# Patient Record
Sex: MALE | Race: WHITE | NOT HISPANIC OR LATINO | Employment: OTHER | URBAN - METROPOLITAN AREA
[De-identification: names, ages, dates, MRNs, and addresses within clinical notes are randomized per-mention and may not be internally consistent; named-entity substitution may affect disease eponyms.]

---

## 2018-06-13 ENCOUNTER — OFFICE VISIT (OUTPATIENT)
Dept: GASTROENTEROLOGY | Facility: CLINIC | Age: 73
End: 2018-06-13
Payer: MEDICARE

## 2018-06-13 VITALS
HEART RATE: 86 BPM | BODY MASS INDEX: 26.35 KG/M2 | HEIGHT: 71 IN | SYSTOLIC BLOOD PRESSURE: 132 MMHG | WEIGHT: 188.19 LBS | DIASTOLIC BLOOD PRESSURE: 75 MMHG | TEMPERATURE: 98 F

## 2018-06-13 DIAGNOSIS — Z12.11 ENCOUNTER FOR SCREENING COLONOSCOPY: Primary | ICD-10-CM

## 2018-06-13 DIAGNOSIS — E78.5 HYPERLIPIDEMIA, UNSPECIFIED HYPERLIPIDEMIA TYPE: ICD-10-CM

## 2018-06-13 DIAGNOSIS — E11.9 TYPE 2 DIABETES MELLITUS WITHOUT COMPLICATION, WITHOUT LONG-TERM CURRENT USE OF INSULIN: ICD-10-CM

## 2018-06-13 DIAGNOSIS — Z90.49 HX OF APPENDECTOMY: ICD-10-CM

## 2018-06-13 PROCEDURE — 99205 OFFICE O/P NEW HI 60 MIN: CPT | Mod: ,,, | Performed by: INTERNAL MEDICINE

## 2018-06-13 RX ORDER — POLYETHYLENE GLYCOL 3350, SODIUM SULFATE ANHYDROUS, SODIUM BICARBONATE, SODIUM CHLORIDE, POTASSIUM CHLORIDE 236; 22.74; 6.74; 5.86; 2.97 G/4L; G/4L; G/4L; G/4L; G/4L
4 POWDER, FOR SOLUTION ORAL ONCE
Qty: 1 BOTTLE | Refills: 0 | Status: SHIPPED | OUTPATIENT
Start: 2018-06-13 | End: 2018-06-13

## 2018-06-13 RX ORDER — LOSARTAN POTASSIUM 50 MG/1
50 TABLET ORAL DAILY
COMMUNITY
End: 2020-09-07

## 2018-06-13 NOTE — PROGRESS NOTES
[facility] New Patient Visit    Subjective:           PCP: Primary Doctor No    Referring Provider: No ref. provider found    Chief Complaint: Colonoscopy and Hemorrhoids       HPI:  Jamil Cadet is a 73 y.o. male here for evaluation of the necessity  for a colonoscopy. No history of colon cancer. Has cardiac problems in the family. No history of dysphagia, odynophagia or early satiety. Denies having hematemesis fever chills or rigors. Patient has type 2 diabetes mellitus and has had a appendectomy and back surgery.     ROS:  Constitutional: No fevers, chills, weight loss  ENT: No allergies, sore throat, rhinorrhea  CV: No chest pain, palpitations, edema  Pulm: No cough, shortness of breath, wheezing  Ophtho: No vision changes  GI: No blood in stools, change in bowel habits, nausea/vomiting  Denies alternating diarrhea/constipation.   Derm: No rash  Heme: No easy bruising or lymphadenopathy  MSK: No arthralgias or myalgias  : No dysuria, hematuria, frequency, polyuria, or flank tenderness  Endo: No hot or cold intolerance  Neuro: No syncope or seizure, or dizziness  Psych: No hallucination, depression or suicidal ideation      Medical History:  has a past medical history of Diabetes mellitus, type 2.      Surgical History:  has a past surgical history that includes Appendectomy and Spine surgery.    Family History:   Family History   Problem Relation Age of Onset    Heart disease Mother     Heart disease Father     Stroke Father     Heart disease Maternal Grandfather     Heart disease Paternal Grandmother     Heart disease Paternal Grandfather        Social History:   Social History   Substance Use Topics    Smoking status: Never Smoker    Smokeless tobacco: Never Used    Alcohol use No       The patient's social and family histories were reviewed by me and updated in the appropriate section of the electronic medical record.    Allergies: Review of patient's allergies indicates:  No Known  "Allergies    Medications:   Current Outpatient Prescriptions   Medication Sig Dispense Refill    aspirin (ENTERIC COATED ASPIRIN) 81 MG EC tablet Take by mouth. 1 Tablet, Delayed Release (E.C.) Oral Every day      cinnamon bark 500 mg capsule Take 500 mg by mouth 2 (two) times daily.        clonazepam (KLONOPIN) 1 MG tablet Take 1 mg by mouth 2 (two) times daily as needed.        glimepiride (AMARYL) 4 MG tablet Take by mouth. Half Tablet Oral Every day      losartan (COZAAR) 50 MG tablet Take 50 mg by mouth once daily.      MULTIVITAMIN W-MINERALS/LUTEIN (CENTRUM SILVER ORAL) Take 1 capsule by mouth.        rosuvastatin (CRESTOR) 10 MG tablet Take by mouth. 1 Tablet Oral Every day      sitagliptan-metformin (JANUMET) 50-1,000 mg per tablet Take by mouth. 1 Tablet Oral Twice a day        No current facility-administered medications for this visit.      All medications and past history have been reviewed.        Objective:        Vital Signs:  Blood pressure 132/75, pulse 86, temperature 98.1 °F (36.7 °C), height 5' 11" (1.803 m), weight 85.4 kg (188 lb 3.2 oz). Body mass index is 26.25 kg/m².    Physical Exam:   General Appearance: Well appearing in no acute distress, well developed, well                 nourished  Head: Normocephalic, without obvious abnormality  Eyes:  Pupils equal, round and reactive to light  Throat: Lips, mucosa, and tongue normal; teeth and gums normal  Lungs: CTA bilaterally in anterior and posterior fields, no wheezes, no crackles  Heart:  Regular rate and rhythm, no murmurs heard  Abdomen: Soft, non tender, non distended with positive bowel sounds in all four quadrants. No hepatosplenomegaly, ascites, or mass. Negative for succusion splash  : male   Extremities: No cyanosis, edema or deformity  Skin: No rash  Neurologic: Normal exam      Labs/Imaging:  All lab results and imaging results have been reviewed and discussed with the patient.    Assessment/Plan:    Assessment:       No " diagnosis found.    Plan:       There are no diagnoses linked to this encounter.    See HPI    No Follow-up on file.    The plan was discussed with the patient and all questions/concerns have been answered to the patient's satisfaction.    CC: No ref. provider found    Electronically signed by Jose Ward MD    This note was dictated using voice recognition software and may contain grammatical errors.

## 2018-08-13 DIAGNOSIS — Z12.11 ENCOUNTER FOR SCREENING COLONOSCOPY: Primary | ICD-10-CM

## 2018-08-13 RX ORDER — POLYETHYLENE GLYCOL 3350, SODIUM SULFATE ANHYDROUS, SODIUM BICARBONATE, SODIUM CHLORIDE, POTASSIUM CHLORIDE 236; 22.74; 6.74; 5.86; 2.97 G/4L; G/4L; G/4L; G/4L; G/4L
4 POWDER, FOR SOLUTION ORAL ONCE
Qty: 1 BOTTLE | Refills: 0 | Status: SHIPPED | OUTPATIENT
Start: 2018-08-13 | End: 2018-08-13

## 2018-08-13 NOTE — TELEPHONE ENCOUNTER
----- Message from Natalee Lindsey sent at 8/13/2018 10:36 AM CDT -----  Contact: Meron; wife  Pt has a colonoscopy scheduled for Friday and needs the prep sent to the pharmacy in [place]

## 2019-01-31 ENCOUNTER — OFFICE VISIT (OUTPATIENT)
Dept: GASTROENTEROLOGY | Facility: CLINIC | Age: 74
End: 2019-01-31
Payer: MEDICARE

## 2019-01-31 VITALS
TEMPERATURE: 98 F | DIASTOLIC BLOOD PRESSURE: 68 MMHG | WEIGHT: 185.38 LBS | SYSTOLIC BLOOD PRESSURE: 112 MMHG | RESPIRATION RATE: 16 BRPM | BODY MASS INDEX: 25.86 KG/M2 | HEART RATE: 76 BPM

## 2019-01-31 DIAGNOSIS — N18.30 CKD (CHRONIC KIDNEY DISEASE), STAGE III: ICD-10-CM

## 2019-01-31 DIAGNOSIS — E80.6 HYPERBILIRUBINEMIA: ICD-10-CM

## 2019-01-31 DIAGNOSIS — I10 ESSENTIAL HYPERTENSION: ICD-10-CM

## 2019-01-31 DIAGNOSIS — E11.9 TYPE 2 DIABETES MELLITUS WITHOUT COMPLICATION, WITHOUT LONG-TERM CURRENT USE OF INSULIN: ICD-10-CM

## 2019-01-31 DIAGNOSIS — E78.5 HYPERLIPIDEMIA, UNSPECIFIED HYPERLIPIDEMIA TYPE: ICD-10-CM

## 2019-01-31 DIAGNOSIS — D53.9 NUTRITIONAL ANEMIA: ICD-10-CM

## 2019-01-31 DIAGNOSIS — D64.9 ANEMIA, UNSPECIFIED TYPE: Primary | ICD-10-CM

## 2019-01-31 LAB
AMYLASE SERPL-CCNC: 180 U/L (ref 28–100)
CRP SERPL-MCNC: 0.14 MG/DL (ref 0–1.4)
FERRITIN SERPL-MCNC: 24 NG/ML (ref 37–201)
FOLATE SERPL-MCNC: >24.8 NG/ML (ref 2.2–11.2)
IRON: 86 MCG/DL (ref 32–176)
LIPASE SERPL-CCNC: 39 U/L (ref 0–38)
MAGNESIUM SERPL-MCNC: 1.6 MG/DL (ref 1.5–2.6)
VITAMIN B12: 366 PG/ML (ref 62–940)

## 2019-01-31 PROCEDURE — 99215 PR OFFICE/OUTPT VISIT, EST, LEVL V, 40-54 MIN: ICD-10-PCS | Mod: ,,, | Performed by: INTERNAL MEDICINE

## 2019-01-31 PROCEDURE — 99215 OFFICE O/P EST HI 40 MIN: CPT | Mod: ,,, | Performed by: INTERNAL MEDICINE

## 2019-01-31 RX ORDER — CALCIUM CITRATE/VITAMIN D3 200MG-6.25
TABLET ORAL
COMMUNITY
Start: 2018-10-29 | End: 2021-11-08 | Stop reason: SDUPTHER

## 2019-01-31 RX ORDER — CLONAZEPAM 2 MG/1
2 TABLET ORAL NIGHTLY
COMMUNITY
Start: 2018-10-25 | End: 2020-10-22 | Stop reason: SDUPTHER

## 2019-02-01 LAB — HEPATITIS B CORE AB TOTAL: NEGATIVE

## 2019-02-04 LAB
ALBUMIN SERPL-MCNC: 3.8 G/DL (ref 2.9–4.4)
ALBUMIN/GLOB SERPL ELPH: 1.3 {RATIO} (ref 0.7–1.7)
ALPHA 1 GLOBULIN/PROTEIN TOTAL: 0.2 G/DL (ref 0–0.4)
ALPHA 2 GLOBULIN/PROTEIN TOTAL: 0.7 G/DL (ref 0.4–1)
B-GLOBULIN FLD ELPH-MCNC: 0.9 G/DL (ref 0.7–1.3)
GAMMA GLOB FLD ELPH-MCNC: 1.2 G/DL (ref 0.4–1.8)
GLOBULIN SER CALC-MCNC: 3 G/DL (ref 2.2–3.9)
Lab: NORMAL
M PROTEIN MFR UR ELPH: NORMAL G/DL
PROT SERPL-MCNC: 6.8 G/DL (ref 6–8.5)

## 2019-02-15 DIAGNOSIS — R93.89 ABNORMAL ULTRASOUND: Primary | ICD-10-CM

## 2019-02-15 DIAGNOSIS — R10.84 GENERALIZED ABDOMINAL PAIN: ICD-10-CM

## 2019-02-17 NOTE — PROGRESS NOTES
[facility] Established Patient Visit    Subjective:           PCP: Primary Doctor No    Chief Complaint: Follow-up       HPI:  Jamil Cadet is a 73 y.o. male here for follow up. Patient's lab work was reviewed in detail and he has a hemoglobin A1C of 6.6. Patient is IGG antibody is positive. Patient is anemic. MCV is 93.1. Patient is CKD stage III. UA is negative. Hyperbilirubinemia of uncertain clinical significance. PSA is WNL. Results of the colonoscopy reviewed.       ROS:   Constitutional: No fevers, chills, weight loss  ENT: No allergies, sore throat, rhinorrhea  CV: No chest pain, palpitations, edema  Pulm: No cough, shortness of breath, wheezing  Ophtho: No vision changes  GI: No blood in stools, change in bowel habits, nausea/vomiting  Denies alternating diarrhea/constipation.   Derm: No rash  Heme: No easy bruising or lymphadenopathy  MSK: No arthralgias or myalgias  : No dysuria, hematuria, frequency, polyuria, or flank tenderness  Endo: No hot or cold intolerance  Neuro: No syncope or seizure, or dizziness  Psych: No hallucination, depression or suicidal ideation      Medical History:  has a past medical history of Diabetes mellitus, type 2.      Surgical History:  has a past surgical history that includes Appendectomy and Spine surgery.    Family History:   Family History   Problem Relation Age of Onset    Heart disease Mother     Heart disease Father     Stroke Father     Heart disease Maternal Grandfather     Heart disease Paternal Grandmother     Heart disease Paternal Grandfather        Social History:   Social History     Tobacco Use    Smoking status: Never Smoker    Smokeless tobacco: Never Used   Substance Use Topics    Alcohol use: No    Drug use: No       The patient's social and family histories were reviewed by me and updated in the appropriate section of the electronic medical record.    Allergies: Review of patient's allergies indicates:  No Known  Allergies      Medications:   Current Outpatient Medications   Medication Sig Dispense Refill    aspirin (ENTERIC COATED ASPIRIN) 81 MG EC tablet Take by mouth. 1 Tablet, Delayed Release (E.C.) Oral Every day      clonazePAM (KLONOPIN) 2 MG Tab       glimepiride (AMARYL) 4 MG tablet Take by mouth. Half Tablet Oral Every day      losartan (COZAAR) 50 MG tablet Take 50 mg by mouth once daily.      MULTIVITAMIN W-MINERALS/LUTEIN (CENTRUM SILVER ORAL) Take 1 capsule by mouth.        rosuvastatin (CRESTOR) 10 MG tablet Take by mouth. 1 Tablet Oral Every day      sitagliptan-metformin (JANUMET) 50-1,000 mg per tablet Take by mouth. 1 Tablet Oral Twice a day       TRUE METRIX GLUCOSE TEST STRIP Strp        No current facility-administered medications for this visit.      All medications and past history have been reviewed.        Objective:        Vital Signs:  Blood pressure 112/68, pulse 76, temperature 97.8 °F (36.6 °C), resp. rate 16, weight 84.1 kg (185 lb 6.4 oz). Body mass index is 25.86 kg/m².    Physical Exam:   General Appearance: Well appearing in no acute distress, well developed, well                nourished  Head: Normocephalic, without obvious abnormality  Eyes:  Pupils equal, round and reactive to light  Throat: Lips, mucosa, and tongue normal; teeth and gums normal  Lungs: CTA bilaterally in anterior and posterior fields, no wheezes, no crackles  Heart:  Regular rate and rhythm, no murmurs heard  Abdomen: Soft, non tender, non distended with positive bowel sounds in all four quadrants. No hepatosplenomegaly, ascites, or mass. Negative for succusion splash  : male   Extremities: No cyanosis, edema or deformity  Skin: No rash  Neurologic: Normal exam    Labs:  Lab Results   Component Value Date    CREATININE 1.1 01/27/2009    BUN 19 01/27/2009       Imaging:     All lab results and imaging results have been reviewed and discussed with the patient      Assessment:       1. Anemia, unspecified type     2. Nutritional anemia         3. DM II  4. CKD Stage III  5.Hld  6. Htn  See visit diagnoses  Plan:       Jamil was seen today for follow-up.    Diagnoses and all orders for this visit:    Anemia, unspecified type  -     Ferritin; Future  -     Folate; Future  -     Transferrin; Future  -     Vitamin B12; Future  -     Iron; Future  -     ESR (SEDIMENTATION RATE, MANUAL); Future  -     C-reactive protein; Future  -     Amylase; Future  -     HEPATITIS B CORE ANTIBODY, TOTAL; Future  -     HEPATITIS B E ANTIBODY; Future  -     HEPATITIS C RNA, QUANTITATIVE, PCR; Future  -     Lipase; Future  -     Magnesium; Future  -     PROTEIN ELECTROPHORESIS, SERUM; Future  -     TISSUE TRANSGLUTAMINASE (TTG), IGA; Future  -     Zinc; Future  -     Hematopath Consult, Peripheral Smear  -     US Abdomen Complete  -     Ferritin  -     Folate  -     Transferrin  -     Vitamin B12  -     Iron  -     ESR (SEDIMENTATION RATE, MANUAL)  -     C-reactive protein  -     Amylase  -     HEPATITIS B CORE ANTIBODY, TOTAL  -     HEPATITIS B E ANTIBODY  -     HEPATITIS C RNA, QUANTITATIVE, PCR  -     Lipase  -     Magnesium  -     PROTEIN ELECTROPHORESIS, SERUM  -     TISSUE TRANSGLUTAMINASE (TTG), IGA  -     Zinc    Nutritional anemia   -     Folate; Future  -     Vitamin B12; Future  -     Folate  -     Vitamin B12    Other orders  -     UNKNOWN [facility] LAB  -     Hepatitis Be Antibody        See HPI    No Follow-up on file.    The plan was discussed with the patient and all questions/concerns have been answered to the patient's satisfaction.        Electronically signed by Jose Ward MD    This note was dictated using voice recognition software and may contain grammatical errors.

## 2019-02-18 DIAGNOSIS — R10.84 GENERALIZED ABDOMINAL PAIN: ICD-10-CM

## 2019-02-21 ENCOUNTER — TELEPHONE (OUTPATIENT)
Dept: GASTROENTEROLOGY | Facility: CLINIC | Age: 74
End: 2019-02-21

## 2019-02-21 DIAGNOSIS — C64.9 MALIGNANT NEOPLASM OF KIDNEY, UNSPECIFIED LATERALITY: Primary | ICD-10-CM

## 2019-02-21 LAB — CREATININE: 1.28 MG/DL (ref 0.6–1.4)

## 2019-02-26 ENCOUNTER — OFFICE VISIT (OUTPATIENT)
Dept: GASTROENTEROLOGY | Facility: CLINIC | Age: 74
End: 2019-02-26
Payer: MEDICARE

## 2019-02-26 VITALS
RESPIRATION RATE: 16 BRPM | WEIGHT: 183.63 LBS | DIASTOLIC BLOOD PRESSURE: 66 MMHG | SYSTOLIC BLOOD PRESSURE: 118 MMHG | HEART RATE: 87 BPM | HEIGHT: 71 IN | BODY MASS INDEX: 25.71 KG/M2 | TEMPERATURE: 97 F

## 2019-02-26 DIAGNOSIS — E78.5 HYPERLIPIDEMIA, UNSPECIFIED HYPERLIPIDEMIA TYPE: ICD-10-CM

## 2019-02-26 DIAGNOSIS — K21.9 GASTROESOPHAGEAL REFLUX DISEASE, ESOPHAGITIS PRESENCE NOT SPECIFIED: Primary | ICD-10-CM

## 2019-02-26 DIAGNOSIS — E11.9 TYPE 2 DIABETES MELLITUS WITHOUT COMPLICATION, WITHOUT LONG-TERM CURRENT USE OF INSULIN: ICD-10-CM

## 2019-02-26 DIAGNOSIS — I10 ESSENTIAL HYPERTENSION: ICD-10-CM

## 2019-02-26 DIAGNOSIS — C64.9 CARCINOMA OF KIDNEY, UNSPECIFIED LATERALITY: ICD-10-CM

## 2019-02-26 DIAGNOSIS — R10.84 GENERALIZED ABDOMINAL PAIN: ICD-10-CM

## 2019-02-26 DIAGNOSIS — R93.89 ABNORMAL ULTRASOUND: ICD-10-CM

## 2019-02-26 DIAGNOSIS — E80.6 HYPERBILIRUBINEMIA: ICD-10-CM

## 2019-02-26 DIAGNOSIS — D64.9 ANEMIA, UNSPECIFIED TYPE: ICD-10-CM

## 2019-02-26 PROCEDURE — 99215 PR OFFICE/OUTPT VISIT, EST, LEVL V, 40-54 MIN: ICD-10-PCS | Mod: ,,, | Performed by: INTERNAL MEDICINE

## 2019-02-26 PROCEDURE — 99215 OFFICE O/P EST HI 40 MIN: CPT | Mod: ,,, | Performed by: INTERNAL MEDICINE

## 2019-02-26 NOTE — PROGRESS NOTES
[facility] Established Patient Visit    Subjective:           PCP: Primary Doctor No    Chief Complaint: Follow-up and Results (MRI)       HPI:  Jamil Cadet is a 73 y.o. male here for follow-up of his recent CT scan and MRI. This was reviewed with the radiologist under direct vision and there is indeed a tumor in the kidney. He also has gallstones and is relatively asymptomatic. Patient has no history of hematuria or flank pain. Case was discussed with Dr. Danielle. Patient could be a candidate for partial nephrectomy. Patient denies any history of dysphagia, odynophagia, early satiety, hematemesis, fever, chills, or rigors. Patient will be followed up after surgery.       ROS:   Constitutional: No fevers, chills, weight loss  ENT: No allergies, sore throat, rhinorrhea  CV: No chest pain, palpitations, edema  Pulm: No cough, shortness of breath, wheezing  Ophtho: No vision changes  GI: No blood in stools, change in bowel habits, nausea/vomiting  Denies alternating diarrhea/constipation.   Derm: No rash  Heme: No easy bruising or lymphadenopathy  MSK: No arthralgias or myalgias  : No dysuria, hematuria, frequency, polyuria, or flank tenderness  Endo: No hot or cold intolerance  Neuro: No syncope or seizure, or dizziness  Psych: No hallucination, depression or suicidal ideation      Medical History:  has a past medical history of Diabetes mellitus, type 2.      Surgical History:  has a past surgical history that includes Appendectomy and Spine surgery.    Family History:   Family History   Problem Relation Age of Onset    Heart disease Mother     Heart disease Father     Stroke Father     Heart disease Maternal Grandfather     Heart disease Paternal Grandmother     Heart disease Paternal Grandfather        Social History:   Social History     Tobacco Use    Smoking status: Never Smoker    Smokeless tobacco: Never Used   Substance Use Topics    Alcohol use: No    Drug use: No       The  "patient's social and family histories were reviewed by me and updated in the appropriate section of the electronic medical record.    Allergies: Review of patient's allergies indicates:  No Known Allergies      Medications:   Current Outpatient Medications   Medication Sig Dispense Refill    aspirin (ENTERIC COATED ASPIRIN) 81 MG EC tablet Take by mouth. 1 Tablet, Delayed Release (E.C.) Oral Every day      clonazePAM (KLONOPIN) 2 MG Tab       glimepiride (AMARYL) 4 MG tablet Take by mouth. Half Tablet Oral Every day      losartan (COZAAR) 50 MG tablet Take 50 mg by mouth once daily.      MULTIVITAMIN W-MINERALS/LUTEIN (CENTRUM SILVER ORAL) Take 1 capsule by mouth.        rosuvastatin (CRESTOR) 10 MG tablet Take by mouth. 1 Tablet Oral Every day      sitagliptan-metformin (JANUMET) 50-1,000 mg per tablet Take by mouth. 1 Tablet Oral Twice a day       TRUE METRIX GLUCOSE TEST STRIP Strp        No current facility-administered medications for this visit.      All medications and past history have been reviewed.        Objective:        Vital Signs:  Blood pressure 118/66, pulse 87, temperature 97.4 °F (36.3 °C), resp. rate 16, height 5' 11" (1.803 m), weight 83.3 kg (183 lb 9.6 oz). Body mass index is 25.61 kg/m².    Physical Exam:   General Appearance: Well appearing in no acute distress, well developed, well                nourished  Head: Normocephalic, without obvious abnormality  Eyes:  Pupils equal, round and reactive to light  Throat: Lips, mucosa, and tongue normal; teeth and gums normal  Lungs: CTA bilaterally in anterior and posterior fields, no wheezes, no crackles  Heart:  Regular rate and rhythm, no murmurs heard  Abdomen: Soft, non tender, non distended with positive bowel sounds in all four quadrants. No hepatosplenomegaly, ascites, or mass. Negative for succusion splash  : male   Extremities: No cyanosis, edema or deformity  Skin: No rash  Neurologic: Normal exam    Labs:  Lab Results "   Component Value Date    CREATININE 1.28 02/21/2019    BUN 19 01/27/2009       Imaging:     All lab results and imaging results have been reviewed and discussed with the patient      Assessment:       No diagnosis found.    1. GERD  2. Carcinoma of kidnety  3. Abnormal US  See visit diagnoses  Plan:       There are no diagnoses linked to this encounter.    See HPI    No Follow-up on file.    The plan was discussed with the patient and all questions/concerns have been answered to the patient's satisfaction.        Electronically signed by Jose Ward MD    This note was dictated using voice recognition software and may contain grammatical errors.

## 2019-04-04 ENCOUNTER — TELEPHONE (OUTPATIENT)
Dept: GASTROENTEROLOGY | Facility: CLINIC | Age: 74
End: 2019-04-04

## 2019-04-04 NOTE — TELEPHONE ENCOUNTER
----- Message from Ceci Cross sent at 4/4/2019 11:24 AM CDT -----  Contact: socorro-wife  The surgery went really well.  They were able to save about 90-95% of his kidney.  Really liked Dr Santos.  They want to thank Dr Ward so much for everything he has done for them.

## 2019-07-23 ENCOUNTER — OFFICE VISIT (OUTPATIENT)
Dept: GASTROENTEROLOGY | Facility: CLINIC | Age: 74
End: 2019-07-23
Payer: MEDICARE

## 2019-07-23 VITALS
DIASTOLIC BLOOD PRESSURE: 77 MMHG | TEMPERATURE: 98 F | SYSTOLIC BLOOD PRESSURE: 144 MMHG | HEART RATE: 77 BPM | BODY MASS INDEX: 25.01 KG/M2 | WEIGHT: 178.63 LBS | RESPIRATION RATE: 16 BRPM | HEIGHT: 71 IN

## 2019-07-23 DIAGNOSIS — E80.6 HYPERBILIRUBINEMIA: ICD-10-CM

## 2019-07-23 DIAGNOSIS — K21.9 GASTROESOPHAGEAL REFLUX DISEASE, ESOPHAGITIS PRESENCE NOT SPECIFIED: ICD-10-CM

## 2019-07-23 DIAGNOSIS — C64.9 RENAL CELL CARCINOMA, UNSPECIFIED LATERALITY: Primary | ICD-10-CM

## 2019-07-23 DIAGNOSIS — E11.9 TYPE 2 DIABETES MELLITUS WITHOUT COMPLICATION, WITHOUT LONG-TERM CURRENT USE OF INSULIN: ICD-10-CM

## 2019-07-23 DIAGNOSIS — E78.5 HYPERLIPIDEMIA, UNSPECIFIED HYPERLIPIDEMIA TYPE: ICD-10-CM

## 2019-07-23 DIAGNOSIS — I10 ESSENTIAL HYPERTENSION: ICD-10-CM

## 2019-07-23 DIAGNOSIS — N18.30 CKD (CHRONIC KIDNEY DISEASE), STAGE III: ICD-10-CM

## 2019-07-23 DIAGNOSIS — D50.9 IRON DEFICIENCY ANEMIA, UNSPECIFIED IRON DEFICIENCY ANEMIA TYPE: ICD-10-CM

## 2019-07-23 PROCEDURE — 99215 PR OFFICE/OUTPT VISIT, EST, LEVL V, 40-54 MIN: ICD-10-PCS | Mod: ,,, | Performed by: INTERNAL MEDICINE

## 2019-07-23 PROCEDURE — 99215 OFFICE O/P EST HI 40 MIN: CPT | Mod: ,,, | Performed by: INTERNAL MEDICINE

## 2019-07-23 NOTE — PROGRESS NOTES
[facility] Established Patient Visit    Subjective:           PCP: Primary Doctor No    Chief Complaint: Follow-up (S/P Renal Mass)       HPI:  Jamil Cadet is a 74 y.o. male here for follow-up of his renal tumor. Patient is anemic and is recently postop so we'll keep an eye on his anemia. Patient denies history of dysphagia, odynophagia, early satiety, hematemesis, fever, chills, or rigors. Labs were reviewed in detail. His hemoglobin A1c is elevated and needs to be brought down. A detailed physical examination is noncontributory. There is no history of urinary problems. Patient is gong to have repeat imaging in 6 months. Will closely follow the patients diabetes and see how he does.       ROS:   Constitutional: No fevers, chills, weight loss  ENT: No allergies, sore throat, rhinorrhea  CV: No chest pain, palpitations, edema  Pulm: No cough, shortness of breath, wheezing  Ophtho: No vision changes  GI: No blood in stools, change in bowel habits, nausea/vomiting  Denies alternating diarrhea/constipation.   Derm: No rash  Heme: No easy bruising or lymphadenopathy  MSK: No arthralgias or myalgias  : No dysuria, hematuria, frequency, polyuria, or flank tenderness  Endo: No hot or cold intolerance  Neuro: No syncope or seizure, or dizziness  Psych: No hallucination, depression or suicidal ideation      Medical History:  has a past medical history of Diabetes mellitus, type 2.      Surgical History:  has a past surgical history that includes Appendectomy and Spine surgery.    Family History:   Family History   Problem Relation Age of Onset    Heart disease Mother     Heart disease Father     Stroke Father     Heart disease Maternal Grandfather     Heart disease Paternal Grandmother     Heart disease Paternal Grandfather        Social History:   Social History     Tobacco Use    Smoking status: Never Smoker    Smokeless tobacco: Never Used   Substance Use Topics    Alcohol use: No    Drug use:  "No       The patient's social and family histories were reviewed by me and updated in the appropriate section of the electronic medical record.    Allergies: Review of patient's allergies indicates:  No Known Allergies      Medications:   Current Outpatient Medications   Medication Sig Dispense Refill    aspirin (ENTERIC COATED ASPIRIN) 81 MG EC tablet Take by mouth. 1 Tablet, Delayed Release (E.C.) Oral Every day      clonazePAM (KLONOPIN) 2 MG Tab       glimepiride (AMARYL) 4 MG tablet Take by mouth. Half Tablet Oral Every day      losartan (COZAAR) 50 MG tablet Take 50 mg by mouth once daily.      MULTIVITAMIN W-MINERALS/LUTEIN (CENTRUM SILVER ORAL) Take 1 capsule by mouth.        rosuvastatin (CRESTOR) 10 MG tablet Take by mouth. 1 Tablet Oral Every day      sitagliptan-metformin (JANUMET) 50-1,000 mg per tablet Take by mouth. 1 Tablet Oral Twice a day       TRUE METRIX GLUCOSE TEST STRIP Strp        No current facility-administered medications for this visit.      All medications and past history have been reviewed.        Objective:        Vital Signs:  Blood pressure (!) 144/77, pulse 77, temperature 97.8 °F (36.6 °C), resp. rate 16, height 5' 11" (1.803 m), weight 81 kg (178 lb 9.6 oz). Body mass index is 24.91 kg/m².    Physical Exam:   General Appearance: Well appearing in no acute distress, well developed, well                nourished  Head: Normocephalic, without obvious abnormality  Eyes:  Pupils equal, round and reactive to light  Throat: Lips, mucosa, and tongue normal; teeth and gums normal  Lungs: CTA bilaterally in anterior and posterior fields, no wheezes, no crackles  Heart:  Regular rate and rhythm, no murmurs heard  Abdomen: Soft, non tender, non distended with positive bowel sounds in all four quadrants. No hepatosplenomegaly, ascites, or mass. Negative for succusion splash  : male   Extremities: No cyanosis, edema or deformity  Skin: No rash  Neurologic: Normal exam    Labs:  Lab " Results   Component Value Date    CREATININE 1.28 02/21/2019    BUN 19 01/27/2009       Imaging:     All lab results and imaging results have been reviewed and discussed with the patient      Assessment:       No diagnosis found.    Plan:       There are no diagnoses linked to this encounter.    See HPI    No follow-ups on file.    The plan was discussed with the patient and all questions/concerns have been answered to the patient's satisfaction.        Electronically signed by Jose Ward MD    This note was dictated using voice recognition software and may contain grammatical errors.

## 2019-09-04 DIAGNOSIS — N28.89 RIGHT RENAL MASS: Primary | ICD-10-CM

## 2019-09-10 ENCOUNTER — HOSPITAL ENCOUNTER (OUTPATIENT)
Dept: RADIOLOGY | Facility: HOSPITAL | Age: 74
Discharge: HOME | End: 2019-09-10
Attending: UROLOGY
Payer: MEDICARE

## 2019-09-10 DIAGNOSIS — N28.89 RIGHT RENAL MASS: ICD-10-CM

## 2019-09-10 PROCEDURE — 74178 CT ABD&PLV WO CNTR FLWD CNTR: CPT | Mod: TC

## 2019-09-10 PROCEDURE — 25500020 PHARM REV CODE 255: Performed by: INTERNAL MEDICINE

## 2019-09-10 RX ADMIN — IOHEXOL 70 ML: 350 INJECTION, SOLUTION INTRAVENOUS at 11:09

## 2019-10-08 ENCOUNTER — OFFICE VISIT (OUTPATIENT)
Dept: GASTROENTEROLOGY | Facility: CLINIC | Age: 74
End: 2019-10-08
Payer: MEDICARE

## 2019-10-08 VITALS
TEMPERATURE: 97 F | WEIGHT: 178.19 LBS | HEIGHT: 71 IN | SYSTOLIC BLOOD PRESSURE: 133 MMHG | HEART RATE: 93 BPM | BODY MASS INDEX: 24.95 KG/M2 | DIASTOLIC BLOOD PRESSURE: 73 MMHG

## 2019-10-08 DIAGNOSIS — D64.9 ANEMIA, UNSPECIFIED TYPE: Primary | ICD-10-CM

## 2019-10-08 DIAGNOSIS — K80.20 CALCULUS OF GALLBLADDER WITHOUT CHOLECYSTITIS WITHOUT OBSTRUCTION: ICD-10-CM

## 2019-10-08 DIAGNOSIS — Z90.5 H/O PARTIAL NEPHRECTOMY: ICD-10-CM

## 2019-10-08 DIAGNOSIS — K42.9 UMBILICAL HERNIA WITHOUT OBSTRUCTION AND WITHOUT GANGRENE: ICD-10-CM

## 2019-10-08 DIAGNOSIS — E11.9 TYPE 2 DIABETES MELLITUS WITHOUT COMPLICATION, WITHOUT LONG-TERM CURRENT USE OF INSULIN: ICD-10-CM

## 2019-10-08 DIAGNOSIS — E04.1 THYROID NODULE: ICD-10-CM

## 2019-10-08 PROCEDURE — 99215 OFFICE O/P EST HI 40 MIN: CPT | Mod: S$GLB,,, | Performed by: INTERNAL MEDICINE

## 2019-10-08 PROCEDURE — 99215 PR OFFICE/OUTPT VISIT, EST, LEVL V, 40-54 MIN: ICD-10-PCS | Mod: S$GLB,,, | Performed by: INTERNAL MEDICINE

## 2019-10-08 NOTE — PROGRESS NOTES
[facility]    Subjective:           PCP: Mesfin Cisneros    Chief Complaint: Follow-up       HPI:  Jamil Cadet is a 74 y.o. male here for GI and Internal Medicine evaluation and renal evaluation, spent greater than 1 hr with patient, labs reviewed, CT and MRI reviewed, will get ultrasound of the thyroid, will continue to follow patient told about pros and cons gall gallbladder stones.  At this point patient is asymptomatic, no fever,or chills ,no trouble urinating CTs and MRIs reviewed with radiologis,t will continue same management at this time, hemoglobin A1c, anemic.      ROS:   Constitutional: No fevers, chills, weight loss  ENT: No allergies, sore throat, rhinorrhea  CV: No chest pain, palpitations, edema  Pulm: No cough, shortness of breath, wheezing  Ophtho: No vision changes  GI: No blood in stools, change in bowel habits, nausea/vomiting  Denies alternating diarrhea/constipation. gallstones  Derm: No rash  Heme: No easy bruising or lymphadenopathy  anemia  MSK: No arthralgias or myalgias  : No dysuria, hematuria, frequency, polyuria, or flank tenderness partial right nephrectomy,umbilical hernia  Endo: No hot or cold intolerance  Thyroid nodule  Neuro: No syncope or seizure, or dizziness  Psych: No hallucination, depression or suicidal ideation      Medical History:  has a past medical history of Diabetes mellitus, type 2.      Surgical History:  has a past surgical history that includes Appendectomy and Spine surgery.    Family History:   Family History   Problem Relation Age of Onset    Heart disease Mother     Heart disease Father     Stroke Father     Heart disease Maternal Grandfather     Heart disease Paternal Grandmother     Heart disease Paternal Grandfather        Social History:   Social History     Tobacco Use    Smoking status: Never Smoker    Smokeless tobacco: Never Used   Substance Use Topics    Alcohol use: No    Drug use: No       The  "patient's social and family histories were reviewed by me and updated in the appropriate section of the electronic medical record.    Allergies: Review of patient's allergies indicates:  No Known Allergies      Medications:   Current Outpatient Medications   Medication Sig Dispense Refill    aspirin (ENTERIC COATED ASPIRIN) 81 MG EC tablet Take by mouth. 1 Tablet, Delayed Release (E.C.) Oral Every day      clonazePAM (KLONOPIN) 2 MG Tab       glimepiride (AMARYL) 4 MG tablet Take by mouth. Half Tablet Oral Every day      MULTIVITAMIN W-MINERALS/LUTEIN (CENTRUM SILVER ORAL) Take 1 capsule by mouth.        rosuvastatin (CRESTOR) 10 MG tablet Take by mouth. 1 Tablet Oral Every day      sitagliptan-metformin (JANUMET) 50-1,000 mg per tablet Take by mouth. 1 Tablet Oral Twice a day       TRUE METRIX GLUCOSE TEST STRIP Strp       losartan (COZAAR) 50 MG tablet Take 50 mg by mouth once daily.       No current facility-administered medications for this visit.      All medications and past history have been reviewed.        Objective:        Vital Signs:  Blood pressure 133/73, pulse 93, temperature 97.3 °F (36.3 °C), height 5' 11" (1.803 m), weight 80.8 kg (178 lb 3.2 oz). Body mass index is 24.85 kg/m².    Physical Exam:   General Appearance: Well appearing in no acute distress, well developed, well   nourished  Head: Normocephalic, without obvious abnormality  Eyes:  Pupils equal, round and reactive to light  Throat: Lips, mucosa, and tongue normal; teeth and gums normal  Lungs: CTA bilaterally in anterior and posterior fields, no wheezes, no crackles  Heart:  Regular rate and rhythm, no murmurs heard  Abdomen: Soft, non tender, non distended with positive bowel sounds in all four quadrants. No hepatosplenomegaly, ascites, or mass. Negative for succusion splash  : male   Extremities: No cyanosis, edema or deformity  Skin: No rash  Neurologic: Normal exam  Psychiatric: Mood appropriate    Labs:  Lab Results "   Component Value Date    WBC 5.89 09/10/2019    HGB 11.8 (L) 09/10/2019    HCT 36.6 (L) 09/10/2019     09/10/2019    ALT 17 09/10/2019    AST 22 09/10/2019     09/10/2019    K 4.7 09/10/2019     09/10/2019    CREATININE 1.6 (H) 09/10/2019    BUN 28 (H) 09/10/2019    CO2 26 09/10/2019    PSA 0.28 09/10/2019       Imaging:     All lab results and imaging results have been reviewed and discussed with the patient      Assessment:       1. Anemia, unspecified type    2. Calculus of gallbladder without cholecystitis without obstruction    3. Thyroid nodule    4. Type 2 diabetes mellitus without complication, without long-term current use of insulin    5. Umbilical hernia without obstruction and without gangrene    6. H/O partial nephrectomy        Plan:       Jamil was seen today for follow-up.    Diagnoses and all orders for this visit:    Anemia, unspecified type    Calculus of gallbladder without cholecystitis without obstruction    Thyroid nodule  -     US Soft Tissue Head Neck Thyroid; Future    Type 2 diabetes mellitus without complication, without long-term current use of insulin  -     Hemoglobin A1c; Future    Umbilical hernia without obstruction and without gangrene    H/O partial nephrectomy        See HPI    Follow up in about 3 months (around 1/8/2020), or if symptoms worsen or fail to improve.    The plan was discussed with the patient and all questions/concerns have been answered to the patient's satisfaction.        Electronically signed by Jose Ward MD    This note was dictated using voice recognition software and may contain grammatical errors.

## 2019-12-03 ENCOUNTER — HOSPITAL ENCOUNTER (OUTPATIENT)
Dept: RADIOLOGY | Facility: HOSPITAL | Age: 74
Discharge: HOME | End: 2019-12-03
Attending: INTERNAL MEDICINE
Payer: MEDICARE

## 2019-12-03 DIAGNOSIS — E04.1 THYROID NODULE: ICD-10-CM

## 2019-12-03 PROCEDURE — 76536 US EXAM OF HEAD AND NECK: CPT | Mod: TC,PO

## 2019-12-19 ENCOUNTER — OFFICE VISIT (OUTPATIENT)
Dept: GASTROENTEROLOGY | Facility: CLINIC | Age: 74
End: 2019-12-19
Payer: MEDICARE

## 2019-12-19 VITALS
WEIGHT: 180.19 LBS | SYSTOLIC BLOOD PRESSURE: 157 MMHG | HEART RATE: 90 BPM | TEMPERATURE: 98 F | DIASTOLIC BLOOD PRESSURE: 92 MMHG | HEIGHT: 71 IN | BODY MASS INDEX: 25.23 KG/M2

## 2019-12-19 DIAGNOSIS — E78.5 HYPERLIPIDEMIA, UNSPECIFIED HYPERLIPIDEMIA TYPE: ICD-10-CM

## 2019-12-19 DIAGNOSIS — E04.1 THYROID NODULE: ICD-10-CM

## 2019-12-19 DIAGNOSIS — N18.30 CKD (CHRONIC KIDNEY DISEASE), STAGE III: ICD-10-CM

## 2019-12-19 DIAGNOSIS — C64.9 RENAL CELL CARCINOMA, UNSPECIFIED LATERALITY: ICD-10-CM

## 2019-12-19 DIAGNOSIS — Z90.49 HX OF APPENDECTOMY: ICD-10-CM

## 2019-12-19 DIAGNOSIS — K21.9 GASTROESOPHAGEAL REFLUX DISEASE, ESOPHAGITIS PRESENCE NOT SPECIFIED: ICD-10-CM

## 2019-12-19 DIAGNOSIS — I10 ESSENTIAL HYPERTENSION: ICD-10-CM

## 2019-12-19 DIAGNOSIS — E11.9 TYPE 2 DIABETES MELLITUS WITHOUT COMPLICATION, WITHOUT LONG-TERM CURRENT USE OF INSULIN: Primary | ICD-10-CM

## 2019-12-19 DIAGNOSIS — D50.9 IRON DEFICIENCY ANEMIA, UNSPECIFIED IRON DEFICIENCY ANEMIA TYPE: ICD-10-CM

## 2019-12-19 DIAGNOSIS — D64.9 ANEMIA, UNSPECIFIED TYPE: ICD-10-CM

## 2019-12-19 DIAGNOSIS — Z90.5 H/O PARTIAL NEPHRECTOMY: ICD-10-CM

## 2019-12-19 PROCEDURE — 99215 PR OFFICE/OUTPT VISIT, EST, LEVL V, 40-54 MIN: ICD-10-PCS | Mod: S$GLB,,, | Performed by: INTERNAL MEDICINE

## 2019-12-19 PROCEDURE — 1159F MED LIST DOCD IN RCRD: CPT | Mod: S$GLB,,, | Performed by: INTERNAL MEDICINE

## 2019-12-19 PROCEDURE — 1159F PR MEDICATION LIST DOCUMENTED IN MEDICAL RECORD: ICD-10-PCS | Mod: S$GLB,,, | Performed by: INTERNAL MEDICINE

## 2019-12-19 PROCEDURE — 99215 OFFICE O/P EST HI 40 MIN: CPT | Mod: S$GLB,,, | Performed by: INTERNAL MEDICINE

## 2019-12-19 PROCEDURE — 1126F PR PAIN SEVERITY QUANTIFIED, NO PAIN PRESENT: ICD-10-PCS | Mod: S$GLB,,, | Performed by: INTERNAL MEDICINE

## 2019-12-19 PROCEDURE — 1126F AMNT PAIN NOTED NONE PRSNT: CPT | Mod: S$GLB,,, | Performed by: INTERNAL MEDICINE

## 2019-12-19 NOTE — PROGRESS NOTES
[facility] Established Patient Visit    Subjective:           PCP: Mesfin Cisneros    Chief Complaint: No chief complaint on file.       HPI:  Jamil Cadet is a 74 y.o. male here for for follow-up of lab tests. Patient's ultrasound showed only small nodules which can be clinically followed up. Radiological follow-up was not recommended. There are no urological problems at this time. His diabetes is under fairly good control. All labs were reviewed in detail. CBC shows mild anemia and no other positive findings. Patient has minimally low GFR. Hemoglobin A1c is minimally elevated but good for a diabetic. BUN creatinine was reasonable. Patient will follow-up at [facility] and with primary care. A detailed physical exam was noncontributory.     ROS:  GFR  Constitutional: No fevers, chills, weight loss  ENT: No allergies, sore throat, rhinorrhea  CV: No chest pain, palpitations, edema  Pulm: No cough, shortness of breath, wheezing  Ophtho: No vision changes  GI: No blood in stools, change in bowel habits, nausea/vomiting  Denies alternating diarrhea/constipation.   Derm: No rash  Heme: No easy bruising or lymphadenopathy  MSK: No arthralgias or myalgias  : No dysuria, hematuria, frequency, polyuria, or flank tenderness  Endo: No hot or cold intolerance  Neuro: No syncope or seizure, or dizziness  Psych: No hallucination, depression or suicidal ideation      Medical History:  has a past medical history of Diabetes mellitus, type 2.      Surgical History:  has a past surgical history that includes Appendectomy and Spine surgery.    Family History:   Family History   Problem Relation Age of Onset    Heart disease Mother     Heart disease Father     Stroke Father     Heart disease Maternal Grandfather     Heart disease Paternal Grandmother     Heart disease Paternal Grandfather        Social History:   Social History     Tobacco Use    Smoking status: Never Smoker    Smokeless tobacco: Never Used  "  Substance Use Topics    Alcohol use: No    Drug use: No       The patient's social and family histories were reviewed by me and updated in the appropriate section of the electronic medical record.    Allergies: Review of patient's allergies indicates:  No Known Allergies      Medications:   Current Outpatient Medications   Medication Sig Dispense Refill    aspirin (ENTERIC COATED ASPIRIN) 81 MG EC tablet Take by mouth. 1 Tablet, Delayed Release (E.C.) Oral Every day      clonazePAM (KLONOPIN) 2 MG Tab       glimepiride (AMARYL) 4 MG tablet Take by mouth. Half Tablet Oral Every day      losartan (COZAAR) 50 MG tablet Take 50 mg by mouth once daily.      MULTIVITAMIN W-MINERALS/LUTEIN (CENTRUM SILVER ORAL) Take 1 capsule by mouth.        rosuvastatin (CRESTOR) 10 MG tablet Take by mouth. 1 Tablet Oral Every day      sitagliptan-metformin (JANUMET) 50-1,000 mg per tablet Take by mouth. 1 Tablet Oral Twice a day       TRUE METRIX GLUCOSE TEST STRIP Strp        No current facility-administered medications for this visit.      All medications and past history have been reviewed.        Objective:        Vital Signs:  Blood pressure (!) 157/92, pulse 90, temperature 97.5 °F (36.4 °C), height 5' 11" (1.803 m), weight 81.7 kg (180 lb 3.2 oz). Body mass index is 25.13 kg/m².    Physical Exam:   General Appearance: Well appearing in no acute distress, well developed, well                nourished  Head: Normocephalic, without obvious abnormality  Eyes:  Pupils equal, round and reactive to light  Throat: Lips, mucosa, and tongue normal; teeth and gums normal  Lungs: CTA bilaterally in anterior and posterior fields, no wheezes, no crackles  Heart:  Regular rate and rhythm, no murmurs heard  Abdomen: Soft, non tender, non distended with positive bowel sounds in all four quadrants. No hepatosplenomegaly, ascites, or mass. Negative for succusion splash  : male   Extremities: No cyanosis, edema or deformity  Skin: No " rash  Neurologic: Normal exam    Labs:  Lab Results   Component Value Date    WBC 5.89 09/10/2019    HGB 11.8 (L) 09/10/2019    HCT 36.6 (L) 09/10/2019     09/10/2019    ALT 17 09/10/2019    AST 22 09/10/2019     09/10/2019    K 4.7 09/10/2019     09/10/2019    CREATININE 1.6 (H) 09/10/2019    BUN 28 (H) 09/10/2019    CO2 26 09/10/2019    PSA 0.28 09/10/2019    HGBA1C 6.5 (H) 12/03/2019       Imaging:     All lab results and imaging results have been reviewed and discussed with the patient      Assessment:       No diagnosis found.    Plan:       There are no diagnoses linked to this encounter.    See HPI    No follow-ups on file.    The plan was discussed with the patient and all questions/concerns have been answered to the patient's satisfaction.        Electronically signed by Jose Ward MD    This note was dictated using voice recognition software and may contain grammatical errors.

## 2020-04-03 ENCOUNTER — LAB VISIT (OUTPATIENT)
Dept: LAB | Facility: HOSPITAL | Age: 75
End: 2020-04-03
Attending: FAMILY MEDICINE
Payer: MEDICARE

## 2020-04-03 DIAGNOSIS — K29.01 ACUTE GASTRITIS WITH HEMORRHAGE: ICD-10-CM

## 2020-04-03 DIAGNOSIS — E11.9 DIABETES MELLITUS WITHOUT COMPLICATION: ICD-10-CM

## 2020-04-03 DIAGNOSIS — E78.5 HYPERLIPEMIA: Primary | ICD-10-CM

## 2020-04-03 DIAGNOSIS — R35.0 URINARY FREQUENCY: ICD-10-CM

## 2020-04-03 DIAGNOSIS — Z79.899 ENCOUNTER FOR LONG-TERM (CURRENT) USE OF OTHER MEDICATIONS: ICD-10-CM

## 2020-04-03 LAB
ALBUMIN SERPL BCP-MCNC: 3.8 G/DL (ref 3.5–5.2)
ALBUMIN/CREAT UR: 9.3 UG/MG (ref 0–30)
ALP SERPL-CCNC: 75 U/L (ref 55–135)
ALT SERPL W/O P-5'-P-CCNC: 22 U/L (ref 10–44)
ANION GAP SERPL CALC-SCNC: 1 MMOL/L (ref 8–16)
AST SERPL-CCNC: 24 U/L (ref 10–40)
BASOPHILS # BLD AUTO: 0.02 K/UL (ref 0–0.2)
BASOPHILS NFR BLD: 0.3 % (ref 0–1.9)
BILIRUB SERPL-MCNC: 1.5 MG/DL (ref 0.1–1)
BILIRUB UR QL STRIP: NEGATIVE
BUN SERPL-MCNC: 23 MG/DL (ref 8–23)
CALCIUM SERPL-MCNC: 9 MG/DL (ref 8.7–10.5)
CHLORIDE SERPL-SCNC: 106 MMOL/L (ref 95–110)
CLARITY UR: CLEAR
CO2 SERPL-SCNC: 29 MMOL/L (ref 23–29)
COLOR UR: YELLOW
CREAT SERPL-MCNC: 1.5 MG/DL (ref 0.5–1.4)
CREAT UR-MCNC: 97 MG/DL (ref 23–375)
DIFFERENTIAL METHOD: ABNORMAL
EOSINOPHIL # BLD AUTO: 0.3 K/UL (ref 0–0.5)
EOSINOPHIL NFR BLD: 3.9 % (ref 0–8)
ERYTHROCYTE [DISTWIDTH] IN BLOOD BY AUTOMATED COUNT: 12.4 % (ref 11.5–14.5)
EST. GFR  (AFRICAN AMERICAN): 51.2 ML/MIN/1.73 M^2
EST. GFR  (NON AFRICAN AMERICAN): 44.3 ML/MIN/1.73 M^2
ESTIMATED AVG GLUCOSE: 146 MG/DL (ref 68–131)
GLUCOSE SERPL-MCNC: 194 MG/DL (ref 70–110)
GLUCOSE UR QL STRIP: NEGATIVE
HBA1C MFR BLD HPLC: 6.7 % (ref 4.5–6.2)
HCT VFR BLD AUTO: 37.2 % (ref 40–54)
HGB BLD-MCNC: 12.1 G/DL (ref 14–18)
HGB UR QL STRIP: NEGATIVE
IMM GRANULOCYTES # BLD AUTO: 0.02 K/UL (ref 0–0.04)
IMM GRANULOCYTES NFR BLD AUTO: 0.3 % (ref 0–0.5)
KETONES UR QL STRIP: NEGATIVE
LEUKOCYTE ESTERASE UR QL STRIP: NEGATIVE
LYMPHOCYTES # BLD AUTO: 0.9 K/UL (ref 1–4.8)
LYMPHOCYTES NFR BLD: 14.4 % (ref 18–48)
MCH RBC QN AUTO: 30.6 PG (ref 27–31)
MCHC RBC AUTO-ENTMCNC: 32.5 G/DL (ref 32–36)
MCV RBC AUTO: 94 FL (ref 82–98)
MICROALBUMIN UR DL<=1MG/L-MCNC: 9 UG/ML
MONOCYTES # BLD AUTO: 0.6 K/UL (ref 0.3–1)
MONOCYTES NFR BLD: 9.5 % (ref 4–15)
NEUTROPHILS # BLD AUTO: 4.5 K/UL (ref 1.8–7.7)
NEUTROPHILS NFR BLD: 71.6 % (ref 38–73)
NITRITE UR QL STRIP: NEGATIVE
NRBC BLD-RTO: 0 /100 WBC
PH UR STRIP: 6 [PH] (ref 5–8)
PLATELET # BLD AUTO: 192 K/UL (ref 150–350)
PMV BLD AUTO: 10.8 FL (ref 9.2–12.9)
POTASSIUM SERPL-SCNC: 5 MMOL/L (ref 3.5–5.1)
PROT SERPL-MCNC: 6.9 G/DL (ref 6–8.4)
PROT UR QL STRIP: ABNORMAL
RBC # BLD AUTO: 3.95 M/UL (ref 4.6–6.2)
SODIUM SERPL-SCNC: 136 MMOL/L (ref 136–145)
SP GR UR STRIP: 1.01 (ref 1–1.03)
URN SPEC COLLECT METH UR: ABNORMAL
UROBILINOGEN UR STRIP-ACNC: NEGATIVE EU/DL
WBC # BLD AUTO: 6.34 K/UL (ref 3.9–12.7)

## 2020-04-03 PROCEDURE — 81003 URINALYSIS AUTO W/O SCOPE: CPT

## 2020-04-03 PROCEDURE — 82043 UR ALBUMIN QUANTITATIVE: CPT

## 2020-04-03 PROCEDURE — 85025 COMPLETE CBC W/AUTO DIFF WBC: CPT

## 2020-04-03 PROCEDURE — 36415 COLL VENOUS BLD VENIPUNCTURE: CPT

## 2020-04-03 PROCEDURE — 80053 COMPREHEN METABOLIC PANEL: CPT

## 2020-04-03 PROCEDURE — 83036 HEMOGLOBIN GLYCOSYLATED A1C: CPT

## 2020-05-07 DIAGNOSIS — N28.89 KIDNEY MASS: Primary | ICD-10-CM

## 2020-09-06 ENCOUNTER — HOSPITAL ENCOUNTER (INPATIENT)
Facility: HOSPITAL | Age: 75
LOS: 7 days | Discharge: HOME | DRG: 660 | End: 2020-09-14
Attending: EMERGENCY MEDICINE | Admitting: INTERNAL MEDICINE
Payer: MEDICARE

## 2020-09-06 DIAGNOSIS — C64.1 RENAL CELL ADENOCARCINOMA, RIGHT: Chronic | ICD-10-CM

## 2020-09-06 DIAGNOSIS — E87.5 HYPERKALEMIA: ICD-10-CM

## 2020-09-06 DIAGNOSIS — N19 RENAL FAILURE: ICD-10-CM

## 2020-09-06 DIAGNOSIS — R06.02 SHORTNESS OF BREATH: ICD-10-CM

## 2020-09-06 DIAGNOSIS — N19 RENAL FAILURE, UNSPECIFIED CHRONICITY: ICD-10-CM

## 2020-09-06 DIAGNOSIS — N13.30 HYDRONEPHROSIS, UNSPECIFIED HYDRONEPHROSIS TYPE: Primary | ICD-10-CM

## 2020-09-06 DIAGNOSIS — N17.9 ACUTE RENAL FAILURE, UNSPECIFIED ACUTE RENAL FAILURE TYPE: ICD-10-CM

## 2020-09-06 DIAGNOSIS — N17.9 ARF (ACUTE RENAL FAILURE): ICD-10-CM

## 2020-09-06 DIAGNOSIS — E16.2 HYPOGLYCEMIA: ICD-10-CM

## 2020-09-06 PROCEDURE — 82962 GLUCOSE BLOOD TEST: CPT

## 2020-09-06 PROCEDURE — 93010 EKG 12-LEAD: ICD-10-PCS | Mod: ,,, | Performed by: INTERNAL MEDICINE

## 2020-09-06 PROCEDURE — 93005 ELECTROCARDIOGRAM TRACING: CPT | Performed by: INTERNAL MEDICINE

## 2020-09-06 PROCEDURE — 99285 EMERGENCY DEPT VISIT HI MDM: CPT | Mod: 25

## 2020-09-06 PROCEDURE — 93010 ELECTROCARDIOGRAM REPORT: CPT | Mod: ,,, | Performed by: INTERNAL MEDICINE

## 2020-09-06 PROCEDURE — 96375 TX/PRO/DX INJ NEW DRUG ADDON: CPT

## 2020-09-06 PROCEDURE — 96365 THER/PROPH/DIAG IV INF INIT: CPT

## 2020-09-07 PROBLEM — E16.2 HYPOGLYCEMIA: Status: ACTIVE | Noted: 2020-09-07

## 2020-09-07 PROBLEM — E87.1 HYPONATREMIA: Status: RESOLVED | Noted: 2020-09-07 | Resolved: 2020-09-07

## 2020-09-07 PROBLEM — C64.1: Chronic | Status: ACTIVE | Noted: 2020-09-07

## 2020-09-07 PROBLEM — N17.9 ARF (ACUTE RENAL FAILURE): Status: ACTIVE | Noted: 2020-09-07

## 2020-09-07 PROBLEM — E11.9 NON-INSULIN DEPENDENT TYPE 2 DIABETES MELLITUS: Chronic | Status: ACTIVE | Noted: 2020-09-07

## 2020-09-07 PROBLEM — E87.20 METABOLIC ACIDOSIS: Status: ACTIVE | Noted: 2020-09-07

## 2020-09-07 PROBLEM — E87.5 HYPERKALEMIA: Status: ACTIVE | Noted: 2020-09-07

## 2020-09-07 PROBLEM — D64.9 ANEMIA: Chronic | Status: ACTIVE | Noted: 2020-09-07

## 2020-09-07 PROBLEM — E87.1 HYPONATREMIA: Status: ACTIVE | Noted: 2020-09-07

## 2020-09-07 PROBLEM — I10 ESSENTIAL HYPERTENSION: Chronic | Status: ACTIVE | Noted: 2020-09-07

## 2020-09-07 PROBLEM — R06.02 SHORTNESS OF BREATH: Status: ACTIVE | Noted: 2020-09-07

## 2020-09-07 LAB
25(OH)D3+25(OH)D2 SERPL-MCNC: 72 NG/ML (ref 30–96)
ALBUMIN SERPL BCP-MCNC: 3.3 G/DL (ref 3.5–5.2)
ALBUMIN SERPL BCP-MCNC: 3.9 G/DL (ref 3.5–5.2)
ALBUMIN/CREAT UR: 388.2 UG/MG (ref 0–30)
ALP SERPL-CCNC: 48 U/L (ref 55–135)
ALP SERPL-CCNC: 61 U/L (ref 55–135)
ALT SERPL W/O P-5'-P-CCNC: 17 U/L (ref 10–44)
ALT SERPL W/O P-5'-P-CCNC: 21 U/L (ref 10–44)
ANION GAP SERPL CALC-SCNC: 20 MMOL/L (ref 8–16)
ANION GAP SERPL CALC-SCNC: 21 MMOL/L (ref 8–16)
ANION GAP SERPL CALC-SCNC: 23 MMOL/L (ref 8–16)
ANION GAP SERPL CALC-SCNC: 25 MMOL/L (ref 8–16)
ANION GAP SERPL CALC-SCNC: 25 MMOL/L (ref 8–16)
AST SERPL-CCNC: 24 U/L (ref 10–40)
AST SERPL-CCNC: 25 U/L (ref 10–40)
B-OH-BUTYR BLD STRIP-SCNC: 1.1 MMOL/L (ref 0–0.5)
BACTERIA #/AREA URNS HPF: NEGATIVE /HPF
BACTERIA #/AREA URNS HPF: NEGATIVE /HPF
BASOPHILS # BLD AUTO: 0.01 K/UL (ref 0–0.2)
BASOPHILS # BLD AUTO: 0.02 K/UL (ref 0–0.2)
BASOPHILS NFR BLD: 0.1 % (ref 0–1.9)
BASOPHILS NFR BLD: 0.2 % (ref 0–1.9)
BILIRUB SERPL-MCNC: 0.7 MG/DL (ref 0.1–1)
BILIRUB SERPL-MCNC: 1 MG/DL (ref 0.1–1)
BILIRUB UR QL STRIP: NEGATIVE
BNP SERPL-MCNC: 106 PG/ML (ref 0–99)
BUN SERPL-MCNC: 102 MG/DL (ref 8–23)
BUN SERPL-MCNC: 109 MG/DL (ref 8–23)
BUN SERPL-MCNC: 110 MG/DL (ref 8–23)
BUN SERPL-MCNC: 110 MG/DL (ref 8–23)
BUN SERPL-MCNC: 98 MG/DL (ref 8–23)
CALCIUM SERPL-MCNC: 7.3 MG/DL (ref 8.7–10.5)
CALCIUM SERPL-MCNC: 7.8 MG/DL (ref 8.7–10.5)
CALCIUM SERPL-MCNC: 8 MG/DL (ref 8.7–10.5)
CALCIUM SERPL-MCNC: 8.1 MG/DL (ref 8.7–10.5)
CALCIUM SERPL-MCNC: 8.1 MG/DL (ref 8.7–10.5)
CHLORIDE SERPL-SCNC: 100 MMOL/L (ref 95–110)
CHLORIDE SERPL-SCNC: 101 MMOL/L (ref 95–110)
CHLORIDE SERPL-SCNC: 101 MMOL/L (ref 95–110)
CHLORIDE SERPL-SCNC: 108 MMOL/L (ref 95–110)
CHLORIDE SERPL-SCNC: 98 MMOL/L (ref 95–110)
CK SERPL-CCNC: 90 U/L (ref 20–200)
CLARITY UR: CLEAR
CO2 SERPL-SCNC: 10 MMOL/L (ref 23–29)
CO2 SERPL-SCNC: 10 MMOL/L (ref 23–29)
CO2 SERPL-SCNC: 11 MMOL/L (ref 23–29)
CO2 SERPL-SCNC: 12 MMOL/L (ref 23–29)
CO2 SERPL-SCNC: 13 MMOL/L (ref 23–29)
COLOR UR: YELLOW
CREAT SERPL-MCNC: 13.1 MG/DL (ref 0.5–1.4)
CREAT SERPL-MCNC: 14.2 MG/DL (ref 0.5–1.4)
CREAT SERPL-MCNC: 14.2 MG/DL (ref 0.5–1.4)
CREAT SERPL-MCNC: 14.5 MG/DL (ref 0.5–1.4)
CREAT SERPL-MCNC: 14.7 MG/DL (ref 0.5–1.4)
CREAT UR-MCNC: 61 MG/DL (ref 23–375)
CREAT UR-MCNC: 61 MG/DL (ref 23–375)
DIFFERENTIAL METHOD: ABNORMAL
DIFFERENTIAL METHOD: ABNORMAL
EOSINOPHIL # BLD AUTO: 0.1 K/UL (ref 0–0.5)
EOSINOPHIL # BLD AUTO: 0.1 K/UL (ref 0–0.5)
EOSINOPHIL NFR BLD: 0.7 % (ref 0–8)
EOSINOPHIL NFR BLD: 1.8 % (ref 0–8)
ERYTHROCYTE [DISTWIDTH] IN BLOOD BY AUTOMATED COUNT: 13.2 % (ref 11.5–14.5)
ERYTHROCYTE [DISTWIDTH] IN BLOOD BY AUTOMATED COUNT: 13.2 % (ref 11.5–14.5)
EST. GFR  (AFRICAN AMERICAN): 3.2 ML/MIN/1.73 M^2
EST. GFR  (AFRICAN AMERICAN): 3.3 ML/MIN/1.73 M^2
EST. GFR  (AFRICAN AMERICAN): 3.4 ML/MIN/1.73 M^2
EST. GFR  (AFRICAN AMERICAN): 3.4 ML/MIN/1.73 M^2
EST. GFR  (AFRICAN AMERICAN): 3.7 ML/MIN/1.73 M^2
EST. GFR  (NON AFRICAN AMERICAN): 2.8 ML/MIN/1.73 M^2
EST. GFR  (NON AFRICAN AMERICAN): 2.9 ML/MIN/1.73 M^2
EST. GFR  (NON AFRICAN AMERICAN): 3.2 ML/MIN/1.73 M^2
ESTIMATED AVG GLUCOSE: 128 MG/DL (ref 68–131)
FERRITIN SERPL-MCNC: 68 NG/ML (ref 20–300)
GLUCOSE SERPL-MCNC: 117 MG/DL (ref 70–110)
GLUCOSE SERPL-MCNC: 133 MG/DL (ref 70–110)
GLUCOSE SERPL-MCNC: 162 MG/DL (ref 70–110)
GLUCOSE SERPL-MCNC: 166 MG/DL (ref 70–110)
GLUCOSE SERPL-MCNC: 172 MG/DL (ref 70–110)
GLUCOSE SERPL-MCNC: 173 MG/DL (ref 70–110)
GLUCOSE SERPL-MCNC: 220 MG/DL (ref 70–110)
GLUCOSE SERPL-MCNC: 245 MG/DL (ref 70–110)
GLUCOSE SERPL-MCNC: 245 MG/DL (ref 70–110)
GLUCOSE SERPL-MCNC: 339 MG/DL (ref 70–110)
GLUCOSE SERPL-MCNC: 35 MG/DL (ref 70–110)
GLUCOSE SERPL-MCNC: 42 MG/DL (ref 70–110)
GLUCOSE SERPL-MCNC: 47 MG/DL (ref 70–110)
GLUCOSE SERPL-MCNC: 54 MG/DL (ref 70–110)
GLUCOSE SERPL-MCNC: 59 MG/DL (ref 70–110)
GLUCOSE SERPL-MCNC: 78 MG/DL (ref 70–110)
GLUCOSE UR QL STRIP: NEGATIVE
HBA1C MFR BLD HPLC: 6.1 % (ref 4.5–6.2)
HCT VFR BLD AUTO: 30.1 % (ref 40–54)
HCT VFR BLD AUTO: 34 % (ref 40–54)
HGB BLD-MCNC: 10 G/DL (ref 14–18)
HGB BLD-MCNC: 10.9 G/DL (ref 14–18)
HGB UR QL STRIP: ABNORMAL
HYALINE CASTS #/AREA URNS LPF: 11 /LPF
HYALINE CASTS #/AREA URNS LPF: 12 /LPF
IMM GRANULOCYTES # BLD AUTO: 0.04 K/UL (ref 0–0.04)
IMM GRANULOCYTES # BLD AUTO: 0.04 K/UL (ref 0–0.04)
IMM GRANULOCYTES NFR BLD AUTO: 0.4 % (ref 0–0.5)
IMM GRANULOCYTES NFR BLD AUTO: 0.5 % (ref 0–0.5)
INR PPP: 1.3
IRON SERPL-MCNC: 110 UG/DL (ref 45–160)
KETONES UR QL STRIP: NEGATIVE
LACTATE SERPL-SCNC: 4 MMOL/L (ref 0.5–1.9)
LACTATE SERPL-SCNC: 4 MMOL/L (ref 0.5–1.9)
LACTATE SERPL-SCNC: 4.9 MMOL/L (ref 0.5–1.9)
LACTATE SERPL-SCNC: 5 MMOL/L (ref 0.5–1.9)
LEUKOCYTE ESTERASE UR QL STRIP: NEGATIVE
LYMPHOCYTES # BLD AUTO: 0.8 K/UL (ref 1–4.8)
LYMPHOCYTES # BLD AUTO: 1.3 K/UL (ref 1–4.8)
LYMPHOCYTES NFR BLD: 16.9 % (ref 18–48)
LYMPHOCYTES NFR BLD: 8.4 % (ref 18–48)
MCH RBC QN AUTO: 31.4 PG (ref 27–31)
MCH RBC QN AUTO: 31.9 PG (ref 27–31)
MCHC RBC AUTO-ENTMCNC: 32.1 G/DL (ref 32–36)
MCHC RBC AUTO-ENTMCNC: 33.2 G/DL (ref 32–36)
MCV RBC AUTO: 96 FL (ref 82–98)
MCV RBC AUTO: 98 FL (ref 82–98)
MICROALBUMIN UR DL<=1MG/L-MCNC: 236.8 UG/ML
MICROSCOPIC COMMENT: ABNORMAL
MICROSCOPIC COMMENT: ABNORMAL
MONOCYTES # BLD AUTO: 0.9 K/UL (ref 0.3–1)
MONOCYTES # BLD AUTO: 1.1 K/UL (ref 0.3–1)
MONOCYTES NFR BLD: 13.8 % (ref 4–15)
MONOCYTES NFR BLD: 9.2 % (ref 4–15)
NEUTROPHILS # BLD AUTO: 5.2 K/UL (ref 1.8–7.7)
NEUTROPHILS # BLD AUTO: 7.7 K/UL (ref 1.8–7.7)
NEUTROPHILS NFR BLD: 66.9 % (ref 38–73)
NEUTROPHILS NFR BLD: 81.1 % (ref 38–73)
NITRITE UR QL STRIP: NEGATIVE
NRBC BLD-RTO: 0 /100 WBC
NRBC BLD-RTO: 0 /100 WBC
PH UR STRIP: 5 [PH] (ref 5–8)
PHOSPHATE SERPL-MCNC: 10.7 MG/DL (ref 2.7–4.5)
PLATELET # BLD AUTO: 183 K/UL (ref 150–350)
PLATELET # BLD AUTO: 197 K/UL (ref 150–350)
PMV BLD AUTO: 10.1 FL (ref 9.2–12.9)
PMV BLD AUTO: 10.4 FL (ref 9.2–12.9)
POTASSIUM SERPL-SCNC: 4.9 MMOL/L (ref 3.5–5.1)
POTASSIUM SERPL-SCNC: 5.2 MMOL/L (ref 3.5–5.1)
POTASSIUM SERPL-SCNC: 5.8 MMOL/L (ref 3.5–5.1)
POTASSIUM SERPL-SCNC: 5.9 MMOL/L (ref 3.5–5.1)
POTASSIUM SERPL-SCNC: 6.2 MMOL/L (ref 3.5–5.1)
PROT SERPL-MCNC: 6 G/DL (ref 6–8.4)
PROT SERPL-MCNC: 6.5 G/DL (ref 6–8.4)
PROT UR QL STRIP: ABNORMAL
PROTHROMBIN TIME: 15.8 SEC (ref 10.6–14.8)
PTH-INTACT SERPL-MCNC: 168.9 PG/ML (ref 9–77)
RBC # BLD AUTO: 3.13 M/UL (ref 4.6–6.2)
RBC # BLD AUTO: 3.47 M/UL (ref 4.6–6.2)
RBC #/AREA URNS HPF: 3 /HPF (ref 0–4)
RBC #/AREA URNS HPF: 4 /HPF (ref 0–4)
SARS-COV-2 RDRP RESP QL NAA+PROBE: NEGATIVE
SATURATED IRON: 40 % (ref 20–50)
SODIUM SERPL-SCNC: 132 MMOL/L (ref 136–145)
SODIUM SERPL-SCNC: 134 MMOL/L (ref 136–145)
SODIUM SERPL-SCNC: 136 MMOL/L (ref 136–145)
SODIUM SERPL-SCNC: 136 MMOL/L (ref 136–145)
SODIUM SERPL-SCNC: 140 MMOL/L (ref 136–145)
SODIUM UR-SCNC: 85 MMOL/L (ref 20–250)
SP GR UR STRIP: 1.01 (ref 1–1.03)
SQUAMOUS #/AREA URNS HPF: 3 /HPF
SQUAMOUS #/AREA URNS HPF: 3 /HPF
TOTAL IRON BINDING CAPACITY: 273 UG/DL (ref 250–450)
TRANSFERRIN SERPL-MCNC: 195 MG/DL (ref 200–375)
TROPONIN I SERPL DL<=0.01 NG/ML-MCNC: <0.03 NG/ML
URATE SERPL-MCNC: 10.8 MG/DL (ref 3.4–7)
URN SPEC COLLECT METH UR: ABNORMAL
UROBILINOGEN UR STRIP-ACNC: NEGATIVE EU/DL
WBC # BLD AUTO: 7.7 K/UL (ref 3.9–12.7)
WBC # BLD AUTO: 9.54 K/UL (ref 3.9–12.7)
WBC #/AREA URNS HPF: 7 /HPF (ref 0–5)
WBC #/AREA URNS HPF: 8 /HPF (ref 0–5)

## 2020-09-07 PROCEDURE — 80053 COMPREHEN METABOLIC PANEL: CPT | Mod: 91

## 2020-09-07 PROCEDURE — 25000003 PHARM REV CODE 250: Performed by: INTERNAL MEDICINE

## 2020-09-07 PROCEDURE — 94640 AIRWAY INHALATION TREATMENT: CPT

## 2020-09-07 PROCEDURE — 25000003 PHARM REV CODE 250: Performed by: EMERGENCY MEDICINE

## 2020-09-07 PROCEDURE — 83540 ASSAY OF IRON: CPT

## 2020-09-07 PROCEDURE — 84550 ASSAY OF BLOOD/URIC ACID: CPT

## 2020-09-07 PROCEDURE — 80053 COMPREHEN METABOLIC PANEL: CPT

## 2020-09-07 PROCEDURE — 83036 HEMOGLOBIN GLYCOSYLATED A1C: CPT

## 2020-09-07 PROCEDURE — 25000242 PHARM REV CODE 250 ALT 637 W/ HCPCS: Performed by: INTERNAL MEDICINE

## 2020-09-07 PROCEDURE — 82550 ASSAY OF CK (CPK): CPT

## 2020-09-07 PROCEDURE — 84300 ASSAY OF URINE SODIUM: CPT

## 2020-09-07 PROCEDURE — 94761 N-INVAS EAR/PLS OXIMETRY MLT: CPT

## 2020-09-07 PROCEDURE — 63600175 PHARM REV CODE 636 W HCPCS: Performed by: INTERNAL MEDICINE

## 2020-09-07 PROCEDURE — 82010 KETONE BODYS QUAN: CPT

## 2020-09-07 PROCEDURE — 94644 CONT INHLJ TX 1ST HOUR: CPT

## 2020-09-07 PROCEDURE — 82962 GLUCOSE BLOOD TEST: CPT

## 2020-09-07 PROCEDURE — 84100 ASSAY OF PHOSPHORUS: CPT

## 2020-09-07 PROCEDURE — U0002 COVID-19 LAB TEST NON-CDC: HCPCS

## 2020-09-07 PROCEDURE — 80048 BASIC METABOLIC PNL TOTAL CA: CPT | Mod: 91

## 2020-09-07 PROCEDURE — 83605 ASSAY OF LACTIC ACID: CPT | Mod: 91

## 2020-09-07 PROCEDURE — 87086 URINE CULTURE/COLONY COUNT: CPT

## 2020-09-07 PROCEDURE — 20000000 HC ICU ROOM

## 2020-09-07 PROCEDURE — 81001 URINALYSIS AUTO W/SCOPE: CPT

## 2020-09-07 PROCEDURE — 82306 VITAMIN D 25 HYDROXY: CPT

## 2020-09-07 PROCEDURE — 83880 ASSAY OF NATRIURETIC PEPTIDE: CPT

## 2020-09-07 PROCEDURE — 85025 COMPLETE CBC W/AUTO DIFF WBC: CPT

## 2020-09-07 PROCEDURE — 82947 ASSAY GLUCOSE BLOOD QUANT: CPT

## 2020-09-07 PROCEDURE — 99900035 HC TECH TIME PER 15 MIN (STAT)

## 2020-09-07 PROCEDURE — 83970 ASSAY OF PARATHORMONE: CPT

## 2020-09-07 PROCEDURE — 82728 ASSAY OF FERRITIN: CPT

## 2020-09-07 PROCEDURE — 36415 COLL VENOUS BLD VENIPUNCTURE: CPT

## 2020-09-07 PROCEDURE — 84484 ASSAY OF TROPONIN QUANT: CPT

## 2020-09-07 PROCEDURE — 87040 BLOOD CULTURE FOR BACTERIA: CPT

## 2020-09-07 PROCEDURE — 82043 UR ALBUMIN QUANTITATIVE: CPT

## 2020-09-07 PROCEDURE — 85610 PROTHROMBIN TIME: CPT

## 2020-09-07 PROCEDURE — 81001 URINALYSIS AUTO W/SCOPE: CPT | Mod: 91

## 2020-09-07 RX ORDER — ASCORBIC ACID 500 MG
500 TABLET ORAL DAILY
COMMUNITY
End: 2020-10-16

## 2020-09-07 RX ORDER — IBUPROFEN 200 MG
16 TABLET ORAL
Status: DISCONTINUED | OUTPATIENT
Start: 2020-09-07 | End: 2020-09-14 | Stop reason: HOSPADM

## 2020-09-07 RX ORDER — IBUPROFEN 200 MG
24 TABLET ORAL
Status: DISCONTINUED | OUTPATIENT
Start: 2020-09-07 | End: 2020-09-14 | Stop reason: HOSPADM

## 2020-09-07 RX ORDER — ONDANSETRON 2 MG/ML
4 INJECTION INTRAMUSCULAR; INTRAVENOUS EVERY 8 HOURS PRN
Status: DISCONTINUED | OUTPATIENT
Start: 2020-09-07 | End: 2020-09-14 | Stop reason: HOSPADM

## 2020-09-07 RX ORDER — ROSUVASTATIN CALCIUM 10 MG/1
10 TABLET, COATED ORAL NIGHTLY
Status: DISCONTINUED | OUTPATIENT
Start: 2020-09-07 | End: 2020-09-14 | Stop reason: HOSPADM

## 2020-09-07 RX ORDER — DEXTROSE 50 % IN WATER (D50W) INTRAVENOUS SYRINGE
12.5 ONCE
Status: COMPLETED | OUTPATIENT
Start: 2020-09-07 | End: 2020-09-07

## 2020-09-07 RX ORDER — AMLODIPINE BESYLATE 5 MG/1
10 TABLET ORAL DAILY
Status: DISCONTINUED | OUTPATIENT
Start: 2020-09-07 | End: 2020-09-14 | Stop reason: HOSPADM

## 2020-09-07 RX ORDER — DEXTROSE MONOHYDRATE 100 MG/ML
INJECTION, SOLUTION INTRAVENOUS CONTINUOUS
Status: DISCONTINUED | OUTPATIENT
Start: 2020-09-07 | End: 2020-09-08

## 2020-09-07 RX ORDER — HYDRALAZINE HYDROCHLORIDE 20 MG/ML
10 INJECTION INTRAMUSCULAR; INTRAVENOUS EVERY 6 HOURS PRN
Status: DISCONTINUED | OUTPATIENT
Start: 2020-09-07 | End: 2020-09-14 | Stop reason: HOSPADM

## 2020-09-07 RX ORDER — VITAMIN A 2400 MCG
1 CAPSULE ORAL DAILY
COMMUNITY
End: 2020-10-16

## 2020-09-07 RX ORDER — ACETAMINOPHEN 500 MG
1 TABLET ORAL DAILY
COMMUNITY
End: 2020-10-16

## 2020-09-07 RX ORDER — INDOMETHACIN 25 MG/1
50 CAPSULE ORAL
Status: COMPLETED | OUTPATIENT
Start: 2020-09-07 | End: 2020-09-07

## 2020-09-07 RX ORDER — ROSUVASTATIN CALCIUM 20 MG/1
20 TABLET, COATED ORAL NIGHTLY
COMMUNITY
End: 2021-05-13 | Stop reason: SDUPTHER

## 2020-09-07 RX ORDER — SODIUM CHLORIDE, SODIUM LACTATE, POTASSIUM CHLORIDE, CALCIUM CHLORIDE 600; 310; 30; 20 MG/100ML; MG/100ML; MG/100ML; MG/100ML
INJECTION, SOLUTION INTRAVENOUS CONTINUOUS
Status: DISCONTINUED | OUTPATIENT
Start: 2020-09-07 | End: 2020-09-07

## 2020-09-07 RX ORDER — GLUCAGON 1 MG
1 KIT INJECTION
Status: DISCONTINUED | OUTPATIENT
Start: 2020-09-07 | End: 2020-09-14 | Stop reason: HOSPADM

## 2020-09-07 RX ORDER — CYANOCOBALAMIN (VITAMIN B-12) 1000 MCG
1 TABLET, EXTENDED RELEASE ORAL DAILY
COMMUNITY
End: 2020-10-16

## 2020-09-07 RX ORDER — HEPARIN SODIUM 5000 [USP'U]/ML
5000 INJECTION, SOLUTION INTRAVENOUS; SUBCUTANEOUS EVERY 8 HOURS
Status: DISCONTINUED | OUTPATIENT
Start: 2020-09-07 | End: 2020-09-12

## 2020-09-07 RX ORDER — ACETAMINOPHEN 325 MG/1
650 TABLET ORAL EVERY 4 HOURS PRN
Status: DISCONTINUED | OUTPATIENT
Start: 2020-09-07 | End: 2020-09-14 | Stop reason: HOSPADM

## 2020-09-07 RX ORDER — FUROSEMIDE 10 MG/ML
80 INJECTION INTRAMUSCULAR; INTRAVENOUS ONCE
Status: DISCONTINUED | OUTPATIENT
Start: 2020-09-07 | End: 2020-09-07

## 2020-09-07 RX ORDER — ACETAMINOPHEN 325 MG/1
650 TABLET ORAL EVERY 8 HOURS PRN
Status: DISCONTINUED | OUTPATIENT
Start: 2020-09-07 | End: 2020-09-14 | Stop reason: HOSPADM

## 2020-09-07 RX ORDER — TALC
6 POWDER (GRAM) TOPICAL NIGHTLY PRN
Status: DISCONTINUED | OUTPATIENT
Start: 2020-09-07 | End: 2020-09-14 | Stop reason: HOSPADM

## 2020-09-07 RX ORDER — ASPIRIN 81 MG/1
81 TABLET ORAL DAILY
Status: DISCONTINUED | OUTPATIENT
Start: 2020-09-07 | End: 2020-09-10

## 2020-09-07 RX ORDER — ALBUTEROL SULFATE 0.83 MG/ML
10 SOLUTION RESPIRATORY (INHALATION) ONCE
Status: COMPLETED | OUTPATIENT
Start: 2020-09-07 | End: 2020-09-07

## 2020-09-07 RX ORDER — DEXTROSE 50 % IN WATER (D50W) INTRAVENOUS SYRINGE
25
Status: COMPLETED | OUTPATIENT
Start: 2020-09-07 | End: 2020-09-07

## 2020-09-07 RX ADMIN — DEXTROSE MONOHYDRATE 25 G: 25 INJECTION, SOLUTION INTRAVENOUS at 07:09

## 2020-09-07 RX ADMIN — ALBUTEROL SULFATE 10 MG: 2.5 SOLUTION RESPIRATORY (INHALATION) at 07:09

## 2020-09-07 RX ADMIN — HEPARIN SODIUM 5000 UNITS: 5000 INJECTION INTRAVENOUS; SUBCUTANEOUS at 02:09

## 2020-09-07 RX ADMIN — SODIUM BICARBONATE: 84 INJECTION, SOLUTION INTRAVENOUS at 03:09

## 2020-09-07 RX ADMIN — CALCIUM GLUCONATE 1 G: 94 INJECTION, SOLUTION INTRAVENOUS at 05:09

## 2020-09-07 RX ADMIN — AMLODIPINE BESYLATE 10 MG: 5 TABLET ORAL at 08:09

## 2020-09-07 RX ADMIN — PATIROMER 8.4 G: 8.4 POWDER, FOR SUSPENSION ORAL at 05:09

## 2020-09-07 RX ADMIN — HEPARIN SODIUM 5000 UNITS: 5000 INJECTION INTRAVENOUS; SUBCUTANEOUS at 10:09

## 2020-09-07 RX ADMIN — SODIUM BICARBONATE 50 MEQ: 84 INJECTION, SOLUTION INTRAVENOUS at 05:09

## 2020-09-07 RX ADMIN — DEXTROSE MONOHYDRATE 12.5 G: 500 INJECTION PARENTERAL at 12:09

## 2020-09-07 RX ADMIN — DEXTROSE MONOHYDRATE 25 G: 500 INJECTION PARENTERAL at 02:09

## 2020-09-07 RX ADMIN — ASPIRIN 81 MG: 81 TABLET, DELAYED RELEASE ORAL at 12:09

## 2020-09-07 RX ADMIN — DEXTROSE: 10 SOLUTION INTRAVENOUS at 07:09

## 2020-09-07 RX ADMIN — ROSUVASTATIN CALCIUM 10 MG: 10 TABLET, FILM COATED ORAL at 08:09

## 2020-09-07 RX ADMIN — SODIUM BICARBONATE: 84 INJECTION, SOLUTION INTRAVENOUS at 10:09

## 2020-09-07 RX ADMIN — MELATONIN 6 MG: at 10:09

## 2020-09-07 NOTE — ASSESSMENT & PLAN NOTE
Patient type 2 non-insulin-dependent diabetes mellitus, on Janumet which he states he was not taking over the last days  Now in acute renal failure, suspect persistent hypoglycemic related to same  Start D10 at 75 cc an hour  Oral diet ordered, patient is alert and oriented  Holding Janumet  Hypoglycemic p.r.n. protocol  Monitoring glucose every 2 hours, if stable will decreased frequency

## 2020-09-07 NOTE — ED NOTES
"MASK AND PRIVATE ROOM. EVEN AND NON LABORED RESPIRATIONS.  AIRWAY CLEAR.  PULSES REGULAR.  < 3" CAPILLARY REFILL. SKIN WDI.  MAEW.  NON DISTENDED ABDOMEN. ALERT AND ORIENTED.  DENIES DISCOMFORT. NAD AT THIS TIME.  CALL LIGHT IN REACH.  "

## 2020-09-07 NOTE — HOSPITAL COURSE
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 He has history of right RCC status post partial nephrectomy (15% removed as per his report). He admitted to generalized weakness, denies any nausea, vomiting, diarrhea.  EMS was called yesterday and on arrival CBG was 52 which improved to 132 after D50.  In the ED he was noted to recurrent persistent hypoglycemia.  Labs were consistent with hyperkalemia (status post membrane stabilizing medication), anion gap metabolic acidosis with acute renal failure, lactic acidosis to 5 without any source of infection, ultrasound with bilateral renal cysts with mild right pelvocaliectasis.  On 09/07 patient is alert and oriented however starting additional fluids along with D10 given persistent hypoglycemia, nephrology consulted given acute renal failure, further test ordered.  On 9/08 glucose much better and discontinuing D10 infusion, lactic acid better however still in acute renal failure, having good urine output, checking CT renal protocol.  CT did demonstrate right-sided hydronephrosis with hydroureter, no definite calculus seen, urology consulted and planning cystoscopy with stent placement today. Unfortunately Dr Garcia unable to place stent and now recommendations for right percutaneous nephrostomy to relieve hydronephrosis, discussed with Radiology, ideally patient would need to be off aspirin for 3 days, this would be tomorrow, states the left kidney appears normal and should be compensating and expresses concern about possible alternate etiology, discussed with patient who is agreeable to wait until tomorrow for nephrostomy (explained with aspirin increased risk of bleeding). On 9/11  patient underwent placement of right percutaneous nephrostomy by IR, will continue to monitor renal function closely. Renal function was monitored throughout hospital course, nephrology following, did repeat CT renal protocol on 9/13 which reported favorable response to decompression and renal function was slowly downtrending and patient reports feeling well. Did have hematuria which seems to be clearing. He was cleared by Nephrology for discharge.  Discharge plan including medication changes, follow-up, return precautions were explained in detail to the patient and wife present at bedside.  No objection to discharge.  All questions/concerns were addressed.    Discharge examination  Lying in bed, comfortable, alert and oriented  Regular heart rhythm  Not on supplemental oxygen  Abdomen soft and nontender  Right-sided nephrostomy with urine draining    Discharge recommendations  Discontinued oral hypoglycemic and insulin sliding scale prescribed until renal function recovers  Discontinued losartan, amlodipine and hydralazine for blood pressure control  Weekly renal panel with results forwarded to nephrologist  Follow-up with nephrologist 1 week  Follow-up with his urologist Dr. Danielle

## 2020-09-07 NOTE — ED NOTES
Pt notes feeling much better after receiving glucose en route to hospital. Pt denies further needs at this time. Safety precautions in place. Call light within reach. Awaiting further orders.

## 2020-09-07 NOTE — ED NOTES
VOICE MAIL LEFT FOR DR POTTS CRITICAL LACTATE.  PT NAD.  100% MEAL EATEN.  ALERT AND ORIENTED.  NSR.

## 2020-09-07 NOTE — H&P
[facility] Medicine History & Physical Examination   Patient Name: Jamil Cadet  MRN: [id number]  Patient Class: OP- Observation   Admission Date: 9/6/2020 10:12 PM  Length of Stay: 0  Attending Physician: Michelle Lira MD  Primary Care Provider: Mesfin Cisneros MD  Face-to-Face encounter date: 09/07/2020  Code Status: Full  Chief Complaint: Hypoglycemia (pt brought in by EMS with c/o weakness. CBG 52 upon EMS arrival. pt given one amp of D50 en route with EMS with improvement to . )      Covid test negative       Patient information was obtained from patient, past medical records and ER records and ED physician sign out.   HISTORY OF PRESENT ILLNESS:   Jamil Cadet is a 75 y.o.  male who  has a past medical history of Diabetes mellitus, type 2.. R/ Renal cell cancer s/p partial nephrectomy appropriately 16 months ago in [facility] The patient presented to [facility] on 9/6/2020 with a primary complaint of Hypoglycemia (pt brought in by EMS with c/o weakness. CBG 52 upon EMS arrival. pt given one amp of D50 en route with EMS with improvement to . )  .   Patient was apparently well 4 days ago, started noticed nausea, loss of appetite and generalized weakness with hypoglycemia.  He was working on his yard under hot sun, not sure about adequate hydration recently.  Came to ED with concern of low sugar of 54, which was corrected in ED with IV dextrose, noted to have severe acute renal failure with hyperkalemia.  No urine output noted since admission in ED.  Hospitalist consulted for further eval, repeat BMP in 4 hours, prior to hyperkalemia cocktail showed resolution of hyperkalemia.  However renal failure remains remarkable .  Bladder scan at bedside 245cc, during my eval patient passed urine without any difficulty.  Reports history of renal stone in the past nonobstructive.  Prior to admission no change in renal output noted, no recent adjustment in  his dose medication, used to check his renal function every 6 months.    REVIEW OF SYSTEMS:   10 Point Review of System was performed and was found to be negative except for that mentioned already in the HPI above.     PAST MEDICAL HISTORY:     Past Medical History:   Diagnosis Date    Diabetes mellitus, type 2        PAST SURGICAL HISTORY:     Past Surgical History:   Procedure Laterality Date    APPENDECTOMY      SPINE SURGERY         ALLERGIES:   Patient has no known allergies.    FAMILY HISTORY:     Family History   Problem Relation Age of Onset    Heart disease Mother     Heart disease Father     Stroke Father     Heart disease Maternal Grandfather     Heart disease Paternal Grandmother     Heart disease Paternal Grandfather        SOCIAL HISTORY:     Social History     Tobacco Use    Smoking status: Never Smoker    Smokeless tobacco: Never Used   Substance Use Topics    Alcohol use: No        Social History     Substance and Sexual Activity   Sexual Activity Yes        HOME MEDICATIONS:     Prior to Admission medications    Medication Sig Start Date End Date Taking? Authorizing Provider   aspirin (ENTERIC COATED ASPIRIN) 81 MG EC tablet Take by mouth. 1 Tablet, Delayed Release (E.C.) Oral Every day    Historical Provider, MD   clonazePAM (KLONOPIN) 2 MG Tab  10/25/18   Historical Provider, MD   glimepiride (AMARYL) 4 MG tablet Take by mouth. Half Tablet Oral Every day    Historical Provider, MD   losartan (COZAAR) 50 MG tablet Take 50 mg by mouth once daily.    Historical Provider, MD   MULTIVITAMIN W-MINERALS/LUTEIN (CENTRUM SILVER ORAL) Take 1 capsule by mouth.      Historical Provider, MD   rosuvastatin (CRESTOR) 10 MG tablet Take by mouth. 1 Tablet Oral Every day    Historical Provider, MD   sitagliptan-metformin (JANUMET) 50-1,000 mg per tablet Take by mouth. 1 Tablet Oral Twice a day     Historical Provider, MD   TRUE METRIX GLUCOSE TEST STRIP Strp  10/29/18   Historical Provider, MD  "        PHYSICAL EXAM:   BP (!) 170/73   Pulse 82   Temp 97.6 °F (36.4 °C) (Oral)   Resp 18   Ht 5' 11" (1.803 m)   Wt 79.4 kg (175 lb)   SpO2 97%   BMI 24.41 kg/m²   Vitals Reviewed  General appearance: Well-developed, well-nourished male in no apparent distress.   Skin: No Rash.   Neuro: Motor and sensory exams grossly intact. Good tone. Power in all 4 extremities 5/5.   HENT: Atraumatic head. Moist mucous membranes of oral cavity.  Eyes: Normal extraocular movements.   Neck: Supple. No evidence of lymphadenopathy. No thyroidomegaly.  Lungs: Clear to auscultation bilaterally. No wheezing present.   Heart: Regular rate and rhythm. S1 and S2 present with no murmurs/gallop/rub. No pedal edema. No JVD present.   Abdomen: Soft, non-distended, non-tender. No rebound tenderness/guarding. No masses or organomegaly. Bowel sounds are normal. Bladder is not palpable.  :no carlton ,no CVA tenderness  Extremities: No cyanosis, clubbing, or edema.  Psych/mental status: Alert and oriented. Cooperative. Responds appropriately to questions.     EMERGENCY DEPARTMENT LABS AND IMAGING:     Labs Reviewed   CBC W/ AUTO DIFFERENTIAL - Abnormal; Notable for the following components:       Result Value    RBC 3.47 (*)     Hemoglobin 10.9 (*)     Hematocrit 34.0 (*)     Mean Corpuscular Hemoglobin 31.4 (*)     Lymph # 0.8 (*)     Gran% 81.1 (*)     Lymph% 8.4 (*)     All other components within normal limits   COMPREHENSIVE METABOLIC PANEL - Abnormal; Notable for the following components:    Sodium 132 (*)     Potassium 6.2 (*)     CO2 10 (*)     Glucose 54 (*)     BUN, Bld 102 (*)     Creatinine 14.2 (*)     Calcium 7.8 (*)     Anion Gap 21 (*)     eGFR if  3.4 (*)     eGFR if non  2.9 (*)     All other components within normal limits   B-TYPE NATRIURETIC PEPTIDE - Abnormal; Notable for the following components:     (*)     All other components within normal limits   PROTIME-INR - Abnormal; " Notable for the following components:    PT 15.8 (*)     All other components within normal limits   CBC W/ AUTO DIFFERENTIAL - Abnormal; Notable for the following components:    RBC 3.13 (*)     Hemoglobin 10.0 (*)     Hematocrit 30.1 (*)     Mean Corpuscular Hemoglobin 31.9 (*)     Mono # 1.1 (*)     Lymph% 16.9 (*)     All other components within normal limits   COMPREHENSIVE METABOLIC PANEL - Abnormal; Notable for the following components:    CO2 12 (*)     BUN, Bld 98 (*)     Creatinine 13.1 (*)     Calcium 7.3 (*)     Albumin 3.3 (*)     Alkaline Phosphatase 48 (*)     Anion Gap 20 (*)     eGFR if  3.7 (*)     eGFR if non  3.2 (*)     All other components within normal limits   POCT GLUCOSE - Abnormal; Notable for the following components:    POC Glucose 59 (*)     All other components within normal limits   POCT GLUCOSE - Abnormal; Notable for the following components:    POC Glucose 117 (*)     All other components within normal limits   TROPONIN I   SARS-COV-2 RNA AMPLIFICATION, QUAL   URINALYSIS, REFLEX TO URINE CULTURE   SARS-COV-2 RNA AMPLIFICATION, QUAL   HEMOGLOBIN A1C   POCT GLUCOSE, HAND-HELD DEVICE       X-Ray Chest AP Portable    (Results Pending)       ASSESSMENT & PLAN:   Jamil Cadet is a 75 y.o. male admitted for    #  MELODIE on CKD 3 with hyperkalemia? ATN  # hypoglycemia possibly 2/2 MELODIE on oral diabetic medication  # anemia  #HTN-uncontrolled  # HLD  # renal cancer s/p right nephrectomy    Plan:  Admit to telemetry  Monitor for hyperkalemia  Encourage oral hydration  Monitor intake and output  Ultrasound kidney/bladder to rule out obstruction  UA, uric acid ,phos, vit D level,PTH  Hold nephrotoxic medication including ACE inhibitors and and oral hypoglycemic agent  Nephrology consult  F/u iron panel  Amlodipine added for blood pressure control  Glucose POC       DVT Prophylaxis: will be placed on heparin for DVT prophylaxis and will be advised to be as mobile  as possible and sit in a chair as tolerated.     INPATIENT LIST OF MEDICATIONS     Current Facility-Administered Medications:     acetaminophen tablet 650 mg, 650 mg, Oral, Q4H PRN, Michelle Lira MD    acetaminophen tablet 650 mg, 650 mg, Oral, Q8H PRN, Michelle Lira MD    albuterol nebulizer solution 10 mg, 10 mg, Nebulization, Once, Michelle Lira MD    aspirin EC tablet 81 mg, 81 mg, Oral, Daily, Michelle Lira MD    dextrose 50% injection 12.5 g, 12.5 g, Intravenous, PRN, Michelle Lira MD    dextrose 50% injection 25 g, 25 g, Intravenous, Once **AND** dextrose 50% injection 25 g, 25 g, Intravenous, PRN **AND** [DISCONTINUED] insulin regular injection 5 Units, 5 Units, Intravenous, Once, Michelle Lira MD    dextrose 50% injection 25 g, 25 g, Intravenous, PRN, Michelle Lira MD    glucagon (human recombinant) injection 1 mg, 1 mg, Intramuscular, PRN, Michelle Lira MD    glucose chewable tablet 16 g, 16 g, Oral, PRN, Michelle Lira MD    glucose chewable tablet 24 g, 24 g, Oral, PRN, Michelle Lira MD    heparin (porcine) injection 5,000 Units, 5,000 Units, Subcutaneous, Q8H, Michelle Lira MD    melatonin tablet 6 mg, 6 mg, Oral, Nightly PRN, Michelle Lira MD    ondansetron injection 4 mg, 4 mg, Intravenous, Q8H PRN, Michelle Lira MD    rosuvastatin tablet 10 mg, 10 mg, Oral, QHS, Michelle Lira MD    Current Outpatient Medications:     aspirin (ENTERIC COATED ASPIRIN) 81 MG EC tablet, Take by mouth. 1 Tablet, Delayed Release (E.C.) Oral Every day, Disp: , Rfl:     clonazePAM (KLONOPIN) 2 MG Tab, , Disp: , Rfl:     glimepiride (AMARYL) 4 MG tablet, Take by mouth. Half Tablet Oral Every day, Disp: , Rfl:     losartan (COZAAR) 50 MG tablet, Take 50 mg by mouth once daily., Disp: , Rfl:     MULTIVITAMIN W-MINERALS/LUTEIN (CENTRUM SILVER ORAL), Take 1 capsule by mouth.  , Disp: , Rfl:     rosuvastatin (CRESTOR) 10 MG tablet, Take by  mouth. 1 Tablet Oral Every day, Disp: , Rfl:     sitagliptan-metformin (JANUMET) 50-1,000 mg per tablet, Take by mouth. 1 Tablet Oral Twice a day , Disp: , Rfl:     TRUE METRIX GLUCOSE TEST STRIP Strp, , Disp: , Rfl:       Scheduled Meds:   albuterol sulfate  10 mg Nebulization Once    aspirin  81 mg Oral Daily    dextrose 50%  25 g Intravenous Once    heparin (porcine)  5,000 Units Subcutaneous Q8H    rosuvastatin  10 mg Oral QHS     Continuous Infusions:  PRN Meds:.acetaminophen, acetaminophen, dextrose 50%, dextrose 50% **AND** dextrose 50% **AND** [DISCONTINUED] insulin regular, dextrose 50%, glucagon (human recombinant), glucose, glucose, melatonin, ondansetron      Michelle Lira  [facility] Hospitalist  09/07/2020

## 2020-09-07 NOTE — PLAN OF CARE
09/07/20 0702   Patient Assessment/Suction   Level of Consciousness (AVPU) alert   Respiratory Effort Normal;Unlabored   Expansion/Accessory Muscles/Retractions no use of accessory muscles   All Lung Fields Breath Sounds clear   Rhythm/Pattern, Respiratory unlabored;pattern regular;depth regular   Cough Frequency no cough   PRE-TX-O2   O2 Device (Oxygen Therapy) room air   SpO2 95 %   Pulse Oximetry Type Intermittent   $ Pulse Oximetry - Multiple Charge Pulse Oximetry - Multiple   Pulse 84   Resp (!) 22   Aerosol Therapy   $ Aerosol Therapy Charges Aerosol Treatment;Initial Continuous   Daily Review of Necessity (SVN) completed   Respiratory Treatment Status (SVN) given   Treatment Route (SVN) mask   Patient Position (SVN) HOB elevated   Post Treatment Assessment (SVN) breath sounds improved   Signs of Intolerance (SVN) tremors   Breath Sounds Post-Respiratory Treatment   Throughout All Fields Post-Treatment All Fields   Throughout All Fields Post-Treatment equal bilaterally;clear   Post-treatment Heart Rate (beats/min) 100   Post-treatment Resp Rate (breaths/min) 22   Respiratory Evaluation   $ Care Plan Tech Time 15 min

## 2020-09-07 NOTE — SUBJECTIVE & OBJECTIVE
Interval History: Patient is seen in the ED with wife present at bedside.  He states for the last 4 days he was working at least 7 hours consecutively in the sun in his garden, believes he was not drinking in oral fluids, denies any change to his urine, states he was not taking any of his prescribed medications including oral hypoglycemics during this time.  Admitted to generalized weakness, no nausea, vomiting, diarrhea, skin rashes.  Denies any NSAID use.  States he had history of right RCC underwent nephrectomy, states 15% of his kidney was removed.  States he does feel slightly better today.  Denies any shortness of breath or chest pain.  Glucose this morning was again 42, he was oral diet and started on D10 by myself.  Hemoglobin 10, BUN/creatinine 98/13.1, anion gap metabolic acidosis, .  Chest x-ray with low volumes.  Ultrasound with bilateral renal cysts.  Discussed with patient and wife present at bedside, all questions answered.  Discussed with ED RN.     Review of Systems   Constitutional: Positive for appetite change (decreased) and fatigue. Negative for chills and fever.   HENT: Negative for congestion and trouble swallowing.    Eyes: Negative for visual disturbance.   Respiratory: Negative for shortness of breath.    Cardiovascular: Negative for chest pain.   Gastrointestinal: Negative for abdominal pain, constipation, diarrhea, nausea and vomiting.   Genitourinary: Negative for decreased urine volume, difficulty urinating, dysuria, flank pain, frequency, hematuria and urgency.   Musculoskeletal: Negative for back pain.   Skin: Negative for rash.   Allergic/Immunologic: Negative for immunocompromised state.   Neurological: Positive for weakness.   Psychiatric/Behavioral: Negative for confusion.     Objective:     Vital Signs (Most Recent):  Temp: 98.4 °F (36.9 °C) (09/07/20 0720)  Pulse: 108 (09/07/20 0800)  Resp: (!) 26 (09/07/20 0800)  BP: (!) 167/77 (09/07/20 0803)  SpO2: 95 % (09/07/20 0800)  Vital Signs (24h Range):  Temp:  [97.6 °F (36.4 °C)-98.4 °F (36.9 °C)] 98.4 °F (36.9 °C)  Pulse:  [] 108  Resp:  [18-32] 26  SpO2:  [95 %-99 %] 95 %  BP: (146-196)/(67-81) 167/77     Weight: 79.4 kg (175 lb)  Body mass index is 24.41 kg/m².    Intake/Output Summary (Last 24 hours) at 9/7/2020 0843  Last data filed at 9/7/2020 0603  Gross per 24 hour   Intake 100 ml   Output --   Net 100 ml      Physical Exam  Vitals signs and nursing note reviewed.   Constitutional:       General: He is not in acute distress.     Appearance: Normal appearance. He is normal weight. He is not ill-appearing, toxic-appearing or diaphoretic.   HENT:      Head: Normocephalic and atraumatic.      Comments: Wearing mask     Mouth/Throat:      Mouth: Mucous membranes are moist.      Comments: Tongue not coated  Eyes:      General:         Right eye: No discharge.         Left eye: No discharge.      Conjunctiva/sclera: Conjunctivae normal.      Pupils: Pupils are equal, round, and reactive to light.   Neck:      Musculoskeletal: Neck supple.   Cardiovascular:      Rate and Rhythm: Regular rhythm. Tachycardia present.      Comments: No LE edema  Pulmonary:      Effort: No respiratory distress.      Breath sounds: Normal breath sounds. No stridor. No wheezing or rhonchi.      Comments: Not on supplemental O2  Abdominal:      General: Bowel sounds are normal. There is no distension.      Palpations: Abdomen is soft.      Tenderness: There is no abdominal tenderness. There is no guarding.   Genitourinary:     Comments: No CVAT or supra pubic fullness  Musculoskeletal:         General: No swelling.   Skin:     General: Skin is warm and dry.      Capillary Refill: Capillary refill takes less than 2 seconds.   Neurological:      General: No focal deficit present.      Mental Status: He is alert and oriented to person, place, and time.   Psychiatric:         Mood and Affect: Mood normal.         Significant Labs:   BMP:   Recent Labs   Lab  09/07/20 0456 09/07/20 0720   GLU 78 35*     --    K 4.9  --      --    CO2 12*  --    BUN 98*  --    CREATININE 13.1*  --    CALCIUM 7.3*  --      CBC:   Recent Labs   Lab 09/07/20 0023 09/07/20 0456   WBC 9.54 7.70   HGB 10.9* 10.0*   HCT 34.0* 30.1*    183     CMP:   Recent Labs   Lab 09/07/20 0023 09/07/20 0456 09/07/20 0720   * 140  --    K 6.2* 4.9  --     108  --    CO2 10* 12*  --    GLU 54* 78 35*   * 98*  --    CREATININE 14.2* 13.1*  --    CALCIUM 7.8* 7.3*  --    PROT 6.5 6.0  --    ALBUMIN 3.9 3.3*  --    BILITOT 1.0 0.7  --    ALKPHOS 61 48*  --    AST 25 24  --    ALT 21 17  --    ANIONGAP 21* 20*  --    EGFRNONAA 2.9* 3.2*  --      Cardiac Markers:   Recent Labs   Lab 09/07/20 0023   *     Lactic Acid: No results for input(s): LACTATE in the last 48 hours.  Lipid Panel: No results for input(s): CHOL, HDL, LDLCALC, TRIG, CHOLHDL in the last 48 hours.  Magnesium: No results for input(s): MG in the last 48 hours.  POCT Glucose: No results for input(s): POCTGLUCOSE in the last 48 hours.  Troponin:   Recent Labs   Lab 09/07/20 0023   TROPONINI <0.030     TSH: No results for input(s): TSH in the last 4320 hours.  Urine Culture: No results for input(s): LABURIN in the last 48 hours.  Urine Studies:   Recent Labs   Lab 09/07/20 0735   COLORU Yellow  Yellow   APPEARANCEUA Clear  Clear   PHUR 5.0  5.0   SPECGRAV 1.010  1.010   PROTEINUA 1+*  1+*   GLUCUA Negative  Negative   KETONESU Negative  Negative   BILIRUBINUA Negative  Negative   OCCULTUA Trace*  Trace*   NITRITE Negative  Negative   UROBILINOGEN Negative  Negative   LEUKOCYTESUR Negative  Negative   RBCUA 3   WBCUA 7*   BACTERIA Negative   SQUAMEPITHEL 3   HYALINECASTS 11*     All pertinent labs within the past 24 hours have been reviewed.    Significant Imaging: I have reviewed all pertinent imaging results/findings within the past 24 hours.     X-ray Chest Ap Portable    Result Date:  9/7/2020  PROCEDURE:   XR CHEST AP PORTABLE  dated  9/7/2020 12:43 AM CLINICAL HISTORY:   Male 75 years of age.   CHF TECHNIQUE: AP view of the chest obtained portably at 12:43 AM. PREVIOUS STUDIES:  March 18, 2019 FINDINGS: Lung volumes are low and the right hemidiaphragm is elevated. Right lung bases mildly atelectatic over the hemidiaphragm. There is no pulmonary infiltrate. There is no pleural effusion or pneumothorax. The heart is not enlarged. Mediastinal contours are normal. IMPRESSION: No acute findings. Normal size heart. Electronically Signed by Lorena Martínez on 9/7/2020 7:28 AM    Us Kidney    Result Date: 9/7/2020  PROCEDURE:    US KIDNEY  dated  9/7/2020 7:13 AM CLINICAL HISTORY:   Male 75 years of age.   MELODIE r/o obstruction  . History of right renal carcinoma, status post right partial nephrectomy. TECHNIQUE:  Sonographic evaluation of the kidneys was performed. PREVIOUS STUDIES:  CT September 10, 2019 FINDINGS: There are bilateral renal cysts. The largest on the right is in the lower pole and measures 1.7 cm in diameter. The largest on the left extends off of the posterior lower pole and measures 2.4 cm. There is mild right pelvocaliectasis. This was not present at the time of the prior CT September 2019. There is no left hydronephrosis. Urinary bladder is mildly filled. Assessment of bladder is limited. IMPRESSION: Mild right pelvocaliectasis, not present on CT from September 2019. Uncertain etiology. Mildly filled urinary bladder. Bilateral renal cysts. Electronically Signed by Lorena Martínez on 9/7/2020 8:01 AM  ECG Results          EKG 12-lead (In process)  Result time 09/07/20 05:19:47    In process by Interface, Lab In [facility] (09/07/20 05:19:47)                 Narrative:    Test Reason : R06.02,    Vent. Rate : 084 BPM     Atrial Rate : 084 BPM     P-R Int : 178 ms          QRS Dur : 078 ms      QT Int : 380 ms       P-R-T Axes : 069 035 068 degrees     QTc Int : 449 ms    Normal sinus  rhythm  Normal ECG  No previous ECGs available    Referred By: MALORIE   SELF           Confirmed By:                   In process by Interface, Lab In [facility] (09/07/20 05:19:23)                 Narrative:    Test Reason : R06.02,    Vent. Rate : 084 BPM     Atrial Rate : 084 BPM     P-R Int : 178 ms          QRS Dur : 078 ms      QT Int : 380 ms       P-R-T Axes : 069 035 068 degrees     QTc Int : 449 ms    Normal sinus rhythm  Normal ECG  When compared with ECG of 06-SEP-2020 22:24,  No significant change was found    Referred By: MALORIE   SELF           Confirmed By:

## 2020-09-07 NOTE — ASSESSMENT & PLAN NOTE
High anion gap metabolic acidosis in the setting of renal failure.  He received amp of bicarbonate in the ED, still low.  Check lactic acid and beta hydroxybutyrate for completeness.  Starting fluids with bicarbonate supplementation and checking BMP q.12 hours, further plan as per clinical course

## 2020-09-07 NOTE — ASSESSMENT & PLAN NOTE
Blood pressure running on the higher side.  Amlodipine 10 mg with p.r.n. IV hydralazine.  Holding losartan.

## 2020-09-07 NOTE — ASSESSMENT & PLAN NOTE
Last HbA1c 6.7%.  Now with persistent hypoglycemia as outlined.  Holding all oral hypoglycemics.  Accu-Cheks.

## 2020-09-07 NOTE — PROGRESS NOTES
[facility] Medicine  Progress Note    Patient Name: Jamil Cadet  MRN: [id number]  Patient Class: IP- Inpatient   Admission Date: 9/6/2020  Length of Stay: 0 days  Attending Physician: Trinidad Boo MD  Primary Care Provider: Mesfin Cisneros MD        Subjective:     Principal Problem:ARF (acute renal failure)        HPI:  HPI as per MD Lira  Jamil Cadet is a 75 y.o.  male who  has a past medical history of Diabetes mellitus, type 2.. R/ Renal cell cancer s/p partial nephrectomy appropriately 16 months ago in [facility] The patient presented to [facility] on 9/6/2020 with a primary complaint of Hypoglycemia (pt brought in by EMS with c/o weakness. CBG 52 upon EMS arrival. pt given one amp of D50 en route with EMS with improvement to . )  .   Patient was apparently well 4 days ago, started noticed nausea, loss of appetite and generalized weakness with hypoglycemia.  He was working on his yard under hot sun, not sure about adequate hydration recently.  Came to ED with concern of low sugar of 54, which was corrected in ED with IV dextrose, noted to have severe acute renal failure with hyperkalemia.  No urine output noted since admission in ED.  Hospitalist consulted for further eval, repeat BMP in 4 hours, prior to hyperkalemia cocktail showed resolution of hyperkalemia.  However renal failure remains remarkable .  Bladder scan at bedside 245cc, during my eval patient passed urine without any difficulty.  Reports history of renal stone in the past nonobstructive.  Prior to admission no change in renal output noted, no recent adjustment in his dose medication, used to check his renal function every 6 months.    Overview/Hospital Course:  Patient reports for the last 4 days he was working outside in the sun doing gardening work at least 7 hours every day, may not have been consuming much water, was actually not taking any of his prescribed medications  during this time including oral hypoglycemic (Janumet), denies NSAID use, no change in urine reported, no rash, fever. He has history of right RCC status post partial nephrectomy (15% removed as per his report). He admitted to generalized weakness, denies any nausea, vomiting, diarrhea.  EMS was called yesterday and on arrival CBG was 52 which improved to 132 after D50.  In the ED he was noted to recurrent persistent hypoglycemia.  Labs were consistent with hyperkalemia (status post membrane stabilizing medication) gap metabolic acidosis with acute renal failure, ultrasound with bilateral renal cysts with renal pelvis ease mildly filled bladder.  On 09/07 patient is alert oriented however starting additional fluids along with D10 given persistent hypoglycemia nephrology consulted given acute renal failure, further test ordered.    Interval History: Patient is seen in the ED with wife present at bedside.  He states for the last 4 days he was working at least 7 hours consecutively in the sun in his garden, believes he was not drinking in oral fluids, denies any change to his urine, states he was not taking any of his prescribed medications including oral hypoglycemics during this time.  Admitted to generalized weakness, no nausea, vomiting, diarrhea, skin rashes.  Denies any NSAID use.  States he had history of right RCC underwent nephrectomy, states 15% of his kidney was removed.  States he does feel slightly better today.  Denies any shortness of breath or chest pain.  Glucose this morning was again 42, he was oral diet and started on D10 by myself.  Hemoglobin 10, BUN/creatinine 98/13.1, anion gap metabolic acidosis, .  Chest x-ray with low volumes.  Ultrasound with bilateral renal cysts.  Discussed with patient and wife present at bedside, all questions answered.  Discussed with ED RN.     Review of Systems   Constitutional: Positive for appetite change (decreased) and fatigue. Negative for chills and  fever.   HENT: Negative for congestion and trouble swallowing.    Eyes: Negative for visual disturbance.   Respiratory: Negative for shortness of breath.    Cardiovascular: Negative for chest pain.   Gastrointestinal: Negative for abdominal pain, constipation, diarrhea, nausea and vomiting.   Genitourinary: Negative for decreased urine volume, difficulty urinating, dysuria, flank pain, frequency, hematuria and urgency.   Musculoskeletal: Negative for back pain.   Skin: Negative for rash.   Allergic/Immunologic: Negative for immunocompromised state.   Neurological: Positive for weakness.   Psychiatric/Behavioral: Negative for confusion.     Objective:     Vital Signs (Most Recent):  Temp: 98.4 °F (36.9 °C) (09/07/20 0720)  Pulse: 108 (09/07/20 0800)  Resp: (!) 26 (09/07/20 0800)  BP: (!) 167/77 (09/07/20 0803)  SpO2: 95 % (09/07/20 0800) Vital Signs (24h Range):  Temp:  [97.6 °F (36.4 °C)-98.4 °F (36.9 °C)] 98.4 °F (36.9 °C)  Pulse:  [] 108  Resp:  [18-32] 26  SpO2:  [95 %-99 %] 95 %  BP: (146-196)/(67-81) 167/77     Weight: 79.4 kg (175 lb)  Body mass index is 24.41 kg/m².    Intake/Output Summary (Last 24 hours) at 9/7/2020 0843  Last data filed at 9/7/2020 0603  Gross per 24 hour   Intake 100 ml   Output --   Net 100 ml      Physical Exam  Vitals signs and nursing note reviewed.   Constitutional:       General: He is not in acute distress.     Appearance: Normal appearance. He is normal weight. He is not ill-appearing, toxic-appearing or diaphoretic.   HENT:      Head: Normocephalic and atraumatic.      Comments: Wearing mask     Mouth/Throat:      Mouth: Mucous membranes are moist.      Comments: Tongue not coated  Eyes:      General:         Right eye: No discharge.         Left eye: No discharge.      Conjunctiva/sclera: Conjunctivae normal.      Pupils: Pupils are equal, round, and reactive to light.   Neck:      Musculoskeletal: Neck supple.   Cardiovascular:      Rate and Rhythm: Regular rhythm.  Tachycardia present.      Comments: No LE edema  Pulmonary:      Effort: No respiratory distress.      Breath sounds: Normal breath sounds. No stridor. No wheezing or rhonchi.      Comments: Not on supplemental O2  Abdominal:      General: Bowel sounds are normal. There is no distension.      Palpations: Abdomen is soft.      Tenderness: There is no abdominal tenderness. There is no guarding.   Genitourinary:     Comments: No CVAT or supra pubic fullness  Musculoskeletal:         General: No swelling.   Skin:     General: Skin is warm and dry.      Capillary Refill: Capillary refill takes less than 2 seconds.   Neurological:      General: No focal deficit present.      Mental Status: He is alert and oriented to person, place, and time.   Psychiatric:         Mood and Affect: Mood normal.         Significant Labs:   BMP:   Recent Labs   Lab 09/07/20 0456 09/07/20 0720   GLU 78 35*     --    K 4.9  --      --    CO2 12*  --    BUN 98*  --    CREATININE 13.1*  --    CALCIUM 7.3*  --      CBC:   Recent Labs   Lab 09/07/20 0023 09/07/20 0456   WBC 9.54 7.70   HGB 10.9* 10.0*   HCT 34.0* 30.1*    183     CMP:   Recent Labs   Lab 09/07/20  0023 09/07/20 0456 09/07/20 0720   * 140  --    K 6.2* 4.9  --     108  --    CO2 10* 12*  --    GLU 54* 78 35*   * 98*  --    CREATININE 14.2* 13.1*  --    CALCIUM 7.8* 7.3*  --    PROT 6.5 6.0  --    ALBUMIN 3.9 3.3*  --    BILITOT 1.0 0.7  --    ALKPHOS 61 48*  --    AST 25 24  --    ALT 21 17  --    ANIONGAP 21* 20*  --    EGFRNONAA 2.9* 3.2*  --      Cardiac Markers:   Recent Labs   Lab 09/07/20 0023   *     Lactic Acid: No results for input(s): LACTATE in the last 48 hours.  Lipid Panel: No results for input(s): CHOL, HDL, LDLCALC, TRIG, CHOLHDL in the last 48 hours.  Magnesium: No results for input(s): MG in the last 48 hours.  POCT Glucose: No results for input(s): POCTGLUCOSE in the last 48 hours.  Troponin:   Recent Labs   Lab  09/07/20  0023   TROPONINI <0.030     TSH: No results for input(s): TSH in the last 4320 hours.  Urine Culture: No results for input(s): LABURIN in the last 48 hours.  Urine Studies:   Recent Labs   Lab 09/07/20  0735   COLORU Yellow  Yellow   APPEARANCEUA Clear  Clear   PHUR 5.0  5.0   SPECGRAV 1.010  1.010   PROTEINUA 1+*  1+*   GLUCUA Negative  Negative   KETONESU Negative  Negative   BILIRUBINUA Negative  Negative   OCCULTUA Trace*  Trace*   NITRITE Negative  Negative   UROBILINOGEN Negative  Negative   LEUKOCYTESUR Negative  Negative   RBCUA 3   WBCUA 7*   BACTERIA Negative   SQUAMEPITHEL 3   HYALINECASTS 11*     All pertinent labs within the past 24 hours have been reviewed.    Significant Imaging: I have reviewed all pertinent imaging results/findings within the past 24 hours.     X-ray Chest Ap Portable    Result Date: 9/7/2020  PROCEDURE:   XR CHEST AP PORTABLE  dated  9/7/2020 12:43 AM CLINICAL HISTORY:   Male 75 years of age.   CHF TECHNIQUE: AP view of the chest obtained portably at 12:43 AM. PREVIOUS STUDIES:  March 18, 2019 FINDINGS: Lung volumes are low and the right hemidiaphragm is elevated. Right lung bases mildly atelectatic over the hemidiaphragm. There is no pulmonary infiltrate. There is no pleural effusion or pneumothorax. The heart is not enlarged. Mediastinal contours are normal. IMPRESSION: No acute findings. Normal size heart. Electronically Signed by Lorena Martínez on 9/7/2020 7:28 AM    Us Kidney    Result Date: 9/7/2020  PROCEDURE:    US KIDNEY  dated  9/7/2020 7:13 AM CLINICAL HISTORY:   Male 75 years of age.   MELODIE r/o obstruction  . History of right renal carcinoma, status post right partial nephrectomy. TECHNIQUE:  Sonographic evaluation of the kidneys was performed. PREVIOUS STUDIES:  CT September 10, 2019 FINDINGS: There are bilateral renal cysts. The largest on the right is in the lower pole and measures 1.7 cm in diameter. The largest on the left extends off of the  posterior lower pole and measures 2.4 cm. There is mild right pelvocaliectasis. This was not present at the time of the prior CT September 2019. There is no left hydronephrosis. Urinary bladder is mildly filled. Assessment of bladder is limited. IMPRESSION: Mild right pelvocaliectasis, not present on CT from September 2019. Uncertain etiology. Mildly filled urinary bladder. Bilateral renal cysts. Electronically Signed by Lorena Martínez on 9/7/2020 8:01 AM  ECG Results          EKG 12-lead (In process)  Result time 09/07/20 05:19:47    In process by Interface, Lab In [facility] (09/07/20 05:19:47)                 Narrative:    Test Reason : R06.02,    Vent. Rate : 084 BPM     Atrial Rate : 084 BPM     P-R Int : 178 ms          QRS Dur : 078 ms      QT Int : 380 ms       P-R-T Axes : 069 035 068 degrees     QTc Int : 449 ms    Normal sinus rhythm  Normal ECG  No previous ECGs available    Referred By: [email or web address]   SELF           Confirmed By:                   In process by Interface, Lab In [facility] (09/07/20 05:19:23)                 Narrative:    Test Reason : R06.02,    Vent. Rate : 084 BPM     Atrial Rate : 084 BPM     P-R Int : 178 ms          QRS Dur : 078 ms      QT Int : 380 ms       P-R-T Axes : 069 035 068 degrees     QTc Int : 449 ms    Normal sinus rhythm  Normal ECG  When compared with ECG of 06-SEP-2020 22:24,  No significant change was found    Referred By: [email or web address]   SELF           Confirmed By:                                   Assessment/Plan:      * ARF (acute renal failure)  Admission labs with BUN/creatinine 102/14.2, previous serum creatinine at around 1.5.  Patient with a history of renal cell cancer status post partial nephrectomy.  He denies any current urinary symptoms.    Does appear clinically hypovolemic, states he was working out in the sun for prolonged periods of time.  He has been spontaneously passing urine in the ED  Strict I/O with close monitoring of urine output, patient at this time  declines Mejia catheter  Start IV fluids, half-normal saline with sodium bicarbonate, 75 cc an hour  Check CPK  Ordered UA with reflex urine culture  Renal ultrasound reviewed  Continue to hold oral hypoglycemics and losartan, renally dosing all medications and avoid nephrotoxin drugs  Nephrology consulted  Monitoring renal function closely      Persistent hypoglycemia  Patient type 2 non-insulin-dependent diabetes mellitus, on Janumet which he states he was not taking over the last days  Now in acute renal failure, suspect persistent hypoglycemic related to same  Start D10 at 75 cc an hour  Oral diet ordered, patient is alert and oriented  Holding Janumet  Hypoglycemic p.r.n. protocol  Monitoring glucose every 2 hours, if stable will decreased frequency      Hyperkalemia  In the setting of acute renal failure, potassium was 6.2, status post membrane stabilizing medication improved to 4.9  Monitoring potassium levels closely.  Losartan remains on hold.      High anion gap metabolic acidosis  High anion gap metabolic acidosis in the setting of renal failure.  He received amp of bicarbonate in the ED, still low.  Check lactic acid and beta hydroxybutyrate for completeness.  Starting fluids with bicarbonate supplementation and checking BMP q.12 hours, further plan as per clinical course      Anemia  No evidence of bleeding.  Trending CBC      Essential hypertension  Blood pressure running on the higher side.  Amlodipine 10 mg with p.r.n. IV hydralazine.  Holding losartan.      Non-insulin dependent type 2 diabetes mellitus  Last HbA1c 6.7%.  Now with persistent hypoglycemia as outlined.  Holding all oral hypoglycemics.  Accu-Cheks.        VTE Risk Mitigation (From admission, onward)         Ordered     heparin (porcine) injection 5,000 Units  Every 8 hours      09/07/20 0632     IP VTE HIGH RISK PATIENT  Once      09/07/20 0632     Place sequential compression device  Until discontinued      09/07/20 0632                 Discharge Planning   CA:      Code Status: Full Code   Is the patient medically ready for discharge?:     Reason for patient still in hospital (select all that apply): Patient unstable                     Trinidad Boo MD  Department of Hospital Medicine   [facility]

## 2020-09-07 NOTE — ED NOTES
First encounter with pt. Pt AxOx3. Pt cc of weakness/low blood sugar. Pt is known diabetic. Pt notes over the past few days has been feeling weak, tired, lightheaded and disoriented. Pt notes today he hook his sugar ad was found to be 40. Pt states he put sugar under his tongue then called ems. Pt also endorses loss of appetite over the past week. Awaiting further orders. Safety precautions in place. Call light within reach. Will continue to provide care accordingly.

## 2020-09-07 NOTE — ED PROVIDER NOTES
Encounter Date: 9/6/2020       History     Chief Complaint   Patient presents with    Hypoglycemia     pt brought in by EMS with c/o weakness. CBG 52 upon EMS arrival. pt given one amp of D50 en route with EMS with improvement to .      Chief complaint is hypoglycemia.  The patient states he is not eating well the last 3 days.  He has been slightly nauseated.  He states food did not taste good.  The patient denies fever chills earache sore throat productive cough.  He denies chest pain vomiting.  He denies trouble urinating.  His initial glucose was 52 by EMS.  He went up to 132 with D50.  In the ER here has decreased slightly so we will give him another amp feed him and re-evaluate.        Review of patient's allergies indicates:  No Known Allergies  Past Medical History:   Diagnosis Date    Diabetes mellitus, type 2      Past Surgical History:   Procedure Laterality Date    APPENDECTOMY      SPINE SURGERY       Family History   Problem Relation Age of Onset    Heart disease Mother     Heart disease Father     Stroke Father     Heart disease Maternal Grandfather     Heart disease Paternal Grandmother     Heart disease Paternal Grandfather      Social History     Tobacco Use    Smoking status: Never Smoker    Smokeless tobacco: Never Used   Substance Use Topics    Alcohol use: No    Drug use: No     Review of Systems   Constitutional: Negative for chills and fever.   HENT: Negative for ear pain, rhinorrhea and sore throat.    Eyes: Negative for pain and visual disturbance.   Respiratory: Negative for cough and shortness of breath.    Cardiovascular: Negative for chest pain and palpitations.   Gastrointestinal: Positive for nausea. Negative for abdominal pain, constipation, diarrhea and vomiting.   Genitourinary: Negative for dysuria, frequency, hematuria and urgency.   Musculoskeletal: Negative for back pain, joint swelling and myalgias.   Skin: Negative for rash.   Neurological: Negative for  dizziness, seizures, weakness and headaches.   Psychiatric/Behavioral: Negative for dysphoric mood. The patient is not nervous/anxious.        Physical Exam     Initial Vitals [09/06/20 2223]   BP Pulse Resp Temp SpO2   (!) 196/80 85 18 97.6 °F (36.4 °C) 99 %      MAP       --         Physical Exam    Nursing note and vitals reviewed.  Constitutional: He appears well-developed and well-nourished.   HENT:   Head: Normocephalic and atraumatic.   Eyes: Conjunctivae, EOM and lids are normal. Pupils are equal, round, and reactive to light.   Neck: Trachea normal. Neck supple. No thyroid mass present.   Cardiovascular: Normal rate, regular rhythm and normal heart sounds.   Pulmonary/Chest: Breath sounds normal. No respiratory distress.   Abdominal: Soft. Bowel sounds are normal. There is no abdominal tenderness.   Musculoskeletal: Normal range of motion.   Neurological: He is alert and oriented to person, place, and time. He has normal strength and normal reflexes. No cranial nerve deficit or sensory deficit.   Skin: Skin is warm and dry.   Psychiatric: He has a normal mood and affect. His speech is normal and behavior is normal. Judgment and thought content normal.         ED Course   Procedures  Labs Reviewed   CBC W/ AUTO DIFFERENTIAL   COMPREHENSIVE METABOLIC PANEL   TROPONIN I   B-TYPE NATRIURETIC PEPTIDE   PROTIME-INR     EKG Readings: (Independently Interpreted)   Patient has ventricular rate 84 MO interval normal no ST elevation no peaked T-waves       Imaging Results          X-Ray Chest AP Portable (In process)                  Medical Decision Making:   ED Management:  The patient who was hypoglycemic was noted to be in renal failure with elevated BUN and creatinine.  Initial potassium was elevated but repeat was normal.  He was treated aggressively with calcium gluconate.  He was also given 1 amp of sodium bicarbonate.  The patient's glucose remained slightly slow for an hour to so he was given both an amp and  a complete amp of bicarb in the ER.  I did speak with Dr. Ceballos.  He is aware of the patient's presentation and lab values.  He recommended 3 amps of bicarb in D5W 1000 cc at 125 cc an hour.  He recommended a bladder scanner and the patient 20/40 5 cc in his bladder.  He urinated for us as well. The patient at the present time is being seen by the hospitalist and will be admitted.Grayson Escobedo MD  5:58 AM 09/07/2020                  Attending Attestation:         Attending Critical Care:   Critical Care Times:   Direct Patient Care (initial evaluation, reassessments, and time considering the case)................................................................20 minutes.   Ordering, Reviewing, and Interpreting Diagnostic Studies...............................................................................................................10 minutes.   Documentation..................................................................................................................................................................................10 minutes.   Consultation with other Physicians. .................................................................................................................................................5 minutes.   ==============================================================  · Total Critical Care Time - exclusive of procedural time: 45 minutes.  ==============================================================  Critical care was necessary to treat or prevent imminent or life-threatening deterioration of the following conditions: renal failure.   Critical care was time spent personally by me on the following activities: examination of patient, ordering lab, x-rays, and/or EKG, evaluation of patient's response to treatment and discussion with consultants.   Critical Care Condition: critical                             Clinical Impression:       ICD-10-CM ICD-9-CM   1. Shortness of  breath  R06.02 786.05   2. Renal failure  N19 586                                Grayson Escobedo MD  09/07/20 0559       Grayson Escobedo MD  09/30/20 0339

## 2020-09-07 NOTE — CONSULTS
INPATIENT NEPHROLOGY CONSULT   [facility] NEPHROLOGY    Jamil Cadet  09/07/2020    Reason for consultation:      Acute kidney injury      Chief Complaint:   Chief Complaint   Patient presents with    Hypoglycemia     pt brought in by EMS with c/o weakness. CBG 52 upon EMS arrival. pt given one amp of D50 en route with EMS with improvement to .           History of Present Illness:       Per H and P    Jamil Cadet is a 75 y.o.  male who  has a past medical history of Diabetes mellitus, type 2.. R/ Renal cell cancer s/p partial nephrectomy appropriately 16 months ago in [facility] The patient presented to [facility] on 9/6/2020 with a primary complaint of Hypoglycemia (pt brought in by EMS with c/o weakness. CBG 52 upon EMS arrival. pt given one amp of D50 en route with EMS with improvement to . )  .   Patient was apparently well 4 days ago, started noticed nausea, loss of appetite and generalized weakness with hypoglycemia.  He was working on his yard under hot sun, not sure about adequate hydration recently.  Came to ED with concern of low sugar of 54, which was corrected in ED with IV dextrose, noted to have severe acute renal failure with hyperkalemia.  No urine output noted since admission in ED.  Hospitalist consulted for further eval, repeat BMP in 4 hours, prior to hyperkalemia cocktail showed resolution of hyperkalemia.  However renal failure remains remarkable .  Bladder scan at bedside 245cc, during my eval patient passed urine without any difficulty.  Reports history of renal stone in the past nonobstructive.    9/7  No nausea, chest pain, sob, fever, urinary or bowel complaint, new neurologic symptoms, new joint pain,      Plan of Care:       Assessment:    Acute kidney injury likely due to intravascular volume depletion.    --renal us  --volume resuscitation  --renal dose medication  --rheumatology serologies  --no acute indication for dialysis but he may need  it if his renal function doesn't improve.  This was relayed to him and his wife    Hyperkalemia secondary to MELODIE  --veltassa  --low k diet    Severe metabolic acidosis/lactic acidosis  --bicarb infusion  --no respiratory difficulty at this time    Anemia  --iron panel ok  --trend  --blood products per primary            Thank you for allowing us to participate in this patient's care. We will continue to follow.    Vital Signs:  Temp Readings from Last 3 Encounters:   09/07/20 98.6 °F (37 °C)   12/19/19 97.5 °F (36.4 °C)   10/08/19 97.3 °F (36.3 °C)       Pulse Readings from Last 3 Encounters:   09/07/20 97   12/19/19 90   10/08/19 93       BP Readings from Last 3 Encounters:   09/07/20 (!) 163/73   12/19/19 (!) 157/92   10/08/19 133/73       Weight:  Wt Readings from Last 3 Encounters:   09/07/20 80 kg (176 lb 5.9 oz)   12/19/19 81.7 kg (180 lb 3.2 oz)   10/08/19 80.8 kg (178 lb 3.2 oz)       Past Medical & Surgical History:  Past Medical History:   Diagnosis Date    Diabetes mellitus, type 2        Past Surgical History:   Procedure Laterality Date    APPENDECTOMY      SPINE SURGERY         Past Social History:  Social History     Socioeconomic History    Marital status:      Spouse name: Not on file    Number of children: Not on file    Years of education: Not on file    Highest education level: Not on file   Occupational History    Not on file   Social Needs    Financial resource strain: Not on file    Food insecurity     Worry: Not on file     Inability: Not on file    Transportation needs     Medical: Not on file     Non-medical: Not on file   Tobacco Use    Smoking status: Never Smoker    Smokeless tobacco: Never Used   Substance and Sexual Activity    Alcohol use: No    Drug use: No    Sexual activity: Yes   Lifestyle    Physical activity     Days per week: Not on file     Minutes per session: Not on file    Stress: Not on file   Relationships    Social connections     Talks on  phone: Not on file     Gets together: Not on file     Attends Taoism service: Not on file     Active member of club or organization: Not on file     Attends meetings of clubs or organizations: Not on file     Relationship status: Not on file   Other Topics Concern    Not on file   Social History Narrative    Not on file       Medications:  No current facility-administered medications on file prior to encounter.      Current Outpatient Medications on File Prior to Encounter   Medication Sig Dispense Refill    ascorbic acid, vitamin C, (VITAMIN C) 500 MG tablet Take 500 mg by mouth once daily.      aspirin (ENTERIC COATED ASPIRIN) 81 MG EC tablet Take 81 mg by mouth once daily. 1 Tablet, Delayed Release (E.C.) Oral Every day      cholecalciferol, vitamin D3, (VITAMIN D3) 50 mcg (2,000 unit) Cap Take 1 capsule by mouth once daily.      clonazePAM (KLONOPIN) 2 MG Tab Take 2 mg by mouth every evening.       madi, Zingiber officinalis, (MADI EXTRACT) 250 mg Cap Take 1 capsule by mouth once daily.      NON FORMULARY MEDICATION Take 1 tablet by mouth once daily. TRIPLE MUSHROOM COMPLEX      rosuvastatin (CRESTOR) 20 MG tablet Take 20 mg by mouth once daily.      selenium 200 mcg Cap Take 1 capsule by mouth once daily.      turmeric (CURCUMIN MISC) 1 Dose by Misc.(Non-Drug; Combo Route) route once daily.      TURMERIC ORAL Take 1 tablet by mouth once daily.      glimepiride (AMARYL) 4 MG tablet Take 4 mg by mouth 2 (two) times daily.       MULTIVITAMIN W-MINERALS/LUTEIN (CENTRUM SILVER ORAL) Take 1 capsule by mouth once daily.       sitagliptan-metformin (JANUMET) 50-1,000 mg per tablet Take 1 tablet by mouth 2 (two) times daily with meals. 1 Tablet Oral Twice a day       TRUE METRIX GLUCOSE TEST STRIP Strp       [DISCONTINUED] losartan (COZAAR) 50 MG tablet Take 50 mg by mouth once daily.      [DISCONTINUED] rosuvastatin (CRESTOR) 10 MG tablet Take by mouth. 1 Tablet Oral Every day       Scheduled  "Meds:   amLODIPine  10 mg Oral Daily    aspirin  81 mg Oral Daily    heparin (porcine)  5,000 Units Subcutaneous Q8H    rosuvastatin  10 mg Oral QHS     Continuous Infusions:   dextrose 10 % in water (D10W) 75 mL/hr at 09/07/20 0759    sodium bicarbonate drip 130 mL/hr at 09/07/20 1011     PRN Meds:.acetaminophen, acetaminophen, dextrose 50%, dextrose 50%, glucagon (human recombinant), glucose, glucose, hydrALAZINE, melatonin, ondansetron    Allergies:  Patient has no known allergies.    Past Family History:  Reviewed; refer to Hospitalist Admission Note    Review of Systems:  Review of Systems - All 14 systems reviewed and negative, except as noted in HPI    Physical Exam:    BP (!) 163/73   Pulse 97   Temp 98.6 °F (37 °C)   Resp (!) 23   Ht 5' 11" (1.803 m)   Wt 80 kg (176 lb 5.9 oz)   SpO2 96%   BMI 24.60 kg/m²     General Appearance:    Alert, cooperative, no distress, appears stated age   Head:    Normocephalic, without obvious abnormality, atraumatic   Eyes:    PER, conjunctiva/corneas clear, EOM's intact in both eyes        Throat:   Lips, mucosa, and tongue normal; teeth and gums normal   Back:     Symmetric, no curvature, ROM normal, no CVA tenderness   Lungs:     Clear to auscultation bilaterally, respirations unlabored   Chest wall:    No tenderness or deformity   Heart:    Regular rate and rhythm, S1 and S2 normal, no murmur, rub   or gallop   Abdomen:     Soft, non-tender, bowel sounds active all four quadrants,     no masses, no organomegaly   Extremities:   Extremities normal, atraumatic, no cyanosis or edema   Pulses:   2+ and symmetric all extremities   MSK:   No joint or muscle swelling, tenderness or deformity   Skin:   Skin color, texture, turgor normal, no rashes or lesions   Neurologic:   CNII-XII intact, normal strength and sensation       Throughout.  No flap.  Has a tremor     Results:  Lab Results   Component Value Date     09/07/2020    K 5.2 (H) 09/07/2020     " 09/07/2020    CO2 10 (L) 09/07/2020     (H) 09/07/2020    CREATININE 14.5 (H) 09/07/2020    CALCIUM 8.0 (L) 09/07/2020    ANIONGAP 25 (H) 09/07/2020    ESTGFRAFRICA 3.3 (A) 09/07/2020    EGFRNONAA 2.9 (A) 09/07/2020       Lab Results   Component Value Date    CALCIUM 8.0 (L) 09/07/2020    PHOS 10.7 (HH) 09/07/2020       Recent Labs   Lab 09/07/20  0456   WBC 7.70   RBC 3.13*   HGB 10.0*   HCT 30.1*      MCV 96   MCH 31.9*   MCHC 33.2          I have personally reviewed pertinent radiological imaging and reports.

## 2020-09-07 NOTE — ASSESSMENT & PLAN NOTE
In the setting of acute renal failure, potassium was 6.2, status post membrane stabilizing medication improved to 4.9  Monitoring potassium levels closely.  Losartan remains on hold.

## 2020-09-07 NOTE — HPI
HPI as per MD Soraya Negron RONAN Cadet is a 75 y.o.  male who  has a past medical history of Diabetes mellitus, type 2.. R/ Renal cell cancer s/p partial nephrectomy appropriately 16 months ago in [facility] The patient presented to [facility] on 9/6/2020 with a primary complaint of Hypoglycemia (pt brought in by EMS with c/o weakness. CBG 52 upon EMS arrival. pt given one amp of D50 en route with EMS with improvement to . )  .   Patient was apparently well 4 days ago, started noticed nausea, loss of appetite and generalized weakness with hypoglycemia.  He was working on his yard under hot sun, not sure about adequate hydration recently.  Came to ED with concern of low sugar of 54, which was corrected in ED with IV dextrose, noted to have severe acute renal failure with hyperkalemia.  No urine output noted since admission in ED.  Hospitalist consulted for further eval, repeat BMP in 4 hours, prior to hyperkalemia cocktail showed resolution of hyperkalemia.  However renal failure remains remarkable .  Bladder scan at bedside 245cc, during my eval patient passed urine without any difficulty.  Reports history of renal stone in the past nonobstructive.  Prior to admission no change in renal output noted, no recent adjustment in his dose medication, used to check his renal function every 6 months.

## 2020-09-07 NOTE — ASSESSMENT & PLAN NOTE
Admission labs with BUN/creatinine 102/14.2, previous serum creatinine at around 1.5.  Patient with a history of renal cell cancer status post partial nephrectomy.  He denies any current urinary symptoms.    Does appear clinically hypovolemic, states he was working out in the sun for prolonged periods of time.  He has been spontaneously passing urine in the ED  Strict I/O with close monitoring of urine output, patient at this time declines Mejia catheter  Start IV fluids, half-normal saline with sodium bicarbonate, 75 cc an hour  Check CPK  Ordered UA with reflex urine culture  Renal ultrasound reviewed  Continue to hold oral hypoglycemics and losartan, renally dosing all medications and avoid nephrotoxin drugs  Nephrology consulted  Monitoring renal function closely

## 2020-09-08 PROBLEM — E83.39 HYPERPHOSPHATEMIA: Status: ACTIVE | Noted: 2020-09-08

## 2020-09-08 PROBLEM — N19 RENAL FAILURE: Status: ACTIVE | Noted: 2020-09-07

## 2020-09-08 PROBLEM — E16.2 HYPOGLYCEMIA: Status: RESOLVED | Noted: 2020-09-07 | Resolved: 2020-09-08

## 2020-09-08 PROBLEM — E87.20 LACTIC ACIDOSIS: Status: ACTIVE | Noted: 2020-09-08

## 2020-09-08 LAB
ALBUMIN SERPL BCP-MCNC: 3.4 G/DL (ref 3.5–5.2)
ANION GAP SERPL CALC-SCNC: 19 MMOL/L (ref 8–16)
ANION GAP SERPL CALC-SCNC: 20 MMOL/L (ref 8–16)
ANION GAP SERPL CALC-SCNC: 22 MMOL/L (ref 8–16)
BASOPHILS # BLD AUTO: 0.01 K/UL (ref 0–0.2)
BASOPHILS NFR BLD: 0.2 % (ref 0–1.9)
BNP SERPL-MCNC: 241 PG/ML (ref 0–99)
BUN SERPL-MCNC: 114 MG/DL (ref 8–23)
BUN SERPL-MCNC: 116 MG/DL (ref 8–23)
BUN SERPL-MCNC: 123 MG/DL (ref 8–23)
CALCIUM SERPL-MCNC: 7.8 MG/DL (ref 8.7–10.5)
CALCIUM SERPL-MCNC: 7.9 MG/DL (ref 8.7–10.5)
CALCIUM SERPL-MCNC: 8 MG/DL (ref 8.7–10.5)
CHLORIDE SERPL-SCNC: 91 MMOL/L (ref 95–110)
CHLORIDE SERPL-SCNC: 95 MMOL/L (ref 95–110)
CHLORIDE SERPL-SCNC: 98 MMOL/L (ref 95–110)
CO2 SERPL-SCNC: 16 MMOL/L (ref 23–29)
CO2 SERPL-SCNC: 18 MMOL/L (ref 23–29)
CO2 SERPL-SCNC: 23 MMOL/L (ref 23–29)
CREAT SERPL-MCNC: 13.7 MG/DL (ref 0.5–1.4)
CREAT SERPL-MCNC: 14.2 MG/DL (ref 0.5–1.4)
CREAT SERPL-MCNC: 14.4 MG/DL (ref 0.5–1.4)
DIFFERENTIAL METHOD: ABNORMAL
EOSINOPHIL # BLD AUTO: 0.2 K/UL (ref 0–0.5)
EOSINOPHIL NFR BLD: 3.6 % (ref 0–8)
ERYTHROCYTE [DISTWIDTH] IN BLOOD BY AUTOMATED COUNT: 12.6 % (ref 11.5–14.5)
EST. GFR  (AFRICAN AMERICAN): 3.3 ML/MIN/1.73 M^2
EST. GFR  (AFRICAN AMERICAN): 3.4 ML/MIN/1.73 M^2
EST. GFR  (AFRICAN AMERICAN): 3.5 ML/MIN/1.73 M^2
EST. GFR  (NON AFRICAN AMERICAN): 2.9 ML/MIN/1.73 M^2
EST. GFR  (NON AFRICAN AMERICAN): 2.9 ML/MIN/1.73 M^2
EST. GFR  (NON AFRICAN AMERICAN): 3.1 ML/MIN/1.73 M^2
GLUCOSE SERPL-MCNC: 134 MG/DL (ref 70–110)
GLUCOSE SERPL-MCNC: 143 MG/DL (ref 70–110)
GLUCOSE SERPL-MCNC: 163 MG/DL (ref 70–110)
GLUCOSE SERPL-MCNC: 190 MG/DL (ref 70–110)
GLUCOSE SERPL-MCNC: 374 MG/DL (ref 70–110)
GLUCOSE SERPL-MCNC: 397 MG/DL (ref 70–110)
HCT VFR BLD AUTO: 28.2 % (ref 40–54)
HGB BLD-MCNC: 9.7 G/DL (ref 14–18)
IMM GRANULOCYTES # BLD AUTO: 0.02 K/UL (ref 0–0.04)
IMM GRANULOCYTES NFR BLD AUTO: 0.3 % (ref 0–0.5)
LACTATE SERPL-SCNC: 2.5 MMOL/L (ref 0.5–1.9)
LACTATE SERPL-SCNC: 2.8 MMOL/L (ref 0.5–1.9)
LACTATE SERPL-SCNC: 3.2 MMOL/L (ref 0.5–1.9)
LYMPHOCYTES # BLD AUTO: 1.1 K/UL (ref 1–4.8)
LYMPHOCYTES NFR BLD: 16.2 % (ref 18–48)
MAGNESIUM SERPL-MCNC: 2.3 MG/DL (ref 1.6–2.6)
MCH RBC QN AUTO: 31.4 PG (ref 27–31)
MCHC RBC AUTO-ENTMCNC: 34.4 G/DL (ref 32–36)
MCV RBC AUTO: 91 FL (ref 82–98)
MONOCYTES # BLD AUTO: 0.9 K/UL (ref 0.3–1)
MONOCYTES NFR BLD: 13.6 % (ref 4–15)
NEUTROPHILS # BLD AUTO: 4.3 K/UL (ref 1.8–7.7)
NEUTROPHILS NFR BLD: 66.1 % (ref 38–73)
NRBC BLD-RTO: 0 /100 WBC
PHOSPHATE SERPL-MCNC: 8.8 MG/DL (ref 2.7–4.5)
PLATELET # BLD AUTO: 168 K/UL (ref 150–350)
PMV BLD AUTO: 10 FL (ref 9.2–12.9)
POTASSIUM SERPL-SCNC: 4.7 MMOL/L (ref 3.5–5.1)
POTASSIUM SERPL-SCNC: 4.8 MMOL/L (ref 3.5–5.1)
POTASSIUM SERPL-SCNC: 5.1 MMOL/L (ref 3.5–5.1)
RBC # BLD AUTO: 3.09 M/UL (ref 4.6–6.2)
SODIUM SERPL-SCNC: 133 MMOL/L (ref 136–145)
SODIUM SERPL-SCNC: 134 MMOL/L (ref 136–145)
SODIUM SERPL-SCNC: 135 MMOL/L (ref 136–145)
URATE SERPL-MCNC: 10 MG/DL (ref 3.4–7)
WBC # BLD AUTO: 6.47 K/UL (ref 3.9–12.7)

## 2020-09-08 PROCEDURE — 86160 COMPLEMENT ANTIGEN: CPT

## 2020-09-08 PROCEDURE — 80048 BASIC METABOLIC PNL TOTAL CA: CPT | Mod: 91

## 2020-09-08 PROCEDURE — 86706 HEP B SURFACE ANTIBODY: CPT

## 2020-09-08 PROCEDURE — 94761 N-INVAS EAR/PLS OXIMETRY MLT: CPT

## 2020-09-08 PROCEDURE — 63600175 PHARM REV CODE 636 W HCPCS: Performed by: INTERNAL MEDICINE

## 2020-09-08 PROCEDURE — 83880 ASSAY OF NATRIURETIC PEPTIDE: CPT

## 2020-09-08 PROCEDURE — 99223 PR INITIAL HOSPITAL CARE,LEVL III: ICD-10-PCS | Mod: ,,, | Performed by: UROLOGY

## 2020-09-08 PROCEDURE — 86704 HEP B CORE ANTIBODY TOTAL: CPT

## 2020-09-08 PROCEDURE — 25000003 PHARM REV CODE 250: Performed by: INTERNAL MEDICINE

## 2020-09-08 PROCEDURE — 20000000 HC ICU ROOM

## 2020-09-08 PROCEDURE — 84550 ASSAY OF BLOOD/URIC ACID: CPT

## 2020-09-08 PROCEDURE — 83735 ASSAY OF MAGNESIUM: CPT

## 2020-09-08 PROCEDURE — 99223 1ST HOSP IP/OBS HIGH 75: CPT | Mod: ,,, | Performed by: UROLOGY

## 2020-09-08 PROCEDURE — 80069 RENAL FUNCTION PANEL: CPT

## 2020-09-08 PROCEDURE — 87340 HEPATITIS B SURFACE AG IA: CPT

## 2020-09-08 PROCEDURE — 99900035 HC TECH TIME PER 15 MIN (STAT)

## 2020-09-08 PROCEDURE — 83605 ASSAY OF LACTIC ACID: CPT | Mod: 91

## 2020-09-08 PROCEDURE — 82962 GLUCOSE BLOOD TEST: CPT

## 2020-09-08 PROCEDURE — 86038 ANTINUCLEAR ANTIBODIES: CPT

## 2020-09-08 PROCEDURE — 83036 HEMOGLOBIN GLYCOSYLATED A1C: CPT

## 2020-09-08 PROCEDURE — 25000003 PHARM REV CODE 250

## 2020-09-08 PROCEDURE — 86160 COMPLEMENT ANTIGEN: CPT | Mod: 59

## 2020-09-08 PROCEDURE — 85025 COMPLETE CBC W/AUTO DIFF WBC: CPT

## 2020-09-08 PROCEDURE — 36415 COLL VENOUS BLD VENIPUNCTURE: CPT

## 2020-09-08 RX ORDER — CHLORHEXIDINE GLUCONATE ORAL RINSE 1.2 MG/ML
15 SOLUTION DENTAL 2 TIMES DAILY
Status: DISPENSED | OUTPATIENT
Start: 2020-09-08 | End: 2020-09-13

## 2020-09-08 RX ORDER — HYDRALAZINE HYDROCHLORIDE 25 MG/1
25 TABLET, FILM COATED ORAL EVERY 8 HOURS
Status: DISCONTINUED | OUTPATIENT
Start: 2020-09-08 | End: 2020-09-14 | Stop reason: HOSPADM

## 2020-09-08 RX ORDER — MUPIROCIN 20 MG/G
OINTMENT TOPICAL 2 TIMES DAILY
Status: DISPENSED | OUTPATIENT
Start: 2020-09-08 | End: 2020-09-13

## 2020-09-08 RX ADMIN — HEPARIN SODIUM 5000 UNITS: 5000 INJECTION INTRAVENOUS; SUBCUTANEOUS at 10:09

## 2020-09-08 RX ADMIN — AMLODIPINE BESYLATE 10 MG: 5 TABLET ORAL at 08:09

## 2020-09-08 RX ADMIN — MUPIROCIN: 20 OINTMENT TOPICAL at 12:09

## 2020-09-08 RX ADMIN — PIPERACILLIN SODIUM AND TAZOBACTAM SODIUM 3.38 G: 3; .375 INJECTION, POWDER, LYOPHILIZED, FOR SOLUTION INTRAVENOUS at 02:09

## 2020-09-08 RX ADMIN — HYDRALAZINE HYDROCHLORIDE 25 MG: 25 TABLET, FILM COATED ORAL at 10:09

## 2020-09-08 RX ADMIN — CHLORHEXIDINE GLUCONATE 15 ML: 1.2 RINSE ORAL at 09:09

## 2020-09-08 RX ADMIN — MELATONIN 6 MG: at 10:09

## 2020-09-08 RX ADMIN — SODIUM BICARBONATE: 84 INJECTION, SOLUTION INTRAVENOUS at 02:09

## 2020-09-08 RX ADMIN — ASPIRIN 81 MG: 81 TABLET, DELAYED RELEASE ORAL at 08:09

## 2020-09-08 RX ADMIN — CHLORHEXIDINE GLUCONATE 15 ML: 1.2 RINSE ORAL at 12:09

## 2020-09-08 RX ADMIN — HYDRALAZINE HYDROCHLORIDE 25 MG: 25 TABLET, FILM COATED ORAL at 12:09

## 2020-09-08 RX ADMIN — HEPARIN SODIUM 5000 UNITS: 5000 INJECTION INTRAVENOUS; SUBCUTANEOUS at 05:09

## 2020-09-08 RX ADMIN — ROSUVASTATIN CALCIUM 10 MG: 10 TABLET, FILM COATED ORAL at 09:09

## 2020-09-08 RX ADMIN — MUPIROCIN: 20 OINTMENT TOPICAL at 09:09

## 2020-09-08 NOTE — PLAN OF CARE
Pt should have no CM needs unless he needs dialysis. Uses [facility]  in [place]. PCP is Dr Mccabe in [place]. Will be changing PCP to a [facility] doctor at the end of the month. Cm to follow until discharge from hospital.     09/08/20 0906   Discharge Assessment   Assessment Type Discharge Planning Assessment   Confirmed/corrected address and phone number on facesheet? Yes   Assessment information obtained from? Patient   Communicated expected length of stay with patient/caregiver no   Prior to hospitilization cognitive status: Alert/Oriented   Prior to hospitalization functional status: Independent   Current cognitive status: Alert/Oriented   Current Functional Status: Needs Assistance   Lives With spouse   Able to Return to Prior Arrangements yes   Is patient able to care for self after discharge? Yes   Who are your caregiver(s) and their phone number(s)? Meron Cadet  wife  [phone]   Patient's perception of discharge disposition home or selfcare   Readmission Within the Last 30 Days no previous admission in last 30 days   Patient currently being followed by outpatient case management? No   Patient currently receives any other outside agency services? No   Equipment Currently Used at Home cane, straight   Part D Coverage [employer]BS   Do you have any problems affording any of your prescribed medications? No   Is the patient taking medications as prescribed? yes   Does the patient have transportation home? Yes   Transportation Anticipated family or friend will provide   Dialysis Name and Scheduled days N/A   Does the patient receive services at the [facility]? No   Discharge Plan A Home   Discharge Plan B Home   DME Needed Upon Discharge  none   Patient/Family in Agreement with Plan yes

## 2020-09-08 NOTE — CONSULTS
INPATIENT NEPHROLOGY CONSULT   [facility] NEPHROLOGY    Jamil Coffmanevette  09/08/2020    Reason for consultation:  MELODIE    History of Present Illness:  75 y.o.  male who  has a past medical history of Diabetes mellitus, type 2.. R/ Renal cell cancer s/p partial nephrectomy appropriately 16 months ago in [facility] The patient presented to [facility] on 9/6/2020 with a primary complaint of Hypoglycemia (pt brought in by EMS with c/o weakness. CBG 52 upon EMS arrival. pt given one amp of D50 en route with EMS with improvement to . )  .   Patient was apparently well 4 days ago, started noticed nausea, loss of appetite and generalized weakness with hypoglycemia.  He was working on his yard under hot sun, not sure about adequate hydration recently.  Came to ED with concern of low sugar of 54, which was corrected in ED with IV dextrose, noted to have severe acute renal failure with hyperkalemia.  No urine output noted since admission in ED.  Hospitalist consulted for further eval, repeat BMP in 4 hours, prior to hyperkalemia cocktail showed resolution of hyperkalemia.  However renal failure remains remarkable .  Bladder scan at bedside 245cc, during my eval patient passed urine without any difficulty.  Reports history of renal stone in the past nonobstructive.    9/7  No nausea, chest pain, sob, fever, urinary or bowel complaint, new neurologic symptoms, new joint pain,  9/8 VSS, no new complains.    Plan of Care:    Acute kidney injury likely due to intravascular volume depletion.    --renal us--mild right pelvocaliectasis, not present on CT from September 2019 ?  --volume resuscitation  --renal dose medication  --rheumatology serologies pending  --no acute indication for dialysis, monitor. This was relayed to him and his wife    Hyperkalemia secondary to MELODIE  --veltassa  --low k diet    Severe metabolic acidosis/lactic acidosis  Hyponatremia  --bicarb infusion, continue for now  --no respiratory  difficulty at this time    Anemia  --iron panel ok  --trend  --blood products per primary    Thank you for allowing us to participate in this patient's care. We will continue to follow.    Vital Signs:  Temp Readings from Last 3 Encounters:   09/08/20 98.9 °F (37.2 °C)   12/19/19 97.5 °F (36.4 °C)   10/08/19 97.3 °F (36.3 °C)       Pulse Readings from Last 3 Encounters:   09/08/20 92   12/19/19 90   10/08/19 93       BP Readings from Last 3 Encounters:   09/08/20 (!) 175/84   12/19/19 (!) 157/92   10/08/19 133/73       Weight:  Wt Readings from Last 3 Encounters:   09/07/20 80 kg (176 lb 5.9 oz)   12/19/19 81.7 kg (180 lb 3.2 oz)   10/08/19 80.8 kg (178 lb 3.2 oz)       Past Medical & Surgical History:  Past Medical History:   Diagnosis Date    Diabetes mellitus, type 2        Past Surgical History:   Procedure Laterality Date    APPENDECTOMY      SPINE SURGERY         Past Social History:  Social History     Socioeconomic History    Marital status:      Spouse name: Not on file    Number of children: Not on file    Years of education: Not on file    Highest education level: Not on file   Occupational History    Not on file   Social Needs    Financial resource strain: Not on file    Food insecurity     Worry: Not on file     Inability: Not on file    Transportation needs     Medical: Not on file     Non-medical: Not on file   Tobacco Use    Smoking status: Never Smoker    Smokeless tobacco: Never Used   Substance and Sexual Activity    Alcohol use: No    Drug use: No    Sexual activity: Yes   Lifestyle    Physical activity     Days per week: Not on file     Minutes per session: Not on file    Stress: Not on file   Relationships    Social connections     Talks on phone: Not on file     Gets together: Not on file     Attends Faith service: Not on file     Active member of club or organization: Not on file     Attends meetings of clubs or organizations: Not on file     Relationship status:  Not on file   Other Topics Concern    Not on file   Social History Narrative    Not on file       Medications:  No current facility-administered medications on file prior to encounter.      Current Outpatient Medications on File Prior to Encounter   Medication Sig Dispense Refill    ascorbic acid, vitamin C, (VITAMIN C) 500 MG tablet Take 500 mg by mouth once daily.      aspirin (ENTERIC COATED ASPIRIN) 81 MG EC tablet Take 81 mg by mouth once daily. 1 Tablet, Delayed Release (E.C.) Oral Every day      cholecalciferol, vitamin D3, (VITAMIN D3) 50 mcg (2,000 unit) Cap Take 1 capsule by mouth once daily.      clonazePAM (KLONOPIN) 2 MG Tab Take 2 mg by mouth every evening.       madi, Zingiber officinalis, (MADI EXTRACT) 250 mg Cap Take 1 capsule by mouth once daily.      NON FORMULARY MEDICATION Take 1 tablet by mouth once daily. TRIPLE MUSHROOM COMPLEX      rosuvastatin (CRESTOR) 20 MG tablet Take 20 mg by mouth once daily.      selenium 200 mcg Cap Take 1 capsule by mouth once daily.      turmeric (CURCUMIN MISC) 1 Dose by Misc.(Non-Drug; Combo Route) route once daily.      TURMERIC ORAL Take 1 tablet by mouth once daily.      glimepiride (AMARYL) 4 MG tablet Take 4 mg by mouth 2 (two) times daily.       MULTIVITAMIN W-MINERALS/LUTEIN (CENTRUM SILVER ORAL) Take 1 capsule by mouth once daily.       sitagliptan-metformin (JANUMET) 50-1,000 mg per tablet Take 1 tablet by mouth 2 (two) times daily with meals. 1 Tablet Oral Twice a day       TRUE METRIX GLUCOSE TEST STRIP Strp        Scheduled Meds:   amLODIPine  10 mg Oral Daily    aspirin  81 mg Oral Daily    heparin (porcine)  5,000 Units Subcutaneous Q8H    rosuvastatin  10 mg Oral QHS     Continuous Infusions:   dextrose 10 % in water (D10W) Stopped (09/07/20 1530)    custom IV infusion builder 100 mL/hr at 09/08/20 0230     PRN Meds:.acetaminophen, acetaminophen, dextrose 50%, dextrose 50%, glucagon (human recombinant), glucose, glucose,  "hydrALAZINE, melatonin, ondansetron    Allergies:  Patient has no known allergies.    Past Family History:  Reviewed; refer to Hospitalist Admission Note    Review of Systems:  Review of Systems - All 14 systems reviewed and negative, except as noted in HPI    Physical Exam:    BP (!) 175/84   Pulse 92   Temp 98.9 °F (37.2 °C)   Resp 20   Ht 5' 11" (1.803 m)   Wt 80 kg (176 lb 5.9 oz)   SpO2 97%   BMI 24.60 kg/m²     General Appearance:    Alert, cooperative, no distress, appears stated age   Head:    Normocephalic, without obvious abnormality, atraumatic   Eyes:    PER, conjunctiva/corneas clear, EOM's intact in both eyes        Throat:   Lips, mucosa, and tongue normal; teeth and gums normal   Back:     Symmetric, no curvature, ROM normal, no CVA tenderness   Lungs:     Clear to auscultation bilaterally, respirations unlabored   Chest wall:    No tenderness or deformity   Heart:    Regular rate and rhythm, S1 and S2 normal, no murmur, rub   or gallop   Abdomen:     Soft, non-tender, bowel sounds active all four quadrants,     no masses, no organomegaly   Extremities:   Extremities normal, atraumatic, no cyanosis or edema   Pulses:   2+ and symmetric all extremities   MSK:   No joint or muscle swelling, tenderness or deformity   Skin:   Skin color, texture, turgor normal, no rashes or lesions   Neurologic:   CNII-XII intact, normal strength and sensation       Throughout.  No flap.  Has a tremor     Results:  Lab Results   Component Value Date     (L) 09/08/2020    K 4.8 09/08/2020    CL 95 09/08/2020    CO2 18 (L) 09/08/2020     (H) 09/08/2020    CREATININE 14.2 (H) 09/08/2020    CALCIUM 7.9 (L) 09/08/2020    ANIONGAP 22 (H) 09/08/2020    ESTGFRAFRICA 3.4 (A) 09/08/2020    EGFRNONAA 2.9 (A) 09/08/2020       Lab Results   Component Value Date    CALCIUM 7.9 (L) 09/08/2020    PHOS 8.8 (H) 09/08/2020       Recent Labs   Lab 09/08/20  0402   WBC 6.47   RBC 3.09*   HGB 9.7*   HCT 28.2*    "   MCV 91   [facility] 31.4*   [facility] 34.4          I have personally reviewed pertinent radiological imaging and reports.

## 2020-09-08 NOTE — ASSESSMENT & PLAN NOTE
Lactic acid 5 on presentation, improved to 2.5 today in the setting of acute renal failure  No evidence of infection, urine and blood culture no growth to date

## 2020-09-08 NOTE — CONSULTS
[facility] Consult Patient/H&P:    Jamil Cadet is a 75 y.o. male who presents for right hydroureteronephrosis.    Patient with a history of bilateral renal masses s/p right partial nephrectomy in 4/2019 with Dr. Santos at [facility] - left 2 cm cyst on surveillance. Unclear of pathology given lack of records, however his wife states it was renal cell carcinoma.     He presented to the [facility] emergency department on 9/6/20 with severe hypoglycemia.      On review of his labs, he was incidentally found to have an elevated creatinine of 14.4 from a baseline of 1.5 with hyperkalemia.     He subsequently underwent US kidney which revealed mild right pelviectasis. CT renal stone on 9/8/20 showed interval development of right hydroureteronephrosis down to the pelvis - no obvious etiology for obstruction. Also with gallbladder distention, and a calculus of the cystic duct and non-obstructing stone in right kidney.     Patient denies any fever, chills, flank pain, dysuria, lower urinary tract symptoms or recent  trauma.     PSA  0.28  9/10/19    Testosterone  280  9/10/19     Urine culture   NGTD  9/7/20    Past Medical History:   Diagnosis Date    Diabetes mellitus, type 2        Past Surgical History:   Procedure Laterality Date    APPENDECTOMY      SPINE SURGERY         Family History   Problem Relation Age of Onset    Heart disease Mother     Heart disease Father     Stroke Father     Heart disease Maternal Grandfather     Heart disease Paternal Grandmother     Heart disease Paternal Grandfather        Social History     Socioeconomic History    Marital status:      Spouse name: Not on file    Number of children: Not on file    Years of education: Not on file    Highest education level: Not on file   Occupational History    Not on file   Social Needs    Financial resource strain: Not on file    Food insecurity     Worry: Not on file     Inability: Not on file     Transportation needs     Medical: Not on file     Non-medical: Not on file   Tobacco Use    Smoking status: Never Smoker    Smokeless tobacco: Never Used   Substance and Sexual Activity    Alcohol use: No    Drug use: No    Sexual activity: Yes   Lifestyle    Physical activity     Days per week: Not on file     Minutes per session: Not on file    Stress: Not on file   Relationships    Social connections     Talks on phone: Not on file     Gets together: Not on file     Attends Bahai service: Not on file     Active member of club or organization: Not on file     Attends meetings of clubs or organizations: Not on file     Relationship status: Not on file   Other Topics Concern    Not on file   Social History Narrative    Not on file       Review of patient's allergies indicates:  No Known Allergies    Medications Reviewed: see MAR    ROS:    Constitutional: denies fevers, chills, night sweats, fatigue, malaise  Respiratory: negative for cough, shortness of breath, wheezing, dyspnea.  Cardiovascular: + for high blood pressure, negative for chest pain, varicose veins, ankle swelling, palpitations, syncope.  GI: negative for abdominal pain, heartburn, indigestion, nausea, vomiting, constipation, diarrhea, blood in stool.   Urology: as noted above in HPI  Endocrinology: negative for cold intolerance, excessive thirst, not feeling tired/sluggish, no heat intolerance.   Hematology/Lymph: negative for easy bleeding, easy bruising, swollen glands.  Musculoskeletal: negative for back pain, joint pain, joint swelling, neck pain.  Allergy-Immunology: negative for seasonal allergies, negative for unusual infections.   Skin: negative for boils, breast lumps, hives, itching, rash.   Neurology: negative for, dizziness, headache, tingling/numbness, tremors.   Psych: satisfied with life; negative for, anxiety, depression, suicidal thoughts.     PHYSICAL EXAM:    Vitals:    09/08/20 1300   BP: (!) 159/70   Pulse: 93    Resp: (!) 24   Temp: 97.7 °F (36.5 °C)     Body mass index is 24.6 kg/m².       General: Alert, cooperative, no distress, appears stated age  Head: Normocephalic, without obvious abnormality, atraumatic  Neck: no masses, no thyromegaly, no lymphadenopathy  Eyes: PERRL, conjunctiva/corneas clear  Lungs: Respirations unlabored, normal effort, no accessory muscle use  CV: Warm and well perfused extremities  Abdomen: Soft, non-tender, no CVA tenderness, no hepatosplenomegaly, no hernia  Extremities: Extremities normal, atraumatic, no cyanosis or edema  Skin: Normal color, texture, and turgor, no rashes or lesions  Psych: Appropriate, well oriented, normal affect, normal mood  Neuro: Non-focal      LABS:    Recent Results (from the past 336 hour(s))   CBC auto differential    Collection Time: 09/07/20 12:23 AM   Result Value Ref Range    WBC 9.54 3.90 - 12.70 K/uL    RBC 3.47 (L) 4.60 - 6.20 M/uL    Hemoglobin 10.9 (L) 14.0 - 18.0 g/dL    Hematocrit 34.0 (L) 40.0 - 54.0 %    Mean Corpuscular Volume 98 82 - 98 fL    Mean Corpuscular Hemoglobin 31.4 (H) 27.0 - 31.0 pg    Mean Corpuscular Hemoglobin Conc 32.1 32.0 - 36.0 g/dL    RDW 13.2 11.5 - 14.5 %    Platelets 197 150 - 350 K/uL    MPV 10.4 9.2 - 12.9 fL    Immature Granulocytes 0.4 0.0 - 0.5 %    Gran # (ANC) 7.7 1.8 - 7.7 K/uL    Immature Grans (Abs) 0.04 0.00 - 0.04 K/uL    Lymph # 0.8 (L) 1.0 - 4.8 K/uL    Mono # 0.9 0.3 - 1.0 K/uL    Eos # 0.1 0.0 - 0.5 K/uL    Baso # 0.02 0.00 - 0.20 K/uL    nRBC 0 0 /100 WBC    Gran% 81.1 (H) 38.0 - 73.0 %    Lymph% 8.4 (L) 18.0 - 48.0 %    Mono% 9.2 4.0 - 15.0 %    Eosinophil% 0.7 0.0 - 8.0 %    Basophil% 0.2 0.0 - 1.9 %    Differential Method Automated    Comprehensive metabolic panel    Collection Time: 09/07/20 12:23 AM   Result Value Ref Range    Sodium 132 (L) 136 - 145 mmol/L    Potassium 6.2 (H) 3.5 - 5.1 mmol/L    Chloride 101 95 - 110 mmol/L    CO2 10 (L) 23 - 29 mmol/L    Glucose 54 (L) 70 - 110 mg/dL    BUN, Bld 102  (H) 8 - 23 mg/dL    Creatinine 14.2 (H) 0.5 - 1.4 mg/dL    Calcium 7.8 (L) 8.7 - 10.5 mg/dL    Total Protein 6.5 6.0 - 8.4 g/dL    Albumin 3.9 3.5 - 5.2 g/dL    Total Bilirubin 1.0 0.1 - 1.0 mg/dL    Alkaline Phosphatase 61 55 - 135 U/L    AST 25 10 - 40 U/L    ALT 21 10 - 44 U/L    Anion Gap 21 (H) 8 - 16 mmol/L    eGFR if African American 3.4 (A) >60 mL/min/1.73 m^2    eGFR if non African American 2.9 (A) >60 mL/min/1.73 m^2   Troponin I    Collection Time: 09/07/20 12:23 AM   Result Value Ref Range    Troponin I <0.030 <=0.040 ng/mL   Brain natriuretic peptide    Collection Time: 09/07/20 12:23 AM   Result Value Ref Range     (H) 0 - 99 pg/mL   Protime-INR    Collection Time: 09/07/20 12:23 AM   Result Value Ref Range    PT 15.8 (H) 10.6 - 14.8 sec    INR 1.3    POCT glucose    Collection Time: 09/07/20 12:32 AM   Result Value Ref Range    POC Glucose 47 (LL) 70 - 110   POCT glucose    Collection Time: 09/07/20  2:32 AM   Result Value Ref Range    POC Glucose 59 (L) 70 - 110   POCT glucose    Collection Time: 09/07/20  3:53 AM   Result Value Ref Range    POC Glucose 117 (H) 70 - 110   CBC auto differential    Collection Time: 09/07/20  4:56 AM   Result Value Ref Range    WBC 7.70 3.90 - 12.70 K/uL    RBC 3.13 (L) 4.60 - 6.20 M/uL    Hemoglobin 10.0 (L) 14.0 - 18.0 g/dL    Hematocrit 30.1 (L) 40.0 - 54.0 %    Mean Corpuscular Volume 96 82 - 98 fL    Mean Corpuscular Hemoglobin 31.9 (H) 27.0 - 31.0 pg    Mean Corpuscular Hemoglobin Conc 33.2 32.0 - 36.0 g/dL    RDW 13.2 11.5 - 14.5 %    Platelets 183 150 - 350 K/uL    MPV 10.1 9.2 - 12.9 fL    Immature Granulocytes 0.5 0.0 - 0.5 %    Gran # (ANC) 5.2 1.8 - 7.7 K/uL    Immature Grans (Abs) 0.04 0.00 - 0.04 K/uL    Lymph # 1.3 1.0 - 4.8 K/uL    Mono # 1.1 (H) 0.3 - 1.0 K/uL    Eos # 0.1 0.0 - 0.5 K/uL    Baso # 0.01 0.00 - 0.20 K/uL    nRBC 0 0 /100 WBC    Gran% 66.9 38.0 - 73.0 %    Lymph% 16.9 (L) 18.0 - 48.0 %    Mono% 13.8 4.0 - 15.0 %    Eosinophil% 1.8  0.0 - 8.0 %    Basophil% 0.1 0.0 - 1.9 %    Differential Method Automated    Comprehensive metabolic panel    Collection Time: 09/07/20  4:56 AM   Result Value Ref Range    Sodium 140 136 - 145 mmol/L    Potassium 4.9 3.5 - 5.1 mmol/L    Chloride 108 95 - 110 mmol/L    CO2 12 (L) 23 - 29 mmol/L    Glucose 78 70 - 110 mg/dL    BUN, Bld 98 (H) 8 - 23 mg/dL    Creatinine 13.1 (H) 0.5 - 1.4 mg/dL    Calcium 7.3 (L) 8.7 - 10.5 mg/dL    Total Protein 6.0 6.0 - 8.4 g/dL    Albumin 3.3 (L) 3.5 - 5.2 g/dL    Total Bilirubin 0.7 0.1 - 1.0 mg/dL    Alkaline Phosphatase 48 (L) 55 - 135 U/L    AST 24 10 - 40 U/L    ALT 17 10 - 44 U/L    Anion Gap 20 (H) 8 - 16 mmol/L    eGFR if African American 3.7 (A) >60 mL/min/1.73 m^2    eGFR if non  3.2 (A) >60 mL/min/1.73 m^2   COVID-19 Rapid Screening    Collection Time: 09/07/20  6:01 AM   Result Value Ref Range    SARS-CoV-2 RNA, Amplification, Qual Negative Negative   POCT glucose    Collection Time: 09/07/20  7:09 AM   Result Value Ref Range    POC Glucose 42 (LL) 70 - 110   Uric acid    Collection Time: 09/07/20  7:20 AM   Result Value Ref Range    Uric Acid 10.8 (H) 3.4 - 7.0 mg/dL   Glucose, random    Collection Time: 09/07/20  7:20 AM   Result Value Ref Range    Glucose 35 (LL) 70 - 110 mg/dL   PTH, intact    Collection Time: 09/07/20  7:20 AM   Result Value Ref Range    PTH, Intact 168.9 (H) 9.0 - 77.0 pg/mL   Vitamin D    Collection Time: 09/07/20  7:20 AM   Result Value Ref Range    Vit D, 25-Hydroxy 72 30 - 96 ng/mL   Phosphorus    Collection Time: 09/07/20  7:20 AM   Result Value Ref Range    Phosphorus 10.7 (HH) 2.7 - 4.5 mg/dL   Ferritin    Collection Time: 09/07/20  7:20 AM   Result Value Ref Range    Ferritin 68 20.0 - 300.0 ng/mL   Iron and TIBC    Collection Time: 09/07/20  7:20 AM   Result Value Ref Range    Iron 110 45 - 160 ug/dL    Transferrin 195 (L) 200 - 375 mg/dL    TIBC 273 250 - 450 ug/dL    Saturated Iron 40 20 - 50 %   Hemoglobin A1C     Collection Time: 09/07/20  7:20 AM   Result Value Ref Range    Hemoglobin A1C 6.1 4.5 - 6.2 %    Estimated Avg Glucose 128 68 - 131 mg/dL   CK    Collection Time: 09/07/20  7:20 AM   Result Value Ref Range    CPK 90 20 - 200 U/L   Urinalysis, Reflex to Urine Culture Urine, Clean Catch    Collection Time: 09/07/20  7:35 AM    Specimen: Urine   Result Value Ref Range    Specimen UA Urine, Clean Catch     Color, UA Yellow Yellow, Straw, Ida    Appearance, UA Clear Clear    pH, UA 5.0 5.0 - 8.0    Specific Gravity, UA 1.010 1.005 - 1.030    Protein, UA 1+ (A) Negative    Glucose, UA Negative Negative    Ketones, UA Negative Negative    Bilirubin (UA) Negative Negative    Occult Blood UA Trace (A) Negative    Nitrite, UA Negative Negative    Urobilinogen, UA Negative Negative EU/dL    Leukocytes, UA Negative Negative   Urinalysis, Reflex to Urine Culture Urine, Clean Catch    Collection Time: 09/07/20  7:35 AM    Specimen: Urine   Result Value Ref Range    Specimen UA Urine, Clean Catch     Color, UA Yellow Yellow, Straw, Ida    Appearance, UA Clear Clear    pH, UA 5.0 5.0 - 8.0    Specific Gravity, UA 1.010 1.005 - 1.030    Protein, UA 1+ (A) Negative    Glucose, UA Negative Negative    Ketones, UA Negative Negative    Bilirubin (UA) Negative Negative    Occult Blood UA Trace (A) Negative    Nitrite, UA Negative Negative    Urobilinogen, UA Negative Negative EU/dL    Leukocytes, UA Negative Negative   Urinalysis Microscopic    Collection Time: 09/07/20  7:35 AM   Result Value Ref Range    RBC, UA 3 0 - 4 /hpf    WBC, UA 7 (H) 0 - 5 /hpf    Bacteria Negative None-Occ /hpf    Squam Epithel, UA 3 /hpf    Hyaline Casts, UA 11 (A) 0-1/lpf /lpf    Microscopic Comment SEE COMMENT    Creatinine, urine, random    Collection Time: 09/07/20  7:36 AM   Result Value Ref Range    Creatinine, Random Ur 61.0 23.0 - 375.0 mg/dL   Microalbumin/creatinine urine ratio    Collection Time: 09/07/20  7:36 AM   Result Value Ref Range     Microalbum.,U,Random 236.8 ug/mL    Creatinine, Random Ur 61.0 23.0 - 375.0 mg/dL    Microalb Creat Ratio 388.2 (H) 0.0 - 30.0 ug/mg   Lactic Acid, Plasma    Collection Time: 09/07/20  8:54 AM   Result Value Ref Range    Lactate (Lactic Acid) 4.0 (HH) 0.5 - 1.9 mmol/L   POCT glucose    Collection Time: 09/07/20  9:02 AM   Result Value Ref Range    POC Glucose 339 (H) 70 - 110   Urine culture    Collection Time: 09/07/20  9:15 AM    Specimen: Urine   Result Value Ref Range    Urine Culture, Routine No growth to date    POCT glucose    Collection Time: 09/07/20  9:15 AM   Result Value Ref Range    POC Glucose 133 (H) 70 - 110   Urinalysis    Collection Time: 09/07/20  9:24 AM   Result Value Ref Range    Specimen UA Urine, Clean Catch     Color, UA Yellow Yellow, Straw, Ida    Appearance, UA Clear Clear    pH, UA 5.0 5.0 - 8.0    Specific Gravity, UA 1.010 1.005 - 1.030    Protein, UA 1+ (A) Negative    Glucose, UA Negative Negative    Ketones, UA Negative Negative    Bilirubin (UA) Negative Negative    Occult Blood UA Trace (A) Negative    Nitrite, UA Negative Negative    Urobilinogen, UA Negative Negative EU/dL    Leukocytes, UA Negative Negative   Beta - Hydroxybutyrate, Serum    Collection Time: 09/07/20  9:24 AM   Result Value Ref Range    Beta-Hydroxybutyrate 1.1 (H) 0.0 - 0.5 mmol/L   Urinalysis Microscopic    Collection Time: 09/07/20  9:24 AM   Result Value Ref Range    RBC, UA 4 0 - 4 /hpf    WBC, UA 8 (H) 0 - 5 /hpf    Bacteria Negative None-Occ /hpf    Squam Epithel, UA 3 /hpf    Hyaline Casts, UA 12 (A) 0-1/lpf /lpf    Microscopic Comment SEE COMMENT    Blood culture    Collection Time: 09/07/20 10:28 AM    Specimen: Peripheral, Antecubital, Left; Blood   Result Value Ref Range    Blood Culture, Routine No Growth to date     Blood Culture, Routine No Growth to date    Basic metabolic panel    Collection Time: 09/07/20 10:33 AM   Result Value Ref Range    Sodium 136 136 - 145 mmol/L    Potassium 5.2 (H)  3.5 - 5.1 mmol/L    Chloride 101 95 - 110 mmol/L    CO2 10 (L) 23 - 29 mmol/L    Glucose 162 (H) 70 - 110 mg/dL    BUN, Bld 110 (H) 8 - 23 mg/dL    Creatinine 14.5 (H) 0.5 - 1.4 mg/dL    Calcium 8.0 (L) 8.7 - 10.5 mg/dL    Anion Gap 25 (H) 8 - 16 mmol/L    eGFR if African American 3.3 (A) >60 mL/min/1.73 m^2    eGFR if non African American 2.9 (A) >60 mL/min/1.73 m^2   Blood Culture #1 **CANNOT BE ORDERED STAT**    Collection Time: 09/07/20 10:33 AM    Specimen: Blood   Result Value Ref Range    Blood Culture, Routine No Growth to date     Blood Culture, Routine No Growth to date    POCT glucose    Collection Time: 09/07/20 10:36 AM   Result Value Ref Range    POC Glucose 173 (H) 70 - 110   POCT glucose    Collection Time: 09/07/20 12:08 PM   Result Value Ref Range    POC Glucose 166 (H) 70 - 110   Basic metabolic panel    Collection Time: 09/07/20 12:24 PM   Result Value Ref Range    Sodium 136 136 - 145 mmol/L    Potassium 5.8 (H) 3.5 - 5.1 mmol/L    Chloride 100 95 - 110 mmol/L    CO2 11 (L) 23 - 29 mmol/L    Glucose 172 (H) 70 - 110 mg/dL    BUN, Bld 109 (H) 8 - 23 mg/dL    Creatinine 14.7 (H) 0.5 - 1.4 mg/dL    Calcium 8.1 (L) 8.7 - 10.5 mg/dL    Anion Gap 25 (H) 8 - 16 mmol/L    eGFR if African American 3.2 (A) >60 mL/min/1.73 m^2    eGFR if non  2.8 (A) >60 mL/min/1.73 m^2   Lactic acid, plasma    Collection Time: 09/07/20 12:24 PM   Result Value Ref Range    Lactate (Lactic Acid) 5.0 (HH) 0.5 - 1.9 mmol/L   Sodium, urine, random    Collection Time: 09/07/20  2:02 PM   Result Value Ref Range    Sodium Urine Random 85 20 - 250 mmol/L   POCT glucose    Collection Time: 09/07/20  3:22 PM   Result Value Ref Range    POC Glucose 245 (H) 70 - 110   Lactic acid, plasma    Collection Time: 09/07/20  4:28 PM   Result Value Ref Range    Lactate (Lactic Acid) 4.9 (HH) 0.5 - 1.9 mmol/L   Basic metabolic panel    Collection Time: 09/07/20  8:19 PM   Result Value Ref Range    Sodium 134 (L) 136 - 145 mmol/L     Potassium 5.9 (H) 3.5 - 5.1 mmol/L    Chloride 98 95 - 110 mmol/L    CO2 13 (L) 23 - 29 mmol/L    Glucose 220 (H) 70 - 110 mg/dL    BUN, Bld 110 (H) 8 - 23 mg/dL    Creatinine 14.2 (H) 0.5 - 1.4 mg/dL    Calcium 8.1 (L) 8.7 - 10.5 mg/dL    Anion Gap 23 (H) 8 - 16 mmol/L    eGFR if African American 3.4 (A) >60 mL/min/1.73 m^2    eGFR if non African American 2.9 (A) >60 mL/min/1.73 m^2   Lactic acid, plasma    Collection Time: 09/07/20  8:19 PM   Result Value Ref Range    Lactate (Lactic Acid) 4.0 (HH) 0.5 - 1.9 mmol/L   POCT glucose    Collection Time: 09/07/20  8:47 PM   Result Value Ref Range    POC Glucose 245 (H) 70 - 110   Lactic acid, plasma    Collection Time: 09/08/20 12:15 AM   Result Value Ref Range    Lactate (Lactic Acid) 3.2 (HH) 0.5 - 1.9 mmol/L   POCT glucose    Collection Time: 09/08/20  2:33 AM   Result Value Ref Range    POC Glucose 163 (H) 70 - 110   CBC with Automated Differential    Collection Time: 09/08/20  4:02 AM   Result Value Ref Range    WBC 6.47 3.90 - 12.70 K/uL    RBC 3.09 (L) 4.60 - 6.20 M/uL    Hemoglobin 9.7 (L) 14.0 - 18.0 g/dL    Hematocrit 28.2 (L) 40.0 - 54.0 %    Mean Corpuscular Volume 91 82 - 98 fL    Mean Corpuscular Hemoglobin 31.4 (H) 27.0 - 31.0 pg    Mean Corpuscular Hemoglobin Conc 34.4 32.0 - 36.0 g/dL    RDW 12.6 11.5 - 14.5 %    Platelets 168 150 - 350 K/uL    MPV 10.0 9.2 - 12.9 fL    Immature Granulocytes 0.3 0.0 - 0.5 %    Gran # (ANC) 4.3 1.8 - 7.7 K/uL    Immature Grans (Abs) 0.02 0.00 - 0.04 K/uL    Lymph # 1.1 1.0 - 4.8 K/uL    Mono # 0.9 0.3 - 1.0 K/uL    Eos # 0.2 0.0 - 0.5 K/uL    Baso # 0.01 0.00 - 0.20 K/uL    nRBC 0 0 /100 WBC    Gran% 66.1 38.0 - 73.0 %    Lymph% 16.2 (L) 18.0 - 48.0 %    Mono% 13.6 4.0 - 15.0 %    Eosinophil% 3.6 0.0 - 8.0 %    Basophil% 0.2 0.0 - 1.9 %    Differential Method Automated    Lactic acid, plasma    Collection Time: 09/08/20  4:02 AM   Result Value Ref Range    Lactate (Lactic Acid) 2.8 (HH) 0.5 - 1.9 mmol/L   Renal  function panel    Collection Time: 09/08/20  4:02 AM   Result Value Ref Range    Glucose 134 (H) 70 - 110 mg/dL    Sodium 134 (L) 136 - 145 mmol/L    Potassium 4.7 3.5 - 5.1 mmol/L    Chloride 98 95 - 110 mmol/L    CO2 16 (L) 23 - 29 mmol/L    BUN, Bld 116 (H) 8 - 23 mg/dL    Calcium 8.0 (L) 8.7 - 10.5 mg/dL    Creatinine 14.2 (H) 0.5 - 1.4 mg/dL    Albumin 3.4 (L) 3.5 - 5.2 g/dL    Phosphorus 8.8 (H) 2.7 - 4.5 mg/dL    eGFR if  3.4 (A) >60 mL/min/1.73 m^2    eGFR if non African American 2.9 (A) >60 mL/min/1.73 m^2    Anion Gap 20 (H) 8 - 16 mmol/L   Magnesium    Collection Time: 09/08/20  4:02 AM   Result Value Ref Range    Magnesium 2.3 1.6 - 2.6 mg/dL   Uric acid    Collection Time: 09/08/20  4:02 AM   Result Value Ref Range    Uric Acid 10.0 (H) 3.4 - 7.0 mg/dL   Brain Natriuretic Peptide    Collection Time: 09/08/20  4:02 AM   Result Value Ref Range     (H) 0 - 99 pg/mL   Lactic acid, plasma    Collection Time: 09/08/20  8:18 AM   Result Value Ref Range    Lactate (Lactic Acid) 2.5 (HH) 0.5 - 1.9 mmol/L   Basic metabolic panel    Collection Time: 09/08/20  8:18 AM   Result Value Ref Range    Sodium 135 (L) 136 - 145 mmol/L    Potassium 4.8 3.5 - 5.1 mmol/L    Chloride 95 95 - 110 mmol/L    CO2 18 (L) 23 - 29 mmol/L    Glucose 143 (H) 70 - 110 mg/dL    BUN, Bld 114 (H) 8 - 23 mg/dL    Creatinine 14.4 (H) 0.5 - 1.4 mg/dL    Calcium 7.9 (L) 8.7 - 10.5 mg/dL    Anion Gap 22 (H) 8 - 16 mmol/L    eGFR if African American 3.3 (A) >60 mL/min/1.73 m^2    eGFR if non African American 2.9 (A) >60 mL/min/1.73 m^2   POCT glucose    Collection Time: 09/08/20 10:43 AM   Result Value Ref Range    POC Glucose 190 (H) 70 - 110       IMAGING:    CT renal stone  9/8/20 Images independently reviewed by me    Detrimental interval development of right-sided hydronephrosis and  hydroureter to the level of the upper pelvis without a defined  etiology. Urologic consultation/retrograde pyelography is  recommended  for further assessment.     Distention of the gallbladder. There is diffuse increase in density  within the gallbladder lumen as well as tarsal calcified dependent  gallstones. A tiny calculus is observed within the cystic duct. This  appearance is similar to the previous examination.     Multiple small additional observations as above.      Assessment/Diagnosis:    1. Right hydroureteronephrosis  2. Acute kidney injury  3. History of right renal cell carcinoma  4. S/P right partial nephrectomy  5. Left renal cyst on surveillance    Plans:    - Extensive discussion with both patient and his wife regarding management of his right hydroureteronephrosis. We discussed that it is currently unclear what caused this, however given his creatinine of 14 he would benefit from urgent right ureteral stent placement with subsequent follow up with Dr. Danielle at [facility]. Unfortunately, patient recently ate and is unable to add-on today.   - NPO after midnight for urgent right ureteral stent placement and right retrograde pyelogram.

## 2020-09-08 NOTE — ASSESSMENT & PLAN NOTE
Last HbA1c 6.7%.  On Janumet at home.  On admission had persistent hypoglycemia, needing D10, now resolved  Continue renal diet, continue to hold all oral hypoglycemics, change glucose check to with meals and at bedtime.  As needed hypoglycemic measures

## 2020-09-08 NOTE — PROGRESS NOTES
[facility]  Adult Nutrition   Progress Note (Initial Assessment)     SUMMARY     Recommendations  Recommendation/Intervention: 1. RD to monitor NPO status and plans to initiate diet. Recommend eventual advancement to Renal/Diabetic Diet as medically able. 2. Hyponatremia and hyperphosphatemia. Recommend monitoring I/Os, weight, fluid status. Recommend phosphorus binders be considered.  Goals: 1. Diet to be initiated. 2. Patient to meet at least 75% of estimated energy and protein needs. 3. Patient to obtain fluid balance and electrolytes to trend towards target range.  Nutrition Goal Status: new  Communication of RD Recs: reviewed with RN    Dietitian Rounds Brief  · Patient assessed 2' ICU status. Patient NPO at time of rounds with plans for surgery per RN, RD did not interview today.   · Per H&P: Patient was apparently well 4 days ago, started noticed nausea, loss of appetite and generalized weakness with hypoglycemia.  He was working on his yard under hot sun, not sure about adequate hydration recently.Came to ED with concern of low sugar of 54, which was corrected in ED with IV dextrose, noted to have severe acute renal failure with hyperkalemia.  No urine output noted since admission in ED.Per progress notes: Hx of renal cell cancer s/p partial nephrectomy about 16 months ago. New neurologic symptoms, new joint pain, renal us--mild right pelvocaliectasis.     Reason for Assessment  Reason For Assessment: other (see comments)(ICU status)  Relevant Medical History: DM2; HTN; History of renal cell carcinoma status post partial nephrectomy; Anemia; ARF; Hyperkalemia;   High anion gap metabolic acidosis; Lactic acidosis; Hyperphosphatemia    Nutrition Risk Screen  Nutrition Risk Screen: no indicators present     MST Score: 0  Have you recently lost weight without trying?: No  Weight loss score: 0  Have you been eating poorly because of a decreased appetite?: No  Appetite score: 0       Nutrition/Diet  "History  Food Allergies: NKFA  Factors Affecting Nutritional Intake: NPO, nausea/vomiting, decreased appetite    Anthropometrics  Temp: 97.7 °F (36.5 °C)  Height Method: Stated  Height: 5' 11" (180.3 cm)  Height (inches): 71 in  Weight Method: Bed Scale  Weight: 80 kg (176 lb 5.9 oz)  Weight (lb): 176.37 lb  Ideal Body Weight (IBW), Male: 172 lb  % Ideal Body Weight, Male (lb): 102.54 %  BMI (Calculated): 24.6  BMI Grade: 18.5-24.9 - normal       Weight History:  Wt Readings from Last 10 Encounters:   09/07/20 80 kg (176 lb 5.9 oz)   12/19/19 81.7 kg (180 lb 3.2 oz)   10/08/19 80.8 kg (178 lb 3.2 oz)   07/23/19 81 kg (178 lb 9.6 oz)   02/26/19 83.3 kg (183 lb 9.6 oz)   01/31/19 84.1 kg (185 lb 6.4 oz)   06/13/18 85.4 kg (188 lb 3.2 oz)   01/18/13 86.2 kg (190 lb)       Lab/Procedures/Meds: Pertinent Labs Reviewed    Clinical Chemistry:  Recent Labs   Lab 09/07/20  0456 09/07/20  0720  09/08/20  0402 09/08/20  0818     --    < > 134* 135*   K 4.9  --    < > 4.7 4.8     --    < > 98 95   CO2 12*  --    < > 16* 18*   GLU 78 35*   < > 134* 143*   BUN 98*  --    < > 116* 114*   CREATININE 13.1*  --    < > 14.2* 14.4*   CALCIUM 7.3*  --    < > 8.0* 7.9*   PROT 6.0  --   --   --   --    ALBUMIN 3.3*  --   --  3.4*  --    BILITOT 0.7  --   --   --   --    ALKPHOS 48*  --   --   --   --    AST 24  --   --   --   --    ALT 17  --   --   --   --    ANIONGAP 20*  --    < > 20* 22*   ESTGFRAFRICA 3.7*  --    < > 3.4* 3.3*   EGFRNONAA 3.2*  --    < > 2.9* 2.9*   MG  --   --   --  2.3  --    PHOS  --  10.7*  --  8.8*  --     < > = values in this interval not displayed.       CBC:   Recent Labs   Lab 09/08/20  0402   WBC 6.47   RBC 3.09*   HGB 9.7*   HCT 28.2*      MCV 91   MCH 31.4*   MCHC 34.4       Cardiac Profile:  Recent Labs   Lab 09/07/20  0023 09/07/20 0720 09/08/20  0402   *  --  241*   CPK  --  90  --    TROPONINI <0.030  --   --        Diabetes:  Recent Labs   Lab 09/07/20 0720   HGBA1C 6.1 "       Vitamins:  Recent Labs   Lab 09/07/20  0720   [id number]         Medications: Pertinent Medications reviewed    Scheduled Meds:   amLODIPine  10 mg Oral Daily    aspirin  81 mg Oral Daily    chlorhexidine  15 mL Mouth/Throat BID    heparin (porcine)  5,000 Units Subcutaneous Q8H    hydrALAZINE  25 mg Oral Q8H    mupirocin   Nasal BID    piperacillin-tazobactam (ZOSYN) IVPB  3.375 g Intravenous Q12H    rosuvastatin  10 mg Oral QHS       Continuous Infusions:   custom IV infusion builder 100 mL/hr at 09/08/20 1457       PRN Meds:.acetaminophen, acetaminophen, dextrose 50%, dextrose 50%, glucagon (human recombinant), glucose, glucose, hydrALAZINE, melatonin, ondansetron    Estimated/Assessed Needs  Weight Used For Calorie Calculations: 80 kg (176 lb 5.9 oz)  Energy Calorie Requirements (kcal): 2000 - 2400 (25 - 30)  Energy Need Method: Kcal/kg  Protein Requirements: 48 - 64 (0.6 - 0.8 g/kg); Monitor Renal Function  Weight Used For Protein Calculations: 80 kg (176 lb 5.9 oz)  Fluid Requirements (mL): per MD  Estimated Fluid Requirement Method: other (see comments)  RDA Method (mL): 2000       Nutrition Prescription Ordered  Current Diet Order: NPO    Evaluation of Received Nutrient/Fluid Intake  Other Calories (kcal): 408(D5 + Bicarb @ 100 ml/hr)  Energy Calories Required: not meeting needs  Protein Required: not meeting needs  Fluid Required: meeting needs  Tolerance: other (see comments)(NPO)     Intake/Output Summary (Last 24 hours) at 9/8/2020 1634  Last data filed at 9/8/2020 1301  Gross per 24 hour   Intake 2690 ml   Output 2140 ml   Net 550 ml      % Intake of Estimated Energy Needs: 0%  % Meal Intake: NPO    Nutrition Risk  Level of Risk/Frequency of Follow-up: high     Monitor and Evaluation  Food and Nutrient Intake: energy intake  Food and Nutrient Adminstration: diet order  Physical Activity and Function: nutrition-related ADLs and IADLs, factors affecting access to physical  activity  Anthropometric Measurements: weight, weight change, body mass index  Biochemical Data, Medical Tests and Procedures: electrolyte and renal panel, lipid profile, gastrointestinal profile, glucose/endocrine profile, inflammatory profile  Nutrition-Focused Physical Findings: overall appearance     Nutrition Follow-Up  RD Follow-up?: Yes    Vilma Del Castillo RD 09/08/2020 4:35 PM

## 2020-09-08 NOTE — ASSESSMENT & PLAN NOTE
Blood pressure is not well controlled today, continue to hold losartan, continue amlodipine 10 mg, add hydralazine 25 mg Q 8, up titrate as needed

## 2020-09-08 NOTE — ASSESSMENT & PLAN NOTE
In the setting of acute renal failure, potassium was 6.2, status post membrane stabilizing medication improved to 4.9  Was recurrent and received dose of Veltassa.  Losartan remains on hold.  Continue to monitor closely with acute renal failure, p.r.n. membrane stabilizing medications

## 2020-09-08 NOTE — SUBJECTIVE & OBJECTIVE
Interval History:  Patient seen with wife present at bedside.  States he did not sleep well last night however this was due to frequent interruptions, he was on regular q.4 labs.  Reports mild intermittent abdominal bloating, generalized, without any nausea, vomiting, continues to deny any chest pain, shortness of breath.  States he continues to urinate without any issue, urine output last 24 hours 1490 mils.  He states he wishes to increases activity today, wants to take a shower, ambulating his home.  Lactic acid is down to 2.5, BUN/creatinine 114/14.5, glucose better, cultures no growth to date.  Patient and wife updated plan of care.  Discussed with nursing.     Review of Systems   Constitutional: Negative for chills and fever.   Respiratory: Negative for shortness of breath.    Cardiovascular: Negative for chest pain.   Gastrointestinal: Negative for constipation, diarrhea, nausea and vomiting.        Abdominal bloating   Genitourinary: Negative for difficulty urinating, flank pain, hematuria and urgency.   Skin: Negative for rash.   Neurological: Negative for headaches.   Psychiatric/Behavioral: Positive for sleep disturbance.     Objective:     Vital Signs (Most Recent):  Temp: 98.9 °F (37.2 °C) (09/08/20 0800)  Pulse: 92 (09/08/20 0903)  Resp: (!) 23 (09/08/20 0903)  BP: (!) 180/80 (09/08/20 0903)  SpO2: 97 % (09/08/20 0903) Vital Signs (24h Range):  Temp:  [98 °F (36.7 °C)-98.9 °F (37.2 °C)] 98.9 °F (37.2 °C)  Pulse:  [] 92  Resp:  [20-30] 23  SpO2:  [92 %-99 %] 97 %  BP: (146-180)/(67-84) 180/80     Weight: 80 kg (176 lb 5.9 oz)  Body mass index is 24.6 kg/m².    Intake/Output Summary (Last 24 hours) at 9/8/2020 1044  Last data filed at 9/8/2020 0900  Gross per 24 hour   Intake 2909.5 ml   Output 2190 ml   Net 719.5 ml      Physical Exam  Vitals signs and nursing note reviewed.   Constitutional:       General: He is not in acute distress.     Appearance: Normal appearance. He is normal weight. He is  not ill-appearing, toxic-appearing or diaphoretic.   HENT:      Head: Normocephalic and atraumatic.      Mouth/Throat:      Mouth: Mucous membranes are moist.   Eyes:      General:         Right eye: No discharge.         Left eye: No discharge.      Conjunctiva/sclera: Conjunctivae normal.      Pupils: Pupils are equal, round, and reactive to light.   Neck:      Musculoskeletal: Neck supple.   Cardiovascular:      Rate and Rhythm: Normal rate and regular rhythm.      Comments: No LE edema  Pulmonary:      Effort: No respiratory distress.      Breath sounds: Normal breath sounds. No stridor. No wheezing or rhonchi.      Comments: Not on supplemental O2  Abdominal:      General: Bowel sounds are normal. There is no distension.      Palpations: Abdomen is soft.      Tenderness: There is no abdominal tenderness. There is no guarding.   Genitourinary:     Comments: No CVAT or supra pubic fullness  Musculoskeletal:         General: No swelling.   Skin:     General: Skin is warm and dry.      Capillary Refill: Capillary refill takes less than 2 seconds.   Neurological:      General: No focal deficit present.      Mental Status: He is alert and oriented to person, place, and time.   Psychiatric:         Mood and Affect: Mood normal.         Significant Labs:   CBC:   Recent Labs   Lab 09/07/20  0023 09/07/20  0456 09/08/20  0402   WBC 9.54 7.70 6.47   HGB 10.9* 10.0* 9.7*   HCT 34.0* 30.1* 28.2*    183 168     CMP:   Recent Labs   Lab 09/07/20  0023 09/07/20  0456  09/07/20  2019 09/08/20  0402 09/08/20  0818   * 140   < > 134* 134* 135*   K 6.2* 4.9   < > 5.9* 4.7 4.8    108   < > 98 98 95   CO2 10* 12*   < > 13* 16* 18*   GLU 54* 78   < > 220* 134* 143*   * 98*   < > 110* 116* 114*   CREATININE 14.2* 13.1*   < > 14.2* 14.2* 14.4*   CALCIUM 7.8* 7.3*   < > 8.1* 8.0* 7.9*   PROT 6.5 6.0  --   --   --   --    ALBUMIN 3.9 3.3*  --   --  3.4*  --    BILITOT 1.0 0.7  --   --   --   --    ALKPHOS 61  48*  --   --   --   --    AST 25 24  --   --   --   --    ALT 21 17  --   --   --   --    ANIONGAP 21* 20*   < > 23* 20* 22*   EGFRNONAA 2.9* 3.2*   < > 2.9* 2.9* 2.9*    < > = values in this interval not displayed.     Cardiac Markers:   Recent Labs   Lab 09/08/20  0402   *     Lactic Acid:   Recent Labs   Lab 09/08/20  0015 09/08/20  0402 09/08/20  0818   LACTATE 3.2* 2.8* 2.5*     Magnesium:   Recent Labs   Lab 09/08/20  0402   MG 2.3     POCT Glucose: No results for input(s): POCTGLUCOSE in the last 48 hours.  Troponin:   Recent Labs   Lab 09/07/20  0023   TROPONINI <0.030     TSH: No results for input(s): TSH in the last 4320 hours.  Urine Culture:   Recent Labs   Lab 09/07/20  0915   LABURIN No growth to date     Urine Studies:   Recent Labs   Lab 09/07/20  0924   COLORU Yellow   APPEARANCEUA Clear   PHUR 5.0   SPECGRAV 1.010   PROTEINUA 1+*   GLUCUA Negative   KETONESU Negative   BILIRUBINUA Negative   OCCULTUA Trace*   NITRITE Negative   UROBILINOGEN Negative   LEUKOCYTESUR Negative   RBCUA 4   WBCUA 8*   BACTERIA Negative   SQUAMEPITHEL 3   HYALINECASTS 12*     All pertinent labs within the past 24 hours have been reviewed.    Significant Imaging: I have reviewed all pertinent imaging results/findings within the past 24 hours.

## 2020-09-08 NOTE — PROGRESS NOTES
Replaced by [facility]  Progress Note    Patient Name: Jamil Cadet  MRN: [id number]  Patient Class: IP- Inpatient   Admission Date: 9/6/2020  Length of Stay: 1 days  Attending Physician: Trinidad Boo MD  Primary Care Provider: Mesfin Cisneros MD        Subjective:     Principal Problem:ARF (acute renal failure)        HPI:  HPI as per MD Lira  Jamil Cadet is a 75 y.o.  male who  has a past medical history of Diabetes mellitus, type 2.. R/ Renal cell cancer s/p partial nephrectomy appropriately 16 months ago in [facility] The patient presented to [facility] on 9/6/2020 with a primary complaint of Hypoglycemia (pt brought in by EMS with c/o weakness. CBG 52 upon EMS arrival. pt given one amp of D50 en route with EMS with improvement to . )  .   Patient was apparently well 4 days ago, started noticed nausea, loss of appetite and generalized weakness with hypoglycemia.  He was working on his yard under hot sun, not sure about adequate hydration recently.  Came to ED with concern of low sugar of 54, which was corrected in ED with IV dextrose, noted to have severe acute renal failure with hyperkalemia.  No urine output noted since admission in ED.  Hospitalist consulted for further eval, repeat BMP in 4 hours, prior to hyperkalemia cocktail showed resolution of hyperkalemia.  However renal failure remains remarkable .  Bladder scan at bedside 245cc, during my eval patient passed urine without any difficulty.  Reports history of renal stone in the past nonobstructive.  Prior to admission no change in renal output noted, no recent adjustment in his dose medication, used to check his renal function every 6 months.    Overview/Hospital Course:  Patient reports for the last 4 days he was working outside in the sun doing gardening work at least 7 hours every day, may not have been consuming much water, was actually not taking any of his prescribed medications  during this time including oral hypoglycemic (Janumet), denies NSAID use, no change in urine reported, no rash, fever. He has history of right RCC status post partial nephrectomy (15% removed as per his report). He admitted to generalized weakness, denies any nausea, vomiting, diarrhea.  EMS was called yesterday and on arrival CBG was 52 which improved to 132 after D50.  In the ED he was noted to recurrent persistent hypoglycemia.  Labs were consistent with hyperkalemia (status post membrane stabilizing medication) anion gap metabolic acidosis with acute renal failure, lactic acidosis to 5 without any source of infection, ultrasound with bilateral renal cysts with mild right pelvocaliectasis.  On 09/07 patient is alert oriented however starting additional fluids along with D10 given persistent hypoglycemia nephrology consulted given acute renal failure, further test ordered.  On 9/0 glucose much better and discontinue D10 infusion, lactic acid better however still in acute renal failure, having good urine output, checking CT renal protocol.    Interval History:  Patient seen with wife present at bedside.  States he did not sleep well last night however this was due to frequent interruptions, he was on regular q.4 labs.  Reports mild intermittent abdominal bloating, generalized, without any nausea, vomiting, continues to deny any chest pain, shortness of breath.  States he continues to urinate without any issue, urine output last 24 hours 1490 mils.  He states he wishes to increases activity today, wants to take a shower, ambulating his home.  Lactic acid is down to 2.5, BUN/creatinine 114/14.5, glucose better, cultures no growth to date.  Patient and wife updated plan of care.  Discussed with nursing.     Review of Systems   Constitutional: Negative for chills and fever.   Respiratory: Negative for shortness of breath.    Cardiovascular: Negative for chest pain.   Gastrointestinal: Negative for constipation,  diarrhea, nausea and vomiting.        Abdominal bloating   Genitourinary: Negative for difficulty urinating, flank pain, hematuria and urgency.   Skin: Negative for rash.   Neurological: Negative for headaches.   Psychiatric/Behavioral: Positive for sleep disturbance.     Objective:     Vital Signs (Most Recent):  Temp: 98.9 °F (37.2 °C) (09/08/20 0800)  Pulse: 92 (09/08/20 0903)  Resp: (!) 23 (09/08/20 0903)  BP: (!) 180/80 (09/08/20 0903)  SpO2: 97 % (09/08/20 0903) Vital Signs (24h Range):  Temp:  [98 °F (36.7 °C)-98.9 °F (37.2 °C)] 98.9 °F (37.2 °C)  Pulse:  [] 92  Resp:  [20-30] 23  SpO2:  [92 %-99 %] 97 %  BP: (146-180)/(67-84) 180/80     Weight: 80 kg (176 lb 5.9 oz)  Body mass index is 24.6 kg/m².    Intake/Output Summary (Last 24 hours) at 9/8/2020 1044  Last data filed at 9/8/2020 0900  Gross per 24 hour   Intake 2909.5 ml   Output 2190 ml   Net 719.5 ml      Physical Exam  Vitals signs and nursing note reviewed.   Constitutional:       General: He is not in acute distress.     Appearance: Normal appearance. He is normal weight. He is not ill-appearing, toxic-appearing or diaphoretic.   HENT:      Head: Normocephalic and atraumatic.      Mouth/Throat:      Mouth: Mucous membranes are moist.   Eyes:      General:         Right eye: No discharge.         Left eye: No discharge.      Conjunctiva/sclera: Conjunctivae normal.      Pupils: Pupils are equal, round, and reactive to light.   Neck:      Musculoskeletal: Neck supple.   Cardiovascular:      Rate and Rhythm: Normal rate and regular rhythm.      Comments: No LE edema  Pulmonary:      Effort: No respiratory distress.      Breath sounds: Normal breath sounds. No stridor. No wheezing or rhonchi.      Comments: Not on supplemental O2  Abdominal:      General: Bowel sounds are normal. There is no distension.      Palpations: Abdomen is soft.      Tenderness: There is no abdominal tenderness. There is no guarding.   Genitourinary:     Comments: No CVAT  or supra pubic fullness  Musculoskeletal:         General: No swelling.   Skin:     General: Skin is warm and dry.      Capillary Refill: Capillary refill takes less than 2 seconds.   Neurological:      General: No focal deficit present.      Mental Status: He is alert and oriented to person, place, and time.   Psychiatric:         Mood and Affect: Mood normal.         Significant Labs:   CBC:   Recent Labs   Lab 09/07/20  0023 09/07/20 0456 09/08/20 0402   WBC 9.54 7.70 6.47   HGB 10.9* 10.0* 9.7*   HCT 34.0* 30.1* 28.2*    183 168     CMP:   Recent Labs   Lab 09/07/20  0023 09/07/20 0456  09/07/20  2019 09/08/20 0402 09/08/20 0818   * 140   < > 134* 134* 135*   K 6.2* 4.9   < > 5.9* 4.7 4.8    108   < > 98 98 95   CO2 10* 12*   < > 13* 16* 18*   GLU 54* 78   < > 220* 134* 143*   * 98*   < > 110* 116* 114*   CREATININE 14.2* 13.1*   < > 14.2* 14.2* 14.4*   CALCIUM 7.8* 7.3*   < > 8.1* 8.0* 7.9*   PROT 6.5 6.0  --   --   --   --    ALBUMIN 3.9 3.3*  --   --  3.4*  --    BILITOT 1.0 0.7  --   --   --   --    ALKPHOS 61 48*  --   --   --   --    AST 25 24  --   --   --   --    ALT 21 17  --   --   --   --    ANIONGAP 21* 20*   < > 23* 20* 22*   EGFRNONAA 2.9* 3.2*   < > 2.9* 2.9* 2.9*    < > = values in this interval not displayed.     Cardiac Markers:   Recent Labs   Lab 09/08/20 0402   *     Lactic Acid:   Recent Labs   Lab 09/08/20  0015 09/08/20 0402 09/08/20 0818   LACTATE 3.2* 2.8* 2.5*     Magnesium:   Recent Labs   Lab 09/08/20  0402   MG 2.3     POCT Glucose: No results for input(s): POCTGLUCOSE in the last 48 hours.  Troponin:   Recent Labs   Lab 09/07/20  0023   TROPONINI <0.030     TSH: No results for input(s): TSH in the last 4320 hours.  Urine Culture:   Recent Labs   Lab 09/07/20  0915   LABURIN No growth to date     Urine Studies:   Recent Labs   Lab 09/07/20  0924   COLORU Yellow   APPEARANCEUA Clear   PHUR 5.0   SPECGRAV 1.010   PROTEINUA 1+*   GLUCUA Negative    KETONESU Negative   BILIRUBINUA Negative   OCCULTUA Trace*   NITRITE Negative   UROBILINOGEN Negative   LEUKOCYTESUR Negative   RBCUA 4   WBCUA 8*   BACTERIA Negative   SQUAMEPITHEL 3   HYALINECASTS 12*     All pertinent labs within the past 24 hours have been reviewed.    Significant Imaging: I have reviewed all pertinent imaging results/findings within the past 24 hours.      Assessment/Plan:      * ARF (acute renal failure)  Admission labs with BUN/creatinine 102/14.2, previous serum creatinine at around 1.5.  Today BUN/creatinine 114/14.4  Patient with a history of renal cell cancer status post partial nephrectomy.  He denies any current urinary symptoms.    Does appear clinically hypovolemic, states he was working out in the sun for prolonged periods of time.  Strict I/O with close monitoring of urine output, patient at this time declines Mejia catheter  CPK checked and within normal, no signs of urinary tract infections, rheumatology serologies pending  Renal ultrasound reviewed, will get CT renal protocol for further clarification  Continue sodium bicarbonate infusion as per nephrology  Continue to hold oral hypoglycemics and losartan, renally dosing all medications and avoid nephrotoxin drugs  Appreciate nephrology input      Hyperkalemia  In the setting of acute renal failure, potassium was 6.2, status post membrane stabilizing medication improved to 4.9  Was recurrent and received dose of Veltassa.  Losartan remains on hold.  Continue to monitor closely with acute renal failure, p.r.n. membrane stabilizing medications      Lactic acidosis  Lactic acid 5 on presentation, improved to 2.5 today in the setting of acute renal failure  No evidence of infection, urine and blood culture no growth to date      High anion gap metabolic acidosis  In the setting of renal failure and lactic acidosis, currently on bicarb infusion, monitoring      Hyperphosphatemia  In the setting of acute renal failure      Anemia  No  evidence of bleeding.  Trending CBC      History of renal cell carcinoma status post partial nephrectomy  History of right renal cell CA status post partial nephrectomy at [facility]      Essential hypertension  Blood pressure is not well controlled today, continue to hold losartan, continue amlodipine 10 mg, add hydralazine 25 mg Q 8, up titrate as needed      Non-insulin dependent type 2 diabetes mellitus  Last HbA1c 6.7%.  On Janumet at home.  On admission had persistent hypoglycemia, needing D10, now resolved  Continue renal diet, continue to hold all oral hypoglycemics, change glucose check to with meals and at bedtime.  As needed hypoglycemic measures       VTE Risk Mitigation (From admission, onward)         Ordered     heparin (porcine) injection 5,000 Units  Every 8 hours      09/07/20 0632     IP VTE HIGH RISK PATIENT  Once      09/07/20 0632     Place sequential compression device  Until discontinued      09/07/20 0632                Discharge Planning   CA:      Code Status: Full Code   Is the patient medically ready for discharge?:     Reason for patient still in hospital (select all that apply): Patient unstable  Discharge Plan A: Home                  Trinidad Boo MD  Department of Hospital Medicine   Former[facility]

## 2020-09-08 NOTE — ASSESSMENT & PLAN NOTE
Admission labs with BUN/creatinine 102/14.2, previous serum creatinine at around 1.5.  Today BUN/creatinine 114/14.4  Patient with a history of renal cell cancer status post partial nephrectomy.  He denies any current urinary symptoms.    Does appear clinically hypovolemic, states he was working out in the sun for prolonged periods of time.  Strict I/O with close monitoring of urine output, patient at this time declines Mejia catheter  CPK checked and within normal, no signs of urinary tract infections, rheumatology serologies pending  Renal ultrasound reviewed, will get CT renal protocol for further clarification  Continue sodium bicarbonate infusion as per nephrology  Continue to hold oral hypoglycemics and losartan, renally dosing all medications and avoid nephrotoxin drugs  Appreciate nephrology input

## 2020-09-08 NOTE — PLAN OF CARE
09/08/20 0903   PRE-TX-O2   O2 Device (Oxygen Therapy) room air   SpO2 97 %   Pulse Oximetry Type Continuous   $ Pulse Oximetry - Multiple Charge Pulse Oximetry - Multiple   Pulse 92   Resp (!) 23   BP (!) 180/80   Respiratory Evaluation   $ Care Plan Tech Time 15 min

## 2020-09-09 ENCOUNTER — ANESTHESIA EVENT (OUTPATIENT)
Dept: SURGERY | Facility: HOSPITAL | Age: 75
DRG: 660 | End: 2020-09-09
Payer: MEDICARE

## 2020-09-09 ENCOUNTER — ANESTHESIA (OUTPATIENT)
Dept: SURGERY | Facility: HOSPITAL | Age: 75
DRG: 660 | End: 2020-09-09
Payer: MEDICARE

## 2020-09-09 PROBLEM — N13.39 OTHER HYDRONEPHROSIS: Status: ACTIVE | Noted: 2020-09-09

## 2020-09-09 PROBLEM — N28.1 RENAL CYST: Chronic | Status: ACTIVE | Noted: 2020-09-09

## 2020-09-09 PROBLEM — E87.20 LACTIC ACIDOSIS: Status: RESOLVED | Noted: 2020-09-08 | Resolved: 2020-09-09

## 2020-09-09 PROBLEM — R06.02 SHORTNESS OF BREATH: Status: ACTIVE | Noted: 2020-09-09

## 2020-09-09 LAB
ALBUMIN SERPL BCP-MCNC: 3.4 G/DL (ref 3.5–5.2)
ALP SERPL-CCNC: 56 U/L (ref 55–135)
ALT SERPL W/O P-5'-P-CCNC: 22 U/L (ref 10–44)
ANION GAP SERPL CALC-SCNC: 21 MMOL/L (ref 8–16)
AST SERPL-CCNC: 22 U/L (ref 10–40)
BACTERIA UR CULT: NORMAL
BACTERIA UR CULT: NORMAL
BASOPHILS # BLD AUTO: 0.01 K/UL (ref 0–0.2)
BASOPHILS NFR BLD: 0.2 % (ref 0–1.9)
BILIRUB SERPL-MCNC: 1.1 MG/DL (ref 0.1–1)
BUN SERPL-MCNC: 120 MG/DL (ref 8–23)
CALCIUM SERPL-MCNC: 7.6 MG/DL (ref 8.7–10.5)
CHLORIDE SERPL-SCNC: 91 MMOL/L (ref 95–110)
CO2 SERPL-SCNC: 24 MMOL/L (ref 23–29)
CREAT SERPL-MCNC: 13.4 MG/DL (ref 0.5–1.4)
DIFFERENTIAL METHOD: ABNORMAL
EOSINOPHIL # BLD AUTO: 0.3 K/UL (ref 0–0.5)
EOSINOPHIL NFR BLD: 4.3 % (ref 0–8)
ERYTHROCYTE [DISTWIDTH] IN BLOOD BY AUTOMATED COUNT: 12.1 % (ref 11.5–14.5)
EST. GFR  (AFRICAN AMERICAN): 3.6 ML/MIN/1.73 M^2
EST. GFR  (NON AFRICAN AMERICAN): 3.1 ML/MIN/1.73 M^2
ESTIMATED AVG GLUCOSE: 123 MG/DL (ref 68–131)
GLUCOSE SERPL-MCNC: 257 MG/DL (ref 70–110)
GLUCOSE SERPL-MCNC: 289 MG/DL (ref 70–110)
GLUCOSE SERPL-MCNC: 300 MG/DL (ref 70–110)
GLUCOSE SERPL-MCNC: 307 MG/DL (ref 70–110)
GLUCOSE SERPL-MCNC: 340 MG/DL (ref 70–110)
HBA1C MFR BLD HPLC: 5.9 % (ref 4.5–6.2)
HCT VFR BLD AUTO: 29.1 % (ref 40–54)
HGB BLD-MCNC: 10.3 G/DL (ref 14–18)
IMM GRANULOCYTES # BLD AUTO: 0.03 K/UL (ref 0–0.04)
IMM GRANULOCYTES NFR BLD AUTO: 0.5 % (ref 0–0.5)
LYMPHOCYTES # BLD AUTO: 0.8 K/UL (ref 1–4.8)
LYMPHOCYTES NFR BLD: 12.3 % (ref 18–48)
MCH RBC QN AUTO: 31.6 PG (ref 27–31)
MCHC RBC AUTO-ENTMCNC: 35.4 G/DL (ref 32–36)
MCV RBC AUTO: 89 FL (ref 82–98)
MONOCYTES # BLD AUTO: 0.9 K/UL (ref 0.3–1)
MONOCYTES NFR BLD: 13.9 % (ref 4–15)
NEUTROPHILS # BLD AUTO: 4.4 K/UL (ref 1.8–7.7)
NEUTROPHILS NFR BLD: 68.8 % (ref 38–73)
NRBC BLD-RTO: 0 /100 WBC
PLATELET # BLD AUTO: 177 K/UL (ref 150–350)
PMV BLD AUTO: 10.1 FL (ref 9.2–12.9)
POTASSIUM SERPL-SCNC: 4.5 MMOL/L (ref 3.5–5.1)
PROT SERPL-MCNC: 6.1 G/DL (ref 6–8.4)
RBC # BLD AUTO: 3.26 M/UL (ref 4.6–6.2)
SODIUM SERPL-SCNC: 136 MMOL/L (ref 136–145)
WBC # BLD AUTO: 6.35 K/UL (ref 3.9–12.7)

## 2020-09-09 PROCEDURE — 25000003 PHARM REV CODE 250: Performed by: INTERNAL MEDICINE

## 2020-09-09 PROCEDURE — 52351 CYSTOURETERO & OR PYELOSCOPE: CPT | Mod: RT,,, | Performed by: UROLOGY

## 2020-09-09 PROCEDURE — 25000003 PHARM REV CODE 250: Performed by: UROLOGY

## 2020-09-09 PROCEDURE — 80053 COMPREHEN METABOLIC PANEL: CPT

## 2020-09-09 PROCEDURE — 25000003 PHARM REV CODE 250: Performed by: NURSE ANESTHETIST, CERTIFIED REGISTERED

## 2020-09-09 PROCEDURE — 27201423 OPTIME MED/SURG SUP & DEVICES STERILE SUPPLY: Performed by: UROLOGY

## 2020-09-09 PROCEDURE — 25000003 PHARM REV CODE 250

## 2020-09-09 PROCEDURE — 37000009 HC ANESTHESIA EA ADD 15 MINS: Performed by: UROLOGY

## 2020-09-09 PROCEDURE — 27000654 HC CATH IV JELCO: Performed by: STUDENT IN AN ORGANIZED HEALTH CARE EDUCATION/TRAINING PROGRAM

## 2020-09-09 PROCEDURE — 27202107 HC XP QUATRO SENSOR: Performed by: STUDENT IN AN ORGANIZED HEALTH CARE EDUCATION/TRAINING PROGRAM

## 2020-09-09 PROCEDURE — 63600175 PHARM REV CODE 636 W HCPCS: Performed by: INTERNAL MEDICINE

## 2020-09-09 PROCEDURE — 82962 GLUCOSE BLOOD TEST: CPT

## 2020-09-09 PROCEDURE — 27000673 HC TUBING BLOOD Y: Performed by: STUDENT IN AN ORGANIZED HEALTH CARE EDUCATION/TRAINING PROGRAM

## 2020-09-09 PROCEDURE — 63600175 PHARM REV CODE 636 W HCPCS: Performed by: NURSE ANESTHETIST, CERTIFIED REGISTERED

## 2020-09-09 PROCEDURE — 94761 N-INVAS EAR/PLS OXIMETRY MLT: CPT

## 2020-09-09 PROCEDURE — C1769 GUIDE WIRE: HCPCS | Performed by: UROLOGY

## 2020-09-09 PROCEDURE — 99900035 HC TECH TIME PER 15 MIN (STAT)

## 2020-09-09 PROCEDURE — 20000000 HC ICU ROOM

## 2020-09-09 PROCEDURE — 36000707: Performed by: UROLOGY

## 2020-09-09 PROCEDURE — 36000706: Performed by: UROLOGY

## 2020-09-09 PROCEDURE — 88112 CYTOPATH CELL ENHANCE TECH: CPT | Mod: TC

## 2020-09-09 PROCEDURE — 85025 COMPLETE CBC W/AUTO DIFF WBC: CPT

## 2020-09-09 PROCEDURE — 27000221 HC OXYGEN, UP TO 24 HOURS

## 2020-09-09 PROCEDURE — 52351 PR CYSTO/URETERO/PYELOSCOPY, DX: ICD-10-PCS | Mod: RT,,, | Performed by: UROLOGY

## 2020-09-09 PROCEDURE — 74420 UROGRAPHY RTRGR +-KUB: CPT | Mod: 26,,, | Performed by: UROLOGY

## 2020-09-09 PROCEDURE — 37000008 HC ANESTHESIA 1ST 15 MINUTES: Performed by: UROLOGY

## 2020-09-09 PROCEDURE — 27000284 HC CANNULA NASAL: Performed by: STUDENT IN AN ORGANIZED HEALTH CARE EDUCATION/TRAINING PROGRAM

## 2020-09-09 PROCEDURE — 36415 COLL VENOUS BLD VENIPUNCTURE: CPT

## 2020-09-09 PROCEDURE — 87086 URINE CULTURE/COLONY COUNT: CPT

## 2020-09-09 PROCEDURE — 74420 PR  X-RAY RETROGRADE PYELOGRAM: ICD-10-PCS | Mod: 26,,, | Performed by: UROLOGY

## 2020-09-09 RX ORDER — FENTANYL CITRATE 50 UG/ML
INJECTION, SOLUTION INTRAMUSCULAR; INTRAVENOUS
Status: DISCONTINUED | OUTPATIENT
Start: 2020-09-09 | End: 2020-09-09

## 2020-09-09 RX ORDER — CEFAZOLIN SODIUM 1 G/3ML
INJECTION, POWDER, FOR SOLUTION INTRAMUSCULAR; INTRAVENOUS
Status: DISCONTINUED | OUTPATIENT
Start: 2020-09-09 | End: 2020-09-09

## 2020-09-09 RX ORDER — PROPOFOL 10 MG/ML
VIAL (ML) INTRAVENOUS
Status: DISCONTINUED | OUTPATIENT
Start: 2020-09-09 | End: 2020-09-09

## 2020-09-09 RX ORDER — SODIUM CHLORIDE, SODIUM LACTATE, POTASSIUM CHLORIDE, CALCIUM CHLORIDE 600; 310; 30; 20 MG/100ML; MG/100ML; MG/100ML; MG/100ML
INJECTION, SOLUTION INTRAVENOUS CONTINUOUS PRN
Status: DISCONTINUED | OUTPATIENT
Start: 2020-09-09 | End: 2020-09-09

## 2020-09-09 RX ORDER — GLUCAGON 1 MG
1 KIT INJECTION
Status: DISCONTINUED | OUTPATIENT
Start: 2020-09-09 | End: 2020-09-11

## 2020-09-09 RX ORDER — ONDANSETRON 2 MG/ML
INJECTION INTRAMUSCULAR; INTRAVENOUS
Status: DISCONTINUED | OUTPATIENT
Start: 2020-09-09 | End: 2020-09-09

## 2020-09-09 RX ORDER — LIDOCAINE HYDROCHLORIDE 20 MG/ML
INJECTION, SOLUTION EPIDURAL; INFILTRATION; INTRACAUDAL; PERINEURAL
Status: DISCONTINUED | OUTPATIENT
Start: 2020-09-09 | End: 2020-09-09

## 2020-09-09 RX ORDER — INSULIN ASPART 100 [IU]/ML
0-5 INJECTION, SOLUTION INTRAVENOUS; SUBCUTANEOUS EVERY 6 HOURS PRN
Status: DISCONTINUED | OUTPATIENT
Start: 2020-09-09 | End: 2020-09-11

## 2020-09-09 RX ADMIN — ONDANSETRON 4 MG: 2 INJECTION INTRAMUSCULAR; INTRAVENOUS at 04:09

## 2020-09-09 RX ADMIN — CEFAZOLIN 2 G: 330 INJECTION, POWDER, FOR SOLUTION INTRAMUSCULAR; INTRAVENOUS at 04:09

## 2020-09-09 RX ADMIN — INSULIN ASPART 2 UNITS: 100 INJECTION, SOLUTION INTRAVENOUS; SUBCUTANEOUS at 09:09

## 2020-09-09 RX ADMIN — MELATONIN 6 MG: at 09:09

## 2020-09-09 RX ADMIN — SODIUM CHLORIDE, SODIUM LACTATE, POTASSIUM CHLORIDE, AND CALCIUM CHLORIDE: .6; .31; .03; .02 INJECTION, SOLUTION INTRAVENOUS at 04:09

## 2020-09-09 RX ADMIN — ROSUVASTATIN CALCIUM 10 MG: 10 TABLET, FILM COATED ORAL at 09:09

## 2020-09-09 RX ADMIN — INSULIN ASPART 3 UNITS: 100 INJECTION, SOLUTION INTRAVENOUS; SUBCUTANEOUS at 09:09

## 2020-09-09 RX ADMIN — MUPIROCIN: 20 OINTMENT TOPICAL at 09:09

## 2020-09-09 RX ADMIN — SODIUM BICARBONATE: 84 INJECTION, SOLUTION INTRAVENOUS at 03:09

## 2020-09-09 RX ADMIN — PROPOFOL 140 MG: 10 INJECTION, EMULSION INTRAVENOUS at 04:09

## 2020-09-09 RX ADMIN — FENTANYL CITRATE 50 MCG: 50 INJECTION INTRAMUSCULAR; INTRAVENOUS at 04:09

## 2020-09-09 RX ADMIN — LIDOCAINE HYDROCHLORIDE 50 MG: 20 INJECTION, SOLUTION INTRAVENOUS at 04:09

## 2020-09-09 RX ADMIN — HYDRALAZINE HYDROCHLORIDE 25 MG: 25 TABLET, FILM COATED ORAL at 09:09

## 2020-09-09 RX ADMIN — PIPERACILLIN SODIUM AND TAZOBACTAM SODIUM 3.38 G: 3; .375 INJECTION, POWDER, LYOPHILIZED, FOR SOLUTION INTRAVENOUS at 02:09

## 2020-09-09 RX ADMIN — INSULIN ASPART 3 UNITS: 100 INJECTION, SOLUTION INTRAVENOUS; SUBCUTANEOUS at 11:09

## 2020-09-09 RX ADMIN — FENTANYL CITRATE 50 MCG: 50 INJECTION INTRAMUSCULAR; INTRAVENOUS at 05:09

## 2020-09-09 RX ADMIN — CHLORHEXIDINE GLUCONATE 15 ML: 1.2 RINSE ORAL at 09:09

## 2020-09-09 RX ADMIN — AMLODIPINE BESYLATE 10 MG: 5 TABLET ORAL at 09:09

## 2020-09-09 RX ADMIN — PIPERACILLIN SODIUM AND TAZOBACTAM SODIUM 3.38 G: 3; .375 INJECTION, POWDER, LYOPHILIZED, FOR SOLUTION INTRAVENOUS at 03:09

## 2020-09-09 RX ADMIN — INSULIN ASPART 4 UNITS: 100 INJECTION, SOLUTION INTRAVENOUS; SUBCUTANEOUS at 05:09

## 2020-09-09 NOTE — PROGRESS NOTES
[facility] Medicine  Progress Note    Patient Name: Jamil Cadet  MRN: [id number]  Patient Class: IP- Inpatient   Admission Date: 9/6/2020  Length of Stay: 2 days  Attending Physician: Trinidad Boo MD  Primary Care Provider: Mesfin Cisneros MD        Subjective:     Principal Problem:Renal failure        HPI:  HPI as per MD Lira  Jamil Cadet is a 75 y.o.  male who  has a past medical history of Diabetes mellitus, type 2.. R/ Renal cell cancer s/p partial nephrectomy appropriately 16 months ago in [facility] The patient presented to [facility] on 9/6/2020 with a primary complaint of Hypoglycemia (pt brought in by EMS with c/o weakness. CBG 52 upon EMS arrival. pt given one amp of D50 en route with EMS with improvement to . )  .   Patient was apparently well 4 days ago, started noticed nausea, loss of appetite and generalized weakness with hypoglycemia.  He was working on his yard under hot sun, not sure about adequate hydration recently.  Came to ED with concern of low sugar of 54, which was corrected in ED with IV dextrose, noted to have severe acute renal failure with hyperkalemia.  No urine output noted since admission in ED.  Hospitalist consulted for further eval, repeat BMP in 4 hours, prior to hyperkalemia cocktail showed resolution of hyperkalemia.  However renal failure remains remarkable .  Bladder scan at bedside 245cc, during my eval patient passed urine without any difficulty.  Reports history of renal stone in the past nonobstructive.  Prior to admission no change in renal output noted, no recent adjustment in his dose medication, used to check his renal function every 6 months.    Overview/Hospital Course:  Patient reports for the last 4 days he was working outside in the sun doing gardening work at least 7 hours every day, may not have been consuming much water, was actually not taking any of his prescribed medications during this time  including oral hypoglycemic (Janumet), denies NSAID use, no change in urine reported, no rash, fever. He has history of right RCC status post partial nephrectomy (15% removed as per his report). He admitted to generalized weakness, denies any nausea, vomiting, diarrhea.  EMS was called yesterday and on arrival CBG was 52 which improved to 132 after D50.  In the ED he was noted to recurrent persistent hypoglycemia.  Labs were consistent with hyperkalemia (status post membrane stabilizing medication) anion gap metabolic acidosis with acute renal failure, lactic acidosis to 5 without any source of infection, ultrasound with bilateral renal cysts with mild right pelvocaliectasis.  On 09/07 patient is alert oriented however starting additional fluids along with D10 given persistent hypoglycemia nephrology consulted given acute renal failure, further test ordered.  On 9/0 glucose much better and discontinue D10 infusion, lactic acid better however still in acute renal failure, having good urine output, checking CT renal protocol.  CT did demonstrate right-sided hydronephrosis with hydroureter, no definite calculus seen, urology consulted and planning cystoscopy with stent placement today.    Interval History:  Patient reports did not sleep well overnight due to vitals, labs, interruptions.  Continues to report generalized abdominal bloating however states he has been voiding urine well without any new symptomatology.  Continues to deny any nausea or vomiting.  Denies any shortness of breath or chest pain.  Urine output last 20 hours 2750.  Labs this morning with BUN/creatinine 120/13.4.  CT abdomen and pelvis renal protocol obtained yesterday with right-sided hydronephrosis with hydroureter without any definite etiology for the obstruction, Urology was consulted urgently and did communicate with Dr. Garcia, patient is planned for cystoscopy with stent placement today.  Glucose now running in the side up to the 300s.   Patient updated plan of care, all questions answered.      Review of Systems   Constitutional: Negative for chills and fever.   Respiratory: Negative for shortness of breath.    Cardiovascular: Negative for chest pain.   Gastrointestinal: Negative for nausea and vomiting.        Abdominal bloating   Genitourinary: Negative for difficulty urinating, dysuria, hematuria and urgency.   Allergic/Immunologic: Negative for immunocompromised state.   Neurological: Negative for headaches.   Psychiatric/Behavioral: Positive for sleep disturbance.     Objective:     Vital Signs (Most Recent):  Temp: 97.8 °F (36.6 °C) (09/09/20 0345)  Pulse: 85 (09/09/20 0600)  Resp: 17 (09/09/20 0600)  BP: (!) 148/74 (09/09/20 0600)  SpO2: 96 % (09/09/20 0600) Vital Signs (24h Range):  Temp:  [97.7 °F (36.5 °C)-98.2 °F (36.8 °C)] 97.8 °F (36.6 °C)  Pulse:  [85-99] 85  Resp:  [16-24] 17  SpO2:  [96 %-97 %] 96 %  BP: (146-180)/(70-80) 148/74     Weight: 80 kg (176 lb 5.9 oz)  Body mass index is 24.6 kg/m².    Intake/Output Summary (Last 24 hours) at 9/9/2020 0832  Last data filed at 9/9/2020 0500  Gross per 24 hour   Intake 2980 ml   Output 2350 ml   Net 630 ml      Physical Exam  Vitals signs and nursing note reviewed.   Constitutional:       General: He is not in acute distress.     Appearance: Normal appearance. He is normal weight. He is not ill-appearing, toxic-appearing or diaphoretic.   HENT:      Head: Normocephalic and atraumatic.      Mouth/Throat:      Mouth: Mucous membranes are moist.   Eyes:      General:         Right eye: No discharge.         Left eye: No discharge.      Conjunctiva/sclera: Conjunctivae normal.      Pupils: Pupils are equal, round, and reactive to light.   Neck:      Musculoskeletal: Neck supple.   Cardiovascular:      Rate and Rhythm: Normal rate and regular rhythm.      Comments: No LE edema  Pulmonary:      Effort: No respiratory distress.      Breath sounds: Normal breath sounds. No stridor. No wheezing or  rhonchi.      Comments: Not on supplemental O2, crackles at bases  Abdominal:      General: Bowel sounds are normal. There is no distension.      Palpations: Abdomen is soft.      Tenderness: There is no abdominal tenderness. There is no guarding.   Genitourinary:     Comments: No carlton catheter, urinal at bedside with yellow urine  Musculoskeletal:         General: No swelling.   Skin:     General: Skin is warm and dry.      Capillary Refill: Capillary refill takes less than 2 seconds.   Neurological:      General: No focal deficit present.      Mental Status: He is alert and oriented to person, place, and time.   Psychiatric:         Mood and Affect: Mood normal.         Significant Labs:   BMP:   Recent Labs   Lab 09/08/20 0402 09/09/20 0348   *   < > 257*   *   < > 136   K 4.7   < > 4.5   CL 98   < > 91*   CO2 16*   < > 24   *   < > 120*   CREATININE 14.2*   < > 13.4*   CALCIUM 8.0*   < > 7.6*   MG 2.3  --   --     < > = values in this interval not displayed.     CBC:   Recent Labs   Lab 09/08/20 0402 09/09/20 0348   WBC 6.47 6.35   HGB 9.7* 10.3*   HCT 28.2* 29.1*    177     CMP:   Recent Labs   Lab 09/08/20 0402 09/08/20 0818 09/08/20 2005 09/09/20 0348   * 135* 133* 136   K 4.7 4.8 5.1 4.5   CL 98 95 91* 91*   CO2 16* 18* 23 24   * 143* 397* 257*   * 114* 123* 120*   CREATININE 14.2* 14.4* 13.7* 13.4*   CALCIUM 8.0* 7.9* 7.8* 7.6*   PROT  --   --   --  6.1   ALBUMIN 3.4*  --   --  3.4*   BILITOT  --   --   --  1.1*   ALKPHOS  --   --   --  56   AST  --   --   --  22   ALT  --   --   --  22   ANIONGAP 20* 22* 19* 21*   EGFRNONAA 2.9* 2.9* 3.1* 3.1*     Cardiac Markers:   Recent Labs   Lab 09/08/20  0402   *     Lactic Acid:   Recent Labs   Lab 09/08/20  0015 09/08/20  0402 09/08/20 0818   LACTATE 3.2* 2.8* 2.5*     Magnesium:   Recent Labs   Lab 09/08/20 0402   MG 2.3     POCT Glucose: No results for input(s): POCTGLUCOSE in the last 48  hours.  Respiratory Culture: No results for input(s): GSRESP, RESPIRATORYC in the last 48 hours.  Troponin: No results for input(s): TROPONINI in the last 48 hours.  TSH: No results for input(s): TSH in the last 4320 hours.  Urine Culture:   Recent Labs   Lab 09/07/20  0915   LABURIN Multiple organisms isolated. None in predominance.  Repeat if  clinically necessary.     Urine Studies:   Recent Labs   Lab 09/07/20  0924   COLORU Yellow   APPEARANCEUA Clear   PHUR 5.0   SPECGRAV 1.010   PROTEINUA 1+*   GLUCUA Negative   KETONESU Negative   BILIRUBINUA Negative   OCCULTUA Trace*   NITRITE Negative   UROBILINOGEN Negative   LEUKOCYTESUR Negative   RBCUA 4   WBCUA 8*   BACTERIA Negative   SQUAMEPITHEL 3   HYALINECASTS 12*     All pertinent labs within the past 24 hours have been reviewed.    Significant Imaging: I have reviewed all pertinent imaging results/findings within the past 24 hours.     CT renal protocol  IMPRESSION:     Detrimental interval development of right-sided hydronephrosis and  hydroureter to the level of the upper pelvis without a defined  etiology. Urologic consultation/retrograde pyelography is recommended  for further assessment.     Distention of the gallbladder. There is diffuse increase in density  within the gallbladder lumen as well as tarsal calcified dependent  gallstones. A tiny calculus is observed within the cystic duct. This  appearance is similar to the previous examination.         Assessment/Plan:      * Acute renal failure, likely secondary to obstructive nephropathy  Admission labs with BUN/creatinine 102/14.2, previous serum creatinine at around 1.5.  Today BUN/creatinine 120/13.4  Patient with a history of renal cell cancer status post partial nephrectomy.  CT abdomen and pelvis with right-sided hydronephrosis with hydroureter, bilateral renal cysts, no definite course obstruction  CPK was checked and within normal  NPO for cystoscopy with stent placement today by   Jose  Continue IV fluid hydration as per Nephrology  Continue to monitor urine output  Continue to hold oral hypoglycemics and losartan, renally dosing all medications and avoid nephrotoxin drugs  Appreciate consultant's input    Right hydronephrosis with hydroureter  CT with right hydronephrosis with hydroureter, no definite course of obstruction  Possible secondary to renal calculi versus stricture/narrowing, history of prior partial right nephrectomy  Started on empiric Zosyn  Plans to the OR today by Urology      Non-insulin dependent type 2 diabetes mellitus, previously with hypoglycemia and now hyperglycemia  Last HbA1c 6.7%.  On Janumet at home.  On admission had persistent hypoglycemia, needing D10, now resolved  Glucose now elevated to the 200-300s  Holding Janumet, starting low-dose insulin sliding scale with Accu-Cheks q.6 hours while NPO  As needed hypoglycemic measures    Hyperkalemia  In the setting of acute renal failure, potassium was 6.2, status post membrane stabilizing medication improved to 4.9  Was recurrent and received dose of Veltassa.  Losartan remains on hold.  Continue to monitor closely with acute renal failure, p.r.n. membrane stabilizing medications      High anion gap metabolic acidosis  In the setting of renal failure and lactic acidosis, currently on bicarb infusion, monitoring      Hyperphosphatemia  In the setting of acute renal failure      Renal cyst  Bilateral renal cysts      Anemia  No evidence of bleeding.  Trending CBC      History of renal cell carcinoma status post partial nephrectomy  History of right renal cell CA status post partial nephrectomy at [facility]      Essential hypertension  Better today, continue to hold losartan, continue amlodipine 10 mg, added hydralazine 25 mg Q 8, up titrate as needed        VTE Risk Mitigation (From admission, onward)         Ordered     heparin (porcine) injection 5,000 Units  Every 8 hours      09/07/20 0632     IP VTE HIGH RISK  PATIENT  Once      09/07/20 0632     Place sequential compression device  Until discontinued      09/07/20 0632                Discharge Planning   CA:      Code Status: Full Code   Is the patient medically ready for discharge?:     Reason for patient still in hospital (select all that apply): Patient unstable  Discharge Plan A: Home                  Trinidad Boo MD  Department of Hospital Medicine   [facility]

## 2020-09-09 NOTE — ASSESSMENT & PLAN NOTE
CT with right hydronephrosis with hydroureter, no definite course of obstruction  Possible secondary to renal calculi versus stricture/narrowing, history of prior partial right nephrectomy  Started on empiric Zosyn  Plans to the OR today by Urology

## 2020-09-09 NOTE — ANESTHESIA PREPROCEDURE EVALUATION
09/09/2020  Jamil Cadet is a 75 y.o., male.      Patient Active Problem List   Diagnosis    Acute renal failure, likely secondary to obstructive nephropathy    Hyperkalemia    Non-insulin dependent type 2 diabetes mellitus, previously with hypoglycemia and now hyperglycemia    High anion gap metabolic acidosis    Essential hypertension    History of renal cell carcinoma status post partial nephrectomy    Anemia    Hyperphosphatemia    Right hydronephrosis with hydroureter    Renal cyst       Past Surgical History:   Procedure Laterality Date    APPENDECTOMY      SPINE SURGERY          Tobacco Use:  The patient  reports that he has never smoked. He has never used smokeless tobacco.     Results for orders placed or performed during the hospital encounter of 09/06/20   EKG 12-lead    Collection Time: 09/06/20 10:25 PM    Narrative    Test Reason : R06.02,    Vent. Rate : 084 BPM     Atrial Rate : 084 BPM     P-R Int : 178 ms          QRS Dur : 078 ms      QT Int : 380 ms       P-R-T Axes : 069 035 068 degrees     QTc Int : 449 ms    Normal sinus rhythm  Normal ECG  No previous ECGs available  Confirmed by Joni Burt MD ([id number]) on 9/8/2020 11:22:34 PM    Referred By: [email or web address]   SELF           Confirmed By:Joni Burt MD        Imaging Results          US Kidney (Final result)  Result time 09/07/20 07:53:44    Final result by Lorena Martínez MD (09/07/20 07:53:44)                 Narrative:    PROCEDURE:    US KIDNEY  dated  9/7/2020 7:13 AM    CLINICAL HISTORY:   Male 75 years of age.   MELODIE r/o obstruction  .  History of right renal carcinoma, status post right partial  nephrectomy.    TECHNIQUE:  Sonographic evaluation of the kidneys was performed.    PREVIOUS STUDIES:  CT September 10, 2019    FINDINGS:    There are bilateral renal cysts. The largest on the right is in the  lower  pole and measures 1.7 cm in diameter. The largest on the left  extends off of the posterior lower pole and measures 2.4 cm. There is  mild right pelvocaliectasis. This was not present at the time of the  prior CT September 2019. There is no left hydronephrosis. Urinary  bladder is mildly filled. Assessment of bladder is limited.    IMPRESSION:    Mild right pelvocaliectasis, not present on CT from September 2019.  Uncertain etiology. Mildly filled urinary bladder.  Bilateral renal cysts.    Electronically Signed by Lorena Martínez on 9/7/2020 8:01 AM                             X-Ray Chest AP Portable (Final result)  Result time 09/07/20 00:42:34    Final result by Lorena Martínez MD (09/07/20 00:42:34)                 Narrative:    PROCEDURE:   XR CHEST AP PORTABLE  dated  9/7/2020 12:43 AM    CLINICAL HISTORY:   Male 75 years of age.   CHF    TECHNIQUE: AP view of the chest obtained portably at 12:43 AM.    PREVIOUS STUDIES:  March 18, 2019    FINDINGS:    Lung volumes are low and the right hemidiaphragm is elevated. Right  lung bases mildly atelectatic over the hemidiaphragm. There is no  pulmonary infiltrate. There is no pleural effusion or pneumothorax.  The heart is not enlarged. Mediastinal contours are normal.    IMPRESSION:    No acute findings. Normal size heart.    Electronically Signed by Lorena Martínez on 9/7/2020 7:28 AM                               Lab Results   Component Value Date    WBC 6.35 09/09/2020    HGB 10.3 (L) 09/09/2020    HCT 29.1 (L) 09/09/2020    MCV 89 09/09/2020     09/09/2020     BMP  Lab Results   Component Value Date     09/09/2020    K 4.5 09/09/2020    CL 91 (L) 09/09/2020    CO2 24 09/09/2020     (H) 09/09/2020    CREATININE 13.4 (H) 09/09/2020    CALCIUM 7.6 (L) 09/09/2020    ANIONGAP 21 (H) 09/09/2020     (H) 09/09/2020     (H) 09/08/2020     (H) 09/08/2020       No results found for this or any previous visit.        Anesthesia  Evaluation    I have reviewed the Patient Summary Reports.    I have reviewed the Nursing Notes. I have reviewed the NPO Status.   I have reviewed the Medications.     Review of Systems  Anesthesia Hx:  Denies Family Hx of Anesthesia complications.   Denies Personal Hx of Anesthesia complications.   Social:  Non-Smoker    Hematology/Oncology:         -- Anemia: --  Cancer in past history:  Other (see Oncology comments) Oncology Comments: RCC s/p partial nephrectomy     EENT/Dental:   crowns   Cardiovascular:   Hypertension ECG has been reviewed.    Pulmonary:  Pulmonary Normal    Renal/:   Chronic Renal Disease, ARF    Hepatic/GI:  Hepatic/GI Normal Sig met acidosis 2 days ago, resolved with bicarb drip   Musculoskeletal:   Arthritis (right knee)     Neurological:  Neurology Normal    Endocrine:   Diabetes, poorly controlled, type 2 Last accucheck 300, being treated very conservatively (insulin 3U IV) because of hypoglycemia upon admission   Psych:  Psychiatric Normal           Physical Exam  General:  Well nourished    Airway/Jaw/Neck:  Airway Findings: Mouth Opening: Normal Tongue: Normal  General Airway Assessment: Adult  Mallampati: III  TM Distance: Normal, at least 6 cm  Jaw/Neck Findings:  Neck ROM: Normal ROM       Chest/Lungs:  Chest/Lungs Findings: Clear to auscultation, Normal Respiratory Rate     Heart/Vascular:  Heart Findings: Rate: Normal  Rhythm: Regular Rhythm  Sounds: Normal        Mental Status:  Mental Status Findings:  Alert and Oriented         Anesthesia Plan  Type of Anesthesia, risks & benefits discussed:  Anesthesia Type:  general  Patient's Preference:   Intra-op Monitoring Plan: standard ASA monitors  Intra-op Monitoring Plan Comments:   Post Op Pain Control Plan: multimodal analgesia  Post Op Pain Control Plan Comments:   Induction:   IV  Beta Blocker:  Patient is not currently on a Beta-Blocker (No further documentation required).       Informed Consent: Patient understands risks and  agrees with Anesthesia plan.  Questions answered. Anesthesia consent signed with patient.  ASA Score: 3     Day of Surgery Review of History & Physical:        Anesthesia Plan Notes: General LMA okay        Ready For Surgery From Anesthesia Perspective.

## 2020-09-09 NOTE — ASSESSMENT & PLAN NOTE
Admission labs with BUN/creatinine 102/14.2, previous serum creatinine at around 1.5.  Today BUN/creatinine 120/13.4  Patient with a history of renal cell cancer status post partial nephrectomy.  CT abdomen and pelvis with right-sided hydronephrosis with hydroureter, bilateral renal cysts, no definite course obstruction  CPK was checked and within normal  NPO for cystoscopy with stent placement today by Dr. Garcia  Continue IV fluid hydration as per Nephrology  Continue to monitor urine output  Continue to hold oral hypoglycemics and losartan, renally dosing all medications and avoid nephrotoxin drugs  Appreciate consultant's input

## 2020-09-09 NOTE — OP NOTE
[facility]  Operative Note    Date: 09/09/2020    Pre-Op Diagnosis: Severe right hydronephrosis    Post-Op Diagnosis: Same    Procedure(s) Performed:   1. Cystourethroscopy  2. Right retrograde pyelogram  3. Right diagnostic ureteroscopy  4. Attempted right ureteral stent placement    Flouro <1 hour    Specimen(s): Urine cytology and culture    Staff Surgeon: Chris Garcia Jr, MD    Anesthesia: General    Indications: Jamil Cadet is a 75 y.o. male with right hydroureteronephrosis.     Patient with a history of bilateral renal masses s/p right partial nephrectomy in 4/2019 with Dr. Santos at [facility] - left 2 cm cyst on surveillance. Unclear of pathology given lack of records, however his wife states it was renal cell carcinoma.      He presented to the [facility] emergency department on 9/6/20 with severe hypoglycemia.       On review of his labs, he was incidentally found to have an elevated creatinine of 14.4 from a baseline of 1.5 with hyperkalemia.      He subsequently underwent US kidney which revealed mild right pelviectasis. CT renal stone on 9/8/20 showed interval development of right hydroureteronephrosis down to the pelvis - no obvious etiology for obstruction. Also with gallbladder distention, and a calculus of the cystic duct and non-obstructing stone in right kidney.      Patient denies any fever, chills, flank pain, dysuria, lower urinary tract symptoms or recent  trauma.      PSA  0.28                 9/10/19     Testosterone  280                  9/10/19      Urine culture   NGTD              9/7/20    Findings:   Severe s-shaped deformity of mid to distal ureter resulting in severe right hydroureteronephrosis. Attempted to place a stent with direct visualization using a ureteroscope, but unable to advance a wire past the obstruction.     Estimated Blood Loss: Minimal    Drains: None    Complications: None    Implants: * No implants in log *    Procedure in detail:  After  informed consent was obtained, the patient was brought the the cystoscopy suite and placed in the supine position.  SCDs were applied and working.  GETA was administered.  The patient was then placed in the dorsal lithotomy position and prepped and draped in the usual sterile fashion.      A rigid cystoscope in a 22 Fr sheath was introduced into the patient's urethra.  This passed easily.  The entire urethra was visualized which showed no strictures or masses.  Formal cystoscopy was performed which revealed no masses or lesions suspicious for malignancy, no bladder stones, no bladder diverticuli, notrabeculations.  The UOs were visualized in the normal anatomic position bilaterally and clear efflux was visualized.      A sensor wire was passed up the rightUO.  This did not advance into the kidney on fluoroscopy. A 5 Kinyarwanda open ended catheter was placed over the wire and the wire was removed. A retrograde pyelogram revealed significant S-shaped ureter with severe hydroureteronephrosis proximal to the obstruction.     An 8 Fr rigid ureteroscope was passed into the patient's bladder alongside the wire under direct vision.  It was then passed through the right UO alongside the wire.  A wire was advanced up the ureteroscope and ureteroscope several attempts were made with a glide wire and motion wire. The obstruction did not allow passage of the wire proximally into the kidney. Decision was made to terminate the procedure after unsuccessful attempts to place the wire.     The ureteroscope was removed and exchanged for the cystoscope. Urine culture and cytology were sent and the bladder drained.     The patient tolerated the procedure well and was transferred to the recovery room in stable condition.      Disposition: ICU    Follow up: Patient will require right nephrostomy tube placement with IR given his significant obstruction with MELODIE. Will follow up at [facility] with Dr. Danielle upon discharge for further management.  Plan communicated with Dr. Boo and patient's wife who will contact [facility] to get follow up ASAP.     Chris Garcia Jr, MD

## 2020-09-09 NOTE — ASSESSMENT & PLAN NOTE
Last HbA1c 6.7%.  On Janumet at home.  On admission had persistent hypoglycemia, needing D10, now resolved  Glucose now elevated to the 200-300s  Holding Janumet, starting low-dose insulin sliding scale with Accu-Cheks q.6 hours while NPO  As needed hypoglycemic measures

## 2020-09-09 NOTE — SUBJECTIVE & OBJECTIVE
Interval History:  Patient reports did not sleep well overnight due to vitals, labs, interruptions.  Continues to report generalized abdominal bloating however states he has been voiding urine well without any new symptomatology.  Continues to deny any nausea or vomiting.  Denies any shortness of breath or chest pain.  Urine output last 20 hours 2750.  Labs this morning with BUN/creatinine 120/13.4.  CT abdomen and pelvis renal protocol obtained yesterday with right-sided hydronephrosis with hydroureter without any definite etiology for the obstruction, Urology was consulted urgently and did communicate with Dr. Garcia, patient is planned for cystoscopy with stent placement today.  Glucose now running in the side up to the 300s.  Patient updated plan of care, all questions answered.      Review of Systems   Constitutional: Negative for chills and fever.   Respiratory: Negative for shortness of breath.    Cardiovascular: Negative for chest pain.   Gastrointestinal: Negative for nausea and vomiting.        Abdominal bloating   Genitourinary: Negative for difficulty urinating, dysuria, hematuria and urgency.   Allergic/Immunologic: Negative for immunocompromised state.   Neurological: Negative for headaches.   Psychiatric/Behavioral: Positive for sleep disturbance.     Objective:     Vital Signs (Most Recent):  Temp: 97.8 °F (36.6 °C) (09/09/20 0345)  Pulse: 85 (09/09/20 0600)  Resp: 17 (09/09/20 0600)  BP: (!) 148/74 (09/09/20 0600)  SpO2: 96 % (09/09/20 0600) Vital Signs (24h Range):  Temp:  [97.7 °F (36.5 °C)-98.2 °F (36.8 °C)] 97.8 °F (36.6 °C)  Pulse:  [85-99] 85  Resp:  [16-24] 17  SpO2:  [96 %-97 %] 96 %  BP: (146-180)/(70-80) 148/74     Weight: 80 kg (176 lb 5.9 oz)  Body mass index is 24.6 kg/m².    Intake/Output Summary (Last 24 hours) at 9/9/2020 0832  Last data filed at 9/9/2020 0500  Gross per 24 hour   Intake 2980 ml   Output 2350 ml   Net 630 ml      Physical Exam  Vitals signs and nursing note reviewed.    Constitutional:       General: He is not in acute distress.     Appearance: Normal appearance. He is normal weight. He is not ill-appearing, toxic-appearing or diaphoretic.   HENT:      Head: Normocephalic and atraumatic.      Mouth/Throat:      Mouth: Mucous membranes are moist.   Eyes:      General:         Right eye: No discharge.         Left eye: No discharge.      Conjunctiva/sclera: Conjunctivae normal.      Pupils: Pupils are equal, round, and reactive to light.   Neck:      Musculoskeletal: Neck supple.   Cardiovascular:      Rate and Rhythm: Normal rate and regular rhythm.      Comments: No LE edema  Pulmonary:      Effort: No respiratory distress.      Breath sounds: Normal breath sounds. No stridor. No wheezing or rhonchi.      Comments: Not on supplemental O2, crackles at bases  Abdominal:      General: Bowel sounds are normal. There is no distension.      Palpations: Abdomen is soft.      Tenderness: There is no abdominal tenderness. There is no guarding.   Genitourinary:     Comments: No carlton catheter, urinal at bedside with yellow urine  Musculoskeletal:         General: No swelling.   Skin:     General: Skin is warm and dry.      Capillary Refill: Capillary refill takes less than 2 seconds.   Neurological:      General: No focal deficit present.      Mental Status: He is alert and oriented to person, place, and time.   Psychiatric:         Mood and Affect: Mood normal.         Significant Labs:   BMP:   Recent Labs   Lab 09/08/20 0402 09/09/20 0348   *   < > 257*   *   < > 136   K 4.7   < > 4.5   CL 98   < > 91*   CO2 16*   < > 24   *   < > 120*   CREATININE 14.2*   < > 13.4*   CALCIUM 8.0*   < > 7.6*   MG 2.3  --   --     < > = values in this interval not displayed.     CBC:   Recent Labs   Lab 09/08/20 0402 09/09/20 0348   WBC 6.47 6.35   HGB 9.7* 10.3*   HCT 28.2* 29.1*    177     CMP:   Recent Labs   Lab 09/08/20 0402 09/08/20 0818 09/08/20 2005  09/09/20  0348   * 135* 133* 136   K 4.7 4.8 5.1 4.5   CL 98 95 91* 91*   CO2 16* 18* 23 24   * 143* 397* 257*   * 114* 123* 120*   CREATININE 14.2* 14.4* 13.7* 13.4*   CALCIUM 8.0* 7.9* 7.8* 7.6*   PROT  --   --   --  6.1   ALBUMIN 3.4*  --   --  3.4*   BILITOT  --   --   --  1.1*   ALKPHOS  --   --   --  56   AST  --   --   --  22   ALT  --   --   --  22   ANIONGAP 20* 22* 19* 21*   EGFRNONAA 2.9* 2.9* 3.1* 3.1*     Cardiac Markers:   Recent Labs   Lab 09/08/20  0402   *     Lactic Acid:   Recent Labs   Lab 09/08/20  0015 09/08/20  0402 09/08/20  0818   LACTATE 3.2* 2.8* 2.5*     Magnesium:   Recent Labs   Lab 09/08/20  0402   MG 2.3     POCT Glucose: No results for input(s): POCTGLUCOSE in the last 48 hours.  Respiratory Culture: No results for input(s): GSRESP, RESPIRATORYC in the last 48 hours.  Troponin: No results for input(s): TROPONINI in the last 48 hours.  TSH: No results for input(s): TSH in the last 4320 hours.  Urine Culture:   Recent Labs   Lab 09/07/20  0915   LABURIN Multiple organisms isolated. None in predominance.  Repeat if  clinically necessary.     Urine Studies:   Recent Labs   Lab 09/07/20  0924   COLORU Yellow   APPEARANCEUA Clear   PHUR 5.0   SPECGRAV 1.010   PROTEINUA 1+*   GLUCUA Negative   KETONESU Negative   BILIRUBINUA Negative   OCCULTUA Trace*   NITRITE Negative   UROBILINOGEN Negative   LEUKOCYTESUR Negative   RBCUA 4   WBCUA 8*   BACTERIA Negative   SQUAMEPITHEL 3   HYALINECASTS 12*     All pertinent labs within the past 24 hours have been reviewed.    Significant Imaging: I have reviewed all pertinent imaging results/findings within the past 24 hours.     CT renal protocol  IMPRESSION:     Detrimental interval development of right-sided hydronephrosis and  hydroureter to the level of the upper pelvis without a defined  etiology. Urologic consultation/retrograde pyelography is recommended  for further assessment.     Distention of the gallbladder. There  is diffuse increase in density  within the gallbladder lumen as well as tarsal calcified dependent  gallstones. A tiny calculus is observed within the cystic duct. This  appearance is similar to the previous examination.

## 2020-09-09 NOTE — TRANSFER OF CARE
"Anesthesia Transfer of Care Note    Patient: Jamil Cadet    Procedure(s) Performed: Procedure(s) (LRB):  CYSTOSCOPY, WITH URETERAL STENT INSERTION (Right)    Patient location: ICU    Anesthesia Type: general    Transport from OR: Transported from OR on 2-3 L/min O2 by NC with adequate spontaneous ventilation    Post pain: adequate analgesia    Post assessment: no apparent anesthetic complications    Post vital signs: stable    Level of consciousness: awake    Nausea/Vomiting: no nausea/vomiting    Complications: none    Transfer of care protocol was followed      Last vitals:   Visit Vitals  BP (!) 151/71   Pulse 96   Temp 36.6 °C (97.8 °F) (Oral)   Resp 18   Ht 5' 11" (1.803 m)   Wt 80 kg (176 lb 5.9 oz)   SpO2 (!) 94%   BMI 24.60 kg/m²     "

## 2020-09-09 NOTE — CONSULTS
INPATIENT NEPHROLOGY CONSULT   [facility] NEPHROLOGY    Jamil FERRARO Chichi  09/09/2020    Reason for consultation:  MELODIE    History of Present Illness:  75 y.o.  male who  has a past medical history of Diabetes mellitus, type 2.. R/ Renal cell cancer s/p partial nephrectomy appropriately 16 months ago in [facility] The patient presented to [facility] on 9/6/2020 with a primary complaint of Hypoglycemia (pt brought in by EMS with c/o weakness. CBG 52 upon EMS arrival. pt given one amp of D50 en route with EMS with improvement to . )  .   Patient was apparently well 4 days ago, started noticed nausea, loss of appetite and generalized weakness with hypoglycemia.  He was working on his yard under hot sun, not sure about adequate hydration recently.  Came to ED with concern of low sugar of 54, which was corrected in ED with IV dextrose, noted to have severe acute renal failure with hyperkalemia.  No urine output noted since admission in ED.  Hospitalist consulted for further eval, repeat BMP in 4 hours, prior to hyperkalemia cocktail showed resolution of hyperkalemia.  However renal failure remains remarkable .  Bladder scan at bedside 245cc, during my eval patient passed urine without any difficulty.  Reports history of renal stone in the past nonobstructive.    9/7  No nausea, chest pain, sob, fever, urinary or bowel complaint, new neurologic symptoms, new joint pain,  9/8 VSS, no new complains.  9/9 VSS, UO is better, K and acidosis are better, sCr is better. Appreciate Urology input, agree with plan for stent and retrograde pyelogram in AM.     Plan of Care:    Acute kidney injury likely due to intravascular volume depletion, obstruction?.    --renal us shows mild right pelvocaliectasis, not present on CT from September 2019 ?, confirmed on repeat CT 9/8, plan for stent and retrograde pyelogram 9/10.  --rheumatology serologies pending  --renal dose medication  --no acute indication for  dialysis, monitor. This was relayed to pt and his wife    Hyperkalemia secondary to MELODIE  --[place]  --low k diet    Metabolic acidosis  Hyponatremia  --bicarb infusion, continue for now  --no respiratory difficulty at this time  --good UO    Anemia  --iron panel ok  --trend  --blood products per primary    Thank you for allowing us to participate in this patient's care. We will continue to follow.    Vital Signs:  Temp Readings from Last 3 Encounters:   09/09/20 98.2 °F (36.8 °C) (Oral)   12/19/19 97.5 °F (36.4 °C)   10/08/19 97.3 °F (36.3 °C)       Pulse Readings from Last 3 Encounters:   09/09/20 87   12/19/19 90   10/08/19 93       BP Readings from Last 3 Encounters:   09/09/20 (!) 148/72   12/19/19 (!) 157/92   10/08/19 133/73       Weight:  Wt Readings from Last 3 Encounters:   09/07/20 80 kg (176 lb 5.9 oz)   12/19/19 81.7 kg (180 lb 3.2 oz)   10/08/19 80.8 kg (178 lb 3.2 oz)       Past Medical & Surgical History:  Past Medical History:   Diagnosis Date    Diabetes mellitus, type 2        Past Surgical History:   Procedure Laterality Date    APPENDECTOMY      SPINE SURGERY         Past Social History:  Social History     Socioeconomic History    Marital status:      Spouse name: Not on file    Number of children: Not on file    Years of education: Not on file    Highest education level: Not on file   Occupational History    Not on file   Social Needs    Financial resource strain: Not on file    Food insecurity     Worry: Not on file     Inability: Not on file    Transportation needs     Medical: Not on file     Non-medical: Not on file   Tobacco Use    Smoking status: Never Smoker    Smokeless tobacco: Never Used   Substance and Sexual Activity    Alcohol use: No    Drug use: No    Sexual activity: Yes   Lifestyle    Physical activity     Days per week: Not on file     Minutes per session: Not on file    Stress: Not on file   Relationships    Social connections     Talks on phone:  Not on file     Gets together: Not on file     Attends Jainism service: Not on file     Active member of club or organization: Not on file     Attends meetings of clubs or organizations: Not on file     Relationship status: Not on file   Other Topics Concern    Not on file   Social History Narrative    Not on file       Medications:  No current facility-administered medications on file prior to encounter.      Current Outpatient Medications on File Prior to Encounter   Medication Sig Dispense Refill    ascorbic acid, vitamin C, (VITAMIN C) 500 MG tablet Take 500 mg by mouth once daily.      aspirin (ENTERIC COATED ASPIRIN) 81 MG EC tablet Take 81 mg by mouth once daily. 1 Tablet, Delayed Release (E.C.) Oral Every day      cholecalciferol, vitamin D3, (VITAMIN D3) 50 mcg (2,000 unit) Cap Take 1 capsule by mouth once daily.      clonazePAM (KLONOPIN) 2 MG Tab Take 2 mg by mouth every evening.       madi, Zingiber officinalis, (MADI EXTRACT) 250 mg Cap Take 1 capsule by mouth once daily.      NON FORMULARY MEDICATION Take 1 tablet by mouth once daily. TRIPLE MUSHROOM COMPLEX      rosuvastatin (CRESTOR) 20 MG tablet Take 20 mg by mouth once daily.      selenium 200 mcg Cap Take 1 capsule by mouth once daily.      turmeric (CURCUMIN MISC) 1 Dose by Misc.(Non-Drug; Combo Route) route once daily.      TURMERIC ORAL Take 1 tablet by mouth once daily.      glimepiride (AMARYL) 4 MG tablet Take 4 mg by mouth 2 (two) times daily.       MULTIVITAMIN W-MINERALS/LUTEIN (CENTRUM SILVER ORAL) Take 1 capsule by mouth once daily.       sitagliptan-metformin (JANUMET) 50-1,000 mg per tablet Take 1 tablet by mouth 2 (two) times daily with meals. 1 Tablet Oral Twice a day       TRUE METRIX GLUCOSE TEST STRIP Strp        Scheduled Meds:   amLODIPine  10 mg Oral Daily    aspirin  81 mg Oral Daily    chlorhexidine  15 mL Mouth/Throat BID    heparin (porcine)  5,000 Units Subcutaneous Q8H    hydrALAZINE  25 mg  "Oral Q8H    mupirocin   Nasal BID    piperacillin-tazobactam (ZOSYN) IVPB  3.375 g Intravenous Q12H    rosuvastatin  10 mg Oral QHS     Continuous Infusions:   custom IV infusion builder 100 mL/hr at 09/09/20 0337     PRN Meds:.acetaminophen, acetaminophen, dextrose 50%, dextrose 50%, dextrose 50%, glucagon (human recombinant), glucagon (human recombinant), glucose, glucose, hydrALAZINE, insulin aspart U-100, melatonin, ondansetron    Allergies:  Patient has no known allergies.    Past Family History:  Reviewed; refer to Hospitalist Admission Note    Review of Systems:  Review of Systems - All 14 systems reviewed and negative, except as noted in HPI    Physical Exam:    BP (!) 148/72   Pulse 87   Temp 98.2 °F (36.8 °C) (Oral)   Resp 18   Ht 5' 11" (1.803 m)   Wt 80 kg (176 lb 5.9 oz)   SpO2 96%   BMI 24.60 kg/m²     General Appearance:    Alert, cooperative, no distress, appears stated age   Head:    Normocephalic, without obvious abnormality, atraumatic   Eyes:    PER, conjunctiva/corneas clear, EOM's intact in both eyes        Throat:   Lips, mucosa, and tongue normal; teeth and gums normal   Back:     Symmetric, no curvature, ROM normal, no CVA tenderness   Lungs:     Clear to auscultation bilaterally, respirations unlabored   Chest wall:    No tenderness or deformity   Heart:    Regular rate and rhythm, S1 and S2 normal, no murmur, rub   or gallop   Abdomen:     Soft, non-tender, bowel sounds active all four quadrants,     no masses, no organomegaly   Extremities:   Extremities normal, atraumatic, no cyanosis or edema   Pulses:   2+ and symmetric all extremities   MSK:   No joint or muscle swelling, tenderness or deformity   Skin:   Skin color, texture, turgor normal, no rashes or lesions   Neurologic:   CNII-XII intact, normal strength and sensation       Throughout.  No flap.  Has a tremor     Results:  Lab Results   Component Value Date     09/09/2020    K 4.5 09/09/2020    CL 91 (L) " 09/09/2020    CO2 24 09/09/2020     (H) 09/09/2020    CREATININE 13.4 (H) 09/09/2020    CALCIUM 7.6 (L) 09/09/2020    ANIONGAP 21 (H) 09/09/2020    ESTGFRAFRICA 3.6 (A) 09/09/2020    EGFRNONAA 3.1 (A) 09/09/2020       Lab Results   Component Value Date    CALCIUM 7.6 (L) 09/09/2020    PHOS 8.8 (H) 09/08/2020       Recent Labs   Lab 09/09/20  0348   WBC 6.35   RBC 3.26*   HGB 10.3*   HCT 29.1*      MCV 89   MCH 31.6*   MCHC 35.4          I have personally reviewed pertinent radiological imaging and reports.

## 2020-09-09 NOTE — ASSESSMENT & PLAN NOTE
Better today, continue to hold losartan, continue amlodipine 10 mg, added hydralazine 25 mg Q 8, up titrate as needed

## 2020-09-10 PROBLEM — E83.39 HYPERPHOSPHATEMIA: Status: RESOLVED | Noted: 2020-09-08 | Resolved: 2020-09-10

## 2020-09-10 LAB
ALBUMIN SERPL BCP-MCNC: 3.3 G/DL (ref 3.5–5.2)
ALP SERPL-CCNC: 60 U/L (ref 55–135)
ALT SERPL W/O P-5'-P-CCNC: 23 U/L (ref 10–44)
ANION GAP SERPL CALC-SCNC: 17 MMOL/L (ref 8–16)
AST SERPL-CCNC: 25 U/L (ref 10–40)
BASOPHILS # BLD AUTO: 0.01 K/UL (ref 0–0.2)
BASOPHILS NFR BLD: 0.1 % (ref 0–1.9)
BILIRUB SERPL-MCNC: 1 MG/DL (ref 0.1–1)
BUN SERPL-MCNC: 120 MG/DL (ref 8–23)
CALCIUM SERPL-MCNC: 7.2 MG/DL (ref 8.7–10.5)
CHLORIDE SERPL-SCNC: 93 MMOL/L (ref 95–110)
CO2 SERPL-SCNC: 27 MMOL/L (ref 23–29)
CREAT SERPL-MCNC: 13.9 MG/DL (ref 0.5–1.4)
DIFFERENTIAL METHOD: ABNORMAL
EOSINOPHIL # BLD AUTO: 0.2 K/UL (ref 0–0.5)
EOSINOPHIL NFR BLD: 2 % (ref 0–8)
ERYTHROCYTE [DISTWIDTH] IN BLOOD BY AUTOMATED COUNT: 11.9 % (ref 11.5–14.5)
EST. GFR  (AFRICAN AMERICAN): 3.5 ML/MIN/1.73 M^2
EST. GFR  (NON AFRICAN AMERICAN): 3 ML/MIN/1.73 M^2
GLUCOSE SERPL-MCNC: 244 MG/DL (ref 70–110)
GLUCOSE SERPL-MCNC: 263 MG/DL (ref 70–110)
GLUCOSE SERPL-MCNC: 265 MG/DL (ref 70–110)
GLUCOSE SERPL-MCNC: 286 MG/DL (ref 70–110)
HCT VFR BLD AUTO: 28.8 % (ref 40–54)
HGB BLD-MCNC: 10 G/DL (ref 14–18)
IMM GRANULOCYTES # BLD AUTO: 0.03 K/UL (ref 0–0.04)
IMM GRANULOCYTES NFR BLD AUTO: 0.4 % (ref 0–0.5)
LYMPHOCYTES # BLD AUTO: 0.6 K/UL (ref 1–4.8)
LYMPHOCYTES NFR BLD: 7.6 % (ref 18–48)
MCH RBC QN AUTO: 31.4 PG (ref 27–31)
MCHC RBC AUTO-ENTMCNC: 34.7 G/DL (ref 32–36)
MCV RBC AUTO: 91 FL (ref 82–98)
MONOCYTES # BLD AUTO: 1.1 K/UL (ref 0.3–1)
MONOCYTES NFR BLD: 14.2 % (ref 4–15)
NEUTROPHILS # BLD AUTO: 5.8 K/UL (ref 1.8–7.7)
NEUTROPHILS NFR BLD: 75.7 % (ref 38–73)
NRBC BLD-RTO: 0 /100 WBC
PLATELET # BLD AUTO: 173 K/UL (ref 150–350)
PMV BLD AUTO: 10.2 FL (ref 9.2–12.9)
POTASSIUM SERPL-SCNC: 4.3 MMOL/L (ref 3.5–5.1)
PROT SERPL-MCNC: 6.1 G/DL (ref 6–8.4)
RBC # BLD AUTO: 3.18 M/UL (ref 4.6–6.2)
SODIUM SERPL-SCNC: 137 MMOL/L (ref 136–145)
WBC # BLD AUTO: 7.61 K/UL (ref 3.9–12.7)

## 2020-09-10 PROCEDURE — 80053 COMPREHEN METABOLIC PANEL: CPT

## 2020-09-10 PROCEDURE — 63600175 PHARM REV CODE 636 W HCPCS: Performed by: UROLOGY

## 2020-09-10 PROCEDURE — 25000003 PHARM REV CODE 250: Performed by: UROLOGY

## 2020-09-10 PROCEDURE — 82962 GLUCOSE BLOOD TEST: CPT

## 2020-09-10 PROCEDURE — 21400001 HC TELEMETRY ROOM

## 2020-09-10 PROCEDURE — 85025 COMPLETE CBC W/AUTO DIFF WBC: CPT

## 2020-09-10 PROCEDURE — 36415 COLL VENOUS BLD VENIPUNCTURE: CPT

## 2020-09-10 RX ADMIN — INSULIN ASPART 3 UNITS: 100 INJECTION, SOLUTION INTRAVENOUS; SUBCUTANEOUS at 09:09

## 2020-09-10 RX ADMIN — CHLORHEXIDINE GLUCONATE 15 ML: 1.2 RINSE ORAL at 09:09

## 2020-09-10 RX ADMIN — MELATONIN 6 MG: at 09:09

## 2020-09-10 RX ADMIN — INSULIN ASPART 2 UNITS: 100 INJECTION, SOLUTION INTRAVENOUS; SUBCUTANEOUS at 11:09

## 2020-09-10 RX ADMIN — HEPARIN SODIUM 5000 UNITS: 5000 INJECTION INTRAVENOUS; SUBCUTANEOUS at 01:09

## 2020-09-10 RX ADMIN — MUPIROCIN: 20 OINTMENT TOPICAL at 09:09

## 2020-09-10 RX ADMIN — ROSUVASTATIN CALCIUM 10 MG: 10 TABLET, FILM COATED ORAL at 09:09

## 2020-09-10 RX ADMIN — PIPERACILLIN SODIUM AND TAZOBACTAM SODIUM 3.38 G: 3; .375 INJECTION, POWDER, LYOPHILIZED, FOR SOLUTION INTRAVENOUS at 03:09

## 2020-09-10 RX ADMIN — ONDANSETRON 4 MG: 2 INJECTION INTRAMUSCULAR; INTRAVENOUS at 11:09

## 2020-09-10 RX ADMIN — AMLODIPINE BESYLATE 10 MG: 5 TABLET ORAL at 09:09

## 2020-09-10 RX ADMIN — INSULIN ASPART 1 UNITS: 100 INJECTION, SOLUTION INTRAVENOUS; SUBCUTANEOUS at 09:09

## 2020-09-10 RX ADMIN — HYDRALAZINE HYDROCHLORIDE 25 MG: 25 TABLET, FILM COATED ORAL at 01:09

## 2020-09-10 RX ADMIN — HYDRALAZINE HYDROCHLORIDE 25 MG: 25 TABLET, FILM COATED ORAL at 09:09

## 2020-09-10 NOTE — PLAN OF CARE
09/09/20 2130   Patient Assessment/Suction   Level of Consciousness (AVPU) alert   Respiratory Effort Normal;Unlabored   Expansion/Accessory Muscles/Retractions no use of accessory muscles   All Lung Fields Breath Sounds equal bilaterally   PRE-TX-O2   O2 Device (Oxygen Therapy) nasal cannula   $ Is the patient on Low Flow Oxygen? Yes   Flow (L/min) 2   SpO2 98 %   Pulse Oximetry Type Continuous   $ Pulse Oximetry - Multiple Charge Pulse Oximetry - Multiple   Pulse 88   Resp 18   BP (!) 162/75   Respiratory Evaluation   $ Care Plan Tech Time 15 min   Evaluation For Re-Eval 3 day

## 2020-09-10 NOTE — ASSESSMENT & PLAN NOTE
Continue to hold losartan, continue amlodipine 10 mg, added hydralazine 25 mg Q 8, up titrate as needed

## 2020-09-10 NOTE — ASSESSMENT & PLAN NOTE
Admission labs with BUN/creatinine 102/14.2, previous serum creatinine at around 1.5.  Today BUN/creatinine 120/13.9  Patient with a history of renal cell cancer status post partial nephrectomy.  CT abdomen and pelvis with right-sided hydronephrosis with hydroureter, bilateral renal cysts, no definite course obstruction  CPK was checked and within normal  Cystoscopy with stent placement attempted by Dr. Garcia 9/0  however was unsuccessful  Discontinue IV fluids and will discussed with Nephrology about future plan  Continue to monitor urine output  Continue to hold oral hypoglycemics and losartan, renally dosing all medications and avoid nephrotoxin drugs  Appreciate consultant's input

## 2020-09-10 NOTE — PROGRESS NOTES
[facility]  Progress Note    Patient Name: Jamil Cadet  MRN: [id number]  Patient Class: IP- Inpatient   Admission Date: 9/6/2020  Length of Stay: 3 days  Attending Physician: Trinidad Boo MD  Primary Care Provider: Mesfin Cisneros MD        Subjective:     Principal Problem:Renal failure        HPI:  HPI as per MD Lira  Jamil Cadet is a 75 y.o.  male who  has a past medical history of Diabetes mellitus, type 2.. R/ Renal cell cancer s/p partial nephrectomy appropriately 16 months ago in [facility] The patient presented to [facility] on 9/6/2020 with a primary complaint of Hypoglycemia (pt brought in by EMS with c/o weakness. CBG 52 upon EMS arrival. pt given one amp of D50 en route with EMS with improvement to . )  .   Patient was apparently well 4 days ago, started noticed nausea, loss of appetite and generalized weakness with hypoglycemia.  He was working on his yard under hot sun, not sure about adequate hydration recently.  Came to ED with concern of low sugar of 54, which was corrected in ED with IV dextrose, noted to have severe acute renal failure with hyperkalemia.  No urine output noted since admission in ED.  Hospitalist consulted for further eval, repeat BMP in 4 hours, prior to hyperkalemia cocktail showed resolution of hyperkalemia.  However renal failure remains remarkable .  Bladder scan at bedside 245cc, during my eval patient passed urine without any difficulty.  Reports history of renal stone in the past nonobstructive.  Prior to admission no change in renal output noted, no recent adjustment in his dose medication, used to check his renal function every 6 months.    Overview/Hospital Course:  Patient reports for the last 4 days he was working outside in the sun doing gardening work at least 7 hours every day, may not have been consuming much water, was actually not taking any of his prescribed medications during this time  including oral hypoglycemic (Janumet), denies NSAID use, no change in urine reported, no rash, fever. He has history of right RCC status post partial nephrectomy (15% removed as per his report). He admitted to generalized weakness, denies any nausea, vomiting, diarrhea.  EMS was called yesterday and on arrival CBG was 52 which improved to 132 after D50.  In the ED he was noted to recurrent persistent hypoglycemia.  Labs were consistent with hyperkalemia (status post membrane stabilizing medication) anion gap metabolic acidosis with acute renal failure, lactic acidosis to 5 without any source of infection, ultrasound with bilateral renal cysts with mild right pelvocaliectasis.  On 09/07 patient is alert oriented however starting additional fluids along with D10 given persistent hypoglycemia nephrology consulted given acute renal failure, further test ordered.  On 9/0 glucose much better and discontinue D10 infusion, lactic acid better however still in acute renal failure, having good urine output, checking CT renal protocol.  CT did demonstrate right-sided hydronephrosis with hydroureter, no definite calculus seen, urology consulted and planning cystoscopy with stent placement today. Unfortunately Dr Garcia unable to place stent and now recommendations for right percutaneous nephrostomy to relieve hydronephrosis, discussed with Radiology, ideally patient would need to be off aspirin for 3 days, this would be tomorrow, states the left kidney appears normal and should be compensating and expresses concern about possible alternate etiology, discussed with patient who is agreeable to wait until tomorrow for nephrostomy (explained with aspirin increased risk of bleeding).    Interval History:  Patient seen with wife present at bedside.  Unfortunately urology unable to place stent yesterday and now recommending right percutaneous nephrostomy.  Patient is on aspirin 81 mg, last dose 9/08, discussed with interventional  radiologist Dr. Bowling, discussed clinical case, renal failure, states ideally would have to be off aspirin for 3-5 days to decrease risk bleeding with procedure, this was reviewed with patient and wife at bedside who stated they would want to wait tomorrow for nephrostomy placement.  Patient states he has notice 20% increase in urine stream since cystoscopy, continues to deny any other symptomatology.  Denies shortness of breath, chest pain, states his abdominal bloating also feels slightly better.  BP running on the higher side, T-max 98.7°, hemoglobin 10, BUN/creatinine 120/13.9, urine output last 24 hours 1500, glucose elevated however he is on infusion with D5.  All patient's and wife questions were answered to their satisfaction.  Discussed with nursing.    Review of Systems   Constitutional: Negative for chills and fever.   Respiratory: Negative for shortness of breath.    Cardiovascular: Negative for chest pain.   Gastrointestinal:        Abdominal bloating   Genitourinary: Negative for difficulty urinating.   Musculoskeletal: Negative for back pain.   Neurological: Negative for headaches.   Hematological: Does not bruise/bleed easily.   Psychiatric/Behavioral: Positive for sleep disturbance.     Objective:     Vital Signs (Most Recent):  Temp: 98.7 °F (37.1 °C) (09/10/20 0701)  Pulse: 98 (09/10/20 0901)  Resp: (!) 25 (09/10/20 0901)  BP: (!) 141/63 (09/10/20 0901)  SpO2: 96 % (09/10/20 0901) Vital Signs (24h Range):  Temp:  [97.2 °F (36.2 °C)-98.7 °F (37.1 °C)] 98.7 °F (37.1 °C)  Pulse:  [] 98  Resp:  [16-26] 25  SpO2:  [90 %-99 %] 96 %  BP: (129-166)/(63-87) 141/63     Weight: 80 kg (176 lb 5.9 oz)  Body mass index is 24.6 kg/m².    Intake/Output Summary (Last 24 hours) at 9/10/2020 1045  Last data filed at 9/10/2020 0900  Gross per 24 hour   Intake 1251.67 ml   Output 1002 ml   Net 249.67 ml      Physical Exam  Vitals signs and nursing note reviewed.   Constitutional:       General: He is not in  acute distress.     Appearance: Normal appearance. He is normal weight. He is not ill-appearing, toxic-appearing or diaphoretic.   HENT:      Head: Normocephalic and atraumatic.      Mouth/Throat:      Mouth: Mucous membranes are moist.   Eyes:      General:         Right eye: No discharge.         Left eye: No discharge.      Conjunctiva/sclera: Conjunctivae normal.      Pupils: Pupils are equal, round, and reactive to light.   Neck:      Musculoskeletal: Neck supple.   Cardiovascular:      Rate and Rhythm: Regular rhythm. Tachycardia present.      Comments: No LE edema  Pulmonary:      Effort: No respiratory distress.      Breath sounds: Normal breath sounds. No stridor. No wheezing or rhonchi.      Comments: On supplemental O2, crackles at bases  Abdominal:      General: Bowel sounds are normal. There is no distension.      Palpations: Abdomen is soft.      Tenderness: There is no abdominal tenderness. There is no guarding.   Genitourinary:     Comments: No carlton catheter, urinal at bedside with yellow urine  Musculoskeletal:         General: No swelling.   Skin:     General: Skin is warm and dry.      Capillary Refill: Capillary refill takes less than 2 seconds.   Neurological:      General: No focal deficit present.      Mental Status: He is alert and oriented to person, place, and time.   Psychiatric:         Mood and Affect: Mood normal.         Significant Labs:   BMP:   Recent Labs   Lab 09/10/20  0250   *      K 4.3   CL 93*   CO2 27   *   CREATININE 13.9*   CALCIUM 7.2*     CBC:   Recent Labs   Lab 09/09/20  0348 09/10/20  0250   WBC 6.35 7.61   HGB 10.3* 10.0*   HCT 29.1* 28.8*    173     CMP:   Recent Labs   Lab 09/08/20  2005 09/09/20  0348 09/10/20  0250   * 136 137   K 5.1 4.5 4.3   CL 91* 91* 93*   CO2 23 24 27   * 257* 265*   * 120* 120*   CREATININE 13.7* 13.4* 13.9*   CALCIUM 7.8* 7.6* 7.2*   PROT  --  6.1 6.1   ALBUMIN  --  3.4* 3.3*   BILITOT  --   1.1* 1.0   ALKPHOS  --  56 60   AST  --  22 25   ALT  --  22 23   ANIONGAP 19* 21* 17*   EGFRNONAA 3.1* 3.1* 3.0*     Cardiac Markers: No results for input(s): CKMB, MYOGLOBIN, BNP, TROPISTAT in the last 48 hours.  Lactic Acid: No results for input(s): LACTATE in the last 48 hours.  Magnesium: No results for input(s): MG in the last 48 hours.  POCT Glucose: No results for input(s): POCTGLUCOSE in the last 48 hours.  TSH: No results for input(s): TSH in the last 4320 hours.  Urine Culture: No results for input(s): LABURIN in the last 48 hours.  Urine Studies: No results for input(s): COLORU, APPEARANCEUA, PHUR, SPECGRAV, PROTEINUA, GLUCUA, KETONESU, BILIRUBINUA, OCCULTUA, NITRITE, UROBILINOGEN, LEUKOCYTESUR, RBCUA, WBCUA, BACTERIA, SQUAMEPITHEL, HYALINECASTS in the last 48 hours.    Invalid input(s): WRIGHTSUR  All pertinent labs within the past 24 hours have been reviewed.    Significant Imaging: I have reviewed all pertinent imaging results/findings within the past 24 hours.      Assessment/Plan:      * Acute renal failure, likely secondary to obstructive nephropathy  Admission labs with BUN/creatinine 102/14.2, previous serum creatinine at around 1.5.  Today BUN/creatinine 120/13.9  Patient with a history of renal cell cancer status post partial nephrectomy.  CT abdomen and pelvis with right-sided hydronephrosis with hydroureter, bilateral renal cysts, no definite course obstruction  CPK was checked and within normal  Cystoscopy with stent placement attempted by Dr. Garcia 9/0  however was unsuccessful  Discontinue IV fluids and will discussed with Nephrology about future plan  Continue to monitor urine output  Continue to hold oral hypoglycemics and losartan, renally dosing all medications and avoid nephrotoxin drugs  Appreciate consultant's input    Right hydronephrosis with hydroureter  CT with right hydronephrosis with hydroureter, no definite course of obstruction  Possible secondary to renal calculi versus  stricture/narrowing, history of prior partial right nephrectomy  Continue empiric Zosyn  On 09/09 Dr. Garcia did attempt stent placement however was unsuccessful, recommendation for IR percutaneous nephrostomy  Discussed with interventional radiologist today, given aspirin ideally would like to be of 3 days, discussed with patient, continue to hold aspirin  IR percutaneous nephrostomy planned for tomorrow        Non-insulin dependent type 2 diabetes mellitus, previously with hypoglycemia and now hyperglycemia  Last HbA1c 6.7%.  On Janumet at home.  On admission had persistent hypoglycemia, needing D10, now resolved  Glucose now elevated to the 200-300s.  He is on IV fluids with D5.  Discontinue IV fluids.  Holding Janumet, continue low-dose insulin sliding scale with Accu-Cheks.  Diabetic diet  As needed hypoglycemic measures    Hyperkalemia  In the setting of acute renal failure, potassium was 6.2, status post membrane stabilizing medication improved to 4.9  Was recurrent and received dose of Veltassa.  Losartan remains on hold.  Continue to monitor closely with acute renal failure, p.r.n. membrane stabilizing medications      High anion gap metabolic acidosis  In the setting of renal failure and lactic acidosis, currently on bicarb infusion, monitoring      Shortness of breath  Discontinuing IV fluids today.  Supplemental oxygen as needed.  P.r.n. chest x-ray.  Following clinically      Renal cyst  Bilateral renal cysts      Anemia  No evidence of bleeding.  Trending CBC      History of renal cell carcinoma status post partial nephrectomy  History of right renal cell CA status post partial nephrectomy at [facility].  Patient and wife were instructed to make close follow-up with Dr. Danielle.      Essential hypertension  Continue to hold losartan, continue amlodipine 10 mg, added hydralazine 25 mg Q 8, up titrate as needed        VTE Risk Mitigation (From admission, onward)         Ordered     heparin (porcine) injection  5,000 Units  Every 8 hours      09/07/20 0632     IP VTE HIGH RISK PATIENT  Once      09/07/20 0632     Place sequential compression device  Until discontinued      09/07/20 0632                Discharge Planning   CA:      Code Status: Full Code   Is the patient medically ready for discharge?:     Reason for patient still in hospital (select all that apply): Patient unstable  Discharge Plan A: Home                  Trinidad Boo MD  Department of Hospital Medicine   [facility]

## 2020-09-10 NOTE — NURSING
Spoke with Chiquis TOLENTINO in CICU.  Per IR guidelines Aspirin needs to be held x 3 days.  Patient's last dose was 9/8/2020 @ 0859. Would be able to have procedure tomorrow.  Chiquis to let Dr. Boo and Dr. Perez know.

## 2020-09-10 NOTE — ASSESSMENT & PLAN NOTE
Discontinuing IV fluids today.  Supplemental oxygen as needed.  P.r.n. chest x-ray.  Following clinically

## 2020-09-10 NOTE — SUBJECTIVE & OBJECTIVE
Interval History:  Patient seen with wife present at bedside.  Unfortunately urology unable to place stent yesterday and now recommending right percutaneous nephrostomy.  Patient is on aspirin 81 mg, last dose 9/08, discussed with interventional radiologist Dr. Bowling, discussed clinical case, renal failure, states ideally would have to be off aspirin for 3-5 days to decrease risk bleeding with procedure, this was reviewed with patient and wife at bedside who stated they would want to wait tomorrow for nephrostomy placement.  Patient states he has notice 20% increase in urine stream since cystoscopy, continues to deny any other symptomatology.  Denies shortness of breath, chest pain, states his abdominal bloating also feels slightly better.  BP running on the higher side, T-max 98.7°, hemoglobin 10, BUN/creatinine 120/13.9, urine output last 24 hours 1500, glucose elevated however he is on infusion with D5.  All patient's and wife questions were answered to their satisfaction.  Discussed with nursing.    Review of Systems   Constitutional: Negative for chills and fever.   Respiratory: Negative for shortness of breath.    Cardiovascular: Negative for chest pain.   Gastrointestinal:        Abdominal bloating   Genitourinary: Negative for difficulty urinating.   Musculoskeletal: Negative for back pain.   Neurological: Negative for headaches.   Hematological: Does not bruise/bleed easily.   Psychiatric/Behavioral: Positive for sleep disturbance.     Objective:     Vital Signs (Most Recent):  Temp: 98.7 °F (37.1 °C) (09/10/20 0701)  Pulse: 98 (09/10/20 0901)  Resp: (!) 25 (09/10/20 0901)  BP: (!) 141/63 (09/10/20 0901)  SpO2: 96 % (09/10/20 0901) Vital Signs (24h Range):  Temp:  [97.2 °F (36.2 °C)-98.7 °F (37.1 °C)] 98.7 °F (37.1 °C)  Pulse:  [] 98  Resp:  [16-26] 25  SpO2:  [90 %-99 %] 96 %  BP: (129-166)/(63-87) 141/63     Weight: 80 kg (176 lb 5.9 oz)  Body mass index is 24.6 kg/m².    Intake/Output Summary (Last  24 hours) at 9/10/2020 1045  Last data filed at 9/10/2020 0900  Gross per 24 hour   Intake 1251.67 ml   Output 1002 ml   Net 249.67 ml      Physical Exam  Vitals signs and nursing note reviewed.   Constitutional:       General: He is not in acute distress.     Appearance: Normal appearance. He is normal weight. He is not ill-appearing, toxic-appearing or diaphoretic.   HENT:      Head: Normocephalic and atraumatic.      Mouth/Throat:      Mouth: Mucous membranes are moist.   Eyes:      General:         Right eye: No discharge.         Left eye: No discharge.      Conjunctiva/sclera: Conjunctivae normal.      Pupils: Pupils are equal, round, and reactive to light.   Neck:      Musculoskeletal: Neck supple.   Cardiovascular:      Rate and Rhythm: Regular rhythm. Tachycardia present.      Comments: No LE edema  Pulmonary:      Effort: No respiratory distress.      Breath sounds: Normal breath sounds. No stridor. No wheezing or rhonchi.      Comments: On supplemental O2, crackles at bases  Abdominal:      General: Bowel sounds are normal. There is no distension.      Palpations: Abdomen is soft.      Tenderness: There is no abdominal tenderness. There is no guarding.   Genitourinary:     Comments: No carlton catheter, urinal at bedside with yellow urine  Musculoskeletal:         General: No swelling.   Skin:     General: Skin is warm and dry.      Capillary Refill: Capillary refill takes less than 2 seconds.   Neurological:      General: No focal deficit present.      Mental Status: He is alert and oriented to person, place, and time.   Psychiatric:         Mood and Affect: Mood normal.         Significant Labs:   BMP:   Recent Labs   Lab 09/10/20  0250   *      K 4.3   CL 93*   CO2 27   *   CREATININE 13.9*   CALCIUM 7.2*     CBC:   Recent Labs   Lab 09/09/20  0348 09/10/20  0250   WBC 6.35 7.61   HGB 10.3* 10.0*   HCT 29.1* 28.8*    173     CMP:   Recent Labs   Lab 09/08/20 2005  09/09/20  0348 09/10/20  0250   * 136 137   K 5.1 4.5 4.3   CL 91* 91* 93*   CO2 23 24 27   * 257* 265*   * 120* 120*   CREATININE 13.7* 13.4* 13.9*   CALCIUM 7.8* 7.6* 7.2*   PROT  --  6.1 6.1   ALBUMIN  --  3.4* 3.3*   BILITOT  --  1.1* 1.0   ALKPHOS  --  56 60   AST  --  22 25   ALT  --  22 23   ANIONGAP 19* 21* 17*   EGFRNONAA 3.1* 3.1* 3.0*     Cardiac Markers: No results for input(s): CKMB, MYOGLOBIN, BNP, TROPISTAT in the last 48 hours.  Lactic Acid: No results for input(s): LACTATE in the last 48 hours.  Magnesium: No results for input(s): MG in the last 48 hours.  POCT Glucose: No results for input(s): POCTGLUCOSE in the last 48 hours.  TSH: No results for input(s): TSH in the last 4320 hours.  Urine Culture: No results for input(s): LABURIN in the last 48 hours.  Urine Studies: No results for input(s): COLORU, APPEARANCEUA, PHUR, SPECGRAV, PROTEINUA, GLUCUA, KETONESU, BILIRUBINUA, OCCULTUA, NITRITE, UROBILINOGEN, LEUKOCYTESUR, RBCUA, WBCUA, BACTERIA, SQUAMEPITHEL, HYALINECASTS in the last 48 hours.    Invalid input(s): TIARA  All pertinent labs within the past 24 hours have been reviewed.    Significant Imaging: I have reviewed all pertinent imaging results/findings within the past 24 hours.

## 2020-09-10 NOTE — CONSULTS
INPATIENT NEPHROLOGY CONSULT   [facility] NEPHROLOGY    Jamil FERRARO Katerynaarletteevette  09/10/2020    Reason for consultation:  MELODIE    History of Present Illness:  75 y.o.  male who  has a past medical history of Diabetes mellitus, type 2.. R/ Renal cell cancer s/p partial nephrectomy appropriately 16 months ago in [facility] with a primary complaint of Hypoglycemia (pt brought in by EMS with c/o weakness. CBG 52 upon EMS arrival. pt given one amp of D50 en route with EMS with improvement to . ).     Patient was apparently well 4 days ago, started noticed nausea, loss of appetite and generalized weakness with hypoglycemia.  He was working on his yard under hot sun, not sure about adequate hydration recently.  Came to ED with concern of low sugar of 54, which was corrected in ED with IV dextrose, noted to have severe acute renal failure with hyperkalemia.  No urine output noted since admission in ED.  Hospitalist consulted for further eval, repeat BMP in 4 hours, prior to hyperkalemia cocktail showed resolution of hyperkalemia.  However renal failure remains remarkable .  Bladder scan at bedside 245cc, during my eval patient passed urine without any difficulty.  Reports history of renal stone in the past nonobstructive.    9/7  No nausea, chest pain, sob, fever, urinary or bowel complaint, new neurologic symptoms, new joint pain,  9/8 VSS, no new complains.  9/9 VSS, UO is better, K and acidosis are better, sCr is better. Appreciate Urology input, agree with plan for stent and retrograde pyelogram in AM.     Plan of Care:    Acute kidney injury likely due to intravascular volume depletion, obstruction?.    --renal us shows mild right pelvocaliectasis, not present on CT from September 2019 ?, confirmed on repeat CT 9/8, attempted stent and retrograde pyelogram 9/10, unsuccessful, now plan for perc neph after 3 days due to ASA.  --rheumatology serologies show  positive ZACH, significance unclear, will repeat when more stable.  --renal dose medication  --no acute indication for dialysis, monitor. If opening the kidney does not improve kidney function or if he develops acute indication, we will proceed with HD. This was relayed to pt and his wife.    Hyperkalemia secondary to MELODIE  --[place]  --low k diet    Metabolic acidosis  Hyponatremia  --bicarb infusion, off now  --no respiratory difficulty at this time  --good UO    Anemia  --iron panel ok  --trend  --blood products per primary    Thank you for allowing us to participate in this patient's care. We will continue to follow.    Vital Signs:  Temp Readings from Last 3 Encounters:   09/10/20 98.2 °F (36.8 °C) (Oral)   12/19/19 97.5 °F (36.4 °C)   10/08/19 97.3 °F (36.3 °C)       Pulse Readings from Last 3 Encounters:   09/10/20 89   12/19/19 90   10/08/19 93       BP Readings from Last 3 Encounters:   09/10/20 (!) 142/67   12/19/19 (!) 157/92   10/08/19 133/73       Weight:  Wt Readings from Last 3 Encounters:   09/07/20 80 kg (176 lb 5.9 oz)   12/19/19 81.7 kg (180 lb 3.2 oz)   10/08/19 80.8 kg (178 lb 3.2 oz)       Past Medical & Surgical History:  Past Medical History:   Diagnosis Date    Diabetes mellitus, type 2        Past Surgical History:   Procedure Laterality Date    APPENDECTOMY      CYSTOSCOPY W/ RETROGRADES Right 9/9/2020    Procedure: CYSTOSCOPY, WITH RETROGRADE PYELOGRAM;  Surgeon: Chris Garcia Jr., MD;  Location: [facility] OR;  Service: Urology;  Laterality: Right;    SPINE SURGERY      URETHROSCOPY  9/9/2020    Procedure: URETHROSCOPY;  Surgeon: Chris Garcia Jr., MD;  Location: [facility];  Service: Urology;;       Past Social History:  Social History     Socioeconomic History    Marital status:      Spouse name: Not on file    Number of children: Not on file    Years of education: Not on file    Highest education level: Not on file   Occupational History    Not on file   Social Needs     Financial resource strain: Not on file    Food insecurity     Worry: Not on file     Inability: Not on file    Transportation needs     Medical: Not on file     Non-medical: Not on file   Tobacco Use    Smoking status: Never Smoker    Smokeless tobacco: Never Used   Substance and Sexual Activity    Alcohol use: No    Drug use: No    Sexual activity: Yes   Lifestyle    Physical activity     Days per week: Not on file     Minutes per session: Not on file    Stress: Not on file   Relationships    Social connections     Talks on phone: Not on file     Gets together: Not on file     Attends Gnosticism service: Not on file     Active member of club or organization: Not on file     Attends meetings of clubs or organizations: Not on file     Relationship status: Not on file   Other Topics Concern    Not on file   Social History Narrative    Not on file       Medications:  No current facility-administered medications on file prior to encounter.      Current Outpatient Medications on File Prior to Encounter   Medication Sig Dispense Refill    ascorbic acid, vitamin C, (VITAMIN C) 500 MG tablet Take 500 mg by mouth once daily.      aspirin (ENTERIC COATED ASPIRIN) 81 MG EC tablet Take 81 mg by mouth once daily. 1 Tablet, Delayed Release (E.C.) Oral Every day      cholecalciferol, vitamin D3, (VITAMIN D3) 50 mcg (2,000 unit) Cap Take 1 capsule by mouth once daily.      clonazePAM (KLONOPIN) 2 MG Tab Take 2 mg by mouth every evening.       madi, Zingiber officinalis, (MADI EXTRACT) 250 mg Cap Take 1 capsule by mouth once daily.      NON FORMULARY MEDICATION Take 1 tablet by mouth once daily. TRIPLE MUSHROOM COMPLEX      rosuvastatin (CRESTOR) 20 MG tablet Take 20 mg by mouth once daily.      selenium 200 mcg Cap Take 1 capsule by mouth once daily.      turmeric (CURCUMIN MISC) 1 Dose by Misc.(Non-Drug; Combo Route) route once daily.      TURMERIC ORAL Take 1 tablet by mouth once daily.      glimepiride  "(AMARYL) 4 MG tablet Take 4 mg by mouth 2 (two) times daily.       MULTIVITAMIN W-MINERALS/LUTEIN (CENTRUM SILVER ORAL) Take 1 capsule by mouth once daily.       sitagliptan-metformin (JANUMET) 50-1,000 mg per tablet Take 1 tablet by mouth 2 (two) times daily with meals. 1 Tablet Oral Twice a day       TRUE METRIX GLUCOSE TEST STRIP Strp        Scheduled Meds:   amLODIPine  10 mg Oral Daily    chlorhexidine  15 mL Mouth/Throat BID    heparin (porcine)  5,000 Units Subcutaneous Q8H    hydrALAZINE  25 mg Oral Q8H    mupirocin   Nasal BID    piperacillin-tazobactam (ZOSYN) IVPB  3.375 g Intravenous Q12H    rosuvastatin  10 mg Oral QHS     Continuous Infusions:    PRN Meds:.acetaminophen, acetaminophen, dextrose 50%, dextrose 50%, dextrose 50%, glucagon (human recombinant), glucagon (human recombinant), glucose, glucose, hydrALAZINE, insulin aspart U-100, melatonin, ondansetron    Allergies:  Patient has no known allergies.    Past Family History:  Reviewed; refer to Hospitalist Admission Note    Review of Systems:  Review of Systems - All 14 systems reviewed and negative, except as noted in HPI    Physical Exam:    BP (!) 142/67 (BP Location: Left arm, Patient Position: Lying)   Pulse 89   Temp 98.2 °F (36.8 °C) (Oral)   Resp 18   Ht 5' 11" (1.803 m)   Wt 80 kg (176 lb 5.9 oz)   SpO2 (!) 91%   BMI 24.60 kg/m²     General Appearance:    Alert, cooperative, no distress, appears stated age   Head:    Normocephalic, without obvious abnormality, atraumatic   Eyes:    PER, conjunctiva/corneas clear, EOM's intact in both eyes        Throat:   Lips, mucosa, and tongue normal; teeth and gums normal   Back:     Symmetric, no curvature, ROM normal, no CVA tenderness   Lungs:     Clear to auscultation bilaterally, respirations unlabored   Chest wall:    No tenderness or deformity   Heart:    Regular rate and rhythm, S1 and S2 normal, no murmur, rub   or gallop   Abdomen:     Soft, non-tender, bowel sounds active " all four quadrants,     no masses, no organomegaly   Extremities:   Extremities normal, atraumatic, no cyanosis or edema   Pulses:   2+ and symmetric all extremities   MSK:   No joint or muscle swelling, tenderness or deformity   Skin:   Skin color, texture, turgor normal, no rashes or lesions   Neurologic:   CNII-XII intact, normal strength and sensation       Throughout.  No flap.  Has a tremor     Results:  Lab Results   Component Value Date     09/10/2020    K 4.3 09/10/2020    CL 93 (L) 09/10/2020    CO2 27 09/10/2020     (H) 09/10/2020    CREATININE 13.9 (H) 09/10/2020    CALCIUM 7.2 (L) 09/10/2020    ANIONGAP 17 (H) 09/10/2020    ESTGFRAFRICA 3.5 (A) 09/10/2020    EGFRNONAA 3.0 (A) 09/10/2020       Lab Results   Component Value Date    CALCIUM 7.2 (L) 09/10/2020    PHOS 8.8 (H) 09/08/2020       Recent Labs   Lab 09/10/20  0250   WBC 7.61   RBC 3.18*   HGB 10.0*   HCT 28.8*      MCV 91   MCH 31.4*   MCHC 34.7          I have personally reviewed pertinent radiological imaging and reports.

## 2020-09-10 NOTE — ANESTHESIA POSTPROCEDURE EVALUATION
Anesthesia Post Evaluation    Patient: Jamil Cadet    Procedure(s) Performed: Procedure(s) (LRB):  CYSTOSCOPY, WITH URETERAL STENT INSERTION (Right)    Final Anesthesia Type: general    Patient location during evaluation: ICU  Patient participation: Yes- Able to Participate  Level of consciousness: awake and alert  Post-procedure vital signs: reviewed and stable  Pain management: adequate  Airway patency: patent    PONV status at discharge: No PONV  Anesthetic complications: no      Cardiovascular status: hemodynamically stable  Respiratory status: unassisted and spontaneous ventilation  Hydration status: euvolemic  Follow-up not needed.          Vitals Value Taken Time   /96 09/09/20 1930   Temp 36.2 °C (97.2 °F) 09/09/20 1735   Pulse 96 09/09/20 1939   Resp 23 09/09/20 1939   SpO2 82 % 09/09/20 1940   Vitals shown include unvalidated device data.      No case tracking events are documented in the log.      Pain/Vi Score: No data recorded

## 2020-09-10 NOTE — ASSESSMENT & PLAN NOTE
History of right renal cell CA status post partial nephrectomy at [facility].  Patient and wife were instructed to make close follow-up with Dr. Danielle.

## 2020-09-10 NOTE — ASSESSMENT & PLAN NOTE
Last HbA1c 6.7%.  On Janumet at home.  On admission had persistent hypoglycemia, needing D10, now resolved  Glucose now elevated to the 200-300s.  He is on IV fluids with D5.  Discontinue IV fluids.  Holding Janumet, continue low-dose insulin sliding scale with Accu-Cheks.  Diabetic diet  As needed hypoglycemic measures

## 2020-09-10 NOTE — NURSING TRANSFER
Nursing Transfer Note      9/10/2020     Transfer To: [id number]    Transfer via wheelchair    Transfer with  cardiac monitoring    Transported by RN    Medicines sent: YES    Chart send with patient: Yes    Notified: spouse at bedside.    Upon arrival to floor: cardiac monitor applied, patient oriented to room, call bell in reach and bed in lowest position    All belonging with pt. NAD Noted.

## 2020-09-10 NOTE — ASSESSMENT & PLAN NOTE
CT with right hydronephrosis with hydroureter, no definite course of obstruction  Possible secondary to renal calculi versus stricture/narrowing, history of prior partial right nephrectomy  Continue empiric Zosyn  On 09/09 Dr. Garcia did attempt stent placement however was unsuccessful, recommendation for IR percutaneous nephrostomy  Discussed with interventional radiologist today, given aspirin ideally would like to be of 3 days, discussed with patient, continue to hold aspirin  IR percutaneous nephrostomy planned for tomorrow

## 2020-09-10 NOTE — PROGRESS NOTES
"[facility]  Adult Nutrition   Progress Note (Follow-Up)    SUMMARY     Recommendations  Recommendation/Intervention: 1. Encourage PO intake. Plans for NPO at midnight. When medically able, recommend diet be resumed (Diabetic/ Renal). 2. RD to add oral nutrition supplement if PO intake remains poor after diet is resumed. 3. Hyperglycemia, recommend continued monitoring and management. Hyperphosphatemia, recommend medical management (phos binder).  Goals: 1. Diet to be resumed. 2. Patient to meet at least 75% of estimated energy and protein needs. 3. Blood glucose and electrolytes to trend towards normal limits/ target ranges.  Nutrition Goal Status: progressing towards goal  Communication of RD Recs: reviewed with RN     Dietitian Rounds Brief  RD follow up. Patient sleeping and RN advised RD not to wake. Diet started, but PO intake and appetite are poor 2' current medical problems. Plans for nephrostomy tube placement per RN. Patient will be NPO at midnight. RD noted hyperglycemia, BG range 244 - 340. Patient on low dose SSI. Patient was hypoglycemic upon admission. Recommend improved glycemic control. Patient transferred to cardiology from ICU after rounds. Will monitor plans to resume diet after procedure tomorrow, add/offer oral nutrition supplement, and make additional recommendations accordingly    Reason for Assessment  Reason For Assessment: RD follow-up  Relevant Medical History: DM2; HTN; History of renal cell carcinoma status post partial nephrectomy; Anemia; ARF; Hyperkalemia;   High anion gap metabolic acidosis; Lactic acidosis; Hyperphosphatemia    Nutrition Risk Screen  Nutrition Risk Screen: no indicators present    Nutrition/Diet History  Food Allergies: NKFA  Factors Affecting Nutritional Intake: decreased appetite    Anthropometrics  Temp: 98.5 °F (36.9 °C)  Height Method: Stated  Height: 5' 11" (180.3 cm)  Height (inches): 71 in  Weight Method: Bed Scale  Weight: 80 kg (176 lb 5.9 " oz)  Weight (lb): 176.37 lb  Ideal Body Weight (IBW), Male: 172 lb  % Ideal Body Weight, Male (lb): 102.54 %  BMI (Calculated): 24.6  BMI Grade: 18.5-24.9 - normal       Weight History:  Wt Readings from Last 10 Encounters:   09/07/20 80 kg (176 lb 5.9 oz)   12/19/19 81.7 kg (180 lb 3.2 oz)   10/08/19 80.8 kg (178 lb 3.2 oz)   07/23/19 81 kg (178 lb 9.6 oz)   02/26/19 83.3 kg (183 lb 9.6 oz)   01/31/19 84.1 kg (185 lb 6.4 oz)   06/13/18 85.4 kg (188 lb 3.2 oz)   01/18/13 86.2 kg (190 lb)       Lab/Procedures/Meds: Pertinent Labs Reviewed    Clinical Chemistry:  Recent Labs   Lab 09/07/20  0720  09/08/20  0402  09/10/20  0250   NA  --    < > 134*   < > 137   K  --    < > 4.7   < > 4.3   CL  --    < > 98   < > 93*   CO2  --    < > 16*   < > 27   GLU 35*   < > 134*   < > 265*   BUN  --    < > 116*   < > 120*   CREATININE  --    < > 14.2*   < > 13.9*   CALCIUM  --    < > 8.0*   < > 7.2*   PROT  --   --   --    < > 6.1   ALBUMIN  --   --  3.4*   < > 3.3*   BILITOT  --   --   --    < > 1.0   ALKPHOS  --   --   --    < > 60   AST  --   --   --    < > 25   ALT  --   --   --    < > 23   ANIONGAP  --    < > 20*   < > 17*   ESTGFRAFRICA  --    < > 3.4*   < > 3.5*   EGFRNONAA  --    < > 2.9*   < > 3.0*   MG  --   --  2.3  --   --    PHOS 10.7*  --  8.8*  --   --     < > = values in this interval not displayed.       CBC:   Recent Labs   Lab 09/10/20  0250   WBC 7.61   RBC 3.18*   HGB 10.0*   HCT 28.8*      MCV 91   MCH 31.4*   MCHC 34.7       Cardiac Profile:  Recent Labs   Lab 09/07/20  0023 09/07/20 0720 09/08/20 0402   *  --  241*   CPK  --  90  --    TROPONINI <0.030  --   --        Diabetes:  Recent Labs   Lab 09/07/20 0720 09/08/20  0402   HGBA1C 6.1 5.9       Vitamins:  Recent Labs   Lab 09/07/20 0720   [id number]         Medications: Pertinent Medications reviewed    Scheduled Meds:   amLODIPine  10 mg Oral Daily    chlorhexidine  15 mL Mouth/Throat BID    heparin (porcine)  5,000 Units  Subcutaneous Q8H    hydrALAZINE  25 mg Oral Q8H    mupirocin   Nasal BID    piperacillin-tazobactam (ZOSYN) IVPB  3.375 g Intravenous Q12H    rosuvastatin  10 mg Oral QHS       Continuous Infusions:    PRN Meds:.acetaminophen, acetaminophen, dextrose 50%, dextrose 50%, dextrose 50%, glucagon (human recombinant), glucagon (human recombinant), glucose, glucose, hydrALAZINE, insulin aspart U-100, melatonin, ondansetron    Estimated/Assessed Needs  Weight Used For Calorie Calculations: 80 kg (176 lb 5.9 oz)  Energy Calorie Requirements (kcal): 2000 - 2400 (25 - 30)  Energy Need Method: Kcal/kg  Protein Requirements: 48 - 64 (0.6 - 0.8 g/kg); Monitor Renal Function  Weight Used For Protein Calculations: 80 kg (176 lb 5.9 oz)  Fluid Requirements (mL): per MD  Estimated Fluid Requirement Method: other (see comments)  RDA Method (mL): 2000       Nutrition Prescription Ordered  Current Diet Order: Diabetic/ Renal  Nutrition Order Comments: NPO midnight    Evaluation of Received Nutrient/Fluid Intake  Other Calories (kcal): 408(D5 + Bicarb @ 100 ml/hr)  Energy Calories Required: not meeting needs  Protein Required: not meeting needs  Fluid Required: meeting needs  Tolerance: tolerating     Intake/Output Summary (Last 24 hours) at 9/10/2020 1743  Last data filed at 9/10/2020 0900  Gross per 24 hour   Intake 251.67 ml   Output 300 ml   Net -48.33 ml      % Intake of Estimated Energy Needs: 0 - 25 %  % Meal Intake: 0 - 25 %    Nutrition Risk  Level of Risk/Frequency of Follow-up: high     Monitor and Evaluation  Food and Nutrient Intake: energy intake, food and beverage intake  Food and Nutrient Adminstration: diet order  Physical Activity and Function: nutrition-related ADLs and IADLs, factors affecting access to physical activity  Anthropometric Measurements: weight, weight change, body mass index  Biochemical Data, Medical Tests and Procedures: electrolyte and renal panel, lipid profile, glucose/endocrine profile,  inflammatory profile, gastrointestinal profile  Nutrition-Focused Physical Findings: overall appearance     Nutrition Follow-Up  RD Follow-up?: Yes    Vilma Del Castillo RD 09/10/2020 5:43 PM

## 2020-09-11 PROBLEM — E87.20 METABOLIC ACIDOSIS: Status: RESOLVED | Noted: 2020-09-07 | Resolved: 2020-09-11

## 2020-09-11 PROBLEM — E87.5 HYPERKALEMIA: Status: RESOLVED | Noted: 2020-09-07 | Resolved: 2020-09-11

## 2020-09-11 LAB
ALBUMIN SERPL BCP-MCNC: 3.3 G/DL (ref 3.5–5.2)
ALP SERPL-CCNC: 59 U/L (ref 55–135)
ALT SERPL W/O P-5'-P-CCNC: 26 U/L (ref 10–44)
ANION GAP SERPL CALC-SCNC: 19 MMOL/L (ref 8–16)
AST SERPL-CCNC: 41 U/L (ref 10–40)
BASOPHILS # BLD AUTO: 0.02 K/UL (ref 0–0.2)
BASOPHILS NFR BLD: 0.2 % (ref 0–1.9)
BILIRUB SERPL-MCNC: 1 MG/DL (ref 0.1–1)
BUN SERPL-MCNC: 118 MG/DL (ref 8–23)
CALCIUM SERPL-MCNC: 7.8 MG/DL (ref 8.7–10.5)
CHLORIDE SERPL-SCNC: 92 MMOL/L (ref 95–110)
CO2 SERPL-SCNC: 29 MMOL/L (ref 23–29)
CREAT SERPL-MCNC: 13.8 MG/DL (ref 0.5–1.4)
DIFFERENTIAL METHOD: ABNORMAL
EOSINOPHIL # BLD AUTO: 0.1 K/UL (ref 0–0.5)
EOSINOPHIL NFR BLD: 1.3 % (ref 0–8)
ERYTHROCYTE [DISTWIDTH] IN BLOOD BY AUTOMATED COUNT: 11.9 % (ref 11.5–14.5)
EST. GFR  (AFRICAN AMERICAN): 3.5 ML/MIN/1.73 M^2
EST. GFR  (NON AFRICAN AMERICAN): 3 ML/MIN/1.73 M^2
GLUCOSE SERPL-MCNC: 191 MG/DL (ref 70–110)
GLUCOSE SERPL-MCNC: 199 MG/DL (ref 70–110)
GLUCOSE SERPL-MCNC: 206 MG/DL (ref 70–110)
GLUCOSE SERPL-MCNC: 228 MG/DL (ref 70–110)
GLUCOSE SERPL-MCNC: 355 MG/DL (ref 70–110)
HCT VFR BLD AUTO: 30.4 % (ref 40–54)
HGB BLD-MCNC: 10.3 G/DL (ref 14–18)
IMM GRANULOCYTES # BLD AUTO: 0.06 K/UL (ref 0–0.04)
IMM GRANULOCYTES NFR BLD AUTO: 0.6 % (ref 0–0.5)
LYMPHOCYTES # BLD AUTO: 0.8 K/UL (ref 1–4.8)
LYMPHOCYTES NFR BLD: 7.2 % (ref 18–48)
MCH RBC QN AUTO: 31.5 PG (ref 27–31)
MCHC RBC AUTO-ENTMCNC: 33.9 G/DL (ref 32–36)
MCV RBC AUTO: 93 FL (ref 82–98)
MONOCYTES # BLD AUTO: 1.4 K/UL (ref 0.3–1)
MONOCYTES NFR BLD: 12.5 % (ref 4–15)
NEUTROPHILS # BLD AUTO: 8.4 K/UL (ref 1.8–7.7)
NEUTROPHILS NFR BLD: 78.2 % (ref 38–73)
NRBC BLD-RTO: 0 /100 WBC
PLATELET # BLD AUTO: 182 K/UL (ref 150–350)
PMV BLD AUTO: 10 FL (ref 9.2–12.9)
POTASSIUM SERPL-SCNC: 4.2 MMOL/L (ref 3.5–5.1)
PROT SERPL-MCNC: 6.5 G/DL (ref 6–8.4)
RBC # BLD AUTO: 3.27 M/UL (ref 4.6–6.2)
SODIUM SERPL-SCNC: 140 MMOL/L (ref 136–145)
WBC # BLD AUTO: 10.76 K/UL (ref 3.9–12.7)

## 2020-09-11 PROCEDURE — 63600175 PHARM REV CODE 636 W HCPCS: Performed by: UROLOGY

## 2020-09-11 PROCEDURE — 99153 MOD SED SAME PHYS/QHP EA: CPT

## 2020-09-11 PROCEDURE — 63600175 PHARM REV CODE 636 W HCPCS: Performed by: INTERNAL MEDICINE

## 2020-09-11 PROCEDURE — 36415 COLL VENOUS BLD VENIPUNCTURE: CPT

## 2020-09-11 PROCEDURE — 25000003 PHARM REV CODE 250: Performed by: UROLOGY

## 2020-09-11 PROCEDURE — 25000003 PHARM REV CODE 250: Performed by: RADIOLOGY

## 2020-09-11 PROCEDURE — 80053 COMPREHEN METABOLIC PANEL: CPT

## 2020-09-11 PROCEDURE — 85025 COMPLETE CBC W/AUTO DIFF WBC: CPT

## 2020-09-11 PROCEDURE — 99152 MOD SED SAME PHYS/QHP 5/>YRS: CPT

## 2020-09-11 PROCEDURE — 25000003 PHARM REV CODE 250: Performed by: INTERNAL MEDICINE

## 2020-09-11 PROCEDURE — 21400001 HC TELEMETRY ROOM

## 2020-09-11 PROCEDURE — 99900035 HC TECH TIME PER 15 MIN (STAT)

## 2020-09-11 PROCEDURE — 63600175 PHARM REV CODE 636 W HCPCS: Performed by: RADIOLOGY

## 2020-09-11 PROCEDURE — 25500020 PHARM REV CODE 255: Performed by: INTERNAL MEDICINE

## 2020-09-11 PROCEDURE — 94761 N-INVAS EAR/PLS OXIMETRY MLT: CPT

## 2020-09-11 RX ORDER — HYDROCODONE BITARTRATE AND ACETAMINOPHEN 5; 325 MG/1; MG/1
1 TABLET ORAL EVERY 6 HOURS PRN
Status: DISCONTINUED | OUTPATIENT
Start: 2020-09-11 | End: 2020-09-14 | Stop reason: HOSPADM

## 2020-09-11 RX ORDER — SODIUM CHLORIDE 9 MG/ML
INJECTION, SOLUTION INTRAVENOUS
Status: COMPLETED | OUTPATIENT
Start: 2020-09-11 | End: 2020-09-11

## 2020-09-11 RX ORDER — FENTANYL CITRATE 50 UG/ML
INJECTION, SOLUTION INTRAMUSCULAR; INTRAVENOUS CODE/TRAUMA/SEDATION MEDICATION
Status: COMPLETED | OUTPATIENT
Start: 2020-09-11 | End: 2020-09-11

## 2020-09-11 RX ORDER — LIDOCAINE HYDROCHLORIDE 10 MG/ML
10 INJECTION INFILTRATION; PERINEURAL ONCE
Status: COMPLETED | OUTPATIENT
Start: 2020-09-11 | End: 2020-09-11

## 2020-09-11 RX ORDER — MIDAZOLAM HYDROCHLORIDE 1 MG/ML
INJECTION INTRAMUSCULAR; INTRAVENOUS CODE/TRAUMA/SEDATION MEDICATION
Status: COMPLETED | OUTPATIENT
Start: 2020-09-11 | End: 2020-09-11

## 2020-09-11 RX ORDER — HYDROMORPHONE HYDROCHLORIDE 1 MG/ML
0.5 INJECTION, SOLUTION INTRAMUSCULAR; INTRAVENOUS; SUBCUTANEOUS EVERY 6 HOURS PRN
Status: DISCONTINUED | OUTPATIENT
Start: 2020-09-11 | End: 2020-09-14 | Stop reason: HOSPADM

## 2020-09-11 RX ADMIN — HUMAN INSULIN 4 UNITS: 100 INJECTION, SOLUTION SUBCUTANEOUS at 05:09

## 2020-09-11 RX ADMIN — INSULIN ASPART 5 UNITS: 100 INJECTION, SOLUTION INTRAVENOUS; SUBCUTANEOUS at 12:09

## 2020-09-11 RX ADMIN — PIPERACILLIN SODIUM AND TAZOBACTAM SODIUM 3.38 G: 3; .375 INJECTION, POWDER, LYOPHILIZED, FOR SOLUTION INTRAVENOUS at 03:09

## 2020-09-11 RX ADMIN — LIDOCAINE HYDROCHLORIDE 10 ML: 10 INJECTION, SOLUTION INFILTRATION; PERINEURAL at 08:09

## 2020-09-11 RX ADMIN — HYDRALAZINE HYDROCHLORIDE 25 MG: 25 TABLET, FILM COATED ORAL at 09:09

## 2020-09-11 RX ADMIN — ACETAMINOPHEN 650 MG: 325 TABLET, FILM COATED ORAL at 09:09

## 2020-09-11 RX ADMIN — MELATONIN 6 MG: at 09:09

## 2020-09-11 RX ADMIN — PIPERACILLIN SODIUM AND TAZOBACTAM SODIUM 3.38 G: 3; .375 INJECTION, POWDER, LYOPHILIZED, FOR SOLUTION INTRAVENOUS at 02:09

## 2020-09-11 RX ADMIN — MIDAZOLAM HYDROCHLORIDE 1 MG: 1 INJECTION, SOLUTION INTRAMUSCULAR; INTRAVENOUS at 08:09

## 2020-09-11 RX ADMIN — FENTANYL CITRATE 25 MCG: 50 INJECTION INTRAMUSCULAR; INTRAVENOUS at 08:09

## 2020-09-11 RX ADMIN — ROSUVASTATIN CALCIUM 10 MG: 10 TABLET, FILM COATED ORAL at 09:09

## 2020-09-11 RX ADMIN — CHLORHEXIDINE GLUCONATE 15 ML: 1.2 RINSE ORAL at 09:09

## 2020-09-11 RX ADMIN — FENTANYL CITRATE 50 MCG: 50 INJECTION INTRAMUSCULAR; INTRAVENOUS at 08:09

## 2020-09-11 RX ADMIN — IOHEXOL 15 ML: 300 INJECTION, SOLUTION INTRAVENOUS at 08:09

## 2020-09-11 RX ADMIN — SODIUM CHLORIDE 250 ML/HR: 0.9 INJECTION, SOLUTION INTRAVENOUS at 08:09

## 2020-09-11 RX ADMIN — HEPARIN SODIUM 5000 UNITS: 5000 INJECTION INTRAVENOUS; SUBCUTANEOUS at 09:09

## 2020-09-11 RX ADMIN — HYDRALAZINE HYDROCHLORIDE 25 MG: 25 TABLET, FILM COATED ORAL at 06:09

## 2020-09-11 RX ADMIN — MIDAZOLAM HYDROCHLORIDE 2 MG: 1 INJECTION, SOLUTION INTRAMUSCULAR; INTRAVENOUS at 08:09

## 2020-09-11 RX ADMIN — AMLODIPINE BESYLATE 10 MG: 5 TABLET ORAL at 09:09

## 2020-09-11 RX ADMIN — ACETAMINOPHEN 650 MG: 325 TABLET, FILM COATED ORAL at 02:09

## 2020-09-11 RX ADMIN — HYDRALAZINE HYDROCHLORIDE 25 MG: 25 TABLET, FILM COATED ORAL at 02:09

## 2020-09-11 NOTE — PLAN OF CARE
Important Message from Medicare was sign, explained and given to patient/caregiver on 09/11/2020 at 10:11am

## 2020-09-11 NOTE — ASSESSMENT & PLAN NOTE
Last HbA1c 6.7%.  On Janumet at home.  On admission had persistent hypoglycemia, needing D10, now resolved  Glucose now running on the higher side with sugars in the high 200s to 300s  Holding Janumet, increase to moderate-dose insulin sliding scale with Accu-Cheks.  Diabetic diet  As needed hypoglycemic measures

## 2020-09-11 NOTE — PROGRESS NOTES
Formerly [facility], [facility]–1983 Medicine  Progress Note    Patient Name: Jamil Cadet  MRN: [id number]  Patient Class: IP- Inpatient   Admission Date: 9/6/2020  Length of Stay: 4 days  Attending Physician: Trinidad Boo MD  Primary Care Provider: Mesfin Cisneros MD        Subjective:     Principal Problem:Renal failure        HPI:  HPI as per MD Lira  Jamil Cadet is a 75 y.o.  male who  has a past medical history of Diabetes mellitus, type 2.. R/ Renal cell cancer s/p partial nephrectomy appropriately 16 months ago in [facility] The patient presented to [facility] on 9/6/2020 with a primary complaint of Hypoglycemia (pt brought in by EMS with c/o weakness. CBG 52 upon EMS arrival. pt given one amp of D50 en route with EMS with improvement to . )  .   Patient was apparently well 4 days ago, started noticed nausea, loss of appetite and generalized weakness with hypoglycemia.  He was working on his yard under hot sun, not sure about adequate hydration recently.  Came to ED with concern of low sugar of 54, which was corrected in ED with IV dextrose, noted to have severe acute renal failure with hyperkalemia.  No urine output noted since admission in ED.  Hospitalist consulted for further eval, repeat BMP in 4 hours, prior to hyperkalemia cocktail showed resolution of hyperkalemia.  However renal failure remains remarkable .  Bladder scan at bedside 245cc, during my eval patient passed urine without any difficulty.  Reports history of renal stone in the past nonobstructive.  Prior to admission no change in renal output noted, no recent adjustment in his dose medication, used to check his renal function every 6 months.    Overview/Hospital Course:  Patient reports for the last 4 days he was working outside in the sun doing gardening work at least 7 hours every day, may not have been consuming much water, was actually not taking any of his prescribed medications during this time  including oral hypoglycemic (Janumet), denies NSAID use, no change in urine reported, no rash, fever. He has history of right RCC status post partial nephrectomy (15% removed as per his report). He admitted to generalized weakness, denies any nausea, vomiting, diarrhea.  EMS was called yesterday and on arrival CBG was 52 which improved to 132 after D50.  In the ED he was noted to recurrent persistent hypoglycemia.  Labs were consistent with hyperkalemia (status post membrane stabilizing medication) anion gap metabolic acidosis with acute renal failure, lactic acidosis to 5 without any source of infection, ultrasound with bilateral renal cysts with mild right pelvocaliectasis.  On 09/07 patient is alert oriented however starting additional fluids along with D10 given persistent hypoglycemia nephrology consulted given acute renal failure, further test ordered.  On 9/0 glucose much better and discontinue D10 infusion, lactic acid better however still in acute renal failure, having good urine output, checking CT renal protocol.  CT did demonstrate right-sided hydronephrosis with hydroureter, no definite calculus seen, urology consulted and planning cystoscopy with stent placement today. Unfortunately Dr Garcia unable to place stent and now recommendations for right percutaneous nephrostomy to relieve hydronephrosis, discussed with Radiology, ideally patient would need to be off aspirin for 3 days, this would be tomorrow, states the left kidney appears normal and should be compensating and expresses concern about possible alternate etiology, discussed with patient who is agreeable to wait until tomorrow for nephrostomy (explained with aspirin increased risk of bleeding). On 9/11 patient underwent placement of right percutaneous nephrostomy by IR, will continue to monitor renal function closely.    Interval History:  Patient is seen after placement of right percutaneous nephrostomy by interventional radiology earlier  today.  He denies any back pain, states the urine from the bag has been slightly bloody however this is improving, states 1 bag has already been emptied.  He is still producing urine from down below.  States the bloating is better.  T-max last 24 hours 99.7.  Hemoglobin 10.3.  A.m. knees positive.  BUN/creatinine 118/13.8.  Glucose in the 300s.  Urine output last 24 hours 1100.  Discussed with nursing at bedside.  Plan of care discussed with patient and wife at bedside.      Review of Systems   Constitutional: Negative for chills and fever.   HENT: Negative for trouble swallowing.    Respiratory: Negative for shortness of breath.    Cardiovascular: Negative for chest pain.   Gastrointestinal: Negative for abdominal pain, diarrhea, nausea and vomiting.   Genitourinary: Negative for difficulty urinating.   Allergic/Immunologic: Negative for immunocompromised state.   Neurological: Negative for headaches.   Psychiatric/Behavioral: Positive for sleep disturbance. Negative for confusion.     Objective:     Vital Signs (Most Recent):  Temp: 97.8 °F (36.6 °C) (09/11/20 1624)  Pulse: 88 (09/11/20 1624)  Resp: 19 (09/11/20 1624)  BP: 124/69 (09/11/20 1624)  SpO2: (!) 94 % (09/11/20 1624) Vital Signs (24h Range):  Temp:  [97.8 °F (36.6 °C)-99.7 °F (37.6 °C)] 97.8 °F (36.6 °C)  Pulse:  [] 88  Resp:  [12-20] 19  SpO2:  [91 %-99 %] 94 %  BP: (124-158)/(61-76) 124/69     Weight: 80 kg (176 lb 5.9 oz)  Body mass index is 24.6 kg/m².    Intake/Output Summary (Last 24 hours) at 9/11/2020 1657  Last data filed at 9/11/2020 1446  Gross per 24 hour   Intake 390 ml   Output 1275 ml   Net -885 ml      Physical Exam  Vitals signs and nursing note reviewed.   Constitutional:       General: He is not in acute distress.     Appearance: Normal appearance. He is normal weight. He is not ill-appearing, toxic-appearing or diaphoretic.   HENT:      Head: Normocephalic and atraumatic.      Mouth/Throat:      Mouth: Mucous membranes are  moist.   Eyes:      General:         Right eye: No discharge.         Left eye: No discharge.      Conjunctiva/sclera: Conjunctivae normal.      Pupils: Pupils are equal, round, and reactive to light.   Neck:      Musculoskeletal: Neck supple.   Cardiovascular:      Rate and Rhythm: Normal rate and regular rhythm.      Comments: No LE edema  Pulmonary:      Effort: No respiratory distress.      Breath sounds: Normal breath sounds. No stridor. No wheezing or rhonchi.      Comments: On supplemental O2, crackles at bases  Abdominal:      General: Bowel sounds are normal. There is no distension.      Palpations: Abdomen is soft.      Tenderness: There is no abdominal tenderness. There is no guarding.   Genitourinary:     Comments: R sided nephrostomy with urine, blood tinged in bag  Musculoskeletal:         General: No swelling.   Skin:     General: Skin is warm and dry.      Capillary Refill: Capillary refill takes less than 2 seconds.   Neurological:      General: No focal deficit present.      Mental Status: He is alert and oriented to person, place, and time.   Psychiatric:         Mood and Affect: Mood normal.         Significant Labs:   BMP:   Recent Labs   Lab 09/11/20  0443   *      K 4.2   CL 92*   CO2 29   *   CREATININE 13.8*   CALCIUM 7.8*     CBC:   Recent Labs   Lab 09/10/20  0250 09/11/20  0443   WBC 7.61 10.76   HGB 10.0* 10.3*   HCT 28.8* 30.4*    182     CMP:   Recent Labs   Lab 09/10/20  0250 09/11/20  0443    140   K 4.3 4.2   CL 93* 92*   CO2 27 29   * 191*   * 118*   CREATININE 13.9* 13.8*   CALCIUM 7.2* 7.8*   PROT 6.1 6.5   ALBUMIN 3.3* 3.3*   BILITOT 1.0 1.0   ALKPHOS 60 59   AST 25 41*   ALT 23 26   ANIONGAP 17* 19*   EGFRNONAA 3.0* 3.0*     Cardiac Markers: No results for input(s): CKMB, MYOGLOBIN, BNP, TROPISTAT in the last 48 hours.  Lactic Acid: No results for input(s): LACTATE in the last 48 hours.  Lipid Panel: No results for input(s): CHOL,  HDL, LDLCALC, TRIG, CHOLHDL in the last 48 hours.  Magnesium: No results for input(s): MG in the last 48 hours.  POCT Glucose: No results for input(s): POCTGLUCOSE in the last 48 hours.  Respiratory Culture: No results for input(s): GSRESP, RESPIRATORYC in the last 48 hours.  Troponin: No results for input(s): TROPONINI in the last 48 hours.  TSH: No results for input(s): TSH in the last 4320 hours.  Urine Culture:   Recent Labs   Lab 09/09/20  1710   LABURIN No growth to date     Urine Studies: No results for input(s): COLORU, APPEARANCEUA, PHUR, SPECGRAV, PROTEINUA, GLUCUA, KETONESU, BILIRUBINUA, OCCULTUA, NITRITE, UROBILINOGEN, LEUKOCYTESUR, RBCUA, WBCUA, BACTERIA, SQUAMEPITHEL, HYALINECASTS in the last 48 hours.    Invalid input(s): WRIGHTSUR  All pertinent labs within the past 24 hours have been reviewed.    Significant Imaging: I have reviewed all pertinent imaging results/findings within the past 24 hours.      Assessment/Plan:      * Acute renal failure  Admission labs with BUN/creatinine 102/14.2, previous serum creatinine at around 1.5.  Today BUN/creatinine 120/13.9  Patient with a history of renal cell cancer status post partial nephrectomy.  CT abdomen and pelvis with right-sided hydronephrosis with hydroureter, bilateral renal cysts, no definite course obstruction  CPK was checked and within normal  Cystoscopy with stent placement attempted by Dr. Garcia 9/0  however was unsuccessful  Patient now underwent IR placement of right percutaneous nephrostomy 9/11  ZACH is also positive however unknown clinically significance at this time  Continue to monitor urine output  Continue to hold oral hypoglycemics and losartan, renally dosing all medications and avoid nephrotoxin drugs  Appreciate consultant's input    Right hydronephrosis with hydroureter  CT with right hydronephrosis with hydroureter, no definite course of obstruction  Possible secondary to renal calculi versus stricture/narrowing, history of prior  partial right nephrectomy  Continue empiric Zosyn  On 09/09 Dr. Garcia did attempt stent placement however was unsuccessful, recommendation for IR percutaneous nephrostomy  On 09/11 status post percutaneous placement of right nephrostomy      Non-insulin dependent type 2 diabetes mellitus, previously with hypoglycemia and now hyperglycemia  Last HbA1c 6.7%.  On Janumet at home.  On admission had persistent hypoglycemia, needing D10, now resolved  Glucose now running on the higher side with sugars in the high 200s to 300s  Holding Janumet, increase to moderate-dose insulin sliding scale with Accu-Cheks.  Diabetic diet  As needed hypoglycemic measures    Shortness of breath  Discontinuing IV fluids today.  Supplemental oxygen as needed.  P.r.n. chest x-ray.  Following clinically      Renal cyst  Bilateral renal cysts      Anemia  No evidence of bleeding.  Trending CBC      History of renal cell carcinoma status post partial nephrectomy  History of right renal cell CA status post partial nephrectomy at [facility].  Patient and wife were instructed to make close follow-up with Dr. Danielle.      Essential hypertension  Continue to hold losartan, continue amlodipine 10 mg, added hydralazine 25 mg Q 8, up titrate as needed        VTE Risk Mitigation (From admission, onward)         Ordered     heparin (porcine) injection 5,000 Units  Every 8 hours      09/07/20 0632     IP VTE HIGH RISK PATIENT  Once      09/07/20 0632     Place sequential compression device  Until discontinued      09/07/20 0632                Discharge Planning   CA:      Code Status: Full Code   Is the patient medically ready for discharge?:     Reason for patient still in hospital (select all that apply): Patient trending condition  Discharge Plan A: Home                  Trinidad Boo MD  Department of Hospital Medicine   [facility]

## 2020-09-11 NOTE — ASSESSMENT & PLAN NOTE
Admission labs with BUN/creatinine 102/14.2, previous serum creatinine at around 1.5.  Today BUN/creatinine 120/13.9  Patient with a history of renal cell cancer status post partial nephrectomy.  CT abdomen and pelvis with right-sided hydronephrosis with hydroureter, bilateral renal cysts, no definite course obstruction  CPK was checked and within normal  Cystoscopy with stent placement attempted by Dr. Garcia 9/0  however was unsuccessful  Patient now underwent IR placement of right percutaneous nephrostomy 9/11  ZACH is also positive however unknown clinically significance at this time  Continue to monitor urine output  Continue to hold oral hypoglycemics and losartan, renally dosing all medications and avoid nephrotoxin drugs  Appreciate consultant's input

## 2020-09-11 NOTE — SUBJECTIVE & OBJECTIVE
Interval History:  Patient is seen after placement of right percutaneous nephrostomy by interventional radiology earlier today.  He denies any back pain, states the urine from the bag has been slightly bloody however this is improving, states 1 bag has already been emptied.  He is still producing urine from down below.  States the bloating is better.  T-max last 24 hours 99.7.  Hemoglobin 10.3.  A.m. knees positive.  BUN/creatinine 118/13.8.  Glucose in the 300s.  Urine output last 24 hours 1100.  Discussed with nursing at bedside.  Plan of care discussed with patient and wife at bedside.      Review of Systems   Constitutional: Negative for chills and fever.   HENT: Negative for trouble swallowing.    Respiratory: Negative for shortness of breath.    Cardiovascular: Negative for chest pain.   Gastrointestinal: Negative for abdominal pain, diarrhea, nausea and vomiting.   Genitourinary: Negative for difficulty urinating.   Allergic/Immunologic: Negative for immunocompromised state.   Neurological: Negative for headaches.   Psychiatric/Behavioral: Positive for sleep disturbance. Negative for confusion.     Objective:     Vital Signs (Most Recent):  Temp: 97.8 °F (36.6 °C) (09/11/20 1624)  Pulse: 88 (09/11/20 1624)  Resp: 19 (09/11/20 1624)  BP: 124/69 (09/11/20 1624)  SpO2: (!) 94 % (09/11/20 1624) Vital Signs (24h Range):  Temp:  [97.8 °F (36.6 °C)-99.7 °F (37.6 °C)] 97.8 °F (36.6 °C)  Pulse:  [] 88  Resp:  [12-20] 19  SpO2:  [91 %-99 %] 94 %  BP: (124-158)/(61-76) 124/69     Weight: 80 kg (176 lb 5.9 oz)  Body mass index is 24.6 kg/m².    Intake/Output Summary (Last 24 hours) at 9/11/2020 1657  Last data filed at 9/11/2020 1446  Gross per 24 hour   Intake 390 ml   Output 1275 ml   Net -885 ml      Physical Exam  Vitals signs and nursing note reviewed.   Constitutional:       General: He is not in acute distress.     Appearance: Normal appearance. He is normal weight. He is not ill-appearing, toxic-appearing  or diaphoretic.   HENT:      Head: Normocephalic and atraumatic.      Mouth/Throat:      Mouth: Mucous membranes are moist.   Eyes:      General:         Right eye: No discharge.         Left eye: No discharge.      Conjunctiva/sclera: Conjunctivae normal.      Pupils: Pupils are equal, round, and reactive to light.   Neck:      Musculoskeletal: Neck supple.   Cardiovascular:      Rate and Rhythm: Normal rate and regular rhythm.      Comments: No LE edema  Pulmonary:      Effort: No respiratory distress.      Breath sounds: Normal breath sounds. No stridor. No wheezing or rhonchi.      Comments: On supplemental O2, crackles at bases  Abdominal:      General: Bowel sounds are normal. There is no distension.      Palpations: Abdomen is soft.      Tenderness: There is no abdominal tenderness. There is no guarding.   Genitourinary:     Comments: R sided nephrostomy with urine, blood tinged in bag  Musculoskeletal:         General: No swelling.   Skin:     General: Skin is warm and dry.      Capillary Refill: Capillary refill takes less than 2 seconds.   Neurological:      General: No focal deficit present.      Mental Status: He is alert and oriented to person, place, and time.   Psychiatric:         Mood and Affect: Mood normal.         Significant Labs:   BMP:   Recent Labs   Lab 09/11/20 0443   *      K 4.2   CL 92*   CO2 29   *   CREATININE 13.8*   CALCIUM 7.8*     CBC:   Recent Labs   Lab 09/10/20  0250 09/11/20  0443   WBC 7.61 10.76   HGB 10.0* 10.3*   HCT 28.8* 30.4*    182     CMP:   Recent Labs   Lab 09/10/20  0250 09/11/20  0443    140   K 4.3 4.2   CL 93* 92*   CO2 27 29   * 191*   * 118*   CREATININE 13.9* 13.8*   CALCIUM 7.2* 7.8*   PROT 6.1 6.5   ALBUMIN 3.3* 3.3*   BILITOT 1.0 1.0   ALKPHOS 60 59   AST 25 41*   ALT 23 26   ANIONGAP 17* 19*   EGFRNONAA 3.0* 3.0*     Cardiac Markers: No results for input(s): CKMB, MYOGLOBIN, BNP, TROPISTAT in the last 48  hours.  Lactic Acid: No results for input(s): LACTATE in the last 48 hours.  Lipid Panel: No results for input(s): CHOL, HDL, LDLCALC, TRIG, CHOLHDL in the last 48 hours.  Magnesium: No results for input(s): MG in the last 48 hours.  POCT Glucose: No results for input(s): POCTGLUCOSE in the last 48 hours.  Respiratory Culture: No results for input(s): GSRESP, RESPIRATORYC in the last 48 hours.  Troponin: No results for input(s): TROPONINI in the last 48 hours.  TSH: No results for input(s): TSH in the last 4320 hours.  Urine Culture:   Recent Labs   Lab 09/09/20  1710   LABURIN No growth to date     Urine Studies: No results for input(s): COLORU, APPEARANCEUA, PHUR, SPECGRAV, PROTEINUA, GLUCUA, KETONESU, BILIRUBINUA, OCCULTUA, NITRITE, UROBILINOGEN, LEUKOCYTESUR, RBCUA, WBCUA, BACTERIA, SQUAMEPITHEL, HYALINECASTS in the last 48 hours.    Invalid input(s): TIARA  All pertinent labs within the past 24 hours have been reviewed.    Significant Imaging: I have reviewed all pertinent imaging results/findings within the past 24 hours.

## 2020-09-11 NOTE — PLAN OF CARE
Problem: Oral Intake Inadequate  Goal: Improved Oral Intake  Outcome: Ongoing, Progressing  Intervention: Promote and Optimize Oral Intake  Flowsheets (Taken 9/11/2020 1230)  Oral Nutrition Promotion: calorie dense liquids provided

## 2020-09-11 NOTE — PLAN OF CARE
Back to room via bed no co pain no acute distress noted right nephrostomy tube remains patent with blood tinged urine secured to skin with stay fix dsg cdi bedside report given to kentrell garland all sedation reported

## 2020-09-11 NOTE — CONSULTS
INPATIENT NEPHROLOGY CONSULT   [facility] NEPHROLOGY    Jamil Coffmanevette  09/11/2020    Reason for consultation:  MELODIE    History of Present Illness:  75 y.o.  male who  has a past medical history of Diabetes mellitus, type 2.. R/ Renal cell cancer s/p partial nephrectomy appropriately 16 months ago in [facility] The patient presented to [facility] on 9/6/2020 with a primary complaint of Hypoglycemia (pt brought in by EMS with c/o weakness. CBG 52 upon EMS arrival. pt given one amp of D50 en route with EMS with improvement to ).     Patient was apparently well 4 days ago, started noticed nausea, loss of appetite and generalized weakness with hypoglycemia.  He was working on his yard under hot sun, not sure about adequate hydration recently.  Came to ED with concern of low sugar of 54, which was corrected in ED with IV dextrose, noted to have severe acute renal failure with hyperkalemia.  No urine output noted since admission in ED.  Hospitalist consulted for further eval, repeat BMP in 4 hours, prior to hyperkalemia cocktail showed resolution of hyperkalemia.  However renal failure remains remarkable .  Bladder scan at bedside 245cc, during my eval patient passed urine without any difficulty.  Reports history of renal stone in the past nonobstructive.    9/7  No nausea, chest pain, sob, fever, urinary or bowel complaint, new neurologic symptoms, new joint pain,  9/8 VSS, no new complains.  9/9 VSS, UO is better, K and acidosis are better, sCr is better. Appreciate Urology input, agree with plan for stent and retrograde pyelogram in AM.   9/11 VSS, s/p nephrostomy tube placement by IR 9/11. Monitor for now...     Plan of Care:    Acute kidney injury likely due to intravascular volume depletion, obstruction?.    --renal us shows mild right pelvocaliectasis, not present on CT from September 2019 ?, confirmed on repeat CT 9/8, attempted stent and retrograde pyelogram 9/10, unsuccessful, now plan  for perc neph after 3 days due to ASA.  --rheumatology serologies show positive ZACH, significance unclear, will repeat when more stable.  --renal dose medication  --no acute indication for dialysis, monitor. If opening the kidney does not improve kidney function or if he develops acute indication, we will proceed with HD. This was relayed to pt and his wife.    Hyperkalemia secondary to MELODIE  --[place]  --low k diet    Metabolic acidosis  Hyponatremia  --bicarb infusion, off now  --no respiratory difficulty at this time  --good UO    Anemia  --iron panel ok  --trend  --blood products per primary    Thank you for allowing us to participate in this patient's care. We will continue to follow.    Vital Signs:  Temp Readings from Last 3 Encounters:   09/11/20 98.4 °F (36.9 °C) (Axillary)   12/19/19 97.5 °F (36.4 °C)   10/08/19 97.3 °F (36.3 °C)       Pulse Readings from Last 3 Encounters:   09/11/20 90   12/19/19 90   10/08/19 93       BP Readings from Last 3 Encounters:   09/11/20 (!) 158/74   12/19/19 (!) 157/92   10/08/19 133/73       Weight:  Wt Readings from Last 3 Encounters:   09/07/20 80 kg (176 lb 5.9 oz)   12/19/19 81.7 kg (180 lb 3.2 oz)   10/08/19 80.8 kg (178 lb 3.2 oz)       Past Medical & Surgical History:  Past Medical History:   Diagnosis Date    Diabetes mellitus, type 2        Past Surgical History:   Procedure Laterality Date    APPENDECTOMY      CYSTOSCOPY W/ RETROGRADES Right 9/9/2020    Procedure: CYSTOSCOPY, WITH RETROGRADE PYELOGRAM;  Surgeon: Chris Garcia Jr., MD;  [facility];  Service: Urology;  Laterality: Right;    SPINE SURGERY      URETHROSCOPY  9/9/2020    Procedure: URETHROSCOPY;  Surgeon: Chris Garcia Jr., MD;  Location: [facility];  Service: Urology;;       Past Social History:  Social History     Socioeconomic History    Marital status:      Spouse name: Not on file    Number of children: Not on file    Years of education: Not on file    Highest education level:  Not on file   Occupational History    Not on file   Social Needs    Financial resource strain: Not on file    Food insecurity     Worry: Not on file     Inability: Not on file    Transportation needs     Medical: Not on file     Non-medical: Not on file   Tobacco Use    Smoking status: Never Smoker    Smokeless tobacco: Never Used   Substance and Sexual Activity    Alcohol use: No    Drug use: No    Sexual activity: Yes   Lifestyle    Physical activity     Days per week: Not on file     Minutes per session: Not on file    Stress: Not on file   Relationships    Social connections     Talks on phone: Not on file     Gets together: Not on file     Attends Protestant service: Not on file     Active member of club or organization: Not on file     Attends meetings of clubs or organizations: Not on file     Relationship status: Not on file   Other Topics Concern    Not on file   Social History Narrative    Not on file       Medications:  No current facility-administered medications on file prior to encounter.      Current Outpatient Medications on File Prior to Encounter   Medication Sig Dispense Refill    ascorbic acid, vitamin C, (VITAMIN C) 500 MG tablet Take 500 mg by mouth once daily.      aspirin (ENTERIC COATED ASPIRIN) 81 MG EC tablet Take 81 mg by mouth once daily. 1 Tablet, Delayed Release (E.C.) Oral Every day      cholecalciferol, vitamin D3, (VITAMIN D3) 50 mcg (2,000 unit) Cap Take 1 capsule by mouth once daily.      clonazePAM (KLONOPIN) 2 MG Tab Take 2 mg by mouth every evening.       madi, Zingiber officinalis, (MADI EXTRACT) 250 mg Cap Take 1 capsule by mouth once daily.      NON FORMULARY MEDICATION Take 1 tablet by mouth once daily. TRIPLE MUSHROOM COMPLEX      rosuvastatin (CRESTOR) 20 MG tablet Take 20 mg by mouth once daily.      selenium 200 mcg Cap Take 1 capsule by mouth once daily.      turmeric (CURCUMIN MISC) 1 Dose by Misc.(Non-Drug; Combo Route) route once daily.    "   TURMERIC ORAL Take 1 tablet by mouth once daily.      glimepiride (AMARYL) 4 MG tablet Take 4 mg by mouth 2 (two) times daily.       MULTIVITAMIN W-MINERALS/LUTEIN (CENTRUM SILVER ORAL) Take 1 capsule by mouth once daily.       sitagliptan-metformin (JANUMET) 50-1,000 mg per tablet Take 1 tablet by mouth 2 (two) times daily with meals. 1 Tablet Oral Twice a day       TRUE METRIX GLUCOSE TEST STRIP Strp        Scheduled Meds:   amLODIPine  10 mg Oral Daily    chlorhexidine  15 mL Mouth/Throat BID    heparin (porcine)  5,000 Units Subcutaneous Q8H    hydrALAZINE  25 mg Oral Q8H    mupirocin   Nasal BID    piperacillin-tazobactam (ZOSYN) IVPB  3.375 g Intravenous Q12H    rosuvastatin  10 mg Oral QHS     Continuous Infusions:    PRN Meds:.acetaminophen, acetaminophen, dextrose 50%, dextrose 50%, dextrose 50%, glucagon (human recombinant), glucagon (human recombinant), glucose, glucose, hydrALAZINE, insulin aspart U-100, melatonin, ondansetron    Allergies:  Patient has no known allergies.    Past Family History:  Reviewed; refer to Hospitalist Admission Note    Review of Systems:  Review of Systems - All 14 systems reviewed and negative, except as noted in HPI    Physical Exam:    BP (!) 158/74 (BP Location: Left arm, Patient Position: Lying)   Pulse 90   Temp 98.4 °F (36.9 °C) (Axillary)   Resp 19   Ht 5' 11" (1.803 m)   Wt 80 kg (176 lb 5.9 oz)   SpO2 99%   BMI 24.60 kg/m²     General Appearance:    Alert, cooperative, no distress, appears stated age   Head:    Normocephalic, without obvious abnormality, atraumatic   Eyes:    PER, conjunctiva/corneas clear, EOM's intact in both eyes        Throat:   Lips, mucosa, and tongue normal; teeth and gums normal   Back:     Symmetric, no curvature, ROM normal, no CVA tenderness   Lungs:     Clear to auscultation bilaterally, respirations unlabored   Chest wall:    No tenderness or deformity   Heart:    Regular rate and rhythm, S1 and S2 normal, no murmur, " rub   or gallop   Abdomen:     Soft, non-tender, bowel sounds active all four quadrants,     no masses, no organomegaly   Extremities:   Extremities normal, atraumatic, no cyanosis or edema   Pulses:   2+ and symmetric all extremities   MSK:   No joint or muscle swelling, tenderness or deformity   Skin:   Skin color, texture, turgor normal, no rashes or lesions   Neurologic:   CNII-XII intact, normal strength and sensation       Throughout.  No flap.  Has a tremor     Results:  Lab Results   Component Value Date     09/11/2020    K 4.2 09/11/2020    CL 92 (L) 09/11/2020    CO2 29 09/11/2020     (H) 09/11/2020    CREATININE 13.8 (H) 09/11/2020    CALCIUM 7.8 (L) 09/11/2020    ANIONGAP 19 (H) 09/11/2020    ESTGFRAFRICA 3.5 (A) 09/11/2020    EGFRNONAA 3.0 (A) 09/11/2020       Lab Results   Component Value Date    CALCIUM 7.8 (L) 09/11/2020    PHOS 8.8 (H) 09/08/2020       Recent Labs   Lab 09/11/20  0443   WBC 10.76   RBC 3.27*   HGB 10.3*   HCT 30.4*      MCV 93   MCH 31.5*   MCHC 33.9          I have personally reviewed pertinent radiological imaging and reports.

## 2020-09-11 NOTE — ASSESSMENT & PLAN NOTE
CT with right hydronephrosis with hydroureter, no definite course of obstruction  Possible secondary to renal calculi versus stricture/narrowing, history of prior partial right nephrectomy  Continue empiric Zosyn  On 09/09 Dr. Garcia did attempt stent placement however was unsuccessful, recommendation for IR percutaneous nephrostomy  On 09/11 status post percutaneous placement of right nephrostomy

## 2020-09-11 NOTE — PROGRESS NOTES
"[facility]  Adult Nutrition   Progress Note (Follow-Up)    SUMMARY     Recommendations/Interventions:    Recommendation/Intervention: 1. Continue current diet as tolerated, encourage intake. 2. Added Glucerna TID to aid in meeting estimated needs when meal intake is insufficient. 3. Hyperglycemia-continue to monitor and manage BG per SSI.  Goals: 1. Patient to meet at least 75% of estimated energy and protein needs. 2. Blood glucose and electrolytes to trend towards normal limits/ target ranges.    Dietitian Rounds Brief:  · Seen 2' f/u. Diet just advanced-added ONS to aid in meeting estimated needs. If Hyperglycemia continues to be an issue, will change supplement to unjury. Per RN: "no co pain no acute distress noted right nephrostomy tube remains patent with blood tinged urine " Will continue to monitor intake, labs, and plan of care.  Reason for Assessment  Reason For Assessment: RD follow-up  Diagnosis: (Acute renal failure, likely secondary to obstructive nephropathy)  Relevant Medical History: DM2; HTN; History of renal cell carcinoma status post partial nephrectomy; Anemia; ARF; Hyperkalemia; High anion gap metabolic acidosis; Lactic acidosis; Hyperphosphatemia  Interdisciplinary Rounds: did not attend    Nutrition Risk Screen  Nutrition Risk Screen: no indicators present     MST Score: 0  Have you recently lost weight without trying?: No  Weight loss score: 0  Have you been eating poorly because of a decreased appetite?: No  Appetite score: 0     Nutrition/Diet History  Food Allergies: NKFA  Factors Affecting Nutritional Intake: decreased appetite    Anthropometrics  Temp: 98.4 °F (36.9 °C)  Height Method: Stated  Height: 5' 11" (180.3 cm)  Height (inches): 71 in  Weight Method: Bed Scale  Weight: 80 kg (176 lb 5.9 oz)  Weight (lb): 176.37 lb  Ideal Body Weight (IBW), Male: 172 lb  % Ideal Body Weight, Male (lb): 102.54 %  BMI (Calculated): 24.6  BMI Grade: 18.5-24.9 - normal     Weight " History:  Wt Readings from Last 10 Encounters:   09/07/20 80 kg (176 lb 5.9 oz)   12/19/19 81.7 kg (180 lb 3.2 oz)   10/08/19 80.8 kg (178 lb 3.2 oz)   07/23/19 81 kg (178 lb 9.6 oz)   02/26/19 83.3 kg (183 lb 9.6 oz)   01/31/19 84.1 kg (185 lb 6.4 oz)   06/13/18 85.4 kg (188 lb 3.2 oz)   01/18/13 86.2 kg (190 lb)     Lab/Procedures/Meds: Pertinent Labs Reviewed  Clinical Chemistry:  Recent Labs   Lab 09/07/20 0720 09/08/20 0402 09/11/20 0443   NA  --  134* 140   K  --  4.7 4.2   CL  --  98 92*   CO2  --  16* 29   GLU 35* 134* 191*   BUN  --  116* 118*   CREATININE  --  14.2* 13.8*   CALCIUM  --  8.0* 7.8*   PROT  --   --  6.5   ALBUMIN  --  3.4* 3.3*   BILITOT  --   --  1.0   ALKPHOS  --   --  59   AST  --   --  41*   ALT  --   --  26   ANIONGAP  --  20* 19*   ESTGFRAFRICA  --  3.4* 3.5*   EGFRNONAA  --  2.9* 3.0*   MG  --  2.3  --    PHOS 10.7* 8.8*  --     < > = values in this interval not displayed.     CBC:   Recent Labs   Lab 09/11/20 0443   WBC 10.76   RBC 3.27*   HGB 10.3*   HCT 30.4*      MCV 93   MCH 31.5*   MCHC 33.9     Cardiac Profile:  Recent Labs   Lab 09/07/20  0023 09/07/20 0720 09/08/20 0402   *  --  241*   CPK  --  90  --    TROPONINI <0.030  --   --      Diabetes:  Recent Labs   Lab 09/07/20 0720 09/08/20 0402   HGBA1C 6.1 5.9     Medications: Pertinent Medications reviewed  Scheduled Meds:   amLODIPine  10 mg Oral Daily    chlorhexidine  15 mL Mouth/Throat BID    heparin (porcine)  5,000 Units Subcutaneous Q8H    hydrALAZINE  25 mg Oral Q8H    mupirocin   Nasal BID    piperacillin-tazobactam (ZOSYN) IVPB  3.375 g Intravenous Q12H    rosuvastatin  10 mg Oral QHS     Continuous Infusions:  PRN Meds:.acetaminophen, acetaminophen, dextrose 50%, dextrose 50%, dextrose 50%, glucagon (human recombinant), glucagon (human recombinant), glucose, glucose, hydrALAZINE, insulin aspart U-100, melatonin, ondansetron    Antibiotics (From admission, onward)    Start     Stop Route  "Frequency Ordered    09/08/20 1500  piperacillin-tazobactam 3.375 g in dextrose 5 % 50 mL IVPB (ready to mix system)     Note to Pharmacy: Ht: 5' 11" (1.803 m)  Wt: 80 kg (176 lb 5.9 oz)  Estimated Creatinine Clearance: 4.6 mL/min (A) (based on SCr of 14.4 mg/dL (H)).       -- IV Every 12 hours (non-standard times) 09/08/20 1356    09/08/20 1100  mupirocin 2 % ointment  (MRSA Decolonization Orders [facility])      09/13 0859 Nasl 2 times daily 09/08/20 0953        Estimated/Assessed Needs    Weight Used For Calorie Calculations: 80 kg (176 lb 5.9 oz)  Energy Calorie Requirements (kcal): 2000 - 2400 (25 - 30)  Energy Need Method: Kcal/kg  Protein Requirements: 48 - 64 (0.6 - 0.8 g/kg); Monitor Renal Function  Weight Used For Protein Calculations: 80 kg (176 lb 5.9 oz)    Nutrition Prescription Ordered    Current Diet Order: Diabetic/Renal Diet    Evaluation of Received Nutrient/Fluid Intake    Energy Calories Required: not meeting needs  Protein Required: not meeting needs  Fluid Required: meeting needs  Tolerance: tolerating  % Intake of Estimated Energy Needs: Other Diet just advanced.  % Meal Intake: Other: Diet just advanced.       Intake/Output Summary (Last 24 hours) at 9/11/2020 1220  Last data filed at 9/11/2020 0841  Gross per 24 hour   Intake 390 ml   Output 800 ml   Net -410 ml      Nutrition Risk    Level of Risk/Frequency of Follow-up: high   Monitor and Evaluation    Food and Nutrient Intake: energy intake, food and beverage intake  Food and Nutrient Adminstration: diet order  Physical Activity and Function: nutrition-related ADLs and IADLs, factors affecting access to physical activity  Anthropometric Measurements: weight, weight change, body mass index  Biochemical Data, Medical Tests and Procedures: electrolyte and renal panel, lipid profile, glucose/endocrine profile, inflammatory profile, gastrointestinal profile  Nutrition-Focused Physical Findings: overall appearance     Nutrition Follow-Up    RD " Follow-up?: Yes   Zuleyka Howell RD 09/11/2020 12:29 PM

## 2020-09-12 PROBLEM — R06.02 SHORTNESS OF BREATH: Status: RESOLVED | Noted: 2020-09-09 | Resolved: 2020-09-12

## 2020-09-12 LAB
ALBUMIN SERPL BCP-MCNC: 3.2 G/DL (ref 3.5–5.2)
ALP SERPL-CCNC: 53 U/L (ref 55–135)
ALT SERPL W/O P-5'-P-CCNC: 22 U/L (ref 10–44)
ANION GAP SERPL CALC-SCNC: 18 MMOL/L (ref 8–16)
AST SERPL-CCNC: 38 U/L (ref 10–40)
BACTERIA BLD CULT: NORMAL
BACTERIA BLD CULT: NORMAL
BACTERIA UR CULT: NO GROWTH
BASOPHILS # BLD AUTO: 0.02 K/UL (ref 0–0.2)
BASOPHILS NFR BLD: 0.2 % (ref 0–1.9)
BILIRUB SERPL-MCNC: 1 MG/DL (ref 0.1–1)
BUN SERPL-MCNC: 115 MG/DL (ref 8–23)
CALCIUM SERPL-MCNC: 7.8 MG/DL (ref 8.7–10.5)
CHLORIDE SERPL-SCNC: 96 MMOL/L (ref 95–110)
CO2 SERPL-SCNC: 25 MMOL/L (ref 23–29)
CREAT SERPL-MCNC: 13 MG/DL (ref 0.5–1.4)
DIFFERENTIAL METHOD: ABNORMAL
EOSINOPHIL # BLD AUTO: 0.4 K/UL (ref 0–0.5)
EOSINOPHIL NFR BLD: 4.4 % (ref 0–8)
ERYTHROCYTE [DISTWIDTH] IN BLOOD BY AUTOMATED COUNT: 11.7 % (ref 11.5–14.5)
EST. GFR  (AFRICAN AMERICAN): 3.8 ML/MIN/1.73 M^2
EST. GFR  (NON AFRICAN AMERICAN): 3.3 ML/MIN/1.73 M^2
GLUCOSE SERPL-MCNC: 155 MG/DL (ref 70–110)
GLUCOSE SERPL-MCNC: 157 MG/DL (ref 70–110)
GLUCOSE SERPL-MCNC: 159 MG/DL (ref 70–110)
GLUCOSE SERPL-MCNC: 247 MG/DL (ref 70–110)
HCT VFR BLD AUTO: 29.2 % (ref 40–54)
HGB BLD-MCNC: 9.9 G/DL (ref 14–18)
IMM GRANULOCYTES # BLD AUTO: 0.07 K/UL (ref 0–0.04)
IMM GRANULOCYTES NFR BLD AUTO: 0.8 % (ref 0–0.5)
LYMPHOCYTES # BLD AUTO: 1.3 K/UL (ref 1–4.8)
LYMPHOCYTES NFR BLD: 15.8 % (ref 18–48)
MCH RBC QN AUTO: 31.7 PG (ref 27–31)
MCHC RBC AUTO-ENTMCNC: 33.9 G/DL (ref 32–36)
MCV RBC AUTO: 94 FL (ref 82–98)
MONOCYTES # BLD AUTO: 1.1 K/UL (ref 0.3–1)
MONOCYTES NFR BLD: 12.8 % (ref 4–15)
NEUTROPHILS # BLD AUTO: 5.5 K/UL (ref 1.8–7.7)
NEUTROPHILS NFR BLD: 66 % (ref 38–73)
NRBC BLD-RTO: 0 /100 WBC
PLATELET # BLD AUTO: 183 K/UL (ref 150–350)
PMV BLD AUTO: 10.4 FL (ref 9.2–12.9)
POTASSIUM SERPL-SCNC: 3.7 MMOL/L (ref 3.5–5.1)
PROT SERPL-MCNC: 6.4 G/DL (ref 6–8.4)
RBC # BLD AUTO: 3.12 M/UL (ref 4.6–6.2)
SODIUM SERPL-SCNC: 139 MMOL/L (ref 136–145)
WBC # BLD AUTO: 8.38 K/UL (ref 3.9–12.7)

## 2020-09-12 PROCEDURE — 21400001 HC TELEMETRY ROOM

## 2020-09-12 PROCEDURE — 99900035 HC TECH TIME PER 15 MIN (STAT)

## 2020-09-12 PROCEDURE — 25000003 PHARM REV CODE 250: Performed by: INTERNAL MEDICINE

## 2020-09-12 PROCEDURE — 25000003 PHARM REV CODE 250: Performed by: UROLOGY

## 2020-09-12 PROCEDURE — 63600175 PHARM REV CODE 636 W HCPCS: Performed by: UROLOGY

## 2020-09-12 PROCEDURE — 85025 COMPLETE CBC W/AUTO DIFF WBC: CPT

## 2020-09-12 PROCEDURE — 94761 N-INVAS EAR/PLS OXIMETRY MLT: CPT

## 2020-09-12 PROCEDURE — 63600175 PHARM REV CODE 636 W HCPCS: Performed by: INTERNAL MEDICINE

## 2020-09-12 PROCEDURE — 82962 GLUCOSE BLOOD TEST: CPT

## 2020-09-12 PROCEDURE — 36415 COLL VENOUS BLD VENIPUNCTURE: CPT

## 2020-09-12 PROCEDURE — 80053 COMPREHEN METABOLIC PANEL: CPT

## 2020-09-12 RX ORDER — CLONAZEPAM 1 MG/1
2 TABLET ORAL NIGHTLY PRN
Status: DISCONTINUED | OUTPATIENT
Start: 2020-09-12 | End: 2020-09-14 | Stop reason: HOSPADM

## 2020-09-12 RX ORDER — ASCORBIC ACID 500 MG
500 TABLET ORAL DAILY
Status: DISCONTINUED | OUTPATIENT
Start: 2020-09-13 | End: 2020-09-14 | Stop reason: HOSPADM

## 2020-09-12 RX ADMIN — HYDRALAZINE HYDROCHLORIDE 25 MG: 25 TABLET, FILM COATED ORAL at 06:09

## 2020-09-12 RX ADMIN — HUMAN INSULIN 2 UNITS: 100 INJECTION, SOLUTION SUBCUTANEOUS at 08:09

## 2020-09-12 RX ADMIN — HYDROCODONE BITARTRATE AND ACETAMINOPHEN 1 TABLET: 5; 325 TABLET ORAL at 08:09

## 2020-09-12 RX ADMIN — PIPERACILLIN SODIUM AND TAZOBACTAM SODIUM 3.38 G: 3; .375 INJECTION, POWDER, LYOPHILIZED, FOR SOLUTION INTRAVENOUS at 03:09

## 2020-09-12 RX ADMIN — HYDRALAZINE HYDROCHLORIDE 25 MG: 25 TABLET, FILM COATED ORAL at 02:09

## 2020-09-12 RX ADMIN — HYDRALAZINE HYDROCHLORIDE 25 MG: 25 TABLET, FILM COATED ORAL at 09:09

## 2020-09-12 RX ADMIN — MELATONIN 6 MG: at 09:09

## 2020-09-12 RX ADMIN — HYDROCODONE BITARTRATE AND ACETAMINOPHEN 1 TABLET: 5; 325 TABLET ORAL at 09:09

## 2020-09-12 RX ADMIN — MUPIROCIN: 20 OINTMENT TOPICAL at 09:09

## 2020-09-12 RX ADMIN — ACETAMINOPHEN 650 MG: 325 TABLET, FILM COATED ORAL at 02:09

## 2020-09-12 RX ADMIN — AMLODIPINE BESYLATE 10 MG: 5 TABLET ORAL at 08:09

## 2020-09-12 RX ADMIN — HUMAN INSULIN 4 UNITS: 100 INJECTION, SOLUTION SUBCUTANEOUS at 05:09

## 2020-09-12 RX ADMIN — CHLORHEXIDINE GLUCONATE 15 ML: 1.2 RINSE ORAL at 09:09

## 2020-09-12 RX ADMIN — ROSUVASTATIN CALCIUM 10 MG: 10 TABLET, FILM COATED ORAL at 09:09

## 2020-09-12 RX ADMIN — HEPARIN SODIUM 5000 UNITS: 5000 INJECTION INTRAVENOUS; SUBCUTANEOUS at 06:09

## 2020-09-12 NOTE — SUBJECTIVE & OBJECTIVE
Interval History: Patient is seen with wife present at bedside. He continues to may good urine, from both nephrostomy and normally, total urine output 2575.  BP is better.  Hemoglobin 9.9, urine has been blood-tinged.  Labs with BUN/creatinine 115/13.  Did communicate with nephrologist, recommendations for continuing to monitor renal function over the next 24 hours.  Discussed with patient, he states no family history of kidney disease, states his father did have prostate cancer.  Updated plan of care.  All questions answered.      Review of Systems   Constitutional: Negative for chills and fever.   Respiratory: Negative for shortness of breath.    Cardiovascular: Negative for chest pain.   Gastrointestinal: Negative for abdominal pain, constipation, diarrhea, nausea and vomiting.   Genitourinary: Positive for hematuria. Negative for difficulty urinating.   Psychiatric/Behavioral: Negative for confusion.     Objective:     Vital Signs (Most Recent):  Temp: 98 °F (36.7 °C) (09/12/20 1100)  Pulse: 88 (09/12/20 1100)  Resp: 18 (09/12/20 1100)  BP: 129/60 (09/12/20 1100)  SpO2: (!) 94 % (09/12/20 1100) Vital Signs (24h Range):  Temp:  [97.6 °F (36.4 °C)-98.2 °F (36.8 °C)] 98 °F (36.7 °C)  Pulse:  [82-90] 88  Resp:  [16-19] 18  SpO2:  [93 %-96 %] 94 %  BP: (124-150)/(60-70) 129/60     Weight: 80 kg (176 lb 5.9 oz)  Body mass index is 24.6 kg/m².    Intake/Output Summary (Last 24 hours) at 9/12/2020 1559  Last data filed at 9/12/2020 1434  Gross per 24 hour   Intake 500 ml   Output 2900 ml   Net -2400 ml      Physical Exam  Vitals signs and nursing note reviewed.   Constitutional:       General: He is not in acute distress.     Appearance: Normal appearance. He is normal weight. He is not ill-appearing, toxic-appearing or diaphoretic.   HENT:      Head: Normocephalic and atraumatic.      Mouth/Throat:      Mouth: Mucous membranes are moist.   Eyes:      General:         Right eye: No discharge.         Left eye: No  discharge.      Conjunctiva/sclera: Conjunctivae normal.      Pupils: Pupils are equal, round, and reactive to light.   Neck:      Musculoskeletal: Neck supple.   Cardiovascular:      Rate and Rhythm: Normal rate and regular rhythm.      Comments: No LE edema  Pulmonary:      Effort: No respiratory distress.      Breath sounds: Normal breath sounds. No stridor. No wheezing or rhonchi.      Comments: Not on supplemental oxygen, good air entry bilaterally  Abdominal:      General: Bowel sounds are normal. There is no distension.      Palpations: Abdomen is soft.      Tenderness: There is no abdominal tenderness. There is no guarding.   Genitourinary:     Comments: R sided nephrostomy with urine, blood tinged in bag.  Urinalysis bedside blood-tinged urine  Musculoskeletal:         General: No swelling.   Skin:     General: Skin is warm and dry.      Capillary Refill: Capillary refill takes less than 2 seconds.   Neurological:      General: No focal deficit present.      Mental Status: He is alert and oriented to person, place, and time.   Psychiatric:         Mood and Affect: Mood normal.         Significant Labs:   BMP:   Recent Labs   Lab 09/12/20 0437   *      K 3.7   CL 96   CO2 25   *   CREATININE 13.0*   CALCIUM 7.8*     CBC:   Recent Labs   Lab 09/11/20 0443 09/12/20 0437   WBC 10.76 8.38   HGB 10.3* 9.9*   HCT 30.4* 29.2*    183     CMP:   Recent Labs   Lab 09/11/20 0443 09/12/20  0437    139   K 4.2 3.7   CL 92* 96   CO2 29 25   * 157*   * 115*   CREATININE 13.8* 13.0*   CALCIUM 7.8* 7.8*   PROT 6.5 6.4   ALBUMIN 3.3* 3.2*   BILITOT 1.0 1.0   ALKPHOS 59 53*   AST 41* 38   ALT 26 22   ANIONGAP 19* 18*   EGFRNONAA 3.0* 3.3*     Cardiac Markers: No results for input(s): CKMB, MYOGLOBIN, BNP, TROPISTAT in the last 48 hours.  Magnesium: No results for input(s): MG in the last 48 hours.  POCT Glucose: No results for input(s): POCTGLUCOSE in the last 48  hours.  Troponin: No results for input(s): TROPONINI in the last 48 hours.  TSH: No results for input(s): TSH in the last 4320 hours.  Urine Culture: No results for input(s): LABURIN in the last 48 hours.  Urine Studies: No results for input(s): COLORU, APPEARANCEUA, PHUR, SPECGRAV, PROTEINUA, GLUCUA, KETONESU, BILIRUBINUA, OCCULTUA, NITRITE, UROBILINOGEN, LEUKOCYTESUR, RBCUA, WBCUA, BACTERIA, SQUAMEPITHEL, HYALINECASTS in the last 48 hours.    Invalid input(s): WRIGHTSUR  All pertinent labs within the past 24 hours have been reviewed.    Significant Imaging: I have reviewed all pertinent imaging results/findings within the past 24 hours.

## 2020-09-12 NOTE — ASSESSMENT & PLAN NOTE
Admission labs with BUN/creatinine 102/14.2, previous serum creatinine at around 1.5.  Today BUN/creatinine 120/13.9  Patient with a history of renal cell cancer status post partial nephrectomy.  CT abdomen and pelvis with right-sided hydronephrosis with hydroureter, bilateral renal cysts, no definite course obstruction  CPK was checked and within normal  Cystoscopy with stent placement attempted by Dr. Garcia 9/0  however was unsuccessful  Patient now underwent IR placement of right percutaneous nephrostomy 9/11  ZACH is also positive however unknown clinically significance at this time  Continue to monitor urine output  Continue to hold oral hypoglycemics and losartan, renally dosing all medications and avoid nephrotoxin drugs  Appreciate consultant's input-- discussed with nephrologist, will monitor renal function over next 24 hrs, if no improvement will re evaluate

## 2020-09-12 NOTE — ASSESSMENT & PLAN NOTE
History of right renal cell CA status post partial nephrectomy at [facility].  Patient and wife were instructed to make close follow-up with Dr. Danielle.

## 2020-09-12 NOTE — PROGRESS NOTES
Critical access hospital Medicine  Progress Note    Patient Name: Jamil Cadet  MRN: [id number]  Patient Class: IP- Inpatient   Admission Date: 9/6/2020  Length of Stay: 5 days  Attending Physician: Trinidad Boo MD  Primary Care Provider: Mesfin Cisneros MD        Subjective:     Principal Problem:Renal failure        HPI:  HPI as per MD Lira  Jamil Cadet is a 75 y.o.  male who  has a past medical history of Diabetes mellitus, type 2.. R/ Renal cell cancer s/p partial nephrectomy appropriately 16 months ago in [facility] The patient presented to [facility] on 9/6/2020 with a primary complaint of Hypoglycemia (pt brought in by EMS with c/o weakness. CBG 52 upon EMS arrival. pt given one amp of D50 en route with EMS with improvement to . )  .   Patient was apparently well 4 days ago, started noticed nausea, loss of appetite and generalized weakness with hypoglycemia.  He was working on his yard under hot sun, not sure about adequate hydration recently.  Came to ED with concern of low sugar of 54, which was corrected in ED with IV dextrose, noted to have severe acute renal failure with hyperkalemia.  No urine output noted since admission in ED.  Hospitalist consulted for further eval, repeat BMP in 4 hours, prior to hyperkalemia cocktail showed resolution of hyperkalemia.  However renal failure remains remarkable .  Bladder scan at bedside 245cc, during my eval patient passed urine without any difficulty.  Reports history of renal stone in the past nonobstructive.  Prior to admission no change in renal output noted, no recent adjustment in his dose medication, used to check his renal function every 6 months.    Overview/Hospital Course:  Patient reports for the last 4 days he was working outside in the sun doing gardening work at least 7 hours every day, may not have been consuming much water, was actually not taking any of his prescribed medications during this time  including oral hypoglycemic (Janumet), denies NSAID use, no change in urine reported, no rash, fever. He has history of right RCC status post partial nephrectomy (15% removed as per his report). He admitted to generalized weakness, denies any nausea, vomiting, diarrhea.  EMS was called yesterday and on arrival CBG was 52 which improved to 132 after D50.  In the ED he was noted to recurrent persistent hypoglycemia.  Labs were consistent with hyperkalemia (status post membrane stabilizing medication) anion gap metabolic acidosis with acute renal failure, lactic acidosis to 5 without any source of infection, ultrasound with bilateral renal cysts with mild right pelvocaliectasis.  On 09/07 patient is alert oriented however starting additional fluids along with D10 given persistent hypoglycemia nephrology consulted given acute renal failure, further test ordered.  On 9/0 glucose much better and discontinue D10 infusion, lactic acid better however still in acute renal failure, having good urine output, checking CT renal protocol.  CT did demonstrate right-sided hydronephrosis with hydroureter, no definite calculus seen, urology consulted and planning cystoscopy with stent placement today. Unfortunately Dr Garcia unable to place stent and now recommendations for right percutaneous nephrostomy to relieve hydronephrosis, discussed with Radiology, ideally patient would need to be off aspirin for 3 days, this would be tomorrow, states the left kidney appears normal and should be compensating and expresses concern about possible alternate etiology, discussed with patient who is agreeable to wait until tomorrow for nephrostomy (explained with aspirin increased risk of bleeding). On 9/11 patient underwent placement of right percutaneous nephrostomy by IR, will continue to monitor renal function closely.    Interval History: Patient is seen with wife present at bedside. He continues to may good urine, from both nephrostomy and  normally, total urine output 2575.  BP is better.  Hemoglobin 9.9, urine has been blood-tinged.  Labs with BUN/creatinine 115/13.  Did communicate with nephrologist, recommendations for continuing to monitor renal function over the next 24 hours.  Discussed with patient, he states no family history of kidney disease, states his father did have prostate cancer.  Updated plan of care.  All questions answered.      Review of Systems   Constitutional: Negative for chills and fever.   Respiratory: Negative for shortness of breath.    Cardiovascular: Negative for chest pain.   Gastrointestinal: Negative for abdominal pain, constipation, diarrhea, nausea and vomiting.   Genitourinary: Positive for hematuria. Negative for difficulty urinating.   Psychiatric/Behavioral: Negative for confusion.     Objective:     Vital Signs (Most Recent):  Temp: 98 °F (36.7 °C) (09/12/20 1100)  Pulse: 88 (09/12/20 1100)  Resp: 18 (09/12/20 1100)  BP: 129/60 (09/12/20 1100)  SpO2: (!) 94 % (09/12/20 1100) Vital Signs (24h Range):  Temp:  [97.6 °F (36.4 °C)-98.2 °F (36.8 °C)] 98 °F (36.7 °C)  Pulse:  [82-90] 88  Resp:  [16-19] 18  SpO2:  [93 %-96 %] 94 %  BP: (124-150)/(60-70) 129/60     Weight: 80 kg (176 lb 5.9 oz)  Body mass index is 24.6 kg/m².    Intake/Output Summary (Last 24 hours) at 9/12/2020 1559  Last data filed at 9/12/2020 1434  Gross per 24 hour   Intake 500 ml   Output 2900 ml   Net -2400 ml      Physical Exam  Vitals signs and nursing note reviewed.   Constitutional:       General: He is not in acute distress.     Appearance: Normal appearance. He is normal weight. He is not ill-appearing, toxic-appearing or diaphoretic.   HENT:      Head: Normocephalic and atraumatic.      Mouth/Throat:      Mouth: Mucous membranes are moist.   Eyes:      General:         Right eye: No discharge.         Left eye: No discharge.      Conjunctiva/sclera: Conjunctivae normal.      Pupils: Pupils are equal, round, and reactive to light.   Neck:       Musculoskeletal: Neck supple.   Cardiovascular:      Rate and Rhythm: Normal rate and regular rhythm.      Comments: No LE edema  Pulmonary:      Effort: No respiratory distress.      Breath sounds: Normal breath sounds. No stridor. No wheezing or rhonchi.      Comments: Not on supplemental oxygen, good air entry bilaterally  Abdominal:      General: Bowel sounds are normal. There is no distension.      Palpations: Abdomen is soft.      Tenderness: There is no abdominal tenderness. There is no guarding.   Genitourinary:     Comments: R sided nephrostomy with urine, blood tinged in bag.  Urinalysis bedside blood-tinged urine  Musculoskeletal:         General: No swelling.   Skin:     General: Skin is warm and dry.      Capillary Refill: Capillary refill takes less than 2 seconds.   Neurological:      General: No focal deficit present.      Mental Status: He is alert and oriented to person, place, and time.   Psychiatric:         Mood and Affect: Mood normal.         Significant Labs:   BMP:   Recent Labs   Lab 09/12/20 0437   *      K 3.7   CL 96   CO2 25   *   CREATININE 13.0*   CALCIUM 7.8*     CBC:   Recent Labs   Lab 09/11/20 0443 09/12/20 0437   WBC 10.76 8.38   HGB 10.3* 9.9*   HCT 30.4* 29.2*    183     CMP:   Recent Labs   Lab 09/11/20 0443 09/12/20 0437    139   K 4.2 3.7   CL 92* 96   CO2 29 25   * 157*   * 115*   CREATININE 13.8* 13.0*   CALCIUM 7.8* 7.8*   PROT 6.5 6.4   ALBUMIN 3.3* 3.2*   BILITOT 1.0 1.0   ALKPHOS 59 53*   AST 41* 38   ALT 26 22   ANIONGAP 19* 18*   EGFRNONAA 3.0* 3.3*     Cardiac Markers: No results for input(s): CKMB, MYOGLOBIN, BNP, TROPISTAT in the last 48 hours.  Magnesium: No results for input(s): MG in the last 48 hours.  POCT Glucose: No results for input(s): POCTGLUCOSE in the last 48 hours.  Troponin: No results for input(s): TROPONINI in the last 48 hours.  TSH: No results for input(s): TSH in the last 4320 hours.  Urine  Culture: No results for input(s): LABURIN in the last 48 hours.  Urine Studies: No results for input(s): COLORU, APPEARANCEUA, PHUR, SPECGRAV, PROTEINUA, GLUCUA, KETONESU, BILIRUBINUA, OCCULTUA, NITRITE, UROBILINOGEN, LEUKOCYTESUR, RBCUA, WBCUA, BACTERIA, SQUAMEPITHEL, HYALINECASTS in the last 48 hours.    Invalid input(s): WRIGHTSUR  All pertinent labs within the past 24 hours have been reviewed.    Significant Imaging: I have reviewed all pertinent imaging results/findings within the past 24 hours.      Assessment/Plan:      * Acute renal failure  Admission labs with BUN/creatinine 102/14.2, previous serum creatinine at around 1.5.  Today BUN/creatinine 120/13.9  Patient with a history of renal cell cancer status post partial nephrectomy.  CT abdomen and pelvis with right-sided hydronephrosis with hydroureter, bilateral renal cysts, no definite course obstruction  CPK was checked and within normal  Cystoscopy with stent placement attempted by Dr. Garcia 9/0  however was unsuccessful  Patient now underwent IR placement of right percutaneous nephrostomy 9/11  ZACH is also positive however unknown clinically significance at this time  Continue to monitor urine output  Continue to hold oral hypoglycemics and losartan, renally dosing all medications and avoid nephrotoxin drugs  Appreciate consultant's input-- discussed with nephrologist, will monitor renal function over next 24 hrs, if no improvement will re evaluate    Right hydronephrosis with hydroureter  CT with right hydronephrosis with hydroureter, no definite course of obstruction  Possible secondary to renal calculi versus stricture/narrowing, history of prior partial right nephrectomy  Continue empiric Zosyn  On 09/09 Dr. Garcia did attempt stent placement however was unsuccessful, recommendation for IR percutaneous nephrostomy  On 09/11 status post percutaneous placement of right nephrostomy      Non-insulin dependent type 2 diabetes mellitus, previously with  hypoglycemia and now hyperglycemia  Last HbA1c 6.7%.  On Janumet at home.  On admission had persistent hypoglycemia, needing D10, now resolved  Holding Janumet, continue moderate-dose insulin sliding scale with Accu-Cheks.  Diabetic diet  As needed hypoglycemic measures    Renal cyst  Bilateral renal cysts      Anemia  Does have bloody urine.  Trending CBC      History of renal cell carcinoma status post partial nephrectomy  History of right renal cell CA status post partial nephrectomy at [facility].  Patient and wife were instructed to make close follow-up with Dr. Danielle.      Essential hypertension  Continue to hold losartan, continue amlodipine 10 mg, added hydralazine 25 mg Q 8, up titrate as needed        VTE Risk Mitigation (From admission, onward)         Ordered     IP VTE HIGH RISK PATIENT  Once      09/07/20 0632     Place sequential compression device  Until discontinued      09/07/20 0632                Discharge Planning   CA:      Code Status: Full Code   Is the patient medically ready for discharge?:     Reason for patient still in hospital (select all that apply): Patient trending condition  Discharge Plan A: Home                  Trinidad Boo MD  Department of Hospital Medicine   [facility]

## 2020-09-12 NOTE — PLAN OF CARE
Problem: Adult Inpatient Plan of Care  Goal: Plan of Care Review  Outcome: Ongoing, Progressing  Goal: Patient-Specific Goal (Individualization)  Outcome: Ongoing, Progressing  Goal: Absence of Hospital-Acquired Illness or Injury  Outcome: Ongoing, Progressing  Goal: Optimal Comfort and Wellbeing  Outcome: Ongoing, Progressing  Goal: Readiness for Transition of Care  Outcome: Ongoing, Progressing  Goal: Rounds/Family Conference  Outcome: Ongoing, Progressing     Problem: Diabetes Comorbidity  Goal: Blood Glucose Level Within Desired Range  Outcome: Ongoing, Progressing     Problem: Electrolyte Imbalance (Acute Kidney Injury/Impairment)  Goal: Serum Electrolyte Balance  Outcome: Ongoing, Progressing     Problem: Fluid Imbalance (Acute Kidney Injury/Impairment)  Goal: Optimal Fluid Balance  Outcome: Ongoing, Progressing     Problem: Hematologic Alteration (Acute Kidney Injury/Impairment)  Goal: Hemoglobin, Hematocrit and Platelets Within Normal Range  Outcome: Ongoing, Progressing     Problem: Oral Intake Inadequate (Acute Kidney Injury/Impairment)  Goal: Optimal Nutrition Intake  Outcome: Ongoing, Progressing     Problem: Renal Function Impairment (Acute Kidney Injury/Impairment)  Goal: Effective Renal Function  Outcome: Ongoing, Progressing     Problem: Fall Injury Risk  Goal: Absence of Fall and Fall-Related Injury  Outcome: Ongoing, Progressing     Problem: Oral Intake Inadequate  Goal: Improved Oral Intake  Outcome: Ongoing, Progressing

## 2020-09-12 NOTE — ASSESSMENT & PLAN NOTE
Last HbA1c 6.7%.  On Janumet at home.  On admission had persistent hypoglycemia, needing D10, now resolved  Holding Janumet, continue moderate-dose insulin sliding scale with Accu-Cheks.  Diabetic diet  As needed hypoglycemic measures

## 2020-09-12 NOTE — CONSULTS
INPATIENT NEPHROLOGY CONSULT   [facility] NEPHROLOGY    Jamil FERRARO Katerynaarletteevette  09/12/2020    Reason for consultation:  MELODIE    History of Present Illness:  75 y.o.  male who  has a past medical history of Diabetes mellitus, type 2.. R/ Renal cell cancer s/p partial nephrectomy appropriately 16 months ago in [facility] The patient presented to [facility] on 9/6/2020 with a primary complaint of Hypoglycemia (pt brought in by EMS with c/o weakness. CBG 52 upon EMS arrival. pt given one amp of D50 en route with EMS with improvement to ).     Patient was apparently well 4 days ago, started noticed nausea, loss of appetite and generalized weakness with hypoglycemia.  He was working on his yard under hot sun, not sure about adequate hydration recently.  Came to ED with concern of low sugar of 54, which was corrected in ED with IV dextrose, noted to have severe acute renal failure with hyperkalemia.  No urine output noted since admission in ED.  Hospitalist consulted for further eval, repeat BMP in 4 hours, prior to hyperkalemia cocktail showed resolution of hyperkalemia.  However renal failure remains remarkable .  Bladder scan at bedside 245cc, during my eval patient passed urine without any difficulty.  Reports history of renal stone in the past nonobstructive.    9/7  No nausea, chest pain, sob, fever, urinary or bowel complaint, new neurologic symptoms, new joint pain,  9/8 VSS, no new complains.  9/9 VSS, UO is better, K and acidosis are better, sCr is better. Appreciate Urology input, agree with plan for stent and retrograde pyelogram in AM.   9/11 VSS, s/p nephrostomy tube placement by IR 9/11. Monitor for now...   9/12 VSS, s/p nephrostomy tube placement by IR 9/11. UO is better, but sCr still lelevated. Monitor for now, no HD today...     Plan of Care:    Acute kidney injury likely due to intravascular volume depletion, obstruction?.    --renal us shows mild right pelvocaliectasis, not present  on CT from September 2019 ?, confirmed on repeat CT 9/8, attempted stent and retrograde pyelogram 9/10, unsuccessful, now plan for perc neph after 3 days due to ASA.  --rheumatology serologies show positive ZACH, significance unclear, will repeat when more stable.  --renal dose medication  --no acute indication for dialysis, monitor. If opening the kidney does not improve kidney function or if he develops acute indication, we will proceed with HD. This was relayed to pt and his wife.    Hyperkalemia secondary to MELODIE  --[place]  --low k diet    Metabolic acidosis  Hyponatremia  --bicarb infusion, off now  --no respiratory difficulty at this time  --good UO    Anemia  --iron panel ok  --trend  --blood products per primary    Thank you for allowing us to participate in this patient's care. We will continue to follow.    Vital Signs:  Temp Readings from Last 3 Encounters:   09/12/20 98 °F (36.7 °C) (Oral)   12/19/19 97.5 °F (36.4 °C)   10/08/19 97.3 °F (36.3 °C)       Pulse Readings from Last 3 Encounters:   09/12/20 88   12/19/19 90   10/08/19 93       BP Readings from Last 3 Encounters:   09/12/20 129/60   12/19/19 (!) 157/92   10/08/19 133/73       Weight:  Wt Readings from Last 3 Encounters:   09/07/20 80 kg (176 lb 5.9 oz)   12/19/19 81.7 kg (180 lb 3.2 oz)   10/08/19 80.8 kg (178 lb 3.2 oz)       Past Medical & Surgical History:  Past Medical History:   Diagnosis Date    Diabetes mellitus, type 2        Past Surgical History:   Procedure Laterality Date    APPENDECTOMY      CYSTOSCOPY W/ RETROGRADES Right 9/9/2020    Procedure: CYSTOSCOPY, WITH RETROGRADE PYELOGRAM;  Surgeon: Chris Garcia Jr., MD;  Location: [facility] OR;  Service: Urology;  Laterality: Right;    SPINE SURGERY      URETHROSCOPY  9/9/2020    Procedure: URETHROSCOPY;  Surgeon: Chris Garcia Jr., MD;  Location: [facility];  Service: Urology;;       Past Social History:  Social History     Socioeconomic History    Marital status:      Spouse  name: Not on file    Number of children: Not on file    Years of education: Not on file    Highest education level: Not on file   Occupational History    Not on file   Social Needs    Financial resource strain: Not on file    Food insecurity     Worry: Not on file     Inability: Not on file    Transportation needs     Medical: Not on file     Non-medical: Not on file   Tobacco Use    Smoking status: Never Smoker    Smokeless tobacco: Never Used   Substance and Sexual Activity    Alcohol use: No    Drug use: No    Sexual activity: Yes   Lifestyle    Physical activity     Days per week: Not on file     Minutes per session: Not on file    Stress: Not on file   Relationships    Social connections     Talks on phone: Not on file     Gets together: Not on file     Attends Protestant service: Not on file     Active member of club or organization: Not on file     Attends meetings of clubs or organizations: Not on file     Relationship status: Not on file   Other Topics Concern    Not on file   Social History Narrative    Not on file       Medications:  No current facility-administered medications on file prior to encounter.      Current Outpatient Medications on File Prior to Encounter   Medication Sig Dispense Refill    ascorbic acid, vitamin C, (VITAMIN C) 500 MG tablet Take 500 mg by mouth once daily.      aspirin (ENTERIC COATED ASPIRIN) 81 MG EC tablet Take 81 mg by mouth once daily. 1 Tablet, Delayed Release (E.C.) Oral Every day      cholecalciferol, vitamin D3, (VITAMIN D3) 50 mcg (2,000 unit) Cap Take 1 capsule by mouth once daily.      clonazePAM (KLONOPIN) 2 MG Tab Take 2 mg by mouth every evening.       madi, Zingiber officinalis, (MADI EXTRACT) 250 mg Cap Take 1 capsule by mouth once daily.      NON FORMULARY MEDICATION Take 1 tablet by mouth once daily. TRIPLE MUSHROOM COMPLEX      rosuvastatin (CRESTOR) 20 MG tablet Take 20 mg by mouth once daily.      selenium 200 mcg Cap Take 1  "capsule by mouth once daily.      turmeric (CURCUMIN MISC) 1 Dose by Misc.(Non-Drug; Combo Route) route once daily.      TURMERIC ORAL Take 1 tablet by mouth once daily.      glimepiride (AMARYL) 4 MG tablet Take 4 mg by mouth 2 (two) times daily.       MULTIVITAMIN W-MINERALS/LUTEIN (CENTRUM SILVER ORAL) Take 1 capsule by mouth once daily.       sitagliptan-metformin (JANUMET) 50-1,000 mg per tablet Take 1 tablet by mouth 2 (two) times daily with meals. 1 Tablet Oral Twice a day       TRUE METRIX GLUCOSE TEST STRIP Strp        Scheduled Meds:   amLODIPine  10 mg Oral Daily    chlorhexidine  15 mL Mouth/Throat BID    heparin (porcine)  5,000 Units Subcutaneous Q8H    hydrALAZINE  25 mg Oral Q8H    mupirocin   Nasal BID    piperacillin-tazobactam (ZOSYN) IVPB  3.375 g Intravenous Q12H    rosuvastatin  10 mg Oral QHS     Continuous Infusions:    PRN Meds:.acetaminophen, acetaminophen, dextrose 50%, dextrose 50%, glucagon (human recombinant), glucose, glucose, hydrALAZINE, HYDROcodone-acetaminophen, HYDROmorphone, insulin regular, melatonin, ondansetron    Allergies:  Patient has no known allergies.    Past Family History:  Reviewed; refer to Hospitalist Admission Note    Review of Systems:  Review of Systems - All 14 systems reviewed and negative, except as noted in HPI    Physical Exam:    /60   Pulse 88   Temp 98 °F (36.7 °C) (Oral)   Resp 18   Ht 5' 11" (1.803 m)   Wt 80 kg (176 lb 5.9 oz)   SpO2 (!) 94%   BMI 24.60 kg/m²     General Appearance:    Alert, cooperative, no distress, appears stated age   Head:    Normocephalic, without obvious abnormality, atraumatic   Eyes:    PER, conjunctiva/corneas clear, EOM's intact in both eyes        Throat:   Lips, mucosa, and tongue normal; teeth and gums normal   Back:     Symmetric, no curvature, ROM normal, no CVA tenderness   Lungs:     Clear to auscultation bilaterally, respirations unlabored   Chest wall:    No tenderness or deformity   Heart: "    Regular rate and rhythm, S1 and S2 normal, no murmur, rub   or gallop   Abdomen:     Soft, non-tender, bowel sounds active all four quadrants,     no masses, no organomegaly   Extremities:   Extremities normal, atraumatic, no cyanosis or edema   Pulses:   2+ and symmetric all extremities   MSK:   No joint or muscle swelling, tenderness or deformity   Skin:   Skin color, texture, turgor normal, no rashes or lesions   Neurologic:   CNII-XII intact, normal strength and sensation       Throughout.  No flap.  Has a tremor     Results:  Lab Results   Component Value Date     09/12/2020    K 3.7 09/12/2020    CL 96 09/12/2020    CO2 25 09/12/2020     (H) 09/12/2020    CREATININE 13.0 (H) 09/12/2020    CALCIUM 7.8 (L) 09/12/2020    ANIONGAP 18 (H) 09/12/2020    ESTGFRAFRICA 3.8 (A) 09/12/2020    EGFRNONAA 3.3 (A) 09/12/2020       Lab Results   Component Value Date    CALCIUM 7.8 (L) 09/12/2020    PHOS 8.8 (H) 09/08/2020       Recent Labs   Lab 09/12/20  0437   WBC 8.38   RBC 3.12*   HGB 9.9*   HCT 29.2*      MCV 94   MCH 31.7*   MCHC 33.9          I have personally reviewed pertinent radiological imaging and reports.

## 2020-09-12 NOTE — PLAN OF CARE
Plan of care, medications and safety reviewed with patient.      Problem: Adult Inpatient Plan of Care  Goal: Plan of Care Review  Outcome: Ongoing, Progressing  Goal: Patient-Specific Goal (Individualization)  Outcome: Ongoing, Progressing  Goal: Absence of Hospital-Acquired Illness or Injury  Outcome: Ongoing, Progressing  Goal: Optimal Comfort and Wellbeing  Outcome: Ongoing, Progressing  Goal: Readiness for Transition of Care  Outcome: Ongoing, Progressing  Goal: Rounds/Family Conference  Outcome: Ongoing, Progressing     Problem: Electrolyte Imbalance (Acute Kidney Injury/Impairment)  Goal: Serum Electrolyte Balance  Outcome: Ongoing, Progressing     Problem: Fluid Imbalance (Acute Kidney Injury/Impairment)  Goal: Optimal Fluid Balance  Outcome: Ongoing, Progressing     Problem: Hematologic Alteration (Acute Kidney Injury/Impairment)  Goal: Hemoglobin, Hematocrit and Platelets Within Normal Range  Outcome: Ongoing, Progressing     Problem: Oral Intake Inadequate (Acute Kidney Injury/Impairment)  Goal: Optimal Nutrition Intake  Outcome: Ongoing, Progressing     Problem: Renal Function Impairment (Acute Kidney Injury/Impairment)  Goal: Effective Renal Function  Outcome: Ongoing, Progressing     Problem: Fall Injury Risk  Goal: Absence of Fall and Fall-Related Injury  Outcome: Ongoing, Progressing     Problem: Oral Intake Inadequate  Goal: Improved Oral Intake  Outcome: Ongoing, Progressing

## 2020-09-13 PROBLEM — R31.9 HEMATURIA: Status: ACTIVE | Noted: 2020-09-13

## 2020-09-13 PROBLEM — N13.39 OTHER HYDRONEPHROSIS: Status: RESOLVED | Noted: 2020-09-09 | Resolved: 2020-09-13

## 2020-09-13 LAB
ALBUMIN SERPL BCP-MCNC: 3.2 G/DL (ref 3.5–5.2)
ALP SERPL-CCNC: 57 U/L (ref 55–135)
ALT SERPL W/O P-5'-P-CCNC: 19 U/L (ref 10–44)
ANION GAP SERPL CALC-SCNC: 17 MMOL/L (ref 8–16)
AST SERPL-CCNC: 28 U/L (ref 10–40)
BILIRUB SERPL-MCNC: 1.4 MG/DL (ref 0.1–1)
BUN SERPL-MCNC: 113 MG/DL (ref 8–23)
CALCIUM SERPL-MCNC: 8.2 MG/DL (ref 8.7–10.5)
CHLORIDE SERPL-SCNC: 100 MMOL/L (ref 95–110)
CO2 SERPL-SCNC: 23 MMOL/L (ref 23–29)
CREAT SERPL-MCNC: 11.5 MG/DL (ref 0.5–1.4)
EST. GFR  (AFRICAN AMERICAN): 4.4 ML/MIN/1.73 M^2
EST. GFR  (NON AFRICAN AMERICAN): 3.8 ML/MIN/1.73 M^2
GLUCOSE SERPL-MCNC: 162 MG/DL (ref 70–110)
GLUCOSE SERPL-MCNC: 225 MG/DL (ref 70–110)
GLUCOSE SERPL-MCNC: 243 MG/DL (ref 70–110)
GLUCOSE SERPL-MCNC: 278 MG/DL (ref 70–110)
POTASSIUM SERPL-SCNC: 3.6 MMOL/L (ref 3.5–5.1)
PROT SERPL-MCNC: 6.8 G/DL (ref 6–8.4)
SODIUM SERPL-SCNC: 140 MMOL/L (ref 136–145)

## 2020-09-13 PROCEDURE — 63600175 PHARM REV CODE 636 W HCPCS: Performed by: UROLOGY

## 2020-09-13 PROCEDURE — 63600175 PHARM REV CODE 636 W HCPCS: Performed by: INTERNAL MEDICINE

## 2020-09-13 PROCEDURE — 25000003 PHARM REV CODE 250: Performed by: UROLOGY

## 2020-09-13 PROCEDURE — 25000003 PHARM REV CODE 250: Performed by: INTERNAL MEDICINE

## 2020-09-13 PROCEDURE — 21400001 HC TELEMETRY ROOM

## 2020-09-13 PROCEDURE — 80053 COMPREHEN METABOLIC PANEL: CPT

## 2020-09-13 PROCEDURE — 36415 COLL VENOUS BLD VENIPUNCTURE: CPT

## 2020-09-13 RX ADMIN — AMLODIPINE BESYLATE 10 MG: 5 TABLET ORAL at 09:09

## 2020-09-13 RX ADMIN — OXYCODONE HYDROCHLORIDE AND ACETAMINOPHEN 500 MG: 500 TABLET ORAL at 09:09

## 2020-09-13 RX ADMIN — HYDRALAZINE HYDROCHLORIDE 25 MG: 25 TABLET, FILM COATED ORAL at 01:09

## 2020-09-13 RX ADMIN — MELATONIN 6 MG: at 09:09

## 2020-09-13 RX ADMIN — ROSUVASTATIN CALCIUM 10 MG: 10 TABLET, FILM COATED ORAL at 09:09

## 2020-09-13 RX ADMIN — PIPERACILLIN SODIUM AND TAZOBACTAM SODIUM 3.38 G: 3; .375 INJECTION, POWDER, LYOPHILIZED, FOR SOLUTION INTRAVENOUS at 03:09

## 2020-09-13 RX ADMIN — HYDRALAZINE HYDROCHLORIDE 25 MG: 25 TABLET, FILM COATED ORAL at 09:09

## 2020-09-13 RX ADMIN — HYDROCODONE BITARTRATE AND ACETAMINOPHEN 1 TABLET: 5; 325 TABLET ORAL at 01:09

## 2020-09-13 RX ADMIN — HYDRALAZINE HYDROCHLORIDE 25 MG: 25 TABLET, FILM COATED ORAL at 05:09

## 2020-09-13 RX ADMIN — HUMAN INSULIN 4 UNITS: 100 INJECTION, SOLUTION SUBCUTANEOUS at 01:09

## 2020-09-13 RX ADMIN — HUMAN INSULIN 2 UNITS: 100 INJECTION, SOLUTION SUBCUTANEOUS at 09:09

## 2020-09-13 NOTE — ASSESSMENT & PLAN NOTE
History of right renal cell CA status post partial nephrectomy at [facility].  Patient and wife were instructed to make close follow-up with Dr. Danielle.

## 2020-09-13 NOTE — CONSULTS
INPATIENT NEPHROLOGY CONSULT   [facility] NEPHROLOGY    Jamil FERRARO Katerynaarletteevette  09/13/2020    Reason for consultation:  MELODIE    History of Present Illness:  75 y.o.  male who  has a past medical history of Diabetes mellitus, type 2.. R/ Renal cell cancer s/p partial nephrectomy appropriately 16 months ago in [facility] The patient presented to [facility] on 9/6/2020 with a primary complaint of Hypoglycemia (pt brought in by EMS with c/o weakness. CBG 52 upon EMS arrival. pt given one amp of D50 en route with EMS with improvement to ).     Patient was apparently well 4 days ago, started noticed nausea, loss of appetite and generalized weakness with hypoglycemia.  He was working on his yard under hot sun, not sure about adequate hydration recently.  Came to ED with concern of low sugar of 54, which was corrected in ED with IV dextrose, noted to have severe acute renal failure with hyperkalemia.  No urine output noted since admission in ED.  Hospitalist consulted for further eval, repeat BMP in 4 hours, prior to hyperkalemia cocktail showed resolution of hyperkalemia.  However renal failure remains remarkable .  Bladder scan at bedside 245cc, during my eval patient passed urine without any difficulty.  Reports history of renal stone in the past nonobstructive.    9/7  No nausea, chest pain, sob, fever, urinary or bowel complaint, new neurologic symptoms, new joint pain,  9/8 VSS, no new complains.  9/9 VSS, UO is better, K and acidosis are better, sCr is better. Appreciate Urology input, agree with plan for stent and retrograde pyelogram in AM.   9/11 VSS, s/p nephrostomy tube placement by IR 9/11. Monitor for now...   9/12 VSS, s/p nephrostomy tube placement by IR 9/11. UO is better, but sCr still lelevated. Monitor for now, no HD today...   9/13 VSS, s/p nephrostomy tube placement by IR 9/11. UO is better again today, sCr still elevated but improving. Monitor for now, no HD needed, if continues to  improve tomorrow, may be dc with outpatient f/u with me and weekly labs... Repeat CT with stone protocol on 9/13 does not give clear cause of obstructive uropathy but shows resolution after perc neph.    Plan of Care:    Acute kidney injury likely due to intravascular volume depletion, obstruction?.    --renal us shows mild right pelvocaliectasis, not present on CT from September 2019, confirmed on repeat CT 9/8, attempted stent and retrograde pyelogram 9/10 but unsuccessful, now s/p perc neph 9/11.  --rheumatology serologies show positive ZACH, significance unclear, will repeat when more stable.  --renal dose medication  --no acute indication for dialysis, monitor. If kidney function and UO continue to improve, can be dc with weekly labs and f/u with me. This was relayed to pt and his wife.    Hyperkalemia secondary to MELODIE  --veltassa  --low k diet    Metabolic acidosis  Hyponatremia  --bicarb infusion, off now  --no respiratory difficulty at this time  --good UO    Anemia  --iron panel ok  --trend  --blood products per primary    Thank you for allowing us to participate in this patient's care. We will continue to follow.    Vital Signs:  Temp Readings from Last 3 Encounters:   09/13/20 97.5 °F (36.4 °C) (Oral)   12/19/19 97.5 °F (36.4 °C)   10/08/19 97.3 °F (36.3 °C)       Pulse Readings from Last 3 Encounters:   09/13/20 82   12/19/19 90   10/08/19 93       BP Readings from Last 3 Encounters:   09/13/20 (!) 146/64   12/19/19 (!) 157/92   10/08/19 133/73       Weight:  Wt Readings from Last 3 Encounters:   09/07/20 80 kg (176 lb 5.9 oz)   12/19/19 81.7 kg (180 lb 3.2 oz)   10/08/19 80.8 kg (178 lb 3.2 oz)       Past Medical & Surgical History:  Past Medical History:   Diagnosis Date    Diabetes mellitus, type 2        Past Surgical History:   Procedure Laterality Date    APPENDECTOMY      CYSTOSCOPY W/ RETROGRADES Right 9/9/2020    Procedure: CYSTOSCOPY, WITH RETROGRADE PYELOGRAM;  Surgeon: Chris Garcia Jr.,  MD;  Location: [facility];  Service: Urology;  Laterality: Right;    SPINE SURGERY      URETHROSCOPY  9/9/2020    Procedure: URETHROSCOPY;  Surgeon: Chris Garcia Jr., MD;  Location: [facility];  Service: Urology;;       Past Social History:  Social History     Socioeconomic History    Marital status:      Spouse name: Not on file    Number of children: Not on file    Years of education: Not on file    Highest education level: Not on file   Occupational History    Not on file   Social Needs    Financial resource strain: Not on file    Food insecurity     Worry: Not on file     Inability: Not on file    Transportation needs     Medical: Not on file     Non-medical: Not on file   Tobacco Use    Smoking status: Never Smoker    Smokeless tobacco: Never Used   Substance and Sexual Activity    Alcohol use: No    Drug use: No    Sexual activity: Yes   Lifestyle    Physical activity     Days per week: Not on file     Minutes per session: Not on file    Stress: Not on file   Relationships    Social connections     Talks on phone: Not on file     Gets together: Not on file     Attends [employer] service: Not on file     Active member of club or organization: Not on file     Attends meetings of clubs or organizations: Not on file     Relationship status: Not on file   Other Topics Concern    Not on file   Social History Narrative    Not on file       Medications:  No current facility-administered medications on file prior to encounter.      Current Outpatient Medications on File Prior to Encounter   Medication Sig Dispense Refill    ascorbic acid, vitamin C, (VITAMIN C) 500 MG tablet Take 500 mg by mouth once daily.      aspirin (ENTERIC COATED ASPIRIN) 81 MG EC tablet Take 81 mg by mouth once daily. 1 Tablet, Delayed Release (E.C.) Oral Every day      cholecalciferol, vitamin D3, (VITAMIN D3) 50 mcg (2,000 unit) Cap Take 1 capsule by mouth once daily.      clonazePAM (KLONOPIN) 2 MG Tab Take 2 mg by  "mouth every evening.       madi, Zingiber officinalis, (MADI EXTRACT) 250 mg Cap Take 1 capsule by mouth once daily.      NON FORMULARY MEDICATION Take 1 tablet by mouth once daily. TRIPLE MUSHROOM COMPLEX      rosuvastatin (CRESTOR) 20 MG tablet Take 20 mg by mouth once daily.      selenium 200 mcg Cap Take 1 capsule by mouth once daily.      turmeric (CURCUMIN MISC) 1 Dose by Misc.(Non-Drug; Combo Route) route once daily.      TURMERIC ORAL Take 1 tablet by mouth once daily.      glimepiride (AMARYL) 4 MG tablet Take 4 mg by mouth 2 (two) times daily.       MULTIVITAMIN W-MINERALS/LUTEIN (CENTRUM SILVER ORAL) Take 1 capsule by mouth once daily.       sitagliptan-metformin (JANUMET) 50-1,000 mg per tablet Take 1 tablet by mouth 2 (two) times daily with meals. 1 Tablet Oral Twice a day       TRUE METRIX GLUCOSE TEST STRIP Strp        Scheduled Meds:   amLODIPine  10 mg Oral Daily    ascorbic acid (vitamin C)  500 mg Oral Daily    hydrALAZINE  25 mg Oral Q8H    piperacillin-tazobactam (ZOSYN) IVPB  3.375 g Intravenous Q12H    rosuvastatin  10 mg Oral QHS     Continuous Infusions:    PRN Meds:.acetaminophen, acetaminophen, clonazePAM, dextrose 50%, dextrose 50%, glucagon (human recombinant), glucose, glucose, hydrALAZINE, HYDROcodone-acetaminophen, HYDROmorphone, insulin regular, melatonin, ondansetron    Allergies:  Patient has no known allergies.    Past Family History:  Reviewed; refer to Hospitalist Admission Note    Review of Systems:  Review of Systems - All 14 systems reviewed and negative, except as noted in HPI    Physical Exam:    BP (!) 146/64   Pulse 82   Temp 97.5 °F (36.4 °C) (Oral)   Resp 17   Ht 5' 11" (1.803 m)   Wt 80 kg (176 lb 5.9 oz)   SpO2 (!) 94%   BMI 24.60 kg/m²     General Appearance:    Alert, cooperative, no distress, appears stated age   Head:    Normocephalic, without obvious abnormality, atraumatic   Eyes:    PER, conjunctiva/corneas clear, EOM's intact in both " eyes        Throat:   Lips, mucosa, and tongue normal; teeth and gums normal   Back:     Symmetric, no curvature, ROM normal, no CVA tenderness   Lungs:     Clear to auscultation bilaterally, respirations unlabored   Chest wall:    No tenderness or deformity   Heart:    Regular rate and rhythm, S1 and S2 normal, no murmur, rub   or gallop   Abdomen:     Soft, non-tender, bowel sounds active all four quadrants,     no masses, no organomegaly   Extremities:   Extremities normal, atraumatic, no cyanosis or edema   Pulses:   2+ and symmetric all extremities   MSK:   No joint or muscle swelling, tenderness or deformity   Skin:   Skin color, texture, turgor normal, no rashes or lesions   Neurologic:   CNII-XII intact, normal strength and sensation       Throughout.  No flap.  Has a tremor     Results:  Lab Results   Component Value Date     09/13/2020    K 3.6 09/13/2020     09/13/2020    CO2 23 09/13/2020     (H) 09/13/2020    CREATININE 11.5 (H) 09/13/2020    CALCIUM 8.2 (L) 09/13/2020    ANIONGAP 17 (H) 09/13/2020    ESTGFRAFRICA 4.4 (A) 09/13/2020    EGFRNONAA 3.8 (A) 09/13/2020       Lab Results   Component Value Date    CALCIUM 8.2 (L) 09/13/2020    PHOS 8.8 (H) 09/08/2020       No results for input(s): WBC, RBC, HGB, HCT, PLT, MCV, MCH, MCHC in the last 24 hours.       I have personally reviewed pertinent radiological imaging and reports.

## 2020-09-13 NOTE — PROGRESS NOTES
[facility] Medicine  Progress Note    Patient Name: Jamil Cadet  MRN: [id number]  Patient Class: IP- Inpatient   Admission Date: 9/6/2020  Length of Stay: 6 days  Attending Physician: Trinidad Boo MD  Primary Care Provider: Mesfin Cisneros MD        Subjective:     Principal Problem:Renal failure        HPI:  HPI as per MD Lira  Jamil Cadet is a 75 y.o.  male who  has a past medical history of Diabetes mellitus, type 2.. R/ Renal cell cancer s/p partial nephrectomy appropriately 16 months ago in [facility] The patient presented to [facility] on 9/6/2020 with a primary complaint of Hypoglycemia (pt brought in by EMS with c/o weakness. CBG 52 upon EMS arrival. pt given one amp of D50 en route with EMS with improvement to . )  .   Patient was apparently well 4 days ago, started noticed nausea, loss of appetite and generalized weakness with hypoglycemia.  He was working on his yard under hot sun, not sure about adequate hydration recently.  Came to ED with concern of low sugar of 54, which was corrected in ED with IV dextrose, noted to have severe acute renal failure with hyperkalemia.  No urine output noted since admission in ED.  Hospitalist consulted for further eval, repeat BMP in 4 hours, prior to hyperkalemia cocktail showed resolution of hyperkalemia.  However renal failure remains remarkable .  Bladder scan at bedside 245cc, during my eval patient passed urine without any difficulty.  Reports history of renal stone in the past nonobstructive.  Prior to admission no change in renal output noted, no recent adjustment in his dose medication, used to check his renal function every 6 months.    Overview/Hospital Course:  Patient reports for the last 4 days he was working outside in the sun doing gardening work at least 7 hours every day, may not have been consuming much water, was actually not taking any of his prescribed medications during this time  including oral hypoglycemic (Janumet), denies NSAID use, no change in urine reported, no rash, fever. He has history of right RCC status post partial nephrectomy (15% removed as per his report). He admitted to generalized weakness, denies any nausea, vomiting, diarrhea.  EMS was called yesterday and on arrival CBG was 52 which improved to 132 after D50.  In the ED he was noted to recurrent persistent hypoglycemia.  Labs were consistent with hyperkalemia (status post membrane stabilizing medication) anion gap metabolic acidosis with acute renal failure, lactic acidosis to 5 without any source of infection, ultrasound with bilateral renal cysts with mild right pelvocaliectasis.  On 09/07 patient is alert oriented however starting additional fluids along with D10 given persistent hypoglycemia nephrology consulted given acute renal failure, further test ordered.  On 9/0 glucose much better and discontinue D10 infusion, lactic acid better however still in acute renal failure, having good urine output, checking CT renal protocol.  CT did demonstrate right-sided hydronephrosis with hydroureter, no definite calculus seen, urology consulted and planning cystoscopy with stent placement today. Unfortunately Dr Garcia unable to place stent and now recommendations for right percutaneous nephrostomy to relieve hydronephrosis, discussed with Radiology, ideally patient would need to be off aspirin for 3 days, this would be tomorrow, states the left kidney appears normal and should be compensating and expresses concern about possible alternate etiology, discussed with patient who is agreeable to wait until tomorrow for nephrostomy (explained with aspirin increased risk of bleeding). On 9/11 patient underwent placement of right percutaneous nephrostomy by IR, will continue to monitor renal function closely.    Interval History:  Patient seen with wife present at bedside.  Continues to denies any new complaints.  Urine in nephrostomy  and in urinal is still blood-tinged.  He states he has been drinking fluids and has been making good urine output.  He is concerned about the bloody urine, discussed will continue to monitor.  Remains afebrile, blood pressure better.  Labs with BUN/creatinine 113/11.5.  Did order repeat CT renal protocol this morning which shows resolution of right hydronephrosis without any other significant changes.  Nephrology note reviewed.  Patient is eager about potential discharge tomorrow.  All questions addressed.      Review of Systems   Constitutional: Negative for chills and fever.   Respiratory: Negative for shortness of breath.    Cardiovascular: Negative for chest pain.   Gastrointestinal: Negative for abdominal pain, constipation, diarrhea and vomiting.   Genitourinary: Positive for hematuria. Negative for difficulty urinating and flank pain.     Objective:     Vital Signs (Most Recent):  Temp: 97.5 °F (36.4 °C) (09/13/20 0755)  Pulse: 82 (09/13/20 0755)  Resp: 17 (09/13/20 0755)  BP: (!) 146/64 (09/13/20 0755)  SpO2: (!) 94 % (09/13/20 0755) Vital Signs (24h Range):  Temp:  [97.5 °F (36.4 °C)-98 °F (36.7 °C)] 97.5 °F (36.4 °C)  Pulse:  [82-95] 82  Resp:  [17-20] 17  SpO2:  [94 %-96 %] 94 %  BP: (137-153)/(64-73) 146/64     Weight: 80 kg (176 lb 5.9 oz)  Body mass index is 24.6 kg/m².    Intake/Output Summary (Last 24 hours) at 9/13/2020 1132  Last data filed at 9/13/2020 0500  Gross per 24 hour   Intake 240 ml   Output 2175 ml   Net -1935 ml      Physical Exam  Vitals signs and nursing note reviewed.   Constitutional:       General: He is not in acute distress.     Appearance: Normal appearance. He is normal weight. He is not ill-appearing, toxic-appearing or diaphoretic.   HENT:      Head: Normocephalic and atraumatic.      Mouth/Throat:      Mouth: Mucous membranes are moist.   Eyes:      General:         Right eye: No discharge.         Left eye: No discharge.      Conjunctiva/sclera: Conjunctivae normal.       Pupils: Pupils are equal, round, and reactive to light.   Neck:      Musculoskeletal: Neck supple.   Cardiovascular:      Rate and Rhythm: Normal rate and regular rhythm.      Comments: No LE edema  Pulmonary:      Effort: No respiratory distress.      Breath sounds: Normal breath sounds. No stridor. No wheezing or rhonchi.      Comments: Not on supplemental oxygen, good air entry bilaterally  Abdominal:      General: Bowel sounds are normal. There is no distension.      Palpations: Abdomen is soft.      Tenderness: There is no abdominal tenderness. There is no guarding.   Genitourinary:     Comments: R sided nephrostomy with urine, blood tinged in bag.  Urinalysis bedside blood-tinged urine  Musculoskeletal:         General: No swelling.   Skin:     General: Skin is warm and dry.      Capillary Refill: Capillary refill takes less than 2 seconds.   Neurological:      General: No focal deficit present.      Mental Status: He is alert and oriented to person, place, and time.   Psychiatric:         Mood and Affect: Mood normal.         Significant Labs:   BMP:   Recent Labs   Lab 09/13/20  0506   *      K 3.6      CO2 23   *   CREATININE 11.5*   CALCIUM 8.2*     CBC:   Recent Labs   Lab 09/12/20  0437   WBC 8.38   HGB 9.9*   HCT 29.2*        CMP:   Recent Labs   Lab 09/12/20  0437 09/13/20  0506    140   K 3.7 3.6   CL 96 100   CO2 25 23   * 162*   * 113*   CREATININE 13.0* 11.5*   CALCIUM 7.8* 8.2*   PROT 6.4 6.8   ALBUMIN 3.2* 3.2*   BILITOT 1.0 1.4*   ALKPHOS 53* 57   AST 38 28   ALT 22 19   ANIONGAP 18* 17*   EGFRNONAA 3.3* 3.8*     Cardiac Markers: No results for input(s): CKMB, MYOGLOBIN, BNP, TROPISTAT in the last 48 hours.  Lactic Acid: No results for input(s): LACTATE in the last 48 hours.  Magnesium: No results for input(s): MG in the last 48 hours.  POCT Glucose: No results for input(s): POCTGLUCOSE in the last 48 hours.  Respiratory Culture: No results  for input(s): GSRESP, RESPIRATORYC in the last 48 hours.  Troponin: No results for input(s): TROPONINI in the last 48 hours.  TSH: No results for input(s): TSH in the last 4320 hours.  Urine Culture: No results for input(s): LABURIN in the last 48 hours.  Urine Studies: No results for input(s): COLORU, APPEARANCEUA, PHUR, SPECGRAV, PROTEINUA, GLUCUA, KETONESU, BILIRUBINUA, OCCULTUA, NITRITE, UROBILINOGEN, LEUKOCYTESUR, RBCUA, WBCUA, BACTERIA, SQUAMEPITHEL, HYALINECASTS in the last 48 hours.    Invalid input(s): WRIGHTSUR  All pertinent labs within the past 24 hours have been reviewed.    Significant Imaging: I have reviewed all pertinent imaging results/findings within the past 24 hours.     CT renal protocol  FINDINGS:  CT Abdomen:     There is atelectasis in the right lung base.  The left lung base is clear.  There is coronary artery calcification.     The liver, spleen and pancreas are normal.  There is no biliary duct dilatation.     There is stable mild distention of the gallbladder with diffuse increased density within the gallbladder.  There are multiple gallstones.  There is no gallbladder wall thickening or edema.     The adrenal glands are normal.     There is a right nephrostomy tube in place with the proximal end in the right renal pelvis.  There is resolution of the right hydroureteronephrosis.  There are stable postsurgical changes of the posterior aspect of the right kidney.  There are stable bilateral renal masses including hyperdense masses within the lower pole the left kidney which were shown to represent cysts.  There is mild stranding in the right perinephric fat.     There is a stable tiny nonobstructing stone in the midpole of the right kidney.  There is no evidence of ureteral stone.     There are no thick-walled or dilated bowel loops.  There is a small fat containing umbilical hernia.  There is no adenopathy.     CT Pelvis:     There is sigmoid diverticulosis without diverticulitis.  The  bladder is collapsed with mild wall thickening.  The prostate gland is normal.  There are no osseous abnormalities.     Impression:     Placement of a right nephrostomy tube with resolution of the right hydroureteronephrosis.     2 mm non-obstructing right renal stone     Mild stranding in the right perinephric fat     Stable bilateral renal masses shown to represent cysts     Cholelithiasis     Degenerative changes of the spine         Assessment/Plan:      * Acute renal failure  Admission labs with BUN/creatinine 102/14.2, previous serum creatinine at around 1.5.  Today BUN/creatinine 113/11.5  Patient with a history of renal cell cancer status post partial nephrectomy.  CT abdomen and pelvis with right-sided hydronephrosis with hydroureter, bilateral renal cysts, no definite course obstruction  CPK was checked and within normal  Cystoscopy with stent placement attempted by Dr. Garcia 9/0  however was unsuccessful  Patient now underwent IR placement of right percutaneous nephrostomy 9/11  ZACH is also positive however unknown clinically significance at this time  Continue to monitor urine output  Continue to hold oral hypoglycemics and losartan, renally dosing all medications and avoid nephrotoxin drugs  Appreciate consultant's input    Non-insulin dependent type 2 diabetes mellitus, previously with hypoglycemia and now hyperglycemia  Last HbA1c 6.7%.  On Janumet at home.  On admission had persistent hypoglycemia, needing D10, now resolved  Holding Janumet, continue moderate-dose insulin sliding scale with Accu-Cheks.  Diabetic diet  As needed hypoglycemic measures    Hematuria  Continue to monitor      Renal cyst  Bilateral renal cysts      Anemia  Does have bloody urine.  Trending CBC      History of renal cell carcinoma status post partial nephrectomy  History of right renal cell CA status post partial nephrectomy at [facility].  Patient and wife were instructed to make close follow-up with Dr. Danielle.      Essential  hypertension  Continue to hold losartan, continue amlodipine 10 mg, added hydralazine 25 mg Q 8, up titrate as needed        VTE Risk Mitigation (From admission, onward)         Ordered     IP VTE HIGH RISK PATIENT  Once      09/07/20 0632     Place sequential compression device  Until discontinued      09/07/20 0632                Discharge Planning   CA:      Code Status: Full Code   Is the patient medically ready for discharge?:     Reason for patient still in hospital (select all that apply): Treatment  Discharge Plan A: Home                  Trinidad Boo MD  Department of Hospital Medicine   [facility]

## 2020-09-13 NOTE — PLAN OF CARE
Plan of care, medications and safety reviewed with patient.      Problem: Adult Inpatient Plan of Care  Goal: Plan of Care Review  Outcome: Ongoing, Progressing  Goal: Patient-Specific Goal (Individualization)  Outcome: Ongoing, Progressing  Goal: Absence of Hospital-Acquired Illness or Injury  Outcome: Ongoing, Progressing  Goal: Optimal Comfort and Wellbeing  Outcome: Ongoing, Progressing  Goal: Readiness for Transition of Care  Outcome: Ongoing, Progressing  Goal: Rounds/Family Conference  Outcome: Ongoing, Progressing     Problem: Diabetes Comorbidity  Goal: Blood Glucose Level Within Desired Range  Outcome: Ongoing, Progressing     Problem: Electrolyte Imbalance (Acute Kidney Injury/Impairment)  Goal: Serum Electrolyte Balance  Outcome: Ongoing, Progressing     Problem: Fluid Imbalance (Acute Kidney Injury/Impairment)  Goal: Optimal Fluid Balance  Outcome: Ongoing, Progressing     Problem: Hematologic Alteration (Acute Kidney Injury/Impairment)  Goal: Hemoglobin, Hematocrit and Platelets Within Normal Range  Outcome: Ongoing, Progressing     Problem: Oral Intake Inadequate (Acute Kidney Injury/Impairment)  Goal: Optimal Nutrition Intake  Outcome: Ongoing, Progressing     Problem: Renal Function Impairment (Acute Kidney Injury/Impairment)  Goal: Effective Renal Function  Outcome: Ongoing, Progressing

## 2020-09-13 NOTE — SUBJECTIVE & OBJECTIVE
Interval History:  Patient seen with wife present at bedside.  Continues to denies any new complaints.  Urine in nephrostomy and in urinal is still blood-tinged.  He states he has been drinking fluids and has been making good urine output.  He is concerned about the bloody urine, discussed will continue to monitor.  Remains afebrile, blood pressure better.  Labs with BUN/creatinine 113/11.5.  Did order repeat CT renal protocol this morning which shows resolution of right hydronephrosis without any other significant changes.  Nephrology note reviewed.  Patient is eager about potential discharge tomorrow.  All questions addressed.      Review of Systems   Constitutional: Negative for chills and fever.   Respiratory: Negative for shortness of breath.    Cardiovascular: Negative for chest pain.   Gastrointestinal: Negative for abdominal pain, constipation, diarrhea and vomiting.   Genitourinary: Positive for hematuria. Negative for difficulty urinating and flank pain.     Objective:     Vital Signs (Most Recent):  Temp: 97.5 °F (36.4 °C) (09/13/20 0755)  Pulse: 82 (09/13/20 0755)  Resp: 17 (09/13/20 0755)  BP: (!) 146/64 (09/13/20 0755)  SpO2: (!) 94 % (09/13/20 0755) Vital Signs (24h Range):  Temp:  [97.5 °F (36.4 °C)-98 °F (36.7 °C)] 97.5 °F (36.4 °C)  Pulse:  [82-95] 82  Resp:  [17-20] 17  SpO2:  [94 %-96 %] 94 %  BP: (137-153)/(64-73) 146/64     Weight: 80 kg (176 lb 5.9 oz)  Body mass index is 24.6 kg/m².    Intake/Output Summary (Last 24 hours) at 9/13/2020 1132  Last data filed at 9/13/2020 0500  Gross per 24 hour   Intake 240 ml   Output 2175 ml   Net -1935 ml      Physical Exam  Vitals signs and nursing note reviewed.   Constitutional:       General: He is not in acute distress.     Appearance: Normal appearance. He is normal weight. He is not ill-appearing, toxic-appearing or diaphoretic.   HENT:      Head: Normocephalic and atraumatic.      Mouth/Throat:      Mouth: Mucous membranes are moist.   Eyes:       General:         Right eye: No discharge.         Left eye: No discharge.      Conjunctiva/sclera: Conjunctivae normal.      Pupils: Pupils are equal, round, and reactive to light.   Neck:      Musculoskeletal: Neck supple.   Cardiovascular:      Rate and Rhythm: Normal rate and regular rhythm.      Comments: No LE edema  Pulmonary:      Effort: No respiratory distress.      Breath sounds: Normal breath sounds. No stridor. No wheezing or rhonchi.      Comments: Not on supplemental oxygen, good air entry bilaterally  Abdominal:      General: Bowel sounds are normal. There is no distension.      Palpations: Abdomen is soft.      Tenderness: There is no abdominal tenderness. There is no guarding.   Genitourinary:     Comments: R sided nephrostomy with urine, blood tinged in bag.  Urinalysis bedside blood-tinged urine  Musculoskeletal:         General: No swelling.   Skin:     General: Skin is warm and dry.      Capillary Refill: Capillary refill takes less than 2 seconds.   Neurological:      General: No focal deficit present.      Mental Status: He is alert and oriented to person, place, and time.   Psychiatric:         Mood and Affect: Mood normal.         Significant Labs:   BMP:   Recent Labs   Lab 09/13/20  0506   *      K 3.6      CO2 23   *   CREATININE 11.5*   CALCIUM 8.2*     CBC:   Recent Labs   Lab 09/12/20 0437   WBC 8.38   HGB 9.9*   HCT 29.2*        CMP:   Recent Labs   Lab 09/12/20 0437 09/13/20  0506    140   K 3.7 3.6   CL 96 100   CO2 25 23   * 162*   * 113*   CREATININE 13.0* 11.5*   CALCIUM 7.8* 8.2*   PROT 6.4 6.8   ALBUMIN 3.2* 3.2*   BILITOT 1.0 1.4*   ALKPHOS 53* 57   AST 38 28   ALT 22 19   ANIONGAP 18* 17*   EGFRNONAA 3.3* 3.8*     Cardiac Markers: No results for input(s): CKMB, MYOGLOBIN, BNP, TROPISTAT in the last 48 hours.  Lactic Acid: No results for input(s): LACTATE in the last 48 hours.  Magnesium: No results for input(s): MG in the  last 48 hours.  POCT Glucose: No results for input(s): POCTGLUCOSE in the last 48 hours.  Respiratory Culture: No results for input(s): GSRESP, RESPIRATORYC in the last 48 hours.  Troponin: No results for input(s): TROPONINI in the last 48 hours.  TSH: No results for input(s): TSH in the last 4320 hours.  Urine Culture: No results for input(s): LABURIN in the last 48 hours.  Urine Studies: No results for input(s): COLORU, APPEARANCEUA, PHUR, SPECGRAV, PROTEINUA, GLUCUA, KETONESU, BILIRUBINUA, OCCULTUA, NITRITE, UROBILINOGEN, LEUKOCYTESUR, RBCUA, WBCUA, BACTERIA, SQUAMEPITHEL, HYALINECASTS in the last 48 hours.    Invalid input(s): WRIGHTSUR  All pertinent labs within the past 24 hours have been reviewed.    Significant Imaging: I have reviewed all pertinent imaging results/findings within the past 24 hours.     CT renal protocol  FINDINGS:  CT Abdomen:     There is atelectasis in the right lung base.  The left lung base is clear.  There is coronary artery calcification.     The liver, spleen and pancreas are normal.  There is no biliary duct dilatation.     There is stable mild distention of the gallbladder with diffuse increased density within the gallbladder.  There are multiple gallstones.  There is no gallbladder wall thickening or edema.     The adrenal glands are normal.     There is a right nephrostomy tube in place with the proximal end in the right renal pelvis.  There is resolution of the right hydroureteronephrosis.  There are stable postsurgical changes of the posterior aspect of the right kidney.  There are stable bilateral renal masses including hyperdense masses within the lower pole the left kidney which were shown to represent cysts.  There is mild stranding in the right perinephric fat.     There is a stable tiny nonobstructing stone in the midpole of the right kidney.  There is no evidence of ureteral stone.     There are no thick-walled or dilated bowel loops.  There is a small fat containing  umbilical hernia.  There is no adenopathy.     CT Pelvis:     There is sigmoid diverticulosis without diverticulitis.  The bladder is collapsed with mild wall thickening.  The prostate gland is normal.  There are no osseous abnormalities.     Impression:     Placement of a right nephrostomy tube with resolution of the right hydroureteronephrosis.     2 mm non-obstructing right renal stone     Mild stranding in the right perinephric fat     Stable bilateral renal masses shown to represent cysts     Cholelithiasis     Degenerative changes of the spine

## 2020-09-13 NOTE — ASSESSMENT & PLAN NOTE
Admission labs with BUN/creatinine 102/14.2, previous serum creatinine at around 1.5.  Today BUN/creatinine 113/11.5  Patient with a history of renal cell cancer status post partial nephrectomy.  CT abdomen and pelvis with right-sided hydronephrosis with hydroureter, bilateral renal cysts, no definite course obstruction  CPK was checked and within normal  Cystoscopy with stent placement attempted by Dr. Garcia 9/0  however was unsuccessful  Patient now underwent IR placement of right percutaneous nephrostomy 9/11  ZACH is also positive however unknown clinically significance at this time  Continue to monitor urine output  Continue to hold oral hypoglycemics and losartan, renally dosing all medications and avoid nephrotoxin drugs  Appreciate consultant's input

## 2020-09-14 VITALS
DIASTOLIC BLOOD PRESSURE: 62 MMHG | OXYGEN SATURATION: 96 % | SYSTOLIC BLOOD PRESSURE: 119 MMHG | HEIGHT: 71 IN | HEART RATE: 86 BPM | BODY MASS INDEX: 24.69 KG/M2 | WEIGHT: 176.38 LBS | RESPIRATION RATE: 18 BRPM | TEMPERATURE: 98 F

## 2020-09-14 LAB
ANION GAP SERPL CALC-SCNC: 16 MMOL/L (ref 8–16)
BUN SERPL-MCNC: 104 MG/DL (ref 8–23)
CALCIUM SERPL-MCNC: 8.2 MG/DL (ref 8.7–10.5)
CHLORIDE SERPL-SCNC: 100 MMOL/L (ref 95–110)
CO2 SERPL-SCNC: 22 MMOL/L (ref 23–29)
CREAT SERPL-MCNC: 9.9 MG/DL (ref 0.5–1.4)
ERYTHROCYTE [DISTWIDTH] IN BLOOD BY AUTOMATED COUNT: 11.3 % (ref 11.5–14.5)
EST. GFR  (AFRICAN AMERICAN): 5.2 ML/MIN/1.73 M^2
EST. GFR  (NON AFRICAN AMERICAN): 4.5 ML/MIN/1.73 M^2
GLUCOSE SERPL-MCNC: 187 MG/DL (ref 70–110)
GLUCOSE SERPL-MCNC: 188 MG/DL (ref 70–110)
HCT VFR BLD AUTO: 29.8 % (ref 40–54)
HGB BLD-MCNC: 9.8 G/DL (ref 14–18)
MAGNESIUM SERPL-MCNC: 2 MG/DL (ref 1.6–2.6)
MCH RBC QN AUTO: 30.5 PG (ref 27–31)
MCHC RBC AUTO-ENTMCNC: 32.9 G/DL (ref 32–36)
MCV RBC AUTO: 93 FL (ref 82–98)
PHOSPHATE SERPL-MCNC: 8.3 MG/DL (ref 2.7–4.5)
PLATELET # BLD AUTO: 251 K/UL (ref 150–350)
PLATELET BLD QL SMEAR: NORMAL
PMV BLD AUTO: 10.3 FL (ref 9.2–12.9)
POTASSIUM SERPL-SCNC: 3.4 MMOL/L (ref 3.5–5.1)
RBC # BLD AUTO: 3.21 M/UL (ref 4.6–6.2)
SODIUM SERPL-SCNC: 138 MMOL/L (ref 136–145)
WBC # BLD AUTO: 7.91 K/UL (ref 3.9–12.7)

## 2020-09-14 PROCEDURE — 36415 COLL VENOUS BLD VENIPUNCTURE: CPT

## 2020-09-14 PROCEDURE — 63600175 PHARM REV CODE 636 W HCPCS: Performed by: UROLOGY

## 2020-09-14 PROCEDURE — 80048 BASIC METABOLIC PNL TOTAL CA: CPT

## 2020-09-14 PROCEDURE — 25000003 PHARM REV CODE 250: Performed by: UROLOGY

## 2020-09-14 PROCEDURE — 25000003 PHARM REV CODE 250: Performed by: INTERNAL MEDICINE

## 2020-09-14 PROCEDURE — 83735 ASSAY OF MAGNESIUM: CPT

## 2020-09-14 PROCEDURE — 84100 ASSAY OF PHOSPHORUS: CPT

## 2020-09-14 PROCEDURE — 85027 COMPLETE CBC AUTOMATED: CPT

## 2020-09-14 RX ORDER — AMLODIPINE BESYLATE 10 MG/1
10 TABLET ORAL DAILY
Qty: 30 TABLET | Refills: 0 | Status: SHIPPED | OUTPATIENT
Start: 2020-09-15 | End: 2020-10-22

## 2020-09-14 RX ORDER — HYDRALAZINE HYDROCHLORIDE 25 MG/1
25 TABLET, FILM COATED ORAL 3 TIMES DAILY
Qty: 90 TABLET | Refills: 0 | Status: SHIPPED | OUTPATIENT
Start: 2020-09-14 | End: 2020-10-22

## 2020-09-14 RX ORDER — INSULIN ASPART 100 [IU]/ML
INJECTION, SOLUTION INTRAVENOUS; SUBCUTANEOUS
Qty: 1 SYRINGE | Refills: 0 | Status: SHIPPED | OUTPATIENT
Start: 2020-09-14 | End: 2020-10-22 | Stop reason: SDUPTHER

## 2020-09-14 RX ADMIN — OXYCODONE HYDROCHLORIDE AND ACETAMINOPHEN 500 MG: 500 TABLET ORAL at 08:09

## 2020-09-14 RX ADMIN — PIPERACILLIN SODIUM AND TAZOBACTAM SODIUM 3.38 G: 3; .375 INJECTION, POWDER, LYOPHILIZED, FOR SOLUTION INTRAVENOUS at 03:09

## 2020-09-14 RX ADMIN — HYDRALAZINE HYDROCHLORIDE 25 MG: 25 TABLET, FILM COATED ORAL at 06:09

## 2020-09-14 RX ADMIN — AMLODIPINE BESYLATE 10 MG: 5 TABLET ORAL at 08:09

## 2020-09-14 NOTE — PROGRESS NOTES
"[facility]  Adult Nutrition   Progress Note (Follow-Up)    SUMMARY     Recommendations/Interventions:    Recommendation/Intervention: 1. Continue current diet as tolerated, encourage intake. 2. Changed supplement to Nepro  TID to aid in meeting estimated needs when meal intake is insufficient 2' Hyperphosphatemia. 3. Hyperglycemia-continue to monitor and manage BG per SSI.  Goals: 1. Patient to meet at least 75% of estimated energy and protein needs. 2. Blood glucose and electrolytes to trend towards normal limits/ target ranges.    Dietitian Rounds Brief:  · Seen 2' f/u. Patient continues with blood tinged urine. Possible discharge today. Appetite and intake continues to remain poor- encouraged supplemental intake once discharged to prevent weight loss and aid in meeting needs. Patient has not other complaints at this time. Will continue to monitor intake, labs, and plan of care.  Reason for Assessment  Reason For Assessment: RD follow-up  Diagnosis: (Acute renal failure, likely secondary to obstructive nephropathy)  Relevant Medical History: DM2; HTN; History of renal cell carcinoma status post partial nephrectomy; Anemia; ARF; Hyperkalemia; High anion gap metabolic acidosis; Lactic acidosis; Hyperphosphatemia  Interdisciplinary Rounds: attended    Nutrition Risk Screen  Nutrition Risk Screen: no indicators present     MST Score: 0  Have you recently lost weight without trying?: No  Weight loss score: 0  Have you been eating poorly because of a decreased appetite?: No  Appetite score: 0     Nutrition/Diet History  Food Allergies: NKFA  Factors Affecting Nutritional Intake: decreased appetite    Anthropometrics  Temp: 97.3 °F (36.3 °C)  Height Method: Stated  Height: 5' 11" (180.3 cm)  Height (inches): 71 in  Weight Method: Bed Scale  Weight: 80 kg (176 lb 5.9 oz)  Weight (lb): 176.37 lb  Ideal Body Weight (IBW), Male: 172 lb  % Ideal Body Weight, Male (lb): 102.54 %  BMI (Calculated): 24.6  BMI " "Grade: 18.5-24.9 - normal     Weight History:  Wt Readings from Last 10 Encounters:   09/07/20 80 kg (176 lb 5.9 oz)   12/19/19 81.7 kg (180 lb 3.2 oz)   10/08/19 80.8 kg (178 lb 3.2 oz)   07/23/19 81 kg (178 lb 9.6 oz)   02/26/19 83.3 kg (183 lb 9.6 oz)   01/31/19 84.1 kg (185 lb 6.4 oz)   06/13/18 85.4 kg (188 lb 3.2 oz)   01/18/13 86.2 kg (190 lb)     Lab/Procedures/Meds: Pertinent Labs Reviewed  Clinical Chemistry:  Recent Labs   Lab 09/08/20  0402 09/13/20  0506 09/14/20 0432   * 140 138   K 4.7 3.6 3.4*   CL 98 100 100   CO2 16* 23 22*   * 162* 188*   * 113* 104*   CREATININE 14.2* 11.5* 9.9*   CALCIUM 8.0* 8.2* 8.2*   PROT  --  6.8  --    ALBUMIN 3.4* 3.2*  --    BILITOT  --  1.4*  --    ALKPHOS  --  57  --    AST  --  28  --    ALT  --  19  --    ANIONGAP 20* 17* 16   ESTGFRAFRICA 3.4* 4.4* 5.2*   EGFRNONAA 2.9* 3.8* 4.5*   MG 2.3  --  2.0   PHOS 8.8*  --  8.3*    < > = values in this interval not displayed.     CBC:   Recent Labs   Lab 09/14/20 0432   WBC 7.91   RBC 3.21*   HGB 9.8*   HCT 29.8*      MCV 93   MCH 30.5   MCHC 32.9   Cardiac Profile:  Recent Labs   Lab 09/08/20 0402   *     Diabetes:  Recent Labs   Lab 09/08/20 0402   HGBA1C 5.9     Medications: Pertinent Medications reviewed  Scheduled Meds:   amLODIPine  10 mg Oral Daily    ascorbic acid (vitamin C)  500 mg Oral Daily    hydrALAZINE  25 mg Oral Q8H    piperacillin-tazobactam (ZOSYN) IVPB  3.375 g Intravenous Q12H    rosuvastatin  10 mg Oral QHS     Continuous Infusions:  PRN Meds:.acetaminophen, acetaminophen, clonazePAM, dextrose 50%, dextrose 50%, glucagon (human recombinant), glucose, glucose, hydrALAZINE, HYDROcodone-acetaminophen, HYDROmorphone, insulin regular, melatonin, ondansetron    Antibiotics (From admission, onward)    Start     Stop Route Frequency Ordered    09/08/20 1500  piperacillin-tazobactam 3.375 g in dextrose 5 % 50 mL IVPB (ready to mix system)     Note to Pharmacy: Ht: 5' 11" " (1.803 m)  Wt: 80 kg (176 lb 5.9 oz)  Estimated Creatinine Clearance: 4.6 mL/min (A) (based on SCr of 14.4 mg/dL (H)).       -- IV Every 12 hours (non-standard times) 09/08/20 1356    09/08/20 1100  mupirocin 2 % ointment  (MRSA Decolonization Orders [facility])      09/13 0859 Nasl 2 times daily 09/08/20 0953        Estimated/Assessed Needs    Weight Used For Calorie Calculations: 80 kg (176 lb 5.9 oz)  Energy Calorie Requirements (kcal): 2000 - 2400 (25 - 30)  Energy Need Method: Kcal/kg  Protein Requirements: 48 - 64 (0.6 - 0.8 g/kg); Monitor Renal Function  Weight Used For Protein Calculations: 80 kg (176 lb 5.9 oz)    Nutrition Prescription Ordered    Current Diet Order: Diabetic/Renal Diet    Evaluation of Received Nutrient/Fluid Intake    Energy Calories Required: not meeting needs  Protein Required: not meeting needs  Fluid Required: meeting needs  Tolerance: tolerating  % Intake of Estimated Energy Needs: 25 - 50 %  % Meal Intake: 25 - 50 %    Intake/Output Summary (Last 24 hours) at 9/14/2020 0804  Last data filed at 9/14/2020 0300  Gross per 24 hour   Intake 240 ml   Output 1600 ml   Net -1360 ml      Nutrition Risk    Level of Risk/Frequency of Follow-up: high   Monitor and Evaluation    Food and Nutrient Intake: energy intake, food and beverage intake  Food and Nutrient Adminstration: diet order  Physical Activity and Function: nutrition-related ADLs and IADLs, factors affecting access to physical activity  Anthropometric Measurements: weight, weight change, body mass index  Biochemical Data, Medical Tests and Procedures: electrolyte and renal panel, lipid profile, glucose/endocrine profile, inflammatory profile, gastrointestinal profile  Nutrition-Focused Physical Findings: overall appearance     Nutrition Follow-Up    RD Follow-up?: Yes   Zuleyka Howell RD 09/14/2020 9:34 AM

## 2020-09-14 NOTE — PLAN OF CARE
Important Message from Medicare was sign, explained and given to patient/caregiver on 09/14/2020 at 10:49am

## 2020-09-14 NOTE — PLAN OF CARE
Patient has no needs at this time.    09/14/20 1120   Final Note   Assessment Type Final Discharge Note   Anticipated Discharge Disposition Home

## 2020-09-14 NOTE — DISCHARGE SUMMARY
Formerly [facility], [facility] Medicine  Discharge Summary      Patient Name: Jamil Cadet  MRN: [id number]  Admission Date: 9/6/2020  Hospital Length of Stay: 7 days  Discharge Date and Time:  09/14/2020 10:48 AM  Attending Physician: Trinidad Boo MD   Discharging Provider: Trinidad Boo MD  Primary Care Provider: Mesfin Cisneros MD      HPI:   HPI as per MD Lira  Jamil Cadet is a 75 y.o.  male who  has a past medical history of Diabetes mellitus, type 2.. R/ Renal cell cancer s/p partial nephrectomy appropriately 16 months ago in [facility] The patient presented to [facility] on 9/6/2020 with a primary complaint of Hypoglycemia (pt brought in by EMS with c/o weakness. CBG 52 upon EMS arrival. pt given one amp of D50 en route with EMS with improvement to . )  .   Patient was apparently well 4 days ago, started noticed nausea, loss of appetite and generalized weakness with hypoglycemia.  He was working on his yard under hot sun, not sure about adequate hydration recently.  Came to ED with concern of low sugar of 54, which was corrected in ED with IV dextrose, noted to have severe acute renal failure with hyperkalemia.  No urine output noted since admission in ED.  Hospitalist consulted for further eval, repeat BMP in 4 hours, prior to hyperkalemia cocktail showed resolution of hyperkalemia.  However renal failure remains remarkable .  Bladder scan at bedside 245cc, during my eval patient passed urine without any difficulty.  Reports history of renal stone in the past nonobstructive.  Prior to admission no change in renal output noted, no recent adjustment in his dose medication, used to check his renal function every 6 months.    Procedure(s) (LRB):  CYSTOSCOPY, WITH RETROGRADE PYELOGRAM (Right)  URETHROSCOPY      Hospital Course:   Patient reports for the last 4 days he was working outside in the sun doing gardening work at least 7 hours every day, may not have  been consuming much water, was actually not taking any of his prescribed medications during this time including oral hypoglycemic (Janumet), denies NSAID use, no change in urine reported, no rash, fever. He has history of right RCC status post partial nephrectomy (15% removed as per his report). He admitted to generalized weakness, denies any nausea, vomiting, diarrhea.  EMS was called yesterday and on arrival CBG was 52 which improved to 132 after D50.  In the ED he was noted to recurrent persistent hypoglycemia.  Labs were consistent with hyperkalemia (status post membrane stabilizing medication), anion gap metabolic acidosis with acute renal failure, lactic acidosis to 5 without any source of infection, ultrasound with bilateral renal cysts with mild right pelvocaliectasis.  On 09/07 patient is alert and oriented however starting additional fluids along with D10 given persistent hypoglycemia, nephrology consulted given acute renal failure, further test ordered.  On 9/08 glucose much better and discontinuing D10 infusion, lactic acid better however still in acute renal failure, having good urine output, checking CT renal protocol.  CT did demonstrate right-sided hydronephrosis with hydroureter, no definite calculus seen, urology consulted and planning cystoscopy with stent placement today. Unfortunately Dr Garcia unable to place stent and now recommendations for right percutaneous nephrostomy to relieve hydronephrosis, discussed with Radiology, ideally patient would need to be off aspirin for 3 days, this would be tomorrow, states the left kidney appears normal and should be compensating and expresses concern about possible alternate etiology, discussed with patient who is agreeable to wait until tomorrow for nephrostomy (explained with aspirin increased risk of bleeding). On 9/11 patient underwent placement of right percutaneous nephrostomy by IR, will continue to monitor renal function closely. Renal function was  monitored throughout hospital course, nephrology following, did repeat CT renal protocol on 9/13 which reported favorable response to decompression and renal function was slowly downtrending and patient reports feeling well. Did have hematuria which seems to be clearing. He was cleared by Nephrology for discharge.  Discharge plan including medication changes, follow-up, return precautions were explained in detail to the patient and wife present at bedside.  No objection to discharge.  All questions/concerns were addressed.    Discharge examination  Lying in bed, comfortable, alert and oriented  Regular heart rhythm  Not on supplemental oxygen  Abdomen soft and nontender  Right-sided nephrostomy with urine draining    Discharge recommendations  Discontinued oral hypoglycemic and insulin sliding scale prescribed until renal function recovers  Discontinued losartan, amlodipine and hydralazine for blood pressure control  Weekly renal panel with results forwarded to nephrologist  Follow-up with nephrologist 1 week  Follow-up with his urologist Dr. Danielle     Consults:   Consults (From admission, onward)        Status Ordering Provider     Inpatient consult to Hospitalist  Once     Provider:  Michelle Lira MD    Acknowledged CHRIS CATALAN JR     Inpatient consult to Nephrology  Once     Provider:  Ralph Ceballos MD    Acknowledged CHRIS CATALAN JR     Inpatient consult to Urology  Once     Provider:  Chris Catalan Jr., MD    Completed BRENDA POTTS          No new Assessment & Plan notes have been filed under this hospital service since the last note was generated.  Service: Hospital Medicine    Final Active Diagnoses:    Diagnosis Date Noted POA    PRINCIPAL PROBLEM:  Acute renal failure [N19] 09/07/2020 Yes    Non-insulin dependent type 2 diabetes mellitus, previously with hypoglycemia and now hyperglycemia [E11.9] 09/07/2020 Yes     Chronic    Hematuria [R31.9] 09/13/2020 No    Renal cyst [N28.1]  09/09/2020 Not Applicable     Chronic    Essential hypertension [I10] 09/07/2020 Yes     Chronic    History of renal cell carcinoma status post partial nephrectomy [C64.1] 09/07/2020 Yes     Chronic    Anemia [D64.9] 09/07/2020 Yes     Chronic      Problems Resolved During this Admission:    Diagnosis Date Noted Date Resolved POA    Right hydronephrosis with hydroureter [N13.39] 09/09/2020 09/13/2020 Yes    Persistent hypoglycemia [E16.2] 09/07/2020 09/08/2020 Yes    Hyperkalemia [E87.5] 09/07/2020 09/11/2020 Yes    Lactic acidosis [E87.2] 09/08/2020 09/09/2020 Yes    High anion gap metabolic acidosis [E87.2] 09/07/2020 09/11/2020 Yes    Hyperphosphatemia [E83.39] 09/08/2020 09/10/2020 Yes    Shortness of breath [R06.02] 09/09/2020 09/12/2020 Yes    Hyponatremia [E87.1] 09/07/2020 09/07/2020 Yes       Discharged Condition: good    Disposition: Home or Self Care    Follow Up:  Follow-up Information     Arnold Heredia MD. Schedule an appointment as soon as possible for a visit in 1 week.    Specialty: Nephrology  Why: post hospital follow up  Contact information:  [address] [facility] NEPHROLOGY [facility] [address]  [phone]             Sravanthi Danielle MD. Schedule an appointment as soon as possible for a visit in 1 week.    Specialty: Urology  Why: post hospital follow up  Contact information:  [address]  [facility] [id number] [phone]             Mesfin Cisneros MD. Schedule an appointment as soon as possible for a visit in 1 week.    Specialty: Family Medicine  Why: post hospital follow up  Contact information:  [address] [place] [address]  [phone]                 Patient Instructions:      Renal function panel   Standing Status: Future Standing Exp. Date: 10/14/20   Order Comments: Monitoring renal function     Diet renal     Notify your health care provider if you experience any of the following:  temperature >100.4     Notify your health care provider  if you experience any of the following:  persistent nausea and vomiting or diarrhea     Notify your health care provider if you experience any of the following:  severe uncontrolled pain     Notify your health care provider if you experience any of the following:  difficulty breathing or increased cough     Notify your health care provider if you experience any of the following:  severe persistent headache     Notify your health care provider if you experience any of the following:  persistent dizziness, light-headedness, or visual disturbances     Notify your health care provider if you experience any of the following:  increased confusion or weakness     Activity as tolerated       Significant Diagnostic Studies: Labs:   BMP:   Recent Labs   Lab 09/13/20  0506 09/14/20  0432   * 188*    138   K 3.6 3.4*    100   CO2 23 22*   * 104*   CREATININE 11.5* 9.9*   CALCIUM 8.2* 8.2*   MG  --  2.0   , CMP   Recent Labs   Lab 09/13/20  0506 09/14/20  0432    138   K 3.6 3.4*    100   CO2 23 22*   * 188*   * 104*   CREATININE 11.5* 9.9*   CALCIUM 8.2* 8.2*   PROT 6.8  --    ALBUMIN 3.2*  --    BILITOT 1.4*  --    ALKPHOS 57  --    AST 28  --    ALT 19  --    ANIONGAP 17* 16   ESTGFRAFRICA 4.4* 5.2*   EGFRNONAA 3.8* 4.5*   , CBC   Recent Labs   Lab 09/14/20  0432   WBC 7.91   HGB 9.8*   HCT 29.8*      , INR   Lab Results   Component Value Date    INR 1.3 09/07/2020   , Lipid Panel No results found for: CHOL, HDL, LDLCALC, TRIG, CHOLHDL, Troponin No results for input(s): TROPONINI in the last 168 hours., A1C:   Recent Labs   Lab 04/03/20  1003 09/07/20  0720 09/08/20  0402   HGBA1C 6.7* 6.1 5.9    and All labs within the past 24 hours have been reviewed    Pending Diagnostic Studies:     Procedure Component Value Units Date/Time    Briscoe's Stain, Urine Random [[id number]] Collected: 09/07/20 1402    Order Status: Sent Lab Status: No result     Specimen: Urine, Clean  Catch        Us Guided Needle Placement    Result Date: 9/11/2020  CMS MANDATED QUALITY [id number] Total fluoroscopic time for the procedure was 2 minutes 12 seconds, with 2 fluoroscopic images obtained. NEPHROSTOMY TUBE INSERTION AND NEPHROSTOGRAM HISTORY: Right renal cell carcinoma, acute renal failure with severe right-sided hydronephrosis, unable to place retrograde ureteral stent. TECHNIQUE: Written informed consent was obtained for the procedure, with a discussion of the risks and benefits to include infection, bleeding, bleeding around the kidney requiring surgery, and loss of the kidney. The patient agreed to proceed. The patient was placed prone on the fluoroscopy table and the right kidney was localized with ultrasound guidance. The skin was marked, and then prepped and draped in sterile fashion, with several milliliters of lidocaine 1% injected to achieve adequate local anesthesia. Following, ultrasound guidance was used to place a coaxial needle into the right renal pelvis, with removal of the stylette and return of bloody urine. Micropuncture catheter and dilator were placed, with subsequent exchange for guidewire, and Seldinger technique utilized to place 8 [place] multi-sidehole locking loop drainage catheter. The catheter loop was then formed within the renal pelvis centrally and was locked. Approximately 15 mL of iodinated contrast was then injected into the right renal collecting system. Following nephrostogram, the catheter was secured to the skin with 2.0 silk sutures, at the nephrostomy tube insertion site. Catheter was placed to gravity drainage. There was no blood loss. Total moderate conscious sedation time with monitoring by the radiologist was 25  minutes. The patient received Versed 3 mg IV and Fentanyl 125 mcg IV during the procedure. FINDINGS: Initial fluoroscopic image shows retained injected IV contrast within dilated right renal pelvis and right ureter. There is subsequent  catheter placement, with final image showing locking loop within the right renal pelvis centrally. IMPRESSION: Successful right nephrostomy tube insertion and nephrostogram. Electronically Signed by Michi CHEATHAM on 9/11/2020 9:26 AM    Ir Nephrostomy Tube Placement    Result Date: 9/11/2020  CMS MANDATED QUALITY [id number] Total fluoroscopic time for the procedure was 2 minutes 12 seconds, with 2 fluoroscopic images obtained. NEPHROSTOMY TUBE INSERTION AND NEPHROSTOGRAM HISTORY: Right renal cell carcinoma, acute renal failure with severe right-sided hydronephrosis, unable to place retrograde ureteral stent. TECHNIQUE: Written informed consent was obtained for the procedure, with a discussion of the risks and benefits to include infection, bleeding, bleeding around the kidney requiring surgery, and loss of the kidney. The patient agreed to proceed. The patient was placed prone on the fluoroscopy table and the right kidney was localized with ultrasound guidance. The skin was marked, and then prepped and draped in sterile fashion, with several milliliters of lidocaine 1% injected to achieve adequate local anesthesia. Following, ultrasound guidance was used to place a coaxial needle into the right renal pelvis, with removal of the stylette and return of bloody urine. Micropuncture catheter and dilator were placed, with subsequent exchange for guidewire, and Seldinger technique utilized to place 8 Yi multi-sidehole locking loop drainage catheter. The catheter loop was then formed within the renal pelvis centrally and was locked. Approximately 15 mL of iodinated contrast was then injected into the right renal collecting system. Following nephrostogram, the catheter was secured to the skin with 2.0 silk sutures, at the nephrostomy tube insertion site. Catheter was placed to gravity drainage. There was no blood loss. Total moderate conscious sedation time with monitoring by the radiologist was 25   minutes. The patient received Versed 3 mg IV and Fentanyl 125 mcg IV during the procedure. FINDINGS: Initial fluoroscopic image shows retained injected IV contrast within dilated right renal pelvis and right ureter. There is subsequent catheter placement, with final image showing locking loop within the right renal pelvis centrally. IMPRESSION: Successful right nephrostomy tube insertion and nephrostogram. Electronically Signed by Michi CHEATHAM on 9/11/2020 9:26 AM    Fl Flouro Usage    Result Date: 9/9/2020  See Notes This procedure was auto-finalized.    X-ray Chest Ap Portable    Result Date: 9/7/2020  PROCEDURE:   XR CHEST AP PORTABLE  dated  9/7/2020 12:43 AM CLINICAL HISTORY:   Male 75 years of age.   CHF TECHNIQUE: AP view of the chest obtained portably at 12:43 AM. PREVIOUS STUDIES:  March 18, 2019 FINDINGS: Lung volumes are low and the right hemidiaphragm is elevated. Right lung bases mildly atelectatic over the hemidiaphragm. There is no pulmonary infiltrate. There is no pleural effusion or pneumothorax. The heart is not enlarged. Mediastinal contours are normal. IMPRESSION: No acute findings. Normal size heart. Electronically Signed by Lorena Martínez on 9/7/2020 7:28 AM    Us Kidney    Result Date: 9/7/2020  PROCEDURE:    US KIDNEY  dated  9/7/2020 7:13 AM CLINICAL HISTORY:   Male 75 years of age.   MELODIE r/o obstruction  . History of right renal carcinoma, status post right partial nephrectomy. TECHNIQUE:  Sonographic evaluation of the kidneys was performed. PREVIOUS STUDIES:  CT September 10, 2019 FINDINGS: There are bilateral renal cysts. The largest on the right is in the lower pole and measures 1.7 cm in diameter. The largest on the left extends off of the posterior lower pole and measures 2.4 cm. There is mild right pelvocaliectasis. This was not present at the time of the prior CT September 2019. There is no left hydronephrosis. Urinary bladder is mildly filled. Assessment of bladder is limited.  IMPRESSION: Mild right pelvocaliectasis, not present on CT from September 2019. Uncertain etiology. Mildly filled urinary bladder. Bilateral renal cysts. Electronically Signed by Lorena Martínez on 9/7/2020 8:01 AM    Ct Renal Stone Study Abd Pelvis Wo    Result Date: 9/13/2020  CMS MANDATED QUALITY DATA - CT RADIATION - 436 All CT scans at this facility utilize dose modulation, iterative reconstruction, and/or weight based dosing when appropriate to reduce radiation dose to as low as reasonably achievable. EXAMINATION: CT RENAL STONE STUDY ABD PELVIS WO CLINICAL HISTORY: MELODIE,;  right renal cell carcinoma with partial right nephrectomy.  Recent placement of a right nephrostomy tube TECHNIQUE: CT abdomen and pelvis without IV or oral contrast. Study is tailored for detection of urinary tract calculi and evaluation of solid organs, hollow viscera, and vascular structures is limited. COMPARISON: 09/08/2020 FINDINGS: CT Abdomen: There is atelectasis in the right lung base.  The left lung base is clear.  There is coronary artery calcification. The liver, spleen and pancreas are normal.  There is no biliary duct dilatation. There is stable mild distention of the gallbladder with diffuse increased density within the gallbladder.  There are multiple gallstones.  There is no gallbladder wall thickening or edema. The adrenal glands are normal. There is a right nephrostomy tube in place with the proximal end in the right renal pelvis.  There is resolution of the right hydroureteronephrosis.  There are stable postsurgical changes of the posterior aspect of the right kidney.  There are stable bilateral renal masses including hyperdense masses within the lower pole the left kidney which were shown to represent cysts.  There is mild stranding in the right perinephric fat. There is a stable tiny nonobstructing stone in the midpole of the right kidney.  There is no evidence of ureteral stone. There are no thick-walled or dilated  bowel loops.  There is a small fat containing umbilical hernia.  There is no adenopathy. CT Pelvis: There is sigmoid diverticulosis without diverticulitis.  The bladder is collapsed with mild wall thickening.  The prostate gland is normal.  There are no osseous abnormalities.     Placement of a right nephrostomy tube with resolution of the right hydroureteronephrosis. 2 mm non-obstructing right renal stone Mild stranding in the right perinephric fat Stable bilateral renal masses shown to represent cysts Cholelithiasis Degenerative changes of the spine Electronically signed by: Prema Salazar MD Date:    09/13/2020 Time:    09:04    Ct Renal Stone Study Abd Pelvis Wo    Result Date: 9/8/2020  CMS MANDATED QUALITY DATA - CT RADIATION 436 All CT scans at this facility utilize dose modulation, iterative reconstruction, and/or weight based dosing when appropriate to reduce radiation dose to as low as reasonably achievable. CT of the abdomen without contrast and CT of the pelvis without contrast HISTORY: Kidney injury, There are surgical changes again noted to involve the posterior aspect of the mid right kidney. There are bilateral renal masses including hyperdense masses within the mid and lower left kidney which have been previously demonstrated to represent cysts on ultrasound. There has been interval development of right-sided hydronephrosis and hydroureter to the level of the upper pelvis where the ureter assumes a normal caliber. A etiology of this obstruction is not identified with certainty. Correlation with retrograde pyelography is recommended. There is no left-sided hydronephrosis. There is a tiny nonobstructing calculus within the mid right kidney. Bilateral perinephric stranding is unchanged in the prior examination. There is prominent dilatation of the gallbladder. Diffuse increase in density within the gallbladder lumen could represent presence of gallbladder sludge or vicarious excretion of contrast.  There are multiple partially calcified gallstones observed dependently within the gallbladder lumen. There is a small stone within the cystic duct. The unenhanced liver and spleen appear normal in size and demonstrate no definite focal parenchymal abnormality or the gallbladder appears unremarkable. The adrenal glands are not enlarged. Scattered atheromatous changes are again noted within the abdominal aorta and abdominal and pelvic branch vessels without aneurysmal dilatation. There are numerous colonic diverticuli without findings of acute diverticulitis. There is no bowel wall thickening or evidence of obstruction. Mildly prominent mesenteric lymph nodes remain unchanged. There is a small fat-containing left inguinal hernia. The urinary bladder is incompletely distended and as such wall thickness is difficult to evaluate. No intrinsic bladder abnormality is identified. There are no pelvic masses or adenopathy. Dependent pulmonary opacities likely represent mild volume loss a tiny subpleural nodule within the left lower lobe at the lung base remains unchanged from prior year's exam. Mild changes of edema and small components of air within the subcutaneous fat of the lower anterior abdominal wall or potentially iatrogenic. IMPRESSION: Detrimental interval development of right-sided hydronephrosis and hydroureter to the level of the upper pelvis without a defined etiology. Urologic consultation/retrograde pyelography is recommended for further assessment. Distention of the gallbladder. There is diffuse increase in density within the gallbladder lumen as well as tarsal calcified dependent gallstones. A tiny calculus is observed within the cystic duct. This appearance is similar to the previous examination. Multiple small additional observations as above. Electronically Signed by Linda Hill M.D. on 9/8/2020 1:19 PM    Medications:  Reconciled Home Medications:      Medication List      START taking these medications     amLODIPine 10 MG tablet  Commonly known as: NORVASC  Take 1 tablet (10 mg total) by mouth once daily.  Start taking on: September 15, 2020     hydrALAZINE 25 MG tablet  Commonly known as: APRESOLINE  Take 1 tablet (25 mg total) by mouth 3 (three) times daily.     insulin aspart U-100 100 unit/mL (3 mL) Inpn pen  Commonly known as: NovoLOG Flexpen U-100 Insulin  Check glucose before meals and then take insulin as per sliding scale     insulin regular 100 unit/mL injection  Inject 1-12 Units into the skin as needed for High Blood Sugar (Administer as needed according to scheduled POCT Glucose monitoring schedule).        CHANGE how you take these medications    CURCUMIN MISC  1 Dose by Misc.(Non-Drug; Combo Route) route once daily.  What changed: Another medication with the same name was removed. Continue taking this medication, and follow the directions you see here.        CONTINUE taking these medications    CENTRUM SILVER ORAL  Take 1 capsule by mouth once daily.     cholecalciferol (vitamin D3) 50 mcg (2,000 unit) Cap  Commonly known as: VITAMIN D3  Take 1 capsule by mouth once daily.     clonazePAM 2 MG Tab  Commonly known as: KLONOPIN  Take 2 mg by mouth every evening.     GINGER EXTRACT 250 mg Cap  Generic drug: ginger (Zingiber officinalis)  Take 1 capsule by mouth once daily.     rosuvastatin 20 MG tablet  Commonly known as: CRESTOR  Take 20 mg by mouth once daily.     selenium 200 mcg Cap  Take 1 capsule by mouth once daily.     TRUE METRIX GLUCOSE TEST STRIP Strp  Generic drug: blood sugar diagnostic     VITAMIN C 500 MG tablet  Generic drug: ascorbic acid (vitamin C)  Take 500 mg by mouth once daily.        STOP taking these medications    ENTERIC COATED ASPIRIN 81 MG EC tablet  Generic drug: aspirin     glimepiride 4 MG tablet  Commonly known as: AMARYL     JANUMET 50-1,000 mg per tablet  Generic drug: SITagliptan-metformin     NON FORMULARY MEDICATION            Indwelling Lines/Drains at time of  discharge:   Lines/Drains/Airways     Drain                 Nephrostomy 09/11/20 0800 Right 3 days                Time spent on the discharge of patient: 41 minutes  Patient was seen and examined on the date of discharge and determined to be suitable for discharge.         Trinidad Boo MD  Department of Hospital Medicine  Formerly [facility]

## 2020-09-14 NOTE — CONSULTS
INPATIENT NEPHROLOGY CONSULT   [facility] NEPHROLOGY    Jamil Coffmanevette  09/14/2020    Reason for consultation:  MELODIE    History of Present Illness:  75 y.o.  male who  has a past medical history of Diabetes mellitus, type 2.. R/ Renal cell cancer s/p partial nephrectomy appropriately 16 months ago in [facility] The patient presented to [facility] on 9/6/2020 with a primary complaint of Hypoglycemia (pt brought in by EMS with c/o weakness. CBG 52 upon EMS arrival. pt given one amp of D50 en route with EMS with improvement to ).     Patient was apparently well 4 days ago, started noticed nausea, loss of appetite and generalized weakness with hypoglycemia.  He was working on his yard under hot sun, not sure about adequate hydration recently.  Came to ED with concern of low sugar of 54, which was corrected in ED with IV dextrose, noted to have severe acute renal failure with hyperkalemia.  No urine output noted since admission in ED.  Hospitalist consulted for further eval, repeat BMP in 4 hours, prior to hyperkalemia cocktail showed resolution of hyperkalemia.  However renal failure remains remarkable .  Bladder scan at bedside 245cc, during my eval patient passed urine without any difficulty.  Reports history of renal stone in the past nonobstructive.    9/7  No nausea, chest pain, sob, fever, urinary or bowel complaint, new neurologic symptoms, new joint pain,  9/8 VSS, no new complains.  9/9 VSS, UO is better, K and acidosis are better, sCr is better. Appreciate Urology input, agree with plan for stent and retrograde pyelogram in AM.   9/11 VSS, s/p nephrostomy tube placement by IR 9/11. Monitor for now...   9/12 VSS, s/p nephrostomy tube placement by IR 9/11. UO is better, but sCr still lelevated. Monitor for now, no HD today...   9/13 VSS, s/p nephrostomy tube placement by IR 9/11. UO is better again today, sCr still elevated but improving. Monitor for now, no HD needed, if continues to  improve tomorrow, may be dc with outpatient f/u with me and weekly labs... Repeat CT with stone protocol on 9/13 does not give clear cause of obstructive uropathy but shows resolution after perc neph.  9/14 No nausea, chest pain, sob, fever, urinary or bowel complaint, new neurologic symptoms, new joint pain,      Plan of Care:    Acute kidney injury likely due to intravascular volume depletion, obstruction?.    --renal us shows mild right pelvocaliectasis, not present on CT from September 2019, confirmed on repeat CT 9/8, attempted stent and retrograde pyelogram 9/10 but unsuccessful, now s/p perc neph 9/11.  --rheumatology serologies show positive ZACH, significance unclear, will repeat when more stable.  --renal dose medication  -stable for d/c today    Hyperkalemia secondary to MELODIE  --[place]  --low k diet    Metabolic acidosis  Hyponatremia  --bicarb infusion, off now  --no respiratory difficulty at this time  --good UO    Anemia  --iron panel ok  --trend  --blood products per primary    Thank you for allowing us to participate in this patient's care. We will continue to follow.    Vital Signs:  Temp Readings from Last 3 Encounters:   09/14/20 98.2 °F (36.8 °C) (Oral)   12/19/19 97.5 °F (36.4 °C)   10/08/19 97.3 °F (36.3 °C)       Pulse Readings from Last 3 Encounters:   09/14/20 86   12/19/19 90   10/08/19 93       BP Readings from Last 3 Encounters:   09/14/20 119/62   12/19/19 (!) 157/92   10/08/19 133/73       Weight:  Wt Readings from Last 3 Encounters:   09/07/20 80 kg (176 lb 5.9 oz)   12/19/19 81.7 kg (180 lb 3.2 oz)   10/08/19 80.8 kg (178 lb 3.2 oz)       Past Medical & Surgical History:  Past Medical History:   Diagnosis Date    Diabetes mellitus, type 2        Past Surgical History:   Procedure Laterality Date    APPENDECTOMY      CYSTOSCOPY W/ RETROGRADES Right 9/9/2020    Procedure: CYSTOSCOPY, WITH RETROGRADE PYELOGRAM;  Surgeon: Chris Garcia Jr., MD;  Location: [facility];  Service: Urology;   Laterality: Right;    SPINE SURGERY      URETHROSCOPY  9/9/2020    Procedure: URETHROSCOPY;  Surgeon: Chris Garcia Jr., MD;  Location: [facility];  Service: Urology;;       Past Social History:  Social History     Socioeconomic History    Marital status:      Spouse name: Not on file    Number of children: Not on file    Years of education: Not on file    Highest education level: Not on file   Occupational History    Not on file   Social Needs    Financial resource strain: Not on file    Food insecurity     Worry: Not on file     Inability: Not on file    Transportation needs     Medical: Not on file     Non-medical: Not on file   Tobacco Use    Smoking status: Never Smoker    Smokeless tobacco: Never Used   Substance and Sexual Activity    Alcohol use: No    Drug use: No    Sexual activity: Yes   Lifestyle    Physical activity     Days per week: Not on file     Minutes per session: Not on file    Stress: Not on file   Relationships    Social connections     Talks on phone: Not on file     Gets together: Not on file     Attends Faith service: Not on file     Active member of club or organization: Not on file     Attends meetings of clubs or organizations: Not on file     Relationship status: Not on file   Other Topics Concern    Not on file   Social History Narrative    Not on file       Medications:  No current facility-administered medications on file prior to encounter.      Current Outpatient Medications on File Prior to Encounter   Medication Sig Dispense Refill    ascorbic acid, vitamin C, (VITAMIN C) 500 MG tablet Take 500 mg by mouth once daily.      cholecalciferol, vitamin D3, (VITAMIN D3) 50 mcg (2,000 unit) Cap Take 1 capsule by mouth once daily.      clonazePAM (KLONOPIN) 2 MG Tab Take 2 mg by mouth every evening.       madi, Zingiber officinalis, (MADI EXTRACT) 250 mg Cap Take 1 capsule by mouth once daily.      rosuvastatin (CRESTOR) 20 MG tablet Take 20 mg by  "mouth once daily.      selenium 200 mcg Cap Take 1 capsule by mouth once daily.      turmeric (CURCUMIN MISC) 1 Dose by Misc.(Non-Drug; Combo Route) route once daily.      [DISCONTINUED] aspirin (ENTERIC COATED ASPIRIN) 81 MG EC tablet Take 81 mg by mouth once daily. 1 Tablet, Delayed Release (E.C.) Oral Every day      [DISCONTINUED] NON FORMULARY MEDICATION Take 1 tablet by mouth once daily. TRIPLE MUSHROOM COMPLEX      [DISCONTINUED] TURMERIC ORAL Take 1 tablet by mouth once daily.      MULTIVITAMIN W-MINERALS/LUTEIN (CENTRUM SILVER ORAL) Take 1 capsule by mouth once daily.       TRUE METRIX GLUCOSE TEST STRIP Strp       [DISCONTINUED] glimepiride (AMARYL) 4 MG tablet Take 4 mg by mouth 2 (two) times daily.       [DISCONTINUED] sitagliptan-metformin (JANUMET) 50-1,000 mg per tablet Take 1 tablet by mouth 2 (two) times daily with meals. 1 Tablet Oral Twice a day        Scheduled Meds:   amLODIPine  10 mg Oral Daily    ascorbic acid (vitamin C)  500 mg Oral Daily    hydrALAZINE  25 mg Oral Q8H    rosuvastatin  10 mg Oral QHS     Continuous Infusions:    PRN Meds:.acetaminophen, acetaminophen, clonazePAM, dextrose 50%, dextrose 50%, glucagon (human recombinant), glucose, glucose, hydrALAZINE, HYDROcodone-acetaminophen, HYDROmorphone, insulin regular, melatonin, ondansetron    Allergies:  Patient has no known allergies.    Past Family History:  Reviewed; refer to Hospitalist Admission Note    Review of Systems:  Review of Systems - All 14 systems reviewed and negative, except as noted in HPI    Physical Exam:    /62   Pulse 86   Temp 98.2 °F (36.8 °C) (Oral)   Resp 18   Ht 5' 11" (1.803 m)   Wt 80 kg (176 lb 5.9 oz)   SpO2 96%   BMI 24.60 kg/m²     General Appearance:    Alert, cooperative, no distress, appears stated age   Head:    Normocephalic, without obvious abnormality, atraumatic   Eyes:    PER, conjunctiva/corneas clear, EOM's intact in both eyes        Throat:   Lips, mucosa, and " tongue normal; teeth and gums normal   Back:     Symmetric, no curvature, ROM normal, no CVA tenderness   Lungs:     Clear to auscultation bilaterally, respirations unlabored   Chest wall:    No tenderness or deformity   Heart:    Regular rate and rhythm, S1 and S2 normal, no murmur, rub   or gallop   Abdomen:     Soft, non-tender, bowel sounds active all four quadrants,     no masses, no organomegaly   Extremities:   Extremities normal, atraumatic, no cyanosis or edema   Pulses:   2+ and symmetric all extremities   MSK:   No joint or muscle swelling, tenderness or deformity   Skin:   Skin color, texture, turgor normal, no rashes or lesions   Neurologic:   CNII-XII intact, normal strength and sensation       Throughout.  No flap.  Has a tremor     Results:  Lab Results   Component Value Date     09/14/2020    K 3.4 (L) 09/14/2020     09/14/2020    CO2 22 (L) 09/14/2020     (H) 09/14/2020    CREATININE 9.9 (H) 09/14/2020    CALCIUM 8.2 (L) 09/14/2020    ANIONGAP 16 09/14/2020    ESTGFRAFRICA 5.2 (A) 09/14/2020    EGFRNONAA 4.5 (A) 09/14/2020       Lab Results   Component Value Date    CALCIUM 8.2 (L) 09/14/2020    PHOS 8.3 (H) 09/14/2020       Recent Labs   Lab 09/14/20  0432   WBC 7.91   RBC 3.21*   HGB 9.8*   HCT 29.8*      MCV 93   MCH 30.5   MCHC 32.9          I have personally reviewed pertinent radiological imaging and reports.

## 2020-09-18 ENCOUNTER — LAB VISIT (OUTPATIENT)
Dept: LAB | Facility: HOSPITAL | Age: 75
End: 2020-09-18
Attending: INTERNAL MEDICINE
Payer: MEDICARE

## 2020-09-18 DIAGNOSIS — N17.9 ACUTE RENAL FAILURE, UNSPECIFIED ACUTE RENAL FAILURE TYPE: ICD-10-CM

## 2020-09-18 LAB
ALBUMIN SERPL BCP-MCNC: 3.5 G/DL (ref 3.5–5.2)
ANION GAP SERPL CALC-SCNC: 14 MMOL/L (ref 8–16)
BUN SERPL-MCNC: 74 MG/DL (ref 8–23)
CALCIUM SERPL-MCNC: 8.9 MG/DL (ref 8.7–10.5)
CHLORIDE SERPL-SCNC: 100 MMOL/L (ref 95–110)
CO2 SERPL-SCNC: 21 MMOL/L (ref 23–29)
CREAT SERPL-MCNC: 5.5 MG/DL (ref 0.5–1.4)
EST. GFR  (AFRICAN AMERICAN): 10.6 ML/MIN/1.73 M^2
EST. GFR  (NON AFRICAN AMERICAN): 9.2 ML/MIN/1.73 M^2
GLUCOSE SERPL-MCNC: 485 MG/DL (ref 70–110)
PHOSPHATE SERPL-MCNC: 5 MG/DL (ref 2.7–4.5)
POTASSIUM SERPL-SCNC: 4.3 MMOL/L (ref 3.5–5.1)
SODIUM SERPL-SCNC: 135 MMOL/L (ref 136–145)

## 2020-09-18 PROCEDURE — 36415 COLL VENOUS BLD VENIPUNCTURE: CPT

## 2020-09-18 PROCEDURE — 80069 RENAL FUNCTION PANEL: CPT

## 2020-09-25 ENCOUNTER — LAB VISIT (OUTPATIENT)
Dept: LAB | Facility: HOSPITAL | Age: 75
End: 2020-09-25
Attending: INTERNAL MEDICINE
Payer: MEDICARE

## 2020-09-25 DIAGNOSIS — N17.9 ACUTE KIDNEY FAILURE, UNSPECIFIED: Primary | ICD-10-CM

## 2020-09-25 LAB
ALBUMIN SERPL BCP-MCNC: 3.8 G/DL (ref 3.5–5.2)
ANION GAP SERPL CALC-SCNC: 12 MMOL/L (ref 8–16)
BUN SERPL-MCNC: 58 MG/DL (ref 8–23)
CALCIUM SERPL-MCNC: 9.5 MG/DL (ref 8.7–10.5)
CHLORIDE SERPL-SCNC: 104 MMOL/L (ref 95–110)
CO2 SERPL-SCNC: 21 MMOL/L (ref 23–29)
CREAT SERPL-MCNC: 3.7 MG/DL (ref 0.5–1.4)
EST. GFR  (AFRICAN AMERICAN): 17.2 ML/MIN/1.73 M^2
EST. GFR  (NON AFRICAN AMERICAN): 14.9 ML/MIN/1.73 M^2
GLUCOSE SERPL-MCNC: 304 MG/DL (ref 70–110)
PHOSPHATE SERPL-MCNC: 5 MG/DL (ref 2.7–4.5)
POTASSIUM SERPL-SCNC: 5.1 MMOL/L (ref 3.5–5.1)
SODIUM SERPL-SCNC: 137 MMOL/L (ref 136–145)

## 2020-09-25 PROCEDURE — 36415 COLL VENOUS BLD VENIPUNCTURE: CPT

## 2020-09-25 PROCEDURE — 80069 RENAL FUNCTION PANEL: CPT

## 2020-10-02 ENCOUNTER — LAB VISIT (OUTPATIENT)
Dept: LAB | Facility: HOSPITAL | Age: 75
End: 2020-10-02
Attending: FAMILY MEDICINE
Payer: MEDICARE

## 2020-10-02 DIAGNOSIS — Z79.899 ENCOUNTER FOR LONG-TERM (CURRENT) USE OF OTHER MEDICATIONS: ICD-10-CM

## 2020-10-02 DIAGNOSIS — R35.0 URINARY FREQUENCY: ICD-10-CM

## 2020-10-02 DIAGNOSIS — N18.9 CHRONIC KIDNEY DISEASE, UNSPECIFIED: ICD-10-CM

## 2020-10-02 DIAGNOSIS — E11.9 DIABETES MELLITUS WITHOUT COMPLICATION: ICD-10-CM

## 2020-10-02 DIAGNOSIS — E78.5 HYPERLIPEMIA: Primary | ICD-10-CM

## 2020-10-02 DIAGNOSIS — I10 ESSENTIAL HYPERTENSION, MALIGNANT: ICD-10-CM

## 2020-10-02 DIAGNOSIS — K29.01 ACUTE GASTRITIS WITH HEMORRHAGE: ICD-10-CM

## 2020-10-02 DIAGNOSIS — D50.9 IRON DEFICIENCY ANEMIA, UNSPECIFIED: ICD-10-CM

## 2020-10-02 DIAGNOSIS — E83.40 DISORDER OF MAGNESIUM METABOLISM: ICD-10-CM

## 2020-10-02 DIAGNOSIS — E55.9 AVITAMINOSIS D: ICD-10-CM

## 2020-10-02 LAB
25(OH)D3+25(OH)D2 SERPL-MCNC: 65 NG/ML (ref 30–96)
ALBUMIN SERPL BCP-MCNC: 3.5 G/DL (ref 3.5–5.2)
ALBUMIN SERPL BCP-MCNC: 3.5 G/DL (ref 3.5–5.2)
ALBUMIN/CREAT UR: 163.7 UG/MG (ref 0–30)
ALP SERPL-CCNC: 84 U/L (ref 55–135)
ALT SERPL W/O P-5'-P-CCNC: 23 U/L (ref 10–44)
ANION GAP SERPL CALC-SCNC: 10 MMOL/L (ref 8–16)
ANION GAP SERPL CALC-SCNC: 11 MMOL/L (ref 8–16)
AST SERPL-CCNC: 19 U/L (ref 10–40)
BACTERIA #/AREA URNS HPF: ABNORMAL /HPF
BILIRUB SERPL-MCNC: 0.8 MG/DL (ref 0.1–1)
BILIRUB UR QL STRIP: NEGATIVE
BUN SERPL-MCNC: 39 MG/DL (ref 8–23)
BUN SERPL-MCNC: 39 MG/DL (ref 8–23)
CALCIUM SERPL-MCNC: 8.8 MG/DL (ref 8.7–10.5)
CALCIUM SERPL-MCNC: 8.8 MG/DL (ref 8.7–10.5)
CHLORIDE SERPL-SCNC: 105 MMOL/L (ref 95–110)
CHLORIDE SERPL-SCNC: 106 MMOL/L (ref 95–110)
CHOLEST SERPL-MCNC: 106 MG/DL (ref 120–199)
CHOLEST/HDLC SERPL: 3.4 {RATIO} (ref 2–5)
CLARITY UR: ABNORMAL
CO2 SERPL-SCNC: 20 MMOL/L (ref 23–29)
CO2 SERPL-SCNC: 22 MMOL/L (ref 23–29)
COLOR UR: YELLOW
CREAT SERPL-MCNC: 2.8 MG/DL (ref 0.5–1.4)
CREAT SERPL-MCNC: 2.8 MG/DL (ref 0.5–1.4)
CREAT UR-MCNC: 116 MG/DL (ref 23–375)
EST. GFR  (AFRICAN AMERICAN): 24.1 ML/MIN/1.73 M^2
EST. GFR  (AFRICAN AMERICAN): 24.1 ML/MIN/1.73 M^2
EST. GFR  (NON AFRICAN AMERICAN): 20.8 ML/MIN/1.73 M^2
EST. GFR  (NON AFRICAN AMERICAN): 20.8 ML/MIN/1.73 M^2
ESTIMATED AVG GLUCOSE: 194 MG/DL (ref 68–131)
FERRITIN SERPL-MCNC: 127 NG/ML (ref 20–300)
GLUCOSE SERPL-MCNC: 288 MG/DL (ref 70–110)
GLUCOSE SERPL-MCNC: 289 MG/DL (ref 70–110)
GLUCOSE UR QL STRIP: ABNORMAL
HBA1C MFR BLD HPLC: 8.4 % (ref 4.5–6.2)
HDLC SERPL-MCNC: 31 MG/DL (ref 40–75)
HDLC SERPL: 29.2 % (ref 20–50)
HGB UR QL STRIP: ABNORMAL
HYALINE CASTS #/AREA URNS LPF: 16 /LPF
IRON SERPL-MCNC: 36 UG/DL (ref 45–160)
KETONES UR QL STRIP: NEGATIVE
LDLC SERPL CALC-MCNC: 60 MG/DL (ref 63–159)
LEUKOCYTE ESTERASE UR QL STRIP: ABNORMAL
MAGNESIUM SERPL-MCNC: 1.5 MG/DL (ref 1.6–2.6)
MICROALBUMIN UR DL<=1MG/L-MCNC: 189.9 UG/ML
MICROSCOPIC COMMENT: ABNORMAL
NITRITE UR QL STRIP: NEGATIVE
NONHDLC SERPL-MCNC: 75 MG/DL
PH UR STRIP: 6 [PH] (ref 5–8)
PHOSPHATE SERPL-MCNC: 4.6 MG/DL (ref 2.7–4.5)
POTASSIUM SERPL-SCNC: 4 MMOL/L (ref 3.5–5.1)
POTASSIUM SERPL-SCNC: 4 MMOL/L (ref 3.5–5.1)
PROT SERPL-MCNC: 6.9 G/DL (ref 6–8.4)
PROT UR QL STRIP: ABNORMAL
RBC #/AREA URNS HPF: 7 /HPF (ref 0–4)
SODIUM SERPL-SCNC: 137 MMOL/L (ref 136–145)
SODIUM SERPL-SCNC: 137 MMOL/L (ref 136–145)
SP GR UR STRIP: 1.01 (ref 1–1.03)
SQUAMOUS #/AREA URNS HPF: 2 /HPF
T4 FREE SERPL-MCNC: 0.95 NG/DL (ref 0.71–1.51)
TRIGL SERPL-MCNC: 75 MG/DL (ref 30–150)
TSH SERPL DL<=0.005 MIU/L-ACNC: 1.04 UIU/ML (ref 0.34–5.6)
URN SPEC COLLECT METH UR: ABNORMAL
UROBILINOGEN UR STRIP-ACNC: NEGATIVE EU/DL
VIT B12 SERPL-MCNC: 444 PG/ML (ref 210–950)
WBC #/AREA URNS HPF: >100 /HPF (ref 0–5)
YEAST URNS QL MICRO: ABNORMAL

## 2020-10-02 PROCEDURE — 83735 ASSAY OF MAGNESIUM: CPT

## 2020-10-02 PROCEDURE — 82043 UR ALBUMIN QUANTITATIVE: CPT

## 2020-10-02 PROCEDURE — 84439 ASSAY OF FREE THYROXINE: CPT

## 2020-10-02 PROCEDURE — 80053 COMPREHEN METABOLIC PANEL: CPT

## 2020-10-02 PROCEDURE — 82607 VITAMIN B-12: CPT

## 2020-10-02 PROCEDURE — 80069 RENAL FUNCTION PANEL: CPT | Mod: XB

## 2020-10-02 PROCEDURE — 84443 ASSAY THYROID STIM HORMONE: CPT

## 2020-10-02 PROCEDURE — 83036 HEMOGLOBIN GLYCOSYLATED A1C: CPT

## 2020-10-02 PROCEDURE — 83540 ASSAY OF IRON: CPT

## 2020-10-02 PROCEDURE — 82728 ASSAY OF FERRITIN: CPT

## 2020-10-02 PROCEDURE — 82306 VITAMIN D 25 HYDROXY: CPT

## 2020-10-02 PROCEDURE — 80061 LIPID PANEL: CPT

## 2020-10-02 PROCEDURE — 81001 URINALYSIS AUTO W/SCOPE: CPT

## 2020-10-06 LAB
ANA HOMOGEN TITR SER: ABNORMAL {TITER}
ANA NOTE: ABNORMAL
ANA TITR SER IF: POSITIVE {TITER}
C3 SERPL-MCNC: 104 MG/DL (ref 82–167)
C4 SERPL-MCNC: 28 MG/DL (ref 14–44)
HBV CORE AB SERPL QL IA: NEGATIVE
HBV SURFACE AB SER QL: NON REACTIVE
HBV SURFACE AG SERPL QL IA: NEGATIVE

## 2020-10-07 ENCOUNTER — OFFICE VISIT (OUTPATIENT)
Dept: FAMILY MEDICINE | Facility: CLINIC | Age: 75
End: 2020-10-07
Payer: MEDICARE

## 2020-10-07 VITALS
HEIGHT: 71 IN | TEMPERATURE: 97 F | SYSTOLIC BLOOD PRESSURE: 106 MMHG | BODY MASS INDEX: 23.94 KG/M2 | WEIGHT: 171 LBS | DIASTOLIC BLOOD PRESSURE: 64 MMHG | HEART RATE: 108 BPM

## 2020-10-07 DIAGNOSIS — N17.0 ACUTE RENAL FAILURE WITH ACUTE TUBULAR NECROSIS SUPERIMPOSED ON STAGE 3 CHRONIC KIDNEY DISEASE, UNSPECIFIED WHETHER STAGE 3A OR 3B CKD: ICD-10-CM

## 2020-10-07 DIAGNOSIS — C64.1 RENAL CELL ADENOCARCINOMA, RIGHT: ICD-10-CM

## 2020-10-07 DIAGNOSIS — N28.89 CALIECTASIS: ICD-10-CM

## 2020-10-07 DIAGNOSIS — N18.30 ACUTE RENAL FAILURE WITH ACUTE TUBULAR NECROSIS SUPERIMPOSED ON STAGE 3 CHRONIC KIDNEY DISEASE, UNSPECIFIED WHETHER STAGE 3A OR 3B CKD: ICD-10-CM

## 2020-10-07 DIAGNOSIS — K80.20 CALCULUS OF GALLBLADDER WITHOUT CHOLECYSTITIS WITHOUT OBSTRUCTION: ICD-10-CM

## 2020-10-07 DIAGNOSIS — Z23 NEEDS FLU SHOT: ICD-10-CM

## 2020-10-07 DIAGNOSIS — E11.649 TYPE 2 DIABETES MELLITUS WITH HYPOGLYCEMIA WITHOUT COMA, WITH LONG-TERM CURRENT USE OF INSULIN: Primary | Chronic | ICD-10-CM

## 2020-10-07 DIAGNOSIS — I10 ESSENTIAL HYPERTENSION: ICD-10-CM

## 2020-10-07 DIAGNOSIS — Z79.4 TYPE 2 DIABETES MELLITUS WITH HYPOGLYCEMIA WITHOUT COMA, WITH LONG-TERM CURRENT USE OF INSULIN: Primary | Chronic | ICD-10-CM

## 2020-10-07 DIAGNOSIS — N13.4 HYDROURETER ON RIGHT: ICD-10-CM

## 2020-10-07 PROCEDURE — 90662 IIV NO PRSV INCREASED AG IM: CPT | Mod: PBBFAC | Performed by: INTERNAL MEDICINE

## 2020-10-07 PROCEDURE — 99214 OFFICE O/P EST MOD 30 MIN: CPT | Performed by: INTERNAL MEDICINE

## 2020-10-07 PROCEDURE — 99214 PR OFFICE/OUTPT VISIT, EST, LEVL IV, 30-39 MIN: ICD-10-PCS | Mod: S$PBB,,, | Performed by: INTERNAL MEDICINE

## 2020-10-07 PROCEDURE — 99214 OFFICE O/P EST MOD 30 MIN: CPT | Mod: S$PBB,,, | Performed by: INTERNAL MEDICINE

## 2020-10-07 RX ORDER — INSULIN DEGLUDEC 100 U/ML
5 INJECTION, SOLUTION SUBCUTANEOUS NIGHTLY
Qty: 1 SYRINGE | Refills: 0 | COMMUNITY
Start: 2020-10-07 | End: 2020-10-22 | Stop reason: SDUPTHER

## 2020-10-07 NOTE — PATIENT INSTRUCTIONS
How to Check Your Blood Sugar    Keeping track of how much sugar (glucose) is in your blood is an important part of self-care when you have diabetes. This is also called self-monitoring of blood glucose (SMBG). To make sure your glucose and insulin are in balance, check your blood sugar as instructed by your healthcare provider. You may need to check your blood glucose levels at certain times every day. Or you may need to check them only a few times a week.  What you need  To check your blood sugar, make sure you have the following:   · A small pricking needle (lancing device)  · Test strips  · A glucose meter  · A notebook, chart, or log book and pen or pencil   Using a blood glucose meter  You can check your blood sugar at home, at work, and anywhere else. Your diabetes team will help you choose a blood glucose meter. A meter measures the amount of glucose in a tiny drop of blood. Youll use a device called a lancet to draw a drop of blood. Put the strip in the meter first. Then touch the test strip to the drop of blood. The meter then gives you a number (reading) that tells you the level of your blood sugar.  Aim for your target range  Your blood sugar should be in your target range--not too high and not too low. A target range is where your blood sugar level is healthiest. Staying in this range as much as possible will help lower your risk for health problems (complications). Your diabetes team will help you figure out the best target range for you. That range depends on many things. They include your age, other health problems, how well your diabetes is controlled, and how long you have had diabetes. In general, target ranges are:  · Control of blood glucose (hemoglobin A1c or A1c): generally, 7.0% or less. You will usually have this test at the lab.  · Before a meal (preprandial glucose): Between 80 and 130 mg/dL.  · One to 2 hours after a meal (postprandial glucose): Less than 180 mg/dL.  Track your  readings  Use a notebook, chart, or log book to keep track of your readings. Write down the date, time, and your blood sugar level numbers. This helps you see patterns, such as high blood sugar after eating certain foods. Take your log along when you see your healthcare provider. Your blood glucose levels will help your provider decide if he or she needs to make any changes to your management plan. To check your blood sugar, follow the steps below.  Step 1. Get ready  · Wash your hands with soap and warm (not hot) water.  · Follow all of the instructions that came with your glucometer. Be sure that your test strips were designed to be used with your meter and are not .   Step 2. Draw a drop of blood  · Prick the side of your finger with the lancet. Squeeze gently until you get a drop of blood. Squeezing too hard can cause an inaccurate reading.  · Put the lancet in a special sharps container. Ask your healthcare team where you can buy one or what you can use to throw away any sharps.  · If you are unable to get enough blood, hold your hand at your side and gently shake it.  Step 3. Place the drop on a strip  · Wait for the meter to show a message or symbol that it is time to test.  · Touch the test strip to the drop of blood.  · Be sure to follow the instructions included with meter.  Step 4. Read and record your results  · Wait for your meter to show the result.  · If you see an error message, recheck using a fresh strip and a fresh drop of blood. Also recheck if the glucose numbers aren't what you expect--too low without symptoms, or too high for no reason.  · Write the results in your log book.  Date Last Reviewed: 2016  © 2485-1262 [employer]. [address] [place] [address], [place] [address]. All rights reserved. This information is not intended as a substitute for professional medical care. Always follow your healthcare professional's instructions.        Prevention Guidelines, Men Ages 65  and Older  Screening tests and vaccines are an important part of managing your health. Health counseling is essential, too. Below are guidelines for these, for men ages 65 and older. Talk with your healthcare provider to make sure youre up-to-date on what you need.  Screening Who needs it How often   Abdominal aortic aneurysm Men ages 65 to 75 who have ever smoked 1 ultrasound   Alcohol misuse All men in this age group At routine exams   Blood pressure All men in this age group Every 2 years if your blood pressure is less than 120/80 mm Hg; yearly if your systolic blood pressure is 120 to 139 mm Hg, or your diastolic blood pressure reading is 80 to 89 mm Hg   Colorectal cancer All men in this age group Flexible sigmoidoscopy every 5 years, or colonoscopy every 10 years, or double-contrast barium enema every 5 years; yearly fecal occult blood test or fecal immunochemical test; or a stool DNA test as often as your healthcare provider advises; talk with your healthcare provider about which tests are best for you and when you no longer need colonoscopies (generally after age 75)   Depression All men in this age group At routine exams   Type 2 diabetes or prediabetes All adults beginning at age 45 and adults without symptoms at any age who are overweight or obese and have 1 or more other risk factors for diabetes At least every 3 years (yearly if your blood sugar has already begun to rise)   Hepatitis C Men at increased risk for infection - talk with your healthcare provider At routine exams   High cholesterol or triglycerides All men in this age group At least every 5 years   HIV Men at increased risk for infection - talk with your healthcare provider At routine exams   Lung cancer Adults ages 55 to 80 who have smoked Yearly screening in smokers with 30 pack-year history of smoking or who quit within 15 years   Obesity All men in this age group At routine exams   Prostate cancer All men in this age group, talk to  healthcare provider about risks and benefits of digital rectal exam (ADAM) and prostate-specific antigen (PSA) screening1 At routine exams   Syphilis Men at increased risk for infection - talk with your healthcare provider At routine exams   Tuberculosis Men at increased risk for infection - talk with your healthcare provider Ask your healthcare provider   Vision All men in this age group Every 1 to 2 years; if you have a chronic health condition, ask your healthcare provider if you needs exams more often   Vaccine Who needs it How often   Chickenpox (varicella) All men in this age group who have no record of this infection or vaccine 2 doses; second dose should be given at least 4 weeks after the first dose   Hepatitis A Men at increased risk for infection - talk with your healthcare provider 2 doses given at least 6 months apart   Hepatitis B Men at increased risk for infection - talk with your healthcare provider 3 doses over 6 months; second dose should be given 1 month after the first dose; the third dose should be given at least 2 months after the second dose and at least 4 months after the first dose   Haemophilus influenzae Type B (HIB) Men at increased risk for infection - talk with your healthcare provider 1 to 3 doses   Influenza (flu) All men in this age group  Once a year   Meningococcal Men at increased risk for infection - talk with your healthcare provider 1 or more doses   Pneumococcal conjugate vaccine (PCV13) and pneumococcal polysaccharide vaccine (PPSV23) All men in this age group 1 dose of each vaccine   Tetanus/diphtheria/  pertussis (Td/Tdap) booster All men in this age group Td every 10 years, or Tdap if you will have contact with a child younger than 12 months old   Zoster All men in this age group 1 dose   Counseling Who needs it How often   Diet and exercise Men who are overweight or obese When diagnosed, and then at routine exams   Fall prevention (exercise, vitamin D supplements) All men  in this age group At routine exams   Sexually transmitted infection Men at increased risk for infection - talk with your healthcare provider At routine exams   Use of daily aspirin Men ages 45 to 79 at risk for cardiovascular health problems At routine exams   Use of tobacco and the health effects it can cause All men in this age group Every visit   [address] [place] [address] [facility]   Date Last Reviewed: 2/1/2017 © 2000-2017 [employer]. [address] [place] [address]. All rights reserved. This information is not intended as a substitute for professional medical care. Always follow your healthcare professional's instructions.

## 2020-10-07 NOTE — Clinical Note
Lana Arnold,    Just to simplify my understanding, was his acute kidney injury and creatinine going up to 14.0 as a result of any medical  renal disease or because of obstructive uropathy.    Kind regards    Dr. Lyric IGNACIO

## 2020-10-07 NOTE — PROGRESS NOTES
Subjective:       Patient ID: Jamil Cadet is a 75 y.o. male.    Chief Complaint: Chronic Kidney Disease, Establish Care, Diabetes, Hypertension, and Obstructive Uropathy    This is the 1st visit for . Jamil Cadet who is a 75-year-old  male.  Today he is accompanied with his wife.  He was recently discharged from the hospital.  His previous primary care physician/gastroenterologist was Dr. Ward who has since retired.    The events leading to hospitalization are somewhat uncertain/fuzzy but it seems that he was extremely dehydrated after being exposed to sun and he landed in the hospital with acute kidney injury and significant hypoglycemia for which he had received D50 ampules.  His creatinine shot up to as much as 14.  He was felt to have obstructive uropathy on the right side to the point that he needed intervention from the urologist which was rock somewhat unsuccessful.  Finally he had to have a percutaneous nephrostomy tube for drainage of urine and subsequently his creatinine came down steadily and slowly.  He almost escaped a dialysis procedure.    Specifically in the hospital records I do not see any mention about intrinsic renal disease but mostly obstructive uropathy based upon the sudden acuity of rise in creatinine.  He does have some complex urological issues for which he follows up with a physician at [facility].  (Dr. Sravanthi Danielle- initially referred by Dr. Ward)    Patient's underlying medical issues include diabetes mellitus and previously he was on oral medication which have been the stop.  This was because of acute kidney injury.  He is currently on short-acting insulin.  He also has hypertension for which he is on a combination of amlodipine and hydralazine.  He also takes vitamin D3 supplements, vitamin-C and some other supplements.  Most of the other supplements he has stop.  This needs to be verified.    Currently his Blood  sugars are running in  between 225 - 260    His last baseline Cr 1.5 in April 2020    Currently patient has a nephrostomy tube coming from the right kidney posteriorly and drains into the leg bag in his right side.  His urine output is reasonable and averages about 1.5 L per day.  He has a follow-up with [facility] soon in near future for further address all of his blockage.    Interestingly his lipid panel is super low.        Dr Ward has retired- his PCP   Hospital discharge summary has been copied and pasted ad verbatim here without any editing done by me.  This is for easy reference  Hospital Course:   Patient reports for the last 4 days he was working outside in the sun doing gardening work at least 7 hours every day, may not have been consuming much water, was actually not taking any of his prescribed medications during this time including oral hypoglycemic (Janumet), denies NSAID use, no change in urine reported, no rash, fever. He has history of right RCC status post partial nephrectomy (15% removed as per his report). He admitted to generalized weakness, denies any nausea, vomiting, diarrhea.  EMS was called yesterday and on arrival CBG was 52 which improved to 132 after D50.  In the ED he was noted to recurrent persistent hypoglycemia.  Labs were consistent with hyperkalemia (status post membrane stabilizing medication), anion gap metabolic acidosis with acute renal failure, lactic acidosis to 5 without any source of infection, ultrasound with bilateral renal cysts with mild right pelvocaliectasis.  On 09/07 patient is alert and oriented however starting additional fluids along with D10 given persistent hypoglycemia, nephrology consulted given acute renal failure, further test ordered.  On 9/08 glucose much better and discontinuing D10 infusion, lactic acid better however still in acute renal failure, having good urine output, checking CT renal protocol.  CT did demonstrate right-sided hydronephrosis with hydroureter, no  definite calculus seen, urology consulted and planning cystoscopy with stent placement today. Unfortunately Dr Garcia unable to place stent and now recommendations for right percutaneous nephrostomy to relieve hydronephrosis, discussed with Radiology, ideally patient would need to be off aspirin for 3 days, this would be tomorrow, states the left kidney appears normal and should be compensating and expresses concern about possible alternate etiology, discussed with patient who is agreeable to wait until tomorrow for nephrostomy (explained with aspirin increased risk of bleeding). On 9/11 patient underwent placement of right percutaneous nephrostomy by IR, will continue to monitor renal function closely. Renal function was monitored throughout hospital course, nephrology following, did repeat CT renal protocol on 9/13 which reported favorable response to decompression and renal function was slowly downtrending and patient reports feeling well. Did have hematuria which seems to be clearing. He was cleared by Nephrology for discharge.  Discharge plan including medication changes, follow-up, return precautions were explained in detail to the patient and wife present at bedside.  No objection to discharge.  All questions/concerns were addressed.     Discharge examination  Lying in bed, comfortable, alert and oriented  Regular heart rhythm  Not on supplemental oxygen  Abdomen soft and nontender  Right-sided nephrostomy with urine draining     Discharge recommendations  Discontinued oral hypoglycemic and insulin sliding scale prescribed until renal function recovers  Discontinued losartan, amlodipine and hydralazine for blood pressure control  Weekly renal panel with results forwarded to nephrologist  Follow-up with nephrologist 1 week  Follow-up with his urologist Dr. Danielle         Diabetes  He presents for his follow-up diabetic visit. He has type 2 diabetes mellitus. No MedicAlert identification noted. The initial  diagnosis of diabetes was made 20 years ago. Hypoglycemia symptoms include nervousness/anxiousness, sleepiness and tremors. Pertinent negatives for hypoglycemia include no confusion, dizziness, headaches, hunger, mood changes, pallor, seizures, speech difficulty or sweats. Associated symptoms include fatigue and weakness. Pertinent negatives for diabetes include no blurred vision, no chest pain, no foot paresthesias, no foot ulcerations, no polydipsia, no polyphagia, no polyuria, no visual change and no weight loss. Hypoglycemia complications include hospitalization, nocturnal hypoglycemia and required glucagon injection. Pertinent negatives for hypoglycemia complications include no blackouts and no required assistance. Symptoms are stable. Diabetic complications include nephropathy. Pertinent negatives for diabetic complications include no autonomic neuropathy, CVA, heart disease, impotence, peripheral neuropathy, PVD or retinopathy. Risk factors for coronary artery disease include family history and diabetes mellitus. Current diabetic treatment includes insulin injections. He is compliant with treatment most of the time. He is currently taking insulin pre-breakfast and pre-dinner. Insulin injections are given by adult caretaker. Rotation sites for injection include the arms and buttocks. His weight is stable. He is following a generally healthy diet. Meal planning includes avoidance of concentrated sweets. He has not had a previous visit with a dietitian. He participates in exercise daily. He monitors blood glucose at home 3-4 x per day. Blood glucose monitoring compliance is fair. His home blood glucose trend is increasing steadily. He does not see a podiatrist.Eye exam is current.   Hypertension  This is a chronic problem. The current episode started more than 1 year ago. The problem is controlled. Pertinent negatives include no blurred vision, chest pain, headaches, palpitations, shortness of breath or sweats.  Risk factors for coronary artery disease include male gender and diabetes mellitus. Past treatments include beta blockers and direct vasodilators. The current treatment provides moderate improvement. Compliance problems include psychosocial issues.  There is no history of CVA, PVD or retinopathy. Identifiable causes of hypertension include chronic renal disease. There is no history of hyperaldosteronism or hypercortisolism. The extent of his chronic kidney disease is not certain at this point..       Past Medical History:   Diagnosis Date    Cancer     Diabetes mellitus, type 2     Disorder of kidney and ureter     Hyperlipidemia     Polio      Social History     Socioeconomic History    Marital status:      Spouse name: Not on file    Number of children: Not on file    Years of education: Not on file    Highest education level: Not on file   Occupational History    Not on file   Social Needs    Financial resource strain: Not very hard    Food insecurity     Worry: Never true     Inability: Never true    Transportation needs     Medical: No     Non-medical: No   Tobacco Use    Smoking status: Never Smoker    Smokeless tobacco: Never Used   Substance and Sexual Activity    Alcohol use: No     Frequency: Never    Drug use: No    Sexual activity: Yes     Partners: Female   Lifestyle    Physical activity     Days per week: 6 days     Minutes per session: 150+ min    Stress: To some extent   Relationships    Social connections     Talks on phone: Three times a week     Gets together: Once a week     Attends Sikhism service: Not on file     Active member of club or organization: Yes     Attends meetings of clubs or organizations: More than 4 times per year     Relationship status:    Other Topics Concern    Not on file   Social History Narrative    Not on file     Past Surgical History:   Procedure Laterality Date    APPENDECTOMY      CYSTOSCOPY W/ RETROGRADES Right 9/9/2020     "Procedure: CYSTOSCOPY, WITH RETROGRADE PYELOGRAM;  Surgeon: Chris Garcia Jr., MD;  Location[facility] OR;  Service: Urology;  Laterality: Right;    SPINE SURGERY      URETHROSCOPY  9/9/2020    Procedure: URETHROSCOPY;  Surgeon: Chris Garcia Jr., MD;  Location: [facility] OR;  Service: Urology;;     Family History   Problem Relation Age of Onset    Heart disease Mother     Heart disease Father     Stroke Father     Heart disease Maternal Grandfather     Heart disease Paternal Grandmother     Heart disease Paternal Grandfather        Review of Systems   Constitutional: Positive for fatigue. Negative for activity change, appetite change, unexpected weight change and weight loss.   HENT: Negative for congestion, sneezing and trouble swallowing.    Eyes: Negative for blurred vision, pain and visual disturbance.   Respiratory: Negative for cough, chest tightness and shortness of breath.    Cardiovascular: Negative for chest pain, palpitations and leg swelling.   Gastrointestinal: Negative for abdominal distention, abdominal pain, blood in stool, constipation and diarrhea.   Endocrine: Negative for cold intolerance, heat intolerance, polydipsia, polyphagia and polyuria.   Genitourinary: Negative for dysuria, hematuria, impotence and scrotal swelling.   Musculoskeletal: Negative for arthralgias, back pain and gait problem.   Skin: Negative for pallor, rash and wound.   Allergic/Immunologic: Negative for environmental allergies, food allergies and immunocompromised state.   Neurological: Positive for tremors and weakness. Negative for dizziness, seizures, speech difficulty, light-headedness, numbness and headaches.   Hematological: Negative for adenopathy. Does not bruise/bleed easily.   Psychiatric/Behavioral: Negative for agitation, behavioral problems and confusion. The patient is nervous/anxious.          Objective:      Blood pressure 106/64, pulse 108, temperature 97 °F (36.1 °C), height 5' 11" (1.803 m), weight 77.6 " kg (171 lb). Body mass index is 23.85 kg/m².  Physical Exam  Vitals signs and nursing note reviewed.   Constitutional:       Appearance: He is well-developed.   HENT:      Head: Normocephalic and atraumatic.      Nose: Nose normal.      Mouth/Throat:      Pharynx: No oropharyngeal exudate.   Eyes:      Conjunctiva/sclera: Conjunctivae normal.   Neck:      Musculoskeletal: Normal range of motion and neck supple.      Thyroid: No thyromegaly.      Vascular: No JVD.      Trachea: No tracheal deviation.   Cardiovascular:      Rate and Rhythm: Normal rate and regular rhythm.      Heart sounds: Normal heart sounds. No murmur. No friction rub. No gallop.    Pulmonary:      Effort: Pulmonary effort is normal. No respiratory distress.      Breath sounds: Normal breath sounds. No wheezing or rales.   Abdominal:      General: Bowel sounds are normal. There is no distension.      Palpations: Abdomen is soft.      Tenderness: There is no abdominal tenderness.       Musculoskeletal: Normal range of motion.        Back:       Right foot: Normal range of motion. Deformity present.      Left foot: Normal range of motion. Deformity present.        Feet:       Comments: History of knee problems.   Feet:      Right foot:      Protective Sensation: 3 sites tested. 3 sites sensed.      Skin integrity: No ulcer, blister, skin breakdown, erythema or warmth.      Left foot:      Protective Sensation: 3 sites tested. 3 sites sensed.      Skin integrity: No ulcer, blister, skin breakdown, erythema or warmth.      Comments: High arch in the foot.?  Effects of poliomyelitis.  Slight claw toes.  Slightly dystrophic nails on the left side.  Skin:     General: Skin is warm and dry.   Neurological:      Mental Status: He is alert and oriented to person, place, and time.      Deep Tendon Reflexes: Reflexes are normal and symmetric.           Assessment:       1. Type 2 diabetes mellitus with hypoglycemia without coma, with long-term current use of  insulin    2. Essential hypertension    3. History of renal cell carcinoma status post partial nephrectomy    4. Acute renal failure with acute tubular necrosis superimposed on stage 3 chronic kidney disease, unspecified whether stage 3a or 3b CKD    5. Calculus of gallbladder without cholecystitis without obstruction    6. Caliectasis    7. Hydroureter on right    8. Needs flu shot           Lab Visit on 10/02/2020   Component Date Value Ref Range Status    Sodium 10/02/2020 137  136 - 145 mmol/L Final    Potassium 10/02/2020 4.0  3.5 - 5.1 mmol/L Final    Chloride 10/02/2020 105  95 - 110 mmol/L Final    CO2 10/02/2020 22* 23 - 29 mmol/L Final    Glucose 10/02/2020 288* 70 - 110 mg/dL Final    BUN, Bld 10/02/2020 39* 8 - 23 mg/dL Final    Creatinine 10/02/2020 2.8* 0.5 - 1.4 mg/dL Final    Calcium 10/02/2020 8.8  8.7 - 10.5 mg/dL Final    Total Protein 10/02/2020 6.9  6.0 - 8.4 g/dL Final    Albumin 10/02/2020 3.5  3.5 - 5.2 g/dL Final    Total Bilirubin 10/02/2020 0.8  0.1 - 1.0 mg/dL Final    Alkaline Phosphatase 10/02/2020 84  55 - 135 U/L Final    AST 10/02/2020 19  10 - 40 U/L Final    ALT 10/02/2020 23  10 - 44 U/L Final    Anion Gap 10/02/2020 10  8 - 16 mmol/L Final    eGFR if African American 10/02/2020 24.1* >60 mL/min/1.73 m^2 Final    eGFR if non African American 10/02/2020 20.8* >60 mL/min/1.73 m^2 Final    Free T4 10/02/2020 0.95  0.71 - 1.51 ng/dL Final    Specimen UA 10/02/2020 Urine, Clean Catch   Final    Color, UA 10/02/2020 Yellow  Yellow, Straw, Ida Final    Appearance, UA 10/02/2020 Hazy* Clear Final    pH, UA 10/02/2020 6.0  5.0 - 8.0 Final    Specific [place], UA 10/02/2020 1.010  1.005 - 1.030 Final    Protein, UA 10/02/2020 1+* Negative Final    Glucose, UA 10/02/2020 4+* Negative Final    Ketones, UA 10/02/2020 Negative  Negative Final    Bilirubin (UA) 10/02/2020 Negative  Negative Final    Occult Blood UA 10/02/2020 1+* Negative Final    Nitrite, UA  10/02/2020 Negative  Negative Final    Urobilinogen, UA 10/02/2020 Negative  Negative EU/dL Final    Leukocytes, UA 10/02/2020 3+* Negative Final    Microalbum.,U,Random 10/02/2020 189.9  ug/mL Final    Creatinine, Random Ur 10/02/2020 116.0  23.0 - 375.0 mg/dL Final    Microalb Creat Ratio 10/02/2020 163.7* 0.0 - 30.0 ug/mg Final    Hemoglobin A1C 10/02/2020 8.4* 4.5 - 6.2 % Final    Estimated Avg Glucose 10/02/2020 194* 68 - 131 mg/dL Final    Vit D, 25-Hydroxy 10/02/2020 65  30 - 96 ng/mL Final    Vitamin B-12 10/02/2020 444  210 - 950 pg/mL Final    Magnesium 10/02/2020 1.5* 1.6 - 2.6 mg/dL Final    Iron 10/02/2020 36* 45 - 160 ug/dL Final    Ferritin 10/02/2020 127  20.0 - 300.0 ng/mL Final    Cholesterol 10/02/2020 106* 120 - 199 mg/dL Final    Triglycerides 10/02/2020 75  30 - 150 mg/dL Final    HDL 10/02/2020 31* 40 - 75 mg/dL Final    LDL Cholesterol 10/02/2020 60.0* 63.0 - 159.0 mg/dL Final    Hdl/Cholesterol Ratio 10/02/2020 29.2  20.0 - 50.0 % Final    Total Cholesterol/HDL Ratio 10/02/2020 3.4  2.0 - 5.0 Final    Non-HDL Cholesterol 10/02/2020 75  mg/dL Final    TSH 10/02/2020 1.040  0.340 - 5.600 uIU/mL Final    Glucose 10/02/2020 289* 70 - 110 mg/dL Final    Sodium 10/02/2020 137  136 - 145 mmol/L Final    Potassium 10/02/2020 4.0  3.5 - 5.1 mmol/L Final    Chloride 10/02/2020 106  95 - 110 mmol/L Final    CO2 10/02/2020 20* 23 - 29 mmol/L Final    BUN, Bld 10/02/2020 39* 8 - 23 mg/dL Final    Calcium 10/02/2020 8.8  8.7 - 10.5 mg/dL Final    Creatinine 10/02/2020 2.8* 0.5 - 1.4 mg/dL Final    Albumin 10/02/2020 3.5  3.5 - 5.2 g/dL Final    Phosphorus 10/02/2020 4.6* 2.7 - 4.5 mg/dL Final    eGFR if African American 10/02/2020 24.1* >60 mL/min/1.73 m^2 Final    eGFR if non African American 10/02/2020 20.8* >60 mL/min/1.73 m^2 Final    Anion Gap 10/02/2020 11  8 - 16 mmol/L Final    RBC, UA 10/02/2020 7* 0 - 4 /hpf Final    WBC, UA 10/02/2020 >100* 0 - 5 /hpf Final     Bacteria 10/02/2020 Few* None-Occ /hpf Final    Yeast, UA 10/02/2020 None  None Final    Squam Epithel, UA 10/02/2020 2  /hpf Final    Hyaline Casts, UA 10/02/2020 16* 0-1/lpf /lpf Final    Microscopic Comment 10/02/2020 SEE COMMENT   Final   Lab Visit on 09/25/2020   Component Date Value Ref Range Status    Glucose 09/25/2020 304* 70 - 110 mg/dL Final    Sodium 09/25/2020 137  136 - 145 mmol/L Final    Potassium 09/25/2020 5.1  3.5 - 5.1 mmol/L Final    Chloride 09/25/2020 104  95 - 110 mmol/L Final    CO2 09/25/2020 21* 23 - 29 mmol/L Final    BUN, Bld 09/25/2020 58* 8 - 23 mg/dL Final    Calcium 09/25/2020 9.5  8.7 - 10.5 mg/dL Final    Creatinine 09/25/2020 3.7* 0.5 - 1.4 mg/dL Final    Albumin 09/25/2020 3.8  3.5 - 5.2 g/dL Final    Phosphorus 09/25/2020 5.0* 2.7 - 4.5 mg/dL Final    eGFR if African American 09/25/2020 17.2* >60 mL/min/1.73 m^2 Final    eGFR if non African American 09/25/2020 14.9* >60 mL/min/1.73 m^2 Final    Anion Gap 09/25/2020 12  8 - 16 mmol/L Final   Lab Visit on 09/18/2020   Component Date Value Ref Range Status    Glucose 09/18/2020 485* 70 - 110 mg/dL Final    Sodium 09/18/2020 135* 136 - 145 mmol/L Final    Potassium 09/18/2020 4.3  3.5 - 5.1 mmol/L Final    Chloride 09/18/2020 100  95 - 110 mmol/L Final    CO2 09/18/2020 21* 23 - 29 mmol/L Final    BUN, Bld 09/18/2020 74* 8 - 23 mg/dL Final    Calcium 09/18/2020 8.9  8.7 - 10.5 mg/dL Final    Creatinine 09/18/2020 5.5* 0.5 - 1.4 mg/dL Final    Albumin 09/18/2020 3.5  3.5 - 5.2 g/dL Final    Phosphorus 09/18/2020 5.0* 2.7 - 4.5 mg/dL Final    eGFR if African American 09/18/2020 10.6* >60 mL/min/1.73 m^2 Final    eGFR if non African American 09/18/2020 9.2* >60 mL/min/1.73 m^2 Final    Anion Gap 09/18/2020 14  8 - 16 mmol/L Final   No results displayed because visit has over 200 results.            Plan:           Type 2 diabetes mellitus with hypoglycemia without coma, with long-term current use of  insulin  -     insulin degludec (TRESIBA FLEXTOUCH U-100) 100 unit/mL (3 mL) InPn; Inject 5 Units into the skin every evening. Exp 06/21 [id number]  Dispense: 1 Syringe; Refill: 0  -     Hemoglobin A1C; Future; Expected date: 10/07/2020    Essential hypertension  -     Comprehensive Metabolic Panel; Future; Expected date: 10/07/2020    History of renal cell carcinoma status post partial nephrectomy    Acute renal failure with acute tubular necrosis superimposed on stage 3 chronic kidney disease, unspecified whether stage 3a or 3b CKD  -     CBC auto differential; Future; Expected date: 10/07/2020    Calculus of gallbladder without cholecystitis without obstruction  Comments:  Incidental finding based upon CT scan.  Asymptomatic at this point.    Caliectasis  Comments:  Right-sided pelvocaliectasis.  Status post nephrostomy tube.  To be followed with urology.    Hydroureter on right  Comments:  Right-sided pelvocaliectasis.  Status post nephrostomy tube.  To be followed with urology.    Needs flu shot  -     Influenza - Quadrivalent - High Dose (65+) (PF) (IM)     This is a new patient appointment for patient who is a 75-year-old  male who went into acute kidney failure with baseline borderline creatinine.  There seems to be some obstruction on the right side of the kidney.  There is a percutaneous nephrostomy tube which is draining the urine.  This obstruction needs to be relieved by his urologist at [facility].    The reason for this acute kidney failure is unclear when the creatinine went as high as 14.  Thankfully he did not needed dialysis.    His creatinine has come down to range between 2-3 and he has a urostomy bag.    I had a detailed discussion about his diabetes and blood sugar management.  Certainly if his creatinine is greater than 2 and it is still fluctuating, I would avoid any oral medications for fear of renal complications.  At this point will continue with insulin  short-acting insulin.  Importance of timely diet and consistency in diet has been discussed.    At this point I will add injection Tresiba 5 units at bedtime and gradually escalate till his lowest blood sugars are in 130s to 140s and I will try to be more conservative and try to avoid hypoglycemia in this gentleman.    I have given him a sample of Tresiba and have discussed with the wife as to how to use it.  Insurance coverage will and might be an issue.    I am looking forward for him to follow-up with his urologist.  I have advised him the distinction between a Urology and Nephrology.  Urologist looks for surgical causes of kidney problems like prostate or obstruction whereas Nephrology looks for medical causes of kidney problems like chronic kidney disease or nephritis.  Patient and his wife understanding seesm to be somewhat limited on these issues but I have tried my best.  While they  need to keep an appointment with the Urology, it is also important to keep in touch with the nephrologist.    His blood pressures seem to be doing okay.  Other preventive care issues including immunizations, fall precautions, COVID precautions also have been discussed.    I did discuss also with Dr. Heredia concerning his general condition.  Currently he is not taking his blood pressure medications.    Spent ezra 40 minutes with patient which involved review of pts medical conditions, labs, medications and with 50% of time face-to-face discussion about medical problems, management and any applicable changes.  Discussion with Nephrology also.    Fup 2 weeks          Current Outpatient Medications:     clonazePAM (KLONOPIN) 2 MG Tab, Take 2 mg by mouth every evening. , Disp: , Rfl:     insulin aspart U-100 (NOVOLOG FLEXPEN U-100 INSULIN) 100 unit/mL (3 mL) InPn pen, Check glucose before meals and then take insulin as per sliding scale, Disp: 1 Syringe, Rfl: 0    rosuvastatin (CRESTOR) 20 MG tablet, Take 20 mg by mouth once  daily., Disp: , Rfl:     TRUE METRIX GLUCOSE TEST STRIP Strp, , Disp: , Rfl:     amLODIPine (NORVASC) 10 MG tablet, Take 1 tablet (10 mg total) by mouth once daily. (Patient not taking: Reported on 10/7/2020), Disp: 30 tablet, Rfl: 0    ascorbic acid, vitamin C, (VITAMIN C) 500 MG tablet, Take 500 mg by mouth once daily., Disp: , Rfl:     cholecalciferol, vitamin D3, (VITAMIN D3) 50 mcg (2,000 unit) Cap, Take 1 capsule by mouth once daily., Disp: , Rfl:     madi, Zingiber officinalis, (MADI EXTRACT) 250 mg Cap, Take 1 capsule by mouth once daily., Disp: , Rfl:     hydrALAZINE (APRESOLINE) 25 MG tablet, Take 1 tablet (25 mg total) by mouth 3 (three) times daily. (Patient not taking: Reported on 10/7/2020), Disp: 90 tablet, Rfl: 0    insulin degludec (TRESIBA FLEXTOUCH U-100) 100 unit/mL (3 mL) InPn, Inject 5 Units into the skin every evening. Exp 06/21 [id number], Disp: 1 Syringe, Rfl: 0    MULTIVITAMIN W-MINERALS/LUTEIN (CENTRUM SILVER ORAL), Take 1 capsule by mouth once daily. , Disp: , Rfl:     selenium 200 mcg Cap, Take 1 capsule by mouth once daily., Disp: , Rfl:

## 2020-10-12 ENCOUNTER — LAB VISIT (OUTPATIENT)
Dept: LAB | Facility: HOSPITAL | Age: 75
End: 2020-10-12
Attending: INTERNAL MEDICINE
Payer: MEDICARE

## 2020-10-12 DIAGNOSIS — N17.9 ACUTE KIDNEY FAILURE, UNSPECIFIED: Primary | ICD-10-CM

## 2020-10-12 DIAGNOSIS — N17.0 ACUTE RENAL FAILURE WITH ACUTE TUBULAR NECROSIS SUPERIMPOSED ON STAGE 3 CHRONIC KIDNEY DISEASE, UNSPECIFIED WHETHER STAGE 3A OR 3B CKD: ICD-10-CM

## 2020-10-12 DIAGNOSIS — Z79.4 TYPE 2 DIABETES MELLITUS WITH HYPOGLYCEMIA WITHOUT COMA, WITH LONG-TERM CURRENT USE OF INSULIN: Chronic | ICD-10-CM

## 2020-10-12 DIAGNOSIS — N18.30 ACUTE RENAL FAILURE WITH ACUTE TUBULAR NECROSIS SUPERIMPOSED ON STAGE 3 CHRONIC KIDNEY DISEASE, UNSPECIFIED WHETHER STAGE 3A OR 3B CKD: ICD-10-CM

## 2020-10-12 DIAGNOSIS — E11.649 TYPE 2 DIABETES MELLITUS WITH HYPOGLYCEMIA WITHOUT COMA, WITH LONG-TERM CURRENT USE OF INSULIN: Chronic | ICD-10-CM

## 2020-10-12 DIAGNOSIS — I10 ESSENTIAL HYPERTENSION: ICD-10-CM

## 2020-10-12 LAB
ALBUMIN SERPL BCP-MCNC: 3.7 G/DL (ref 3.5–5.2)
ALBUMIN SERPL BCP-MCNC: 3.7 G/DL (ref 3.5–5.2)
ALP SERPL-CCNC: 75 U/L (ref 55–135)
ALT SERPL W/O P-5'-P-CCNC: 28 U/L (ref 10–44)
ANION GAP SERPL CALC-SCNC: 11 MMOL/L (ref 8–16)
ANION GAP SERPL CALC-SCNC: 11 MMOL/L (ref 8–16)
AST SERPL-CCNC: 31 U/L (ref 10–40)
BASOPHILS # BLD AUTO: 0.03 K/UL (ref 0–0.2)
BASOPHILS NFR BLD: 0.4 % (ref 0–1.9)
BILIRUB SERPL-MCNC: 1.1 MG/DL (ref 0.1–1)
BUN SERPL-MCNC: 35 MG/DL (ref 8–23)
BUN SERPL-MCNC: 35 MG/DL (ref 8–23)
CALCIUM SERPL-MCNC: 9.2 MG/DL (ref 8.7–10.5)
CALCIUM SERPL-MCNC: 9.2 MG/DL (ref 8.7–10.5)
CHLORIDE SERPL-SCNC: 109 MMOL/L (ref 95–110)
CHLORIDE SERPL-SCNC: 109 MMOL/L (ref 95–110)
CO2 SERPL-SCNC: 21 MMOL/L (ref 23–29)
CO2 SERPL-SCNC: 21 MMOL/L (ref 23–29)
CREAT SERPL-MCNC: 2.5 MG/DL (ref 0.5–1.4)
CREAT SERPL-MCNC: 2.5 MG/DL (ref 0.5–1.4)
DIFFERENTIAL METHOD: ABNORMAL
EOSINOPHIL # BLD AUTO: 0.3 K/UL (ref 0–0.5)
EOSINOPHIL NFR BLD: 3.5 % (ref 0–8)
ERYTHROCYTE [DISTWIDTH] IN BLOOD BY AUTOMATED COUNT: 12.5 % (ref 11.5–14.5)
EST. GFR  (AFRICAN AMERICAN): 27.6 ML/MIN/1.73 M^2
EST. GFR  (AFRICAN AMERICAN): 27.6 ML/MIN/1.73 M^2
EST. GFR  (NON AFRICAN AMERICAN): 23.9 ML/MIN/1.73 M^2
EST. GFR  (NON AFRICAN AMERICAN): 23.9 ML/MIN/1.73 M^2
ESTIMATED AVG GLUCOSE: 186 MG/DL (ref 68–131)
GLUCOSE SERPL-MCNC: 225 MG/DL (ref 70–110)
GLUCOSE SERPL-MCNC: 225 MG/DL (ref 70–110)
HBA1C MFR BLD HPLC: 8.1 % (ref 4.5–6.2)
HCT VFR BLD AUTO: 26.6 % (ref 40–54)
HGB BLD-MCNC: 8.8 G/DL (ref 14–18)
IMM GRANULOCYTES # BLD AUTO: 0.09 K/UL (ref 0–0.04)
IMM GRANULOCYTES NFR BLD AUTO: 1.2 % (ref 0–0.5)
LYMPHOCYTES # BLD AUTO: 1.4 K/UL (ref 1–4.8)
LYMPHOCYTES NFR BLD: 19.8 % (ref 18–48)
MCH RBC QN AUTO: 30.6 PG (ref 27–31)
MCHC RBC AUTO-ENTMCNC: 33.1 G/DL (ref 32–36)
MCV RBC AUTO: 92 FL (ref 82–98)
MONOCYTES # BLD AUTO: 0.7 K/UL (ref 0.3–1)
MONOCYTES NFR BLD: 9 % (ref 4–15)
NEUTROPHILS # BLD AUTO: 4.8 K/UL (ref 1.8–7.7)
NEUTROPHILS NFR BLD: 66.1 % (ref 38–73)
NRBC BLD-RTO: 0 /100 WBC
PHOSPHATE SERPL-MCNC: 4.3 MG/DL (ref 2.7–4.5)
PLATELET # BLD AUTO: 225 K/UL (ref 150–350)
PMV BLD AUTO: 10.5 FL (ref 9.2–12.9)
POTASSIUM SERPL-SCNC: 4.5 MMOL/L (ref 3.5–5.1)
POTASSIUM SERPL-SCNC: 4.5 MMOL/L (ref 3.5–5.1)
PROT SERPL-MCNC: 6.8 G/DL (ref 6–8.4)
RBC # BLD AUTO: 2.88 M/UL (ref 4.6–6.2)
SODIUM SERPL-SCNC: 141 MMOL/L (ref 136–145)
SODIUM SERPL-SCNC: 141 MMOL/L (ref 136–145)
WBC # BLD AUTO: 7.22 K/UL (ref 3.9–12.7)

## 2020-10-12 PROCEDURE — 36415 COLL VENOUS BLD VENIPUNCTURE: CPT

## 2020-10-12 PROCEDURE — 83036 HEMOGLOBIN GLYCOSYLATED A1C: CPT

## 2020-10-12 PROCEDURE — 85025 COMPLETE CBC W/AUTO DIFF WBC: CPT

## 2020-10-12 PROCEDURE — 80069 RENAL FUNCTION PANEL: CPT | Mod: XB

## 2020-10-12 PROCEDURE — 80053 COMPREHEN METABOLIC PANEL: CPT

## 2020-10-16 ENCOUNTER — HOSPITAL ENCOUNTER (EMERGENCY)
Facility: HOSPITAL | Age: 75
Discharge: HOME | End: 2020-10-17
Attending: EMERGENCY MEDICINE
Payer: MEDICARE

## 2020-10-16 DIAGNOSIS — T83.098A: Primary | ICD-10-CM

## 2020-10-16 LAB
ANION GAP SERPL CALC-SCNC: 10 MMOL/L (ref 8–16)
BUN SERPL-MCNC: 38 MG/DL (ref 8–23)
CALCIUM SERPL-MCNC: 8.5 MG/DL (ref 8.7–10.5)
CHLORIDE SERPL-SCNC: 100 MMOL/L (ref 95–110)
CO2 SERPL-SCNC: 21 MMOL/L (ref 23–29)
CREAT SERPL-MCNC: 2.8 MG/DL (ref 0.5–1.4)
EST. GFR  (AFRICAN AMERICAN): 24.1 ML/MIN/1.73 M^2
EST. GFR  (NON AFRICAN AMERICAN): 20.8 ML/MIN/1.73 M^2
GLUCOSE SERPL-MCNC: 328 MG/DL (ref 70–110)
POTASSIUM SERPL-SCNC: 4 MMOL/L (ref 3.5–5.1)
SODIUM SERPL-SCNC: 131 MMOL/L (ref 136–145)

## 2020-10-16 PROCEDURE — 80048 BASIC METABOLIC PNL TOTAL CA: CPT

## 2020-10-16 PROCEDURE — 99283 EMERGENCY DEPT VISIT LOW MDM: CPT

## 2020-10-16 PROCEDURE — 36415 COLL VENOUS BLD VENIPUNCTURE: CPT

## 2020-10-16 RX ORDER — LEVOFLOXACIN 250 MG/1
250 TABLET ORAL DAILY
COMMUNITY
End: 2020-10-22

## 2020-10-17 VITALS
OXYGEN SATURATION: 99 % | DIASTOLIC BLOOD PRESSURE: 59 MMHG | RESPIRATION RATE: 20 BRPM | WEIGHT: 169 LBS | HEART RATE: 80 BPM | BODY MASS INDEX: 23.66 KG/M2 | SYSTOLIC BLOOD PRESSURE: 125 MMHG | HEIGHT: 71 IN | TEMPERATURE: 99 F

## 2020-10-17 RX ORDER — MUPIROCIN 20 MG/G
OINTMENT TOPICAL 3 TIMES DAILY
Qty: 15 G | Refills: 0 | Status: SHIPPED | OUTPATIENT
Start: 2020-10-16 | End: 2020-10-26

## 2020-10-17 NOTE — DISCHARGE INSTRUCTIONS
Continue as scheduled for follow-up appointment on Monday for kidney function test.  Return to the ER for any concern or inability to relieve any obstruction from the urostomy tube.

## 2020-10-17 NOTE — ED NOTES
Bed: 11  Expected date:   Expected time:   Means of arrival: POV (Privately Owned Vehicle)  Comments:

## 2020-10-17 NOTE — ED PROVIDER NOTES
Encounter Date: 10/16/2020       History     Chief Complaint   Patient presents with    Urinary Retention     States right kidney tube not draining in 12 hours.      Patient presents complaining of decreased urine output to his urostomy bag for the last 12 hours.  Patient has a history of partial nephrectomy and ultimately had persistent hydronephrosis requiring urostomy tube after having acute renal failure.  Patient became concerned today when he did not see urine in the bag.  Patient has no other complaints.        Review of patient's allergies indicates:  No Known Allergies  Past Medical History:   Diagnosis Date    Cancer     Diabetes mellitus, type 2     Disorder of kidney and ureter     Hyperlipidemia     Polio      Past Surgical History:   Procedure Laterality Date    APPENDECTOMY      CYSTOSCOPY W/ RETROGRADES Right 9/9/2020    Procedure: CYSTOSCOPY, WITH RETROGRADE PYELOGRAM;  Surgeon: Chris Garcia Jr., MD;  Location: [facility] OR;  Service: Urology;  Laterality: Right;    SPINE SURGERY      URETHROSCOPY  9/9/2020    Procedure: URETHROSCOPY;  Surgeon: Chris Garcia Jr., MD;  Location: [facility] OR;  Service: Urology;;     Family History   Problem Relation Age of Onset    Heart disease Mother     Heart disease Father     Stroke Father     Heart disease Maternal Grandfather     Heart disease Paternal Grandmother     Heart disease Paternal Grandfather      Social History     Tobacco Use    Smoking status: Never Smoker    Smokeless tobacco: Never Used   Substance Use Topics    Alcohol use: No     Frequency: Never    Drug use: No     Review of Systems   All other systems reviewed and are negative.      Physical Exam     Initial Vitals [10/16/20 2202]   BP Pulse Resp Temp SpO2   132/75 106 18 -- 98 %      MAP       --         Physical Exam    Nursing note and vitals reviewed.  Constitutional: He appears well-developed and well-nourished. He is not diaphoretic. No distress.   HENT:   Head:  Normocephalic and atraumatic.   Eyes: EOM are normal.   Neck: Normal range of motion. Neck supple.   Cardiovascular: Intact distal pulses.   Pulmonary/Chest: No respiratory distress.   Abdominal: Soft.   Neurological: He is alert.   Skin: Skin is warm and dry.   There is a minor skin breakdown to the sacral area   Psychiatric: He has a normal mood and affect. His behavior is normal. Judgment and thought content normal.         ED Course   Procedures  Labs Reviewed   BASIC METABOLIC PANEL - Abnormal; Notable for the following components:       Result Value    Sodium 131 (*)     CO2 21 (*)     Glucose 328 (*)     BUN, Bld 38 (*)     Creatinine 2.8 (*)     Calcium 8.5 (*)     eGFR if  24.1 (*)     eGFR if non  20.8 (*)     All other components within normal limits          Imaging Results    None          Medical Decision Making:   Initial Assessment:   Patient is in no distress  ED Management:  Kidney function tests show no acute change from previous.  Patient has chronic kidney disease.  There is no acute worsening of his creatinine.  Patient's blood sugar is elevated however there is no evidence of DKA is anion gap is 10 bicarb 21.  At the bedside patient's urostomy tube was flushed and urine easily flowed into the bag without problems.  Patient is already on antibiotics.  Will follow up on Monday for repeat kidney function test as scheduled.  He was given detailed return precautions and was discharged stable condition.  Of note he does have some small sacral decubiti either starting I advised pressure dressings cushion doughnut and mupirocin.                             Clinical Impression:       ICD-10-CM ICD-9-CM   1. Obstruction of urostomy catheter, initial encounter  T83.098A 996.76                          ED Disposition Condition    Discharge Stable        ED Prescriptions     Medication Sig Dispense Start Date End Date Auth. Provider    mupirocin (BACTROBAN) 2 % ointment  Apply topically 3 (three) times daily. for 10 days 15 g 10/16/2020 10/26/2020 Deepak Chaudhry MD        Follow-up Information     Follow up With Specialties Details Why Contact Info    Richard Gunter MD Internal Medicine   [address]  [address]  [facility] [address]  [phone]      Arnold Heredia MD Nephrology   [address] [facility] [facility] [place]  [place] [address]  [phone]                                         Deepak Chaudhry MD  10/17/20 0008

## 2020-10-17 NOTE — ED NOTES
Flushed Nephrostomy tube via stop cock port. Flushed with little resistance. Pulled back 10 cc urine with particles in it. Urine flow resumed. Reported to the MD.

## 2020-10-22 ENCOUNTER — OFFICE VISIT (OUTPATIENT)
Dept: FAMILY MEDICINE | Facility: CLINIC | Age: 75
DRG: 698 | End: 2020-10-22
Payer: MEDICARE

## 2020-10-22 VITALS
HEART RATE: 101 BPM | DIASTOLIC BLOOD PRESSURE: 63 MMHG | SYSTOLIC BLOOD PRESSURE: 98 MMHG | HEIGHT: 71 IN | BODY MASS INDEX: 23.94 KG/M2 | TEMPERATURE: 97 F | WEIGHT: 171 LBS

## 2020-10-22 DIAGNOSIS — N18.30 ACUTE RENAL FAILURE WITH ACUTE TUBULAR NECROSIS SUPERIMPOSED ON STAGE 3 CHRONIC KIDNEY DISEASE, UNSPECIFIED WHETHER STAGE 3A OR 3B CKD: ICD-10-CM

## 2020-10-22 DIAGNOSIS — I10 ESSENTIAL HYPERTENSION: ICD-10-CM

## 2020-10-22 DIAGNOSIS — N17.0 ACUTE RENAL FAILURE WITH ACUTE TUBULAR NECROSIS SUPERIMPOSED ON STAGE 3 CHRONIC KIDNEY DISEASE, UNSPECIFIED WHETHER STAGE 3A OR 3B CKD: ICD-10-CM

## 2020-10-22 DIAGNOSIS — F51.04 PSYCHOPHYSIOLOGICAL INSOMNIA: ICD-10-CM

## 2020-10-22 DIAGNOSIS — Z79.4 TYPE 2 DIABETES MELLITUS WITH HYPOGLYCEMIA WITHOUT COMA, WITH LONG-TERM CURRENT USE OF INSULIN: Primary | ICD-10-CM

## 2020-10-22 DIAGNOSIS — E11.649 TYPE 2 DIABETES MELLITUS WITH HYPOGLYCEMIA WITHOUT COMA, WITH LONG-TERM CURRENT USE OF INSULIN: Primary | ICD-10-CM

## 2020-10-22 PROCEDURE — 99214 PR OFFICE/OUTPT VISIT, EST, LEVL IV, 30-39 MIN: ICD-10-PCS | Mod: S$PBB,,, | Performed by: INTERNAL MEDICINE

## 2020-10-22 PROCEDURE — 99214 OFFICE O/P EST MOD 30 MIN: CPT | Mod: S$PBB,,, | Performed by: INTERNAL MEDICINE

## 2020-10-22 PROCEDURE — 99214 OFFICE O/P EST MOD 30 MIN: CPT | Mod: 25 | Performed by: INTERNAL MEDICINE

## 2020-10-22 RX ORDER — TRAZODONE HYDROCHLORIDE 50 MG/1
TABLET ORAL
Qty: 30 TABLET | Refills: 11 | Status: SHIPPED | OUTPATIENT
Start: 2020-10-22 | End: 2020-11-14

## 2020-10-22 RX ORDER — INSULIN ASPART 100 [IU]/ML
10 INJECTION, SOLUTION INTRAVENOUS; SUBCUTANEOUS DAILY
Qty: 3 ML | Refills: 5 | Status: SHIPPED | OUTPATIENT
Start: 2020-10-22 | End: 2020-10-27 | Stop reason: SDUPTHER

## 2020-10-22 RX ORDER — CLONAZEPAM 1 MG/1
1 TABLET ORAL NIGHTLY
Qty: 30 TABLET | Refills: 0 | Status: SHIPPED | OUTPATIENT
Start: 2020-10-22 | End: 2020-11-05

## 2020-10-22 RX ORDER — INSULIN DEGLUDEC 100 U/ML
15 INJECTION, SOLUTION SUBCUTANEOUS NIGHTLY
Qty: 1 SYRINGE | Refills: 5 | Status: SHIPPED | OUTPATIENT
Start: 2020-10-22 | End: 2020-10-27 | Stop reason: SDUPTHER

## 2020-10-22 NOTE — PROGRESS NOTES
Subjective:       Patient ID: Jamil Cadet is a 75 y.o. male.    Chief Complaint: Diabetes, Hypertension, Insomnia, and Kidney injury and drainage catheter    This is the 2nd visit for Mr. Jamil Cadet who is a 75-year-old  male.  He is accompanied with his wife.  He was recently discharged from the hospital.  His previous primary care physician/gastroenterologist was Dr. Ward who has since retired.    He comes for follow-up on his medical conditions predominately diabetes.  Recent acute kidney injury with creatinine jumping up to 14 was also noted which was secondly mostly obstructive uropathy.  This rapidly improved to the point that his creatinine had come down to 2.8 or so and he did not need any dialysis.  He has  a percutaneous nephrostomy tube on the right side and he went back to [facility] Urology Department.  The details of that encounter are unclear to me.  ([facility] is not link to our epic system)    In the meantime his blood sugars are elevated in 200s and 300s range.  He was started on injection Tresiba 5 units last time to get him used to this medication.  The sugars are still in that range and the plan now is to further up adjust the dosage of this medication subject to coverage to get his diabetes under better control.    Issues of insomnia also has been noted which is to large extent contributed by some degree of anxiety and medical issues.  He has been on a high dose of clonazepam and at least 2 mg.    I have advised the patient that historically I have not believed in prescribing benzodiazepines for these issues and I would suggest that he slowly and steadily start weaning of this medication.    The events leading to hospitalization are somewhat uncertain/fuzzy but it seems that he was extremely dehydrated after being exposed to sun and he landed in the hospital with acute kidney injury and significant hypoglycemia for which he had received D50 ampules.  His creatinine shot up  to as much as 14.  He was felt to have obstructive uropathy on the right side to the point that he needed intervention from the urologist which was rock somewhat unsuccessful.  Finally he had to have a percutaneous nephrostomy tube for drainage of urine and subsequently his creatinine came down steadily and slowly.  He almost escaped a dialysis procedure.    After last visit I did speak to  who did confirm that his acute kidney injury was more of a reflection of post obstructive uropathy.  No intrinsic renal disease was considered at that point.                        Diabetes  He presents for his follow-up diabetic visit. He has type 2 diabetes mellitus. No MedicAlert identification noted. The initial diagnosis of diabetes was made 20 years ago. Hypoglycemia symptoms include nervousness/anxiousness, sleepiness and tremors. Pertinent negatives for hypoglycemia include no confusion, dizziness, headaches, hunger, mood changes, pallor, seizures, speech difficulty or sweats. Associated symptoms include fatigue. Pertinent negatives for diabetes include no blurred vision, no chest pain, no foot paresthesias, no foot ulcerations, no polydipsia, no polyphagia, no polyuria, no visual change, no weakness and no weight loss. Hypoglycemia complications include nocturnal hypoglycemia. Pertinent negatives for hypoglycemia complications include no blackouts, no hospitalization, no required assistance and no required glucagon injection. Symptoms are improving. Diabetic complications include nephropathy. Pertinent negatives for diabetic complications include no autonomic neuropathy, CVA, heart disease, impotence, peripheral neuropathy, PVD or retinopathy. Risk factors for coronary artery disease include family history and hypertension. Current diabetic treatment includes insulin injections and oral agent (dual therapy). He is compliant with treatment all of the time. He is currently taking insulin pre-breakfast,  pre-lunch, pre-dinner and at bedtime. Insulin injections are given by significant other. Rotation sites for injection include the buttocks. His weight is stable. He is following a generally healthy diet. When asked about meal planning, he reported none. He has not had a previous visit with a dietitian. He participates in exercise intermittently. He monitors blood glucose at home 3-4 x per day. Blood glucose monitoring compliance is fair. His home blood glucose trend is fluctuating minimally. He does not see a podiatrist.Eye exam is not current.   Hypertension  This is a chronic problem. The current episode started more than 1 year ago. The problem is controlled. Pertinent negatives include no blurred vision, chest pain, headaches, palpitations, shortness of breath or sweats. Risk factors for coronary artery disease include male gender and diabetes mellitus. Past treatments include beta blockers and direct vasodilators. The current treatment provides moderate improvement. Compliance problems include psychosocial issues.  There is no history of CVA, PVD or retinopathy. Identifiable causes of hypertension include chronic renal disease. There is no history of hyperaldosteronism, hypercortisolism or sleep apnea. The extent of his chronic kidney disease is not certain at this point..       Past Medical History:   Diagnosis Date    Cancer     Diabetes mellitus, type 2     Disorder of kidney and ureter     Hyperlipidemia     Polio      Social History     Socioeconomic History    Marital status:      Spouse name: Not on file    Number of children: Not on file    Years of education: Not on file    Highest education level: Not on file   Occupational History    Not on file   Social Needs    Financial resource strain: Not very hard    Food insecurity     Worry: Never true     Inability: Never true    Transportation needs     Medical: No     Non-medical: No   Tobacco Use    Smoking status: Never Smoker     Smokeless tobacco: Never Used   Substance and Sexual Activity    Alcohol use: No     Frequency: Never    Drug use: No    Sexual activity: Yes     Partners: Female   Lifestyle    Physical activity     Days per week: 6 days     Minutes per session: 150+ min    Stress: To some extent   Relationships    Social connections     Talks on phone: Three times a week     Gets together: Once a week     Attends Christianity service: Not on file     Active member of club or organization: Yes     Attends meetings of clubs or organizations: More than 4 times per year     Relationship status:    Other Topics Concern    Not on file   Social History Narrative    Not on file     Past Surgical History:   Procedure Laterality Date    APPENDECTOMY      CYSTOSCOPY W/ RETROGRADES Right 9/9/2020    Procedure: CYSTOSCOPY, WITH RETROGRADE PYELOGRAM;  Surgeon: Chris Garcia Jr., MD;  Location: [facility] OR;  Service: Urology;  Laterality: Right;    SPINE SURGERY      URETHROSCOPY  9/9/2020    Procedure: URETHROSCOPY;  Surgeon: Chris Garcia Jr., MD;  Location: [facility];  Service: Urology;;     Family History   Problem Relation Age of Onset    Heart disease Mother     Heart disease Father     Stroke Father     Heart disease Maternal Grandfather     Heart disease Paternal Grandmother     Heart disease Paternal Grandfather        Review of Systems   Constitutional: Positive for fatigue. Negative for activity change, appetite change, unexpected weight change and weight loss.   HENT: Negative for congestion, sneezing and trouble swallowing.    Eyes: Negative for blurred vision, pain and visual disturbance.   Respiratory: Negative for cough, chest tightness and shortness of breath.    Cardiovascular: Negative for chest pain, palpitations and leg swelling.   Gastrointestinal: Negative for abdominal distention, abdominal pain, blood in stool, constipation and diarrhea.   Endocrine: Negative for cold intolerance, heat intolerance,  "polydipsia, polyphagia and polyuria.   Genitourinary: Negative for dysuria, hematuria, impotence and scrotal swelling.   Musculoskeletal: Negative for arthralgias, back pain and gait problem.   Skin: Negative for pallor, rash and wound.   Allergic/Immunologic: Negative for environmental allergies, food allergies and immunocompromised state.   Neurological: Positive for tremors. Negative for dizziness, seizures, speech difficulty, weakness, light-headedness, numbness and headaches.   Hematological: Negative for adenopathy. Does not bruise/bleed easily.   Psychiatric/Behavioral: Positive for sleep disturbance. Negative for agitation, behavioral problems and confusion. The patient is nervous/anxious.         Possibly some longstanding underlying base line anxiety/insomnia.         Objective:      Blood pressure 98/63, pulse 101, temperature 97.4 °F (36.3 °C), height 5' 11" (1.803 m), weight 77.6 kg (171 lb). Body mass index is 23.85 kg/m².  Physical Exam  Vitals signs and nursing note reviewed.   Constitutional:       Appearance: He is well-developed.   HENT:      Head: Normocephalic and atraumatic.      Nose: Nose normal.      Mouth/Throat:      Pharynx: No oropharyngeal exudate.   Eyes:      Conjunctiva/sclera: Conjunctivae normal.   Neck:      Musculoskeletal: Normal range of motion and neck supple.      Thyroid: No thyromegaly.      Vascular: No JVD.      Trachea: No tracheal deviation.   Cardiovascular:      Rate and Rhythm: Normal rate and regular rhythm.      Heart sounds: Normal heart sounds. No murmur. No friction rub. No gallop.    Pulmonary:      Effort: Pulmonary effort is normal. No respiratory distress.      Breath sounds: Normal breath sounds. No wheezing or rales.   Abdominal:      General: Bowel sounds are normal. There is no distension.      Palpations: Abdomen is soft.      Tenderness: There is no abdominal tenderness.       Musculoskeletal: Normal range of motion.        Back:       Comments: " History of knee problems.   Feet:      Right foot:      Skin integrity: No ulcer, blister, skin breakdown, erythema or warmth.      Left foot:      Skin integrity: No ulcer, blister, skin breakdown, erythema or warmth.      Comments: High arch in the foot.?  Effects of poliomyelitis.  Slight claw toes.  Slightly dystrophic nails on the left side.  Skin:     General: Skin is warm and dry.   Neurological:      Mental Status: He is alert and oriented to person, place, and time.      Deep Tendon Reflexes: Reflexes are normal and symmetric.   Psychiatric:         Attention and Perception: He is attentive.         Mood and Affect: Affect is not labile.      Comments: Patient is fairly attentive and cognizant of my discussions.  Appropriate answers.           Assessment:       1. Type 2 diabetes mellitus with hypoglycemia without coma, with long-term current use of insulin    2. Essential hypertension    3. Acute renal failure with acute tubular necrosis superimposed on stage 3 chronic kidney disease, unspecified whether stage 3a or 3b CKD    4. Psychophysiological insomnia           Admission on 10/22/2020, Discharged on 10/22/2020   Component Date Value Ref Range Status    WBC 10/22/2020 8.84  3.90 - 12.70 K/uL Final    RBC 10/22/2020 2.66* 4.60 - 6.20 M/uL Final    Hemoglobin 10/22/2020 8.4* 14.0 - 18.0 g/dL Final    Hematocrit 10/22/2020 25.1* 40.0 - 54.0 % Final    Mean Corpuscular Volume 10/22/2020 94  82 - 98 fL Final    Mean Corpuscular Hemoglobin 10/22/2020 31.6* 27.0 - 31.0 pg Final    Mean Corpuscular Hemoglobin Conc 10/22/2020 33.5  32.0 - 36.0 g/dL Final    RDW 10/22/2020 12.4  11.5 - 14.5 % Final    Platelets 10/22/2020 240  150 - 350 K/uL Final    MPV 10/22/2020 10.0  9.2 - 12.9 fL Final    Immature Granulocytes 10/22/2020 0.6* 0.0 - 0.5 % Final    Gran # (ANC) 10/22/2020 6.7  1.8 - 7.7 K/uL Final    Immature Grans (Abs) 10/22/2020 0.05* 0.00 - 0.04 K/uL Final    Lymph # 10/22/2020 0.8* 1.0 -  4.8 K/uL Final    Mono # 10/22/2020 1.0  0.3 - 1.0 K/uL Final    Eos # 10/22/2020 0.3  0.0 - 0.5 K/uL Final    Baso # 10/22/2020 0.02  0.00 - 0.20 K/uL Final    nRBC 10/22/2020 0  0 /100 WBC Final    Gran% 10/22/2020 75.5* 38.0 - 73.0 % Final    Lymph% 10/22/2020 9.4* 18.0 - 48.0 % Final    Mono% 10/22/2020 10.9  4.0 - 15.0 % Final    Eosinophil% 10/22/2020 3.4  0.0 - 8.0 % Final    Basophil% 10/22/2020 0.2  0.0 - 1.9 % Final    Differential Method 10/22/2020 Automated   Final    Sodium 10/22/2020 139  136 - 145 mmol/L Final    Potassium 10/22/2020 4.2  3.5 - 5.1 mmol/L Final    Chloride 10/22/2020 106  95 - 110 mmol/L Final    CO2 10/22/2020 21* 23 - 29 mmol/L Final    Glucose 10/22/2020 224* 70 - 110 mg/dL Final    BUN, Bld 10/22/2020 25* 8 - 23 mg/dL Final    Creatinine 10/22/2020 2.7* 0.5 - 1.4 mg/dL Final    Calcium 10/22/2020 8.7  8.7 - 10.5 mg/dL Final    Total Protein 10/22/2020 7.3  6.0 - 8.4 g/dL Final    Albumin 10/22/2020 3.5  3.5 - 5.2 g/dL Final    Total Bilirubin 10/22/2020 1.0  0.1 - 1.0 mg/dL Final    Alkaline Phosphatase 10/22/2020 71  55 - 135 U/L Final    AST 10/22/2020 20  10 - 40 U/L Final    ALT 10/22/2020 19  10 - 44 U/L Final    Anion Gap 10/22/2020 12  8 - 16 mmol/L Final    eGFR if  10/22/2020 25.1* >60 mL/min/1.73 m^2 Final    eGFR if non African American 10/22/2020 21.8* >60 mL/min/1.73 m^2 Final    aPTT 10/22/2020 35.4* 23.6 - 33.3 sec Final    PT 10/22/2020 15.1* 10.6 - 14.8 sec Final    INR 10/22/2020 1.3   Final    Specimen UA 10/22/2020 Urine, Clean Catch   Final    Color, UA 10/22/2020 Yellow  Yellow, Straw, Ida Final    Appearance, UA 10/22/2020 Clear  Clear Final    pH, UA 10/22/2020 6.0  5.0 - 8.0 Final    Specific [place], UA 10/22/2020 1.015  1.005 - 1.030 Final    Protein, UA 10/22/2020 Trace* Negative Final    Glucose, UA 10/22/2020 2+* Negative Final    Ketones, UA 10/22/2020 Negative  Negative Final    Bilirubin (UA)  10/22/2020 Negative  Negative Final    Occult Blood UA 10/22/2020 Negative  Negative Final    Nitrite, UA 10/22/2020 Negative  Negative Final    Urobilinogen, UA 10/22/2020 Negative  Negative EU/dL Final    Leukocytes, UA 10/22/2020 Negative  Negative Final   Admission on 10/16/2020, Discharged on 10/17/2020   Component Date Value Ref Range Status    Sodium 10/16/2020 131* 136 - 145 mmol/L Final    Potassium 10/16/2020 4.0  3.5 - 5.1 mmol/L Final    Chloride 10/16/2020 100  95 - 110 mmol/L Final    CO2 10/16/2020 21* 23 - 29 mmol/L Final    Glucose 10/16/2020 328* 70 - 110 mg/dL Final    BUN, Bld 10/16/2020 38* 8 - 23 mg/dL Final    Creatinine 10/16/2020 2.8* 0.5 - 1.4 mg/dL Final    Calcium 10/16/2020 8.5* 8.7 - 10.5 mg/dL Final    Anion Gap 10/16/2020 10  8 - 16 mmol/L Final    eGFR if African American 10/16/2020 24.1* >60 mL/min/1.73 m^2 Final    eGFR if non African American 10/16/2020 20.8* >60 mL/min/1.73 m^2 Final   Lab Visit on 10/12/2020   Component Date Value Ref Range Status    Sodium 10/12/2020 141  136 - 145 mmol/L Final    Potassium 10/12/2020 4.5  3.5 - 5.1 mmol/L Final    Chloride 10/12/2020 109  95 - 110 mmol/L Final    CO2 10/12/2020 21* 23 - 29 mmol/L Final    Glucose 10/12/2020 225* 70 - 110 mg/dL Final    BUN, Bld 10/12/2020 35* 8 - 23 mg/dL Final    Creatinine 10/12/2020 2.5* 0.5 - 1.4 mg/dL Final    Calcium 10/12/2020 9.2  8.7 - 10.5 mg/dL Final    Total Protein 10/12/2020 6.8  6.0 - 8.4 g/dL Final    Albumin 10/12/2020 3.7  3.5 - 5.2 g/dL Final    Total Bilirubin 10/12/2020 1.1* 0.1 - 1.0 mg/dL Final    Alkaline Phosphatase 10/12/2020 75  55 - 135 U/L Final    AST 10/12/2020 31  10 - 40 U/L Final    ALT 10/12/2020 28  10 - 44 U/L Final    Anion Gap 10/12/2020 11  8 - 16 mmol/L Final    eGFR if African American 10/12/2020 27.6* >60 mL/min/1.73 m^2 Final    eGFR if non African American 10/12/2020 23.9* >60 mL/min/1.73 m^2 Final    WBC 10/12/2020 7.22  3.90 -  12.70 K/uL Final    RBC 10/12/2020 2.88* 4.60 - 6.20 M/uL Final    Hemoglobin 10/12/2020 8.8* 14.0 - 18.0 g/dL Final    Hematocrit 10/12/2020 26.6* 40.0 - 54.0 % Final    Mean Corpuscular Volume 10/12/2020 92  82 - 98 fL Final    Mean Corpuscular Hemoglobin 10/12/2020 30.6  27.0 - 31.0 pg Final    Mean Corpuscular Hemoglobin Conc 10/12/2020 33.1  32.0 - 36.0 g/dL Final    RDW 10/12/2020 12.5  11.5 - 14.5 % Final    Platelets 10/12/2020 225  150 - 350 K/uL Final    MPV 10/12/2020 10.5  9.2 - 12.9 fL Final    Immature Granulocytes 10/12/2020 1.2* 0.0 - 0.5 % Final    Gran # (ANC) 10/12/2020 4.8  1.8 - 7.7 K/uL Final    Immature Grans (Abs) 10/12/2020 0.09* 0.00 - 0.04 K/uL Final    Lymph # 10/12/2020 1.4  1.0 - 4.8 K/uL Final    Mono # 10/12/2020 0.7  0.3 - 1.0 K/uL Final    Eos # 10/12/2020 0.3  0.0 - 0.5 K/uL Final    Baso # 10/12/2020 0.03  0.00 - 0.20 K/uL Final    nRBC 10/12/2020 0  0 /100 WBC Final    Gran% 10/12/2020 66.1  38.0 - 73.0 % Final    Lymph% 10/12/2020 19.8  18.0 - 48.0 % Final    Mono% 10/12/2020 9.0  4.0 - 15.0 % Final    Eosinophil% 10/12/2020 3.5  0.0 - 8.0 % Final    Basophil% 10/12/2020 0.4  0.0 - 1.9 % Final    Differential Method 10/12/2020 Automated   Final    Hemoglobin A1C 10/12/2020 8.1* 4.5 - 6.2 % Final    Estimated Avg Glucose 10/12/2020 186* 68 - 131 mg/dL Final    Glucose 10/12/2020 225* 70 - 110 mg/dL Final    Sodium 10/12/2020 141  136 - 145 mmol/L Final    Potassium 10/12/2020 4.5  3.5 - 5.1 mmol/L Final    Chloride 10/12/2020 109  95 - 110 mmol/L Final    CO2 10/12/2020 21* 23 - 29 mmol/L Final    BUN, Bld 10/12/2020 35* 8 - 23 mg/dL Final    Calcium 10/12/2020 9.2  8.7 - 10.5 mg/dL Final    Creatinine 10/12/2020 2.5* 0.5 - 1.4 mg/dL Final    Albumin 10/12/2020 3.7  3.5 - 5.2 g/dL Final    Phosphorus 10/12/2020 4.3  2.7 - 4.5 mg/dL Final    eGFR if African American 10/12/2020 27.6* >60 mL/min/1.73 m^2 Final    eGFR if non African American  10/12/2020 23.9* >60 mL/min/1.73 m^2 Final    Anion Gap 10/12/2020 11  8 - 16 mmol/L Final   Lab Visit on 10/02/2020   Component Date Value Ref Range Status    Sodium 10/02/2020 137  136 - 145 mmol/L Final    Potassium 10/02/2020 4.0  3.5 - 5.1 mmol/L Final    Chloride 10/02/2020 105  95 - 110 mmol/L Final    CO2 10/02/2020 22* 23 - 29 mmol/L Final    Glucose 10/02/2020 288* 70 - 110 mg/dL Final    BUN, Bld 10/02/2020 39* 8 - 23 mg/dL Final    Creatinine 10/02/2020 2.8* 0.5 - 1.4 mg/dL Final    Calcium 10/02/2020 8.8  8.7 - 10.5 mg/dL Final    Total Protein 10/02/2020 6.9  6.0 - 8.4 g/dL Final    Albumin 10/02/2020 3.5  3.5 - 5.2 g/dL Final    Total Bilirubin 10/02/2020 0.8  0.1 - 1.0 mg/dL Final    Alkaline Phosphatase 10/02/2020 84  55 - 135 U/L Final    AST 10/02/2020 19  10 - 40 U/L Final    ALT 10/02/2020 23  10 - 44 U/L Final    Anion Gap 10/02/2020 10  8 - 16 mmol/L Final    eGFR if African American 10/02/2020 24.1* >60 mL/min/1.73 m^2 Final    eGFR if non African American 10/02/2020 20.8* >60 mL/min/1.73 m^2 Final    Free T4 10/02/2020 0.95  0.71 - 1.51 ng/dL Final    Specimen UA 10/02/2020 Urine, Clean Catch   Final    Color, UA 10/02/2020 Yellow  Yellow, Straw, Ida Final    Appearance, UA 10/02/2020 Hazy* Clear Final    pH, UA 10/02/2020 6.0  5.0 - 8.0 Final    Specific [place], UA 10/02/2020 1.010  1.005 - 1.030 Final    Protein, UA 10/02/2020 1+* Negative Final    Glucose, UA 10/02/2020 4+* Negative Final    Ketones, UA 10/02/2020 Negative  Negative Final    Bilirubin (UA) 10/02/2020 Negative  Negative Final    Occult Blood UA 10/02/2020 1+* Negative Final    Nitrite, UA 10/02/2020 Negative  Negative Final    Urobilinogen, UA 10/02/2020 Negative  Negative EU/dL Final    Leukocytes, UA 10/02/2020 3+* Negative Final    Microalbum.,U,Random 10/02/2020 189.9  ug/mL Final    Creatinine, Random Ur 10/02/2020 116.0  23.0 - 375.0 mg/dL Final    Microalb Creat Ratio 10/02/2020  163.7* 0.0 - 30.0 ug/mg Final    Hemoglobin A1C 10/02/2020 8.4* 4.5 - 6.2 % Final    Estimated Avg Glucose 10/02/2020 194* 68 - 131 mg/dL Final    Vit D, 25-Hydroxy 10/02/2020 65  30 - 96 ng/mL Final    Vitamin B-12 10/02/2020 444  210 - 950 pg/mL Final    Magnesium 10/02/2020 1.5* 1.6 - 2.6 mg/dL Final    Iron 10/02/2020 36* 45 - 160 ug/dL Final    Ferritin 10/02/2020 127  20.0 - 300.0 ng/mL Final    Cholesterol 10/02/2020 106* 120 - 199 mg/dL Final    Triglycerides 10/02/2020 75  30 - 150 mg/dL Final    HDL 10/02/2020 31* 40 - 75 mg/dL Final    LDL Cholesterol 10/02/2020 60.0* 63.0 - 159.0 mg/dL Final    Hdl/Cholesterol Ratio 10/02/2020 29.2  20.0 - 50.0 % Final    Total Cholesterol/HDL Ratio 10/02/2020 3.4  2.0 - 5.0 Final    Non-HDL Cholesterol 10/02/2020 75  mg/dL Final    TSH 10/02/2020 1.040  0.340 - 5.600 uIU/mL Final    Glucose 10/02/2020 289* 70 - 110 mg/dL Final    Sodium 10/02/2020 137  136 - 145 mmol/L Final    Potassium 10/02/2020 4.0  3.5 - 5.1 mmol/L Final    Chloride 10/02/2020 106  95 - 110 mmol/L Final    CO2 10/02/2020 20* 23 - 29 mmol/L Final    BUN, Bld 10/02/2020 39* 8 - 23 mg/dL Final    Calcium 10/02/2020 8.8  8.7 - 10.5 mg/dL Final    Creatinine 10/02/2020 2.8* 0.5 - 1.4 mg/dL Final    Albumin 10/02/2020 3.5  3.5 - 5.2 g/dL Final    Phosphorus 10/02/2020 4.6* 2.7 - 4.5 mg/dL Final    eGFR if African American 10/02/2020 24.1* >60 mL/min/1.73 m^2 Final    eGFR if non African American 10/02/2020 20.8* >60 mL/min/1.73 m^2 Final    Anion Gap 10/02/2020 11  8 - 16 mmol/L Final    RBC, UA 10/02/2020 7* 0 - 4 /hpf Final    WBC, UA 10/02/2020 >100* 0 - 5 /hpf Final    Bacteria 10/02/2020 Few* None-Occ /hpf Final    Yeast, UA 10/02/2020 None  None Final    Squam Epithel, UA 10/02/2020 2  /hpf Final    Hyaline Casts, UA 10/02/2020 16* 0-1/lpf /lpf Final    Microscopic Comment 10/02/2020 SEE COMMENT   Final   Lab Visit on 09/25/2020   Component Date Value Ref Range  Status    Glucose 09/25/2020 304* 70 - 110 mg/dL Final    Sodium 09/25/2020 137  136 - 145 mmol/L Final    Potassium 09/25/2020 5.1  3.5 - 5.1 mmol/L Final    Chloride 09/25/2020 104  95 - 110 mmol/L Final    CO2 09/25/2020 21* 23 - 29 mmol/L Final    BUN, Bld 09/25/2020 58* 8 - 23 mg/dL Final    Calcium 09/25/2020 9.5  8.7 - 10.5 mg/dL Final    Creatinine 09/25/2020 3.7* 0.5 - 1.4 mg/dL Final    Albumin 09/25/2020 3.8  3.5 - 5.2 g/dL Final    Phosphorus 09/25/2020 5.0* 2.7 - 4.5 mg/dL Final    eGFR if African American 09/25/2020 17.2* >60 mL/min/1.73 m^2 Final    eGFR if non African American 09/25/2020 14.9* >60 mL/min/1.73 m^2 Final    Anion Gap 09/25/2020 12  8 - 16 mmol/L Final   Lab Visit on 09/18/2020   Component Date Value Ref Range Status    Glucose 09/18/2020 485* 70 - 110 mg/dL Final    Sodium 09/18/2020 135* 136 - 145 mmol/L Final    Potassium 09/18/2020 4.3  3.5 - 5.1 mmol/L Final    Chloride 09/18/2020 100  95 - 110 mmol/L Final    CO2 09/18/2020 21* 23 - 29 mmol/L Final    BUN, Bld 09/18/2020 74* 8 - 23 mg/dL Final    Calcium 09/18/2020 8.9  8.7 - 10.5 mg/dL Final    Creatinine 09/18/2020 5.5* 0.5 - 1.4 mg/dL Final    Albumin 09/18/2020 3.5  3.5 - 5.2 g/dL Final    Phosphorus 09/18/2020 5.0* 2.7 - 4.5 mg/dL Final    eGFR if African American 09/18/2020 10.6* >60 mL/min/1.73 m^2 Final    eGFR if non African American 09/18/2020 9.2* >60 mL/min/1.73 m^2 Final    Anion Gap 09/18/2020 14  8 - 16 mmol/L Final   No results displayed because visit has over 200 results.            Plan:           Type 2 diabetes mellitus with hypoglycemia without coma, with long-term current use of insulin  -     insulin degludec (TRESIBA FLEXTOUCH U-100) 100 unit/mL (3 mL) InPn; Inject 15 Units into the skin every evening. Start with 10 units for 7 days and then go up to 15 units.  Dispense: 1 Syringe; Refill: 5  -     insulin aspart U-100 (NOVOLOG FLEXPEN U-100 INSULIN) 100 unit/mL (3 mL) InPn pen;  Inject 10 Units into the skin once daily. Check glucose before meals and then take insulin as per sliding scale  Dispense: 3 mL; Refill: 5    Essential hypertension    Acute renal failure with acute tubular necrosis superimposed on stage 3 chronic kidney disease, unspecified whether stage 3a or 3b CKD    Psychophysiological insomnia  -     clonazePAM (KLONOPIN) 1 MG tablet; Take 1 tablet (1 mg total) by mouth every evening. (Patient taking differently: Take 1 mg by mouth 2 (two) times daily as needed (sleep/restless legs). )  Dispense: 30 tablet; Refill: 0  -     traZODone (DESYREL) 50 MG tablet; Start with 50 mg at bedtime for 1 week and then from 2nd week increase to 100 mg or 2 tablets at bedtime.  Tapering protocol on clonazepam. (Patient taking differently: Take 50 mg by mouth nightly as needed. Start with 50 mg at bedtime for 1 week and then from 2nd week increase to 100 mg or 2 tablets at bedtime.  Tapering protocol on clonazepam.)  Dispense: 30 tablet; Refill: 11     This is 1st follow up patient appointment for patient who is a 75-year-old  male who went into acute kidney failure with baseline borderline creatinine.  There seems to be some obstruction on the right side of the kidney.  There is a percutaneous nephrostomy tube which is draining the urine.    He had recently gone to [facility] and outcome of that visit is not clear to me.  He has a follow-up also.  The reason for acute kidney injury is obstructive uropathy.      His blood sugars continue to be elevated and he is off oral hypoglycemic medications at this point.  I will increase injection Tresiba to 10 units and we may need to go up to 15 units till we reliably bring the blood sugars down to 150s and 200s.  I would like to avoid hypoglycemic side effects by over zealous treatment.    Will increase injection Tresiba to 10-15 units now.  New prescription will be given.    His blood pressures seem to be doing okay.   Other preventive care issues including immunizations, fall precautions, COVID precautions also have been discussed.    As far as sleep and insomnia is concerned, I will reduce the clonazepam to 1 mg.  I have expressed my practice limitations and philosophy in avoiding benzodiazepines for long-term periods.  I will start with trazodone 50 mg at bedtime and may need to increase it to 100 or even 150 or 200 mg tele achieves a reasonable sleep.  Sleep hygiene issues discussed.  I would like to stop sudden withdrawal from benzos.    Spent ezra 25 minutes with patient which involved review of pts medical conditions, labs, medications and with 50% of time face-to-face discussion about medical problems, management and any applicable changes.      Fup 2 weeks    At the time of signing this chart I have learned that patient has been hospitalized for some obstructive issues and kidney related issues.        No current facility-administered medications for this visit.   No current outpatient medications on file.    Facility-Administered Medications Ordered in Other Visits:     acetaminophen tablet 650 mg, 650 mg, Oral, Q4H PRN, Sandra Morales DO    calcium chloride 1 g in dextrose 5 % 100 mL IVPB, 1 g, Intravenous, PRN, Jared Sibley MD    calcium chloride 1 g in dextrose 5 % 100 mL IVPB, 1 g, Intravenous, PRN, Jared Sibley MD    calcium chloride 1 g in dextrose 5 % 100 mL IVPB, 1 g, Intravenous, PRN, Jared Sibley MD    clonazePAM tablet 1 mg, 1 mg, Oral, BID PRN, Sandra Morales DO    dextrose 50% injection 12.5 g, 12.5 g, Intravenous, PRN, Sandra Morales DO    famotidine tablet 20 mg, 20 mg, Oral, Daily, Sandra Morales DO, 20 mg at 10/24/20 1026    glucagon (human recombinant) injection 1 mg, 1 mg, Intramuscular, PRN, Sandra Morales DO    hydrALAZINE tablet 25 mg, 25 mg, Oral, TID PRN, Sandra Morales DO    HYDROcodone-acetaminophen 5-325 mg per tablet 1 tablet, 1 tablet, Oral, Q4H PRN, Sandra FELIX  DO Carmen    insulin aspart U-100 pen 1-5 Units, 1-5 Units, Subcutaneous, Q6H PRN, Sandra Morales DO, 3 Units at 10/24/20 0732    lactated ringers infusion, , Intravenous, Continuous, Sandra Morales DO, Last Rate: 125 mL/hr at 10/24/20 0014    magnesium oxide tablet 800 mg, 800 mg, Oral, PRN, Jared Sibley MD    magnesium sulfate 2g in water 50mL IVPB (premix), 2 g, Intravenous, PRN, Jared Sibley, MD    magnesium sulfate 2g in water 50mL IVPB (premix), 4 g, Intravenous, PRN, Jared Sibley, MD    magnesium sulfate 2g in water 50mL IVPB (premix), 2 g, Intravenous, PRN, Jared Sibley, MD    magnesium sulfate in dextrose IVPB (premix) 1 g, 1 g, Intravenous, PRN, Jared Sibley MD, Last Rate: 100 mL/hr at 10/24/20 0623, 1 g at 10/24/20 0623    morphine injection 4 mg, 4 mg, Intravenous, Q4H PRN, Sandra Morales DO    ondansetron disintegrating tablet 8 mg, 8 mg, Oral, Q8H PRN, Sandra Morales DO    piperacillin-tazobactam 3.375 g in dextrose 5 % 50 mL IVPB (ready to mix system), 3.375 g, Intravenous, Q8H, Sandra Morales DO, Last Rate: 12.5 mL/hr at 10/24/20 0425, 3.375 g at 10/24/20 0425    potassium chloride 10 mEq in 100 mL IVPB, 20 mEq, Intravenous, PRN, Jared Sibley MD    potassium chloride 10 mEq in 100 mL IVPB, 40 mEq, Intravenous, PRN, Jared Sibley MD    potassium chloride 10 mEq in 100 mL IVPB, 20 mEq, Intravenous, PRN, Jared Sibley, MD    potassium chloride 10 mEq in 100 mL IVPB, 40 mEq, Intravenous, PRN, Jared Sibley, MD    potassium chloride SA CR tablet 20 mEq, 20 mEq, Oral, PRN, Visally Sibley MD    potassium chloride SA CR tablet 20 mEq, 20 mEq, Oral, PRN, Jared Sibley MD    potassium chloride SA CR tablet 40 mEq, 40 mEq, Oral, PRN, Jared Sibley MD    potassium chloride SA CR tablet 40 mEq, 40 mEq, Oral, PRN, Jared Sibley MD    promethazine (PHENERGAN) 6.25 mg in dextrose 5 % 50 mL IVPB, 6.25 mg, Intravenous, Q6H PRN, Sandra Morales,  DO    rosuvastatin tablet 20 mg, 20 mg, Oral, QHS, Sandra Morales DO, 20 mg at 10/23/20 2240    sodium chloride 0.9% flush 10 mL, 10 mL, Intravenous, PRN, Sandra Morales DO    sodium phosphate 15 mmol in dextrose 5 % 250 mL IVPB, 15 mmol, Intravenous, PRN, Jared Sibley MD    sodium phosphate 15 mmol in dextrose 5 % 250 mL IVPB, 15 mmol, Intravenous, PRN, Jared Sibley MD    sodium phosphate 20.01 mmol in dextrose 5 % 250 mL IVPB, 20.01 mmol, Intravenous, PRN, Jared Sibley MD    sodium phosphate 20.01 mmol in dextrose 5 % 250 mL IVPB, 20.01 mmol, Intravenous, PRN, Jared Sibley MD    sodium phosphate 30 mmol in dextrose 5 % 250 mL IVPB, 30 mmol, Intravenous, PRN, Jared Sibley MD    traZODone tablet 50 mg, 50 mg, Oral, Nightly PRN, Sandra Morales DO    Pharmacy to dose Vancomycin consult, , , Once **AND** vancomycin - pharmacy to dose, , Intravenous, pharmacy to manage frequency, Sandra Morales DO

## 2020-10-22 NOTE — PATIENT INSTRUCTIONS
Using a Blood Sugar Log    You have diabetes. This means your body has trouble regulating a sugar called glucose. To help manage your diabetes, youll need to check your blood sugar level as directed by your healthcare provider. Keeping a log of your blood sugar levels will help you track your blood sugar readings. Its a simple and easy way to see how well you are controlling your diabetes.  Checking your blood sugar level  You can check your blood sugar level with a blood glucose meter. Youll first prick the side of your finger with a tiny lancet to draw a tiny drop of blood onto the test strip. Some glucose meters let you use another place on your body to test. But these other places should not be used in some cases as they may be inaccurate. Follow the instructions for your glucose meter. And talk with your healthcare provider before doing the test on other places.  The strip goes into the meter first, then a drop of blood is placed on the tip of the strip. The meter then shows a reading that tells you the level of your blood sugar. Your readings should be in your target range as often as possible. This means not too high or too low. Staying in this range helps lower your risk for complications. Your healthcare provider will help you figure out the target range that is best for you.  Tracking your readings  Every time you check your blood sugar, use your log to keep track of your readings. Your meter will also probably have a memory feature that your healthcare provider can check at your next visit. You may be advised by your healthcare provider to check your blood sugar in the morning, at bedtime, and before and after meals. Be sure to write down all of your numbers. Also use your log to record things that might have affected your blood sugar. Some examples include being sick, certain medicines, being physically active, feeling stressed, or skipping meals.   Lessons learned from your readings  Tracking your  blood sugar readings helps you see patterns. These patterns tell you how your actions affect your blood sugar. For instance, you may have higher numbers after eating certain foods or lower numbers after exercise. They just help you understand how to stay in your target range more often, so that your diabetes remains in good control.  Sharing your log with your healthcare team  Bring your blood sugar log and glucose meter with you to all of your healthcare appointments. This can help your healthcare team make changes to your treatment plan, if needed. This may involve making changes in what you eat, what medicines you take, or how much you exercise.  To learn more  The resources below can help you learn more:  · American Diabetes Association 594-598-3014 www.diabetes.org  · [employer] 300-360-8499 www.lighthouse.org  · National Eye [place] 478-898-3818 www.nei.nih.gov  · [facility] [facility] 684-051-3198 www.hormone.org  Date Last Reviewed: 5/1/2016  © 0393-9855 [employer]. [address] [place] [address]. All rights reserved. This information is not intended as a substitute for professional medical care. Always follow your healthcare professional's instructions.        Using a Blood Sugar Log    You have diabetes. This means your body has trouble regulating a sugar called glucose. To help manage your diabetes, youll need to check your blood sugar level as directed by your healthcare provider. Keeping a log of your blood sugar levels will help you track your blood sugar readings. Its a simple and easy way to see how well you are controlling your diabetes.  Checking your blood sugar level  You can check your blood sugar level with a blood glucose meter. Youll first prick the side of your finger with a tiny lancet to draw a tiny drop of blood onto the test strip. Some glucose meters let you use another place on your body to test. But these other places should not be used in  some cases as they may be inaccurate. Follow the instructions for your glucose meter. And talk with your healthcare provider before doing the test on other places.  The strip goes into the meter first, then a drop of blood is placed on the tip of the strip. The meter then shows a reading that tells you the level of your blood sugar. Your readings should be in your target range as often as possible. This means not too high or too low. Staying in this range helps lower your risk for complications. Your healthcare provider will help you figure out the target range that is best for you.  Tracking your readings  Every time you check your blood sugar, use your log to keep track of your readings. Your meter will also probably have a memory feature that your healthcare provider can check at your next visit. You may be advised by your healthcare provider to check your blood sugar in the morning, at bedtime, and before and after meals. Be sure to write down all of your numbers. Also use your log to record things that might have affected your blood sugar. Some examples include being sick, certain medicines, being physically active, feeling stressed, or skipping meals.   Lessons learned from your readings  Tracking your blood sugar readings helps you see patterns. These patterns tell you how your actions affect your blood sugar. For instance, you may have higher numbers after eating certain foods or lower numbers after exercise. They just help you understand how to stay in your target range more often, so that your diabetes remains in good control.  Sharing your log with your healthcare team  Bring your blood sugar log and glucose meter with you to all of your healthcare appointments. This can help your healthcare team make changes to your treatment plan, if needed. This may involve making changes in what you eat, what medicines you take, or how much you exercise.  To learn more  The resources below can help you learn  more:  · American Diabetes Association 000-064-9754 www.diabetes.org  · [employer] 578-365-0644 www.lighthouse.org  · National Eye [place] 912-684-7726 www.nei.nih.gov  · [facility] [facility] 570-802-9172 www.hormone.org  Date Last Reviewed: 5/1/2016  © 4621-0207 [employer]. [address] [place] [address], [place] [address]. All rights reserved. This information is not intended as a substitute for professional medical care. Always follow your healthcare professional's instructions.

## 2020-10-23 ENCOUNTER — HOSPITAL ENCOUNTER (INPATIENT)
Facility: HOSPITAL | Age: 75
LOS: 3 days | Discharge: HOME | DRG: 698 | End: 2020-10-26
Attending: EMERGENCY MEDICINE | Admitting: STUDENT IN AN ORGANIZED HEALTH CARE EDUCATION/TRAINING PROGRAM
Payer: MEDICARE

## 2020-10-23 DIAGNOSIS — R78.81 BACTEREMIA DUE TO ENTEROCOCCUS: ICD-10-CM

## 2020-10-23 DIAGNOSIS — B95.2 BACTEREMIA DUE TO ENTEROCOCCUS: ICD-10-CM

## 2020-10-23 DIAGNOSIS — A41.9 SEPSIS, DUE TO UNSPECIFIED ORGANISM, UNSPECIFIED WHETHER ACUTE ORGAN DYSFUNCTION PRESENT: ICD-10-CM

## 2020-10-23 DIAGNOSIS — R31.0 GROSS HEMATURIA: ICD-10-CM

## 2020-10-23 DIAGNOSIS — N12 PYELONEPHRITIS OF RIGHT KIDNEY: ICD-10-CM

## 2020-10-23 DIAGNOSIS — N13.30 HYDRONEPHROSIS, UNSPECIFIED HYDRONEPHROSIS TYPE: ICD-10-CM

## 2020-10-23 DIAGNOSIS — A41.9 SEPSIS: ICD-10-CM

## 2020-10-23 DIAGNOSIS — R31.0 HEMATURIA, GROSS: Primary | ICD-10-CM

## 2020-10-23 DIAGNOSIS — R50.9 FEVER: ICD-10-CM

## 2020-10-23 DIAGNOSIS — D64.9 SYMPTOMATIC ANEMIA: ICD-10-CM

## 2020-10-23 PROBLEM — N13.4 HYDROURETER: Status: ACTIVE | Noted: 2020-10-23

## 2020-10-23 PROBLEM — N99.528 COMPLICATION OF NEPHROSTOMY: Status: ACTIVE | Noted: 2020-10-23

## 2020-10-23 LAB
ABO + RH BLD: NORMAL
ALBUMIN SERPL BCP-MCNC: 3.4 G/DL (ref 3.5–5.2)
ALP SERPL-CCNC: 69 U/L (ref 55–135)
ALT SERPL W/O P-5'-P-CCNC: 18 U/L (ref 10–44)
ANION GAP SERPL CALC-SCNC: 15 MMOL/L (ref 8–16)
AST SERPL-CCNC: 18 U/L (ref 10–40)
BASOPHILS # BLD AUTO: 0.01 K/UL (ref 0–0.2)
BASOPHILS NFR BLD: 0.1 % (ref 0–1.9)
BILIRUB SERPL-MCNC: 1.3 MG/DL (ref 0.1–1)
BLD GP AB SCN CELLS X3 SERPL QL: NORMAL
BUN SERPL-MCNC: 25 MG/DL (ref 8–23)
CALCIUM SERPL-MCNC: 8.8 MG/DL (ref 8.7–10.5)
CHLORIDE SERPL-SCNC: 102 MMOL/L (ref 95–110)
CO2 SERPL-SCNC: 19 MMOL/L (ref 23–29)
CREAT SERPL-MCNC: 2.7 MG/DL (ref 0.5–1.4)
DIFFERENTIAL METHOD: ABNORMAL
EOSINOPHIL # BLD AUTO: 0.1 K/UL (ref 0–0.5)
EOSINOPHIL NFR BLD: 0.8 % (ref 0–8)
ERYTHROCYTE [DISTWIDTH] IN BLOOD BY AUTOMATED COUNT: 12.3 % (ref 11.5–14.5)
EST. GFR  (AFRICAN AMERICAN): 25.1 ML/MIN/1.73 M^2
EST. GFR  (NON AFRICAN AMERICAN): 21.8 ML/MIN/1.73 M^2
GLUCOSE SERPL-MCNC: 268 MG/DL (ref 70–110)
GLUCOSE SERPL-MCNC: 331 MG/DL (ref 70–110)
HCT VFR BLD AUTO: 24.9 % (ref 40–54)
HGB BLD-MCNC: 8.2 G/DL (ref 14–18)
IMM GRANULOCYTES # BLD AUTO: 0.06 K/UL (ref 0–0.04)
IMM GRANULOCYTES NFR BLD AUTO: 0.6 % (ref 0–0.5)
LACTATE SERPL-SCNC: 1.3 MMOL/L (ref 0.5–1.9)
LYMPHOCYTES # BLD AUTO: 0.5 K/UL (ref 1–4.8)
LYMPHOCYTES NFR BLD: 5 % (ref 18–48)
MCH RBC QN AUTO: 31.2 PG (ref 27–31)
MCHC RBC AUTO-ENTMCNC: 32.9 G/DL (ref 32–36)
MCV RBC AUTO: 95 FL (ref 82–98)
MONOCYTES # BLD AUTO: 0.2 K/UL (ref 0.3–1)
MONOCYTES NFR BLD: 2.4 % (ref 4–15)
NEUTROPHILS # BLD AUTO: 8.8 K/UL (ref 1.8–7.7)
NEUTROPHILS NFR BLD: 91.1 % (ref 38–73)
NRBC BLD-RTO: 0 /100 WBC
PHOSPHATE SERPL-MCNC: 2.6 MG/DL (ref 2.7–4.5)
PLATELET # BLD AUTO: 247 K/UL (ref 150–350)
PMV BLD AUTO: 9.8 FL (ref 9.2–12.9)
POTASSIUM SERPL-SCNC: 4 MMOL/L (ref 3.5–5.1)
PROCALCITONIN SERPL IA-MCNC: 0.39 NG/ML (ref 0–0.5)
PROT SERPL-MCNC: 6.7 G/DL (ref 6–8.4)
RBC # BLD AUTO: 2.63 M/UL (ref 4.6–6.2)
SARS-COV-2 RDRP RESP QL NAA+PROBE: NEGATIVE
SODIUM SERPL-SCNC: 136 MMOL/L (ref 136–145)
WBC # BLD AUTO: 9.66 K/UL (ref 3.9–12.7)

## 2020-10-23 PROCEDURE — 21400001 HC TELEMETRY ROOM

## 2020-10-23 PROCEDURE — 36430 TRANSFUSION BLD/BLD COMPNT: CPT

## 2020-10-23 PROCEDURE — 36415 COLL VENOUS BLD VENIPUNCTURE: CPT

## 2020-10-23 PROCEDURE — 99285 EMERGENCY DEPT VISIT HI MDM: CPT | Mod: 25

## 2020-10-23 PROCEDURE — 85025 COMPLETE CBC W/AUTO DIFF WBC: CPT

## 2020-10-23 PROCEDURE — 86920 COMPATIBILITY TEST SPIN: CPT

## 2020-10-23 PROCEDURE — 96361 HYDRATE IV INFUSION ADD-ON: CPT

## 2020-10-23 PROCEDURE — 63600175 PHARM REV CODE 636 W HCPCS: Performed by: EMERGENCY MEDICINE

## 2020-10-23 PROCEDURE — 87040 BLOOD CULTURE FOR BACTERIA: CPT | Mod: 59

## 2020-10-23 PROCEDURE — 25000003 PHARM REV CODE 250: Performed by: STUDENT IN AN ORGANIZED HEALTH CARE EDUCATION/TRAINING PROGRAM

## 2020-10-23 PROCEDURE — 93005 ELECTROCARDIOGRAM TRACING: CPT | Performed by: INTERNAL MEDICINE

## 2020-10-23 PROCEDURE — 86850 RBC ANTIBODY SCREEN: CPT

## 2020-10-23 PROCEDURE — 93010 ELECTROCARDIOGRAM REPORT: CPT | Mod: ,,, | Performed by: INTERNAL MEDICINE

## 2020-10-23 PROCEDURE — U0002 COVID-19 LAB TEST NON-CDC: HCPCS

## 2020-10-23 PROCEDURE — P9016 RBC LEUKOCYTES REDUCED: HCPCS

## 2020-10-23 PROCEDURE — 96365 THER/PROPH/DIAG IV INF INIT: CPT | Mod: 59

## 2020-10-23 PROCEDURE — 87186 SC STD MICRODIL/AGAR DIL: CPT

## 2020-10-23 PROCEDURE — 93010 EKG 12-LEAD: ICD-10-PCS | Mod: ,,, | Performed by: INTERNAL MEDICINE

## 2020-10-23 PROCEDURE — 83605 ASSAY OF LACTIC ACID: CPT

## 2020-10-23 PROCEDURE — 87077 CULTURE AEROBIC IDENTIFY: CPT

## 2020-10-23 PROCEDURE — 84100 ASSAY OF PHOSPHORUS: CPT

## 2020-10-23 PROCEDURE — 84145 PROCALCITONIN (PCT): CPT

## 2020-10-23 PROCEDURE — 80053 COMPREHEN METABOLIC PANEL: CPT

## 2020-10-23 PROCEDURE — A4357 BEDSIDE DRAINAGE BAG: HCPCS

## 2020-10-23 PROCEDURE — 63600175 PHARM REV CODE 636 W HCPCS: Performed by: STUDENT IN AN ORGANIZED HEALTH CARE EDUCATION/TRAINING PROGRAM

## 2020-10-23 RX ORDER — ONDANSETRON 4 MG/1
8 TABLET, ORALLY DISINTEGRATING ORAL EVERY 8 HOURS PRN
Status: DISCONTINUED | OUTPATIENT
Start: 2020-10-23 | End: 2020-10-26 | Stop reason: HOSPADM

## 2020-10-23 RX ORDER — SODIUM CHLORIDE, SODIUM LACTATE, POTASSIUM CHLORIDE, CALCIUM CHLORIDE 600; 310; 30; 20 MG/100ML; MG/100ML; MG/100ML; MG/100ML
1000 INJECTION, SOLUTION INTRAVENOUS
Status: COMPLETED | OUTPATIENT
Start: 2020-10-23 | End: 2020-10-23

## 2020-10-23 RX ORDER — SODIUM CHLORIDE, SODIUM LACTATE, POTASSIUM CHLORIDE, CALCIUM CHLORIDE 600; 310; 30; 20 MG/100ML; MG/100ML; MG/100ML; MG/100ML
INJECTION, SOLUTION INTRAVENOUS CONTINUOUS
Status: DISCONTINUED | OUTPATIENT
Start: 2020-10-23 | End: 2020-10-25

## 2020-10-23 RX ORDER — HYDRALAZINE HYDROCHLORIDE 25 MG/1
25 TABLET, FILM COATED ORAL 3 TIMES DAILY
Status: DISCONTINUED | OUTPATIENT
Start: 2020-10-23 | End: 2020-10-23

## 2020-10-23 RX ORDER — VANCOMYCIN HCL IN 5 % DEXTROSE 1G/250ML
1000 PLASTIC BAG, INJECTION (ML) INTRAVENOUS ONCE
Status: COMPLETED | OUTPATIENT
Start: 2020-10-23 | End: 2020-10-23

## 2020-10-23 RX ORDER — ZOSTER VACCINE RECOMBINANT, ADJUVANTED 50 MCG/0.5
0.5 KIT INTRAMUSCULAR ONCE
COMMUNITY
End: 2020-11-05

## 2020-10-23 RX ORDER — SODIUM CHLORIDE 0.9 % (FLUSH) 0.9 %
10 SYRINGE (ML) INJECTION
Status: DISCONTINUED | OUTPATIENT
Start: 2020-10-23 | End: 2020-10-26 | Stop reason: HOSPADM

## 2020-10-23 RX ORDER — AMLODIPINE BESYLATE 10 MG/1
10 TABLET ORAL DAILY
COMMUNITY
End: 2020-12-01 | Stop reason: SDUPTHER

## 2020-10-23 RX ORDER — TRAZODONE HYDROCHLORIDE 50 MG/1
50 TABLET ORAL NIGHTLY PRN
Status: DISCONTINUED | OUTPATIENT
Start: 2020-10-23 | End: 2020-10-26 | Stop reason: HOSPADM

## 2020-10-23 RX ORDER — CLONAZEPAM 1 MG/1
1 TABLET ORAL 2 TIMES DAILY PRN
Status: DISCONTINUED | OUTPATIENT
Start: 2020-10-23 | End: 2020-10-26 | Stop reason: HOSPADM

## 2020-10-23 RX ORDER — CLONAZEPAM 1 MG/1
1 TABLET ORAL 2 TIMES DAILY PRN
Status: DISCONTINUED | OUTPATIENT
Start: 2020-10-23 | End: 2020-10-23

## 2020-10-23 RX ORDER — GLUCAGON 1 MG
1 KIT INJECTION
Status: DISCONTINUED | OUTPATIENT
Start: 2020-10-23 | End: 2020-10-26 | Stop reason: HOSPADM

## 2020-10-23 RX ORDER — HYDRALAZINE HYDROCHLORIDE 25 MG/1
25 TABLET, FILM COATED ORAL 3 TIMES DAILY
COMMUNITY
End: 2021-03-29

## 2020-10-23 RX ORDER — HYDRALAZINE HYDROCHLORIDE 25 MG/1
25 TABLET, FILM COATED ORAL 3 TIMES DAILY PRN
Status: DISCONTINUED | OUTPATIENT
Start: 2020-10-23 | End: 2020-10-26 | Stop reason: HOSPADM

## 2020-10-23 RX ORDER — GLIMEPIRIDE 4 MG/1
4 TABLET ORAL
COMMUNITY
End: 2020-11-05

## 2020-10-23 RX ORDER — HYDROCODONE BITARTRATE AND ACETAMINOPHEN 5; 325 MG/1; MG/1
1 TABLET ORAL EVERY 4 HOURS PRN
Status: DISCONTINUED | OUTPATIENT
Start: 2020-10-23 | End: 2020-10-26 | Stop reason: HOSPADM

## 2020-10-23 RX ORDER — FAMOTIDINE 20 MG/1
20 TABLET, FILM COATED ORAL DAILY
Status: DISCONTINUED | OUTPATIENT
Start: 2020-10-24 | End: 2020-10-26 | Stop reason: HOSPADM

## 2020-10-23 RX ORDER — ROSUVASTATIN CALCIUM 20 MG/1
20 TABLET, COATED ORAL NIGHTLY
Status: DISCONTINUED | OUTPATIENT
Start: 2020-10-23 | End: 2020-10-26 | Stop reason: HOSPADM

## 2020-10-23 RX ORDER — AMLODIPINE BESYLATE 5 MG/1
10 TABLET ORAL DAILY
Status: DISCONTINUED | OUTPATIENT
Start: 2020-10-24 | End: 2020-10-23

## 2020-10-23 RX ORDER — INSULIN ASPART 100 [IU]/ML
1-5 INJECTION, SOLUTION INTRAVENOUS; SUBCUTANEOUS EVERY 6 HOURS PRN
Status: DISCONTINUED | OUTPATIENT
Start: 2020-10-23 | End: 2020-10-26 | Stop reason: HOSPADM

## 2020-10-23 RX ORDER — ACETAMINOPHEN 325 MG/1
650 TABLET ORAL EVERY 4 HOURS PRN
Status: DISCONTINUED | OUTPATIENT
Start: 2020-10-23 | End: 2020-10-26 | Stop reason: HOSPADM

## 2020-10-23 RX ORDER — ACETAMINOPHEN 10 MG/ML
1000 INJECTION, SOLUTION INTRAVENOUS EVERY 8 HOURS
Status: COMPLETED | OUTPATIENT
Start: 2020-10-23 | End: 2020-10-24

## 2020-10-23 RX ORDER — MORPHINE SULFATE 4 MG/ML
4 INJECTION, SOLUTION INTRAMUSCULAR; INTRAVENOUS EVERY 4 HOURS PRN
Status: DISCONTINUED | OUTPATIENT
Start: 2020-10-23 | End: 2020-10-26 | Stop reason: HOSPADM

## 2020-10-23 RX ADMIN — INSULIN ASPART 2 UNITS: 100 INJECTION, SOLUTION INTRAVENOUS; SUBCUTANEOUS at 10:10

## 2020-10-23 RX ADMIN — PIPERACILLIN SODIUM AND TAZOBACTAM SODIUM 3.38 G: 3; .375 INJECTION, POWDER, LYOPHILIZED, FOR SOLUTION INTRAVENOUS at 07:10

## 2020-10-23 RX ADMIN — VANCOMYCIN HYDROCHLORIDE 1000 MG: 1 INJECTION, POWDER, LYOPHILIZED, FOR SOLUTION INTRAVENOUS at 08:10

## 2020-10-23 RX ADMIN — ROSUVASTATIN CALCIUM 20 MG: 20 TABLET, FILM COATED ORAL at 10:10

## 2020-10-23 RX ADMIN — VANCOMYCIN HYDROCHLORIDE 500 MG: 500 INJECTION, POWDER, LYOPHILIZED, FOR SOLUTION INTRAVENOUS at 10:10

## 2020-10-23 RX ADMIN — ACETAMINOPHEN 1000 MG: 10 INJECTION, SOLUTION INTRAVENOUS at 07:10

## 2020-10-23 RX ADMIN — SODIUM CHLORIDE, SODIUM LACTATE, POTASSIUM CHLORIDE, AND CALCIUM CHLORIDE 1000 ML: .6; .31; .03; .02 INJECTION, SOLUTION INTRAVENOUS at 07:10

## 2020-10-23 RX ADMIN — SODIUM CHLORIDE, SODIUM LACTATE, POTASSIUM CHLORIDE, AND CALCIUM CHLORIDE 1000 ML: .6; .31; .03; .02 INJECTION, SOLUTION INTRAVENOUS at 06:10

## 2020-10-23 NOTE — ED PROVIDER NOTES
Encounter Date: 10/23/2020       History     Chief Complaint   Patient presents with    Nephrostomy tube problem     Excess bleeding in neph. tube     75-year-old male with history of chronic kidney disease, ureteral obstruction, non-insulin-dependent diabetes, hyperlipidemia,   Patient status post nephrostomy tube placement on Monday and was seen emergency department yesterday due with a history of 3 days oozing from the nephrostomy site.  Patient was evaluated yesterday in emergency department and underwent CT noncontrast study of abdomen and pelvis which showed the nephrostomy tube to be in good position however patient did have a hematoma noted on CT.  Patient returns to the emergency department today with complaint of persistent hematuria since this morning.  Patient states has had approximately 600-870 cc of makes urine with blood since this morning.  Also associated with blood clot formation as well.  Patient attempted to flush the nephrostomy catheter and had pain so decided to come to the emergency department for further evaluation.   Patient scheduled to see his urologist in the next week at [facility].  Patient denies fever or chills or nausea vomiting or chest pain or abdominal pain or flank pain.            Review of patient's allergies indicates:  No Known Allergies  Past Medical History:   Diagnosis Date    Cancer     Diabetes mellitus, type 2     Disorder of kidney and ureter     Hyperlipidemia     Polio      Past Surgical History:   Procedure Laterality Date    APPENDECTOMY      CYSTOSCOPY W/ RETROGRADES Right 9/9/2020    Procedure: CYSTOSCOPY, WITH RETROGRADE PYELOGRAM;  Surgeon: Chris Garcia Jr., MD;  Location: [facility] OR;  Service: Urology;  Laterality: Right;    SPINE SURGERY      URETHROSCOPY  9/9/2020    Procedure: URETHROSCOPY;  Surgeon: Chris Garcia Jr., MD;  Location: [facility] OR;  Service: Urology;;     Family History   Problem Relation Age of Onset    Heart disease Mother     Heart  disease Father     Stroke Father     Heart disease Maternal Grandfather     Heart disease Paternal Grandmother     Heart disease Paternal Grandfather      Social History     Tobacco Use    Smoking status: Never Smoker    Smokeless tobacco: Never Used   Substance Use Topics    Alcohol use: No     Frequency: Never    Drug use: No     Review of Systems   Constitutional: Negative for fever.   HENT: Negative for sore throat.    Respiratory: Negative for shortness of breath.    Cardiovascular: Negative for chest pain.   Gastrointestinal: Negative for nausea.   Genitourinary: Positive for hematuria. Negative for decreased urine volume, dysuria, flank pain, testicular pain and urgency.   Musculoskeletal: Negative for back pain and neck pain.   Skin: Negative for rash.   Neurological: Negative for seizures, weakness and light-headedness.   Hematological: Does not bruise/bleed easily.   Psychiatric/Behavioral: Negative for self-injury.       Physical Exam     Initial Vitals [10/23/20 1707]   BP Pulse Resp Temp SpO2   (!) 164/76 (!) 124 20 99.3 °F (37.4 °C) 100 %      MAP       --         Physical Exam    Nursing note and vitals reviewed.  Constitutional: He appears well-developed and well-nourished.   HENT:   Head: Normocephalic and atraumatic.   Nose: Nose normal.   Mouth/Throat: Oropharynx is clear and moist.   Eyes: Conjunctivae and EOM are normal. Pupils are equal, round, and reactive to light. No scleral icterus.   Neck: Normal range of motion. Neck supple.   Cardiovascular: Normal rate, regular rhythm, normal heart sounds and intact distal pulses. Exam reveals no gallop and no friction rub.    No murmur heard.  Pulmonary/Chest: Breath sounds normal. No stridor. No respiratory distress. He has no rhonchi. He has no rales.   Course bilateral breath sounds no adventitious sounds   Abdominal: Soft. Bowel sounds are normal. He exhibits no mass. There is no abdominal tenderness. There is no rebound and no guarding.    Right urostomy tube intact with no fluctuance or erythema noted.   Musculoskeletal: Normal range of motion. No edema.   Lymphadenopathy:     He has no cervical adenopathy.   Neurological: He is alert and oriented to person, place, and time. He has normal strength and normal reflexes. No cranial nerve deficit or sensory deficit. GCS score is 15. GCS eye subscore is 4. GCS verbal subscore is 5. GCS motor subscore is 6.   Skin: Skin is warm and dry. Capillary refill takes less than 2 seconds. No rash noted.   Psychiatric: He has a normal mood and affect. His behavior is normal. Judgment and thought content normal.         ED Course   Procedures  Labs Reviewed   CBC W/ AUTO DIFFERENTIAL - Abnormal; Notable for the following components:       Result Value    RBC 2.63 (*)     Hemoglobin 8.2 (*)     Hematocrit 24.9 (*)     Mean Corpuscular Hemoglobin 31.2 (*)     Immature Granulocytes 0.6 (*)     Gran # (ANC) 8.8 (*)     Immature Grans (Abs) 0.06 (*)     Lymph # 0.5 (*)     Mono # 0.2 (*)     Gran% 91.1 (*)     Lymph% 5.0 (*)     Mono% 2.4 (*)     All other components within normal limits   COMPREHENSIVE METABOLIC PANEL - Abnormal; Notable for the following components:    CO2 19 (*)     Glucose 268 (*)     BUN, Bld 25 (*)     Creatinine 2.7 (*)     Albumin 3.4 (*)     Total Bilirubin 1.3 (*)     eGFR if  25.1 (*)     eGFR if non  21.8 (*)     All other components within normal limits   CULTURE, BLOOD   CULTURE, BLOOD   SARS-COV-2 RNA AMPLIFICATION, QUAL   LACTIC ACID, PLASMA   PROCALCITONIN   URINALYSIS, REFLEX TO URINE CULTURE   TYPE & SCREEN          Imaging Results          CT Abdomen Pelvis  Without Contrast (Final result)  Result time 10/23/20 18:35:56    Final result by Harish Paz MD (10/23/20 18:35:56)                 Narrative:    CT ABDOMEN AND PELVIS WITHOUT CONTRAST    HISTORY: Nephrostomy tube hematoma    CMS MANDATED QUALITY DATA - CT RADIATION  436    All CT scans at  this facility utilize dose modulation, iterative  reconstruction, and/or weight based dosing when appropriate to reduce  radiation dose to as low as reasonably achievable    FINDINGS: Noncontrast axial images were obtained. The lack of  intravenous contrast limits assessment, most notably in regards to  solid organs and vascular structures.    Comparison is made to prior studies from September 13 and October 22.    The lung bases are unremarkable.    The liver is unremarkable. The gallbladder is moderately dilated.  Hyperdense bile and gallstones are noted. There is no pathologic bowel  duct dilation. The spleen is mildly enlarged at 14.4 cm in length. The  pancreas and adrenal glands are unremarkable.    The left kidney is unremarkable with the exception of exophytic lower  pole mass lesions, one of which is hyperdense, unchanged. On the  right, nephrostomy tube remains in place with pigtail at the level of  the lower pole collecting system. This is unchanged when compared to  October 22. On the prior CT from September 13, the pigtail was at the  level of the renal pelvis, indicating some degree of tube retraction.  There is persistent mild proximal hydronephrosis. There is moderate  dilation of the proximal ureter, however the distal ureter returns to  normal caliber. Hyperdensity within the mildly dilated renal pelvis  and calyces likely reflects clot formation, similar to October 22.  Exophytic lower pole right renal mass is unchanged. Mild right-sided  perinephric fat stranding is also stable, with no drainable  perinephric fluid collections.    The abdominal aorta is calcified without aneurysm. Bowel structures  are unremarkable. Small fat-containing umbilical hernia is noted.    Images of the pelvis demonstrate sigmoid diverticula without evidence  of diverticulitis. Urinary bladder is unremarkable.    IMPRESSION:  1. Stable CT appearance of the abdomen and pelvis when compared to  October 22, 2020.  2.  Right-sided nephrostomy tube is unchanged in position compared to  October 22, with pigtail at the level of the lower pole collecting  system. Mild right-sided hydronephrosis and mild-to-moderate  hydroureter are unchanged. Hyperdensity within the mildly dilated  proximal collecting system is compatible with clot formation, stable.  No perinephric fluid collections are identified.  3. Additional findings as above.    Electronically Signed by Niraj Paz M.D. on 10/23/2020 6:48 PM                               Medical Decision Making:   Initial Assessment:   75-year-old male status post urostomy tube placement on Monday here with complaint of persistent hematuria since this morning.  Differential Diagnosis:   Symptomatic anemia, expanding hematoma, nephrostomy tube displacement, pyelonephritis, cystitis  Clinical Tests:   Lab Tests: Ordered and Reviewed  Radiological Study: Ordered and Reviewed  Medical Tests: Ordered and Reviewed  ED Management:  Patient seen evaluated emergency department.  Patient found with new onset fever while in emergency department of 103.6.  Patient's workup revealed nephrostomy tube intact with stable hematoma noted on previous imaging as compared to today's.  However at this time patient with fever and persistent hematuria.  Patient will be admitted for suspected pyelonephritis.  Patient case was discussed with Urology Dr. Garcia.  At this time patient has nephrostomy device intact with no significant interval changes on CT noted in last 48 hours.  Patient H&H was found to be low at 8 and 24.  Patient will need to be transfused at least 1 unit of packed red blood cells.  Patient also was began on broad-spectrum antibiotics including vancomycin and Zosyn.  Patient will be admitted to hospitalist service.  Remained hemodynamically stable.                             Clinical Impression:     ICD-10-CM ICD-9-CM   1. Hematuria, gross  R31.0 599.71   2. Symptomatic anemia  D64.9 285.9   3.  Sepsis  A41.9 038.9     995.91   4. Pyelonephritis of right kidney  N12 590.80                          ED Disposition Condition    Admit                             Jamil Ramos MD  10/23/20 2023

## 2020-10-24 LAB
ANION GAP SERPL CALC-SCNC: 8 MMOL/L (ref 8–16)
BACTERIA #/AREA URNS HPF: NEGATIVE /HPF
BASOPHILS # BLD AUTO: 0.01 K/UL (ref 0–0.2)
BASOPHILS NFR BLD: 0.1 % (ref 0–1.9)
BILIRUB UR QL STRIP: NEGATIVE
BUN SERPL-MCNC: 28 MG/DL (ref 8–23)
CALCIUM SERPL-MCNC: 7.9 MG/DL (ref 8.7–10.5)
CHLORIDE SERPL-SCNC: 106 MMOL/L (ref 95–110)
CLARITY UR: CLEAR
CO2 SERPL-SCNC: 21 MMOL/L (ref 23–29)
COLOR UR: COLORLESS
CREAT SERPL-MCNC: 2.7 MG/DL (ref 0.5–1.4)
DIFFERENTIAL METHOD: ABNORMAL
EOSINOPHIL # BLD AUTO: 0.1 K/UL (ref 0–0.5)
EOSINOPHIL NFR BLD: 0.8 % (ref 0–8)
ERYTHROCYTE [DISTWIDTH] IN BLOOD BY AUTOMATED COUNT: 12.3 % (ref 11.5–14.5)
EST. GFR  (AFRICAN AMERICAN): 25.1 ML/MIN/1.73 M^2
EST. GFR  (NON AFRICAN AMERICAN): 21.8 ML/MIN/1.73 M^2
GLUCOSE SERPL-MCNC: 211 MG/DL (ref 70–110)
GLUCOSE SERPL-MCNC: 227 MG/DL (ref 70–110)
GLUCOSE SERPL-MCNC: 245 MG/DL (ref 70–110)
GLUCOSE SERPL-MCNC: 247 MG/DL (ref 70–110)
GLUCOSE SERPL-MCNC: 255 MG/DL (ref 70–110)
GLUCOSE UR QL STRIP: ABNORMAL
HCT VFR BLD AUTO: 24.5 % (ref 40–54)
HGB BLD-MCNC: 8 G/DL (ref 14–18)
HGB UR QL STRIP: NEGATIVE
HYALINE CASTS #/AREA URNS LPF: 2 /LPF
IMM GRANULOCYTES # BLD AUTO: 0.05 K/UL (ref 0–0.04)
IMM GRANULOCYTES NFR BLD AUTO: 0.5 % (ref 0–0.5)
KETONES UR QL STRIP: NEGATIVE
LEUKOCYTE ESTERASE UR QL STRIP: NEGATIVE
LYMPHOCYTES # BLD AUTO: 0.5 K/UL (ref 1–4.8)
LYMPHOCYTES NFR BLD: 4.9 % (ref 18–48)
MAGNESIUM SERPL-MCNC: 1.5 MG/DL (ref 1.6–2.6)
MCH RBC QN AUTO: 31.6 PG (ref 27–31)
MCHC RBC AUTO-ENTMCNC: 32.7 G/DL (ref 32–36)
MCV RBC AUTO: 97 FL (ref 82–98)
MICROSCOPIC COMMENT: ABNORMAL
MONOCYTES # BLD AUTO: 0.8 K/UL (ref 0.3–1)
MONOCYTES NFR BLD: 7.2 % (ref 4–15)
NEUTROPHILS # BLD AUTO: 9.2 K/UL (ref 1.8–7.7)
NEUTROPHILS NFR BLD: 86.5 % (ref 38–73)
NITRITE UR QL STRIP: NEGATIVE
NRBC BLD-RTO: 0 /100 WBC
PH UR STRIP: 5 [PH] (ref 5–8)
PLATELET # BLD AUTO: 153 K/UL (ref 150–350)
PMV BLD AUTO: 9.7 FL (ref 9.2–12.9)
POTASSIUM SERPL-SCNC: 4.1 MMOL/L (ref 3.5–5.1)
PROT UR QL STRIP: NEGATIVE
RBC # BLD AUTO: 2.53 M/UL (ref 4.6–6.2)
RBC #/AREA URNS HPF: 1 /HPF (ref 0–4)
SODIUM SERPL-SCNC: 135 MMOL/L (ref 136–145)
SP GR UR STRIP: 1.01 (ref 1–1.03)
SQUAMOUS #/AREA URNS HPF: 0 /HPF
URN SPEC COLLECT METH UR: ABNORMAL
UROBILINOGEN UR STRIP-ACNC: NEGATIVE EU/DL
VANCOMYCIN SERPL-MCNC: 11.9 UG/ML
WBC # BLD AUTO: 10.64 K/UL (ref 3.9–12.7)
WBC #/AREA URNS HPF: 0 /HPF (ref 0–5)
YEAST URNS QL MICRO: ABNORMAL

## 2020-10-24 PROCEDURE — 99223 PR INITIAL HOSPITAL CARE,LEVL III: ICD-10-PCS | Mod: ,,, | Performed by: UROLOGY

## 2020-10-24 PROCEDURE — 80202 ASSAY OF VANCOMYCIN: CPT

## 2020-10-24 PROCEDURE — 25000003 PHARM REV CODE 250: Performed by: STUDENT IN AN ORGANIZED HEALTH CARE EDUCATION/TRAINING PROGRAM

## 2020-10-24 PROCEDURE — 63600175 PHARM REV CODE 636 W HCPCS: Performed by: STUDENT IN AN ORGANIZED HEALTH CARE EDUCATION/TRAINING PROGRAM

## 2020-10-24 PROCEDURE — 81001 URINALYSIS AUTO W/SCOPE: CPT

## 2020-10-24 PROCEDURE — 85025 COMPLETE CBC W/AUTO DIFF WBC: CPT

## 2020-10-24 PROCEDURE — 63600175 PHARM REV CODE 636 W HCPCS: Performed by: HOSPITALIST

## 2020-10-24 PROCEDURE — 82962 GLUCOSE BLOOD TEST: CPT

## 2020-10-24 PROCEDURE — 36415 COLL VENOUS BLD VENIPUNCTURE: CPT

## 2020-10-24 PROCEDURE — 80048 BASIC METABOLIC PNL TOTAL CA: CPT

## 2020-10-24 PROCEDURE — 99223 1ST HOSP IP/OBS HIGH 75: CPT | Mod: ,,, | Performed by: UROLOGY

## 2020-10-24 PROCEDURE — 83735 ASSAY OF MAGNESIUM: CPT

## 2020-10-24 PROCEDURE — 21400001 HC TELEMETRY ROOM

## 2020-10-24 RX ORDER — POTASSIUM CHLORIDE 20 MEQ/1
20 TABLET, EXTENDED RELEASE ORAL
Status: DISCONTINUED | OUTPATIENT
Start: 2020-10-24 | End: 2020-10-26 | Stop reason: HOSPADM

## 2020-10-24 RX ORDER — POTASSIUM CHLORIDE 7.45 MG/ML
40 INJECTION INTRAVENOUS
Status: DISCONTINUED | OUTPATIENT
Start: 2020-10-24 | End: 2020-10-26 | Stop reason: HOSPADM

## 2020-10-24 RX ORDER — MAGNESIUM SULFATE HEPTAHYDRATE 40 MG/ML
2 INJECTION, SOLUTION INTRAVENOUS
Status: DISCONTINUED | OUTPATIENT
Start: 2020-10-24 | End: 2020-10-26 | Stop reason: HOSPADM

## 2020-10-24 RX ORDER — POTASSIUM CHLORIDE 7.45 MG/ML
20 INJECTION INTRAVENOUS
Status: DISCONTINUED | OUTPATIENT
Start: 2020-10-24 | End: 2020-10-26 | Stop reason: HOSPADM

## 2020-10-24 RX ORDER — LANOLIN ALCOHOL/MO/W.PET/CERES
800 CREAM (GRAM) TOPICAL
Status: DISCONTINUED | OUTPATIENT
Start: 2020-10-24 | End: 2020-10-26 | Stop reason: HOSPADM

## 2020-10-24 RX ORDER — POTASSIUM CHLORIDE 20 MEQ/1
40 TABLET, EXTENDED RELEASE ORAL
Status: DISCONTINUED | OUTPATIENT
Start: 2020-10-24 | End: 2020-10-26 | Stop reason: HOSPADM

## 2020-10-24 RX ORDER — MAGNESIUM SULFATE 1 G/100ML
1 INJECTION INTRAVENOUS
Status: DISCONTINUED | OUTPATIENT
Start: 2020-10-24 | End: 2020-10-26 | Stop reason: HOSPADM

## 2020-10-24 RX ORDER — MAGNESIUM SULFATE HEPTAHYDRATE 40 MG/ML
4 INJECTION, SOLUTION INTRAVENOUS
Status: DISCONTINUED | OUTPATIENT
Start: 2020-10-24 | End: 2020-10-26 | Stop reason: HOSPADM

## 2020-10-24 RX ADMIN — INSULIN ASPART 2 UNITS: 100 INJECTION, SOLUTION INTRAVENOUS; SUBCUTANEOUS at 12:10

## 2020-10-24 RX ADMIN — INSULIN ASPART 2 UNITS: 100 INJECTION, SOLUTION INTRAVENOUS; SUBCUTANEOUS at 05:10

## 2020-10-24 RX ADMIN — INSULIN ASPART 1 UNITS: 100 INJECTION, SOLUTION INTRAVENOUS; SUBCUTANEOUS at 08:10

## 2020-10-24 RX ADMIN — SODIUM CHLORIDE, SODIUM LACTATE, POTASSIUM CHLORIDE, AND CALCIUM CHLORIDE: .6; .31; .03; .02 INJECTION, SOLUTION INTRAVENOUS at 12:10

## 2020-10-24 RX ADMIN — PIPERACILLIN SODIUM AND TAZOBACTAM SODIUM 3.38 G: 3; .375 INJECTION, POWDER, LYOPHILIZED, FOR SOLUTION INTRAVENOUS at 08:10

## 2020-10-24 RX ADMIN — PIPERACILLIN SODIUM AND TAZOBACTAM SODIUM 3.38 G: 3; .375 INJECTION, POWDER, LYOPHILIZED, FOR SOLUTION INTRAVENOUS at 12:10

## 2020-10-24 RX ADMIN — FAMOTIDINE 20 MG: 20 TABLET ORAL at 10:10

## 2020-10-24 RX ADMIN — ACETAMINOPHEN 1000 MG: 10 INJECTION, SOLUTION INTRAVENOUS at 06:10

## 2020-10-24 RX ADMIN — MAGNESIUM SULFATE 1 G: 1 INJECTION INTRAVENOUS at 06:10

## 2020-10-24 RX ADMIN — ACETAMINOPHEN 1000 MG: 10 INJECTION, SOLUTION INTRAVENOUS at 12:10

## 2020-10-24 RX ADMIN — PIPERACILLIN SODIUM AND TAZOBACTAM SODIUM 3.38 G: 3; .375 INJECTION, POWDER, LYOPHILIZED, FOR SOLUTION INTRAVENOUS at 04:10

## 2020-10-24 RX ADMIN — SODIUM CHLORIDE, SODIUM LACTATE, POTASSIUM CHLORIDE, AND CALCIUM CHLORIDE: .6; .31; .03; .02 INJECTION, SOLUTION INTRAVENOUS at 03:10

## 2020-10-24 RX ADMIN — ROSUVASTATIN CALCIUM 20 MG: 20 TABLET, FILM COATED ORAL at 08:10

## 2020-10-24 RX ADMIN — INSULIN ASPART 3 UNITS: 100 INJECTION, SOLUTION INTRAVENOUS; SUBCUTANEOUS at 07:10

## 2020-10-24 NOTE — PLAN OF CARE
Problem: Adult Inpatient Plan of Care  Goal: Plan of Care Review  Outcome: Ongoing, Progressing     Problem: Adult Inpatient Plan of Care  Goal: Patient-Specific Goal (Individualization)  Outcome: Ongoing, Progressing     Problem: Adult Inpatient Plan of Care  Goal: Absence of Hospital-Acquired Illness or Injury  Outcome: Ongoing, Progressing     Problem: Adult Inpatient Plan of Care  Goal: Optimal Comfort and Wellbeing  Outcome: Ongoing, Progressing     Problem: Diabetes Comorbidity  Goal: Blood Glucose Level Within Desired Range  Outcome: Ongoing, Progressing     Problem: Adjustment to Illness (Sepsis/Septic Shock)  Goal: Optimal Coping  Outcome: Ongoing, Progressing     Problem: Bleeding (Sepsis/Septic Shock)  Goal: Absence of Bleeding  Outcome: Ongoing, Progressing     Problem: Hemodynamic Instability (Sepsis/Septic Shock)  Goal: Effective Tissue Perfusion  Outcome: Ongoing, Progressing     Problem: Infection (Sepsis/Septic Shock)  Goal: Absence of Infection Signs/Symptoms  Outcome: Ongoing, Progressing     Problem: Fall Injury Risk  Goal: Absence of Fall and Fall-Related Injury  Outcome: Ongoing, Progressing

## 2020-10-24 NOTE — ED NOTES
Pt transported to cardio b via stretcher with vancomycin infusing. Pt stable at time of transfer. Pt denies any pain at this time

## 2020-10-24 NOTE — PROGRESS NOTES
VANCOMYCIN PHARMACOKINETIC NOTE:  Vancomycin Day # 1    Objective/Assessment:    Diagnosis/Indication for Vancomycin: Urinary Tract Infection     75 y.o., male; Actual Body Weight = 77.1 kg (170 lb).    The patient has the following labs:  10/23/2020 Estimated Creatinine Clearance: 24.4 mL/min (A) (based on SCr of 2.7 mg/dL (H)). Lab Results   Component Value Date    BUN 25 (H) 10/23/2020     Lab Results   Component Value Date    WBC 9.66 10/23/2020        Plan:  Adjust vancomycin dose and/or frequency based on the patient's actual weight and renal function:  Initiate Vancomycin 1500 mg intermittent dosing.  Orders have been entered into patient's chart.      Pharmacy will manage vancomycin therapy, monitor serum vancomycin levels, monitor renal function and adjust regimen as necessary.    Will follow random vancomycin levels and redose when serum vancomycin level decreases to 10-15 mcg/mL  Random vancomycin level has been ordered prior to dialysis on 10/24/20 @ 20:00.    Thank you for allowing us to participate in this patient's care.     Azar aBlderas 10/23/2020 9:15 PM  Department of Pharmacy  Ext [phone]

## 2020-10-24 NOTE — NURSING
Report received from Miranda TOLENTINO. Patient arrived to unit via stretcher at this time. Cardiac monitoring in place. Patient voices no complaints. Oriented to room, verbalizes understanding. Wife at bedside. RN to complete admit assessment.

## 2020-10-24 NOTE — PROGRESS NOTES
Nephrostomy bag noted to be leaking at the emptying valve. New bag obtained, and old bag replaced.

## 2020-10-24 NOTE — PLAN OF CARE
10/24/20 1018   Discharge Assessment   Assessment Type Discharge Planning Assessment   Confirmed/corrected address and phone number on facesheet? Yes   Assessment information obtained from? Medical Record;Patient   Prior to hospitilization cognitive status: Alert/Oriented   Prior to hospitalization functional status: Independent   Current cognitive status: Alert/Oriented   Current Functional Status: Independent   Facility Arrived From: home   Lives With spouse   Is patient able to care for self after discharge? Yes   Who are your caregiver(s) and their phone number(s)? Meron Cadet  wife  [phone]   Readmission Within the Last 30 Days no previous admission in last 30 days   Patient currently being followed by outpatient case management? No   Patient currently receives any other outside agency services? No   Equipment Currently Used at Home cane, straight   Part D Coverage BCBS   Do you have any problems affording any of your prescribed medications? No   Is the patient taking medications as prescribed? yes   Does the patient have transportation home? Yes   Transportation Anticipated family or friend will provide   Dialysis Name and Scheduled days none   Does the patient receive services at the [facility]? No   Discharge Plan A Home with family   Discharge Plan B Home with family   DME Needed Upon Discharge  none   Patient/Family in Agreement with Plan yes   Pt resides with spouse. PCP is Dr. Lyric Ramos. Pharmacy is [facility] in [place]. Pt does not have any advance directives arranged, and not interested in any at this time.

## 2020-10-24 NOTE — HPI
Old male with past medical history of CKD4, hx or RCC s/p right partial nephroectomy, right hydronephrosis with hydroureter s/p right nephrostomy tube presented to ED c/o bloody oozing from nephrostomy site over past 3 days. He denied fever, chills, abdominal pain, pain from nephrostomy site, nausea, or vomiting prior to ED arrival.  Patient was seen in ED yesterday for similar complaints.  CT imaging at that time showed stable nephrostomy tube and given stable kidney function patient was discharged home.    Spoke with ER physician Dr. Ramos, he states he discussed case with patient's urologist Dr. Garcia, who stated that nephrostomy tube looks like it is in good placement from CT imaging.  Recommended not to reposition or irrigate 2.  Advised IV antibiotics and monitoring of H&H.    Home medications reviewed patient's wife, who is present at bedside. .  She states he is only on a statin and Klonopin this time.  Additionally she states patient has had dark clots from nephrostomy to and about 750 cc of bloody urine mix since discharge from ER yesterday.    In the ED  Vital signs: Tmax 103.2, -130, RR 27, (SIRS 3/4+)  bp 148/63   Labs: h/h 8.2/24.9 (bl Hb 9-10), wbc 9.66, LA 1.3, bun/cr 25/2.7 (stable), covid negative  EKG, personally reviewed, Sinus tach with no ST elevation  EGDT: vanc/zosyn, IVF (bp stable not in shock and LA wnl)     Code status discussed with patient and wife: full code and wife surrogate decision maker

## 2020-10-24 NOTE — CONSULTS
[facility] Consult Patient/H&P:    Jamil Cadet is a 75 y.o. male who presents for right nephrostomy tube malfunction.    Patient with a history of bilateral renal masses s/p right partial nephrectomy in 4/2019 with Dr. Santos at [facility] - left 2 cm cyst on surveillance. Unclear of pathology given lack of records, however his wife states it was renal cell carcinoma.      He presented to the [facility] emergency department on 9/6/20 with severe hypoglycemia.       On review of his labs, he was incidentally found to have an elevated creatinine of 14.4 from a baseline of 1.5 with hyperkalemia.      He subsequently underwent US kidney which revealed mild right pelviectasis. CT renal stone on 9/8/20 showed interval development of right hydroureteronephrosis down to the pelvis - no obvious etiology for obstruction. Also with gallbladder distention, and a calculus of the cystic duct and non-obstructing stone in right kidney.     He underwent right diagnostic ureteroscopy with attempted right ureteral stent placement on 9/9/20. Subsequently had a nephrostomy tube placed and was discharged in stable condition.       10/24/20: Patient states he followed up with Dr. Kennedy at [facility]. He underwent Mag 3 renal lasix scan and had his nephrostomy tube exchanged. Per his wife, he began with severe gross hematuria and clots from his nephrostomy tube after exchange at [facility].    He presented to the ED on 10/22 and subsequently on 10/23 complaining of persistent gross hematuria. His H/H was stable at 8/25, however he had fever to 103F. Admitted for medical management. Nephrostomy tube now draining clear, yellow urine. Creatinine 2.7. CT abd/pelvis wo contrast revealed nephrostomy tube in place with mild right hydroureteronephrosis down to pelvis.      Patient denies any chills, flank pain, dysuria, lower urinary tract symptoms or recent urinary tract  infection.     PSA  0.28                 9/10/19     Testosterone  280                  9/10/19      Urine culture   No growth      9/7/20    Past Medical History:   Diagnosis Date    Cancer     Diabetes mellitus, type 2     Disorder of kidney and ureter     Hyperlipidemia     Polio        Past Surgical History:   Procedure Laterality Date    APPENDECTOMY      CYSTOSCOPY W/ RETROGRADES Right 9/9/2020    Procedure: CYSTOSCOPY, WITH RETROGRADE PYELOGRAM;  Surgeon: Chris Garcia Jr., MD;  Location: [facility] OR;  Service: Urology;  Laterality: Right;    SPINE SURGERY      URETHROSCOPY  9/9/2020    Procedure: URETHROSCOPY;  Surgeon: Chris Garcia Jr., MD;  Location: [facility] OR;  Service: Urology;;       Family History   Problem Relation Age of Onset    Heart disease Mother     Heart disease Father     Stroke Father     Heart disease Maternal Grandfather     Heart disease Paternal Grandmother     Heart disease Paternal Grandfather        Social History     Socioeconomic History    Marital status:      Spouse name: Not on file    Number of children: Not on file    Years of education: Not on file    Highest education level: Not on file   Occupational History    Not on file   Social Needs    Financial resource strain: Not very hard    Food insecurity     Worry: Never true     Inability: Never true    Transportation needs     Medical: No     Non-medical: No   Tobacco Use    Smoking status: Never Smoker    Smokeless tobacco: Never Used   Substance and Sexual Activity    Alcohol use: No     Frequency: Never    Drug use: No    Sexual activity: Yes     Partners: Female   Lifestyle    Physical activity     Days per week: 6 days     Minutes per session: 150+ min    Stress: To some extent   Relationships    Social connections     Talks on phone: Three times a week     Gets together: Once a week     Attends Druze service: Not on file     Active member of club or organization: Yes     Attends  meetings of clubs or organizations: More than 4 times per year     Relationship status:    Other Topics Concern    Not on file   Social History Narrative    Not on file       Review of patient's allergies indicates:  No Known Allergies    Medications Reviewed: see MAR    ROS:    Constitutional: denies fevers, chills, night sweats, fatigue, malaise  Respiratory: negative for cough, shortness of breath, wheezing, dyspnea.  Cardiovascular: negative for chest pain, varicose veins, ankle swelling, palpitations, syncope.  GI: negative for abdominal pain, heartburn, indigestion, nausea, vomiting, constipation, diarrhea, blood in stool.   Urology: as noted above in HPI  Endocrinology: negative for cold intolerance, excessive thirst, not feeling tired/sluggish, no heat intolerance.   Hematology/Lymph: negative for easy bleeding, easy bruising, swollen glands.  Musculoskeletal: negative for back pain, joint pain, joint swelling, neck pain.  Allergy-Immunology: negative for seasonal allergies, negative for unusual infections.   Skin: negative for boils, breast lumps, hives, itching, rash.   Neurology: negative for, dizziness, headache, tingling/numbness, tremors.   Psych: satisfied with life; negative for, anxiety, depression, suicidal thoughts.     PHYSICAL EXAM:    Vitals:    10/24/20 1112   BP: (!) 112/56   Pulse: 75   Resp: 20   Temp: 97.9 °F (36.6 °C)     Body mass index is 24.72 kg/m².       General: Alert, cooperative, no distress, appears stated age  Head: Normocephalic, without obvious abnormality, atraumatic  Neck: no masses, no thyromegaly, no lymphadenopathy  Eyes: PERRL, conjunctiva/corneas clear  Lungs: Respirations unlabored, normal effort, no accessory muscle use  CV: Warm and well perfused extremities  Abdomen: Soft, non-tender, no CVA tenderness, no hepatosplenomegaly, no hernia  : Right nephrostomy tube in place with clear, yellow urine  Extremities: Extremities normal, atraumatic, no cyanosis or  edema  Skin: Normal color, texture, and turgor, no rashes or lesions  Psych: Appropriate, well oriented, normal affect, normal mood  Neuro: Non-focal      LABS:    Recent Results (from the past 336 hour(s))   Comprehensive Metabolic Panel    Collection Time: 10/12/20 11:13 AM   Result Value Ref Range    Sodium 141 136 - 145 mmol/L    Potassium 4.5 3.5 - 5.1 mmol/L    Chloride 109 95 - 110 mmol/L    CO2 21 (L) 23 - 29 mmol/L    Glucose 225 (H) 70 - 110 mg/dL    BUN, Bld 35 (H) 8 - 23 mg/dL    Creatinine 2.5 (H) 0.5 - 1.4 mg/dL    Calcium 9.2 8.7 - 10.5 mg/dL    Total Protein 6.8 6.0 - 8.4 g/dL    Albumin 3.7 3.5 - 5.2 g/dL    Total Bilirubin 1.1 (H) 0.1 - 1.0 mg/dL    Alkaline Phosphatase 75 55 - 135 U/L    AST 31 10 - 40 U/L    ALT 28 10 - 44 U/L    Anion Gap 11 8 - 16 mmol/L    eGFR if African American 27.6 (A) >60 mL/min/1.73 m^2    eGFR if non  23.9 (A) >60 mL/min/1.73 m^2   CBC auto differential    Collection Time: 10/12/20 11:13 AM   Result Value Ref Range    WBC 7.22 3.90 - 12.70 K/uL    RBC 2.88 (L) 4.60 - 6.20 M/uL    Hemoglobin 8.8 (L) 14.0 - 18.0 g/dL    Hematocrit 26.6 (L) 40.0 - 54.0 %    Mean Corpuscular Volume 92 82 - 98 fL    Mean Corpuscular Hemoglobin 30.6 27.0 - 31.0 pg    Mean Corpuscular Hemoglobin Conc 33.1 32.0 - 36.0 g/dL    RDW 12.5 11.5 - 14.5 %    Platelets 225 150 - 350 K/uL    MPV 10.5 9.2 - 12.9 fL    Immature Granulocytes 1.2 (H) 0.0 - 0.5 %    Gran # (ANC) 4.8 1.8 - 7.7 K/uL    Immature Grans (Abs) 0.09 (H) 0.00 - 0.04 K/uL    Lymph # 1.4 1.0 - 4.8 K/uL    Mono # 0.7 0.3 - 1.0 K/uL    Eos # 0.3 0.0 - 0.5 K/uL    Baso # 0.03 0.00 - 0.20 K/uL    nRBC 0 0 /100 WBC    Gran% 66.1 38.0 - 73.0 %    Lymph% 19.8 18.0 - 48.0 %    Mono% 9.0 4.0 - 15.0 %    Eosinophil% 3.5 0.0 - 8.0 %    Basophil% 0.4 0.0 - 1.9 %    Differential Method Automated    Hemoglobin A1C    Collection Time: 10/12/20 11:13 AM   Result Value Ref Range    Hemoglobin A1C 8.1 (H) 4.5 - 6.2 %    Estimated Avg  Glucose 186 (H) 68 - 131 mg/dL   Renal function panel    Collection Time: 10/12/20 11:13 AM   Result Value Ref Range    Glucose 225 (H) 70 - 110 mg/dL    Sodium 141 136 - 145 mmol/L    Potassium 4.5 3.5 - 5.1 mmol/L    Chloride 109 95 - 110 mmol/L    CO2 21 (L) 23 - 29 mmol/L    BUN, Bld 35 (H) 8 - 23 mg/dL    Calcium 9.2 8.7 - 10.5 mg/dL    Creatinine 2.5 (H) 0.5 - 1.4 mg/dL    Albumin 3.7 3.5 - 5.2 g/dL    Phosphorus 4.3 2.7 - 4.5 mg/dL    eGFR if African American 27.6 (A) >60 mL/min/1.73 m^2    eGFR if non  23.9 (A) >60 mL/min/1.73 m^2    Anion Gap 11 8 - 16 mmol/L   Basic metabolic panel    Collection Time: 10/16/20 10:40 PM   Result Value Ref Range    Sodium 131 (L) 136 - 145 mmol/L    Potassium 4.0 3.5 - 5.1 mmol/L    Chloride 100 95 - 110 mmol/L    CO2 21 (L) 23 - 29 mmol/L    Glucose 328 (H) 70 - 110 mg/dL    BUN, Bld 38 (H) 8 - 23 mg/dL    Creatinine 2.8 (H) 0.5 - 1.4 mg/dL    Calcium 8.5 (L) 8.7 - 10.5 mg/dL    Anion Gap 10 8 - 16 mmol/L    eGFR if African American 24.1 (A) >60 mL/min/1.73 m^2    eGFR if non African American 20.8 (A) >60 mL/min/1.73 m^2   CBC auto differential    Collection Time: 10/22/20  3:04 PM   Result Value Ref Range    WBC 8.84 3.90 - 12.70 K/uL    RBC 2.66 (L) 4.60 - 6.20 M/uL    Hemoglobin 8.4 (L) 14.0 - 18.0 g/dL    Hematocrit 25.1 (L) 40.0 - 54.0 %    Mean Corpuscular Volume 94 82 - 98 fL    Mean Corpuscular Hemoglobin 31.6 (H) 27.0 - 31.0 pg    Mean Corpuscular Hemoglobin Conc 33.5 32.0 - 36.0 g/dL    RDW 12.4 11.5 - 14.5 %    Platelets 240 150 - 350 K/uL    MPV 10.0 9.2 - 12.9 fL    Immature Granulocytes 0.6 (H) 0.0 - 0.5 %    Gran # (ANC) 6.7 1.8 - 7.7 K/uL    Immature Grans (Abs) 0.05 (H) 0.00 - 0.04 K/uL    Lymph # 0.8 (L) 1.0 - 4.8 K/uL    Mono # 1.0 0.3 - 1.0 K/uL    Eos # 0.3 0.0 - 0.5 K/uL    Baso # 0.02 0.00 - 0.20 K/uL    nRBC 0 0 /100 WBC    Gran% 75.5 (H) 38.0 - 73.0 %    Lymph% 9.4 (L) 18.0 - 48.0 %    Mono% 10.9 4.0 - 15.0 %    Eosinophil% 3.4 0.0 -  8.0 %    Basophil% 0.2 0.0 - 1.9 %    Differential Method Automated    Comprehensive metabolic panel    Collection Time: 10/22/20  3:04 PM   Result Value Ref Range    Sodium 139 136 - 145 mmol/L    Potassium 4.2 3.5 - 5.1 mmol/L    Chloride 106 95 - 110 mmol/L    CO2 21 (L) 23 - 29 mmol/L    Glucose 224 (H) 70 - 110 mg/dL    BUN, Bld 25 (H) 8 - 23 mg/dL    Creatinine 2.7 (H) 0.5 - 1.4 mg/dL    Calcium 8.7 8.7 - 10.5 mg/dL    Total Protein 7.3 6.0 - 8.4 g/dL    Albumin 3.5 3.5 - 5.2 g/dL    Total Bilirubin 1.0 0.1 - 1.0 mg/dL    Alkaline Phosphatase 71 55 - 135 U/L    AST 20 10 - 40 U/L    ALT 19 10 - 44 U/L    Anion Gap 12 8 - 16 mmol/L    eGFR if African American 25.1 (A) >60 mL/min/1.73 m^2    eGFR if non  21.8 (A) >60 mL/min/1.73 m^2   APTT    Collection Time: 10/22/20  3:04 PM   Result Value Ref Range    aPTT 35.4 (H) 23.6 - 33.3 sec   Protime-INR    Collection Time: 10/22/20  3:04 PM   Result Value Ref Range    PT 15.1 (H) 10.6 - 14.8 sec    INR 1.3    Urinalysis, Reflex to Urine Culture Urine, Clean Catch    Collection Time: 10/22/20  4:14 PM    Specimen: Urine   Result Value Ref Range    Specimen UA Urine, Clean Catch     Color, UA Yellow Yellow, Straw, Ida    Appearance, UA Clear Clear    pH, UA 6.0 5.0 - 8.0    Specific Gravity, UA 1.015 1.005 - 1.030    Protein, UA Trace (A) Negative    Glucose, UA 2+ (A) Negative    Ketones, UA Negative Negative    Bilirubin (UA) Negative Negative    Occult Blood UA Negative Negative    Nitrite, UA Negative Negative    Urobilinogen, UA Negative Negative EU/dL    Leukocytes, UA Negative Negative   CBC auto differential    Collection Time: 10/23/20  5:30 PM   Result Value Ref Range    WBC 9.66 3.90 - 12.70 K/uL    RBC 2.63 (L) 4.60 - 6.20 M/uL    Hemoglobin 8.2 (L) 14.0 - 18.0 g/dL    Hematocrit 24.9 (L) 40.0 - 54.0 %    Mean Corpuscular Volume 95 82 - 98 fL    Mean Corpuscular Hemoglobin 31.2 (H) 27.0 - 31.0 pg    Mean Corpuscular Hemoglobin Conc 32.9  32.0 - 36.0 g/dL    RDW 12.3 11.5 - 14.5 %    Platelets 247 150 - 350 K/uL    MPV 9.8 9.2 - 12.9 fL    Immature Granulocytes 0.6 (H) 0.0 - 0.5 %    Gran # (ANC) 8.8 (H) 1.8 - 7.7 K/uL    Immature Grans (Abs) 0.06 (H) 0.00 - 0.04 K/uL    Lymph # 0.5 (L) 1.0 - 4.8 K/uL    Mono # 0.2 (L) 0.3 - 1.0 K/uL    Eos # 0.1 0.0 - 0.5 K/uL    Baso # 0.01 0.00 - 0.20 K/uL    nRBC 0 0 /100 WBC    Gran% 91.1 (H) 38.0 - 73.0 %    Lymph% 5.0 (L) 18.0 - 48.0 %    Mono% 2.4 (L) 4.0 - 15.0 %    Eosinophil% 0.8 0.0 - 8.0 %    Basophil% 0.1 0.0 - 1.9 %    Differential Method Automated    Comprehensive metabolic panel    Collection Time: 10/23/20  5:30 PM   Result Value Ref Range    Sodium 136 136 - 145 mmol/L    Potassium 4.0 3.5 - 5.1 mmol/L    Chloride 102 95 - 110 mmol/L    CO2 19 (L) 23 - 29 mmol/L    Glucose 268 (H) 70 - 110 mg/dL    BUN, Bld 25 (H) 8 - 23 mg/dL    Creatinine 2.7 (H) 0.5 - 1.4 mg/dL    Calcium 8.8 8.7 - 10.5 mg/dL    Total Protein 6.7 6.0 - 8.4 g/dL    Albumin 3.4 (L) 3.5 - 5.2 g/dL    Total Bilirubin 1.3 (H) 0.1 - 1.0 mg/dL    Alkaline Phosphatase 69 55 - 135 U/L    AST 18 10 - 40 U/L    ALT 18 10 - 44 U/L    Anion Gap 15 8 - 16 mmol/L    eGFR if African American 25.1 (A) >60 mL/min/1.73 m^2    eGFR if non  21.8 (A) >60 mL/min/1.73 m^2   Type & Screen    Collection Time: 10/23/20  5:30 PM   Result Value Ref Range    Group & Rh O POS     Indirect Mejia NEG    Prepare RBC 2 Units; transfusion    Collection Time: 10/23/20  5:30 PM   Result Value Ref Range    UNIT NUMBER [id number]     Product Code Z3541R11     DISPENSE STATUS CROSSMATCHED     CODING SYSTEM STMV230     Unit Blood Type Code 5100     Unit Blood Type O POS     Unit Expiration [id number]     UNIT NUMBER [id number]     Product Code S1321S02     DISPENSE STATUS TRANSFUSED     CODING SYSTEM [id number]     Unit Blood Type Code 5100     Unit Blood Type O POS     Unit Expiration [id number]    COVID-19 Rapid Screening    Collection Time:  10/23/20  5:52 PM   Result Value Ref Range    SARS-CoV-2 RNA, Amplification, Qual Negative Negative   Blood culture    Collection Time: 10/23/20  7:30 PM    Specimen: Peripheral, Forearm, Right; Blood   Result Value Ref Range    Blood Culture, Routine No Growth to date    Blood culture    Collection Time: 10/23/20  7:32 PM    Specimen: Peripheral, Forearm, Left; Blood   Result Value Ref Range    Blood Culture, Routine No Growth to date    Lactic acid, plasma    Collection Time: 10/23/20  7:32 PM   Result Value Ref Range    Lactate (Lactic Acid) 1.3 0.5 - 1.9 mmol/L   Procalcitonin    Collection Time: 10/23/20  7:32 PM   Result Value Ref Range    Procalcitonin 0.39 0.00 - 0.50 ng/mL   POCT glucose    Collection Time: 10/23/20 10:07 PM   Result Value Ref Range    POC Glucose 331 (H) 70 - 110   Phosphorus    Collection Time: 10/23/20 10:11 PM   Result Value Ref Range    Phosphorus 2.6 (L) 2.7 - 4.5 mg/dL   Urinalysis, Reflex to Urine Culture Urine, Clean Catch    Collection Time: 10/24/20  3:32 AM    Specimen: Urine   Result Value Ref Range    Specimen UA Urine, Clean Catch     Color, UA Colorless (A) Yellow, Straw, Ida    Appearance, UA Clear Clear    pH, UA 5.0 5.0 - 8.0    Specific Gravity, UA 1.010 1.005 - 1.030    Protein, UA Negative Negative    Glucose, UA 3+ (A) Negative    Ketones, UA Negative Negative    Bilirubin (UA) Negative Negative    Occult Blood UA Negative Negative    Nitrite, UA Negative Negative    Urobilinogen, UA Negative Negative EU/dL    Leukocytes, UA Negative Negative   Urinalysis Microscopic    Collection Time: 10/24/20  3:32 AM   Result Value Ref Range    RBC, UA 1 0 - 4 /hpf    WBC, UA 0 0 - 5 /hpf    Bacteria Negative None-Occ /hpf    Yeast, UA None None    Squam Epithel, UA 0 /hpf    Hyaline Casts, UA 2 (A) 0-1/lpf /lpf    Microscopic Comment SEE COMMENT    Basic Metabolic Panel    Collection Time: 10/24/20  5:14 AM   Result Value Ref Range    Sodium 135 (L) 136 - 145 mmol/L     Potassium 4.1 3.5 - 5.1 mmol/L    Chloride 106 95 - 110 mmol/L    CO2 21 (L) 23 - 29 mmol/L    Glucose 247 (H) 70 - 110 mg/dL    BUN, Bld 28 (H) 8 - 23 mg/dL    Creatinine 2.7 (H) 0.5 - 1.4 mg/dL    Calcium 7.9 (L) 8.7 - 10.5 mg/dL    Anion Gap 8 8 - 16 mmol/L    eGFR if African American 25.1 (A) >60 mL/min/1.73 m^2    eGFR if non  21.8 (A) >60 mL/min/1.73 m^2   CBC auto differential    Collection Time: 10/24/20  5:14 AM   Result Value Ref Range    WBC 10.64 3.90 - 12.70 K/uL    RBC 2.53 (L) 4.60 - 6.20 M/uL    Hemoglobin 8.0 (L) 14.0 - 18.0 g/dL    Hematocrit 24.5 (L) 40.0 - 54.0 %    Mean Corpuscular Volume 97 82 - 98 fL    Mean Corpuscular Hemoglobin 31.6 (H) 27.0 - 31.0 pg    Mean Corpuscular Hemoglobin Conc 32.7 32.0 - 36.0 g/dL    RDW 12.3 11.5 - 14.5 %    Platelets 153 150 - 350 K/uL    MPV 9.7 9.2 - 12.9 fL    Immature Granulocytes 0.5 0.0 - 0.5 %    Gran # (ANC) 9.2 (H) 1.8 - 7.7 K/uL    Immature Grans (Abs) 0.05 (H) 0.00 - 0.04 K/uL    Lymph # 0.5 (L) 1.0 - 4.8 K/uL    Mono # 0.8 0.3 - 1.0 K/uL    Eos # 0.1 0.0 - 0.5 K/uL    Baso # 0.01 0.00 - 0.20 K/uL    nRBC 0 0 /100 WBC    Gran% 86.5 (H) 38.0 - 73.0 %    Lymph% 4.9 (L) 18.0 - 48.0 %    Mono% 7.2 4.0 - 15.0 %    Eosinophil% 0.8 0.0 - 8.0 %    Basophil% 0.1 0.0 - 1.9 %    Differential Method Automated    Magnesium    Collection Time: 10/24/20  5:14 AM   Result Value Ref Range    Magnesium 1.5 (L) 1.6 - 2.6 mg/dL   POCT glucose    Collection Time: 10/24/20  5:47 AM   Result Value Ref Range    POC Glucose 255 (H) 70 - 110   POCT glucose    Collection Time: 10/24/20 11:24 AM   Result Value Ref Range    POC Glucose 211 (H) 70 - 110       IMAGING:    CT abd/pelvis without contrast 10/23/20 Images independently reviewed by me    1. Stable CT appearance of the abdomen and pelvis when compared to  October 22, 2020.  2. Right-sided nephrostomy tube is unchanged in position compared to  October 22, with pigtail at the level of the lower pole  collecting  system. Mild right-sided hydronephrosis and mild-to-moderate  hydroureter are unchanged. Hyperdensity within the mildly dilated  proximal collecting system is compatible with clot formation, stable.  No perinephric fluid collections are identified.      Assessment/Diagnosis:    1. Right ureteral stricture  2. Right nephrostomy tube malfunction  3. Gross hematuria  4. History of right renal cell carcinoma s/p partial nephrectomy    Plans:    - Extensive discussion with both patient and family regarding management of his right ureteral stricture s/p right partial nephrectomy for RCC. No indication for any urologic intervention this admission given that his hematuria has resolved. He is s/p recent Mag 3 renal lasix scan at [facility]. Patient was establishing care with Dr. Kennedy and was scheduled to discuss ureteroscope to characterize the stricture next week. On discussion today, he states he would prefer to establish urologic care with [facility]. Will obtain medical records from [facility] and discuss further outpatient management with patient.   - Follow up cultures.

## 2020-10-24 NOTE — ED NOTES
Report received from RANDA Srivastava. Assumed care of pt at this time. Pt aaox3. Pt with fever at this time. Pt with multiple blankets on. Blankets removed at this time.

## 2020-10-24 NOTE — PROGRESS NOTES
[facility] Medicine  Progress Note    Patient Name: Jamil Cadet  MRN: [id number]  Patient Class: IP- Inpatient   Admission Date: 10/23/2020  Length of Stay: 1 days  Attending Physician: Marcos Telles MD  Primary Care Provider: Richard Gunter MD        Subjective:     Principal Problem:Sepsis    Interval History:   Patient was seen and examined  Admitted with sepsis possible pyelonephritis.  History noted  On examination patient appeared to be dehydrated, denied abdominal pain or renal angle tenderness at this point.  T-max 103.2° otherwise other vitals are stable.  Renal function is appeared to be baseline.  Patient and wife said the last his cystoscopy done at [facility] showed total blockage  No oozing from nephrostomy site identified      Review of Systems  Objective:     Vital Signs (Most Recent):  Temp: 97.9 °F (36.6 °C) (10/24/20 1112)  Pulse: 75 (10/24/20 1112)  Resp: 20 (10/24/20 1112)  BP: (!) 112/56 (10/24/20 1112)  SpO2: 97 % (10/24/20 1112) Vital Signs (24h Range):  Temp:  [97.9 °F (36.6 °C)-103.2 °F (39.6 °C)] 97.9 °F (36.6 °C)  Pulse:  [] 75  Resp:  [18-27] 20  SpO2:  [94 %-100 %] 97 %  BP: ()/(51-76) 112/56     Weight: 80.4 kg (177 lb 4 oz)  Body mass index is 24.72 kg/m².    Intake/Output Summary (Last 24 hours) at 10/24/2020 1335  Last data filed at 10/24/2020 1236  Gross per 24 hour   Intake 3600 ml   Output 1635 ml   Net 1965 ml      Physical Exam    Physical Exam:  General- Patient alert and oriented x3 in NAD  HEENT- PERRLA, EOMI, OP clear, MMM  Neck- No JVD, Lymphadenopathy, Thyromegaly  CV- Regular rate and rhythm, No Murmur/varghese/rubs  Resp- Lungs CTA Bilaterally, No increased WOB  GI- Non tender/non-distended,  Extrem- No cyanosis, clubbing, edema  Neuro- Strength 5/5 flexors/extensors,  Skin-  No masses, rashes or lesions noted on cursory skin exam.  Overview/Hospital Course:     Significant Labs:   CBC:   Recent Labs   Lab 10/22/20  1504  10/23/20  1730 10/24/20  0514   WBC 8.84 9.66 10.64   HGB 8.4* 8.2* 8.0*   HCT 25.1* 24.9* 24.5*    247 153     CMP:   Recent Labs   Lab 10/22/20  1504 10/23/20  1730 10/24/20  0514    136 135*   K 4.2 4.0 4.1    102 106   CO2 21* 19* 21*   * 268* 247*   BUN 25* 25* 28*   CREATININE 2.7* 2.7* 2.7*   CALCIUM 8.7 8.8 7.9*   PROT 7.3 6.7  --    ALBUMIN 3.5 3.4*  --    BILITOT 1.0 1.3*  --    ALKPHOS 71 69  --    AST 20 18  --    ALT 19 18  --    ANIONGAP 12 15 8   EGFRNONAA 21.8* 21.8* 21.8*       Significant Imaging: I have reviewed all pertinent imaging results/findings within the past 24 hours.    Assessment/Plan:      Active Diagnoses:    Diagnosis Date Noted POA    PRINCIPAL PROBLEM:  Sepsis [A41.9] 10/23/2020 Yes    Hydroureter [N13.4] 10/23/2020 Yes    Complication of nephrostomy [N99.528] 10/23/2020 Yes    Hematuria [R31.9] 09/13/2020 Yes    Hydronephrosis [N13.30] 09/09/2020 Unknown    Acute renal failure [N19] 09/07/2020 Yes    Non-insulin dependent type 2 diabetes mellitus, previously with hypoglycemia and now hyperglycemia [E11.9] 09/07/2020 Yes     Chronic    Essential hypertension [I10] 09/07/2020 Yes     Chronic    History of renal cell carcinoma status post partial nephrectomy [C64.1] 09/07/2020 Yes     Chronic    Symptomatic anemia [D64.9] 09/07/2020 Unknown      Problems Resolved During this Admission:     ASSESSMENT AND PLAN     Sepsis 2/2 suspected pyelonephritis :stable     IV antibiotics  Close monitor Hb  Follow urine and blood cultures, sofar negative   Urology review .  IVF           VTE Risk Mitigation (From admission, onward)         Ordered     IP VTE HIGH RISK PATIENT  Once      10/23/20 2045     Place sequential compression device  Until discontinued      10/23/20 2045     Place NORMA hose  Until discontinued      10/23/20 2045                   Marcos Telles MD  Department of Hospital Medicine   [facility]

## 2020-10-24 NOTE — SUBJECTIVE & OBJECTIVE
Past Medical History:   Diagnosis Date    Cancer     Diabetes mellitus, type 2     Disorder of kidney and ureter     Hyperlipidemia     Polio        Past Surgical History:   Procedure Laterality Date    APPENDECTOMY      CYSTOSCOPY W/ RETROGRADES Right 9/9/2020    Procedure: CYSTOSCOPY, WITH RETROGRADE PYELOGRAM;  Surgeon: Chris Garcia Jr., MD;  Location: [facility] OR;  Service: Urology;  Laterality: Right;    SPINE SURGERY      URETHROSCOPY  9/9/2020    Procedure: URETHROSCOPY;  Surgeon: Chris Garcia Jr., MD;  Location: [facility];  Service: Urology;;       Review of patient's allergies indicates:  No Known Allergies    No current facility-administered medications on file prior to encounter.      Current Outpatient Medications on File Prior to Encounter   Medication Sig    clonazePAM (KLONOPIN) 1 MG tablet Take 1 tablet (1 mg total) by mouth every evening. (Patient taking differently: Take 1 mg by mouth 2 (two) times daily as needed (sleep/restless legs). )    hydrALAZINE (APRESOLINE) 25 MG tablet Take 25 mg by mouth 3 (three) times daily.    rosuvastatin (CRESTOR) 20 MG tablet Take 20 mg by mouth every evening.     traZODone (DESYREL) 50 MG tablet  --- patient has not started taking yet Start with 50 mg at bedtime for 1 week and then from 2nd week increase to 100 mg or 2 tablets at bedtime.  Tapering protocol on clonazepam. (Patient taking differently: Take 50 mg by mouth nightly as needed. Start with 50 mg at bedtime for 1 week and then from 2nd week increase to 100 mg or 2 tablets at bedtime.  Tapering protocol on clonazepam.)     Family History     Problem Relation (Age of Onset)    Heart disease Mother, Father, Maternal Grandfather, Paternal Grandmother, Paternal Grandfather    Stroke Father        Tobacco Use    Smoking status: Never Smoker    Smokeless tobacco: Never Used   Substance and Sexual Activity    Alcohol use: No     Frequency: Never    Drug use: No    Sexual activity: Yes      Partners: Female     Review of Systems   Constitutional: Positive for fever. Negative for activity change, chills and fatigue.   HENT: Negative for congestion, rhinorrhea and sore throat.    Eyes: Negative for visual disturbance.   Respiratory: Negative for cough, chest tightness and shortness of breath.    Cardiovascular: Negative for chest pain, palpitations and leg swelling.   Gastrointestinal: Negative for abdominal pain, constipation, diarrhea, nausea and vomiting.   Genitourinary: Positive for hematuria. Negative for dysuria.   Musculoskeletal: Negative for arthralgias and myalgias.   Skin: Negative for rash.   Neurological: Negative for dizziness, light-headedness and headaches.   Psychiatric/Behavioral: Negative for agitation. The patient is not nervous/anxious.      Objective:     Vital Signs (Most Recent):  Temp: (!) 103.2 °F (39.6 °C) (10/23/20 1938)  Pulse: (!) 130 (10/23/20 2000)  Resp: (!) 27 (10/23/20 1945)  BP: (!) 148/63 (10/23/20 2000)  SpO2: (!) 94 % (10/23/20 2000) Vital Signs (24h Range):  Temp:  [99.3 °F (37.4 °C)-103.2 °F (39.6 °C)] 103.2 °F (39.6 °C)  Pulse:  [124-130] 130  Resp:  [20-27] 27  SpO2:  [94 %-100 %] 94 %  BP: (120-164)/(58-76) 148/63     Weight: 77.1 kg (170 lb)  Body mass index is 23.71 kg/m².    Physical Exam  Vitals signs and nursing note reviewed.   Constitutional:       General: He is not in acute distress.     Appearance: He is well-developed. He is not diaphoretic.      Comments: flushed   HENT:      Head: Normocephalic and atraumatic.      Nose:      Comments: Nasal canula       Mouth/Throat:      Mouth: Mucous membranes are moist.   Eyes:      Conjunctiva/sclera: Conjunctivae normal.      Pupils: Pupils are equal, round, and reactive to light.   Neck:      Musculoskeletal: Normal range of motion and neck supple.   Cardiovascular:      Rate and Rhythm: Regular rhythm. Tachycardia present.      Heart sounds: Normal heart sounds. No murmur.   Pulmonary:      Effort:  Pulmonary effort is normal.      Breath sounds: Normal breath sounds. No wheezing or rales.   Abdominal:      General: Bowel sounds are normal. There is distension.      Palpations: Abdomen is soft.      Tenderness: There is no abdominal tenderness.   Genitourinary:     Comments: Right nephrostomy tube    Musculoskeletal: Normal range of motion.         General: No tenderness or deformity.   Skin:     General: Skin is warm and dry.      Coloration: Skin is not jaundiced.      Findings: No lesion.   Neurological:      Mental Status: He is alert and oriented to person, place, and time.      Deep Tendon Reflexes: Reflexes are normal and symmetric.   Psychiatric:         Behavior: Behavior normal.         Thought Content: Thought content normal.         Judgment: Judgment normal.         Significant Labs:   CBC:   Recent Labs   Lab 10/22/20  1504 10/23/20  1730   WBC 8.84 9.66   HGB 8.4* 8.2*   HCT 25.1* 24.9*    247     CMP:   Recent Labs   Lab 10/22/20  1504 10/23/20  1730    136   K 4.2 4.0    102   CO2 21* 19*   * 268*   BUN 25* 25*   CREATININE 2.7* 2.7*   CALCIUM 8.7 8.8   PROT 7.3 6.7   ALBUMIN 3.5 3.4*   BILITOT 1.0 1.3*   ALKPHOS 71 69   AST 20 18   ALT 19 18   ANIONGAP 12 15   EGFRNONAA 21.8* 21.8*     Coagulation:   Recent Labs   Lab 10/22/20  1504   INR 1.3   APTT 35.4*     Lactic Acid:   Recent Labs   Lab 10/23/20  1932   LACTATE 1.3     Urine Studies:   Recent Labs   Lab 10/22/20  1614   COLORU Yellow   APPEARANCEUA Clear   PHUR 6.0   SPECGRAV 1.015   PROTEINUA Trace*   GLUCUA 2+*   KETONESU Negative   BILIRUBINUA Negative   OCCULTUA Negative   NITRITE Negative   UROBILINOGEN Negative   LEUKOCYTESUR Negative     All pertinent labs within the past 24 hours have been reviewed.    Significant Imaging: I have reviewed all pertinent imaging results/findings within the past 24 hours.   Imaging Results          CT Abdomen Pelvis  Without Contrast (Final result)  Result time 10/23/20  18:35:56    Final result by Harish Paz MD (10/23/20 18:35:56)                 Narrative:    CT ABDOMEN AND PELVIS WITHOUT CONTRAST    HISTORY: Nephrostomy tube hematoma    CMS MANDATED QUALITY DATA - CT RADIATION  436    All CT scans at this facility utilize dose modulation, iterative  reconstruction, and/or weight based dosing when appropriate to reduce  radiation dose to as low as reasonably achievable    FINDINGS: Noncontrast axial images were obtained. The lack of  intravenous contrast limits assessment, most notably in regards to  solid organs and vascular structures.    Comparison is made to prior studies from September 13 and October 22.    The lung bases are unremarkable.    The liver is unremarkable. The gallbladder is moderately dilated.  Hyperdense bile and gallstones are noted. There is no pathologic bowel  duct dilation. The spleen is mildly enlarged at 14.4 cm in length. The  pancreas and adrenal glands are unremarkable.    The left kidney is unremarkable with the exception of exophytic lower  pole mass lesions, one of which is hyperdense, unchanged. On the  right, nephrostomy tube remains in place with pigtail at the level of  the lower pole collecting system. This is unchanged when compared to  October 22. On the prior CT from September 13, the pigtail was at the  level of the renal pelvis, indicating some degree of tube retraction.  There is persistent mild proximal hydronephrosis. There is moderate  dilation of the proximal ureter, however the distal ureter returns to  normal caliber. Hyperdensity within the mildly dilated renal pelvis  and calyces likely reflects clot formation, similar to October 22.  Exophytic lower pole right renal mass is unchanged. Mild right-sided  perinephric fat stranding is also stable, with no drainable  perinephric fluid collections.    The abdominal aorta is calcified without aneurysm. Bowel structures  are unremarkable. Small fat-containing umbilical hernia is  noted.    Images of the pelvis demonstrate sigmoid diverticula without evidence  of diverticulitis. Urinary bladder is unremarkable.    IMPRESSION:  1. Stable CT appearance of the abdomen and pelvis when compared to  October 22, 2020.  2. Right-sided nephrostomy tube is unchanged in position compared to  October 22, with pigtail at the level of the lower pole collecting  system. Mild right-sided hydronephrosis and mild-to-moderate  hydroureter are unchanged. Hyperdensity within the mildly dilated  proximal collecting system is compatible with clot formation, stable.  No perinephric fluid collections are identified.  3. Additional findings as above.    Electronically Signed by Niraj Paz M.D. on 10/23/2020 6:48 PM

## 2020-10-24 NOTE — H&P
[facility]  History & Physical    Patient Name: Jamil Cadet  MRN: [id number]  Admission Date: 10/23/2020  Attending Physician: Sandra Morales DO   Primary Care Provider: Richard Gunter MD         Patient information was obtained from patient, past medical records and ER records.     Subjective:     Principal Problem:Sepsis    Chief Complaint:   Chief Complaint   Patient presents with    Nephrostomy tube problem     Excess bleeding in neph. tube        HPI: Old male with past medical history of CKD4, hx or RCC s/p right partial nephroectomy, right hydronephrosis with hydroureter s/p right nephrostomy tube presented to ED c/o bloody oozing from nephrostomy site over past 3 days. He denied fever, chills, abdominal pain, pain from nephrostomy site, nausea, or vomiting prior to ED arrival.  Patient was seen in ED yesterday for similar complaints.  CT imaging at that time showed stable nephrostomy tube and given stable kidney function patient was discharged home.    Spoke with ER physician Dr. Ramos, he states he discussed case with patient's urologist Dr. Garcia, who stated that nephrostomy tube looks like it is in good placement from CT imaging.  Recommended not to reposition or irrigate 2.  Advised IV antibiotics and monitoring of H&H.    Home medications reviewed patient's wife, who is present at bedside. .  She states he is only on a statin and Klonopin this time.  Additionally she states patient has had dark clots from nephrostomy to and about 750 cc of bloody urine mix since discharge from ER yesterday.    In the ED  Vital signs: Tmax 103.2, -130, RR 27, (SIRS 3/4+)  bp 148/63   Labs: h/h 8.2/24.9 (bl Hb 9-10), wbc 9.66, LA 1.3, bun/cr 25/2.7 (stable), covid negative  EKG, personally reviewed, Sinus tach with no ST elevation  EGDT: vanc/zosyn, IVF (bp stable not in shock and LA wnl)     Code status discussed with patient and wife: full code and wife surrogate decision  maker    Past Medical History:   Diagnosis Date    Cancer     Diabetes mellitus, type 2     Disorder of kidney and ureter     Hyperlipidemia     Polio        Past Surgical History:   Procedure Laterality Date    APPENDECTOMY      CYSTOSCOPY W/ RETROGRADES Right 9/9/2020    Procedure: CYSTOSCOPY, WITH RETROGRADE PYELOGRAM;  Surgeon: Chris Garcia Jr., MD;  Location: [facility] OR;  Service: Urology;  Laterality: Right;    SPINE SURGERY      URETHROSCOPY  9/9/2020    Procedure: URETHROSCOPY;  Surgeon: Chris Garcia Jr., MD;  Location: [facility] OR;  Service: Urology;;       Review of patient's allergies indicates:  No Known Allergies    No current facility-administered medications on file prior to encounter.      Current Outpatient Medications on File Prior to Encounter   Medication Sig    clonazePAM (KLONOPIN) 1 MG tablet Take 1 tablet (1 mg total) by mouth every evening. (Patient taking differently: Take 1 mg by mouth 2 (two) times daily as needed (sleep/restless legs). )    hydrALAZINE (APRESOLINE) 25 MG tablet Take 25 mg by mouth 3 (three) times daily.    rosuvastatin (CRESTOR) 20 MG tablet Take 20 mg by mouth every evening.     traZODone (DESYREL) 50 MG tablet  --- patient has not started taking yet Start with 50 mg at bedtime for 1 week and then from 2nd week increase to 100 mg or 2 tablets at bedtime.  Tapering protocol on clonazepam. (Patient taking differently: Take 50 mg by mouth nightly as needed. Start with 50 mg at bedtime for 1 week and then from 2nd week increase to 100 mg or 2 tablets at bedtime.  Tapering protocol on clonazepam.)     Family History     Problem Relation (Age of Onset)    Heart disease Mother, Father, Maternal Grandfather, Paternal Grandmother, Paternal Grandfather    Stroke Father        Tobacco Use    Smoking status: Never Smoker    Smokeless tobacco: Never Used   Substance and Sexual Activity    Alcohol use: No     Frequency: Never    Drug use: No    Sexual activity: Yes      Partners: Female     Review of Systems   Constitutional: Positive for fever. Negative for activity change, chills and fatigue.   HENT: Negative for congestion, rhinorrhea and sore throat.    Eyes: Negative for visual disturbance.   Respiratory: Negative for cough, chest tightness and shortness of breath.    Cardiovascular: Negative for chest pain, palpitations and leg swelling.   Gastrointestinal: Negative for abdominal pain, constipation, diarrhea, nausea and vomiting.   Genitourinary: Positive for hematuria. Negative for dysuria.   Musculoskeletal: Negative for arthralgias and myalgias.   Skin: Negative for rash.   Neurological: Negative for dizziness, light-headedness and headaches.   Psychiatric/Behavioral: Negative for agitation. The patient is not nervous/anxious.      Objective:     Vital Signs (Most Recent):  Temp: (!) 103.2 °F (39.6 °C) (10/23/20 1938)  Pulse: (!) 130 (10/23/20 2000)  Resp: (!) 27 (10/23/20 1945)  BP: (!) 148/63 (10/23/20 2000)  SpO2: (!) 94 % (10/23/20 2000) Vital Signs (24h Range):  Temp:  [99.3 °F (37.4 °C)-103.2 °F (39.6 °C)] 103.2 °F (39.6 °C)  Pulse:  [124-130] 130  Resp:  [20-27] 27  SpO2:  [94 %-100 %] 94 %  BP: (120-164)/(58-76) 148/63     Weight: 77.1 kg (170 lb)  Body mass index is 23.71 kg/m².    Physical Exam  Vitals signs and nursing note reviewed.   Constitutional:       General: He is not in acute distress.     Appearance: He is well-developed. He is not diaphoretic.      Comments: flushed   HENT:      Head: Normocephalic and atraumatic.      Nose:      Comments: Nasal canula       Mouth/Throat:      Mouth: Mucous membranes are moist.   Eyes:      Conjunctiva/sclera: Conjunctivae normal.      Pupils: Pupils are equal, round, and reactive to light.   Neck:      Musculoskeletal: Normal range of motion and neck supple.   Cardiovascular:      Rate and Rhythm: Regular rhythm. Tachycardia present.      Heart sounds: Normal heart sounds. No murmur.   Pulmonary:      Effort:  Pulmonary effort is normal.      Breath sounds: Normal breath sounds. No wheezing or rales.   Abdominal:      General: Bowel sounds are normal. There is distension.      Palpations: Abdomen is soft.      Tenderness: There is no abdominal tenderness.   Genitourinary:     Comments: Right nephrostomy tube    Musculoskeletal: Normal range of motion.         General: No tenderness or deformity.   Skin:     General: Skin is warm and dry.      Coloration: Skin is not jaundiced.      Findings: No lesion.   Neurological:      Mental Status: He is alert and oriented to person, place, and time.      Deep Tendon Reflexes: Reflexes are normal and symmetric.   Psychiatric:         Behavior: Behavior normal.         Thought Content: Thought content normal.         Judgment: Judgment normal.         Significant Labs:   CBC:   Recent Labs   Lab 10/22/20  1504 10/23/20  1730   WBC 8.84 9.66   HGB 8.4* 8.2*   HCT 25.1* 24.9*    247     CMP:   Recent Labs   Lab 10/22/20  1504 10/23/20  1730    136   K 4.2 4.0    102   CO2 21* 19*   * 268*   BUN 25* 25*   CREATININE 2.7* 2.7*   CALCIUM 8.7 8.8   PROT 7.3 6.7   ALBUMIN 3.5 3.4*   BILITOT 1.0 1.3*   ALKPHOS 71 69   AST 20 18   ALT 19 18   ANIONGAP 12 15   EGFRNONAA 21.8* 21.8*     Coagulation:   Recent Labs   Lab 10/22/20  1504   INR 1.3   APTT 35.4*     Lactic Acid:   Recent Labs   Lab 10/23/20  1932   LACTATE 1.3     Urine Studies:   Recent Labs   Lab 10/22/20  1614   COLORU Yellow   APPEARANCEUA Clear   PHUR 6.0   SPECGRAV 1.015   PROTEINUA Trace*   GLUCUA 2+*   KETONESU Negative   BILIRUBINUA Negative   OCCULTUA Negative   NITRITE Negative   UROBILINOGEN Negative   LEUKOCYTESUR Negative     All pertinent labs within the past 24 hours have been reviewed.    Significant Imaging: I have reviewed all pertinent imaging results/findings within the past 24 hours.   Imaging Results          CT Abdomen Pelvis  Without Contrast (Final result)  Result time 10/23/20  18:35:56    Final result by Harish Paz MD (10/23/20 18:35:56)                 Narrative:    CT ABDOMEN AND PELVIS WITHOUT CONTRAST    HISTORY: Nephrostomy tube hematoma    CMS MANDATED QUALITY DATA - CT RADIATION  436    All CT scans at this facility utilize dose modulation, iterative  reconstruction, and/or weight based dosing when appropriate to reduce  radiation dose to as low as reasonably achievable    FINDINGS: Noncontrast axial images were obtained. The lack of  intravenous contrast limits assessment, most notably in regards to  solid organs and vascular structures.    Comparison is made to prior studies from September 13 and October 22.    The lung bases are unremarkable.    The liver is unremarkable. The gallbladder is moderately dilated.  Hyperdense bile and gallstones are noted. There is no pathologic bowel  duct dilation. The spleen is mildly enlarged at 14.4 cm in length. The  pancreas and adrenal glands are unremarkable.    The left kidney is unremarkable with the exception of exophytic lower  pole mass lesions, one of which is hyperdense, unchanged. On the  right, nephrostomy tube remains in place with pigtail at the level of  the lower pole collecting system. This is unchanged when compared to  October 22. On the prior CT from September 13, the pigtail was at the  level of the renal pelvis, indicating some degree of tube retraction.  There is persistent mild proximal hydronephrosis. There is moderate  dilation of the proximal ureter, however the distal ureter returns to  normal caliber. Hyperdensity within the mildly dilated renal pelvis  and calyces likely reflects clot formation, similar to October 22.  Exophytic lower pole right renal mass is unchanged. Mild right-sided  perinephric fat stranding is also stable, with no drainable  perinephric fluid collections.    The abdominal aorta is calcified without aneurysm. Bowel structures  are unremarkable. Small fat-containing umbilical hernia is  noted.    Images of the pelvis demonstrate sigmoid diverticula without evidence  of diverticulitis. Urinary bladder is unremarkable.    IMPRESSION:  1. Stable CT appearance of the abdomen and pelvis when compared to  October 22, 2020.  2. Right-sided nephrostomy tube is unchanged in position compared to  October 22, with pigtail at the level of the lower pole collecting  system. Mild right-sided hydronephrosis and mild-to-moderate  hydroureter are unchanged. Hyperdensity within the mildly dilated  proximal collecting system is compatible with clot formation, stable.  No perinephric fluid collections are identified.  3. Additional findings as above.    Electronically Signed by Niraj Paz M.D. on 10/23/2020 6:48 PM                                Assessment/Plan:     Active Hospital Problems    Diagnosis  POA    *Sepsis [A41.9]  Yes    Hydroureter [N13.4]  Yes    Complication of nephrostomy [N99.528]  Yes    Hematuria [R31.9]  Yes    Hydronephrosis [N13.30]  Unknown    Acute renal failure [N19]  Yes    Non-insulin dependent type 2 diabetes mellitus, previously with hypoglycemia and now hyperglycemia [E11.9]  Yes     Chronic    Essential hypertension [I10]  Yes     Chronic    History of renal cell carcinoma status post partial nephrectomy [C64.1]  Yes     Chronic    Symptomatic anemia [D64.9]  Unknown      Resolved Hospital Problems   No resolved problems to display.     Sepsis 2/2 suspected pyelonephritis   Admit to telemetry   SIRS (+) on admission (HR, RR, Temp)  Source: urine/pyleo  LA wnl  Ua/ucx and blood cx pending  IVABx started in ED: Vanc, Zosyn will continue  IVF: started in ER will continue  Patient not in shock, BP stable  US ab ordered to ascites check, consider paracentesis pending results      Right nephrostomy complication  Right hydronephrosis with hydroureter  S/p right nephrostomy  Urology, Dr. Garcia consulted, recs:  Avoid repositioning, no flushes at this nathan        Non-insulin  dependent type 2 diabetes mellitus, previously with hypoglycemia and now hyperglycemia  Last HbA1c 8.1% on 10/2020  low-dose insulin sliding scale with Accu-Cheks.  uptitrate as necessary  Diabetic diet  As needed hypoglycemic measures     Renal cyst  Bilateral renal cysts        Anemia multifactorial  Anemia of chronic disease/chronic kidney disease  acute blood loss  bloody urine for past 3 days.    Typed and screened  Baseline hemoglobin from about 1 month ago was 10  On admission hb 8.2  2 units prepared and 1 unit ordered for transfusion   Trending CBC        History of renal cell carcinoma status post partial nephrectomy  History of right renal cell CA status post partial nephrectomy at [facility].        Essential hypertension  Patient by states he is no longer on blood pressure medication   hydralazine 25 mg Q 8 p.r.n., up titrate as needed      Insomnia  - Patient's wife states that he has been started on Klonopin wean by PCP but has not yet initiated it.  Was advised to decrease Klonopin from 2 mg to 1 mg nightly and add trazodone.  - trazodone and klonopin 1 mg ordered     Code full  Diet: diabetic  Gi ppx: pepcid  dvt ppx: norma, scd,     VTE Risk Mitigation (From admission, onward)         Ordered     IP VTE HIGH RISK PATIENT  Once      10/23/20 2045     Place sequential compression device  Until discontinued      10/23/20 2045     Place NORMA hose  Until discontinued      10/23/20 2045                   Sandra Morales DO  Department of Hospital Medicine   [facility]

## 2020-10-25 PROBLEM — R31.0 HEMATURIA, GROSS: Status: ACTIVE | Noted: 2020-10-25

## 2020-10-25 PROBLEM — B95.2 BACTEREMIA DUE TO ENTEROCOCCUS: Status: ACTIVE | Noted: 2020-10-25

## 2020-10-25 PROBLEM — R78.81 BACTEREMIA: Status: ACTIVE | Noted: 2020-10-25

## 2020-10-25 LAB
ANION GAP SERPL CALC-SCNC: 6 MMOL/L (ref 8–16)
BASOPHILS # BLD AUTO: 0.01 K/UL (ref 0–0.2)
BASOPHILS NFR BLD: 0.2 % (ref 0–1.9)
BLD PROD TYP BPU: NORMAL
BLD PROD TYP BPU: NORMAL
BLOOD UNIT EXPIRATION DATE: NORMAL
BLOOD UNIT EXPIRATION DATE: NORMAL
BLOOD UNIT TYPE CODE: 5100
BLOOD UNIT TYPE CODE: 5100
BLOOD UNIT TYPE: NORMAL
BLOOD UNIT TYPE: NORMAL
BUN SERPL-MCNC: 28 MG/DL (ref 8–23)
CALCIUM SERPL-MCNC: 8.3 MG/DL (ref 8.7–10.5)
CHLORIDE SERPL-SCNC: 108 MMOL/L (ref 95–110)
CO2 SERPL-SCNC: 22 MMOL/L (ref 23–29)
CODING SYSTEM: NORMAL
CODING SYSTEM: NORMAL
CREAT SERPL-MCNC: 2.5 MG/DL (ref 0.5–1.4)
DIFFERENTIAL METHOD: ABNORMAL
DISPENSE STATUS: NORMAL
DISPENSE STATUS: NORMAL
EOSINOPHIL # BLD AUTO: 0.2 K/UL (ref 0–0.5)
EOSINOPHIL NFR BLD: 2.4 % (ref 0–8)
ERYTHROCYTE [DISTWIDTH] IN BLOOD BY AUTOMATED COUNT: 12.4 % (ref 11.5–14.5)
EST. GFR  (AFRICAN AMERICAN): 27.6 ML/MIN/1.73 M^2
EST. GFR  (NON AFRICAN AMERICAN): 23.9 ML/MIN/1.73 M^2
FOLATE SERPL-MCNC: 8.7 NG/ML (ref 4–24)
GLUCOSE SERPL-MCNC: 224 MG/DL (ref 70–110)
GLUCOSE SERPL-MCNC: 228 MG/DL (ref 70–110)
GLUCOSE SERPL-MCNC: 244 MG/DL (ref 70–110)
GLUCOSE SERPL-MCNC: 256 MG/DL (ref 70–110)
GLUCOSE SERPL-MCNC: 259 MG/DL (ref 70–110)
HCT VFR BLD AUTO: 22.5 % (ref 40–54)
HGB BLD-MCNC: 7.4 G/DL (ref 14–18)
IMM GRANULOCYTES # BLD AUTO: 0.03 K/UL (ref 0–0.04)
IMM GRANULOCYTES NFR BLD AUTO: 0.5 % (ref 0–0.5)
IRON SERPL-MCNC: 13 UG/DL (ref 45–160)
LDH SERPL L TO P-CCNC: 99 U/L (ref 110–260)
LYMPHOCYTES # BLD AUTO: 0.8 K/UL (ref 1–4.8)
LYMPHOCYTES NFR BLD: 12.2 % (ref 18–48)
MAGNESIUM SERPL-MCNC: 1.6 MG/DL (ref 1.6–2.6)
MCH RBC QN AUTO: 31.5 PG (ref 27–31)
MCHC RBC AUTO-ENTMCNC: 32.9 G/DL (ref 32–36)
MCV RBC AUTO: 96 FL (ref 82–98)
MONOCYTES # BLD AUTO: 0.8 K/UL (ref 0.3–1)
MONOCYTES NFR BLD: 11.6 % (ref 4–15)
NEUTROPHILS # BLD AUTO: 4.8 K/UL (ref 1.8–7.7)
NEUTROPHILS NFR BLD: 73.1 % (ref 38–73)
NRBC BLD-RTO: 0 /100 WBC
NUM UNITS TRANS PACKED RBC: NORMAL
NUM UNITS TRANS PACKED RBC: NORMAL
PLATELET # BLD AUTO: 151 K/UL (ref 150–350)
PMV BLD AUTO: 10 FL (ref 9.2–12.9)
POTASSIUM SERPL-SCNC: 3.7 MMOL/L (ref 3.5–5.1)
RBC # BLD AUTO: 2.35 M/UL (ref 4.6–6.2)
RETICS/RBC NFR AUTO: 1.7 % (ref 0.4–2)
SATURATED IRON: 8 % (ref 20–50)
SODIUM SERPL-SCNC: 136 MMOL/L (ref 136–145)
TOTAL IRON BINDING CAPACITY: 171 UG/DL (ref 250–450)
TRANSFERRIN SERPL-MCNC: 122 MG/DL (ref 200–375)
VANCOMYCIN SERPL-MCNC: 18.9 UG/ML
VIT B12 SERPL-MCNC: 215 PG/ML (ref 210–950)
WBC # BLD AUTO: 6.54 K/UL (ref 3.9–12.7)

## 2020-10-25 PROCEDURE — 63600175 PHARM REV CODE 636 W HCPCS: Performed by: STUDENT IN AN ORGANIZED HEALTH CARE EDUCATION/TRAINING PROGRAM

## 2020-10-25 PROCEDURE — 36430 TRANSFUSION BLD/BLD COMPNT: CPT

## 2020-10-25 PROCEDURE — 25000003 PHARM REV CODE 250: Performed by: STUDENT IN AN ORGANIZED HEALTH CARE EDUCATION/TRAINING PROGRAM

## 2020-10-25 PROCEDURE — 82607 VITAMIN B-12: CPT

## 2020-10-25 PROCEDURE — 83540 ASSAY OF IRON: CPT

## 2020-10-25 PROCEDURE — 80202 ASSAY OF VANCOMYCIN: CPT

## 2020-10-25 PROCEDURE — 83615 LACTATE (LD) (LDH) ENZYME: CPT

## 2020-10-25 PROCEDURE — 85045 AUTOMATED RETICULOCYTE COUNT: CPT

## 2020-10-25 PROCEDURE — 36415 COLL VENOUS BLD VENIPUNCTURE: CPT

## 2020-10-25 PROCEDURE — 83735 ASSAY OF MAGNESIUM: CPT

## 2020-10-25 PROCEDURE — 82962 GLUCOSE BLOOD TEST: CPT

## 2020-10-25 PROCEDURE — 63600175 PHARM REV CODE 636 W HCPCS: Performed by: INTERNAL MEDICINE

## 2020-10-25 PROCEDURE — 80048 BASIC METABOLIC PNL TOTAL CA: CPT

## 2020-10-25 PROCEDURE — P9016 RBC LEUKOCYTES REDUCED: HCPCS

## 2020-10-25 PROCEDURE — 99223 PR INITIAL HOSPITAL CARE,LEVL III: ICD-10-PCS | Mod: ,,, | Performed by: INTERNAL MEDICINE

## 2020-10-25 PROCEDURE — 25000003 PHARM REV CODE 250: Performed by: INTERNAL MEDICINE

## 2020-10-25 PROCEDURE — 12000002 HC ACUTE/MED SURGE SEMI-PRIVATE ROOM

## 2020-10-25 PROCEDURE — 87040 BLOOD CULTURE FOR BACTERIA: CPT

## 2020-10-25 PROCEDURE — 85025 COMPLETE CBC W/AUTO DIFF WBC: CPT

## 2020-10-25 PROCEDURE — 99233 SBSQ HOSP IP/OBS HIGH 50: CPT | Mod: ,,, | Performed by: UROLOGY

## 2020-10-25 PROCEDURE — 82746 ASSAY OF FOLIC ACID SERUM: CPT

## 2020-10-25 PROCEDURE — 99223 1ST HOSP IP/OBS HIGH 75: CPT | Mod: ,,, | Performed by: INTERNAL MEDICINE

## 2020-10-25 PROCEDURE — 99233 PR SUBSEQUENT HOSPITAL CARE,LEVL III: ICD-10-PCS | Mod: ,,, | Performed by: UROLOGY

## 2020-10-25 PROCEDURE — 63600175 PHARM REV CODE 636 W HCPCS: Performed by: HOSPITALIST

## 2020-10-25 RX ORDER — HYDROCODONE BITARTRATE AND ACETAMINOPHEN 500; 5 MG/1; MG/1
TABLET ORAL
Status: DISCONTINUED | OUTPATIENT
Start: 2020-10-25 | End: 2020-10-26 | Stop reason: HOSPADM

## 2020-10-25 RX ADMIN — AMPICILLIN SODIUM 2 G: 2 INJECTION, POWDER, FOR SOLUTION INTRAMUSCULAR; INTRAVENOUS at 11:10

## 2020-10-25 RX ADMIN — PIPERACILLIN SODIUM AND TAZOBACTAM SODIUM 3.38 G: 3; .375 INJECTION, POWDER, LYOPHILIZED, FOR SOLUTION INTRAVENOUS at 04:10

## 2020-10-25 RX ADMIN — VANCOMYCIN HYDROCHLORIDE 1500 MG: 1 INJECTION, POWDER, LYOPHILIZED, FOR SOLUTION INTRAVENOUS at 12:10

## 2020-10-25 RX ADMIN — INSULIN ASPART 2 UNITS: 100 INJECTION, SOLUTION INTRAVENOUS; SUBCUTANEOUS at 05:10

## 2020-10-25 RX ADMIN — INSULIN ASPART 1 UNITS: 100 INJECTION, SOLUTION INTRAVENOUS; SUBCUTANEOUS at 08:10

## 2020-10-25 RX ADMIN — PIPERACILLIN SODIUM AND TAZOBACTAM SODIUM 3.38 G: 3; .375 INJECTION, POWDER, LYOPHILIZED, FOR SOLUTION INTRAVENOUS at 01:10

## 2020-10-25 RX ADMIN — SODIUM CHLORIDE, SODIUM LACTATE, POTASSIUM CHLORIDE, AND CALCIUM CHLORIDE: .6; .31; .03; .02 INJECTION, SOLUTION INTRAVENOUS at 07:10

## 2020-10-25 RX ADMIN — AMPICILLIN SODIUM 2 G: 2 INJECTION, POWDER, FOR SOLUTION INTRAMUSCULAR; INTRAVENOUS at 05:10

## 2020-10-25 RX ADMIN — ROSUVASTATIN CALCIUM 20 MG: 20 TABLET, FILM COATED ORAL at 08:10

## 2020-10-25 RX ADMIN — FAMOTIDINE 20 MG: 20 TABLET ORAL at 09:10

## 2020-10-25 RX ADMIN — MAGNESIUM SULFATE IN WATER 2 G: 40 INJECTION, SOLUTION INTRAVENOUS at 10:10

## 2020-10-25 RX ADMIN — INSULIN ASPART 3 UNITS: 100 INJECTION, SOLUTION INTRAVENOUS; SUBCUTANEOUS at 12:10

## 2020-10-25 RX ADMIN — SODIUM CHLORIDE: 0.9 INJECTION, SOLUTION INTRAVENOUS at 10:10

## 2020-10-25 NOTE — NURSING TRANSFER
Nursing Transfer Note      10/25/2020     Transfer To: 1203    Transfer via wheelchair    Transfer with cardiac monitoring    Transported by RANDA Palumbo. Fawn, extender    Medicines sent: yes, insulin    Chart send with patient: Yes    Notified: spouse    Patient reassessed at: 10/25/2020 1410   (date, time)    Upon arrival to floor: cardiac monitor applied, patient oriented to room, call bell in reach and bed in lowest position

## 2020-10-25 NOTE — PROGRESS NOTES
[facility] Medicine  Progress Note    Patient Name: Jamil Cadet  MRN: [id number]  Patient Class: IP- Inpatient   Admission Date: 10/23/2020  Length of Stay: 2 days  Attending Physician: Marcos Telles MD  Primary Care Provider: Richard Gunter MD        Subjective:     Principal Problem:Sepsis    Interval History:     Patient was seen and examined  Appreciate urology consult  Patient is afebrile.  Patient was to establish the urology care in [facility]  Blood culture both bottles are positive with Gram-positive cocci  Review of Systems  Objective:     Vital Signs (Most Recent):  Temp: 98.2 °F (36.8 °C) (10/25/20 1207)  Pulse: 81 (10/25/20 1207)  Resp: 16 (10/25/20 1207)  BP: (!) 145/70 (10/25/20 1207)  SpO2: 97 % (10/25/20 1207) Vital Signs (24h Range):  Temp:  [98.1 °F (36.7 °C)-99.2 °F (37.3 °C)] 98.2 °F (36.8 °C)  Pulse:  [71-84] 81  Resp:  [16-18] 16  SpO2:  [95 %-98 %] 97 %  BP: (117-145)/(58-73) 145/70     Weight: 80.4 kg (177 lb 4 oz)  Body mass index is 24.72 kg/m².    Intake/Output Summary (Last 24 hours) at 10/25/2020 1315  Last data filed at 10/25/2020 0928  Gross per 24 hour   Intake 2100 ml   Output 2255 ml   Net -155 ml      Physical Exam    Physical Exam:  General- Patient alert and oriented x3 in NAD  HEENT- PERRLA, EOMI, OP clear, MMM  Neck- No JVD, Lymphadenopathy, Thyromegaly  CV- Regular rate and rhythm, No Murmur/varghese/rubs  Resp- Lungs CTA Bilaterally, No increased WOB  GI- Non tender/non-distended,  Extrem- No cyanosis, clubbing, edema  Neuro- Strength 5/5 flexors/extensors,  Skin-  No masses, rashes or lesions noted on cursory skin exam.  Overview/Hospital Course:     Significant Labs:   CBC:   Recent Labs   Lab 10/23/20  1730 10/24/20  0514 10/25/20  0426   WBC 9.66 10.64 6.54   HGB 8.2* 8.0* 7.4*   HCT 24.9* 24.5* 22.5*    153 151     CMP:   Recent Labs   Lab 10/23/20  1730 10/24/20  0514 10/25/20  0426    135* 136   K 4.0 4.1 3.7    106 108    CO2 19* 21* 22*   * 247* 256*   BUN 25* 28* 28*   CREATININE 2.7* 2.7* 2.5*   CALCIUM 8.8 7.9* 8.3*   PROT 6.7  --   --    ALBUMIN 3.4*  --   --    BILITOT 1.3*  --   --    ALKPHOS 69  --   --    AST 18  --   --    ALT 18  --   --    ANIONGAP 15 8 6*   EGFRNONAA 21.8* 21.8* 23.9*       Significant Imaging: I have reviewed all pertinent imaging results/findings within the past 24 hours.    Assessment/Plan:      Active Diagnoses:    Diagnosis Date Noted POA    PRINCIPAL PROBLEM:  Sepsis [A41.9] 10/23/2020 Yes    Bacteremia [R78.81] 10/25/2020 Unknown    Hydroureter [N13.4] 10/23/2020 Yes    Complication of nephrostomy [N99.528] 10/23/2020 Yes    Hematuria [R31.9] 09/13/2020 Yes    Hydronephrosis [N13.30] 09/09/2020 Unknown    Acute renal failure [N19] 09/07/2020 Yes    Non-insulin dependent type 2 diabetes mellitus, previously with hypoglycemia and now hyperglycemia [E11.9] 09/07/2020 Yes     Chronic    Essential hypertension [I10] 09/07/2020 Yes     Chronic    History of renal cell carcinoma status post partial nephrectomy [C64.1] 09/07/2020 Yes     Chronic    Symptomatic anemia [D64.9] 09/07/2020 Unknown      Problems Resolved During this Admission:     ASSESSMENT AND PLAN     Sepsis 2/2 suspected pyelonephritis :stable   Follow-up blood culture Gram-positive cocci bacteremia  Repeat blood culture  IV antibiotics.  May discontinue Zosyn soon  Anemia workup and 1 unit of blood transfusion today  Urine culture so far not showing up.  No other source of bacteremia is able to identify at this moment .  It could also be from the nephrostomy tube.  Will await for final culture and if positive may probably need to replace the nephrostomy tube.  Consult ID tomorrow          VTE Risk Mitigation (From admission, onward)         Ordered     IP VTE HIGH RISK PATIENT  Once      10/23/20 2045     Place sequential compression device  Until discontinued      10/23/20 2045     Place NORMA hose  Until discontinued       10/23/20 2045                   Marcos Telles MD  Department of Hospital Medicine   Critical access hospital

## 2020-10-25 NOTE — NURSING
Attempted to call pt's spouse 2x to notify of pt's transfer, both attempts to call line was busy. Will try again later.

## 2020-10-25 NOTE — PROGRESS NOTES
Pharmacokinetic Assessment Follow Up: IV Vancomycin    Vancomycin serum concentration assessment(s):    The random level was drawn correctly and can be used to guide therapy at this time. The measurement is within the desired definitive target range of 10 to 15 mcg/mL.    Vancomycin Regimen Plan:    Vancomycin 1500 mg x 1  Re-dose when the random level is less than 15 mcg/mL, next level to be drawn at 2200 on 10/25/20    Drug levels (last 3 results):  Recent Labs   Lab Result Units 10/24/20  2049   Vancomycin, Random ug/mL 11.9       Pharmacy will continue to follow and monitor vancomycin.    Please contact pharmacy at extension [phone] for questions regarding this assessment.    Thank you for the consult,   Rehana Valentine       Patient brief summary:  Jmail Cadet is a 75 y.o. male initiated on antimicrobial therapy with IV Vancomycin for treatment of urinary tract infection    Drug Allergies:   Review of patient's allergies indicates:  No Known Allergies    Actual Body Weight:   80.4 kg    Renal Function:   Estimated Creatinine Clearance: 24.4 mL/min (A) (based on SCr of 2.7 mg/dL (H)).,     Dialysis Method (if applicable):  N/A    CBC (last 72 hours):  Recent Labs   Lab Result Units 10/22/20  1504 10/23/20  1730 10/24/20  0514   WBC K/uL 8.84 9.66 10.64   Hemoglobin g/dL 8.4* 8.2* 8.0*   Hematocrit % 25.1* 24.9* 24.5*   Platelets K/uL 240 247 153   Gran% % 75.5* 91.1* 86.5*   Lymph% % 9.4* 5.0* 4.9*   Mono% % 10.9 2.4* 7.2   Eosinophil% % 3.4 0.8 0.8   Basophil% % 0.2 0.1 0.1   Differential Method  Automated Automated Automated       Metabolic Panel (last 72 hours):  Recent Labs   Lab Result Units 10/22/20  1504 10/22/20  1614 10/23/20  1730 10/23/20  2211 10/24/20  0332 10/24/20  0514   Sodium mmol/L 139  --  136  --   --  135*   Potassium mmol/L 4.2  --  4.0  --   --  4.1   Chloride mmol/L 106  --  102  --   --  106   CO2 mmol/L 21*  --  19*  --   --  21*   Glucose mg/dL 224*  --  268*  --   --  247*   Glucose, UA    --  2+*  --   --  3+*  --    BUN, Bld mg/dL 25*  --  25*  --   --  28*   Creatinine mg/dL 2.7*  --  2.7*  --   --  2.7*   Albumin g/dL 3.5  --  3.4*  --   --   --    Total Bilirubin mg/dL 1.0  --  1.3*  --   --   --    Alkaline Phosphatase U/L 71  --  69  --   --   --    AST U/L 20  --  18  --   --   --    ALT U/L 19  --  18  --   --   --    Magnesium mg/dL  --   --   --   --   --  1.5*   Phosphorus mg/dL  --   --   --  2.6*  --   --        Vancomycin Administrations:  vancomycin given in the last 96 hours                     vancomycin 500 mg in dextrose 5 % 100 mL IVPB (ready to mix system) (mg) 500 mg New Bag 10/23/20 2243    vancomycin in dextrose 5 % 1 gram/250 mL IVPB 1,000 mg (mg) 1,000 mg New Bag 10/23/20 2027                    Microbiologic Results:  Microbiology Results (last 7 days)       Procedure Component Value Units Date/Time    Blood culture [[id number]] Collected: 10/23/20 1932    Order Status: Completed Specimen: Blood from Peripheral, Forearm, Left Updated: 10/24/20 1527     Blood Culture, Routine No Growth to date      Gram stain aer bottle: Gram positive cocci in chains resembling Strep      Gram stain lucius bottle: Gram positive cocci in chains resembling Strep      Results called to and read back by:Terra Granger RN/Luzma        10/24/2020  15:27 cea    Blood culture [[id number]] Collected: 10/23/20 1930    Order Status: Completed Specimen: Blood from Peripheral, Forearm, Right Updated: 10/24/20 1526     Blood Culture, Routine No Growth to date      Gram stain aer bottle: Gram positive cocci in chains resembling Strep      Gram stain lucius bottle: Gram positive cocci in chains resembling Strep      Results called to and read back by: Terra Segal       10/24/2020  15:25 cea

## 2020-10-25 NOTE — CONSULTS
Consult Note  Infectious Disease    Reason for Consult:  Sepsis    HPI: Jamil Cadet is a   75 y.o. male who presented to the emergency room on 10/16 with complaints of right nephrostomy tube output decreased for the previous 12 hours.  He has a history of a partial nephrectomy and required a nephrostomy tube for persistent hydronephrosis([facility] urology).  On that visit he was afebrile, had a creatinine of 2.8, a blood glucose of 328 and the nephrostomy tube was flushed with resumption of patency. He was on antibiotics at the time(given by family doctor on the [facility], po levaquin for abnormal voided urine), and was released.He saw Dr. Gunter on 10/22 and went to the ED later that day, with bleeding from nephrostomy site. Hgb wsa 8.4 and no bleeding for noted by ED provider. CT scan did suggest blood in the renal pelvis on the right. He was released. He returned to the hospital on 10/23 with continued bleeding/gross hematuria with clots, and was admitted. CT repeated and unchanged. He had no chills or fever(99.3 upt to 103 degrees  In ED),   blood cultures were obtained and he was started on vancomycin and zosyn.   The blood cultures have grown Enterococcus, prompting this consult. No urine culture was obtained.  He was also seen by urology who recommends no intervention at this time  Nephrostomy placed 9/11, changed at [facility] a few days before the ED visit for obstruction. The tubing was exchanged here on admit.    Review of patient's allergies indicates:  No Known Allergies  Past Medical History:   Diagnosis Date    Cancer     Diabetes mellitus, type 2     Disorder of kidney and ureter     History of renal cell carcinoma status post partial nephrectomy 9/7/2020    Hydronephrosis 9/9/2020    Hyperlipidemia     Polio    urosepsis 10/23/20: Enterococcus    Past Surgical History:   Procedure Laterality Date    APPENDECTOMY      CYSTOSCOPY W/ RETROGRADES Right 9/9/2020    Procedure: CYSTOSCOPY, WITH  RETROGRADE PYELOGRAM;  Surgeon: Chris Garcia Jr., MD;  Location: [facility];  Service: Urology;  Laterality: Right;    SPINE SURGERY      URETHROSCOPY  9/9/2020    Procedure: URETHROSCOPY;  Surgeon: Chris Garcia Jr., MD;  Location: [facility];  Service: Urology;;     Social History     Socioeconomic History    Marital status:      Spouse name: Not on file    Number of children: Not on file    Years of education: Not on file    Highest education level: Not on file   Occupational History    Not on file   Social Needs    Financial resource strain: Not very hard    Food insecurity     Worry: Never true     Inability: Never true    Transportation needs     Medical: No     Non-medical: No   Tobacco Use    Smoking status: Never Smoker    Smokeless tobacco: Never Used   Substance and Sexual Activity    Alcohol use: No     Frequency: Never    Drug use: No    Sexual activity: Yes     Partners: Female   Lifestyle    Physical activity     Days per week: 6 days     Minutes per session: 150+ min    Stress: To some extent   Relationships    Social connections     Talks on phone: Three times a week     Gets together: Once a week     Attends Spiritism service: Not on file     Active member of club or organization: Yes     Attends meetings of clubs or organizations: More than 4 times per year     Relationship status:    Other Topics Concern    Not on file   Social History Narrative    Not on file     Family History   Problem Relation Age of Onset    Heart disease Mother     Heart disease Father     Stroke Father     Heart disease Maternal Grandfather     Heart disease Paternal Grandmother     Heart disease Paternal Grandfather        Pertinent medications noted:     Review of Systems:   No chills, fever, sweats, until he came in  No change in vision, loss of vision or diplopia  No sinus congestion, purulent nasal discharge, post nasal drip or facial pain  No pain in mouth or throat. No problems  with teeth, gums.  No chest pain, palpitations, syncope  No cough, sputum production, shortness of breath, dyspnea on exertion, pleurisy, hemoptysis  No nausea, vomiting, diarrhea,  blood in stool, or focal abd pain,  No dysphagia, odynophagia. Las tcolonoscopy 3 years ago with polyps  No dysuria, hematuria, strangury, retention, incontinence, nocturia, prostatism,      No swelling of joints, redness of joints, injuries, or new focal pain  No unusual headaches, dizziness, vertigo, numbness, paresthesias, neuropathy, falls  No anxiety, depression, substance abuse, sleep disturbance     No bleeding, lymphadenopathy, anemia,  unusual bruising  No new rashes, lesions, or wounds     No recent/prior steroids   Implants: none  Antibiotic History:     EXAM & DIAGNOSTICS REVIEWED:   Vitals:     Temp:  [98.1 °F (36.7 °C)-99.2 °F (37.3 °C)]   Temp: 98.1 °F (36.7 °C) (10/25/20 1407)  Pulse: 70 (10/25/20 1407)  Resp: 17 (10/25/20 1407)  BP: 137/62 (10/25/20 1407)  SpO2: 95 % (10/25/20 1407)    Intake/Output Summary (Last 24 hours) at 10/25/2020 1448  Last data filed at 10/25/2020 0928  Gross per 24 hour   Intake 2100 ml   Output 2255 ml   Net -155 ml       General:  In NAD. Alert and attentive, cooperative, comfortable, non toxic  Eyes:  Anicteric, PERRL, EOMI, no scleral or conjunctival petechia  ENT:  No ulcers, exudates, thrush, nares patent, dentition is very good  Neck:  supple, no masses or adenopathy appreciated  Lungs: Clear, no consolidation, rales, wheezes, rub  Heart:  RRR, no gallop/murmur/rub noted  Abd:  Soft, NT, ND, normal BS, no masses or organomegaly appreciated.  :  Voids , urine clear, no flank tenderness, right nephrostomy tube in place, clear urine  Musc:  Joints without effusion, swelling, erythema, synovitis, muscle wasting.   Skin:  No rashes. No palmar or plantar lesions. No subungual petechiae  Wound:   Neuro:  Alert, attentive, speech fluent, face symmetric, moves all extremities, no focal weakness.  Ambulatory  Psych:  Calm, cooperative  Lymphatic:     No cervical, supraclavicular, axillary, or inguinal nodes  Extrem: No edema, erythema, phlebitis, cellulitis, warm and well perfused  VAD:  peripheral     Isolation:  none    Lines/Tubes/Drains:    General Labs reviewed:  Recent Labs   Lab 10/23/20  1730 10/24/20  0514 10/25/20  0426   WBC 9.66 10.64 6.54   HGB 8.2* 8.0* 7.4*   HCT 24.9* 24.5* 22.5*    153 151       Recent Labs   Lab 10/22/20  1504 10/23/20  1730 10/24/20  0514 10/25/20  0426    136 135* 136   K 4.2 4.0 4.1 3.7    102 106 108   CO2 21* 19* 21* 22*   BUN 25* 25* 28* 28*   CREATININE 2.7* 2.7* 2.7* 2.5*   CALCIUM 8.7 8.8 7.9* 8.3*   PROT 7.3 6.7  --   --    BILITOT 1.0 1.3*  --   --    ALKPHOS 71 69  --   --    ALT 19 18  --   --    AST 20 18  --   --            Micro:  Microbiology Results (last 7 days)     Procedure Component Value Units Date/Time    Blood culture [[id number]]  (Abnormal) Collected: 10/23/20 1932    Order Status: Completed Specimen: Blood from Peripheral, Forearm, Left Updated: 10/25/20 1413     Blood Culture, Routine Gram stain aer bottle: Gram positive cocci in chains resembling Strep      Gram stain lucius bottle: Gram positive cocci in chains resembling Strep      Results called to and read back by:Terra Granger RN/CardB        10/24/2020  15:27 cea      ENTEROCOCCUS SPECIES  For susceptibility see order #[id number]      Blood culture [[id number]]  (Abnormal) Collected: 10/23/20 1930    Order Status: Completed Specimen: Blood from Peripheral, Forearm, Right Updated: 10/25/20 1412     Blood Culture, Routine Gram stain aer bottle: Gram positive cocci in chains resembling Strep      Gram stain lucius bottle: Gram positive cocci in chains resembling Strep      Results called to and read back by: Terra Granger RN CardB       10/24/2020  15:25 cea      ENTEROCOCCUS SPECIES  Identification and susceptibility pending      Blood culture [[id number]]     Order Status:  Sent Specimen: Blood         Imaging Reviewed:   CXR elevated right hemidiaphragm      10/22  CT abdomen/pelvis  IMPRESSION: Right nephrostomy tube in place with moderate right  hydronephrosis and hydroureter to the level of the pelvic inlet. There  is hyperdense fluid within the inferior right renal calyx compatible  with hemorrhage     Bilateral exophytic renal masses. Findings are nonspecific and may  represent solid masses versus complex cysts     Mild stranding in the perinephric fat     Stable distended gallbladder with cholelithiasis. There is no evidence  of acute cholecystitis     Sigmoid diverticulosis     Diffuse vascular calcification of aorta  Cardiology:    IMPRESSION & PLAN   1. Enterococcal bacteremia from right kidney/nephrostomy/obstruction  2. S/p partial nephrectomy for renal cell cancer  3. CKD  4. DM, insulin requiring      Recommendations:  Stop zosyn, sub ampicillin 2 g IV q8  Continue vanc until susceptibilities are available  Not a candidate for gent due to CKD  Ok for midline   placedx    Medical Decision Making during this encounter was  [_] Low Complexity  [_] Moderate Complexity  [x  ] High Complexity

## 2020-10-25 NOTE — PROGRESS NOTES
[facility] Progress Note:    Jamil Cadet is a 75 y.o. male who presents for right nephrostomy tube malfunction.     Patient with a history of bilateral renal masses s/p right partial nephrectomy in 4/2019 with Dr. Santos at [facility] - left 2 cm cyst on surveillance. Unclear of pathology given lack of records, however his wife states it was renal cell carcinoma.      He presented to the [facility] emergency department on 9/6/20 with severe hypoglycemia.       On review of his labs, he was incidentally found to have an elevated creatinine of 14.4 from a baseline of 1.5 with hyperkalemia.      He subsequently underwent US kidney which revealed mild right pelviectasis. CT renal stone on 9/8/20 showed interval development of right hydroureteronephrosis down to the pelvis - no obvious etiology for obstruction. Also with gallbladder distention, and a calculus of the cystic duct and non-obstructing stone in right kidney.      He underwent right diagnostic ureteroscopy with attempted right ureteral stent placement on 9/9/20. Subsequently had a nephrostomy tube placed and was discharged in stable condition.         10/24/20: Patient states he followed up with Dr. Kennedy at [facility]. He underwent Mag 3 renal lasix scan and had his nephrostomy tube exchanged. Per his wife, he began with severe gross hematuria and clots from his nephrostomy tube after exchange at [facility].     He presented to the ED on 10/22 and subsequently on 10/23 complaining of persistent gross hematuria. His H/H was stable at 8/25, however he had fever to 103F. Admitted for medical management. Nephrostomy tube now draining clear, yellow urine. Creatinine 2.7. CT abd/pelvis wo contrast revealed nephrostomy tube in place with mild right hydroureteronephrosis down to pelvis.      10/25/20: H/H dropped overnight. Receiving 1 unit PRBC today. Creatinine slightly decreased.      Patient denies any chills, flank pain, dysuria, lower urinary  tract symptoms or recent urinary tract infection.     PSA  0.28                 9/10/19     Testosterone  280                  9/10/19      Urine culture   No growth        9/7/20    Past Medical History:   Diagnosis Date    Cancer     Diabetes mellitus, type 2     Disorder of kidney and ureter     Hyperlipidemia     Polio        Past Surgical History:   Procedure Laterality Date    APPENDECTOMY      CYSTOSCOPY W/ RETROGRADES Right 9/9/2020    Procedure: CYSTOSCOPY, WITH RETROGRADE PYELOGRAM;  Surgeon: Chris Garcia Jr., MD;  Location: [facility];  Service: Urology;  Laterality: Right;    SPINE SURGERY      URETHROSCOPY  9/9/2020    Procedure: URETHROSCOPY;  Surgeon: Chris Garcia Jr., MD;  Location: [facility];  Service: Urology;;       Family History   Problem Relation Age of Onset    Heart disease Mother     Heart disease Father     Stroke Father     Heart disease Maternal Grandfather     Heart disease Paternal Grandmother     Heart disease Paternal Grandfather        Social History     Socioeconomic History    Marital status:      Spouse name: Not on file    Number of children: Not on file    Years of education: Not on file    Highest education level: Not on file   Occupational History    Not on file   Social Needs    Financial resource strain: Not very hard    Food insecurity     Worry: Never true     Inability: Never true    Transportation needs     Medical: No     Non-medical: No   Tobacco Use    Smoking status: Never Smoker    Smokeless tobacco: Never Used   Substance and Sexual Activity    Alcohol use: No     Frequency: Never    Drug use: No    Sexual activity: Yes     Partners: Female   Lifestyle    Physical activity     Days per week: 6 days     Minutes per session: 150+ min    Stress: To some extent   Relationships    Social connections     Talks on phone: Three times a week     Gets together: Once a week     Attends [employer] service: Not on file     Active member of  club or organization: Yes     Attends meetings of clubs or organizations: More than 4 times per year     Relationship status:    Other Topics Concern    Not on file   Social History Narrative    Not on file       Review of patient's allergies indicates:  No Known Allergies    Medications Reviewed: see MAR    ROS:    Constitutional: denies fevers, chills, night sweats, fatigue, malaise  Respiratory: negative for cough, shortness of breath, wheezing, dyspnea.  Cardiovascular: negative for chest pain, varicose veins, ankle swelling, palpitations, syncope.  GI: negative for abdominal pain, heartburn, indigestion, nausea, vomiting, constipation, diarrhea, blood in stool.   Urology: as noted above in HPI  Endocrinology: negative for cold intolerance, excessive thirst, not feeling tired/sluggish, no heat intolerance.   Hematology/Lymph: negative for easy bleeding, easy bruising, swollen glands.  Musculoskeletal: negative for back pain, joint pain, joint swelling, neck pain.  Allergy-Immunology: negative for seasonal allergies, negative for unusual infections.   Skin: negative for boils, breast lumps, hives, itching, rash.   Neurology: negative for, dizziness, headache, tingling/numbness, tremors.   Psych: satisfied with life; negative for, anxiety, depression, suicidal thoughts.     PHYSICAL EXAM:    Vitals:    10/25/20 1307   BP: 137/63   Pulse: 78   Resp: 16   Temp: 98.8 °F (37.1 °C)     Body mass index is 24.72 kg/m².       General: Alert, cooperative, no distress, appears stated age  Head: Normocephalic, without obvious abnormality, atraumatic  Neck: no masses, no thyromegaly, no lymphadenopathy  Eyes: PERRL, conjunctiva/corneas clear  Lungs: Respirations unlabored, normal effort, no accessory muscle use  CV: Warm and well perfused extremities  Abdomen: Soft, non-tender, no CVA tenderness, no hepatosplenomegaly, no hernia  : Right nephrostomy tube in place with clear, yellow urine  Extremities: Extremities  normal, atraumatic, no cyanosis or edema  Skin: Normal color, texture, and turgor, no rashes or lesions  Psych: Appropriate, well oriented, normal affect, normal mood  Neuro: Non-focal  Neuro: Non-focal      LABS:    Recent Results (from the past 336 hour(s))   Comprehensive Metabolic Panel    Collection Time: 10/12/20 11:13 AM   Result Value Ref Range    Sodium 141 136 - 145 mmol/L    Potassium 4.5 3.5 - 5.1 mmol/L    Chloride 109 95 - 110 mmol/L    CO2 21 (L) 23 - 29 mmol/L    Glucose 225 (H) 70 - 110 mg/dL    BUN, Bld 35 (H) 8 - 23 mg/dL    Creatinine 2.5 (H) 0.5 - 1.4 mg/dL    Calcium 9.2 8.7 - 10.5 mg/dL    Total Protein 6.8 6.0 - 8.4 g/dL    Albumin 3.7 3.5 - 5.2 g/dL    Total Bilirubin 1.1 (H) 0.1 - 1.0 mg/dL    Alkaline Phosphatase 75 55 - 135 U/L    AST 31 10 - 40 U/L    ALT 28 10 - 44 U/L    Anion Gap 11 8 - 16 mmol/L    eGFR if African American 27.6 (A) >60 mL/min/1.73 m^2    eGFR if non  23.9 (A) >60 mL/min/1.73 m^2   CBC auto differential    Collection Time: 10/12/20 11:13 AM   Result Value Ref Range    WBC 7.22 3.90 - 12.70 K/uL    RBC 2.88 (L) 4.60 - 6.20 M/uL    Hemoglobin 8.8 (L) 14.0 - 18.0 g/dL    Hematocrit 26.6 (L) 40.0 - 54.0 %    Mean Corpuscular Volume 92 82 - 98 fL    Mean Corpuscular Hemoglobin 30.6 27.0 - 31.0 pg    Mean Corpuscular Hemoglobin Conc 33.1 32.0 - 36.0 g/dL    RDW 12.5 11.5 - 14.5 %    Platelets 225 150 - 350 K/uL    MPV 10.5 9.2 - 12.9 fL    Immature Granulocytes 1.2 (H) 0.0 - 0.5 %    Gran # (ANC) 4.8 1.8 - 7.7 K/uL    Immature Grans (Abs) 0.09 (H) 0.00 - 0.04 K/uL    Lymph # 1.4 1.0 - 4.8 K/uL    Mono # 0.7 0.3 - 1.0 K/uL    Eos # 0.3 0.0 - 0.5 K/uL    Baso # 0.03 0.00 - 0.20 K/uL    nRBC 0 0 /100 WBC    Gran% 66.1 38.0 - 73.0 %    Lymph% 19.8 18.0 - 48.0 %    Mono% 9.0 4.0 - 15.0 %    Eosinophil% 3.5 0.0 - 8.0 %    Basophil% 0.4 0.0 - 1.9 %    Differential Method Automated    Hemoglobin A1C    Collection Time: 10/12/20 11:13 AM   Result Value Ref Range     Hemoglobin A1C 8.1 (H) 4.5 - 6.2 %    Estimated Avg Glucose 186 (H) 68 - 131 mg/dL   Renal function panel    Collection Time: 10/12/20 11:13 AM   Result Value Ref Range    Glucose 225 (H) 70 - 110 mg/dL    Sodium 141 136 - 145 mmol/L    Potassium 4.5 3.5 - 5.1 mmol/L    Chloride 109 95 - 110 mmol/L    CO2 21 (L) 23 - 29 mmol/L    BUN, Bld 35 (H) 8 - 23 mg/dL    Calcium 9.2 8.7 - 10.5 mg/dL    Creatinine 2.5 (H) 0.5 - 1.4 mg/dL    Albumin 3.7 3.5 - 5.2 g/dL    Phosphorus 4.3 2.7 - 4.5 mg/dL    eGFR if African American 27.6 (A) >60 mL/min/1.73 m^2    eGFR if non  23.9 (A) >60 mL/min/1.73 m^2    Anion Gap 11 8 - 16 mmol/L   Basic metabolic panel    Collection Time: 10/16/20 10:40 PM   Result Value Ref Range    Sodium 131 (L) 136 - 145 mmol/L    Potassium 4.0 3.5 - 5.1 mmol/L    Chloride 100 95 - 110 mmol/L    CO2 21 (L) 23 - 29 mmol/L    Glucose 328 (H) 70 - 110 mg/dL    BUN, Bld 38 (H) 8 - 23 mg/dL    Creatinine 2.8 (H) 0.5 - 1.4 mg/dL    Calcium 8.5 (L) 8.7 - 10.5 mg/dL    Anion Gap 10 8 - 16 mmol/L    eGFR if African American 24.1 (A) >60 mL/min/1.73 m^2    eGFR if non African American 20.8 (A) >60 mL/min/1.73 m^2   CBC auto differential    Collection Time: 10/22/20  3:04 PM   Result Value Ref Range    WBC 8.84 3.90 - 12.70 K/uL    RBC 2.66 (L) 4.60 - 6.20 M/uL    Hemoglobin 8.4 (L) 14.0 - 18.0 g/dL    Hematocrit 25.1 (L) 40.0 - 54.0 %    Mean Corpuscular Volume 94 82 - 98 fL    Mean Corpuscular Hemoglobin 31.6 (H) 27.0 - 31.0 pg    Mean Corpuscular Hemoglobin Conc 33.5 32.0 - 36.0 g/dL    RDW 12.4 11.5 - 14.5 %    Platelets 240 150 - 350 K/uL    MPV 10.0 9.2 - 12.9 fL    Immature Granulocytes 0.6 (H) 0.0 - 0.5 %    Gran # (ANC) 6.7 1.8 - 7.7 K/uL    Immature Grans (Abs) 0.05 (H) 0.00 - 0.04 K/uL    Lymph # 0.8 (L) 1.0 - 4.8 K/uL    Mono # 1.0 0.3 - 1.0 K/uL    Eos # 0.3 0.0 - 0.5 K/uL    Baso # 0.02 0.00 - 0.20 K/uL    nRBC 0 0 /100 WBC    Gran% 75.5 (H) 38.0 - 73.0 %    Lymph% 9.4 (L) 18.0 - 48.0 %     Mono% 10.9 4.0 - 15.0 %    Eosinophil% 3.4 0.0 - 8.0 %    Basophil% 0.2 0.0 - 1.9 %    Differential Method Automated    Comprehensive metabolic panel    Collection Time: 10/22/20  3:04 PM   Result Value Ref Range    Sodium 139 136 - 145 mmol/L    Potassium 4.2 3.5 - 5.1 mmol/L    Chloride 106 95 - 110 mmol/L    CO2 21 (L) 23 - 29 mmol/L    Glucose 224 (H) 70 - 110 mg/dL    BUN, Bld 25 (H) 8 - 23 mg/dL    Creatinine 2.7 (H) 0.5 - 1.4 mg/dL    Calcium 8.7 8.7 - 10.5 mg/dL    Total Protein 7.3 6.0 - 8.4 g/dL    Albumin 3.5 3.5 - 5.2 g/dL    Total Bilirubin 1.0 0.1 - 1.0 mg/dL    Alkaline Phosphatase 71 55 - 135 U/L    AST 20 10 - 40 U/L    ALT 19 10 - 44 U/L    Anion Gap 12 8 - 16 mmol/L    eGFR if African American 25.1 (A) >60 mL/min/1.73 m^2    eGFR if non  21.8 (A) >60 mL/min/1.73 m^2   APTT    Collection Time: 10/22/20  3:04 PM   Result Value Ref Range    aPTT 35.4 (H) 23.6 - 33.3 sec   Protime-INR    Collection Time: 10/22/20  3:04 PM   Result Value Ref Range    PT 15.1 (H) 10.6 - 14.8 sec    INR 1.3    Urinalysis, Reflex to Urine Culture Urine, Clean Catch    Collection Time: 10/22/20  4:14 PM    Specimen: Urine   Result Value Ref Range    Specimen UA Urine, Clean Catch     Color, UA Yellow Yellow, Straw, Ida    Appearance, UA Clear Clear    pH, UA 6.0 5.0 - 8.0    Specific Gravity, UA 1.015 1.005 - 1.030    Protein, UA Trace (A) Negative    Glucose, UA 2+ (A) Negative    Ketones, UA Negative Negative    Bilirubin (UA) Negative Negative    Occult Blood UA Negative Negative    Nitrite, UA Negative Negative    Urobilinogen, UA Negative Negative EU/dL    Leukocytes, UA Negative Negative   CBC auto differential    Collection Time: 10/23/20  5:30 PM   Result Value Ref Range    WBC 9.66 3.90 - 12.70 K/uL    RBC 2.63 (L) 4.60 - 6.20 M/uL    Hemoglobin 8.2 (L) 14.0 - 18.0 g/dL    Hematocrit 24.9 (L) 40.0 - 54.0 %    Mean Corpuscular Volume 95 82 - 98 fL    Mean Corpuscular Hemoglobin 31.2 (H) 27.0 -  31.0 pg    Mean Corpuscular Hemoglobin Conc 32.9 32.0 - 36.0 g/dL    RDW 12.3 11.5 - 14.5 %    Platelets 247 150 - 350 K/uL    MPV 9.8 9.2 - 12.9 fL    Immature Granulocytes 0.6 (H) 0.0 - 0.5 %    Gran # (ANC) 8.8 (H) 1.8 - 7.7 K/uL    Immature Grans (Abs) 0.06 (H) 0.00 - 0.04 K/uL    Lymph # 0.5 (L) 1.0 - 4.8 K/uL    Mono # 0.2 (L) 0.3 - 1.0 K/uL    Eos # 0.1 0.0 - 0.5 K/uL    Baso # 0.01 0.00 - 0.20 K/uL    nRBC 0 0 /100 WBC    Gran% 91.1 (H) 38.0 - 73.0 %    Lymph% 5.0 (L) 18.0 - 48.0 %    Mono% 2.4 (L) 4.0 - 15.0 %    Eosinophil% 0.8 0.0 - 8.0 %    Basophil% 0.1 0.0 - 1.9 %    Differential Method Automated    Comprehensive metabolic panel    Collection Time: 10/23/20  5:30 PM   Result Value Ref Range    Sodium 136 136 - 145 mmol/L    Potassium 4.0 3.5 - 5.1 mmol/L    Chloride 102 95 - 110 mmol/L    CO2 19 (L) 23 - 29 mmol/L    Glucose 268 (H) 70 - 110 mg/dL    BUN, Bld 25 (H) 8 - 23 mg/dL    Creatinine 2.7 (H) 0.5 - 1.4 mg/dL    Calcium 8.8 8.7 - 10.5 mg/dL    Total Protein 6.7 6.0 - 8.4 g/dL    Albumin 3.4 (L) 3.5 - 5.2 g/dL    Total Bilirubin 1.3 (H) 0.1 - 1.0 mg/dL    Alkaline Phosphatase 69 55 - 135 U/L    AST 18 10 - 40 U/L    ALT 18 10 - 44 U/L    Anion Gap 15 8 - 16 mmol/L    eGFR if African American 25.1 (A) >60 mL/min/1.73 m^2    eGFR if non  21.8 (A) >60 mL/min/1.73 m^2   Type & Screen    Collection Time: 10/23/20  5:30 PM   Result Value Ref Range    Group & Rh O POS     Indirect Mejia NEG    Prepare RBC 2 Units; transfusion    Collection Time: 10/23/20  5:30 PM   Result Value Ref Range    UNIT NUMBER [id number]     Product Code [id number]     DISPENSE STATUS ISSUED     CODING SYSTEM FCXX101     Unit Blood Type Code 5100     Unit Blood Type O POS     Unit Expiration 342906000583     UNIT NUMBER [id number]     Product Code [id number]     DISPENSE STATUS TRANSFUSED     CODING SYSTEM GGFS314     Unit Blood Type Code 5100     Unit Blood Type O POS     Unit Expiration [id number]     COVID-19 Rapid Screening    Collection Time: 10/23/20  5:52 PM   Result Value Ref Range    SARS-CoV-2 RNA, Amplification, Qual Negative Negative   Blood culture    Collection Time: 10/23/20  7:30 PM    Specimen: Peripheral, Forearm, Right; Blood   Result Value Ref Range    Blood Culture, Routine       Gram stain aer bottle: Gram positive cocci in chains resembling Strep    Blood Culture, Routine       Gram stain lucius bottle: Gram positive cocci in chains resembling Strep    Blood Culture, Routine       Results called to and read back by: Terra HarmonB     Blood Culture, Routine 10/24/2020  15:25 cea     Blood Culture, Routine (A)      ENTEROCOCCUS SPECIES  Identification and susceptibility pending     Blood culture    Collection Time: 10/23/20  7:32 PM    Specimen: Peripheral, Forearm, Left; Blood   Result Value Ref Range    Blood Culture, Routine       Gram stain aer bottle: Gram positive cocci in chains resembling Strep    Blood Culture, Routine       Gram stain lucius bottle: Gram positive cocci in chains resembling Strep    Blood Culture, Routine       Results called to and read back by:Terra Granger RN/Luzma      Blood Culture, Routine 10/24/2020  15:27 cea     Blood Culture, Routine (A)      ENTEROCOCCUS SPECIES  For susceptibility see order #[id number]     Lactic acid, plasma    Collection Time: 10/23/20  7:32 PM   Result Value Ref Range    Lactate (Lactic Acid) 1.3 0.5 - 1.9 mmol/L   Procalcitonin    Collection Time: 10/23/20  7:32 PM   Result Value Ref Range    Procalcitonin 0.39 0.00 - 0.50 ng/mL   POCT glucose    Collection Time: 10/23/20 10:07 PM   Result Value Ref Range    POC Glucose 331 (H) 70 - 110   Phosphorus    Collection Time: 10/23/20 10:11 PM   Result Value Ref Range    Phosphorus 2.6 (L) 2.7 - 4.5 mg/dL   Urinalysis, Reflex to Urine Culture Urine, Clean Catch    Collection Time: 10/24/20  3:32 AM    Specimen: Urine   Result Value Ref Range    Specimen UA Urine, Clean Catch      Color, UA Colorless (A) Yellow, Straw, Ida    Appearance, UA Clear Clear    pH, UA 5.0 5.0 - 8.0    Specific Gravity, UA 1.010 1.005 - 1.030    Protein, UA Negative Negative    Glucose, UA 3+ (A) Negative    Ketones, UA Negative Negative    Bilirubin (UA) Negative Negative    Occult Blood UA Negative Negative    Nitrite, UA Negative Negative    Urobilinogen, UA Negative Negative EU/dL    Leukocytes, UA Negative Negative   Urinalysis Microscopic    Collection Time: 10/24/20  3:32 AM   Result Value Ref Range    RBC, UA 1 0 - 4 /hpf    WBC, UA 0 0 - 5 /hpf    Bacteria Negative None-Occ /hpf    Yeast, UA None None    Squam Epithel, UA 0 /hpf    Hyaline Casts, UA 2 (A) 0-1/lpf /lpf    Microscopic Comment SEE COMMENT    Basic Metabolic Panel    Collection Time: 10/24/20  5:14 AM   Result Value Ref Range    Sodium 135 (L) 136 - 145 mmol/L    Potassium 4.1 3.5 - 5.1 mmol/L    Chloride 106 95 - 110 mmol/L    CO2 21 (L) 23 - 29 mmol/L    Glucose 247 (H) 70 - 110 mg/dL    BUN, Bld 28 (H) 8 - 23 mg/dL    Creatinine 2.7 (H) 0.5 - 1.4 mg/dL    Calcium 7.9 (L) 8.7 - 10.5 mg/dL    Anion Gap 8 8 - 16 mmol/L    eGFR if African American 25.1 (A) >60 mL/min/1.73 m^2    eGFR if non  21.8 (A) >60 mL/min/1.73 m^2   CBC auto differential    Collection Time: 10/24/20  5:14 AM   Result Value Ref Range    WBC 10.64 3.90 - 12.70 K/uL    RBC 2.53 (L) 4.60 - 6.20 M/uL    Hemoglobin 8.0 (L) 14.0 - 18.0 g/dL    Hematocrit 24.5 (L) 40.0 - 54.0 %    Mean Corpuscular Volume 97 82 - 98 fL    Mean Corpuscular Hemoglobin 31.6 (H) 27.0 - 31.0 pg    Mean Corpuscular Hemoglobin Conc 32.7 32.0 - 36.0 g/dL    RDW 12.3 11.5 - 14.5 %    Platelets 153 150 - 350 K/uL    MPV 9.7 9.2 - 12.9 fL    Immature Granulocytes 0.5 0.0 - 0.5 %    Gran # (ANC) 9.2 (H) 1.8 - 7.7 K/uL    Immature Grans (Abs) 0.05 (H) 0.00 - 0.04 K/uL    Lymph # 0.5 (L) 1.0 - 4.8 K/uL    Mono # 0.8 0.3 - 1.0 K/uL    Eos # 0.1 0.0 - 0.5 K/uL    Baso # 0.01 0.00 - 0.20 K/uL     nRBC 0 0 /100 WBC    Gran% 86.5 (H) 38.0 - 73.0 %    Lymph% 4.9 (L) 18.0 - 48.0 %    Mono% 7.2 4.0 - 15.0 %    Eosinophil% 0.8 0.0 - 8.0 %    Basophil% 0.1 0.0 - 1.9 %    Differential Method Automated    Magnesium    Collection Time: 10/24/20  5:14 AM   Result Value Ref Range    Magnesium 1.5 (L) 1.6 - 2.6 mg/dL   POCT glucose    Collection Time: 10/24/20  5:47 AM   Result Value Ref Range    POC Glucose 255 (H) 70 - 110   POCT glucose    Collection Time: 10/24/20 11:24 AM   Result Value Ref Range    POC Glucose 211 (H) 70 - 110   POCT glucose    Collection Time: 10/24/20  3:47 PM   Result Value Ref Range    POC Glucose 227 (H) 70 - 110   POCT glucose    Collection Time: 10/24/20  8:30 PM   Result Value Ref Range    POC Glucose 245 (H) 70 - 110   Vancomycin, Random    Collection Time: 10/24/20  8:49 PM   Result Value Ref Range    Vancomycin, Random 11.9 Not established ug/mL   Basic Metabolic Panel    Collection Time: 10/25/20  4:26 AM   Result Value Ref Range    Sodium 136 136 - 145 mmol/L    Potassium 3.7 3.5 - 5.1 mmol/L    Chloride 108 95 - 110 mmol/L    CO2 22 (L) 23 - 29 mmol/L    Glucose 256 (H) 70 - 110 mg/dL    BUN, Bld 28 (H) 8 - 23 mg/dL    Creatinine 2.5 (H) 0.5 - 1.4 mg/dL    Calcium 8.3 (L) 8.7 - 10.5 mg/dL    Anion Gap 6 (L) 8 - 16 mmol/L    eGFR if African American 27.6 (A) >60 mL/min/1.73 m^2    eGFR if non  23.9 (A) >60 mL/min/1.73 m^2   CBC auto differential    Collection Time: 10/25/20  4:26 AM   Result Value Ref Range    WBC 6.54 3.90 - 12.70 K/uL    RBC 2.35 (L) 4.60 - 6.20 M/uL    Hemoglobin 7.4 (L) 14.0 - 18.0 g/dL    Hematocrit 22.5 (L) 40.0 - 54.0 %    Mean Corpuscular Volume 96 82 - 98 fL    Mean Corpuscular Hemoglobin 31.5 (H) 27.0 - 31.0 pg    Mean Corpuscular Hemoglobin Conc 32.9 32.0 - 36.0 g/dL    RDW 12.4 11.5 - 14.5 %    Platelets 151 150 - 350 K/uL    MPV 10.0 9.2 - 12.9 fL    Immature Granulocytes 0.5 0.0 - 0.5 %    Gran # (ANC) 4.8 1.8 - 7.7 K/uL    Immature Grans  (Abs) 0.03 0.00 - 0.04 K/uL    Lymph # 0.8 (L) 1.0 - 4.8 K/uL    Mono # 0.8 0.3 - 1.0 K/uL    Eos # 0.2 0.0 - 0.5 K/uL    Baso # 0.01 0.00 - 0.20 K/uL    nRBC 0 0 /100 WBC    Gran% 73.1 (H) 38.0 - 73.0 %    Lymph% 12.2 (L) 18.0 - 48.0 %    Mono% 11.6 4.0 - 15.0 %    Eosinophil% 2.4 0.0 - 8.0 %    Basophil% 0.2 0.0 - 1.9 %    Differential Method Automated    Magnesium    Collection Time: 10/25/20  4:26 AM   Result Value Ref Range    Magnesium 1.6 1.6 - 2.6 mg/dL   Iron and TIBC    Collection Time: 10/25/20  4:26 AM   Result Value Ref Range    Iron 13 (L) 45 - 160 ug/dL    Transferrin 122 (L) 200 - 375 mg/dL    TIBC 171 (L) 250 - 450 ug/dL    Saturated Iron 8 (L) 20 - 50 %   Lactate dehydrogenase    Collection Time: 10/25/20  4:26 AM   Result Value Ref Range    LD 99 (L) 110 - 260 U/L   Reticulocytes    Collection Time: 10/25/20  4:26 AM   Result Value Ref Range    Retic 1.7 0.4 - 2.0 %   Folate    Collection Time: 10/25/20  4:26 AM   Result Value Ref Range    Folate 8.7 4.0 - 24.0 ng/mL   Vitamin B12    Collection Time: 10/25/20  4:26 AM   Result Value Ref Range    Vitamin B-12 215 210 - 950 pg/mL   POCT glucose    Collection Time: 10/25/20  5:46 AM   Result Value Ref Range    POC Glucose 244 (H) 70 - 110   POCT glucose    Collection Time: 10/25/20 11:06 AM   Result Value Ref Range    POC Glucose 259 (H) 70 - 110       IMAGING:    Reviewed      Assessment/Diagnosis:    1. Right ureteral stricture  2. Right nephrostomy tube malfunction  3. Gross hematuria  4. History of right renal cell carcinoma s/p partial nephrectomy    Plans:    - Extensive discussion with both patient and family regarding management of his right ureteral stricture s/p right partial nephrectomy for RCC. No indication for any urologic intervention this admission given that his hematuria has resolved. He is s/p recent Mag 3 renal lasix scan at [facility]. Patient was establishing care with Dr. Kennedy and was scheduled to discuss ureteroscope to  characterize the stricture next week. He states he would prefer to establish urologic care with [facility]. Will obtain medical records from [facility] and discuss further outpatient management with patient.   - Follow up cultures.   - Continue care per primary medical team.

## 2020-10-25 NOTE — NURSING
Patient arrived to room from cardio, dressing clean dry intact to back covering nephrostomy tube site

## 2020-10-26 VITALS
BODY MASS INDEX: 24.81 KG/M2 | HEIGHT: 71 IN | DIASTOLIC BLOOD PRESSURE: 75 MMHG | TEMPERATURE: 98 F | HEART RATE: 69 BPM | SYSTOLIC BLOOD PRESSURE: 138 MMHG | OXYGEN SATURATION: 97 % | WEIGHT: 177.25 LBS | RESPIRATION RATE: 18 BRPM

## 2020-10-26 LAB
ANION GAP SERPL CALC-SCNC: 11 MMOL/L (ref 8–16)
BACTERIA BLD CULT: ABNORMAL
BASOPHILS # BLD AUTO: 0.02 K/UL (ref 0–0.2)
BASOPHILS NFR BLD: 0.4 % (ref 0–1.9)
BUN SERPL-MCNC: 28 MG/DL (ref 8–23)
CALCIUM SERPL-MCNC: 8.7 MG/DL (ref 8.7–10.5)
CHLORIDE SERPL-SCNC: 105 MMOL/L (ref 95–110)
CO2 SERPL-SCNC: 24 MMOL/L (ref 23–29)
CREAT SERPL-MCNC: 2.4 MG/DL (ref 0.5–1.4)
DIFFERENTIAL METHOD: ABNORMAL
EOSINOPHIL # BLD AUTO: 0.3 K/UL (ref 0–0.5)
EOSINOPHIL NFR BLD: 4.8 % (ref 0–8)
ERYTHROCYTE [DISTWIDTH] IN BLOOD BY AUTOMATED COUNT: 12.8 % (ref 11.5–14.5)
EST. GFR  (AFRICAN AMERICAN): 29 ML/MIN/1.73 M^2
EST. GFR  (NON AFRICAN AMERICAN): 25.1 ML/MIN/1.73 M^2
GLUCOSE SERPL-MCNC: 197 MG/DL (ref 70–110)
GLUCOSE SERPL-MCNC: 290 MG/DL (ref 70–110)
GLUCOSE SERPL-MCNC: 306 MG/DL (ref 70–110)
HCT VFR BLD AUTO: 27.1 % (ref 40–54)
HGB BLD-MCNC: 9.1 G/DL (ref 14–18)
IMM GRANULOCYTES # BLD AUTO: 0.02 K/UL (ref 0–0.04)
IMM GRANULOCYTES NFR BLD AUTO: 0.4 % (ref 0–0.5)
LYMPHOCYTES # BLD AUTO: 0.9 K/UL (ref 1–4.8)
LYMPHOCYTES NFR BLD: 15.7 % (ref 18–48)
MAGNESIUM SERPL-MCNC: 2 MG/DL (ref 1.6–2.6)
MCH RBC QN AUTO: 30.6 PG (ref 27–31)
MCHC RBC AUTO-ENTMCNC: 33.6 G/DL (ref 32–36)
MCV RBC AUTO: 91 FL (ref 82–98)
MONOCYTES # BLD AUTO: 0.7 K/UL (ref 0.3–1)
MONOCYTES NFR BLD: 11.5 % (ref 4–15)
NEUTROPHILS # BLD AUTO: 3.8 K/UL (ref 1.8–7.7)
NEUTROPHILS NFR BLD: 67.2 % (ref 38–73)
NRBC BLD-RTO: 0 /100 WBC
PLATELET # BLD AUTO: 159 K/UL (ref 150–350)
PMV BLD AUTO: 10 FL (ref 9.2–12.9)
POTASSIUM SERPL-SCNC: 3.8 MMOL/L (ref 3.5–5.1)
RBC # BLD AUTO: 2.97 M/UL (ref 4.6–6.2)
SODIUM SERPL-SCNC: 140 MMOL/L (ref 136–145)
WBC # BLD AUTO: 5.66 K/UL (ref 3.9–12.7)

## 2020-10-26 PROCEDURE — 83735 ASSAY OF MAGNESIUM: CPT

## 2020-10-26 PROCEDURE — 25000003 PHARM REV CODE 250: Performed by: STUDENT IN AN ORGANIZED HEALTH CARE EDUCATION/TRAINING PROGRAM

## 2020-10-26 PROCEDURE — 63600175 PHARM REV CODE 636 W HCPCS: Performed by: INTERNAL MEDICINE

## 2020-10-26 PROCEDURE — 36415 COLL VENOUS BLD VENIPUNCTURE: CPT

## 2020-10-26 PROCEDURE — 25000003 PHARM REV CODE 250: Performed by: INTERNAL MEDICINE

## 2020-10-26 PROCEDURE — 85025 COMPLETE CBC W/AUTO DIFF WBC: CPT

## 2020-10-26 PROCEDURE — 80048 BASIC METABOLIC PNL TOTAL CA: CPT

## 2020-10-26 RX ADMIN — AMPICILLIN SODIUM 2 G: 2 INJECTION, POWDER, FOR SOLUTION INTRAMUSCULAR; INTRAVENOUS at 09:10

## 2020-10-26 RX ADMIN — VANCOMYCIN HYDROCHLORIDE 1250 MG: 1 INJECTION, POWDER, LYOPHILIZED, FOR SOLUTION INTRAVENOUS at 04:10

## 2020-10-26 RX ADMIN — INSULIN ASPART 3 UNITS: 100 INJECTION, SOLUTION INTRAVENOUS; SUBCUTANEOUS at 11:10

## 2020-10-26 RX ADMIN — INSULIN ASPART 4 UNITS: 100 INJECTION, SOLUTION INTRAVENOUS; SUBCUTANEOUS at 09:10

## 2020-10-26 RX ADMIN — FAMOTIDINE 20 MG: 20 TABLET ORAL at 09:10

## 2020-10-26 NOTE — PLAN OF CARE
Problem: Adult Inpatient Plan of Care  Goal: Plan of Care Review  Outcome: Met  Goal: Patient-Specific Goal (Individualization)  Outcome: Met  Goal: Absence of Hospital-Acquired Illness or Injury  Outcome: Met  Goal: Optimal Comfort and Wellbeing  Outcome: Met  Goal: Readiness for Transition of Care  Outcome: Met  Goal: Rounds/Family Conference  Outcome: Met     Problem: Diabetes Comorbidity  Goal: Blood Glucose Level Within Desired Range  Outcome: Met     Problem: Adjustment to Illness (Sepsis/Septic Shock)  Goal: Optimal Coping  Outcome: Met     Problem: Bleeding (Sepsis/Septic Shock)  Goal: Absence of Bleeding  Outcome: Met     Problem: Glycemic Control Impaired (Sepsis/Septic Shock)  Goal: Blood Glucose Level Within Desired Range  Outcome: Met     Problem: Hemodynamic Instability (Sepsis/Septic Shock)  Goal: Effective Tissue Perfusion  Outcome: Met     Problem: Infection (Sepsis/Septic Shock)  Goal: Absence of Infection Signs/Symptoms  Outcome: Met     Problem: Nutrition Impaired (Sepsis/Septic Shock)  Goal: Optimal Nutrition Intake  Outcome: Met     Problem: Respiratory Compromise (Sepsis/Septic Shock)  Goal: Effective Oxygenation and Ventilation  Outcome: Met     Problem: Fall Injury Risk  Goal: Absence of Fall and Fall-Related Injury  Outcome: Met     Problem: Infection  Goal: Infection Symptom Resolution  Outcome: Met

## 2020-10-26 NOTE — CONSULTS
Consult was put in for IV infusion. Reached out to [facility] to set it Up. Talked to Milena at [phone] and she is working on it and will get back to this SW CM.

## 2020-10-26 NOTE — DISCHARGE SUMMARY
[facility] Medicine  Discharge Summary      Patient Name: Jamil Cadet  MRN: [id number]  Admission Date: 10/23/2020  Hospital Length of Stay: 3 days  Discharge Date and Time:  10/26/2020 10:55 AM  Attending Physician: Marcos Telles MD   Discharging Provider: Marcos Telles MD  Primary Care Provider: Richard Gunter MD      HPI:   Old male with past medical history of CKD4, hx or RCC s/p right partial nephroectomy, right hydronephrosis with hydroureter s/p right nephrostomy tube presented to ED c/o bloody oozing from nephrostomy site over past 3 days. He denied fever, chills, abdominal pain, pain from nephrostomy site, nausea, or vomiting prior to ED arrival.  Patient was seen in ED yesterday for similar complaints.  CT imaging at that time showed stable nephrostomy tube and given stable kidney function patient was discharged home.    Spoke with ER physician Dr. Ramos, he states he discussed case with patient's urologist Dr. Garcia, who stated that nephrostomy tube looks like it is in good placement from CT imaging.  Recommended not to reposition or irrigate 2.  Advised IV antibiotics and monitoring of H&H.    Home medications reviewed patient's wife, who is present at bedside. .  She states he is only on a statin and Klonopin this time.  Additionally she states patient has had dark clots from nephrostomy to and about 750 cc of bloody urine mix since discharge from ER yesterday.    In the ED  Vital signs: Tmax 103.2, -130, RR 27, (SIRS 3/4+)  bp 148/63   Labs: h/h 8.2/24.9 (bl Hb 9-10), wbc 9.66, LA 1.3, bun/cr 25/2.7 (stable), covid negative  EKG, personally reviewed, Sinus tach with no ST elevation  EGDT: vanc/zosyn, IVF (bp stable not in shock and LA wnl)     Code status discussed with patient and wife: full code and wife surrogate decision maker    * No surgery found *      Hospital Course:   Patient was admitted with sepsis possible urosepsis and pyelonephritis.  He was recently  discharged from the hospital with some bleeding from nephrostomy site.  He also continued to have bleeding and gross hematuria with clots since then.  He had previous history renal cell carcinoma status post nephrectomy and  follow-up with urologist at [facility]  and planning to do another procedure for blockage of the urinary tract.  He was reviewed by urologist here  and no acute problem with nephrostomy and advised to follow-up with his own urologist at [facility] .  Patient was started on IV antibiotics and monitored  lactic acid and it back to normal .Later blood culture grew Enterococcus and  antibiotic adjusted and infectious disease specialist saw the patient and discharged with outpatient ampicillin infusion 10 days.  During the hospital stay his hemoglobin went down and patient needed 1 unit of blood transfusion.   Midline placed and patient was discharged in stable condition.  Repeat blood work was requested and follow-up with Urology at [facility] and Infectious Disease as outpatient.     Consults:   Consults (From admission, onward)        Status Ordering Provider     Inpatient consult to Hospitalist  Once     Provider:  Daria Morales DO    Acknowledged DARIA MORALES     Inpatient consult to Infectious Diseases  Once     Provider:  Maddy Huddleston MD    Completed MONAE BAKER     Inpatient consult to PICC Line Nurse  Once     Provider:  (Not yet assigned)    Acknowledged MONAE BAKER     Inpatient consult to Social Work/Case Management  Once     Provider:  (Not yet assigned)    Acknowledged MADDY HUDDLESTON     Inpatient consult to Urology  Once     Provider:  Chris Garcia Jr., MD    Completed DARIA MORALES     IP consult to case management  Once     Provider:  (Not yet assigned)    Acknowledged DARIA MORALES     Pharmacy to dose Vancomycin consult  Once     Provider:  (Not yet assigned)    Acknowledged DARIA MORALES     Pharmacy to dose Vancomycin consult  Once     Provider:  (Not yet  assigned)    Acknowledged DARIA GALVIN          No new Assessment & Plan notes have been filed under this hospital service since the last note was generated.  Service: Hospital Medicine    Final Active Diagnoses:    Diagnosis Date Noted POA    PRINCIPAL PROBLEM:  Sepsis [A41.9] 10/23/2020 Yes    Bacteremia due to Enterococcus [R78.81, B95.2] 10/25/2020 Yes    Hematuria, gross [R31.0] 10/25/2020 Yes    Pyelonephritis of right kidney [N12]  Yes    Hydroureter [N13.4] 10/23/2020 Yes    Complication of nephrostomy [N99.528] 10/23/2020 Yes    Hematuria [R31.9] 09/13/2020 Yes    Hydronephrosis [N13.30] 09/09/2020 Unknown    Acute renal failure [N19] 09/07/2020 Yes    Non-insulin dependent type 2 diabetes mellitus, previously with hypoglycemia and now hyperglycemia [E11.9] 09/07/2020 Yes     Chronic    Essential hypertension [I10] 09/07/2020 Yes     Chronic    History of renal cell carcinoma status post partial nephrectomy [C64.1] 09/07/2020 Yes     Chronic    Symptomatic anemia [D64.9] 09/07/2020 Unknown      Problems Resolved During this Admission:       Discharged Condition: good    Disposition: Home or Self Care    Follow Up:  Follow-up Information     Richard Gunter MD In 2 weeks.    Specialty: Internal Medicine  Contact information:  [address]  [address]  [place] [address] [phone]             Giana Huddleston MD In 2 weeks.    Specialty: Infectious Diseases  Why: for CBC and CMP ,CRP in next 5 days   Contact information:  [address]  [address]  [place] [address] [phone]                 Patient Instructions:      Diet Cardiac     Notify your health care provider if you experience any of the following:  severe uncontrolled pain     Activity as tolerated       Significant Diagnostic Studies: Labs:   CMP   Recent Labs   Lab 10/25/20  0426 10/26/20  0513    140   K 3.7 3.8    105   CO2 22* 24   * 197*   BUN 28* 28*   CREATININE 2.5* 2.4*   CALCIUM 8.3* 8.7   ANIONGAP 6*  11   ESTGFRAFRICA 27.6* 29.0*   EGFRNONAA 23.9* 25.1*    and CBC   Recent Labs   Lab 10/25/20  0426 10/26/20  0513   WBC 6.54 5.66   HGB 7.4* 9.1*   HCT 22.5* 27.1*    159       Pending Diagnostic Studies:     None         Medications:  Reconciled Home Medications:      Medication List      START taking these medications    AMPICILLIN 2 G IN  ML EXTENDED INFUSION  Inject 300 mLs (6 g total) into the vein continuous. for 10 days        CHANGE how you take these medications    clonazePAM 1 MG tablet  Commonly known as: KLONOPIN  Take 1 tablet (1 mg total) by mouth every evening.  What changed:   · when to take this  · reasons to take this     mupirocin 2 % ointment  Commonly known as: BACTROBAN  Apply topically 3 (three) times daily. for 10 days  What changed: how much to take     traZODone 50 MG tablet  Commonly known as: DESYREL  Start with 50 mg at bedtime for 1 week and then from 2nd week increase to 100 mg or 2 tablets at bedtime.  Tapering protocol on clonazepam.  What changed:   · how much to take  · how to take this  · when to take this  · reasons to take this        CONTINUE taking these medications    amLODIPine 10 MG tablet  Commonly known as: NORVASC  Take 10 mg by mouth once daily.     glimepiride 4 MG tablet  Commonly known as: AMARYL  Take 4 mg by mouth before breakfast.     hydrALAZINE 25 MG tablet  Commonly known as: APRESOLINE  Take 25 mg by mouth 3 (three) times daily.     insulin aspart U-100 100 unit/mL (3 mL) Inpn pen  Commonly known as: NovoLOG Flexpen U-100 Insulin  Inject 10 Units into the skin once daily. Check glucose before meals and then take insulin as per sliding scale     rosuvastatin 20 MG tablet  Commonly known as: CRESTOR  Take 20 mg by mouth every evening.     SHINGRIX (PF) 50 mcg/0.5 mL injection  Generic drug: varicella-zoster gE-AS01B (PF)  Inject 0.5 mLs into the muscle once.     SITagliptan-metformin 50-1,000 mg per tablet  Commonly known as: JANUMET  Take 1 tablet  by mouth 2 (two) times daily with meals.     TRESIBA FLEXTOUCH U-100 100 unit/mL (3 mL) Inpn  Generic drug: insulin degludec  Inject 15 Units into the skin every evening. Start with 10 units for 7 days and then go up to 15 units.     TRUE METRIX GLUCOSE TEST STRIP Strp  Generic drug: blood sugar diagnostic            Indwelling Lines/Drains at time of discharge:   Lines/Drains/Airways     Drain                 Nephrostomy 09/11/20 0800 Right 45 days                Time spent on the discharge of patient: 32 minutes  Patient was seen and examined on the date of discharge and determined to be suitable for discharge.         Marcos Telles MD  Department of Hospital Medicine  [facility]

## 2020-10-26 NOTE — PLAN OF CARE
Important Message from Medicare was sign, explained and given to patient/caregiver on 10/26/2020 at 9:13aam     answered all questions.

## 2020-10-26 NOTE — HOSPITAL COURSE
Patient was admitted with sepsis possible urosepsis and pyelonephritis.  He was recently discharged from the hospital with some bleeding from nephrostomy site.  He also continued to have bleeding and gross hematuria with clots since then.  He had previous history renal cell carcinoma status post nephrectomy and  follow-up with urologist at [facility]  and planning to do another procedure for blockage of the urinary tract.  He was reviewed by urologist here  and no acute problem with nephrostomy and advised to follow-up with his own urologist at [facility] .  Patient was started on IV antibiotics and monitored  lactic acid and it back to normal .Later blood culture grew Enterococcus and  antibiotic adjusted and infectious disease specialist saw the patient and discharged with outpatient ampicillin infusion 10 days.  During the hospital stay his hemoglobin went down and patient needed 1 unit of blood transfusion.   Midline placed and patient was discharged in stable condition.  Repeat blood work was requested and follow-up with Urology at [facility] and Infectious Disease as outpatient.

## 2020-10-26 NOTE — PROGRESS NOTES
Pharmacokinetic Assessment Follow Up: IV Vancomycin    Vancomycin serum concentration assessment(s):    The random level was drawn correctly and can be used to guide therapy at this time. The measurement is within the desired definitive target range of 15 to 20 mcg/mL.    Vancomycin Regimen Plan:    Level is within range for bactermia (15-20 mcg/ml). Will redose a one -time dose of 1250 mg. Will schedule the next random level approx 23 hours after dose.     Drug levels (last 3 results):  Recent Labs   Lab Result Units 10/24/20  2049 10/25/20  2203   Vancomycin, Random ug/mL 11.9 18.9       Pharmacy will continue to follow and monitor vancomycin.    Please contact pharmacy at extension [phone] for questions regarding this assessment.    Thank you for the consult,   Ralph Hillman       Patient brief summary:  Jamil Cadet is a 75 y.o. male initiated on antimicrobial therapy with IV Vancomycin for treatment of bacteremia    The patient's current regimen is intermittent dosing based on random levels    Drug Allergies:   Review of patient's allergies indicates:  No Known Allergies    Actual Body Weight:   80.4 kg    Renal Function:   Estimated Creatinine Clearance: 26.4 mL/min (A) (based on SCr of 2.5 mg/dL (H)).

## 2020-10-26 NOTE — PLAN OF CARE
10/26/20 1622   Final Note   Assessment Type Final Discharge Note   Anticipated Discharge Disposition Home-Health   Spoke with patient about Freedom of Choice Form, explained to patient and family that they have the right to choose any agency, and a list of agencies was provided to patient and family to review, they verbalized an understanding, pt's wife signed form, and form scanned into CM notes. Sent referral to [facility]. Milena from [facility] came to do the teachings for the IV antibiotics. Talked to Oriana from [facility] and she received the home health order and infusion information to start his home health so should be out to see him tomorrow.

## 2020-10-26 NOTE — PROGRESS NOTES
Therapy with Vancomycin complete and / or consult / order discontinued by Dr. Huddleston on 10/26/2020 @ 13:39   Pharmacy will sign off, please re-consult as needed.  Thank you for allowing us to participate in this patient's care.  Winnie Saravia 10/26/2020 1:42 PM  Dept of Pharmacy  Ext [phone]

## 2020-10-26 NOTE — NURSING
Pt discharged home per MD order in NAD, VSS. Instructions given to pt on home ABX by vid[facility] at [facility]. Discharge instructions reviewed with pt and spouse, verbalized understanding, noted follow up times, no concerns noted. Pt off unit via wheelchair x1 , tolerated well.     Lorrie Yang  10/26/2020  3:35 PM

## 2020-10-27 ENCOUNTER — TELEPHONE (OUTPATIENT)
Dept: FAMILY MEDICINE | Facility: CLINIC | Age: 75
End: 2020-10-27

## 2020-10-27 ENCOUNTER — TELEPHONE (OUTPATIENT)
Dept: UROLOGY | Facility: CLINIC | Age: 75
End: 2020-10-27

## 2020-10-27 DIAGNOSIS — Z79.4 TYPE 2 DIABETES MELLITUS WITH HYPOGLYCEMIA WITHOUT COMA, WITH LONG-TERM CURRENT USE OF INSULIN: ICD-10-CM

## 2020-10-27 DIAGNOSIS — E11.649 TYPE 2 DIABETES MELLITUS WITH HYPOGLYCEMIA WITHOUT COMA, WITH LONG-TERM CURRENT USE OF INSULIN: ICD-10-CM

## 2020-10-27 RX ORDER — INSULIN ASPART 100 [IU]/ML
10 INJECTION, SOLUTION INTRAVENOUS; SUBCUTANEOUS
Qty: 5 SYRINGE | Refills: 2 | Status: SHIPPED | OUTPATIENT
Start: 2020-10-27 | End: 2021-04-01 | Stop reason: SDUPTHER

## 2020-10-27 RX ORDER — INSULIN DEGLUDEC 100 U/ML
15 INJECTION, SOLUTION SUBCUTANEOUS NIGHTLY
Qty: 5 SYRINGE | Refills: 1 | Status: SHIPPED | OUTPATIENT
Start: 2020-10-27 | End: 2020-11-25

## 2020-10-27 RX ORDER — INSULIN ASPART 100 [IU]/ML
10 INJECTION, SOLUTION INTRAVENOUS; SUBCUTANEOUS DAILY
Qty: 3 ML | Refills: 5 | Status: SHIPPED | OUTPATIENT
Start: 2020-10-27 | End: 2020-10-27 | Stop reason: SDUPTHER

## 2020-10-27 RX ORDER — INSULIN DEGLUDEC 100 U/ML
15 INJECTION, SOLUTION SUBCUTANEOUS NIGHTLY
Qty: 1 SYRINGE | Refills: 5 | Status: SHIPPED | OUTPATIENT
Start: 2020-10-27 | End: 2020-10-27 | Stop reason: SDUPTHER

## 2020-10-27 NOTE — TELEPHONE ENCOUNTER
----- Message from Na Parra sent at 10/27/2020  3:08 PM CDT -----  Type: Needs Medical Advice  Who Called:  Meron Cadet (Spouse)  Symptoms (please be specific):  Meron Avendañocatina (Spouse)  Best Call Back Number:  or   Additional Information: Calling to speak with the nurse about the patient's records from  [facility] being transferred. She says if she does not answer then to leave a message

## 2020-10-27 NOTE — TELEPHONE ENCOUNTER
Spoke with pt re medical records from [facility], states Dr Garcia needs records transferred. Advised pt Nurse is out today but will return in the morning and will call pt regarding this matter. Pt verbally voiced understanding

## 2020-10-27 NOTE — TELEPHONE ENCOUNTER
Pt wife called back to schedule his HFU.  She asked if it was at all possible to coordinate his HFU with Dr. Gunter with his HFU with Dr. Huddleston.  I was able to accommodate him on 11/5/20 @ 10:00am.    Ms. Cadet also said that he was admitted to the hospital the day after his last visit with Dr. Gunter so she was not able to get his Rx's filled.  When she did go into the retail pharmacy they told her one was about $160.00 and the other was over $80.00.  She says he has some left but does ask that the prescriptions be redone to the mail in pharmacy and this will give him a 90 day supply with a $40.00 co-pay for each.   I advised her that I would notify Dr. Gunter's staff and someone would get back with them either way.

## 2020-10-27 NOTE — TELEPHONE ENCOUNTER
DC Date 10/26/20  Reached out to schedule HFU.  Unable to schedule at time of call LVM.  C3 notified.

## 2020-10-27 NOTE — PROGRESS NOTES
Injection Tresiba and NovoLog FlexPen sent to mail order pharmacy [facility].    Ms Ga apprised me of need for prescription to go to the mail order pharmacy instead of local pharmacy for cost savings.

## 2020-10-28 NOTE — TELEPHONE ENCOUNTER
Left detailed message informing that records have been requested on 10/26/2020 from Dr. Quiñones. Records have not been received. Left call back number.

## 2020-10-29 ENCOUNTER — TELEPHONE (OUTPATIENT)
Dept: FAMILY MEDICINE | Facility: CLINIC | Age: 75
End: 2020-10-29

## 2020-10-29 NOTE — TELEPHONE ENCOUNTER
Is the pt. taking his Novolog as a sliding scale or is he taking 10u tid? You need to fix the instructions & re-send rx.

## 2020-10-30 ENCOUNTER — TELEPHONE (OUTPATIENT)
Dept: UROLOGY | Facility: CLINIC | Age: 75
End: 2020-10-30

## 2020-10-30 ENCOUNTER — LAB VISIT (OUTPATIENT)
Dept: LAB | Facility: HOSPITAL | Age: 75
End: 2020-10-30
Attending: INTERNAL MEDICINE
Payer: MEDICARE

## 2020-10-30 DIAGNOSIS — N10 ACUTE PYELONEPHRITIS WITHOUT LESION OF RENAL MEDULLARY NECROSIS: ICD-10-CM

## 2020-10-30 DIAGNOSIS — A41.9 SYSTEMIC INFECTION: ICD-10-CM

## 2020-10-30 DIAGNOSIS — C64.1 MALIGNANT NEOPLASM OF RIGHT KIDNEY, EXCEPT RENAL PELVIS: Primary | ICD-10-CM

## 2020-10-30 LAB
ALBUMIN SERPL BCP-MCNC: 3.2 G/DL (ref 3.5–5.2)
ALP SERPL-CCNC: 71 U/L (ref 55–135)
ALT SERPL W/O P-5'-P-CCNC: 20 U/L (ref 10–44)
ANION GAP SERPL CALC-SCNC: 9 MMOL/L (ref 8–16)
AST SERPL-CCNC: 19 U/L (ref 10–40)
BACTERIA BLD CULT: NORMAL
BASOPHILS # BLD AUTO: 0.03 K/UL (ref 0–0.2)
BASOPHILS NFR BLD: 0.4 % (ref 0–1.9)
BILIRUB SERPL-MCNC: 0.8 MG/DL (ref 0.1–1)
BUN SERPL-MCNC: 26 MG/DL (ref 8–23)
CALCIUM SERPL-MCNC: 8.5 MG/DL (ref 8.7–10.5)
CHLORIDE SERPL-SCNC: 105 MMOL/L (ref 95–110)
CO2 SERPL-SCNC: 23 MMOL/L (ref 23–29)
CREAT SERPL-MCNC: 2.3 MG/DL (ref 0.5–1.4)
CRP SERPL-MCNC: 0.99 MG/DL (ref 0–0.75)
DIFFERENTIAL METHOD: ABNORMAL
EOSINOPHIL # BLD AUTO: 0.3 K/UL (ref 0–0.5)
EOSINOPHIL NFR BLD: 4.3 % (ref 0–8)
ERYTHROCYTE [DISTWIDTH] IN BLOOD BY AUTOMATED COUNT: 12.4 % (ref 11.5–14.5)
EST. GFR  (AFRICAN AMERICAN): 30.5 ML/MIN/1.73 M^2
EST. GFR  (NON AFRICAN AMERICAN): 26.4 ML/MIN/1.73 M^2
GLUCOSE SERPL-MCNC: 233 MG/DL (ref 70–110)
HCT VFR BLD AUTO: 30.7 % (ref 40–54)
HGB BLD-MCNC: 10.1 G/DL (ref 14–18)
IMM GRANULOCYTES # BLD AUTO: 0.07 K/UL (ref 0–0.04)
IMM GRANULOCYTES NFR BLD AUTO: 1 % (ref 0–0.5)
LYMPHOCYTES # BLD AUTO: 1 K/UL (ref 1–4.8)
LYMPHOCYTES NFR BLD: 14.1 % (ref 18–48)
MCH RBC QN AUTO: 30.7 PG (ref 27–31)
MCHC RBC AUTO-ENTMCNC: 32.9 G/DL (ref 32–36)
MCV RBC AUTO: 93 FL (ref 82–98)
MONOCYTES # BLD AUTO: 0.5 K/UL (ref 0.3–1)
MONOCYTES NFR BLD: 7.3 % (ref 4–15)
NEUTROPHILS # BLD AUTO: 5.1 K/UL (ref 1.8–7.7)
NEUTROPHILS NFR BLD: 72.9 % (ref 38–73)
NRBC BLD-RTO: 0 /100 WBC
PLATELET # BLD AUTO: 249 K/UL (ref 150–350)
PMV BLD AUTO: 10.7 FL (ref 9.2–12.9)
POTASSIUM SERPL-SCNC: 4.5 MMOL/L (ref 3.5–5.1)
PROT SERPL-MCNC: 6.5 G/DL (ref 6–8.4)
RBC # BLD AUTO: 3.29 M/UL (ref 4.6–6.2)
SODIUM SERPL-SCNC: 137 MMOL/L (ref 136–145)
WBC # BLD AUTO: 6.96 K/UL (ref 3.9–12.7)

## 2020-10-30 PROCEDURE — 80053 COMPREHEN METABOLIC PANEL: CPT

## 2020-10-30 PROCEDURE — 85025 COMPLETE CBC W/AUTO DIFF WBC: CPT

## 2020-10-30 PROCEDURE — 36415 COLL VENOUS BLD VENIPUNCTURE: CPT

## 2020-10-30 PROCEDURE — 86140 C-REACTIVE PROTEIN: CPT

## 2020-10-30 NOTE — TELEPHONE ENCOUNTER
----- Message from Jessica Colon sent at 10/30/2020  1:03 PM CDT -----  Regarding: medical records  Contact: pt's wife  Pt's wife would like to know if pt's records from [facility] have been received. She can be reached at [phone]

## 2020-10-30 NOTE — TELEPHONE ENCOUNTER
Called and spoke to patient wife to inform that records have been requested but have not been received yet. Patient wife voiced okay and stated that she will give [facility] on Monday morning.

## 2020-11-01 ENCOUNTER — HOSPITAL ENCOUNTER (OUTPATIENT)
Facility: HOSPITAL | Age: 75
Discharge: HOME | End: 2020-11-03
Attending: EMERGENCY MEDICINE | Admitting: INTERNAL MEDICINE
Payer: MEDICARE

## 2020-11-01 DIAGNOSIS — R31.9 HEMATURIA, UNSPECIFIED TYPE: Primary | ICD-10-CM

## 2020-11-01 DIAGNOSIS — D62 ACUTE BLOOD LOSS ANEMIA: ICD-10-CM

## 2020-11-01 DIAGNOSIS — D64.9 ANEMIA, UNSPECIFIED TYPE: ICD-10-CM

## 2020-11-01 PROCEDURE — 99285 EMERGENCY DEPT VISIT HI MDM: CPT

## 2020-11-02 PROBLEM — D62 ACUTE BLOOD LOSS ANEMIA: Status: ACTIVE | Noted: 2020-11-02

## 2020-11-02 LAB
ALBUMIN SERPL BCP-MCNC: 3.1 G/DL (ref 3.5–5.2)
ALP SERPL-CCNC: 69 U/L (ref 55–135)
ALT SERPL W/O P-5'-P-CCNC: 15 U/L (ref 10–44)
ANION GAP SERPL CALC-SCNC: 9 MMOL/L (ref 8–16)
AST SERPL-CCNC: 15 U/L (ref 10–40)
BACTERIA #/AREA URNS HPF: ABNORMAL /HPF
BASOPHILS # BLD AUTO: 0.03 K/UL (ref 0–0.2)
BASOPHILS NFR BLD: 0.4 % (ref 0–1.9)
BILIRUB SERPL-MCNC: 1 MG/DL (ref 0.1–1)
BILIRUB UR QL STRIP: ABNORMAL
BUN SERPL-MCNC: 35 MG/DL (ref 8–23)
CALCIUM SERPL-MCNC: 8.4 MG/DL (ref 8.7–10.5)
CHLORIDE SERPL-SCNC: 105 MMOL/L (ref 95–110)
CLARITY UR: ABNORMAL
CO2 SERPL-SCNC: 23 MMOL/L (ref 23–29)
COLOR UR: ABNORMAL
CREAT SERPL-MCNC: 2.2 MG/DL (ref 0.5–1.4)
DIFFERENTIAL METHOD: ABNORMAL
EOSINOPHIL # BLD AUTO: 0.2 K/UL (ref 0–0.5)
EOSINOPHIL NFR BLD: 3.4 % (ref 0–8)
ERYTHROCYTE [DISTWIDTH] IN BLOOD BY AUTOMATED COUNT: 12.7 % (ref 11.5–14.5)
EST. GFR  (AFRICAN AMERICAN): 32.2 ML/MIN/1.73 M^2
EST. GFR  (NON AFRICAN AMERICAN): 27.9 ML/MIN/1.73 M^2
GLUCOSE SERPL-MCNC: 149 MG/DL (ref 70–110)
GLUCOSE SERPL-MCNC: 207 MG/DL (ref 70–110)
GLUCOSE SERPL-MCNC: 226 MG/DL (ref 70–110)
GLUCOSE UR QL STRIP: ABNORMAL
HCT VFR BLD AUTO: 26.8 % (ref 40–54)
HGB BLD-MCNC: 8.7 G/DL (ref 14–18)
HGB UR QL STRIP: ABNORMAL
HYALINE CASTS #/AREA URNS LPF: 0 /LPF
IMM GRANULOCYTES # BLD AUTO: 0.04 K/UL (ref 0–0.04)
IMM GRANULOCYTES NFR BLD AUTO: 0.6 % (ref 0–0.5)
INR PPP: 1.2
KETONES UR QL STRIP: ABNORMAL
LEUKOCYTE ESTERASE UR QL STRIP: ABNORMAL
LIPASE SERPL-CCNC: 45 U/L (ref 4–60)
LYMPHOCYTES # BLD AUTO: 1.3 K/UL (ref 1–4.8)
LYMPHOCYTES NFR BLD: 18.5 % (ref 18–48)
MCH RBC QN AUTO: 29.9 PG (ref 27–31)
MCHC RBC AUTO-ENTMCNC: 32.5 G/DL (ref 32–36)
MCV RBC AUTO: 92 FL (ref 82–98)
MICROSCOPIC COMMENT: ABNORMAL
MONOCYTES # BLD AUTO: 0.8 K/UL (ref 0.3–1)
MONOCYTES NFR BLD: 10.6 % (ref 4–15)
NEUTROPHILS # BLD AUTO: 4.7 K/UL (ref 1.8–7.7)
NEUTROPHILS NFR BLD: 66.5 % (ref 38–73)
NITRITE UR QL STRIP: ABNORMAL
NRBC BLD-RTO: 0 /100 WBC
PH UR STRIP: ABNORMAL [PH] (ref 5–8)
PLATELET # BLD AUTO: 204 K/UL (ref 150–350)
PMV BLD AUTO: 10 FL (ref 9.2–12.9)
POTASSIUM SERPL-SCNC: 4.6 MMOL/L (ref 3.5–5.1)
PROT SERPL-MCNC: 6.1 G/DL (ref 6–8.4)
PROT UR QL STRIP: ABNORMAL
PROTHROMBIN TIME: 14.8 SEC (ref 10.6–14.8)
RBC # BLD AUTO: 2.91 M/UL (ref 4.6–6.2)
RBC #/AREA URNS HPF: >100 /HPF (ref 0–4)
SARS-COV-2 RDRP RESP QL NAA+PROBE: NEGATIVE
SODIUM SERPL-SCNC: 137 MMOL/L (ref 136–145)
SP GR UR STRIP: ABNORMAL (ref 1–1.03)
TROPONIN I SERPL DL<=0.01 NG/ML-MCNC: <0.03 NG/ML
TROPONIN I SERPL DL<=0.01 NG/ML-MCNC: <0.03 NG/ML
URN SPEC COLLECT METH UR: ABNORMAL
UROBILINOGEN UR STRIP-ACNC: ABNORMAL EU/DL
WBC # BLD AUTO: 7.1 K/UL (ref 3.9–12.7)
WBC #/AREA URNS HPF: >100 /HPF (ref 0–5)

## 2020-11-02 PROCEDURE — 85610 PROTHROMBIN TIME: CPT

## 2020-11-02 PROCEDURE — 96372 THER/PROPH/DIAG INJ SC/IM: CPT | Mod: 59

## 2020-11-02 PROCEDURE — G0378 HOSPITAL OBSERVATION PER HR: HCPCS

## 2020-11-02 PROCEDURE — 81001 URINALYSIS AUTO W/SCOPE: CPT

## 2020-11-02 PROCEDURE — 25000003 PHARM REV CODE 250: Performed by: NURSE PRACTITIONER

## 2020-11-02 PROCEDURE — 25000003 PHARM REV CODE 250: Performed by: INTERNAL MEDICINE

## 2020-11-02 PROCEDURE — G0378 HOSPITAL OBSERVATION PER HR: HCPCS | Mod: CS

## 2020-11-02 PROCEDURE — U0002 COVID-19 LAB TEST NON-CDC: HCPCS

## 2020-11-02 PROCEDURE — 63600175 PHARM REV CODE 636 W HCPCS: Mod: GZ | Performed by: NURSE PRACTITIONER

## 2020-11-02 PROCEDURE — 85025 COMPLETE CBC W/AUTO DIFF WBC: CPT

## 2020-11-02 PROCEDURE — 84484 ASSAY OF TROPONIN QUANT: CPT

## 2020-11-02 PROCEDURE — 80053 COMPREHEN METABOLIC PANEL: CPT

## 2020-11-02 PROCEDURE — 87086 URINE CULTURE/COLONY COUNT: CPT

## 2020-11-02 PROCEDURE — 83690 ASSAY OF LIPASE: CPT

## 2020-11-02 PROCEDURE — C9399 UNCLASSIFIED DRUGS OR BIOLOG: HCPCS | Performed by: INTERNAL MEDICINE

## 2020-11-02 PROCEDURE — 96374 THER/PROPH/DIAG INJ IV PUSH: CPT

## 2020-11-02 PROCEDURE — 36415 COLL VENOUS BLD VENIPUNCTURE: CPT

## 2020-11-02 RX ORDER — ROSUVASTATIN CALCIUM 20 MG/1
20 TABLET, COATED ORAL NIGHTLY
Status: DISCONTINUED | OUTPATIENT
Start: 2020-11-02 | End: 2020-11-03 | Stop reason: HOSPADM

## 2020-11-02 RX ORDER — SODIUM CHLORIDE 0.9 % (FLUSH) 0.9 %
10 SYRINGE (ML) INJECTION
Status: DISCONTINUED | OUTPATIENT
Start: 2020-11-02 | End: 2020-11-03 | Stop reason: HOSPADM

## 2020-11-02 RX ORDER — INSULIN ASPART 100 [IU]/ML
10 INJECTION, SOLUTION INTRAVENOUS; SUBCUTANEOUS
Status: DISCONTINUED | OUTPATIENT
Start: 2020-11-02 | End: 2020-11-03 | Stop reason: HOSPADM

## 2020-11-02 RX ORDER — MORPHINE SULFATE 2 MG/ML
1 INJECTION, SOLUTION INTRAMUSCULAR; INTRAVENOUS EVERY 6 HOURS PRN
Status: DISCONTINUED | OUTPATIENT
Start: 2020-11-02 | End: 2020-11-03 | Stop reason: HOSPADM

## 2020-11-02 RX ORDER — TRAZODONE HYDROCHLORIDE 50 MG/1
50 TABLET ORAL NIGHTLY PRN
Status: DISCONTINUED | OUTPATIENT
Start: 2020-11-02 | End: 2020-11-03 | Stop reason: HOSPADM

## 2020-11-02 RX ADMIN — AMPICILLIN SODIUM: 2 INJECTION, POWDER, FOR SOLUTION INTRAMUSCULAR; INTRAVENOUS at 10:11

## 2020-11-02 RX ADMIN — INSULIN DETEMIR 12 UNITS: 100 INJECTION, SOLUTION SUBCUTANEOUS at 08:11

## 2020-11-02 RX ADMIN — INSULIN ASPART 10 UNITS: 100 INJECTION, SOLUTION INTRAVENOUS; SUBCUTANEOUS at 11:11

## 2020-11-02 RX ADMIN — ROSUVASTATIN 20 MG: 20 TABLET, FILM COATED ORAL at 08:11

## 2020-11-02 RX ADMIN — INSULIN ASPART 10 UNITS: 100 INJECTION, SOLUTION INTRAVENOUS; SUBCUTANEOUS at 05:11

## 2020-11-02 NOTE — H&P
[facility] Medicine History & Physical Examination   Patient Name: Jamil Cadet  MRN: [id number]  Patient Class: OP- Observation   Admission Date: 11/1/2020 11:45 PM  Length of Stay: 0  Attending Physician:   Primary Care Provider: Richard Gunter MD  Face-to-Face encounter date: 11/02/2020  Code Status:Full Code  MPOA:  Chief Complaint: bleeding from stent , right kidney        Patient information was obtained from patient, past medical records and ER records.   HISTORY OF PRESENT ILLNESS:   Jamil Cadet is a 75 y.o. old male who  has a past medical history of Cancer, Diabetes mellitus, type 2, Disorder of kidney and ureter, History of renal cell carcinoma status post partial nephrectomy (9/7/2020), Hydronephrosis (9/9/2020), Hyperlipidemia, and Polio.. The patient presented to [facility] on 11/1/2020 with a primary complaint of bleeding from stent , right kidney  .   75-year-old  male presents to the emergency room with complaints of bleeding from a right renal nephroscopy tube.  The patient has a known history of renal cell carcinoma he is status post nephrectomy he presents to emergency room complaints bleeding from his nephroscopy 2.  The patient states he has been bleeding for 2 days but over the past 24 hours the bleeding got progressively worse he described as initially bright red in color but currently dark red in color.  He denies fever, chills, cough, hematemesis hemoptysis lightheadedness dizziness or syncope.  The patient did state that 2 days ago the tubing got caught on the draw handle and was pulled slightly he believes this may have dislodged tube causing the bleeding.  He denies being on blood thinners.  The patient states when he urinates his harsh blood.  In the emergency room the ER physician did speak with the urologist.  This patient will be admitted for observation of his H&H since he had a significant drop over the past of days and his blood  count    REVIEW OF SYSTEMS:   10 Point Review of System was performed and was found to be negative except for that mentioned already in the HPI and   Review of Systems (Negative unless checked off)  Review of Systems   Constitutional: Negative.    HENT: Negative.    Eyes: Negative.    Respiratory: Negative.    Cardiovascular: Negative.    Gastrointestinal: Negative.    Genitourinary: Positive for frequency, hematuria and urgency.   Skin: Negative.    Neurological: Negative.    Endo/Heme/Allergies: Negative.    Psychiatric/Behavioral: Negative.            PAST MEDICAL HISTORY:     Past Medical History:   Diagnosis Date    Cancer     Diabetes mellitus, type 2     Disorder of kidney and ureter     History of renal cell carcinoma status post partial nephrectomy 9/7/2020    Hydronephrosis 9/9/2020    Hyperlipidemia     Polio        PAST SURGICAL HISTORY:     Past Surgical History:   Procedure Laterality Date    APPENDECTOMY      CYSTOSCOPY W/ RETROGRADES Right 9/9/2020    Procedure: CYSTOSCOPY, WITH RETROGRADE PYELOGRAM;  Surgeon: Chris Garcia Jr., MD;  Location: [facility];  Service: Urology;  Laterality: Right;    SPINE SURGERY      URETHROSCOPY  9/9/2020    Procedure: URETHROSCOPY;  Surgeon: Chris Garcia Jr., MD;  Location: [facility];  Service: Urology;;       ALLERGIES:   Patient has no known allergies.    FAMILY HISTORY:     Family History   Problem Relation Age of Onset    Heart disease Mother     Heart disease Father     Stroke Father     Heart disease Maternal Grandfather     Heart disease Paternal Grandmother     Heart disease Paternal Grandfather        SOCIAL HISTORY:     Social History     Tobacco Use    Smoking status: Never Smoker    Smokeless tobacco: Never Used   Substance Use Topics    Alcohol use: No     Frequency: Never        Social History     Substance and Sexual Activity   Sexual Activity Yes    Partners: Female        HOME MEDICATIONS:     Prior to Admission medications     Medication Sig Start Date End Date Taking? Authorizing Provider   insulin aspart U-100 (NOVOLOG FLEXPEN U-100 INSULIN) 100 unit/mL (3 mL) InPn pen Inject 10 Units into the skin 3 (three) times daily with meals. Check glucose before meals and then take insulin as per sliding scale 10/27/20  Yes Richard Gunter MD   insulin degludec (TRESIBA FLEXTOUCH U-100) 100 unit/mL (3 mL) InPn Inject 15 Units into the skin every evening. 10/27/20  Yes Richard Gunter MD   rosuvastatin (CRESTOR) 20 MG tablet Take 20 mg by mouth every evening.    Yes Historical Provider   amLODIPine (NORVASC) 10 MG tablet Take 10 mg by mouth once daily.    Historical Provider   ampicillin sodium (AMPICILLIN 2 G IN  ML EXTENDED INFUSION) Inject 300 mLs (6 g total) into the vein continuous. for 10 days 10/26/20 11/5/20  Marcos Telles MD   clonazePAM (KLONOPIN) 1 MG tablet Take 1 tablet (1 mg total) by mouth every evening.  Patient taking differently: Take 1 mg by mouth 2 (two) times daily as needed (sleep/restless legs).  10/22/20   Richard Gunter MD   glimepiride (AMARYL) 4 MG tablet Take 4 mg by mouth before breakfast.    Historical Provider   hydrALAZINE (APRESOLINE) 25 MG tablet Take 25 mg by mouth 3 (three) times daily.    Historical Provider   SITagliptan-metformin (JANUMET) 50-1,000 mg per tablet Take 1 tablet by mouth 2 (two) times daily with meals.    Historical Provider   traZODone (DESYREL) 50 MG tablet Start with 50 mg at bedtime for 1 week and then from 2nd week increase to 100 mg or 2 tablets at bedtime.  Tapering protocol on clonazepam.  Patient taking differently: Take 50 mg by mouth nightly as needed. Start with 50 mg at bedtime for 1 week and then from 2nd week increase to 100 mg or 2 tablets at bedtime.  Tapering protocol on clonazepam. 10/22/20   Richard Gunter MD   TRUE METRIX GLUCOSE TEST STRIP Strp  10/29/18   Historical Provider   varicella-zoster gE-AS01B, PF, (SHINGRIX, PF,) 50 mcg/0.5 mL injection Inject 0.5 mLs into the  "muscle once.    Historical Provider         PHYSICAL EXAM:   BP (!) 156/70   Pulse 74   Temp 98.6 °F (37 °C) (Oral)   Resp 17   Ht 5' 11" (1.803 m)   Wt 77.1 kg (170 lb)   SpO2 99%   BMI 23.71 kg/m²   Vitals Reviewed  General appearance: Well-developed, well-nourished male in no apparent distress.  Skin: No Rash.   Neuro: Motor and sensory exams grossly intact. Good tone. Power in all 4 extremities 5/5.   HENT: Atraumatic head. Moist mucous membranes of oral cavity.  Eyes: Normal extraocular movements.   Neck: Supple. No evidence of lymphadenopathy. No thyroidomegaly.  Lungs: Clear to auscultation bilaterally. No wheezing present.   Heart: Regular rate and rhythm. S1 and S2 present with no murmurs/gallop/rub. No pedal edema. No JVD present.   Abdomen: Soft, non-distended, non-tender. No rebound tenderness/guarding. No masses or organomegaly. Bowel sounds are normal. Bladder is not palpable.   Extremities: No cyanosis, clubbing, or edema.  Psych/mental status: Alert and oriented. Cooperative. Responds appropriately to questions.   EMERGENCY DEPARTMENT LABS AND IMAGING:   Following labs were Reviewed   Recent Labs   Lab 11/02/20 0124   WBC 7.10   HGB 8.7*   HCT 26.8*      CALCIUM 8.4*   ALBUMIN 3.1*   PROT 6.1      K 4.6   CO2 23      BUN 35*   CREATININE 2.2*   ALKPHOS 69   ALT 15   AST 15   BILITOT 1.0         CMP   Recent Labs   Lab 11/02/20 0124      K 4.6      CO2 23   *   BUN 35*   CREATININE 2.2*   CALCIUM 8.4*   PROT 6.1   ALBUMIN 3.1*   BILITOT 1.0   ALKPHOS 69   AST 15   ALT 15   ANIONGAP 9   ESTGFRAFRICA 32.2*   EGFRNONAA 27.9*   , CBC   Recent Labs   Lab 11/02/20 0124   WBC 7.10   HGB 8.7*   HCT 26.8*      , INR   Lab Results   Component Value Date    INR 1.2 11/02/2020    INR 1.3 10/22/2020    INR 1.3 09/07/2020   , Lipid Panel   Lab Results   Component Value Date    CHOL 106 (L) 10/02/2020    HDL 31 (L) 10/02/2020    LDLCALC 60.0 (L) 10/02/2020    " TRIG 75 10/02/2020    CHOLHDL 29.2 10/02/2020   , Troponin No results for input(s): TROPONINI in the last 168 hours., A1C:   Recent Labs   Lab 09/08/20  0402 10/02/20  1012 10/12/20  1113   HGBA1C 5.9 8.4* 8.1*    and All labs within the past 24 hours have been reviewed  Microbiology Results (last 7 days)     Procedure Component Value Units Date/Time    Urine culture [[id number]] Collected: 11/02/20 0124    Order Status: No result Specimen: Urine Updated: 11/02/20 0150        CT Renal Stone Study ABD Pelvis WO   Final Result      Ct Renal Stone Study Abd Pelvis Wo    Result Date: 11/2/2020  EXAM DESCRIPTION: CT RENAL STONE STUDY ABD PELVIS WO 11/2/2020 2:58 AM CST CLINICAL HISTORY: 75 years, Male, Hematuria, unknown cause; Evaluate R nephrostomy tube position; Rt nephrostomy tube clotted off  with blood COMPARISON: 10/23/2020-9/10/2020 PROCEDURE: Multiple transaxial tomograms of the abdomen and pelvis were performed from the lung bases to the symphysis pubis, without administration of IV and oral contrast. Multiplanar reformats in the sagittal and coronal plane were generated and reviewed. An individualized dose optimization technique, Automated Exposure Control, was utilized for the performed procedure. FINDINGS: The lack of IV and oral contrast limits evaluation of solid organs, subtle lesions cannot be excluded. The lung bases demonstrate to be clear. Again there is anterior diaphragmatic eventration with minimal compressive atelectatic changes. Grossly the unopacified liver, pancreas, spleen and adrenal glands demonstrate to be within normal limits, no significant focal lesions were identified.  Again the gallbladder demonstrate hyperdense material and radiodensities layering along the gallbladder neck area corresponding to cholelithiasis similar to prior studies Again there is a percutaneous nephrostomy tube again the pigtail along the lower pole right kidney unchanged in comparison. Again there are noted the  presence of bilateral exophytic lower pole renal cyst again there is noted presence of mild right hydronephrosis and hydroureter up to the level of the crossing of the iliac vessels similar in comparison with prior study. Grossly the unopacified stomach, small bowel and large bowel demonstrate to be within normal limits. Findings suggest fecal stasis. There is diverticulosis within the sigmoid colon. The appendix was not visualized. The urinary bladder demonstrate to be within normal limits. The prostate gland is unremarkable  The aorta demonstrate minimal atheromatous plaque formation.  There is no retroperitoneal lymphadenopathy.  There is no evidence for ascites. The rest of the soft tissue demonstrate to be grossly unremarkable. Small left inguinal hernia containing omentum IMPRESSION: RIGHT-SIDED NEPHROSTOMY TUBE UNCHANGED IN POSITION IN COMPARISON TO PRIOR STUDIES. CHOLELITHIASIS. DIVERTICULOSIS. ATHEROSCLEROTIC DISEASE OF THE AORTA. BILATERAL SIMPLE RENAL CYSTS. NO SIGNIFICANT INTERVAL CHANGE IN COMPARISON WITH PRIOR STUDIES. Electronically signed by:  Preet Pena MD  11/2/2020 3:07 AM CST Workstation: [id number]    Us Abdomen Limited    Result Date: 10/23/2020  EXAM: US Abdomen Limited CLINICAL HISTORY: The patient is 75 years old and is Male; ab distention TECHNIQUE: Real-time ultrasound of the abdomen with image documentation. COMPARISON: No relevant prior studies available. FINDINGS: FREE FLUID:  4 quadrants were imaged. No free fluid is noted throughout the abdomen and pelvis. IMPRESSION: No ascites. Electronically signed by:  Tegan Salcido MD  10/23/2020 10:29 PM CDT Workstation: [id number]    Ct Abdomen Pelvis  Without Contrast    Result Date: 10/23/2020  CT ABDOMEN AND PELVIS WITHOUT CONTRAST HISTORY: Nephrostomy tube hematoma CMS MANDATED QUALITY DATA - CT RADIATION  436 All CT scans at this facility utilize dose modulation, iterative reconstruction, and/or weight based dosing when appropriate to  reduce radiation dose to as low as reasonably achievable FINDINGS: Noncontrast axial images were obtained. The lack of intravenous contrast limits assessment, most notably in regards to solid organs and vascular structures. Comparison is made to prior studies from September 13 and October 22. The lung bases are unremarkable. The liver is unremarkable. The gallbladder is moderately dilated. Hyperdense bile and gallstones are noted. There is no pathologic bowel duct dilation. The spleen is mildly enlarged at 14.4 cm in length. The pancreas and adrenal glands are unremarkable. The left kidney is unremarkable with the exception of exophytic lower pole mass lesions, one of which is hyperdense, unchanged. On the right, nephrostomy tube remains in place with pigtail at the level of the lower pole collecting system. This is unchanged when compared to October 22. On the prior CT from September 13, the pigtail was at the level of the renal pelvis, indicating some degree of tube retraction. There is persistent mild proximal hydronephrosis. There is moderate dilation of the proximal ureter, however the distal ureter returns to normal caliber. Hyperdensity within the mildly dilated renal pelvis and calyces likely reflects clot formation, similar to October 22. Exophytic lower pole right renal mass is unchanged. Mild right-sided perinephric fat stranding is also stable, with no drainable perinephric fluid collections. The abdominal aorta is calcified without aneurysm. Bowel structures are unremarkable. Small fat-containing umbilical hernia is noted. Images of the pelvis demonstrate sigmoid diverticula without evidence of diverticulitis. Urinary bladder is unremarkable. IMPRESSION: 1. Stable CT appearance of the abdomen and pelvis when compared to October 22, 2020. 2. Right-sided nephrostomy tube is unchanged in position compared to October 22, with pigtail at the level of the lower pole collecting system. Mild right-sided  hydronephrosis and mild-to-moderate hydroureter are unchanged. Hyperdensity within the mildly dilated proximal collecting system is compatible with clot formation, stable. No perinephric fluid collections are identified. 3. Additional findings as above. Electronically Signed by Niraj Paz M.D. on 10/23/2020 6:48 PM    Ct Abdomen Pelvis  Without Contrast    Result Date: 10/22/2020  CMS MANDATED QUALITY DATA - CT RADIATION - 436 All CT scans at this facility utilize dose modulation, iterative reconstruction, and/or weight based dosing when appropriate to reduce radiation dose to as low as reasonably achievable. HISTORY: Hydronephrosis, bleeding from the right nephrostomy tube FINDINGS: Noncontrast axial images were obtained. Nonenhanced study is tailored for the detection of urolithiasis, and is insensitive for abnormalities of the solid organs, vasculature and hollow viscera. COMPARISON: 9/8/2020 CT ABDOMEN: The lung bases are clear. The liver and spleen have a normal noncontrast appearance. The gallbladder is distended. There are tiny gallstones. There is no gallbladder wall thickening or pericholecystic edema. There is hyperdense sludge within the gallbladder which is stable. The pancreas and adrenal glands are normal. There is a right nephrostomy tube in place. The proximal end of the tube is within the inferior calyx. There is hyperdense fluid within the inferior right renal calyx suggestive of hemorrhage. There is moderate right hydronephrosis and hydroureter to the level of the pelvic inlet. There is stable exophytic masses arising from both kidneys measuring 2.3 cm on the right and 2.7 cm on the left. There is a 2 cm hyperdense mass arising from the inferior pole of the left kidney. There is mild stranding in the perinephric fat. There are no thick-walled or dilated bowel loops. There is a small fat-containing umbilical hernia. CT PELVIS: There is sigmoid diverticulosis. The bladder is collapsed. The  prostate gland is unremarkable. There is no free fluid. There are degenerative changes of the spine. IMPRESSION: Right nephrostomy tube in place with moderate right hydronephrosis and hydroureter to the level of the pelvic inlet. There is hyperdense fluid within the inferior right renal calyx compatible with hemorrhage Bilateral exophytic renal masses. Findings are nonspecific and may represent solid masses versus complex cysts Mild stranding in the perinephric fat Stable distended gallbladder with cholelithiasis. There is no evidence of acute cholecystitis Sigmoid diverticulosis Diffuse vascular calcification of aorta Electronically Signed by Prema Salazar M.D. on 10/22/2020 4:06 PM    ASSESSMENT & PLAN:   Jamil Cadet is a 75 y.o. male admitted for    1.  Acute anemia most likely blood loss from nephrostomy tube malfunction  -trend H&H  -IV hydration  -monitor closely    2 nephrostomy  tube dysfunction  -consult urology-  -IV hydration  -pain management    3.  Type 2 diabetes  -NovoLog low-dose insulin sliding scale  -diabetic diet  -sliding scale protocol    4.  Recent diagnosed with bacteremia being treated with ampicillin  -patient has a out patient infusion device infusing ampicillin 6 g over 24 hours  -will transition to a peripheral IV and give 2 g every 8 hours 3 times a day    5.  History of renal cell carcinoma status post nephrectomy    DVT Prophylaxis: will be placed on SCDs for DVT prophylaxis and will be advised to be as mobile as possible and sit in a chair as tolerated.   ________________________________________________________________________________    Discharge Planning and Disposition: No mobility needs. Ambulating well. Good social support system. Patient will be discharged in   Face-to-Face encounter date: 11/02/2020  Encounter included review of the medical records, interviewing and examining the patient face-to-face, discussion with family and other health care providers including  emergency medicine physician, admission orders, interpreting lab/test results and formulating a plan of care.   Medical Decision Making during this encounter was  [_] Low Complexity  [_] Moderate Complexity  [x] High Complexity  _________________________________________________________________________________    INPATIENT LIST OF MEDICATIONS     Current Facility-Administered Medications:     ampicillin (OMNIPEN) 2 g in  mL EXTENDED INFUSION, 2 g, Intravenous, Q4H, Minda Vidal NP    insulin aspart U-100 pen 10 Units, 10 Units, Subcutaneous, TID WM, Minda Vidal NP    morphine injection 1 mg, 1 mg, Intravenous, Q6H PRN, Minda Vidal NP    promethazine (PHENERGAN) 6.25 mg in dextrose 5 % 50 mL IVPB, 6.25 mg, Intravenous, Q6H PRN, Minda Vidal NP    rosuvastatin tablet 20 mg, 20 mg, Oral, QHS, Minda Vidal NP    sodium chloride 0.9% flush 10 mL, 10 mL, Intravenous, PRN, Minda Vidal NP    traZODone tablet 50 mg, 50 mg, Oral, Nightly PRN, Minda Vidal NP    Current Outpatient Medications:     insulin aspart U-100 (NOVOLOG FLEXPEN U-100 INSULIN) 100 unit/mL (3 mL) InPn pen, Inject 10 Units into the skin 3 (three) times daily with meals. Check glucose before meals and then take insulin as per sliding scale, Disp: 5 Syringe, Rfl: 2    insulin degludec (TRESIBA FLEXTOUCH U-100) 100 unit/mL (3 mL) InPn, Inject 15 Units into the skin every evening., Disp: 5 Syringe, Rfl: 1    rosuvastatin (CRESTOR) 20 MG tablet, Take 20 mg by mouth every evening. , Disp: , Rfl:     amLODIPine (NORVASC) 10 MG tablet, Take 10 mg by mouth once daily., Disp: , Rfl:     ampicillin sodium (AMPICILLIN 2 G IN  ML EXTENDED INFUSION), Inject 300 mLs (6 g total) into the vein continuous. for 10 days, Disp: 3000 mL, Rfl: 0    clonazePAM (KLONOPIN) 1 MG tablet, Take 1 tablet (1 mg total) by mouth every evening. (Patient taking differently: Take 1 mg by mouth 2 (two) times daily as needed (sleep/restless legs). ),  Disp: 30 tablet, Rfl: 0    glimepiride (AMARYL) 4 MG tablet, Take 4 mg by mouth before breakfast., Disp: , Rfl:     hydrALAZINE (APRESOLINE) 25 MG tablet, Take 25 mg by mouth 3 (three) times daily., Disp: , Rfl:     SITagliptan-metformin (JANUMET) 50-1,000 mg per tablet, Take 1 tablet by mouth 2 (two) times daily with meals., Disp: , Rfl:     traZODone (DESYREL) 50 MG tablet, Start with 50 mg at bedtime for 1 week and then from 2nd week increase to 100 mg or 2 tablets at bedtime.  Tapering protocol on clonazepam. (Patient taking differently: Take 50 mg by mouth nightly as needed. Start with 50 mg at bedtime for 1 week and then from 2nd week increase to 100 mg or 2 tablets at bedtime.  Tapering protocol on clonazepam.), Disp: 30 tablet, Rfl: 11    TRUE METRIX GLUCOSE TEST STRIP Strp, , Disp: , Rfl:     varicella-zoster gE-AS01B, PF, (SHINGRIX, PF,) 50 mcg/0.5 mL injection, Inject 0.5 mLs into the muscle once., Disp: , Rfl:       Scheduled Meds:   ampicillin (OMNIPEN) 2 g in  mL EXTENDED INFUSION  2 g Intravenous Q4H    insulin aspart U-100  10 Units Subcutaneous TID WM    rosuvastatin  20 mg Oral QHS     Continuous Infusions:  PRN Meds:.morphine, promethazine (PHENERGAN) IVPB, sodium chloride 0.9%, traZODone      Minda Vidal  [facility] Hospitalist NP  11/02/2020

## 2020-11-02 NOTE — NURSING
Pt ambulated from stretcher to bed. Denies any pain. Denies any chills. Nephrostomy bag has cherry red-tinged urine inside. Denies discomfort. Discussed poc, safety measures and call light system. Denies any other needs at this time.

## 2020-11-02 NOTE — ED PROVIDER NOTES
Encounter Date: 11/1/2020       History     Chief Complaint   Patient presents with    bleeding from stent , right kidney     75-year-old male with a history of DM, renal cell carcinoma status post partial nephrectomy, age of the presents to the ER with bleeding from his nephrostomy tube.  Patient has an obstruction in his right ureter necessitating a right nephrostomy placed by Dr. Garcia.  Two days ago, the tubing got caught on a drawer handle and was pulled slightly.  He is unsure if it actually backed out.  Since then, he has had increasing amounts of blood in his nephrostomy bag.  He also noted blood around the insertion site.  Denies fever, nausea or vomiting, or any pain. When he actually urinates, his urine continues to be clear.  Patient is not on any blood thinners.        Review of patient's allergies indicates:  No Known Allergies  Past Medical History:   Diagnosis Date    Cancer     Diabetes mellitus, type 2     Disorder of kidney and ureter     History of renal cell carcinoma status post partial nephrectomy 9/7/2020    Hydronephrosis 9/9/2020    Hyperlipidemia     Polio      Past Surgical History:   Procedure Laterality Date    APPENDECTOMY      CYSTOSCOPY W/ RETROGRADES Right 9/9/2020    Procedure: CYSTOSCOPY, WITH RETROGRADE PYELOGRAM;  Surgeon: Chris Garcia Jr., MD;  Location: [facility] OR;  Service: Urology;  Laterality: Right;    SPINE SURGERY      URETHROSCOPY  9/9/2020    Procedure: URETHROSCOPY;  Surgeon: Chris Garcia Jr., MD;  Location: [facility] OR;  Service: Urology;;     Family History   Problem Relation Age of Onset    Heart disease Mother     Heart disease Father     Stroke Father     Heart disease Maternal Grandfather     Heart disease Paternal Grandmother     Heart disease Paternal Grandfather      Social History     Tobacco Use    Smoking status: Never Smoker    Smokeless tobacco: Never Used   Substance Use Topics    Alcohol use: No     Frequency: Never    Drug use: No      Review of Systems   Constitutional: Negative for fever.   HENT: Negative for sore throat.    Respiratory: Negative for shortness of breath.    Cardiovascular: Negative for chest pain.   Gastrointestinal: Negative for abdominal pain, nausea and vomiting.   Genitourinary: Positive for hematuria. Negative for dysuria and flank pain.   Musculoskeletal: Negative for back pain.   Skin: Negative for rash.   Neurological: Negative for weakness.   Hematological: Does not bruise/bleed easily.   All other systems reviewed and are negative.      Physical Exam     Initial Vitals [11/01/20 2340]   BP Pulse Resp Temp SpO2   (!) 158/82 93 16 98.6 °F (37 °C) 97 %      MAP       --         Physical Exam    Constitutional: He appears well-developed and well-nourished. No distress.   HENT:   Head: Normocephalic and atraumatic.   Mouth/Throat: Oropharynx is clear and moist.   Eyes: Conjunctivae and EOM are normal. Pupils are equal, round, and reactive to light.   Neck: Normal range of motion.   Cardiovascular: Normal rate, regular rhythm, normal heart sounds and intact distal pulses.   No murmur heard.  Pulmonary/Chest: Breath sounds normal. No respiratory distress. He has no wheezes. He has no rhonchi. He has no rales.   Abdominal: Soft. He exhibits no distension. There is no abdominal tenderness. There is no rebound and no guarding.   Right flank with nephrostomy tube in place draining harsh blood.  Insertion site with small amount of blood.  No tenderness, purulent drainage, erythema, induration.   Musculoskeletal: Normal range of motion. No tenderness or edema.      Comments: PICC line in left upper extremity.  No signs of infection.   Neurological: He is alert.   Skin: Skin is warm and dry. No rash noted. No erythema.   Psychiatric: He has a normal mood and affect. Thought content normal.         ED Course   Procedures  Labs Reviewed   CBC W/ AUTO DIFFERENTIAL - Abnormal; Notable for the following components:       Result Value     RBC 2.91 (*)     Hemoglobin 8.7 (*)     Hematocrit 26.8 (*)     Immature Granulocytes 0.6 (*)     All other components within normal limits   COMPREHENSIVE METABOLIC PANEL - Abnormal; Notable for the following components:    Glucose 207 (*)     BUN 35 (*)     Creatinine 2.2 (*)     Calcium 8.4 (*)     Albumin 3.1 (*)     eGFR if  32.2 (*)     eGFR if non  27.9 (*)     All other components within normal limits   URINALYSIS, REFLEX TO URINE CULTURE - Abnormal; Notable for the following components:    Color, UA Red (*)     Appearance, UA Cloudy (*)     Occult Blood UA 3+ (*)     All other components within normal limits    Narrative:     Specimen Source->Urine   URINALYSIS MICROSCOPIC - Abnormal; Notable for the following components:    RBC, UA >100 (*)     WBC, UA >100 (*)     Bacteria Many (*)     All other components within normal limits    Narrative:     Specimen Source->Urine   CULTURE, URINE   LIPASE   PROTIME-INR   SARS-COV-2 RNA AMPLIFICATION, QUAL   TROPONIN I          Imaging Results          CT Renal Stone Study ABD Pelvis WO (Final result)  Result time 11/02/20 01:02:00    Final result by Preet Pena MD (11/02/20 01:02:00)                 Narrative:    EXAM DESCRIPTION: CT RENAL STONE STUDY ABD PELVIS WO 11/2/2020 2:58 AM CST    CLINICAL HISTORY: 75 years, Male, Hematuria, unknown cause; Evaluate R nephrostomy tube position; Rt nephrostomy tube clotted off  with blood    COMPARISON: 10/23/2020-9/10/2020    PROCEDURE:  Multiple transaxial tomograms of the abdomen and pelvis were performed from the lung bases to the symphysis pubis, without administration of IV and oral contrast.  Multiplanar reformats in the sagittal and coronal plane were generated and reviewed.  An individualized dose optimization technique, Automated Exposure Control, was utilized for the performed procedure.    FINDINGS:  The lack of IV and oral contrast limits evaluation of solid organs, subtle  lesions cannot be excluded.    The lung bases demonstrate to be clear. Again there is anterior diaphragmatic eventration with minimal compressive atelectatic changes.    Grossly the unopacified liver, pancreas, spleen and adrenal glands demonstrate to be within normal limits, no significant focal lesions were identified.  Again the gallbladder demonstrate hyperdense material and radiodensities layering along the gallbladder neck area corresponding to cholelithiasis similar to prior studies    Again there is a percutaneous nephrostomy tube again the pigtail along the lower pole right kidney unchanged in comparison. Again there are noted the presence of bilateral exophytic lower pole renal cyst again there is noted presence of mild right hydronephrosis and hydroureter up to the level of the crossing of the iliac vessels similar in comparison with prior study.  Grossly the unopacified stomach, small bowel and large bowel demonstrate to be within normal limits. Findings suggest fecal stasis. There is diverticulosis within the sigmoid colon. The appendix was not visualized.  The urinary bladder demonstrate to be within normal limits. The prostate gland is unremarkable  The aorta demonstrate minimal atheromatous plaque formation.  There is no retroperitoneal lymphadenopathy.  There is no evidence for ascites. The rest of the soft tissue demonstrate to be grossly unremarkable. Small left inguinal hernia containing omentum    IMPRESSION:    RIGHT-SIDED NEPHROSTOMY TUBE UNCHANGED IN POSITION IN COMPARISON TO PRIOR STUDIES.  CHOLELITHIASIS.  DIVERTICULOSIS.  ATHEROSCLEROTIC DISEASE OF THE AORTA.  BILATERAL SIMPLE RENAL CYSTS. NO SIGNIFICANT INTERVAL CHANGE IN COMPARISON WITH PRIOR STUDIES.    Electronically signed by:  Preet Pena MD  11/2/2020 3:07 AM CST Workstation: [id number]                               Medical Decision Making:   Initial Assessment:   75-year-old male with a history of DM, renal cell carcinoma status  post partial nephrectomy, age of the presents to the ER with bleeding from his nephrostomy tube.   ED Management:  Plan:  Afebrile, vital signs stable. Concern for dislodgement of his nephrostomy tube.  UA shows WBC 7.1.  H&H 8.7/26.8 which is slightly down from previous hemoglobin of 10.1.  BUN 35, creatinine 2.2, similar to previous.  Urine from the nephrostomy tube shows RBC greater than 100, WBC greater than 100, bacteria many.  CTAP pending to evaluate for displacement.    CT AP shows that the nephrostomy tube appears unchanged in position compared to prior studies.  No significant interval change.  While the 2 appears be in place, I am still concerned about the amount of bleeding from the nephrostomy tube in the patient's drop in hemoglobin.  Discussed case with Dr. Freedman.  States that he does not feel that the nephrostomy bleeding is an emergent issue in itself as long as it is draining and his kidney function is normal.  Feels that it could be dealt with an clinic.  My concern at this point is the drop in hemoglobin, and that he continues to have grossly bloody output.  The patient is also concerned that the nephrostomy will continue to get clogged with blood clots.  I feel that the patient would benefit from observation and repeat H&H.  Further evaluation by Urology.  Will admit to hospitalist.                             Clinical Impression:     ICD-10-CM ICD-9-CM   1. Hematuria, unspecified type  R31.9 599.70   2. Anemia, unspecified type  D64.9 285.9                          ED Disposition Condition    Observation                             Judah Steiner MD  11/02/20 [id number]

## 2020-11-02 NOTE — PLAN OF CARE
Medicare Outpatient Observation Notice was signed, explained and given to patient/caregiver on 11/02/2020 at 8:33am     answered all questions

## 2020-11-02 NOTE — PLAN OF CARE
Pt lives with spouse and is current with [facility] and it is the pt wish to return to their service.  The pt has no other discharge needs    Pcp= Lyric Nguyen     11/02/20 1111   Discharge Assessment   Assessment Type Discharge Planning Assessment   Confirmed/corrected address and phone number on facesheet? Yes   Assessment information obtained from? Patient;Medical Record   Communicated expected length of stay with patient/caregiver no   Prior to hospitilization cognitive status: Alert/Oriented   Prior to hospitalization functional status: Independent   Current cognitive status: Alert/Oriented   Current Functional Status: Independent   Facility Arrived From: home   Lives With spouse   Able to Return to Prior Arrangements yes   Is patient able to care for self after discharge? Yes   Who are your caregiver(s) and their phone number(s)? Meron Branch [phone]   Patient's perception of discharge disposition home or selfcare   Readmission Within the Last 30 Days unable to assess   Patient currently being followed by outpatient case management? No   Patient currently receives any other outside agency services? Yes   Name and contact number of agency or person providing outside services [facility] [phone]   Is it the patient/care giver preference to resume care with the current outside agency? Yes   Equipment Currently Used at Home cane, straight;walker, rolling;glucometer   Do you have any problems affording any of your prescribed medications? No   Is the patient taking medications as prescribed? yes   Does the patient have transportation home? Yes   Transportation Anticipated car, drives self;family or friend will provide   Does the patient receive services at the [facility]? No   Discharge Plan A Home   Discharge Plan B Home with family   DME Needed Upon Discharge  none   Patient/Family in Agreement with Plan unable to assess

## 2020-11-02 NOTE — ED NOTES
Pt c/o blood in his right kidney drain. 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 Pt stated that earlier this morning his drainage started having a light pink color and went to dark red over a few hours. Pt also states that the sites bandage has blood as well. Pt denies chest pain, sob, nausea, vomiting, fever and pain

## 2020-11-03 ENCOUNTER — TELEPHONE (OUTPATIENT)
Dept: INFECTIOUS DISEASES | Facility: CLINIC | Age: 75
End: 2020-11-03

## 2020-11-03 VITALS
HEART RATE: 78 BPM | OXYGEN SATURATION: 95 % | BODY MASS INDEX: 25.22 KG/M2 | TEMPERATURE: 98 F | DIASTOLIC BLOOD PRESSURE: 71 MMHG | WEIGHT: 180.13 LBS | HEIGHT: 71 IN | RESPIRATION RATE: 19 BRPM | SYSTOLIC BLOOD PRESSURE: 129 MMHG

## 2020-11-03 LAB
BASOPHILS # BLD AUTO: 0.03 K/UL (ref 0–0.2)
BASOPHILS NFR BLD: 0.4 % (ref 0–1.9)
DIFFERENTIAL METHOD: ABNORMAL
EOSINOPHIL # BLD AUTO: 0.2 K/UL (ref 0–0.5)
EOSINOPHIL NFR BLD: 3.4 % (ref 0–8)
ERYTHROCYTE [DISTWIDTH] IN BLOOD BY AUTOMATED COUNT: 12.7 % (ref 11.5–14.5)
GLUCOSE SERPL-MCNC: 115 MG/DL (ref 70–110)
GLUCOSE SERPL-MCNC: 124 MG/DL (ref 70–110)
HCT VFR BLD AUTO: 28.5 % (ref 40–54)
HGB BLD-MCNC: 9.3 G/DL (ref 14–18)
IMM GRANULOCYTES # BLD AUTO: 0.03 K/UL (ref 0–0.04)
IMM GRANULOCYTES NFR BLD AUTO: 0.4 % (ref 0–0.5)
LYMPHOCYTES # BLD AUTO: 1.3 K/UL (ref 1–4.8)
LYMPHOCYTES NFR BLD: 19.9 % (ref 18–48)
MCH RBC QN AUTO: 30.3 PG (ref 27–31)
MCHC RBC AUTO-ENTMCNC: 32.6 G/DL (ref 32–36)
MCV RBC AUTO: 93 FL (ref 82–98)
MONOCYTES # BLD AUTO: 0.7 K/UL (ref 0.3–1)
MONOCYTES NFR BLD: 9.9 % (ref 4–15)
NEUTROPHILS # BLD AUTO: 4.4 K/UL (ref 1.8–7.7)
NEUTROPHILS NFR BLD: 66 % (ref 38–73)
NRBC BLD-RTO: 0 /100 WBC
PLATELET # BLD AUTO: 217 K/UL (ref 150–350)
PMV BLD AUTO: 10.1 FL (ref 9.2–12.9)
RBC # BLD AUTO: 3.07 M/UL (ref 4.6–6.2)
WBC # BLD AUTO: 6.74 K/UL (ref 3.9–12.7)

## 2020-11-03 PROCEDURE — 85025 COMPLETE CBC W/AUTO DIFF WBC: CPT

## 2020-11-03 PROCEDURE — 96372 THER/PROPH/DIAG INJ SC/IM: CPT | Mod: 59

## 2020-11-03 PROCEDURE — 36415 COLL VENOUS BLD VENIPUNCTURE: CPT

## 2020-11-03 PROCEDURE — G0378 HOSPITAL OBSERVATION PER HR: HCPCS

## 2020-11-03 PROCEDURE — 99214 OFFICE O/P EST MOD 30 MIN: CPT | Mod: ,,, | Performed by: UROLOGY

## 2020-11-03 PROCEDURE — 99214 PR OFFICE/OUTPT VISIT, EST, LEVL IV, 30-39 MIN: ICD-10-PCS | Mod: ,,, | Performed by: UROLOGY

## 2020-11-03 RX ADMIN — INSULIN ASPART 10 UNITS: 100 INJECTION, SOLUTION INTRAVENOUS; SUBCUTANEOUS at 07:11

## 2020-11-03 RX ADMIN — INSULIN ASPART 10 UNITS: 100 INJECTION, SOLUTION INTRAVENOUS; SUBCUTANEOUS at 12:11

## 2020-11-03 NOTE — CONSULTS
[facility] Consult Patient/H&P:    Jamil Cadet is a 75 y.o. male who presents for gross hematuria.    Patient with a history of bilateral renal masses s/p right partial nephrectomy in 4/2019 with Dr. Santos at [facility] - left 2 cm cyst on surveillance. Unclear of pathology given lack of records, however his wife states it was renal cell carcinoma.      He presented to the [facility] emergency department on 9/6/20 with severe hypoglycemia.       On review of his labs, he was incidentally found to have an elevated creatinine of 14.4 from a baseline of 1.5 with hyperkalemia.      He subsequently underwent US kidney which revealed mild right pelviectasis. CT renal stone on 9/8/20 showed interval development of right hydroureteronephrosis down to the pelvis - no obvious etiology for obstruction. Also with gallbladder distention, and a calculus of the cystic duct and non-obstructing stone in right kidney.      He underwent right diagnostic ureteroscopy with attempted right ureteral stent placement on 9/9/20. Subsequently had a nephrostomy tube placed and was discharged in stable condition.         10/24/20: Patient states he followed up with Dr. Kennedy at [facility]. He underwent Mag 3 renal lasix scan and had his nephrostomy tube exchanged. Per his wife, he began with severe gross hematuria and clots from his nephrostomy tube after exchange at [facility].     He presented to the ED on 10/22 and subsequently on 10/23 complaining of persistent gross hematuria. His H/H was stable at 8/25, however he had fever to 103F. Admitted for medical management. Nephrostomy tube now draining clear, yellow urine. Creatinine 2.7. CT abd/pelvis wo contrast revealed nephrostomy tube in place with mild right hydroureteronephrosis down to pelvis.      11/3/20: Patient presented again to the [facility] ED yesterday complaining of recurrent gross hematuria after pulling his right nephrostomy tube inadvertently. Admitted for  observation. Urine now clear ine nephrostomy tube with no hematuria.      Patient denies any chills, flank pain, dysuria, lower urinary tract symptoms or recent urinary tract infection.     PSA  0.28                 9/10/19     Testosterone  280                  9/10/19      Urine culture   No growth        9/7/20    Past Medical History:   Diagnosis Date    Cancer     Diabetes mellitus, type 2     Disorder of kidney and ureter     History of renal cell carcinoma status post partial nephrectomy 9/7/2020    Hydronephrosis 9/9/2020    Hyperlipidemia     Polio        Past Surgical History:   Procedure Laterality Date    APPENDECTOMY      CYSTOSCOPY W/ RETROGRADES Right 9/9/2020    Procedure: CYSTOSCOPY, WITH RETROGRADE PYELOGRAM;  Surgeon: Chris Garcia Jr., MD;  Location: [facility] OR;  Service: Urology;  Laterality: Right;    SPINE SURGERY      URETHROSCOPY  9/9/2020    Procedure: URETHROSCOPY;  Surgeon: Chris Garcia Jr., MD;  Location: [facility] OR;  Service: Urology;;       Family History   Problem Relation Age of Onset    Heart disease Mother     Heart disease Father     Stroke Father     Heart disease Maternal Grandfather     Heart disease Paternal Grandmother     Heart disease Paternal Grandfather        Social History     Socioeconomic History    Marital status:      Spouse name: Not on file    Number of children: Not on file    Years of education: Not on file    Highest education level: Not on file   Occupational History    Not on file   Social Needs    Financial resource strain: Not very hard    Food insecurity     Worry: Never true     Inability: Never true    Transportation needs     Medical: No     Non-medical: No   Tobacco Use    Smoking status: Never Smoker    Smokeless tobacco: Never Used   Substance and Sexual Activity    Alcohol use: No     Frequency: Never    Drug use: No    Sexual activity: Yes     Partners: Female   Lifestyle    Physical activity     Days per week: 6  days     Minutes per session: 150+ min    Stress: To some extent   Relationships    Social connections     Talks on phone: Three times a week     Gets together: Once a week     Attends Muslim service: Not on file     Active member of club or organization: Yes     Attends meetings of clubs or organizations: More than 4 times per year     Relationship status:    Other Topics Concern    Not on file   Social History Narrative    Not on file       Review of patient's allergies indicates:  No Known Allergies    Medications Reviewed: see MAR    ROS:    Constitutional: denies fevers, chills, night sweats, fatigue, malaise  Respiratory: negative for cough, shortness of breath, wheezing, dyspnea.  Cardiovascular:  negative for chest pain, varicose veins, ankle swelling, palpitations, syncope.  GI: negative for abdominal pain, heartburn, indigestion, nausea, vomiting, constipation, diarrhea, blood in stool.   Urology: as noted above in HPI  Endocrinology: negative for cold intolerance, excessive thirst, not feeling tired/sluggish, no heat intolerance.   Hematology/Lymph: negative for easy bleeding, easy bruising, swollen glands.  Musculoskeletal: negative for back pain, joint pain, joint swelling, neck pain.  Allergy-Immunology: negative for seasonal allergies, negative for unusual infections.   Skin: negative for boils, breast lumps, hives, itching, rash.   Neurology: negative for, dizziness, headache, tingling/numbness, tremors.   Psych: satisfied with life; negative for, anxiety, depression, suicidal thoughts.     PHYSICAL EXAM:    Vitals:    11/03/20 1137   BP: 129/71   Pulse: 78   Resp: 19   Temp: 97.7 °F (36.5 °C)     Body mass index is 25.12 kg/m².       General: Alert, cooperative, no distress, appears stated age  Head: Normocephalic, without obvious abnormality, atraumatic  Neck: no masses, no thyromegaly, no lymphadenopathy  Eyes: PERRL, conjunctiva/corneas clear  Lungs: Respirations unlabored, normal  effort, no accessory muscle use  CV: Warm and well perfused extremities  Abdomen: Soft, non-tender, no CVA tenderness, no hepatosplenomegaly, no hernia  : Right nephrostomy tube in place with clear yellow urine  Extremities: Extremities normal, atraumatic, no cyanosis or edema  Skin: Normal color, texture, and turgor, no rashes or lesions  Psych: Appropriate, well oriented, normal affect, normal mood  Neuro: Non-focal      LABS:    Recent Results (from the past 336 hour(s))   CBC auto differential    Collection Time: 10/22/20  3:04 PM   Result Value Ref Range    WBC 8.84 3.90 - 12.70 K/uL    RBC 2.66 (L) 4.60 - 6.20 M/uL    Hemoglobin 8.4 (L) 14.0 - 18.0 g/dL    Hematocrit 25.1 (L) 40.0 - 54.0 %    MCV 94 82 - 98 fL    MCH 31.6 (H) 27.0 - 31.0 pg    MCHC 33.5 32.0 - 36.0 g/dL    RDW 12.4 11.5 - 14.5 %    Platelets 240 150 - 350 K/uL    MPV 10.0 9.2 - 12.9 fL    Immature Granulocytes 0.6 (H) 0.0 - 0.5 %    Gran # (ANC) 6.7 1.8 - 7.7 K/uL    Immature Grans (Abs) 0.05 (H) 0.00 - 0.04 K/uL    Lymph # 0.8 (L) 1.0 - 4.8 K/uL    Mono # 1.0 0.3 - 1.0 K/uL    Eos # 0.3 0.0 - 0.5 K/uL    Baso # 0.02 0.00 - 0.20 K/uL    nRBC 0 0 /100 WBC    Gran % 75.5 (H) 38.0 - 73.0 %    Lymph % 9.4 (L) 18.0 - 48.0 %    Mono % 10.9 4.0 - 15.0 %    Eosinophil % 3.4 0.0 - 8.0 %    Basophil % 0.2 0.0 - 1.9 %    Differential Method Automated    Comprehensive metabolic panel    Collection Time: 10/22/20  3:04 PM   Result Value Ref Range    Sodium 139 136 - 145 mmol/L    Potassium 4.2 3.5 - 5.1 mmol/L    Chloride 106 95 - 110 mmol/L    CO2 21 (L) 23 - 29 mmol/L    Glucose 224 (H) 70 - 110 mg/dL    BUN 25 (H) 8 - 23 mg/dL    Creatinine 2.7 (H) 0.5 - 1.4 mg/dL    Calcium 8.7 8.7 - 10.5 mg/dL    Total Protein 7.3 6.0 - 8.4 g/dL    Albumin 3.5 3.5 - 5.2 g/dL    Total Bilirubin 1.0 0.1 - 1.0 mg/dL    Alkaline Phosphatase 71 55 - 135 U/L    AST 20 10 - 40 U/L    ALT 19 10 - 44 U/L    Anion Gap 12 8 - 16 mmol/L    eGFR if African American 25.1 (A) >60  mL/min/1.73 m^2    eGFR if non  21.8 (A) >60 mL/min/1.73 m^2   APTT    Collection Time: 10/22/20  3:04 PM   Result Value Ref Range    aPTT 35.4 (H) 23.6 - 33.3 sec   Protime-INR    Collection Time: 10/22/20  3:04 PM   Result Value Ref Range    PT 15.1 (H) 10.6 - 14.8 sec    INR 1.3    Urinalysis, Reflex to Urine Culture Urine, Clean Catch    Collection Time: 10/22/20  4:14 PM    Specimen: Urine   Result Value Ref Range    Specimen UA Urine, Clean Catch     Color, UA Yellow Yellow, Straw, Ida    Appearance, UA Clear Clear    pH, UA 6.0 5.0 - 8.0    Specific Gravity, UA 1.015 1.005 - 1.030    Protein, UA Trace (A) Negative    Glucose, UA 2+ (A) Negative    Ketones, UA Negative Negative    Bilirubin (UA) Negative Negative    Occult Blood UA Negative Negative    Nitrite, UA Negative Negative    Urobilinogen, UA Negative Negative EU/dL    Leukocytes, UA Negative Negative   CBC auto differential    Collection Time: 10/23/20  5:30 PM   Result Value Ref Range    WBC 9.66 3.90 - 12.70 K/uL    RBC 2.63 (L) 4.60 - 6.20 M/uL    Hemoglobin 8.2 (L) 14.0 - 18.0 g/dL    Hematocrit 24.9 (L) 40.0 - 54.0 %    MCV 95 82 - 98 fL    MCH 31.2 (H) 27.0 - 31.0 pg    MCHC 32.9 32.0 - 36.0 g/dL    RDW 12.3 11.5 - 14.5 %    Platelets 247 150 - 350 K/uL    MPV 9.8 9.2 - 12.9 fL    Immature Granulocytes 0.6 (H) 0.0 - 0.5 %    Gran # (ANC) 8.8 (H) 1.8 - 7.7 K/uL    Immature Grans (Abs) 0.06 (H) 0.00 - 0.04 K/uL    Lymph # 0.5 (L) 1.0 - 4.8 K/uL    Mono # 0.2 (L) 0.3 - 1.0 K/uL    Eos # 0.1 0.0 - 0.5 K/uL    Baso # 0.01 0.00 - 0.20 K/uL    nRBC 0 0 /100 WBC    Gran % 91.1 (H) 38.0 - 73.0 %    Lymph % 5.0 (L) 18.0 - 48.0 %    Mono % 2.4 (L) 4.0 - 15.0 %    Eosinophil % 0.8 0.0 - 8.0 %    Basophil % 0.1 0.0 - 1.9 %    Differential Method Automated    Comprehensive metabolic panel    Collection Time: 10/23/20  5:30 PM   Result Value Ref Range    Sodium 136 136 - 145 mmol/L    Potassium 4.0 3.5 - 5.1 mmol/L    Chloride 102 95 - 110  mmol/L    CO2 19 (L) 23 - 29 mmol/L    Glucose 268 (H) 70 - 110 mg/dL    BUN 25 (H) 8 - 23 mg/dL    Creatinine 2.7 (H) 0.5 - 1.4 mg/dL    Calcium 8.8 8.7 - 10.5 mg/dL    Total Protein 6.7 6.0 - 8.4 g/dL    Albumin 3.4 (L) 3.5 - 5.2 g/dL    Total Bilirubin 1.3 (H) 0.1 - 1.0 mg/dL    Alkaline Phosphatase 69 55 - 135 U/L    AST 18 10 - 40 U/L    ALT 18 10 - 44 U/L    Anion Gap 15 8 - 16 mmol/L    eGFR if African American 25.1 (A) >60 mL/min/1.73 m^2    eGFR if non  21.8 (A) >60 mL/min/1.73 m^2   Type & Screen    Collection Time: 10/23/20  5:30 PM   Result Value Ref Range    Group & Rh O POS     Indirect Mejia NEG    Prepare RBC 2 Units; transfusion    Collection Time: 10/23/20  5:30 PM   Result Value Ref Range    UNIT NUMBER [id number]     Product Code [id number]     DISPENSE STATUS TRANSFUSED     CODING SYSTEM CKNB122     Unit Blood Type Code 5100     Unit Blood Type O POS     Unit Expiration [id number]     UNIT NUMBER [id number]     Product Code [id number]     DISPENSE STATUS TRANSFUSED     CODING SYSTEM WVLW429     Unit Blood Type Code 5100     Unit Blood Type O POS     Unit Expiration [id number]    COVID-19 Rapid Screening    Collection Time: 10/23/20  5:52 PM   Result Value Ref Range    SARS-CoV-2 RNA, Amplification, Qual Negative Negative   Blood culture    Collection Time: 10/23/20  7:30 PM    Specimen: Peripheral, Forearm, Right; Blood   Result Value Ref Range    Blood Culture, Routine       Gram stain aer bottle: Gram positive cocci in chains resembling Strep    Blood Culture, Routine       Gram stain lucius bottle: Gram positive cocci in chains resembling Strep    Blood Culture, Routine       Results called to and read back by: Terra Granger RN CardB     Blood Culture, Routine 10/24/2020  15:25 cea     Blood Culture, Routine ENTEROCOCCUS FAECALIS (A)        Susceptibility    Enterococcus faecalis - CULTURE, BLOOD     Ampicillin <=2 Sensitive mcg/mL     Gentamicin Synergy Screen <=500  Sensitive mcg/mL     Tetracycline >8 Resistant mcg/mL     Vancomycin 1 Sensitive mcg/mL   Blood culture    Collection Time: 10/23/20  7:32 PM    Specimen: Peripheral, Forearm, Left; Blood   Result Value Ref Range    Blood Culture, Routine       Gram stain aer bottle: Gram positive cocci in chains resembling Strep    Blood Culture, Routine       Gram stain lucius bottle: Gram positive cocci in chains resembling Strep    Blood Culture, Routine       Results called to and read back by:Terra Granger RN/Luzma      Blood Culture, Routine 10/24/2020  15:27 cea     Blood Culture, Routine (A)      ENTEROCOCCUS FAECALIS  For susceptibility see order #[id number]     Lactic acid, plasma    Collection Time: 10/23/20  7:32 PM   Result Value Ref Range    Lactate (Lactic Acid) 1.3 0.5 - 1.9 mmol/L   Procalcitonin    Collection Time: 10/23/20  7:32 PM   Result Value Ref Range    Procalcitonin 0.39 0.00 - 0.50 ng/mL   POCT glucose    Collection Time: 10/23/20 10:07 PM   Result Value Ref Range    POC Glucose 331 (H) 70 - 110   Phosphorus    Collection Time: 10/23/20 10:11 PM   Result Value Ref Range    Phosphorus 2.6 (L) 2.7 - 4.5 mg/dL   Urinalysis, Reflex to Urine Culture Urine, Clean Catch    Collection Time: 10/24/20  3:32 AM    Specimen: Urine   Result Value Ref Range    Specimen UA Urine, Clean Catch     Color, UA Colorless (A) Yellow, Straw, Ida    Appearance, UA Clear Clear    pH, UA 5.0 5.0 - 8.0    Specific Gravity, UA 1.010 1.005 - 1.030    Protein, UA Negative Negative    Glucose, UA 3+ (A) Negative    Ketones, UA Negative Negative    Bilirubin (UA) Negative Negative    Occult Blood UA Negative Negative    Nitrite, UA Negative Negative    Urobilinogen, UA Negative Negative EU/dL    Leukocytes, UA Negative Negative   Urinalysis Microscopic    Collection Time: 10/24/20  3:32 AM   Result Value Ref Range    RBC, UA 1 0 - 4 /hpf    WBC, UA 0 0 - 5 /hpf    Bacteria Negative None-Occ /hpf    Yeast, UA None None    Squam  Epithel, UA 0 /hpf    Hyaline Casts, UA 2 (A) 0-1/lpf /lpf    Microscopic Comment SEE COMMENT    Basic Metabolic Panel    Collection Time: 10/24/20  5:14 AM   Result Value Ref Range    Sodium 135 (L) 136 - 145 mmol/L    Potassium 4.1 3.5 - 5.1 mmol/L    Chloride 106 95 - 110 mmol/L    CO2 21 (L) 23 - 29 mmol/L    Glucose 247 (H) 70 - 110 mg/dL    BUN 28 (H) 8 - 23 mg/dL    Creatinine 2.7 (H) 0.5 - 1.4 mg/dL    Calcium 7.9 (L) 8.7 - 10.5 mg/dL    Anion Gap 8 8 - 16 mmol/L    eGFR if African American 25.1 (A) >60 mL/min/1.73 m^2    eGFR if non  21.8 (A) >60 mL/min/1.73 m^2   CBC auto differential    Collection Time: 10/24/20  5:14 AM   Result Value Ref Range    WBC 10.64 3.90 - 12.70 K/uL    RBC 2.53 (L) 4.60 - 6.20 M/uL    Hemoglobin 8.0 (L) 14.0 - 18.0 g/dL    Hematocrit 24.5 (L) 40.0 - 54.0 %    MCV 97 82 - 98 fL    MCH 31.6 (H) 27.0 - 31.0 pg    MCHC 32.7 32.0 - 36.0 g/dL    RDW 12.3 11.5 - 14.5 %    Platelets 153 150 - 350 K/uL    MPV 9.7 9.2 - 12.9 fL    Immature Granulocytes 0.5 0.0 - 0.5 %    Gran # (ANC) 9.2 (H) 1.8 - 7.7 K/uL    Immature Grans (Abs) 0.05 (H) 0.00 - 0.04 K/uL    Lymph # 0.5 (L) 1.0 - 4.8 K/uL    Mono # 0.8 0.3 - 1.0 K/uL    Eos # 0.1 0.0 - 0.5 K/uL    Baso # 0.01 0.00 - 0.20 K/uL    nRBC 0 0 /100 WBC    Gran % 86.5 (H) 38.0 - 73.0 %    Lymph % 4.9 (L) 18.0 - 48.0 %    Mono % 7.2 4.0 - 15.0 %    Eosinophil % 0.8 0.0 - 8.0 %    Basophil % 0.1 0.0 - 1.9 %    Differential Method Automated    Magnesium    Collection Time: 10/24/20  5:14 AM   Result Value Ref Range    Magnesium 1.5 (L) 1.6 - 2.6 mg/dL   POCT glucose    Collection Time: 10/24/20  5:47 AM   Result Value Ref Range    POC Glucose 255 (H) 70 - 110   POCT glucose    Collection Time: 10/24/20 11:24 AM   Result Value Ref Range    POC Glucose 211 (H) 70 - 110   POCT glucose    Collection Time: 10/24/20  3:47 PM   Result Value Ref Range    POC Glucose 227 (H) 70 - 110   POCT glucose    Collection Time: 10/24/20  8:30 PM    Result Value Ref Range    POC Glucose 245 (H) 70 - 110   Vancomycin, Random    Collection Time: 10/24/20  8:49 PM   Result Value Ref Range    Vancomycin, Random 11.9 Not established ug/mL   Basic Metabolic Panel    Collection Time: 10/25/20  4:26 AM   Result Value Ref Range    Sodium 136 136 - 145 mmol/L    Potassium 3.7 3.5 - 5.1 mmol/L    Chloride 108 95 - 110 mmol/L    CO2 22 (L) 23 - 29 mmol/L    Glucose 256 (H) 70 - 110 mg/dL    BUN 28 (H) 8 - 23 mg/dL    Creatinine 2.5 (H) 0.5 - 1.4 mg/dL    Calcium 8.3 (L) 8.7 - 10.5 mg/dL    Anion Gap 6 (L) 8 - 16 mmol/L    eGFR if African American 27.6 (A) >60 mL/min/1.73 m^2    eGFR if non  23.9 (A) >60 mL/min/1.73 m^2   CBC auto differential    Collection Time: 10/25/20  4:26 AM   Result Value Ref Range    WBC 6.54 3.90 - 12.70 K/uL    RBC 2.35 (L) 4.60 - 6.20 M/uL    Hemoglobin 7.4 (L) 14.0 - 18.0 g/dL    Hematocrit 22.5 (L) 40.0 - 54.0 %    MCV 96 82 - 98 fL    MCH 31.5 (H) 27.0 - 31.0 pg    MCHC 32.9 32.0 - 36.0 g/dL    RDW 12.4 11.5 - 14.5 %    Platelets 151 150 - 350 K/uL    MPV 10.0 9.2 - 12.9 fL    Immature Granulocytes 0.5 0.0 - 0.5 %    Gran # (ANC) 4.8 1.8 - 7.7 K/uL    Immature Grans (Abs) 0.03 0.00 - 0.04 K/uL    Lymph # 0.8 (L) 1.0 - 4.8 K/uL    Mono # 0.8 0.3 - 1.0 K/uL    Eos # 0.2 0.0 - 0.5 K/uL    Baso # 0.01 0.00 - 0.20 K/uL    nRBC 0 0 /100 WBC    Gran % 73.1 (H) 38.0 - 73.0 %    Lymph % 12.2 (L) 18.0 - 48.0 %    Mono % 11.6 4.0 - 15.0 %    Eosinophil % 2.4 0.0 - 8.0 %    Basophil % 0.2 0.0 - 1.9 %    Differential Method Automated    Magnesium    Collection Time: 10/25/20  4:26 AM   Result Value Ref Range    Magnesium 1.6 1.6 - 2.6 mg/dL   Iron and TIBC    Collection Time: 10/25/20  4:26 AM   Result Value Ref Range    Iron 13 (L) 45 - 160 ug/dL    Transferrin 122 (L) 200 - 375 mg/dL    TIBC 171 (L) 250 - 450 ug/dL    Saturated Iron 8 (L) 20 - 50 %   Lactate dehydrogenase    Collection Time: 10/25/20  4:26 AM   Result Value Ref Range     LD 99 (L) 110 - 260 U/L   Reticulocytes    Collection Time: 10/25/20  4:26 AM   Result Value Ref Range    Retic 1.7 0.4 - 2.0 %   Folate    Collection Time: 10/25/20  4:26 AM   Result Value Ref Range    Folate 8.7 4.0 - 24.0 ng/mL   Vitamin B12    Collection Time: 10/25/20  4:26 AM   Result Value Ref Range    Vitamin B-12 215 210 - 950 pg/mL   POCT glucose    Collection Time: 10/25/20  5:46 AM   Result Value Ref Range    POC Glucose 244 (H) 70 - 110   POCT glucose    Collection Time: 10/25/20 11:06 AM   Result Value Ref Range    POC Glucose 259 (H) 70 - 110   POCT glucose    Collection Time: 10/25/20  3:28 PM   Result Value Ref Range    POC Glucose 224 (H) 70 - 110   POCT glucose    Collection Time: 10/25/20  7:45 PM   Result Value Ref Range    POC Glucose 228 (H) 70 - 110   Blood culture    Collection Time: 10/25/20 10:02 PM    Specimen: Antecubital, Right; Blood   Result Value Ref Range    Blood Culture, Routine No growth after 5 days.    Vancomycin, random    Collection Time: 10/25/20 10:03 PM   Result Value Ref Range    Vancomycin, Random 18.9 Not established ug/mL   Basic Metabolic Panel    Collection Time: 10/26/20  5:13 AM   Result Value Ref Range    Sodium 140 136 - 145 mmol/L    Potassium 3.8 3.5 - 5.1 mmol/L    Chloride 105 95 - 110 mmol/L    CO2 24 23 - 29 mmol/L    Glucose 197 (H) 70 - 110 mg/dL    BUN 28 (H) 8 - 23 mg/dL    Creatinine 2.4 (H) 0.5 - 1.4 mg/dL    Calcium 8.7 8.7 - 10.5 mg/dL    Anion Gap 11 8 - 16 mmol/L    eGFR if African American 29.0 (A) >60 mL/min/1.73 m^2    eGFR if non  25.1 (A) >60 mL/min/1.73 m^2   CBC auto differential    Collection Time: 10/26/20  5:13 AM   Result Value Ref Range    WBC 5.66 3.90 - 12.70 K/uL    RBC 2.97 (L) 4.60 - 6.20 M/uL    Hemoglobin 9.1 (L) 14.0 - 18.0 g/dL    Hematocrit 27.1 (L) 40.0 - 54.0 %    MCV 91 82 - 98 fL    MCH 30.6 27.0 - 31.0 pg    MCHC 33.6 32.0 - 36.0 g/dL    RDW 12.8 11.5 - 14.5 %    Platelets 159 150 - 350 K/uL    MPV 10.0  9.2 - 12.9 fL    Immature Granulocytes 0.4 0.0 - 0.5 %    Gran # (ANC) 3.8 1.8 - 7.7 K/uL    Immature Grans (Abs) 0.02 0.00 - 0.04 K/uL    Lymph # 0.9 (L) 1.0 - 4.8 K/uL    Mono # 0.7 0.3 - 1.0 K/uL    Eos # 0.3 0.0 - 0.5 K/uL    Baso # 0.02 0.00 - 0.20 K/uL    nRBC 0 0 /100 WBC    Gran % 67.2 38.0 - 73.0 %    Lymph % 15.7 (L) 18.0 - 48.0 %    Mono % 11.5 4.0 - 15.0 %    Eosinophil % 4.8 0.0 - 8.0 %    Basophil % 0.4 0.0 - 1.9 %    Differential Method Automated    Magnesium    Collection Time: 10/26/20  5:13 AM   Result Value Ref Range    Magnesium 2.0 1.6 - 2.6 mg/dL   POCT glucose    Collection Time: 10/26/20  9:06 AM   Result Value Ref Range    POC Glucose 306 (H) 70 - 110   POCT glucose    Collection Time: 10/26/20 10:46 AM   Result Value Ref Range    POC Glucose 290 (H) 70 - 110   Comprehensive metabolic panel    Collection Time: 10/30/20 12:00 PM   Result Value Ref Range    Sodium 137 136 - 145 mmol/L    Potassium 4.5 3.5 - 5.1 mmol/L    Chloride 105 95 - 110 mmol/L    CO2 23 23 - 29 mmol/L    Glucose 233 (H) 70 - 110 mg/dL    BUN 26 (H) 8 - 23 mg/dL    Creatinine 2.3 (H) 0.5 - 1.4 mg/dL    Calcium 8.5 (L) 8.7 - 10.5 mg/dL    Total Protein 6.5 6.0 - 8.4 g/dL    Albumin 3.2 (L) 3.5 - 5.2 g/dL    Total Bilirubin 0.8 0.1 - 1.0 mg/dL    Alkaline Phosphatase 71 55 - 135 U/L    AST 19 10 - 40 U/L    ALT 20 10 - 44 U/L    Anion Gap 9 8 - 16 mmol/L    eGFR if African American 30.5 (A) >60 mL/min/1.73 m^2    eGFR if non  26.4 (A) >60 mL/min/1.73 m^2   C-reactive protein    Collection Time: 10/30/20 12:00 PM   Result Value Ref Range    CRP 0.99 (H) 0.00 - 0.75 mg/dL   CBC auto differential    Collection Time: 10/30/20 12:00 PM   Result Value Ref Range    WBC 6.96 3.90 - 12.70 K/uL    RBC 3.29 (L) 4.60 - 6.20 M/uL    Hemoglobin 10.1 (L) 14.0 - 18.0 g/dL    Hematocrit 30.7 (L) 40.0 - 54.0 %    MCV 93 82 - 98 fL    MCH 30.7 27.0 - 31.0 pg    MCHC 32.9 32.0 - 36.0 g/dL    RDW 12.4 11.5 - 14.5 %    Platelets  249 150 - 350 K/uL    MPV 10.7 9.2 - 12.9 fL    Immature Granulocytes 1.0 (H) 0.0 - 0.5 %    Gran # (ANC) 5.1 1.8 - 7.7 K/uL    Immature Grans (Abs) 0.07 (H) 0.00 - 0.04 K/uL    Lymph # 1.0 1.0 - 4.8 K/uL    Mono # 0.5 0.3 - 1.0 K/uL    Eos # 0.3 0.0 - 0.5 K/uL    Baso # 0.03 0.00 - 0.20 K/uL    nRBC 0 0 /100 WBC    Gran % 72.9 38.0 - 73.0 %    Lymph % 14.1 (L) 18.0 - 48.0 %    Mono % 7.3 4.0 - 15.0 %    Eosinophil % 4.3 0.0 - 8.0 %    Basophil % 0.4 0.0 - 1.9 %    Differential Method Automated    CBC W/ AUTO DIFFERENTIAL    Collection Time: 11/02/20  1:24 AM   Result Value Ref Range    WBC 7.10 3.90 - 12.70 K/uL    RBC 2.91 (L) 4.60 - 6.20 M/uL    Hemoglobin 8.7 (L) 14.0 - 18.0 g/dL    Hematocrit 26.8 (L) 40.0 - 54.0 %    MCV 92 82 - 98 fL    MCH 29.9 27.0 - 31.0 pg    MCHC 32.5 32.0 - 36.0 g/dL    RDW 12.7 11.5 - 14.5 %    Platelets 204 150 - 350 K/uL    MPV 10.0 9.2 - 12.9 fL    Immature Granulocytes 0.6 (H) 0.0 - 0.5 %    Gran # (ANC) 4.7 1.8 - 7.7 K/uL    Immature Grans (Abs) 0.04 0.00 - 0.04 K/uL    Lymph # 1.3 1.0 - 4.8 K/uL    Mono # 0.8 0.3 - 1.0 K/uL    Eos # 0.2 0.0 - 0.5 K/uL    Baso # 0.03 0.00 - 0.20 K/uL    nRBC 0 0 /100 WBC    Gran % 66.5 38.0 - 73.0 %    Lymph % 18.5 18.0 - 48.0 %    Mono % 10.6 4.0 - 15.0 %    Eosinophil % 3.4 0.0 - 8.0 %    Basophil % 0.4 0.0 - 1.9 %    Differential Method Automated    Comp. Metabolic Panel    Collection Time: 11/02/20  1:24 AM   Result Value Ref Range    Sodium 137 136 - 145 mmol/L    Potassium 4.6 3.5 - 5.1 mmol/L    Chloride 105 95 - 110 mmol/L    CO2 23 23 - 29 mmol/L    Glucose 207 (H) 70 - 110 mg/dL    BUN 35 (H) 8 - 23 mg/dL    Creatinine 2.2 (H) 0.5 - 1.4 mg/dL    Calcium 8.4 (L) 8.7 - 10.5 mg/dL    Total Protein 6.1 6.0 - 8.4 g/dL    Albumin 3.1 (L) 3.5 - 5.2 g/dL    Total Bilirubin 1.0 0.1 - 1.0 mg/dL    Alkaline Phosphatase 69 55 - 135 U/L    AST 15 10 - 40 U/L    ALT 15 10 - 44 U/L    Anion Gap 9 8 - 16 mmol/L    eGFR if African American 32.2 (A) >60  mL/min/1.73 m^2    eGFR if non  27.9 (A) >60 mL/min/1.73 m^2   Lipase    Collection Time: 11/02/20  1:24 AM   Result Value Ref Range    Lipase 45 4 - 60 U/L   Urinalysis, Reflex to Urine Culture Urine, Clean Catch    Collection Time: 11/02/20  1:24 AM    Specimen: Urine, Clean Catch   Result Value Ref Range    Specimen UA Urine, Clean Catch     Color, UA Red (A) Yellow, Straw, Ida    Appearance, UA Cloudy (A) Clear    pH, UA SEE COMMENT 5.0 - 8.0    Specific Gravity, UA SEE COMMENT 1.005 - 1.030    Protein, UA SEE COMMENT Negative    Glucose, UA SEE COMMENT Negative    Ketones, UA SEE COMMENT Negative    Bilirubin (UA) SEE COMMENT Negative    Occult Blood UA 3+ (A) Negative    Nitrite, UA SEE COMMENT Negative    Urobilinogen, UA SEE COMMENT Negative EU/dL    Leukocytes, UA SEE COMMENT Negative   Protime-INR    Collection Time: 11/02/20  1:24 AM   Result Value Ref Range    PT 14.8 10.6 - 14.8 sec    INR 1.2    Urinalysis Microscopic    Collection Time: 11/02/20  1:24 AM   Result Value Ref Range    RBC, UA >100 (H) 0 - 4 /hpf    WBC, UA >100 (H) 0 - 5 /hpf    Bacteria Many (A) None-Occ /hpf    Hyaline Casts, UA 0 0-1/lpf /lpf    Microscopic Comment SEE COMMENT    Urine culture    Collection Time: 11/02/20  1:24 AM    Specimen: Urine   Result Value Ref Range    Urine Culture, Routine No growth to date    COVID-19 Rapid Screening    Collection Time: 11/02/20  4:37 AM   Result Value Ref Range    SARS-CoV-2 RNA, Amplification, Qual Negative Negative   Troponin I    Collection Time: 11/02/20  5:04 AM   Result Value Ref Range    Troponin I <0.030 <=0.040 ng/mL   Troponin I    Collection Time: 11/02/20 11:33 AM   Result Value Ref Range    Troponin I <0.030 <=0.040 ng/mL   POCT glucose    Collection Time: 11/02/20  4:27 PM   Result Value Ref Range    POC Glucose 226 (H) 70 - 110   POCT glucose    Collection Time: 11/02/20  8:28 PM   Result Value Ref Range    POC Glucose 149 (H) 70 - 110   CBC auto  differential    Collection Time: 11/03/20  5:27 AM   Result Value Ref Range    WBC 6.74 3.90 - 12.70 K/uL    RBC 3.07 (L) 4.60 - 6.20 M/uL    Hemoglobin 9.3 (L) 14.0 - 18.0 g/dL    Hematocrit 28.5 (L) 40.0 - 54.0 %    MCV 93 82 - 98 fL    MCH 30.3 27.0 - 31.0 pg    MCHC 32.6 32.0 - 36.0 g/dL    RDW 12.7 11.5 - 14.5 %    Platelets 217 150 - 350 K/uL    MPV 10.1 9.2 - 12.9 fL    Immature Granulocytes 0.4 0.0 - 0.5 %    Gran # (ANC) 4.4 1.8 - 7.7 K/uL    Immature Grans (Abs) 0.03 0.00 - 0.04 K/uL    Lymph # 1.3 1.0 - 4.8 K/uL    Mono # 0.7 0.3 - 1.0 K/uL    Eos # 0.2 0.0 - 0.5 K/uL    Baso # 0.03 0.00 - 0.20 K/uL    nRBC 0 0 /100 WBC    Gran % 66.0 38.0 - 73.0 %    Lymph % 19.9 18.0 - 48.0 %    Mono % 9.9 4.0 - 15.0 %    Eosinophil % 3.4 0.0 - 8.0 %    Basophil % 0.4 0.0 - 1.9 %    Differential Method Automated    POCT glucose    Collection Time: 11/03/20  7:29 AM   Result Value Ref Range    POC Glucose 115 (H) 70 - 110       IMAGING:    Reviewed      Assessment/Diagnosis:    1. Right ureteral stricture  2. Right nephrostomy tube malfunction  3. Gross hematuria  4. History of right renal cell carcinoma s/p partial nephrectomy    Plans:    - Extensive discussion with both patient and family regarding management of his right ureteral stricture s/p right partial nephrectomy for RCC. No indication for any urologic intervention this admission given that his hematuria has resolved. He is s/p recent Mag 3 renal lasix scan at [facility]. Patient was establishing care with Dr. Kennedy and was scheduled to discuss ureteroscope to characterize the stricture next week. He states he would prefer to establish urologic care with [facility]. Will obtain medical records from [facility] and discuss further outpatient management with patient.   - Patient already scheduled for an outpatient appointment with me. Urological records requested from [facility] today.

## 2020-11-03 NOTE — TELEPHONE ENCOUNTER
Just confirming that tomorrow is his end of care for his IV antibiotics.    KARLA Carreno

## 2020-11-03 NOTE — PLAN OF CARE
11/03/20 1250   Final Note   Assessment Type Final Discharge Note   Anticipated Discharge Disposition Home-Health   Pt will resume care with  [facility].

## 2020-11-03 NOTE — HPI
Admission note     75-year-old  male presents to the emergency room with complaints of bleeding from a right renal nephroscopy tube.  The patient has a known history of renal cell carcinoma he is status post nephrectomy he presents to emergency room complaints bleeding from his nephroscopy 2.  The patient states he has been bleeding for 2 days but over the past 24 hours the bleeding got progressively worse he described as initially bright red in color but currently dark red in color.  He denies fever, chills, cough, hematemesis hemoptysis lightheadedness dizziness or syncope.  The patient did state that 2 days ago the tubing got caught on the draw handle and was pulled slightly he believes this may have dislodged tube causing the bleeding.  He denies being on blood thinners.  The patient states when he urinates his harsh blood.  In the emergency room the ER physician did speak with the urologist.  This patient will be admitted for observation of his H&H since he had a significant drop over the past of days and his blood count

## 2020-11-03 NOTE — DISCHARGE SUMMARY
[facility]  Discharge Summary      Patient Name: Jamil Cadet  MRN: [id number]  Admission Date: 11/1/2020  Hospital Length of Stay: 0 days  Discharge Date and Time:  11/03/2020 4:37 PM  Attending Physician: No att. providers found   Discharging Provider: Marcos Telles MD  Primary Care Provider: Richard Gunter MD      HPI:   Admission note     75-year-old  male presents to the emergency room with complaints of bleeding from a right renal nephroscopy tube.  The patient has a known history of renal cell carcinoma he is status post nephrectomy he presents to emergency room complaints bleeding from his nephroscopy 2.  The patient states he has been bleeding for 2 days but over the past 24 hours the bleeding got progressively worse he described as initially bright red in color but currently dark red in color.  He denies fever, chills, cough, hematemesis hemoptysis lightheadedness dizziness or syncope.  The patient did state that 2 days ago the tubing got caught on the draw handle and was pulled slightly he believes this may have dislodged tube causing the bleeding.  He denies being on blood thinners.  The patient states when he urinates his harsh blood.  In the emergency room the ER physician did speak with the urologist.  This patient will be admitted for observation of his H&H since he had a significant drop over the past of days and his blood count    * No surgery found *      Hospital Course:   Patient was admitted with acute anemia and blood loss from nephrostomy tube and the tube might have been dislodged .     hemoglobin was trended and it is stable. urology reviewed and to acute intervention recommended.  The patient was discharged in stable condition to follow up outpatient.  He is to continue his insulin infusion for recent bacteremia  as  OP.  His urine is clear at [facility] .       Consults:   Consults (From admission, onward)        Status Ordering Provider     Inpatient  consult to Hospitalist  Once     Provider:  Minda Colvin NP    Acknowledged NATALIA CABRAL     Inpatient consult to   Once     Provider:  (Not yet assigned)    MONAE Swanson     Inpatient consult to Urology  Once     Provider:  Robbie Freedman MD    Acknowledged NATALIA CABRAL     Inpatient consult to Urology  Once     Provider:  Radha Lau MD    Acknowledged MINDA COLVIN          No new Assessment & Plan notes have been filed under this hospital service since the last note was generated.  Service: Hospital Medicine    Final Active Diagnoses:    Diagnosis Date Noted POA    PRINCIPAL PROBLEM:  Acute blood loss anemia [D62] 11/02/2020 Yes    Bacteremia due to Enterococcus [R78.81, B95.2] 10/25/2020 Yes    Complication of nephrostomy [N99.528] 10/23/2020 Yes    Hematuria [R31.9] 09/13/2020 Yes    Essential hypertension [I10] 09/07/2020 Yes     Chronic    History of renal cell carcinoma status post partial nephrectomy [C64.1] 09/07/2020 Yes     Chronic      Problems Resolved During this Admission:       Discharged Condition: good    Disposition: Home or Self Care    Follow Up:   Contact information for follow-up providers     Richard Gunter MD In 2 weeks.    Specialty: Internal Medicine  Contact information:  [address] [place] [address]  [phone]             with own urologist as OP.                 Contact information for after-discharge care     Home Medical Care     [facility].    Service: Home Health Services  Contact information:  [address] [place] [id number] [phone]                           Patient Instructions:      Diet renal     Notify your health care provider if you experience any of the following:  severe uncontrolled pain     Activity as tolerated       Significant Diagnostic Studies: Labs:   CMP   Recent Labs   Lab 11/02/20  0124      K 4.6      CO2 23   *   BUN 35*    CREATININE 2.2*   CALCIUM 8.4*   PROT 6.1   ALBUMIN 3.1*   BILITOT 1.0   ALKPHOS 69   AST 15   ALT 15   ANIONGAP 9   ESTGFRAFRICA 32.2*   EGFRNONAA 27.9*    and CBC   Recent Labs   Lab 11/02/20  0124 11/03/20  0527   WBC 7.10 6.74   HGB 8.7* 9.3*   HCT 26.8* 28.5*    217       Pending Diagnostic Studies:     None         Medications:  Reconciled Home Medications:      Medication List      CHANGE how you take these medications    clonazePAM 1 MG tablet  Commonly known as: KLONOPIN  Take 1 tablet (1 mg total) by mouth every evening.  What changed:   · when to take this  · reasons to take this     traZODone 50 MG tablet  Commonly known as: DESYREL  Start with 50 mg at bedtime for 1 week and then from 2nd week increase to 100 mg or 2 tablets at bedtime.  Tapering protocol on clonazepam.  What changed:   · how much to take  · how to take this  · when to take this  · reasons to take this        CONTINUE taking these medications    amLODIPine 10 MG tablet  Commonly known as: NORVASC  Take 10 mg by mouth once daily.     AMPICILLIN 2 G IN  ML EXTENDED INFUSION  Inject 300 mLs (6 g total) into the vein continuous. for 10 days     glimepiride 4 MG tablet  Commonly known as: AMARYL  Take 4 mg by mouth before breakfast.     hydrALAZINE 25 MG tablet  Commonly known as: APRESOLINE  Take 25 mg by mouth 3 (three) times daily.     insulin aspart U-100 100 unit/mL (3 mL) Inpn pen  Commonly known as: NovoLOG Flexpen U-100 Insulin  Inject 10 Units into the skin 3 (three) times daily with meals. Check glucose before meals and then take insulin as per sliding scale     rosuvastatin 20 MG tablet  Commonly known as: CRESTOR  Take 20 mg by mouth every evening.     SHINGRIX (PF) 50 mcg/0.5 mL injection  Generic drug: varicella-zoster gE-AS01B (PF)  Inject 0.5 mLs into the muscle once.     SITagliptan-metformin 50-1,000 mg per tablet  Commonly known as: JANUMET  Take 1 tablet by mouth 2 (two) times daily with meals.     TRESIBA  FLEXTOUCH U-100 100 unit/mL (3 mL) Inpn  Generic drug: insulin degludec  Inject 15 Units into the skin every evening.     TRUE METRIX GLUCOSE TEST STRIP Strp  Generic drug: blood sugar diagnostic            Indwelling Lines/Drains at time of discharge:   Lines/Drains/Airways     Drain                 Nephrostomy 09/11/20 0800 Right 53 days                Time spent on the discharge of patient: 23 minutes  Patient was seen and examined on the date of discharge and determined to be suitable for discharge.         Marcos Telles MD  Department of Hospital Medicine  [facility]

## 2020-11-03 NOTE — PROGRESS NOTES
Sounds like neph tube was dislodged, he bled acutely and it has now stopped (urine dark red now). Pt is known to  and had outpt plans for him.  I have discussed with him and he will review the notes and make a plan for pt tomorrow.   No urgent need for urologic intervention needed today  Nurse notified to let pt know.

## 2020-11-04 LAB — BACTERIA UR CULT: NO GROWTH

## 2020-11-04 RX ORDER — DEXTROSE 4 G
1 TABLET,CHEWABLE ORAL DAILY
Qty: 1 EACH | Refills: 0 | Status: SHIPPED | OUTPATIENT
Start: 2020-11-04 | End: 2023-01-10

## 2020-11-05 ENCOUNTER — OFFICE VISIT (OUTPATIENT)
Dept: FAMILY MEDICINE | Facility: CLINIC | Age: 75
End: 2020-11-05
Payer: MEDICARE

## 2020-11-05 ENCOUNTER — OFFICE VISIT (OUTPATIENT)
Dept: INFECTIOUS DISEASES | Facility: CLINIC | Age: 75
End: 2020-11-05
Payer: MEDICARE

## 2020-11-05 VITALS
OXYGEN SATURATION: 100 % | WEIGHT: 176.81 LBS | SYSTOLIC BLOOD PRESSURE: 148 MMHG | BODY MASS INDEX: 24.75 KG/M2 | DIASTOLIC BLOOD PRESSURE: 66 MMHG | TEMPERATURE: 98 F | HEIGHT: 71 IN | HEART RATE: 78 BPM

## 2020-11-05 VITALS
SYSTOLIC BLOOD PRESSURE: 132 MMHG | DIASTOLIC BLOOD PRESSURE: 69 MMHG | WEIGHT: 175 LBS | TEMPERATURE: 98 F | OXYGEN SATURATION: 98 % | HEIGHT: 71 IN | HEART RATE: 77 BPM | BODY MASS INDEX: 24.5 KG/M2

## 2020-11-05 DIAGNOSIS — F51.04 PSYCHOPHYSIOLOGICAL INSOMNIA: ICD-10-CM

## 2020-11-05 DIAGNOSIS — R78.81 BACTEREMIA DUE TO ENTEROCOCCUS: Primary | ICD-10-CM

## 2020-11-05 DIAGNOSIS — N12 PYELONEPHRITIS OF RIGHT KIDNEY: ICD-10-CM

## 2020-11-05 DIAGNOSIS — C64.1 RENAL CELL ADENOCARCINOMA, RIGHT: ICD-10-CM

## 2020-11-05 DIAGNOSIS — R31.0 HEMATURIA, GROSS: ICD-10-CM

## 2020-11-05 DIAGNOSIS — N99.528 COMPLICATION OF UROSTOMY: ICD-10-CM

## 2020-11-05 DIAGNOSIS — E11.649 TYPE 2 DIABETES MELLITUS WITH HYPOGLYCEMIA WITHOUT COMA, WITH LONG-TERM CURRENT USE OF INSULIN: Primary | Chronic | ICD-10-CM

## 2020-11-05 DIAGNOSIS — A49.8 ENTEROCOCCUS FAECALIS INFECTION: ICD-10-CM

## 2020-11-05 DIAGNOSIS — N99.528 NEPHROSTOMY COMPLICATION: ICD-10-CM

## 2020-11-05 DIAGNOSIS — Z79.4 TYPE 2 DIABETES MELLITUS WITH HYPOGLYCEMIA WITHOUT COMA, WITH LONG-TERM CURRENT USE OF INSULIN: Primary | Chronic | ICD-10-CM

## 2020-11-05 DIAGNOSIS — Z79.899 ENCOUNTER FOR LONG-TERM (CURRENT) USE OF MEDICATIONS: ICD-10-CM

## 2020-11-05 DIAGNOSIS — N28.9 DISORDER OF KIDNEY AND URETER: ICD-10-CM

## 2020-11-05 DIAGNOSIS — B95.2 BACTEREMIA DUE TO ENTEROCOCCUS: Primary | ICD-10-CM

## 2020-11-05 DIAGNOSIS — I10 ESSENTIAL HYPERTENSION: ICD-10-CM

## 2020-11-05 PROCEDURE — 99214 PR OFFICE/OUTPT VISIT, EST, LEVL IV, 30-39 MIN: ICD-10-PCS | Mod: S$GLB,,, | Performed by: INTERNAL MEDICINE

## 2020-11-05 PROCEDURE — 99215 OFFICE O/P EST HI 40 MIN: CPT | Performed by: INTERNAL MEDICINE

## 2020-11-05 PROCEDURE — 99214 OFFICE O/P EST MOD 30 MIN: CPT | Mod: S$PBB,,, | Performed by: INTERNAL MEDICINE

## 2020-11-05 PROCEDURE — 99214 OFFICE O/P EST MOD 30 MIN: CPT | Mod: S$GLB,,, | Performed by: INTERNAL MEDICINE

## 2020-11-05 PROCEDURE — 99214 PR OFFICE/OUTPT VISIT, EST, LEVL IV, 30-39 MIN: ICD-10-PCS | Mod: S$PBB,,, | Performed by: INTERNAL MEDICINE

## 2020-11-05 RX ORDER — MUPIROCIN 20 MG/G
OINTMENT TOPICAL 3 TIMES DAILY
Qty: 30 G | Refills: 1 | Status: SHIPPED | OUTPATIENT
Start: 2020-11-05 | End: 2021-09-16

## 2020-11-05 RX ORDER — MUPIROCIN 20 MG/G
OINTMENT TOPICAL 3 TIMES DAILY
COMMUNITY
End: 2020-11-05 | Stop reason: SDUPTHER

## 2020-11-05 NOTE — PROGRESS NOTES
Subjective:       Patient ID: Jamil Cadet is a 75 y.o. male.    Chief Complaint:: Hospital Follow Up    HPI seen at [facility]:   75 y.o. male who presented to the emergency room on 10/16 with complaints of right nephrostomy tube output decreased for the previous 12 hours.  He has a history of a partial nephrectomy and required a nephrostomy tube for persistent hydronephrosis([facility] urology).  On that visit he was afebrile, had a creatinine of 2.8, a blood glucose of 328 and the nephrostomy tube was flushed with resumption of patency. He was on antibiotics at the time(given by family doctor on the [facility], po levaquin for abnormal voided urine), and was released.He saw Dr. Gunter on 10/22 and went to the ED later that day, with bleeding from nephrostomy site. Hgb wsa 8.4 and no bleeding for noted by ED provider. CT scan did suggest blood in the renal pelvis on the right. He was released. He returned to the hospital on 10/23 with continued bleeding/gross hematuria with clots, and was admitted. CT repeated and unchanged. He had no chills or fever(99.3 upt to 103 degrees  In ED),   blood cultures were obtained and he was started on vancomycin and zosyn.   The blood cultures have grown Enterococcus, prompting this consult. No urine culture was obtained.  He was also seen by urology who recommends no intervention at this time  Nephrostomy placed 9/11, changed at [facility] a few days before the ED visit for obstruction. The tubing was exchanged here on admit.    11/5/20 office follow up. He has completed 13 days of IV antibiotics since his positive blood culture with Enterococcus. He is looking forward to repair of the ureter to treat the stricture, so that he can get the nephrostomy tube out(at [facility]  He feels good, has had no side effects from antibiotic treatment.  He saw his primary care physician earlier today.    Review of patient's allergies indicates:  No Known Allergies  Past Medical History:   Diagnosis Date    Cancer      Diabetes mellitus, type 2     Disorder of kidney and ureter     History of renal cell carcinoma status post partial nephrectomy 9/7/2020    Hydronephrosis 9/9/2020    Hyperlipidemia     Polio      Past Surgical History:   Procedure Laterality Date    APPENDECTOMY      CYSTOSCOPY W/ RETROGRADES Right 9/9/2020    Procedure: CYSTOSCOPY, WITH RETROGRADE PYELOGRAM;  Surgeon: Chris Garcia Jr., MD;  Location: [facility];  Service: Urology;  Laterality: Right;    SPINE SURGERY      URETHROSCOPY  9/9/2020    Procedure: URETHROSCOPY;  Surgeon: Chris Garcia Jr., MD;  Location: [facility];  Service: Urology;;     Social History     Tobacco Use    Smoking status: Never Smoker    Smokeless tobacco: Never Used   Substance Use Topics    Alcohol use: No     Frequency: Never     Social History     Occupational History    Not on file     Family History   Problem Relation Age of Onset    Heart disease Mother     Heart disease Father     Stroke Father     Heart disease Maternal Grandfather     Heart disease Paternal Grandmother     Heart disease Paternal Grandfather          Review of Systems    Constitutional: No fever, chills, sweats, fatigue, weakness, weight loss    Eyes: No change in vision,     ENT: No sore throat, mouth pains, or lesions    Cardiovascular: No chest pain, LAGUNAS, or pedal edema    Respiratory: No shortness of breath, LAGUNAS, cough,     Gastrointestinal: No abdominal pain, nausea, vomiting, diarrhea,      Genitourinary: No dysuria, hematuria, strangury, hematuria the right kidney is    Musculoskeletal: No new pain, joint swelling, or injuries    Integumentary: No new rashes, skin lesions, or wounds    Neurological: No dizziness, vertigo, unusual headaches, neuropathy, loss of vision, falls    Psychiatric: No anxiety, depression    Endocrine:      Lymphatic: No lymphadenopathy, blood loss, anemia,     VAD:     Objective:      Blood pressure (!) 148/66, pulse 78, temperature 98 °F (36.7 °C),  "temperature source Temporal, height 5' 11" (1.803 m), weight 80.2 kg (176 lb 12.8 oz), SpO2 100 %. Body mass index is 24.66 kg/m².  Physical Exam      General: Alert and attentive, cooperative and in no distress    Eyes: Pupils equal, round, reactive to light, anicteric, EOMI    Neck: Supple, non-tender, no thyromegaly or masses    ENT: EAC patent, TM normal, nares patent, no oral lesions, teeth in good condition, no thrush    Cardiovascular: Regular rate and rhythm, no murmurs, rubs, or gallop    Respiratory: Lungs clear without wheezes, rales, rubs or rhonci    Gastrointestinal:  Soft, bowel sounds normal,   no tenderness or distention    Genitourinary:   no flank tenderness.  Right nephrostomy tube has clear yellow urine    Integumentary: Skin without rashes, lesions, or wounds   Vascular: No peripheral edema or phlebitis, warm and well perfused    Musculoskeletal: Ambulates without difficulty, no acute arthritis, synovitis or myositis. Normal muscle bulk and strength    Lymphatic:      Neurological: Normal LOC, cranial nerves, speech  gait    Psychiatric: Normal mood, speech,  demeanor     Wound:    VAD:  No redness, tenderness, drainage at midline site.  Midline removed, tip intact and pressure dressingapplied       Recent Diagnostics:          Assessment and Plan:           Bacteremia due to Enterococcus    Pyelonephritis of right kidney    Nephrostomy complication    Encounter for long-term (current) use of medications    Disorder of kidney and ureter      Midline removed, pressure bandage, until supper time    If you get fever, chills, increased cloudiness or blood in the urine, contact me and Dr. Garcia    This note was created using Dragon voice recognition software that occasionally misinterpreted phrases or words.    "

## 2020-11-05 NOTE — PROGRESS NOTES
Subjective:       Patient ID: Jamil Cadet is a 75 y.o. male.    Chief Complaint: Hospital Follow Up ([facility] -renal bleeding), Diabetes, Hypertension, and Hyperlipidemia    This is a 2nd visit for patient Mr. Donny RAMIREZ who is a 75-year-old  male.  Recently following an office visit he went to the emergency room because he had noticed some pain and discomfort and bleeding from his urostomy tube.  It was probably transiently dislodged leading to bleeding.  He was observed with anemia.  He was also started on antibiotics.  He had hematuria.    Please see the hospital summary copied ad verbatim here for easy referrals.    As per the patient's statements he yanked his tube out inadvertently on a drawer.       11/3/20: Patient presented again to the [facility] ED yesterday complaining of recurrent gross hematuria after pulling his right nephrostomy tube inadvertently. Admitted for observation. Urine now clear ine nephrostomy tube with no hematuria.        Patient Name: Jamil Cadet  MRN: [id number]  Admission Date: 11/1/2020  Hospital Length of Stay: 0 days  Discharge Date and Time:  11/03/2020 4:37 PM  Attending Physician: No att. providers found   Discharging Provider: Marcos Telles MD  Primary Care Provider: Richard Gunter MD        HPI:   Admission note      75-year-old  male presents to the emergency room with complaints of bleeding from a right renal nephroscopy tube.  The patient has a known history of renal cell carcinoma he is status post nephrectomy he presents to emergency room complaints bleeding from his nephroscopy 2.  The patient states he has been bleeding for 2 days but over the past 24 hours the bleeding got progressively worse he described as initially bright red in color but currently dark red in color.  He denies fever, chills, cough, hematemesis hemoptysis lightheadedness dizziness or syncope.  The patient did state that 2 days ago the tubing got caught on the draw handle and was pulled  slightly he believes this may have dislodged tube causing the bleeding.  He denies being on blood thinners.  The patient states when he urinates his harsh blood.  In the emergency room the ER physician did speak with the urologist.  This patient will be admitted for observation of his H&H since he had a significant drop over the past of days and his blood count     * No surgery found *       Hospital Course:   Patient was admitted with acute anemia and blood loss from nephrostomy tube and the tube might have been dislodged .     hemoglobin was trended and it is stable. urology reviewed and to acute intervention recommended.  The patient was discharged in stable condition to follow up outpatient.  He is to continue his insulin infusion for recent bacteremia  as  OP.  His urine is clear at [place] .       Diabetes  He presents for his follow-up diabetic visit. He has type 2 diabetes mellitus. His disease course has been stable. Symptoms are stable. Risk factors for coronary artery disease include hypertension, dyslipidemia, sedentary lifestyle and male sex. Current diabetic treatment includes insulin injections. He is compliant with treatment most of the time.   Hypertension  This is a chronic problem. The current episode started more than 1 year ago. The problem is controlled. Associated symptoms include malaise/fatigue. Past treatments include calcium channel blockers. The current treatment provides moderate improvement. Compliance problems include psychosocial issues.    Hyperlipidemia  This is a chronic problem. The current episode started more than 1 year ago. The problem is controlled. Current antihyperlipidemic treatment includes statins. The current treatment provides moderate improvement of lipids. Risk factors for coronary artery disease include hypertension, male sex, a sedentary lifestyle and dyslipidemia.       Past Medical History:   Diagnosis Date    Cancer     Diabetes mellitus, type 2     Disorder of  kidney and ureter     History of renal cell carcinoma status post partial nephrectomy 9/7/2020    Hydronephrosis 9/9/2020    Hyperlipidemia     Polio      Social History     Socioeconomic History    Marital status:      Spouse name: Not on file    Number of children: Not on file    Years of education: Not on file    Highest education level: Not on file   Occupational History    Not on file   Social Needs    Financial resource strain: Not very hard    Food insecurity     Worry: Never true     Inability: Never true    Transportation needs     Medical: No     Non-medical: No   Tobacco Use    Smoking status: Never Smoker    Smokeless tobacco: Never Used   Substance and Sexual Activity    Alcohol use: No     Frequency: Never    Drug use: No    Sexual activity: Yes     Partners: Female   Lifestyle    Physical activity     Days per week: 6 days     Minutes per session: 150+ min    Stress: To some extent   Relationships    Social connections     Talks on phone: Three times a week     Gets together: Once a week     Attends Restorationism service: Not on file     Active member of club or organization: Yes     Attends meetings of clubs or organizations: More than 4 times per year     Relationship status:    Other Topics Concern    Not on file   Social History Narrative    Not on file     Past Surgical History:   Procedure Laterality Date    APPENDECTOMY      CYSTOSCOPY W/ RETROGRADES Right 9/9/2020    Procedure: CYSTOSCOPY, WITH RETROGRADE PYELOGRAM;  Surgeon: Chris Garcia Jr., MD;  Location: [facility] OR;  Service: Urology;  Laterality: Right;    SPINE SURGERY      URETHROSCOPY  9/9/2020    Procedure: URETHROSCOPY;  Surgeon: Chris Garcia Jr., MD;  Location: [facility] OR;  Service: Urology;;     Family History   Problem Relation Age of Onset    Heart disease Mother     Heart disease Father     Stroke Father     Heart disease Maternal Grandfather     Heart disease Paternal Grandmother      "Heart disease Paternal Grandfather        Review of Systems   Constitutional: Positive for malaise/fatigue.         Objective:      Blood pressure 132/69, pulse 77, temperature 98.1 °F (36.7 °C), temperature source Temporal, height 5' 11" (1.803 m), weight 79.4 kg (175 lb), SpO2 98 %. Body mass index is 24.41 kg/m².  Physical Exam  Vitals signs and nursing note reviewed.   Constitutional:       Appearance: He is well-developed.   HENT:      Head: Normocephalic and atraumatic.   Eyes:      Conjunctiva/sclera: Conjunctivae normal.   Neck:      Musculoskeletal: Normal range of motion and neck supple.      Thyroid: No thyromegaly.      Vascular: No JVD.      Trachea: No tracheal deviation.   Cardiovascular:      Rate and Rhythm: Normal rate and regular rhythm.      Heart sounds: Normal heart sounds. No murmur. No friction rub. No gallop.    Pulmonary:      Effort: Pulmonary effort is normal. No respiratory distress.      Breath sounds: Normal breath sounds. No wheezing or rales.   Abdominal:      General: Bowel sounds are normal. There is no distension.      Palpations: Abdomen is soft.      Tenderness: There is no abdominal tenderness.       Musculoskeletal: Normal range of motion.        Back:       Right lower leg: No edema.      Left lower leg: No edema.      Comments: History of knee problems.   Feet:      Right foot:      Protective Sensation: 3 sites tested. 3 sites sensed.      Skin integrity: No ulcer, blister, skin breakdown, erythema or warmth.      Left foot:      Protective Sensation: 3 sites tested. 3 sites sensed.      Skin integrity: No ulcer, blister, skin breakdown, erythema or warmth.      Comments: High arch in the foot.?  Effects of poliomyelitis.  Slight claw toes.  Slightly dystrophic nails on the left side.  Skin:     General: Skin is warm and dry.   Neurological:      Mental Status: He is alert and oriented to person, place, and time.      Deep Tendon Reflexes: Reflexes are normal and symmetric. "   Psychiatric:         Attention and Perception: He is attentive.         Mood and Affect: Affect is not labile.      Comments: Patient is fairly attentive and cognizant of my discussions.  Appropriate answers.           Assessment:       1. Type 2 diabetes mellitus with hypoglycemia without coma, with long-term current use of insulin    2. Essential hypertension    3. Psychophysiological insomnia    4. History of renal cell carcinoma status post partial nephrectomy    5. Hematuria, gross    6. Complication of urostomy    7. Enterococcus faecalis infection         Component      Latest Ref Rng & Units 11/2/2020   Sodium      136 - 145 mmol/L 137   Potassium      3.5 - 5.1 mmol/L 4.6   Chloride      95 - 110 mmol/L 105   CO2      23 - 29 mmol/L 23   Glucose      70 - 110 mg/dL 207 (H)   BUN      8 - 23 mg/dL 35 (H)   Creatinine      0.5 - 1.4 mg/dL 2.2 (H)   Calcium      8.7 - 10.5 mg/dL 8.4 (L)   PROTEIN TOTAL      6.0 - 8.4 g/dL 6.1   Albumin      3.5 - 5.2 g/dL 3.1 (L)   BILIRUBIN TOTAL      0.1 - 1.0 mg/dL 1.0   Alkaline Phosphatase      55 - 135 U/L 69   AST      10 - 40 U/L 15   ALT      10 - 44 U/L 15   Anion Gap      8 - 16 mmol/L 9   eGFR if African American      >60 mL/min/1.73 m:2 32.2 (A)   eGFR if non African American      >60 mL/min/1.73 m:2 27.9 (A)           Plan:           Type 2 diabetes mellitus with hypoglycemia without coma, with long-term current use of insulin  Comments:  Patient to start with Tresiba 15 mg from tonight.  Goal is to bring his lowest blood sugars to around 120 to 130.  Hopefully this will reduce his risk for infec    Essential hypertension    Psychophysiological insomnia  Comments:  Patient was prescribed trazodone last time but he did not take it. Probably some insomnia might not be an issue at this point.He can try OTC melatonin if needed    History of renal cell carcinoma status post partial nephrectomy    Hematuria, gross  Comments:  Probably the urostomy tube temporarily  got dislodged leading to hematuria.  He did not get any blood transfusion.    Complication of urostomy  Comments:  Probably temporary dislodgement of the urostomy tube.  Lead to hematuria.    Enterococcus faecalis infection  Comments:  This grew on the blood cultures on 10/23 20.  Treated with antibiotics.  Blood cultures were negative on 10/25/2020     Patient's recent hospitalization for potential complication of urostomy with hematuria has been noted.  He had underlying infection also.  As far as diabetes is concerned, I have reviewed his blood sugars after his hospital discharge.  David Harkins is currently on 10 units of injection Tresiba and from tonight he will increase to 15 units.    I have reviewed his hospital stay especially the Urology intervention and the bottom line is that probably he yanked out his urostomy tube inadvertently.  Patient is updated on the influenza vaccination for this season.      Abx to be finsihed    Following in [facility] system    He was also prescribed trazodone for insomnia but he did not take it.  He states he feels better by himself.  He feels optimistic that things will get better at this point    Follow-up in a couple of weeks to review his blood sugars and blood pressures are chronic medical issues.    Spent ezra 25 minutes with patient which involved review of pts medical conditions, labs, medications and with 50% of time face-to-face discussion about medical problems, management and any applicable changes.    .  Current Outpatient Medications:     amLODIPine (NORVASC) 10 MG tablet, Take 10 mg by mouth daily as needed. , Disp: , Rfl:     ampicillin sodium (AMPICILLIN 2 G IN  ML EXTENDED INFUSION), Inject 300 mLs (6 g total) into the vein continuous. for 10 days, Disp: 3000 mL, Rfl: 0    hydrALAZINE (APRESOLINE) 25 MG tablet, Take 25 mg by mouth 3 (three) times daily., Disp: , Rfl:     insulin aspart U-100 (NOVOLOG FLEXPEN U-100 INSULIN) 100 unit/mL (3 mL) InPn pen,  Inject 10 Units into the skin 3 (three) times daily with meals. Check glucose before meals and then take insulin as per sliding scale, Disp: 5 Syringe, Rfl: 2    insulin degludec (TRESIBA FLEXTOUCH U-100) 100 unit/mL (3 mL) InPn, Inject 15 Units into the skin every evening., Disp: 5 Syringe, Rfl: 1    rosuvastatin (CRESTOR) 20 MG tablet, Take 20 mg by mouth every evening. , Disp: , Rfl:     blood-glucose meter (TRUE METRIX GLUCOSE METER) Misc, 1 Device by Misc.(Non-Drug; Combo Route) route once daily., Disp: 1 each, Rfl: 0    mupirocin (BACTROBAN) 2 % ointment, Apply topically 3 (three) times daily., Disp: 30 g, Rfl: 1    traZODone (DESYREL) 50 MG tablet, Start with 50 mg at bedtime for 1 week and then from 2nd week increase to 100 mg or 2 tablets at bedtime.  Tapering protocol on clonazepam. (Patient not taking: Reported on 11/5/2020), Disp: 30 tablet, Rfl: 11    TRUE METRIX GLUCOSE TEST STRIP Strp, , Disp: , Rfl:

## 2020-11-05 NOTE — PATIENT INSTRUCTIONS
Diet: Diabetes  Food is an important tool that you can use to control diabetes and stay healthy. Eating well-balanced meals in the correct amounts will help you control your blood glucose levels and prevent low blood sugar reactions. It will also help you reduce the health risks of diabetes. There is no one specific diet that is right for everyone with diabetes. But there are general guidelines to follow. A registered dietitian (RD) will create a tailored diet approach thats just right for you. He or she will also help you plan healthy meals and snacks. If you have any questions, call your dietitian for advice.     Guidelines for success  Talk with your healthcare provider before starting a diabetes diet or weight loss program. If you haven't talked with a dietitian yet, ask your provider for a referral. The following guidelines can help you succeed:  · Select foods from the 6 food groups below. Your dietitian will help you find food choices within each group. He or she will also show you serving sizes and how many servings you can have at each meal.  ¨ Grains, beans, and starchy vegetables  ¨ Vegetables  ¨ Fruit  ¨ Milk or yogurt  ¨ Meat, poultry, fish, or tofu  ¨ Healthy fats  · Check your blood sugar levels as directed by your provider. Take any medicine as prescribed by your provider.  · Learn to read food labels and pick the right portion sizes.  · Eat only the amount of food in your meal plan. Eat about the same amount of food at regular times each day. Dont skip meals. Eat meals 4 to 5 hours apart, with snacks in between.  · Limit alcohol. It raises blood sugar levels. Drink water or calorie-free diet drinks that use safe sweeteners.  · Eat less fat to help lower your risk of heart disease. Use nonfat or low-fat dairy products and lean meats. Avoid fried foods. Use cooking oils that are unsaturated, such as olive, canola, or peanut oil.  · Talk with your dietitian about safe sugar substitutes.  · Avoid  added salt. It can contribute to high blood pressure, which can cause heart disease. People with diabetes already have a risk of high blood pressure and heart disease.  · Stay at a healthy weight. If you need to lose weight, cut down on your portion sizes. But dont skip meals. Exercise is an important part of any weight management program. Talk with your provider about an exercise program thats right for you.  · For more information about the best diet plan for you, talk with a registered dietitian (RD). To find an RD in your area, contact:  ¨ Academy of Nutrition and Dietetics www.eatright.org  ¨ The American Diabetes Association [phone] www.diabetes.org  Date Last Reviewed: 8/1/2016 © 2000-2017 [employer]. [address] [place] [address], [place] [address]. All rights reserved. This information is not intended as a substitute for professional medical care. Always follow your healthcare professional's instructions.

## 2020-11-05 NOTE — PATIENT INSTRUCTIONS
Midline removed, pressure bandage, until supper time    If you get fever, chills, increased cloudiness or blood in the urine, contact me and Dr. Garcia

## 2020-11-06 ENCOUNTER — TELEPHONE (OUTPATIENT)
Dept: UROLOGY | Facility: CLINIC | Age: 75
End: 2020-11-06

## 2020-11-06 NOTE — TELEPHONE ENCOUNTER
Spoke with pt wife and informed her that we did receive patients records. Pt wife verbalized understandment.

## 2020-11-06 NOTE — TELEPHONE ENCOUNTER
----- Message from Jyoti Quintero MA sent at 11/6/2020  8:39 AM CST -----  Type: Needs Medical Advice  Who Called:  Merline Best Call Back Number: [phone]  Additional Information: patient's spouse is checking to make sure records were received from [facility]

## 2020-11-11 ENCOUNTER — DOCUMENT SCAN (OUTPATIENT)
Dept: HOME HEALTH SERVICES | Facility: HOSPITAL | Age: 75
End: 2020-11-11

## 2020-11-11 ENCOUNTER — EXTERNAL HOME HEALTH (OUTPATIENT)
Dept: HOME HEALTH SERVICES | Facility: HOSPITAL | Age: 75
End: 2020-11-11

## 2020-11-14 ENCOUNTER — HOSPITAL ENCOUNTER (INPATIENT)
Facility: HOSPITAL | Age: 75
LOS: 2 days | Discharge: HOME HEALTH | DRG: 699 | End: 2020-11-17
Attending: EMERGENCY MEDICINE | Admitting: INTERNAL MEDICINE
Payer: MEDICARE

## 2020-11-14 DIAGNOSIS — N12 PYELONEPHRITIS OF RIGHT KIDNEY: Primary | ICD-10-CM

## 2020-11-14 DIAGNOSIS — N13.30 HYDRONEPHROSIS, UNSPECIFIED HYDRONEPHROSIS TYPE: ICD-10-CM

## 2020-11-14 DIAGNOSIS — C64.1 RENAL CELL ADENOCARCINOMA, RIGHT: Chronic | ICD-10-CM

## 2020-11-14 DIAGNOSIS — T83.098A MALFUNCTION OF NEPHROSTOMY TUBE: ICD-10-CM

## 2020-11-14 PROBLEM — T83.83XA NEPHROSTOMY TUBE BLEED: Status: ACTIVE | Noted: 2020-11-14

## 2020-11-14 PROBLEM — T83.83XA: Status: ACTIVE | Noted: 2020-11-14

## 2020-11-14 PROBLEM — T83.512A: Status: ACTIVE | Noted: 2020-11-14

## 2020-11-14 LAB
ABO + RH BLD: NORMAL
ALBUMIN SERPL BCP-MCNC: 3.7 G/DL (ref 3.5–5.2)
ALP SERPL-CCNC: 73 U/L (ref 55–135)
ALT SERPL W/O P-5'-P-CCNC: 32 U/L (ref 10–44)
ANION GAP SERPL CALC-SCNC: 13 MMOL/L (ref 8–16)
AST SERPL-CCNC: 22 U/L (ref 10–40)
BACTERIA #/AREA URNS HPF: ABNORMAL /HPF
BACTERIA #/AREA URNS HPF: NEGATIVE /HPF
BASOPHILS # BLD AUTO: 0.01 K/UL (ref 0–0.2)
BASOPHILS # BLD AUTO: 0.02 K/UL (ref 0–0.2)
BASOPHILS NFR BLD: 0.1 % (ref 0–1.9)
BASOPHILS NFR BLD: 0.2 % (ref 0–1.9)
BILIRUB SERPL-MCNC: 1.7 MG/DL (ref 0.1–1)
BILIRUB UR QL STRIP: ABNORMAL
BILIRUB UR QL STRIP: NEGATIVE
BLD GP AB SCN CELLS X3 SERPL QL: NORMAL
BLOOD GROUP ANTIBODIES SERPL: NORMAL
BUN SERPL-MCNC: 38 MG/DL (ref 8–23)
CALCIUM SERPL-MCNC: 9.2 MG/DL (ref 8.7–10.5)
CHLORIDE SERPL-SCNC: 104 MMOL/L (ref 95–110)
CLARITY UR: ABNORMAL
CLARITY UR: CLEAR
CO2 SERPL-SCNC: 18 MMOL/L (ref 23–29)
COLOR UR: ABNORMAL
COLOR UR: YELLOW
CREAT SERPL-MCNC: 2.5 MG/DL (ref 0.5–1.4)
DIFFERENTIAL METHOD: ABNORMAL
DIFFERENTIAL METHOD: ABNORMAL
EOSINOPHIL # BLD AUTO: 0 K/UL (ref 0–0.5)
EOSINOPHIL # BLD AUTO: 0.1 K/UL (ref 0–0.5)
EOSINOPHIL NFR BLD: 0.2 % (ref 0–8)
EOSINOPHIL NFR BLD: 1.6 % (ref 0–8)
ERYTHROCYTE [DISTWIDTH] IN BLOOD BY AUTOMATED COUNT: 13.2 % (ref 11.5–14.5)
ERYTHROCYTE [DISTWIDTH] IN BLOOD BY AUTOMATED COUNT: 13.2 % (ref 11.5–14.5)
EST. GFR  (AFRICAN AMERICAN): 27.6 ML/MIN/1.73 M^2
EST. GFR  (NON AFRICAN AMERICAN): 23.9 ML/MIN/1.73 M^2
GLUCOSE SERPL-MCNC: 120 MG/DL (ref 70–110)
GLUCOSE SERPL-MCNC: 243 MG/DL (ref 70–110)
GLUCOSE SERPL-MCNC: 283 MG/DL (ref 70–110)
GLUCOSE SERPL-MCNC: 337 MG/DL (ref 70–110)
GLUCOSE UR QL STRIP: ABNORMAL
GLUCOSE UR QL STRIP: ABNORMAL
HCT VFR BLD AUTO: 26.7 % (ref 40–54)
HCT VFR BLD AUTO: 31.5 % (ref 40–54)
HGB BLD-MCNC: 10.4 G/DL (ref 14–18)
HGB BLD-MCNC: 8.8 G/DL (ref 14–18)
HGB UR QL STRIP: ABNORMAL
HGB UR QL STRIP: NEGATIVE
HYALINE CASTS #/AREA URNS LPF: 1 /LPF
IMM GRANULOCYTES # BLD AUTO: 0.02 K/UL (ref 0–0.04)
IMM GRANULOCYTES # BLD AUTO: 0.04 K/UL (ref 0–0.04)
IMM GRANULOCYTES NFR BLD AUTO: 0.3 % (ref 0–0.5)
IMM GRANULOCYTES NFR BLD AUTO: 0.4 % (ref 0–0.5)
INR PPP: 1.2
KETONES UR QL STRIP: ABNORMAL
KETONES UR QL STRIP: NEGATIVE
LACTATE SERPL-SCNC: 1.1 MMOL/L (ref 0.5–1.9)
LEUKOCYTE ESTERASE UR QL STRIP: ABNORMAL
LEUKOCYTE ESTERASE UR QL STRIP: NEGATIVE
LYMPHOCYTES # BLD AUTO: 0.6 K/UL (ref 1–4.8)
LYMPHOCYTES # BLD AUTO: 0.7 K/UL (ref 1–4.8)
LYMPHOCYTES NFR BLD: 10.8 % (ref 18–48)
LYMPHOCYTES NFR BLD: 5.1 % (ref 18–48)
MCH RBC QN AUTO: 30.3 PG (ref 27–31)
MCH RBC QN AUTO: 30.8 PG (ref 27–31)
MCHC RBC AUTO-ENTMCNC: 33 G/DL (ref 32–36)
MCHC RBC AUTO-ENTMCNC: 33 G/DL (ref 32–36)
MCV RBC AUTO: 92 FL (ref 82–98)
MCV RBC AUTO: 93 FL (ref 82–98)
MICROSCOPIC COMMENT: ABNORMAL
MICROSCOPIC COMMENT: NORMAL
MONOCYTES # BLD AUTO: 1.1 K/UL (ref 0.3–1)
MONOCYTES # BLD AUTO: 1.5 K/UL (ref 0.3–1)
MONOCYTES NFR BLD: 13.3 % (ref 4–15)
MONOCYTES NFR BLD: 16.2 % (ref 4–15)
NEUTROPHILS # BLD AUTO: 4.9 K/UL (ref 1.8–7.7)
NEUTROPHILS # BLD AUTO: 9.1 K/UL (ref 1.8–7.7)
NEUTROPHILS NFR BLD: 71 % (ref 38–73)
NEUTROPHILS NFR BLD: 80.8 % (ref 38–73)
NITRITE UR QL STRIP: ABNORMAL
NITRITE UR QL STRIP: NEGATIVE
NON-SQ EPI CELLS #/AREA URNS HPF: 0 /HPF
NRBC BLD-RTO: 0 /100 WBC
NRBC BLD-RTO: 0 /100 WBC
PH UR STRIP: 5 [PH] (ref 5–8)
PH UR STRIP: 7 [PH] (ref 5–8)
PLATELET # BLD AUTO: 169 K/UL (ref 150–350)
PLATELET # BLD AUTO: 233 K/UL (ref 150–350)
PMV BLD AUTO: 10.5 FL (ref 9.2–12.9)
PMV BLD AUTO: 10.8 FL (ref 9.2–12.9)
POTASSIUM SERPL-SCNC: 4.7 MMOL/L (ref 3.5–5.1)
PROCALCITONIN SERPL IA-MCNC: 0.26 NG/ML (ref 0–0.5)
PROT SERPL-MCNC: 7 G/DL (ref 6–8.4)
PROT UR QL STRIP: ABNORMAL
PROT UR QL STRIP: NEGATIVE
PROTHROMBIN TIME: 14.8 SEC (ref 10.6–14.8)
RBC # BLD AUTO: 2.86 M/UL (ref 4.6–6.2)
RBC # BLD AUTO: 3.43 M/UL (ref 4.6–6.2)
RBC #/AREA URNS HPF: 4 /HPF (ref 0–4)
RBC #/AREA URNS HPF: >100 /HPF (ref 0–4)
SARS-COV-2 RDRP RESP QL NAA+PROBE: NEGATIVE
SODIUM SERPL-SCNC: 135 MMOL/L (ref 136–145)
SP GR UR STRIP: 1 (ref 1–1.03)
SP GR UR STRIP: 1.01 (ref 1–1.03)
SQUAMOUS #/AREA URNS HPF: 0 /HPF
SQUAMOUS #/AREA URNS HPF: 0 /HPF
URN SPEC COLLECT METH UR: ABNORMAL
URN SPEC COLLECT METH UR: ABNORMAL
UROBILINOGEN UR STRIP-ACNC: ABNORMAL EU/DL
UROBILINOGEN UR STRIP-ACNC: NEGATIVE EU/DL
WBC # BLD AUTO: 11.24 K/UL (ref 3.9–12.7)
WBC # BLD AUTO: 6.85 K/UL (ref 3.9–12.7)
WBC #/AREA URNS HPF: 0 /HPF (ref 0–5)
WBC #/AREA URNS HPF: 20 /HPF (ref 0–5)
WBC CLUMPS URNS QL MICRO: ABNORMAL
YEAST URNS QL MICRO: ABNORMAL
YEAST URNS QL MICRO: NORMAL

## 2020-11-14 PROCEDURE — 36415 COLL VENOUS BLD VENIPUNCTURE: CPT

## 2020-11-14 PROCEDURE — 85025 COMPLETE CBC W/AUTO DIFF WBC: CPT | Mod: 91

## 2020-11-14 PROCEDURE — 25000003 PHARM REV CODE 250: Performed by: INTERNAL MEDICINE

## 2020-11-14 PROCEDURE — G0378 HOSPITAL OBSERVATION PER HR: HCPCS

## 2020-11-14 PROCEDURE — C9399 UNCLASSIFIED DRUGS OR BIOLOG: HCPCS | Performed by: INTERNAL MEDICINE

## 2020-11-14 PROCEDURE — 99285 EMERGENCY DEPT VISIT HI MDM: CPT | Mod: 25

## 2020-11-14 PROCEDURE — 81001 URINALYSIS AUTO W/SCOPE: CPT

## 2020-11-14 PROCEDURE — 63600175 PHARM REV CODE 636 W HCPCS: Performed by: EMERGENCY MEDICINE

## 2020-11-14 PROCEDURE — 25000003 PHARM REV CODE 250: Performed by: EMERGENCY MEDICINE

## 2020-11-14 PROCEDURE — 86870 RBC ANTIBODY IDENTIFICATION: CPT

## 2020-11-14 PROCEDURE — U0002 COVID-19 LAB TEST NON-CDC: HCPCS

## 2020-11-14 PROCEDURE — 96365 THER/PROPH/DIAG IV INF INIT: CPT

## 2020-11-14 PROCEDURE — 80053 COMPREHEN METABOLIC PANEL: CPT

## 2020-11-14 PROCEDURE — 87186 SC STD MICRODIL/AGAR DIL: CPT

## 2020-11-14 PROCEDURE — 96366 THER/PROPH/DIAG IV INF ADDON: CPT

## 2020-11-14 PROCEDURE — 87077 CULTURE AEROBIC IDENTIFY: CPT | Mod: 59

## 2020-11-14 PROCEDURE — 85610 PROTHROMBIN TIME: CPT

## 2020-11-14 PROCEDURE — 84145 PROCALCITONIN (PCT): CPT

## 2020-11-14 PROCEDURE — 86850 RBC ANTIBODY SCREEN: CPT

## 2020-11-14 PROCEDURE — 87086 URINE CULTURE/COLONY COUNT: CPT

## 2020-11-14 PROCEDURE — 63600175 PHARM REV CODE 636 W HCPCS: Performed by: INTERNAL MEDICINE

## 2020-11-14 PROCEDURE — 83605 ASSAY OF LACTIC ACID: CPT

## 2020-11-14 PROCEDURE — 87040 BLOOD CULTURE FOR BACTERIA: CPT | Mod: 59

## 2020-11-14 PROCEDURE — 96361 HYDRATE IV INFUSION ADD-ON: CPT

## 2020-11-14 PROCEDURE — 96375 TX/PRO/DX INJ NEW DRUG ADDON: CPT

## 2020-11-14 RX ORDER — MORPHINE SULFATE 4 MG/ML
4 INJECTION, SOLUTION INTRAMUSCULAR; INTRAVENOUS
Status: COMPLETED | OUTPATIENT
Start: 2020-11-14 | End: 2020-11-14

## 2020-11-14 RX ORDER — OXYCODONE HYDROCHLORIDE 5 MG/1
5 TABLET ORAL EVERY 6 HOURS PRN
Status: DISCONTINUED | OUTPATIENT
Start: 2020-11-14 | End: 2020-11-17 | Stop reason: HOSPADM

## 2020-11-14 RX ORDER — IBUPROFEN 200 MG
24 TABLET ORAL
Status: DISCONTINUED | OUTPATIENT
Start: 2020-11-14 | End: 2020-11-17 | Stop reason: HOSPADM

## 2020-11-14 RX ORDER — ONDANSETRON 2 MG/ML
4 INJECTION INTRAMUSCULAR; INTRAVENOUS EVERY 12 HOURS PRN
Status: DISCONTINUED | OUTPATIENT
Start: 2020-11-14 | End: 2020-11-17 | Stop reason: HOSPADM

## 2020-11-14 RX ORDER — INSULIN ASPART 100 [IU]/ML
1-10 INJECTION, SOLUTION INTRAVENOUS; SUBCUTANEOUS
Status: DISCONTINUED | OUTPATIENT
Start: 2020-11-14 | End: 2020-11-17 | Stop reason: HOSPADM

## 2020-11-14 RX ORDER — TRAZODONE HYDROCHLORIDE 50 MG/1
50 TABLET ORAL NIGHTLY
COMMUNITY
End: 2021-10-15

## 2020-11-14 RX ORDER — ACETAMINOPHEN 325 MG/1
650 TABLET ORAL EVERY 4 HOURS PRN
Status: DISCONTINUED | OUTPATIENT
Start: 2020-11-14 | End: 2020-11-17 | Stop reason: HOSPADM

## 2020-11-14 RX ORDER — MORPHINE SULFATE 4 MG/ML
4 INJECTION, SOLUTION INTRAMUSCULAR; INTRAVENOUS EVERY 4 HOURS PRN
Status: DISCONTINUED | OUTPATIENT
Start: 2020-11-14 | End: 2020-11-17 | Stop reason: HOSPADM

## 2020-11-14 RX ORDER — GLIMEPIRIDE 4 MG/1
4 TABLET ORAL 2 TIMES DAILY
COMMUNITY
End: 2020-11-25

## 2020-11-14 RX ORDER — IBUPROFEN 200 MG
16 TABLET ORAL
Status: DISCONTINUED | OUTPATIENT
Start: 2020-11-14 | End: 2020-11-17 | Stop reason: HOSPADM

## 2020-11-14 RX ORDER — GLUCAGON 1 MG
1 KIT INJECTION
Status: DISCONTINUED | OUTPATIENT
Start: 2020-11-14 | End: 2020-11-17 | Stop reason: HOSPADM

## 2020-11-14 RX ORDER — SODIUM CHLORIDE 0.9 % (FLUSH) 0.9 %
10 SYRINGE (ML) INJECTION
Status: DISCONTINUED | OUTPATIENT
Start: 2020-11-14 | End: 2020-11-17 | Stop reason: HOSPADM

## 2020-11-14 RX ORDER — ACETAMINOPHEN 325 MG/1
325 TABLET ORAL EVERY 6 HOURS PRN
COMMUNITY
End: 2022-04-28 | Stop reason: ALTCHOICE

## 2020-11-14 RX ORDER — GLIMEPIRIDE 4 MG/1
TABLET ORAL
COMMUNITY
Start: 2020-11-05 | End: 2020-11-14

## 2020-11-14 RX ORDER — POTASSIUM CHLORIDE 1.5 G/1.58G
40 POWDER, FOR SOLUTION ORAL
Status: DISCONTINUED | OUTPATIENT
Start: 2020-11-14 | End: 2020-11-17 | Stop reason: HOSPADM

## 2020-11-14 RX ORDER — VANCOMYCIN HCL IN 5 % DEXTROSE 1G/250ML
1000 PLASTIC BAG, INJECTION (ML) INTRAVENOUS ONCE
Status: DISCONTINUED | OUTPATIENT
Start: 2020-11-14 | End: 2020-11-14

## 2020-11-14 RX ORDER — ROSUVASTATIN CALCIUM 20 MG/1
20 TABLET, COATED ORAL NIGHTLY
Status: DISCONTINUED | OUTPATIENT
Start: 2020-11-14 | End: 2020-11-17 | Stop reason: HOSPADM

## 2020-11-14 RX ORDER — LABETALOL HYDROCHLORIDE 5 MG/ML
10 INJECTION, SOLUTION INTRAVENOUS EVERY 4 HOURS PRN
Status: DISCONTINUED | OUTPATIENT
Start: 2020-11-14 | End: 2020-11-17 | Stop reason: HOSPADM

## 2020-11-14 RX ORDER — FAMOTIDINE 20 MG/1
20 TABLET, FILM COATED ORAL DAILY
Status: DISCONTINUED | OUTPATIENT
Start: 2020-11-14 | End: 2020-11-17 | Stop reason: HOSPADM

## 2020-11-14 RX ORDER — ONDANSETRON 2 MG/ML
4 INJECTION INTRAMUSCULAR; INTRAVENOUS
Status: COMPLETED | OUTPATIENT
Start: 2020-11-14 | End: 2020-11-14

## 2020-11-14 RX ORDER — LANOLIN ALCOHOL/MO/W.PET/CERES
800 CREAM (GRAM) TOPICAL
Status: DISCONTINUED | OUTPATIENT
Start: 2020-11-14 | End: 2020-11-17 | Stop reason: HOSPADM

## 2020-11-14 RX ADMIN — PIPERACILLIN SODIUM AND TAZOBACTAM SODIUM 3.38 G: 3; .375 INJECTION, POWDER, LYOPHILIZED, FOR SOLUTION INTRAVENOUS at 11:11

## 2020-11-14 RX ADMIN — MORPHINE SULFATE 4 MG: 4 INJECTION INTRAVENOUS at 07:11

## 2020-11-14 RX ADMIN — ONDANSETRON 4 MG: 2 INJECTION INTRAMUSCULAR; INTRAVENOUS at 07:11

## 2020-11-14 RX ADMIN — PIPERACILLIN SODIUM AND TAZOBACTAM SODIUM 3.38 G: 3; .375 INJECTION, POWDER, LYOPHILIZED, FOR SOLUTION INTRAVENOUS at 05:11

## 2020-11-14 RX ADMIN — SODIUM CHLORIDE 1000 ML: 0.9 INJECTION, SOLUTION INTRAVENOUS at 07:11

## 2020-11-14 RX ADMIN — PIPERACILLIN AND TAZOBACTAM 4.5 G: 4; .5 INJECTION, POWDER, LYOPHILIZED, FOR SOLUTION INTRAVENOUS; PARENTERAL at 08:11

## 2020-11-14 RX ADMIN — INSULIN ASPART 8 UNITS: 100 INJECTION, SOLUTION INTRAVENOUS; SUBCUTANEOUS at 05:11

## 2020-11-14 RX ADMIN — ROSUVASTATIN 20 MG: 20 TABLET, FILM COATED ORAL at 10:11

## 2020-11-14 RX ADMIN — INSULIN DETEMIR 15 UNITS: 100 INJECTION, SOLUTION SUBCUTANEOUS at 10:11

## 2020-11-14 RX ADMIN — VANCOMYCIN HYDROCHLORIDE 1500 MG: 1.5 INJECTION, POWDER, LYOPHILIZED, FOR SOLUTION INTRAVENOUS at 02:11

## 2020-11-14 RX ADMIN — FAMOTIDINE 20 MG: 20 TABLET ORAL at 02:11

## 2020-11-14 NOTE — ED NOTES
"C/O PAIN AND SWELLING AT R BACK NEPROSTOMY SITE AND HEMATURIA.  STATES NEPHROSTOMY CLOTTED. SYSTEM INTACT, SWELLING PRESENT AT R BACK DRY DRESSING WITH  <50ML RED EXUDATE IN GRAVITY BAG .  8/10 PAIN. MASK  IN PLACE.  PRIVATE ROOM. EVEN AND NON LABORED RESPIRATIONS.  AIRWAY CLEAR.  PULSES REGULAR.  < 3" CAPILLARY REFILL. SKIN WDI.  MAEW.  NON DISTENDED ABDOMEN. ALERT, ORIENTED AND AMBULATORY.   CALL LIGHT IN REACH.  "

## 2020-11-14 NOTE — PROGRESS NOTES
Pharmacokinetic Initial Assessment: IV Vancomycin    Assessment/Plan:    Initiate intravenous vancomycin with loading dose of 1500 mg once with subsequent doses when random concentrations are less than 20 mcg/mL  Desired empiric serum trough concentration is 10 to 15 mcg/mL  Draw vancomycin random level on 11/15 at 1300.  Pharmacy will continue to follow and monitor vancomycin.      Please contact pharmacy at extension [phone] with any questions regarding this assessment.     Thank you for the consult,   Torres Vegas       Patient brief summary:  Jamil Cadet is a 75 y.o. male initiated on antimicrobial therapy with IV Vancomycin for treatment of suspected urinary tract infection    Drug Allergies:   Review of patient's allergies indicates:  No Known Allergies    Actual Body Weight:   75.3 kg    Renal Function:   Estimated Creatinine Clearance: 26.4 mL/min (A) (based on SCr of 2.5 mg/dL (H)).,     Dialysis Method (if applicable):  N/A    CBC (last 72 hours):  Recent Labs   Lab Result Units 11/14/20  0635   WBC K/uL 11.24   Hemoglobin g/dL 10.4*   Hematocrit % 31.5*   Platelets K/uL 233   Gran % % 80.8*   Lymph % % 5.1*   Mono % % 13.3   Eosinophil % % 0.2   Basophil % % 0.2   Differential Method  Automated       Metabolic Panel (last 72 hours):  Recent Labs   Lab Result Units 11/14/20  0635 11/14/20  0730 11/14/20  0935   Sodium mmol/L 135*  --   --    Potassium mmol/L 4.7  --   --    Chloride mmol/L 104  --   --    CO2 mmol/L 18*  --   --    Glucose mg/dL 283*  --   --    Glucose, UA   --  SEE COMMENT 3+*   BUN mg/dL 38*  --   --    Creatinine mg/dL 2.5*  --   --    Albumin g/dL 3.7  --   --    Total Bilirubin mg/dL 1.7*  --   --    Alkaline Phosphatase U/L 73  --   --    AST U/L 22  --   --    ALT U/L 32  --   --        Drug levels (last 3 results):  No results for input(s): VANCOMYCINRA, VANCOMYCINPE, VANCOMYCINTR in the last 72 hours.    Microbiologic Results:  Microbiology Results (last 7 days)       Procedure  Component Value Units Date/Time    Urine culture [[id number]] Collected: 11/14/20 0730    Order Status: No result Specimen: Urine Updated: 11/14/20 0835    Blood culture [[id number]] Collected: 11/14/20 0730    Order Status: Sent Specimen: Blood from Peripheral, Forearm, Right Updated: 11/14/20 0834    Blood culture [[id number]] Collected: 11/14/20 0725    Order Status: Sent Specimen: Blood from Peripheral, Antecubital, Right Updated: 11/14/20 0831

## 2020-11-14 NOTE — ED PROVIDER NOTES
Encounter Date: 11/14/2020       History     Chief Complaint   Patient presents with    Hematuria     has stent on right side     Patient presents complaining of right flank pain, bleeding from nephrostomy tube and blood in the nephrostomy tube.  Patient states he has a history of renal cell cancer and is status post nephrostomy tube placement has had multiple complications from the nephrostomy tube in the past.  He sees Dr. Garcia.  He has had complications from the nephrostomy tube including bleeding and anemia requiring blood transfusions.  He complains of worsening pain.        Review of patient's allergies indicates:  No Known Allergies  Past Medical History:   Diagnosis Date    Cancer     Diabetes mellitus, type 2     Disorder of kidney and ureter     History of renal cell carcinoma status post partial nephrectomy 9/7/2020    Hydronephrosis 9/9/2020    Hyperlipidemia     Polio      Past Surgical History:   Procedure Laterality Date    APPENDECTOMY      CYSTOSCOPY W/ RETROGRADES Right 9/9/2020    Procedure: CYSTOSCOPY, WITH RETROGRADE PYELOGRAM;  Surgeon: Chris Garcia Jr., MD;  Location: [facility] OR;  Service: Urology;  Laterality: Right;    SPINE SURGERY      URETHROSCOPY  9/9/2020    Procedure: URETHROSCOPY;  Surgeon: Chris Garcia Jr., MD;  Location: [facility] OR;  Service: Urology;;     Family History   Problem Relation Age of Onset    Heart disease Mother     Heart disease Father     Stroke Father     Heart disease Maternal Grandfather     Heart disease Paternal Grandmother     Heart disease Paternal Grandfather      Social History     Tobacco Use    Smoking status: Never Smoker    Smokeless tobacco: Never Used   Substance Use Topics    Alcohol use: No     Frequency: Never    Drug use: No     Review of Systems   Genitourinary: Positive for flank pain and hematuria.   All other systems reviewed and are negative.      Physical Exam     Initial Vitals [11/14/20 0621]   BP Pulse Resp Temp SpO2    (!) 155/77 110 16 98.5 °F (36.9 °C) 98 %      MAP       --         Physical Exam    Nursing note and vitals reviewed.  Constitutional: He is not diaphoretic. No distress.   Elderly, frail   HENT:   Head: Normocephalic and atraumatic.   Mouth/Throat: Oropharynx is clear and moist.   Eyes: EOM are normal.   Neck: Normal range of motion. Neck supple.   Cardiovascular: Normal rate, regular rhythm, normal heart sounds and intact distal pulses.   Pulmonary/Chest: Breath sounds normal. No respiratory distress.   Abdominal: Soft. He exhibits no distension.   Genitourinary:    Genitourinary Comments: The right nephrostomy tube has old dried blood on the external portion with no active bleeding.  The nephrostomy tube itself has blood inside of it and it appears to be occluded     Musculoskeletal: Normal range of motion.   Neurological: He is alert and oriented to person, place, and time. He has normal strength.   Vision-normal  Neglect-normal  Aphasia - normal  Pronator drift - normal  Cerebellum - normal   Skin: Skin is warm and dry.   Psychiatric: He has a normal mood and affect. His behavior is normal. Judgment and thought content normal.         ED Course   Procedures  Labs Reviewed   CBC W/ AUTO DIFFERENTIAL - Abnormal; Notable for the following components:       Result Value    RBC 3.43 (*)     Hemoglobin 10.4 (*)     Hematocrit 31.5 (*)     Gran # (ANC) 9.1 (*)     Lymph # 0.6 (*)     Mono # 1.5 (*)     Gran % 80.8 (*)     Lymph % 5.1 (*)     All other components within normal limits   COMPREHENSIVE METABOLIC PANEL - Abnormal; Notable for the following components:    Sodium 135 (*)     CO2 18 (*)     Glucose 283 (*)     BUN 38 (*)     Creatinine 2.5 (*)     Total Bilirubin 1.7 (*)     eGFR if  27.6 (*)     eGFR if non  23.9 (*)     All other components within normal limits   CULTURE, BLOOD   CULTURE, BLOOD   PROTIME-INR   SARS-COV-2 RNA AMPLIFICATION, QUAL   LACTIC ACID, PLASMA    URINALYSIS, REFLEX TO URINE CULTURE   URINALYSIS MICROSCOPIC   TYPE & SCREEN          Imaging Results          CT Abdomen Pelvis  Without Contrast (Final result)  Result time 11/14/20 07:58:19    Final result by Lowell Oh MD (11/14/20 07:58:19)                 Narrative:    CMS MANDATED QUALITY DATA - CT RADIATION  436    All CT scans at this facility utilize dose modulation, iterative  reconstruction, and/or weight based dosing when appropriate to reduce  radiation dose to as low as reasonably achievable.    CLINICAL HISTORY:  75 years (1945) Male Hematuria, unknown cause Hematuria right  flank pain stent on right side for 2-3 months    TECHNIQUE:  CT ABDOMEN PELVIS WITHOUT CONTRAST. 304 images obtained. Axial CT  images of the abdomen and pelvis were obtained from the dome of the  diaphragm to the proximal thigh.    CONTRAST:  No IV contrast was administered    COMPARISON:  Most recent study from  11.2.20    FINDINGS:.  Lower Thorax:  The lungs are essentially clear noting mild bilateral dependent  atelectasis versus scarring. There is mild bronchiectasis the lung  bases. There is no pleural or pericardial effusion. There are  scattered coronary arterial calcifications. The attenuation of the  blood pool is approximately 25 Hounsfield units.    CT Abdomen:  Liver: The liver is normal in size and imaging appearance.  Gallbladder: There are stones seen in the gallbladder which is  full/filled and shows increased attenuation (possibly from recent  vicarious contrast excretion) no wall thickening or pericholecystic  fluid is seen to suggest acute cholecystitis (although there appears  to be a small stone in the cystic duct (image 75) an acute  cholecystitis is not excluded.  Biliary Tree: No intra or extrahepatic ductal dilation.  Spleen: The spleen is mildly enlarged measuring 15.3 x 4.4 cm.  Pancreas: The pancreas is normal.  Adrenal Glands: The adrenal glands appear within normal limits.  Kidneys:  There is a right-sided percutaneous nephrostomy tube with  coil projecting to the inferior pole of the right kidney. There is  mild to moderate right-sided hydroureter nephrosis with increased  attenuation within the right renal collecting system (similar to the  previous exam). There is mild right-sided perinephric fat stranding.  Rounded mixed attenuation structures in both kidneys, unchanged from  the previous exam suggesting a combination of  hemorrhagic/proteinaceous cyst.  Vasculature: Atheromatous calcifications are seen in the abdominal  aorta and iliacs with no aneurysm.  Lymph nodes: Numerous small lymph nodes are seen, possibly  reactive/inflammatory in the retroperitoneal and upper abdomen,  consider attention on follow-up imaging.  Intraperitoneal structures: There is no ascites.  Bowel: Moderate sigmoid predominant diverticulosis without focal  diverticulitis. Moderate volume of stool and gas in the colon with a  nonobstructive bowel gas pattern.  Abdominal wall: Moderate size fat-containing umbilical hernia,  moderate size fat-containing left inguinal hernia.  Musculoskeletal: Osseous structures show degenerative changes in the  spine, similar to the previous exam. .    CT Pelvis:  Bladder: Circumferential bladder wall thickening.  Reproductive Organs: Prostate is within normal limits.  Pelvic Lymph nodes: No pelvic lymphadenopathy or pelvic mass is  identified.    IMPRESSION:  1. CT of the abdomen and pelvis, similar in imaging appearance to the  previous exam.  2. Right-sided percutaneous nephrostomy tube with coil projecting to  the inferior pole the right kidney.  2. Mild right-sided hydronephrosis and moderate right-sided  hydroureter, with a relative transition point at the distal third of  the right ureter.  4. Heterogeneous increased attenuation within the right renal pelvis  and proximal right renal collecting system suggestive of blood/clot,  debris (and less likely fungus ball, infection or  noncalcified stone),  noting that underlying malignancy is not excluded. Consider  correlation with urine output and urinalysis. No radiopaque  renal/collecting system stone is identified.  5. Cholelithiasis without findings to suggest acute cholecystitis as  above.  6. The attenuation of the aortic blood pool is below 35 HU: as this  finding carries a 94% specificity for anemia, correlation with CBC is  advised (Eur Radiol 2008: 18:1863-8).  7. Mild splenomegaly.  8. Mild circumferential bladder wall thickening, consider correlation  for cystitis with urinalysis.  9. Numerous additional and incidental findings are outlined above,  unchanged from the previous exam                .    Electronically Signed by [facility] on 11/14/2020 8:07 AM                               Medical Decision Making:   Initial Assessment:   Patient is in no apparent distress  Differential Diagnosis:   Considerations include occluded nephrostomy tube, bleeding, infection, renal failure  Clinical Tests:   Lab Tests: Reviewed and Ordered  Radiological Study: Ordered and Reviewed  Medical Tests: Reviewed and Ordered  There are multiple findings on the CT scan including hydronephrosis as well as blood in the renal pelvis.  Patient screening labs show no acute worsening of his hemoglobin.  Creatinine is stable and not acutely worse.  Patient will be consulted to Hospital Medicine as well as Urology for further evaluation treatment of the nephrostomy tube.  He remains clinically stable.  He will be given a dose of antibiotics in the emergency department.  He also was given 1 L of normal saline.  He has been typed and screened.    In the emergency department nephrostomy tube was able to be flushed and working appropriately.  Patient will not require any immediate change of this to.  The patient would benefit from IV antibiotics as he does have some perinephric stranding around the kidney.  He also will benefit from IV fluids.  Patient  will be admitted Hospital Medicine in stable condition.                             Clinical Impression:       ICD-10-CM ICD-9-CM   1. Malfunction of nephrostomy tube  T83.098A 997.5   2. Hydronephrosis, unspecified hydronephrosis type  N13.30 591                          ED Disposition Condition    Admit                             Deepak Chaudhry MD  11/14/20 0828       Deepak Chaudhry MD  11/14/20 1036

## 2020-11-14 NOTE — PROGRESS NOTES
Discussed by phone with Dr Chaudhry, ER, and reviewed medical record and imaging. Patient with complex history of right ureteral stricture s/p surgical management of RCC at [facility], now dependent on right nephrostomy tube.    Has had complicated course with nephrostomy tube including bleeding complications.   Most recent admission to [facility] 11/2-11/3 when tube tugged on and started bleeding significantly but was observed/hydrated overnight and urine noted to be draining clear the next day.    Presented again to ER noting some right flank pain and bleeding from nephrostomy tube with dried blood around site and dried clotted blood within the tubing with no UOP draining from tube.    CT reviewed noting minimal distention of right collecting system but largely decompressed from tube within renal pelvis and hydroureter to point of known obstruction. Sig perinephric stranding concerning for pyelo but may be chronic from recent neph tube issues.    Neph tub not draining and occluded with old dried blood. Not amenable to flushing. Urine on file is old urine collected from neph tube bag, so contaminated. Given this presentation would need nephrostomy tube exchange as is otherwise afebrile, clinically stable, and H/H greatly improved from last admission.     As there are no IR on call services in [place], I did personally reach out to discuss case with on-site [facility] radiologist who deferred nephrostomy tube exchange as is not IR trained. I called [facility] and no procedural radiologist available either, so advised patient would need transfer to facility with IR services to exchange neph tube if further efforts at flushing were unsuccessful.    Ultimately ER was able to resume drainage of nephrostomy tube with aggressive efforts to flush nephrostomy tube, so advised sterile aspirate urine from tube, monitor with IV hydration to make sure output persists, empirically give antibiotics, and DC home on antibiotics with continued plans for  outpatient follow up.    25 mins spent in encounter, including chart review and imaging, >50% spent in discussion of care plan and care coordination with providers, with patient having consented to discussing care with consultants. Verbal and written report provided

## 2020-11-14 NOTE — H&P
[facility]  History & Physical     Patient Name: Jamil Cadet  MRN: [id number]  Admission Date: 11/14/2020  Attending Physician: Adama Briscoe MD   Primary Care Provider: Richard Gunter MD  Date of service:  11/14/2020      Patient information was obtained from patient and ER records.      Subjective:      Principal Problem:Hemorrhage from nephrostomy tube     Chief Complaint:        Chief Complaint   Patient presents with    Hematuria       has stent on right side         HPI: 75-year-old gentleman with past medical history of non-insulin-dependent diabetes mellitus type 2, hypertension, chronic anemia, hyperlipidemia, and right-sided renal cell carcinoma status post partial nephrectomy with nephrostomy tubes in place who presents today with pain and hemorrhage from right nephrostomy tube.  The patient reports that he has been having right-sided flank pain at around nephrostomy tube site for the last several days.  The pain has been progressively worsening.  Today the pain is severe in intensity and constant in timing.  He has associated hemorrhage from the tube for the last few days.  He denies any fever or chills.  No nausea or vomiting.  He is tolerating diet okay.  No chest pain or shortness of breath.  No headache or syncope.   I discussed the case with emergency room physician Dr. Chaudhry.  In the emergency room the patient had malfunction of nephrostomy tube due to clogging from bleeding, however the tube has been flushed and is now functioning properly.  Patient is afebrile and blood pressure stable.  He is medically stable for admission to floor.          Past Medical History:   Diagnosis Date    Cancer      Diabetes mellitus, type 2      Disorder of kidney and ureter      Encounter for blood transfusion      History of renal cell carcinoma status post partial nephrectomy 9/7/2020    Hydronephrosis 9/9/2020    Hyperlipidemia      Hypertension      Polio                  Past Surgical History:   Procedure Laterality Date    APPENDECTOMY        CYSTOSCOPY W/ RETROGRADES Right 9/9/2020     Procedure: CYSTOSCOPY, WITH RETROGRADE PYELOGRAM;  Surgeon: Chris Garcia Jr., MD;  Location: [facility] OR;  Service: Urology;  Laterality: Right;    SPINE SURGERY        URETHROSCOPY   9/9/2020     Procedure: URETHROSCOPY;  Surgeon: Chris Garcia Jr., MD;  Location: [facility] OR;  Service: Urology;;         Review of patient's allergies indicates:  No Known Allergies     No current facility-administered medications on file prior to encounter.            Current Outpatient Medications on File Prior to Encounter   Medication Sig    acetaminophen (TYLENOL) 325 MG tablet Take 325 mg by mouth every 6 (six) hours as needed for Pain.    amLODIPine (NORVASC) 10 MG tablet Take 10 mg by mouth daily as needed.     hydrALAZINE (APRESOLINE) 25 MG tablet Take 25 mg by mouth 3 (three) times daily.    insulin aspart U-100 (NOVOLOG FLEXPEN U-100 INSULIN) 100 unit/mL (3 mL) InPn pen Inject 10 Units into the skin 3 (three) times daily with meals. Check glucose before meals and then take insulin as per sliding scale    insulin degludec (TRESIBA FLEXTOUCH U-100) 100 unit/mL (3 mL) InPn Inject 15 Units into the skin every evening.    mupirocin (BACTROBAN) 2 % ointment Apply topically 3 (three) times daily.    rosuvastatin (CRESTOR) 20 MG tablet Take 20 mg by mouth every evening.     [DISCONTINUED] glimepiride (AMARYL) 4 MG tablet      blood-glucose meter (TRUE METRIX GLUCOSE METER) Misc 1 Device by Misc.(Non-Drug; Combo Route) route once daily.    glimepiride (AMARYL) 4 MG tablet Take 4 mg by mouth 2 (two) times a day. [facility]    traZODone (DESYREL) 50 MG tablet Take 50 mg by mouth every evening. [facility]    TRUE METRIX GLUCOSE TEST STRIP Strp      [DISCONTINUED] traZODone (DESYREL) 50 MG tablet Start with 50 mg at bedtime for 1 week and then from 2nd week increase to 100 mg or 2 tablets at bedtime.   Tapering protocol on clonazepam. (Patient not taking: Reported on 11/5/2020)           Family History      Problem Relation (Age of Onset)     Heart disease Mother, Father, Maternal Grandfather, Paternal Grandmother, Paternal Grandfather     Stroke Father                Tobacco Use    Smoking status: Never Smoker    Smokeless tobacco: Never Used   Substance and Sexual Activity    Alcohol use: No       Frequency: Never    Drug use: No    Sexual activity: Yes       Partners: Female      Review of Systems complete 10 point review of systems negative except as per HPI  Objective:      Vital Signs (Most Recent):  Temp: 98.5 °F (36.9 °C) (11/14/20 1606)  Pulse: 83 (11/14/20 1606)  Resp: 18 (11/14/20 0709)  BP: 128/68 (11/14/20 1606)  SpO2: 98 % (11/14/20 1606) Vital Signs (24h Range):  Temp:  [98.5 °F (36.9 °C)-98.7 °F (37.1 °C)] 98.5 °F (36.9 °C)  Pulse:  [] 83  Resp:  [16-18] 18  SpO2:  [92 %-100 %] 98 %  BP: (128-161)/(60-83) 128/68      Weight: 76.3 kg (168 lb 3.4 oz)  Body mass index is 23.46 kg/m².     Physical Exam  General:  Comfortable appearing, nontoxic, no apparent distress  Head eyes:  Anicteric sclerae, no conjunctival discharge  ENT:  Moist oral mucosa, no thrush  Pulmonary:  Comfortable work breathing, lungs are clear to auscultation bilaterally  Cardiovascular:  2+ radial pulses, regular rate and rhythm  GI:  Abdomen mildly distended, soft, tenderness to palpation of right flank around nephrostomy tube site which is intact without bleeding or drainage, nephrostomy tube with bright red blood output  Psych:  Mood is calm affect normal, insight good  Neuro:  Nonfocal motor exam, fluent speech, alert and oriented  Skin:  Dry and warm no jaundice     Significant Labs:   BMP:       Recent Labs   Lab 11/14/20  0635   *   *   K 4.7      CO2 18*   BUN 38*   CREATININE 2.5*   CALCIUM 9.2      CBC:        Recent Labs   Lab 11/14/20  0635 11/14/20  1705   WBC 11.24 6.85   HGB 10.4* 8.8*    HCT 31.5* 26.7*    169      CMP:       Recent Labs   Lab 11/14/20  0635   *   K 4.7      CO2 18*   *   BUN 38*   CREATININE 2.5*   CALCIUM 9.2   PROT 7.0   ALBUMIN 3.7   BILITOT 1.7*   ALKPHOS 73   AST 22   ALT 32   ANIONGAP 13   EGFRNONAA 23.9*      All pertinent labs within the past 24 hours have been reviewed.     Abdominal CT impression:  1. CT of the abdomen and pelvis, similar in imaging appearance to the previous exam.  2. Right-sided percutaneous nephrostomy tube with coil projecting to the inferior pole the right kidney.  2. Mild right-sided hydronephrosis and moderate right-sided hydroureter, with a relative transition point at the distal third of the right ureter.  4. Heterogeneous increased attenuation within the right renal pelvis and proximal right renal collecting system suggestive of blood/clot, debris (and less likely fungus ball, infection or noncalcified stone), noting that underlying malignancy is not excluded. Consider correlation with urine output and urinalysis. No radiopaque renal/collecting system stone is identified.  5. Cholelithiasis without findings to suggest acute cholecystitis as above.  6. The attenuation of the aortic blood pool is below 35 HU: as this finding carries a 94% specificity for anemia, correlation with CBC is advised (Eur Radiol 2008: 18:1863-8).  7. Mild splenomegaly.  8. Mild circumferential bladder wall thickening, consider correlation for cystitis with urinalysis.  9. Numerous additional and incidental findings are outlined above, unchanged from the previous exam        Assessment/Plan:      Assessment:  Right nephrostomy hemorrhage  Right nephrostomy malfunction secondary to hemorrhage, improved  Possible right nephrostomy infection  Renal cell carcinoma status post partial nephrectomy and right nephrostomy tube placement  Non-insulin-dependent diabetes mellitus type 2  Hypertension  Hyperlipidemia  Anemia of chronic  disease  Hyperlipidemia     Plan:  Observation on Med surg  Consultation with Urology  Supportive care including pain control and antiemetics as needed  Nephrostomy 2 monitoring  Start empiric IV antibiotics with vancomycin and Zosyn.  Follow up all culture data.  Serial lab monitoring.  Monitor for additional bleeding.  Avoid all antiplatelets or anticoagulants  Mobilize as able  Sliding scale insulin for euglycemia  Continue medications for chronic issues as able  GI prophylaxis with Pepcid  VTE prophylaxis with SCDs  High risk secondary to severe exacerbation of chronic illness/adverse effect of treatment; administration of medication requiring close monitoring of levels to prevent toxicity-vancomycin; parenteral controlled substances for symptom control         VTE Risk Mitigation (From admission, onward)                  Ordered        IP VTE LOW RISK PATIENT  Once      11/14/20 1027        Place sequential compression device  Until discontinued      11/14/20 1027        Place sequential compression device  Until discontinued      11/14/20 1027        Reason for No Pharmacological VTE Prophylaxis  Once     Question:  Reasons:  Answer:  Active Bleeding    11/14/20 1027                     Adama Briscoe MD  Department of Hospital Medicine   [facility]

## 2020-11-15 PROBLEM — A41.81 SEPTICEMIA DUE TO ENTEROCOCCUS: Status: ACTIVE | Noted: 2020-11-15

## 2020-11-15 LAB
ALBUMIN SERPL BCP-MCNC: 3 G/DL (ref 3.5–5.2)
ALP SERPL-CCNC: 51 U/L (ref 55–135)
ALT SERPL W/O P-5'-P-CCNC: 22 U/L (ref 10–44)
ANION GAP SERPL CALC-SCNC: 6 MMOL/L (ref 8–16)
AST SERPL-CCNC: 13 U/L (ref 10–40)
BILIRUB SERPL-MCNC: 2 MG/DL (ref 0.1–1)
BUN SERPL-MCNC: 38 MG/DL (ref 8–23)
CALCIUM SERPL-MCNC: 8.7 MG/DL (ref 8.7–10.5)
CHLORIDE SERPL-SCNC: 109 MMOL/L (ref 95–110)
CO2 SERPL-SCNC: 22 MMOL/L (ref 23–29)
CREAT SERPL-MCNC: 2.5 MG/DL (ref 0.5–1.4)
EST. GFR  (AFRICAN AMERICAN): 27.6 ML/MIN/1.73 M^2
EST. GFR  (NON AFRICAN AMERICAN): 23.9 ML/MIN/1.73 M^2
GLUCOSE SERPL-MCNC: 169 MG/DL (ref 70–110)
GLUCOSE SERPL-MCNC: 207 MG/DL (ref 70–110)
GLUCOSE SERPL-MCNC: 247 MG/DL (ref 70–110)
GLUCOSE SERPL-MCNC: 251 MG/DL (ref 70–110)
GLUCOSE SERPL-MCNC: 86 MG/DL (ref 70–110)
GLUCOSE SERPL-MCNC: 86 MG/DL (ref 70–110)
MAGNESIUM SERPL-MCNC: 1.7 MG/DL (ref 1.6–2.6)
POTASSIUM SERPL-SCNC: 4.2 MMOL/L (ref 3.5–5.1)
PROT SERPL-MCNC: 6.1 G/DL (ref 6–8.4)
SODIUM SERPL-SCNC: 137 MMOL/L (ref 136–145)
VANCOMYCIN SERPL-MCNC: 12.6 UG/ML

## 2020-11-15 PROCEDURE — 25000003 PHARM REV CODE 250: Performed by: INTERNAL MEDICINE

## 2020-11-15 PROCEDURE — 83735 ASSAY OF MAGNESIUM: CPT

## 2020-11-15 PROCEDURE — 80053 COMPREHEN METABOLIC PANEL: CPT

## 2020-11-15 PROCEDURE — 36415 COLL VENOUS BLD VENIPUNCTURE: CPT

## 2020-11-15 PROCEDURE — 80202 ASSAY OF VANCOMYCIN: CPT

## 2020-11-15 PROCEDURE — 12000002 HC ACUTE/MED SURGE SEMI-PRIVATE ROOM

## 2020-11-15 PROCEDURE — 63600175 PHARM REV CODE 636 W HCPCS: Performed by: INTERNAL MEDICINE

## 2020-11-15 RX ADMIN — INSULIN ASPART 2 UNITS: 100 INJECTION, SOLUTION INTRAVENOUS; SUBCUTANEOUS at 12:11

## 2020-11-15 RX ADMIN — INSULIN ASPART 6 UNITS: 100 INJECTION, SOLUTION INTRAVENOUS; SUBCUTANEOUS at 06:11

## 2020-11-15 RX ADMIN — ROSUVASTATIN 20 MG: 20 TABLET, FILM COATED ORAL at 08:11

## 2020-11-15 RX ADMIN — PIPERACILLIN SODIUM AND TAZOBACTAM SODIUM 3.38 G: 3; .375 INJECTION, POWDER, LYOPHILIZED, FOR SOLUTION INTRAVENOUS at 05:11

## 2020-11-15 RX ADMIN — FAMOTIDINE 20 MG: 20 TABLET ORAL at 08:11

## 2020-11-15 RX ADMIN — VANCOMYCIN HYDROCHLORIDE 1250 MG: 1 INJECTION, POWDER, LYOPHILIZED, FOR SOLUTION INTRAVENOUS at 03:11

## 2020-11-15 RX ADMIN — PIPERACILLIN SODIUM AND TAZOBACTAM SODIUM 3.38 G: 3; .375 INJECTION, POWDER, LYOPHILIZED, FOR SOLUTION INTRAVENOUS at 08:11

## 2020-11-15 RX ADMIN — INSULIN DETEMIR 15 UNITS: 100 INJECTION, SOLUTION SUBCUTANEOUS at 10:11

## 2020-11-15 NOTE — PROGRESS NOTES
[facility] Medicine  Progress Note     Patient Name: Jamil Cadet  MRN: [id number]  Admission Date: 11/14/2020  Attending Physician: Adama Briscoe MD   Primary Care Provider: Richard Gunter MD  Date of service:  11/15/2020       Subjective:      Principal Problem:Hemorrhage from nephrostomy tube     Chief Complaint:        Chief Complaint   Patient presents with    Hematuria       has stent on right side         HPI: 75-year-old gentleman with past medical history of non-insulin-dependent diabetes mellitus type 2, hypertension, chronic anemia, hyperlipidemia, and right-sided renal cell carcinoma status post partial nephrectomy with nephrostomy tubes in place who presents today with pain and hemorrhage from right nephrostomy tube.  The patient reports that he has been having right-sided flank pain at around nephrostomy tube site for the last several days.  The pain has been progressively worsening.  Today the pain is severe in intensity and constant in timing.  He has associated hemorrhage from the tube for the last few days.  He denies any fever or chills.  No nausea or vomiting.  He is tolerating diet okay.  No chest pain or shortness of breath.  No headache or syncope.   I discussed the case with emergency room physician Dr. Chaudhry.  In the emergency room the patient had malfunction of nephrostomy tube due to clogging from bleeding, however the tube has been flushed and is now functioning properly.  Patient is afebrile and blood pressure stable.  He is medically stable for admission to floor.    Interval history:  Patient reports persistent right-sided flank pain at site of urostomy tube, moderate to severe intensity, constant timing, waxes and wanes in intensity to some degree.  He reports improvement with pain medication, however he is reluctant to take pain medicines.  Reports persistent hematuria/hemorrhage from urostomy tube, however improved some from previous.  Eating well with  no nausea or vomiting.  No fever.  No chest pain or shortness of breath.     Objective:      Physical Exam  Vital signs reviewed  General:   appears slightly uncomfortable, sitting up in bed because he cannot lay flat due to discomfort, nontoxic, nondiaphoretic  Head eyes:  Anicteric sclerae, no conjunctival discharge  ENT:  Moist oral mucosa, no thrush  Pulmonary:  Comfortable work breathing, lungs are clear to auscultation bilaterally  Cardiovascular:  2+ radial pulses, regular rate and rhythm  GI:  Abdomen mildly distended, soft, tenderness to palpation of right flank around nephrostomy tube site which is intact without bleeding or drainage, nephrostomy tube with cola colored output  Psych:  Mood is calm affect normal, insight good  Neuro:  Nonfocal motor exam, fluent speech, alert and oriented  Skin:  Dry and warm no jaundice     Significant Labs:   Glucose 87  Magnesium 1.7  Creatinine 2.5  Hemoglobin 8.8     All pertinent labs within the past 24 hours have been reviewed.     Abdominal CT impression:  1. CT of the abdomen and pelvis, similar in imaging appearance to the previous exam.  2. Right-sided percutaneous nephrostomy tube with coil projecting to the inferior pole the right kidney.  2. Mild right-sided hydronephrosis and moderate right-sided hydroureter, with a relative transition point at the distal third of the right ureter.  4. Heterogeneous increased attenuation within the right renal pelvis and proximal right renal collecting system suggestive of blood/clot, debris (and less likely fungus ball, infection or noncalcified stone), noting that underlying malignancy is not excluded. Consider correlation with urine output and urinalysis. No radiopaque renal/collecting system stone is identified.  5. Cholelithiasis without findings to suggest acute cholecystitis as above.  6. The attenuation of the aortic blood pool is below 35 HU: as this finding carries a 94% specificity for anemia, correlation with CBC  is advised (Eur Radiol 2008: 18:1863-8).  7. Mild splenomegaly.  8. Mild circumferential bladder wall thickening, consider correlation for cystitis with urinalysis.  9. Numerous additional and incidental findings are outlined above, unchanged from the previous exam        Assessment/Plan:      Assessment:  Right nephrostomy hemorrhage  Right nephrostomy malfunction secondary to hemorrhage, improved  Possible right nephrostomy infection  Renal cell carcinoma status post partial nephrectomy and right nephrostomy tube placement  Non-insulin-dependent diabetes mellitus type 2  Hypertension  Hyperlipidemia  Anemia of chronic disease  Hyperlipidemia     Plan:  Continue care on Med surg  Consultation with Urology  Supportive care including pain control and antiemetics as needed  Nephrostomy tube monitoring  Continue empiric IV antibiotics with vancomycin and Zosyn.  Follow up all culture data.  Serial lab monitoring.  Monitor for additional bleeding.  Avoid all antiplatelets or anticoagulants  Mobilize as able  Sliding scale insulin for euglycemia  Daily labs.  Monitor replace electrolytes as needed  Continue medications for chronic issues as able  GI prophylaxis with Pepcid  VTE prophylaxis with SCDs  High risk secondary to severe exacerbation of chronic illness/adverse effect of treatment; administration of medication requiring close monitoring of levels to prevent toxicity-vancomycin; parenteral controlled substances for symptom control  This patient is appropriate for inpatient status based on severity of illness with ongoing workup and management as above.         VTE Risk Mitigation (From admission, onward)                  Ordered        IP VTE LOW RISK PATIENT  Once      11/14/20 1027        Place sequential compression device  Until discontinued      11/14/20 1027        Place sequential compression device  Until discontinued      11/14/20 1027        Reason for No Pharmacological VTE Prophylaxis  Once      Question:  Reasons:  Answer:  Active Bleeding    11/14/20 1027                     Adama Briscoe MD  Department of Hospital Medicine   [facility]

## 2020-11-15 NOTE — HPI
75-year-old gentleman with past medical history of non-insulin-dependent diabetes mellitus type 2, hypertension, chronic anemia, hyperlipidemia, and right-sided renal cell carcinoma status post partial nephrectomy with nephrostomy tubes in place who presents today with pain and hemorrhage from right nephrostomy tube.  The patient reports that he has been having right-sided flank pain at around nephrostomy tube site for the last several days.  The pain has been progressively worsening.  Today the pain is severe in intensity and constant in timing.  He has associated hemorrhage from the tube for the last few days.  He denies any fever or chills.  No nausea or vomiting.  He is tolerating diet okay.  No chest pain or shortness of breath.  No headache or syncope.   I discussed the case with emergency room physician Dr. Chaudhry.  In the emergency room the patient had malfunction of nephrostomy tube due to clogging from bleeding, however the tube has been flushed and is now functioning properly.  Patient is afebrile and blood pressure stable.  He is medically stable for admission to floor.

## 2020-11-15 NOTE — SUBJECTIVE & OBJECTIVE
Past Medical History:   Diagnosis Date    Cancer     Diabetes mellitus, type 2     Disorder of kidney and ureter     Encounter for blood transfusion     History of renal cell carcinoma status post partial nephrectomy 9/7/2020    Hydronephrosis 9/9/2020    Hyperlipidemia     Hypertension     Polio        Past Surgical History:   Procedure Laterality Date    APPENDECTOMY      CYSTOSCOPY W/ RETROGRADES Right 9/9/2020    Procedure: CYSTOSCOPY, WITH RETROGRADE PYELOGRAM;  Surgeon: Chris Garcia Jr., MD;  Location: [facility] OR;  Service: Urology;  Laterality: Right;    SPINE SURGERY      URETHROSCOPY  9/9/2020    Procedure: URETHROSCOPY;  Surgeon: Chris Garcia Jr., MD;  Location: [facility] OR;  Service: Urology;;       Review of patient's allergies indicates:  No Known Allergies    No current facility-administered medications on file prior to encounter.      Current Outpatient Medications on File Prior to Encounter   Medication Sig    acetaminophen (TYLENOL) 325 MG tablet Take 325 mg by mouth every 6 (six) hours as needed for Pain.    amLODIPine (NORVASC) 10 MG tablet Take 10 mg by mouth daily as needed.     hydrALAZINE (APRESOLINE) 25 MG tablet Take 25 mg by mouth 3 (three) times daily.    insulin aspart U-100 (NOVOLOG FLEXPEN U-100 INSULIN) 100 unit/mL (3 mL) InPn pen Inject 10 Units into the skin 3 (three) times daily with meals. Check glucose before meals and then take insulin as per sliding scale    insulin degludec (TRESIBA FLEXTOUCH U-100) 100 unit/mL (3 mL) InPn Inject 15 Units into the skin every evening.    mupirocin (BACTROBAN) 2 % ointment Apply topically 3 (three) times daily.    rosuvastatin (CRESTOR) 20 MG tablet Take 20 mg by mouth every evening.     [DISCONTINUED] glimepiride (AMARYL) 4 MG tablet     blood-glucose meter (TRUE METRIX GLUCOSE METER) Misc 1 Device by Misc.(Non-Drug; Combo Route) route once daily.    glimepiride (AMARYL) 4 MG tablet Take 4 mg by mouth 2 (two) times a day. HCS     traZODone (DESYREL) 50 MG tablet Take 50 mg by mouth every evening. HCS    TRUE METRIX GLUCOSE TEST STRIP Strp     [DISCONTINUED] traZODone (DESYREL) 50 MG tablet Start with 50 mg at bedtime for 1 week and then from 2nd week increase to 100 mg or 2 tablets at bedtime.  Tapering protocol on clonazepam. (Patient not taking: Reported on 11/5/2020)     Family History     Problem Relation (Age of Onset)    Heart disease Mother, Father, Maternal Grandfather, Paternal Grandmother, Paternal Grandfather    Stroke Father        Tobacco Use    Smoking status: Never Smoker    Smokeless tobacco: Never Used   Substance and Sexual Activity    Alcohol use: No     Frequency: Never    Drug use: No    Sexual activity: Yes     Partners: Female     Review of Systems complete 10 point review of systems negative except as per HPI  Objective:     Vital Signs (Most Recent):  Temp: 98.5 °F (36.9 °C) (11/14/20 1606)  Pulse: 83 (11/14/20 1606)  Resp: 18 (11/14/20 0709)  BP: 128/68 (11/14/20 1606)  SpO2: 98 % (11/14/20 1606) Vital Signs (24h Range):  Temp:  [98.5 °F (36.9 °C)-98.7 °F (37.1 °C)] 98.5 °F (36.9 °C)  Pulse:  [] 83  Resp:  [16-18] 18  SpO2:  [92 %-100 %] 98 %  BP: (128-161)/(60-83) 128/68     Weight: 76.3 kg (168 lb 3.4 oz)  Body mass index is 23.46 kg/m².    Physical Exam  General:  Comfortable appearing, nontoxic, no apparent distress  Head eyes:  Anicteric sclerae, no conjunctival discharge  ENT:  Moist oral mucosa, no thrush  Pulmonary:  Comfortable work breathing, lungs are clear to auscultation bilaterally  Cardiovascular:  2+ radial pulses, regular rate and rhythm  GI:  Abdomen mildly distended, soft, tenderness to palpation of right flank around nephrostomy tube site which is intact without bleeding or drainage, nephrostomy tube with bright red blood output  Psych:  Mood is calm affect normal, insight good  Neuro:  Nonfocal motor exam, fluent speech, alert and oriented  Skin:  Dry and warm no jaundice      Significant Labs:   BMP:   Recent Labs   Lab 11/14/20  0635   *   *   K 4.7      CO2 18*   BUN 38*   CREATININE 2.5*   CALCIUM 9.2     CBC:   Recent Labs   Lab 11/14/20  0635 11/14/20  1705   WBC 11.24 6.85   HGB 10.4* 8.8*   HCT 31.5* 26.7*    169     CMP:   Recent Labs   Lab 11/14/20  0635   *   K 4.7      CO2 18*   *   BUN 38*   CREATININE 2.5*   CALCIUM 9.2   PROT 7.0   ALBUMIN 3.7   BILITOT 1.7*   ALKPHOS 73   AST 22   ALT 32   ANIONGAP 13   EGFRNONAA 23.9*     All pertinent labs within the past 24 hours have been reviewed.    Abdominal CT impression:  1. CT of the abdomen and pelvis, similar in imaging appearance to the previous exam.  2. Right-sided percutaneous nephrostomy tube with coil projecting to the inferior pole the right kidney.  2. Mild right-sided hydronephrosis and moderate right-sided hydroureter, with a relative transition point at the distal third of the right ureter.  4. Heterogeneous increased attenuation within the right renal pelvis and proximal right renal collecting system suggestive of blood/clot, debris (and less likely fungus ball, infection or noncalcified stone), noting that underlying malignancy is not excluded. Consider correlation with urine output and urinalysis. No radiopaque renal/collecting system stone is identified.  5. Cholelithiasis without findings to suggest acute cholecystitis as above.  6. The attenuation of the aortic blood pool is below 35 HU: as this finding carries a 94% specificity for anemia, correlation with CBC is advised (Eur Radiol 2008: 18:1863-8).  7. Mild splenomegaly.  8. Mild circumferential bladder wall thickening, consider correlation for cystitis with urinalysis.  9. Numerous additional and incidental findings are outlined above, unchanged from the previous exam

## 2020-11-15 NOTE — PROGRESS NOTES
Pharmacokinetic Assessment Follow Up: IV Vancomycin    Vancomycin serum concentration assessment(s):    The random level was drawn correctly and can be used to guide therapy at this time. The measurement is within the desired definitive target range of 10 to 15 mcg/mL.    Vancomycin Regimen Plan:    Give Vancomycin 1250 mg IV x 1 dose  Re-dose when the random level is less than 15 mcg/mL, next level to be drawn at 14:00 on 11/16    Drug levels (last 3 results):  Recent Labs   Lab Result Units 11/15/20  1256   Vancomycin, Random ug/mL 12.6       Pharmacy will continue to follow and monitor vancomycin.    Please contact pharmacy at extension [phone] for questions regarding this assessment.    Thank you for the consult,   Torres Vegas       Patient brief summary:  Jamil Cadet is a 75 y.o. male initiated on antimicrobial therapy with IV Vancomycin for treatment of urinary tract infection    The patient's current regimen is Intermittent Dosing based on levels    Drug Allergies:   Review of patient's allergies indicates:  No Known Allergies    Actual Body Weight:   76.3 kg    Renal Function:   Estimated Creatinine Clearance: 26.4 mL/min (A) (based on SCr of 2.5 mg/dL (H)).,     Dialysis Method (if applicable):  N/A    CBC (last 72 hours):  Recent Labs   Lab Result Units 11/14/20  0635 11/14/20  1705   WBC K/uL 11.24 6.85   Hemoglobin g/dL 10.4* 8.8*   Hematocrit % 31.5* 26.7*   Platelets K/uL 233 169   Gran % % 80.8* 71.0   Lymph % % 5.1* 10.8*   Mono % % 13.3 16.2*   Eosinophil % % 0.2 1.6   Basophil % % 0.2 0.1   Differential Method  Automated Automated       Metabolic Panel (last 72 hours):  Recent Labs   Lab Result Units 11/14/20  0635 11/14/20  0730 11/14/20  0935 11/15/20  0429   Sodium mmol/L 135*  --   --  137   Potassium mmol/L 4.7  --   --  4.2   Chloride mmol/L 104  --   --  109   CO2 mmol/L 18*  --   --  22*   Glucose mg/dL 283*  --   --  86   Glucose, UA   --  SEE COMMENT 3+*  --    BUN mg/dL 38*  --   --   38*   Creatinine mg/dL 2.5*  --   --  2.5*   Albumin g/dL 3.7  --   --  3.0*   Total Bilirubin mg/dL 1.7*  --   --  2.0*   Alkaline Phosphatase U/L 73  --   --  51*   AST U/L 22  --   --  13   ALT U/L 32  --   --  22   Magnesium mg/dL  --   --   --  1.7       Vancomycin Administrations:  vancomycin given in the last 96 hours                     vancomycin (VANCOCIN) 1,500 mg in dextrose 5 % 500 mL IVPB (mg) 1,500 mg New Bag 11/14/20 1440                    Microbiologic Results:  Microbiology Results (last 7 days)       Procedure Component Value Units Date/Time    Blood culture [[id number]] Collected: 11/14/20 0725    Order Status: Completed Specimen: Blood from Peripheral, Antecubital, Right Updated: 11/15/20 0832     Blood Culture, Routine No Growth to date      No Growth to date    Urine culture [[id number]]  (Abnormal) Collected: 11/14/20 0730    Order Status: Completed Specimen: Urine Updated: 11/15/20 0822     Urine Culture, Routine GRAM NEGATIVE SURAJ, LACTOSE   50,000 - 99,999 cfu/ml  Identification and susceptibility pending      Narrative:      Specimen Source->Urine    Blood culture [[id number]] Collected: 11/14/20 0730    Order Status: Completed Specimen: Blood from Peripheral, Forearm, Right Updated: 11/15/20 0215     Blood Culture, Routine Gram stain lucius bottle: Gram negative rods      Results called to and read back by:Mahesh Slade RN-[address] [place] [address];        11/15/2020  02:15 BRIAN

## 2020-11-15 NOTE — H&P
[facility] Medicine  History & Physical    Patient Name: Jamil Cadet  MRN: [id number]  Admission Date: 11/14/2020  Attending Physician: Adama Briscoe MD   Primary Care Provider: Richard Gunter MD  Date of service:  11/14/2020     Patient information was obtained from patient and ER records.     Subjective:     Principal Problem:Hemorrhage from nephrostomy tube    Chief Complaint:   Chief Complaint   Patient presents with    Hematuria     has stent on right side        HPI: 75-year-old gentleman with past medical history of non-insulin-dependent diabetes mellitus type 2, hypertension, chronic anemia, hyperlipidemia, and right-sided renal cell carcinoma status post partial nephrectomy with nephrostomy tubes in place who presents today with pain and hemorrhage from right nephrostomy tube.  The patient reports that he has been having right-sided flank pain at around nephrostomy tube site for the last several days.  The pain has been progressively worsening.  Today the pain is severe in intensity and constant in timing.  He has associated hemorrhage from the tube for the last few days.  He denies any fever or chills.  No nausea or vomiting.  He is tolerating diet okay.  No chest pain or shortness of breath.  No headache or syncope.   I discussed the case with emergency room physician Dr. Chaudhry.  In the emergency room the patient had malfunction of nephrostomy tube due to clogging from bleeding, however the tube has been flushed and is now functioning properly.  Patient is afebrile and blood pressure stable.  He is medically stable for admission to floor.    Past Medical History:   Diagnosis Date    Cancer     Diabetes mellitus, type 2     Disorder of kidney and ureter     Encounter for blood transfusion     History of renal cell carcinoma status post partial nephrectomy 9/7/2020    Hydronephrosis 9/9/2020    Hyperlipidemia     Hypertension     Polio        Past Surgical History:    Procedure Laterality Date    APPENDECTOMY      CYSTOSCOPY W/ RETROGRADES Right 9/9/2020    Procedure: CYSTOSCOPY, WITH RETROGRADE PYELOGRAM;  Surgeon: Chris Garcia Jr., MD;  Location: [facility] OR;  Service: Urology;  Laterality: Right;    SPINE SURGERY      URETHROSCOPY  9/9/2020    Procedure: URETHROSCOPY;  Surgeon: Chris Garcia Jr., MD;  Location: [facility] OR;  Service: Urology;;       Review of patient's allergies indicates:  No Known Allergies    No current facility-administered medications on file prior to encounter.      Current Outpatient Medications on File Prior to Encounter   Medication Sig    acetaminophen (TYLENOL) 325 MG tablet Take 325 mg by mouth every 6 (six) hours as needed for Pain.    amLODIPine (NORVASC) 10 MG tablet Take 10 mg by mouth daily as needed.     hydrALAZINE (APRESOLINE) 25 MG tablet Take 25 mg by mouth 3 (three) times daily.    insulin aspart U-100 (NOVOLOG FLEXPEN U-100 INSULIN) 100 unit/mL (3 mL) InPn pen Inject 10 Units into the skin 3 (three) times daily with meals. Check glucose before meals and then take insulin as per sliding scale    insulin degludec (TRESIBA FLEXTOUCH U-100) 100 unit/mL (3 mL) InPn Inject 15 Units into the skin every evening.    mupirocin (BACTROBAN) 2 % ointment Apply topically 3 (three) times daily.    rosuvastatin (CRESTOR) 20 MG tablet Take 20 mg by mouth every evening.     [DISCONTINUED] glimepiride (AMARYL) 4 MG tablet     blood-glucose meter (TRUE METRIX GLUCOSE METER) Misc 1 Device by Misc.(Non-Drug; Combo Route) route once daily.    glimepiride (AMARYL) 4 MG tablet Take 4 mg by mouth 2 (two) times a day. HCS    traZODone (DESYREL) 50 MG tablet Take 50 mg by mouth every evening. HCS    TRUE METRIX GLUCOSE TEST STRIP Strp     [DISCONTINUED] traZODone (DESYREL) 50 MG tablet Start with 50 mg at bedtime for 1 week and then from 2nd week increase to 100 mg or 2 tablets at bedtime.  Tapering protocol on clonazepam. (Patient not taking:  Reported on 11/5/2020)     Family History     Problem Relation (Age of Onset)    Heart disease Mother, Father, Maternal Grandfather, Paternal Grandmother, Paternal Grandfather    Stroke Father        Tobacco Use    Smoking status: Never Smoker    Smokeless tobacco: Never Used   Substance and Sexual Activity    Alcohol use: No     Frequency: Never    Drug use: No    Sexual activity: Yes     Partners: Female     Review of Systems complete 10 point review of systems negative except as per HPI  Objective:     Vital Signs (Most Recent):  Temp: 98.5 °F (36.9 °C) (11/14/20 1606)  Pulse: 83 (11/14/20 1606)  Resp: 18 (11/14/20 0709)  BP: 128/68 (11/14/20 1606)  SpO2: 98 % (11/14/20 1606) Vital Signs (24h Range):  Temp:  [98.5 °F (36.9 °C)-98.7 °F (37.1 °C)] 98.5 °F (36.9 °C)  Pulse:  [] 83  Resp:  [16-18] 18  SpO2:  [92 %-100 %] 98 %  BP: (128-161)/(60-83) 128/68     Weight: 76.3 kg (168 lb 3.4 oz)  Body mass index is 23.46 kg/m².    Physical Exam  General:  Comfortable appearing, nontoxic, no apparent distress  Head eyes:  Anicteric sclerae, no conjunctival discharge  ENT:  Moist oral mucosa, no thrush  Pulmonary:  Comfortable work breathing, lungs are clear to auscultation bilaterally  Cardiovascular:  2+ radial pulses, regular rate and rhythm  GI:  Abdomen mildly distended, soft, tenderness to palpation of right flank around nephrostomy tube site which is intact without bleeding or drainage, nephrostomy tube with bright red blood output  Psych:  Mood is calm affect normal, insight good  Neuro:  Nonfocal motor exam, fluent speech, alert and oriented  Skin:  Dry and warm no jaundice     Significant Labs:   BMP:   Recent Labs   Lab 11/14/20  0635   *   *   K 4.7      CO2 18*   BUN 38*   CREATININE 2.5*   CALCIUM 9.2     CBC:   Recent Labs   Lab 11/14/20  0635 11/14/20  1705   WBC 11.24 6.85   HGB 10.4* 8.8*   HCT 31.5* 26.7*    169     CMP:   Recent Labs   Lab 11/14/20  0635   *   K  4.7      CO2 18*   *   BUN 38*   CREATININE 2.5*   CALCIUM 9.2   PROT 7.0   ALBUMIN 3.7   BILITOT 1.7*   ALKPHOS 73   AST 22   ALT 32   ANIONGAP 13   EGFRNONAA 23.9*     All pertinent labs within the past 24 hours have been reviewed.    Abdominal CT impression:  1. CT of the abdomen and pelvis, similar in imaging appearance to the previous exam.  2. Right-sided percutaneous nephrostomy tube with coil projecting to the inferior pole the right kidney.  2. Mild right-sided hydronephrosis and moderate right-sided hydroureter, with a relative transition point at the distal third of the right ureter.  4. Heterogeneous increased attenuation within the right renal pelvis and proximal right renal collecting system suggestive of blood/clot, debris (and less likely fungus ball, infection or noncalcified stone), noting that underlying malignancy is not excluded. Consider correlation with urine output and urinalysis. No radiopaque renal/collecting system stone is identified.  5. Cholelithiasis without findings to suggest acute cholecystitis as above.  6. The attenuation of the aortic blood pool is below 35 HU: as this finding carries a 94% specificity for anemia, correlation with CBC is advised (Eur Radiol 2008: 18:1863-8).  7. Mild splenomegaly.  8. Mild circumferential bladder wall thickening, consider correlation for cystitis with urinalysis.  9. Numerous additional and incidental findings are outlined above, unchanged from the previous exam       Assessment/Plan:     Assessment:  Right nephrostomy hemorrhage  Right nephrostomy malfunction secondary to hemorrhage, improved  Possible right nephrostomy infection  Renal cell carcinoma status post partial nephrectomy and right nephrostomy tube placement  Non-insulin-dependent diabetes mellitus type 2  Hypertension  Hyperlipidemia  Anemia of chronic disease  Hyperlipidemia     Plan:  Observation on Med surg  Consultation with Urology  Supportive care including pain  control and antiemetics as needed  Nephrostomy 2 monitoring  Start empiric IV antibiotics with vancomycin and Zosyn.  Follow up all culture data.  Serial lab monitoring.  Monitor for additional bleeding.  Avoid all antiplatelets or anticoagulants  Mobilize as able  Sliding scale insulin for euglycemia  Continue medications for chronic issues as able  GI prophylaxis with Pepcid  VTE prophylaxis with SCDs  High risk secondary to severe exacerbation of chronic illness/adverse effect of treatment; administration of medication requiring close monitoring of levels to prevent toxicity-vancomycin; parenteral controlled substances for symptom control    VTE Risk Mitigation (From admission, onward)         Ordered     IP VTE LOW RISK PATIENT  Once      11/14/20 1027     Place sequential compression device  Until discontinued      11/14/20 1027     Place sequential compression device  Until discontinued      11/14/20 1027     Reason for No Pharmacological VTE Prophylaxis  Once     Question:  Reasons:  Answer:  Active Bleeding    11/14/20 1027                Adama Briscoe MD  Department of Hospital Medicine   [facility]

## 2020-11-15 NOTE — PLAN OF CARE
11/15/20 0925   SALAMANCA Message   Medicare Outpatient and Observation Notification regarding financial responsibility Given to patient/caregiver;Signed/date by patient/caregiver   Date SALAMANCA was signed 11/15/20   Time SALAMANCA was signed 0830

## 2020-11-15 NOTE — PLAN OF CARE
Problem: Fall Injury Risk  Goal: Absence of Fall and Fall-Related Injury  Outcome: Ongoing, Progressing     Problem: Adult Inpatient Plan of Care  Goal: Plan of Care Review  Outcome: Ongoing, Progressing  Goal: Patient-Specific Goal (Individualization)  Outcome: Ongoing, Progressing  Goal: Absence of Hospital-Acquired Illness or Injury  Outcome: Ongoing, Progressing  Goal: Optimal Comfort and Wellbeing  Outcome: Ongoing, Progressing  Goal: Readiness for Transition of Care  Outcome: Ongoing, Progressing  Goal: Rounds/Family Conference  Outcome: Ongoing, Progressing     Problem: Diabetes Comorbidity  Goal: Blood Glucose Level Within Desired Range  Outcome: Ongoing, Progressing     Problem: Adjustment to Illness (Sepsis/Septic Shock)  Goal: Optimal Coping  Outcome: Ongoing, Progressing     Problem: Bleeding (Sepsis/Septic Shock)  Goal: Absence of Bleeding  Outcome: Ongoing, Progressing     Problem: Glycemic Control Impaired (Sepsis/Septic Shock)  Goal: Blood Glucose Level Within Desired Range  Outcome: Ongoing, Progressing     Problem: Hemodynamic Instability (Sepsis/Septic Shock)  Goal: Effective Tissue Perfusion  Outcome: Ongoing, Progressing     Problem: Infection (Sepsis/Septic Shock)  Goal: Absence of Infection Signs/Symptoms  Outcome: Ongoing, Progressing     Problem: Nutrition Impaired (Sepsis/Septic Shock)  Goal: Optimal Nutrition Intake  Outcome: Ongoing, Progressing     Problem: Respiratory Compromise (Sepsis/Septic Shock)  Goal: Effective Oxygenation and Ventilation  Outcome: Ongoing, Progressing     Problem: Infection  Goal: Infection Symptom Resolution  Outcome: Ongoing, Progressing

## 2020-11-15 NOTE — PLAN OF CARE
Pt assessment completed at bedside. Pt spouse present during assessment. Pt denies children. Pt denies living will.Pt PCP Dr. Gunter. Pt intends to d/c home w/ family; family will transport. Pt currently receives  [facility]. Pt gets [facility] at [facility]. Denies any other barriers at current. Case management to continue to follow.        11/15/20 0910   Discharge Assessment   Assessment Type Discharge Planning Assessment   Confirmed/corrected address and phone number on facesheet? Yes   Assessment information obtained from? Patient;Caregiver;Medical Record   Communicated expected length of stay with patient/caregiver no   Prior to hospitilization cognitive status: Alert/Oriented   Prior to hospitalization functional status: Assistive Equipment   Current cognitive status: Alert/Oriented   Current Functional Status: Assistive Equipment   Facility Arrived From: home   Lives With spouse   Able to Return to Prior Arrangements yes   Is patient able to care for self after discharge? Unable to determine at this time (comments)   Who are your caregiver(s) and their phone number(s)? Meron velasco spouse [phone]   Patient's perception of discharge disposition home health   Readmission Within the Last 30 Days no previous admission in last 30 days   Patient currently being followed by outpatient case management? No   Patient currently receives any other outside agency services? Yes   Name and contact number of agency or person providing outside services [facility]   Is it the patient/care giver preference to resume care with the current outside agency? Yes   Equipment Currently Used at Home cane, straight   Do you have any problems affording any of your prescribed medications? No   Is the patient taking medications as prescribed? yes   Does the patient have transportation home? Yes   Transportation Anticipated family or friend will provide   Discharge Plan A Home Health   Discharge Plan B Home Health   DME Needed  Upon Discharge  none   Patient/Family in Agreement with Plan yes

## 2020-11-16 ENCOUNTER — TELEPHONE (OUTPATIENT)
Dept: UROLOGY | Facility: CLINIC | Age: 75
End: 2020-11-16

## 2020-11-16 LAB
ALBUMIN SERPL BCP-MCNC: 3.2 G/DL (ref 3.5–5.2)
ALP SERPL-CCNC: 59 U/L (ref 55–135)
ALT SERPL W/O P-5'-P-CCNC: 24 U/L (ref 10–44)
ANION GAP SERPL CALC-SCNC: 13 MMOL/L (ref 8–16)
AST SERPL-CCNC: 18 U/L (ref 10–40)
BACTERIA UR CULT: ABNORMAL
BILIRUB SERPL-MCNC: 1.4 MG/DL (ref 0.1–1)
BUN SERPL-MCNC: 34 MG/DL (ref 8–23)
CALCIUM SERPL-MCNC: 9.3 MG/DL (ref 8.7–10.5)
CHLORIDE SERPL-SCNC: 107 MMOL/L (ref 95–110)
CO2 SERPL-SCNC: 20 MMOL/L (ref 23–29)
CREAT SERPL-MCNC: 2.4 MG/DL (ref 0.5–1.4)
EST. GFR  (AFRICAN AMERICAN): 29 ML/MIN/1.73 M^2
EST. GFR  (NON AFRICAN AMERICAN): 25.1 ML/MIN/1.73 M^2
GLUCOSE SERPL-MCNC: 131 MG/DL (ref 70–110)
GLUCOSE SERPL-MCNC: 139 MG/DL (ref 70–110)
GLUCOSE SERPL-MCNC: 226 MG/DL (ref 70–110)
GLUCOSE SERPL-MCNC: 272 MG/DL (ref 70–110)
GLUCOSE SERPL-MCNC: 288 MG/DL (ref 70–110)
MAGNESIUM SERPL-MCNC: 1.8 MG/DL (ref 1.6–2.6)
POTASSIUM SERPL-SCNC: 3.9 MMOL/L (ref 3.5–5.1)
PROT SERPL-MCNC: 6.3 G/DL (ref 6–8.4)
SODIUM SERPL-SCNC: 140 MMOL/L (ref 136–145)
VANCOMYCIN SERPL-MCNC: 17.2 UG/ML

## 2020-11-16 PROCEDURE — 80202 ASSAY OF VANCOMYCIN: CPT

## 2020-11-16 PROCEDURE — 83735 ASSAY OF MAGNESIUM: CPT

## 2020-11-16 PROCEDURE — 25000003 PHARM REV CODE 250: Performed by: INTERNAL MEDICINE

## 2020-11-16 PROCEDURE — 36415 COLL VENOUS BLD VENIPUNCTURE: CPT

## 2020-11-16 PROCEDURE — 87040 BLOOD CULTURE FOR BACTERIA: CPT

## 2020-11-16 PROCEDURE — 12000002 HC ACUTE/MED SURGE SEMI-PRIVATE ROOM

## 2020-11-16 PROCEDURE — 80053 COMPREHEN METABOLIC PANEL: CPT

## 2020-11-16 PROCEDURE — 63600175 PHARM REV CODE 636 W HCPCS: Performed by: INTERNAL MEDICINE

## 2020-11-16 RX ADMIN — FAMOTIDINE 20 MG: 20 TABLET ORAL at 09:11

## 2020-11-16 RX ADMIN — INSULIN DETEMIR 15 UNITS: 100 INJECTION, SOLUTION SUBCUTANEOUS at 09:11

## 2020-11-16 RX ADMIN — PIPERACILLIN SODIUM AND TAZOBACTAM SODIUM 3.38 G: 3; .375 INJECTION, POWDER, LYOPHILIZED, FOR SOLUTION INTRAVENOUS at 04:11

## 2020-11-16 RX ADMIN — PIPERACILLIN SODIUM AND TAZOBACTAM SODIUM 3.38 G: 3; .375 INJECTION, POWDER, LYOPHILIZED, FOR SOLUTION INTRAVENOUS at 12:11

## 2020-11-16 RX ADMIN — INSULIN ASPART 4 UNITS: 100 INJECTION, SOLUTION INTRAVENOUS; SUBCUTANEOUS at 12:11

## 2020-11-16 RX ADMIN — INSULIN ASPART 6 UNITS: 100 INJECTION, SOLUTION INTRAVENOUS; SUBCUTANEOUS at 04:11

## 2020-11-16 RX ADMIN — ROSUVASTATIN 20 MG: 20 TABLET, FILM COATED ORAL at 08:11

## 2020-11-16 RX ADMIN — PIPERACILLIN SODIUM AND TAZOBACTAM SODIUM 3.38 G: 3; .375 INJECTION, POWDER, LYOPHILIZED, FOR SOLUTION INTRAVENOUS at 09:11

## 2020-11-16 NOTE — PLAN OF CARE
Important Message from Medicare was sign, explained and given to patient/caregiver on 11/16/2020 at 9:15am     answered all questions.

## 2020-11-16 NOTE — TELEPHONE ENCOUNTER
Called and spoke to patient wife who states who states that patient is admitted is [facility] for bleeding in nephrostomy tube. Wife states that tube was replaced on Saturday and since patient has been experiencing bleeding from tube and has been saturating gauze as well. Informed wife that message will sent to provider to inform and is scheduled to f/u with Dr. Garcia on 11/18. Wife voiced okay.

## 2020-11-16 NOTE — TELEPHONE ENCOUNTER
----- Message from Toyin Short sent at 11/16/2020  1:39 PM CST -----  Patient's wife, Meron, is calling to be sure you know that he has been admitted to [facility] for bleeding into the nephrostomy tube.  She said that hospital notified Dr Stearns, but they have not seen him.  Please call her at [phone].  Thank you!

## 2020-11-16 NOTE — PROGRESS NOTES
[facility] Medicine  Progress Note     Patient Name: Jamil Cadet  MRN: [id number]  Admission Date: 11/14/2020  Attending Physician: Adama Briscoe MD   Primary Care Provider: Richard Gunter MD  Date of service:  11/16/2020       Subjective:      Principal Problem:Hemorrhage from nephrostomy tube     Chief Complaint:        Chief Complaint   Patient presents with    Hematuria       has stent on right side         HPI: 75-year-old gentleman with past medical history of non-insulin-dependent diabetes mellitus type 2, hypertension, chronic anemia, hyperlipidemia, and right-sided renal cell carcinoma status post partial nephrectomy with nephrostomy tubes in place who presents today with pain and hemorrhage from right nephrostomy tube.  The patient reports that he has been having right-sided flank pain at around nephrostomy tube site for the last several days.  The pain has been progressively worsening.  Today the pain is severe in intensity and constant in timing.  He has associated hemorrhage from the tube for the last few days.  He denies any fever or chills.  No nausea or vomiting.  He is tolerating diet okay.  No chest pain or shortness of breath.  No headache or syncope.   I discussed the case with emergency room physician Dr. Chaudhry.  In the emergency room the patient had malfunction of nephrostomy tube due to clogging from bleeding, however the tube has been flushed and is now functioning properly.  Patient is afebrile and blood pressure stable.  He is medically stable for admission to floor.    Interval history:  Today the patient reports that hemorrhage/hematuria coming from nephrostomy tube has continued to improve, currently resolved with medical treatment.  Nephrostomy tube now putting out clear urine.  Patient has right-sided flank pain is improved with medical treatment, persistent to mild intensity at this time.  Eating well with no nausea or vomiting.  No fever.  No chest  pain or shortness of breath.     Objective:      Physical Exam  Vital signs reviewed  General:  Comfortable appearing, nontoxic, nondiaphoretic  Head eyes:  Anicteric sclerae, no conjunctival discharge  ENT:  Moist oral mucosa, no thrush  Pulmonary:  Comfortable work breathing, lungs are clear to auscultation bilaterally  Cardiovascular:  2+ radial pulses, regular rate and rhythm  GI:  Abdomen soft, nondistended, minimal right flank tenderness, no from a semi tube with clear yellow output  Psych:  Mood is calm affect normal, insight good  Neuro:  Nonfocal motor exam, fluent speech, alert and oriented  Skin:  Dry and warm no jaundice     Significant Labs:   Glucose 131-226  Creatinine 2.4    Urine culture positive Citrobacter species  Blood culture positive Gram-negative shelly     All pertinent labs within the past 24 hours have been reviewed.     Abdominal CT impression:  1. CT of the abdomen and pelvis, similar in imaging appearance to the previous exam.  2. Right-sided percutaneous nephrostomy tube with coil projecting to the inferior pole the right kidney.  2. Mild right-sided hydronephrosis and moderate right-sided hydroureter, with a relative transition point at the distal third of the right ureter.  4. Heterogeneous increased attenuation within the right renal pelvis and proximal right renal collecting system suggestive of blood/clot, debris (and less likely fungus ball, infection or noncalcified stone), noting that underlying malignancy is not excluded. Consider correlation with urine output and urinalysis. No radiopaque renal/collecting system stone is identified.  5. Cholelithiasis without findings to suggest acute cholecystitis as above.  6. The attenuation of the aortic blood pool is below 35 HU: as this finding carries a 94% specificity for anemia, correlation with CBC is advised (Eur Radiol 2008: 18:1863-8).  7. Mild splenomegaly.  8. Mild circumferential bladder wall thickening, consider correlation for  cystitis with urinalysis.  9. Numerous additional and incidental findings are outlined above, unchanged from the previous exam        Assessment/Plan:      Assessment:  Right nephrostomy hemorrhage likely secondary to infection with urinary tract infection and bacteremia  Right nephrostomy malfunction secondary to hemorrhage, improved  Renal cell carcinoma status post partial nephrectomy and right nephrostomy tube placement  Non-insulin-dependent diabetes mellitus type 2  Hypertension  Hyperlipidemia  Anemia of chronic disease  Hyperlipidemia     Plan:  Continue care on Med surg  Consultation with Urology - I communicated today with Dr. Garcia about the plan of care  Supportive care including pain control and antiemetics as needed  Nephrostomy tube monitoring.  Hemorrhage seems to be resolved.  Continue empiric IV antibiotics with Zosyn.  Stop vancomycin.  Repeat blood culture to ensure clearance.  Hopefully transition to oral antibiotic in 24-48 hours to complete 14 day course for Gram-negative bacteremia  Serial lab monitoring.  Monitor for additional bleeding.  Avoid all antiplatelets or anticoagulants  Mobilize as able  Sliding scale insulin for euglycemia  Daily labs.  Monitor replace electrolytes as needed  Continue medications for chronic issues as able  GI prophylaxis with Pepcid  VTE prophylaxis with SCDs  High risk secondary to severe exacerbation of chronic illness/adverse effect of treatment; administration of medication requiring close monitoring of levels to prevent toxicity-vancomycin; parenteral controlled substances for symptom control  Disposition:  Possible discharge next 24 hours         VTE Risk Mitigation (From admission, onward)                  Ordered        IP VTE LOW RISK PATIENT  Once      11/14/20 1027        Place sequential compression device  Until discontinued      11/14/20 1027        Place sequential compression device  Until discontinued      11/14/20 1027        Reason for No  Pharmacological VTE Prophylaxis  Once     Question:  Reasons:  Answer:  Active Bleeding    11/14/20 1027                     Adama Briscoe MD  Department of Hospital Medicine   [facility]

## 2020-11-17 VITALS
HEIGHT: 71 IN | TEMPERATURE: 98 F | RESPIRATION RATE: 18 BRPM | SYSTOLIC BLOOD PRESSURE: 141 MMHG | HEART RATE: 66 BPM | OXYGEN SATURATION: 100 % | DIASTOLIC BLOOD PRESSURE: 69 MMHG | BODY MASS INDEX: 23.55 KG/M2 | WEIGHT: 168.19 LBS

## 2020-11-17 PROBLEM — T83.098A MALFUNCTION OF NEPHROSTOMY TUBE: Status: RESOLVED | Noted: 2020-11-14 | Resolved: 2020-11-17

## 2020-11-17 PROBLEM — T83.83XA: Status: RESOLVED | Noted: 2020-11-14 | Resolved: 2020-11-17

## 2020-11-17 LAB
ALBUMIN SERPL BCP-MCNC: 3.2 G/DL (ref 3.5–5.2)
ALP SERPL-CCNC: 55 U/L (ref 55–135)
ALT SERPL W/O P-5'-P-CCNC: 24 U/L (ref 10–44)
ANION GAP SERPL CALC-SCNC: 8 MMOL/L (ref 8–16)
AST SERPL-CCNC: 17 U/L (ref 10–40)
BACTERIA BLD CULT: ABNORMAL
BASOPHILS # BLD AUTO: 0.02 K/UL (ref 0–0.2)
BASOPHILS NFR BLD: 0.4 % (ref 0–1.9)
BILIRUB SERPL-MCNC: 1 MG/DL (ref 0.1–1)
BUN SERPL-MCNC: 34 MG/DL (ref 8–23)
CALCIUM SERPL-MCNC: 9 MG/DL (ref 8.7–10.5)
CHLORIDE SERPL-SCNC: 110 MMOL/L (ref 95–110)
CO2 SERPL-SCNC: 21 MMOL/L (ref 23–29)
CREAT SERPL-MCNC: 2.2 MG/DL (ref 0.5–1.4)
DIFFERENTIAL METHOD: ABNORMAL
EOSINOPHIL # BLD AUTO: 0.3 K/UL (ref 0–0.5)
EOSINOPHIL NFR BLD: 5.8 % (ref 0–8)
ERYTHROCYTE [DISTWIDTH] IN BLOOD BY AUTOMATED COUNT: 12.7 % (ref 11.5–14.5)
EST. GFR  (AFRICAN AMERICAN): 32.2 ML/MIN/1.73 M^2
EST. GFR  (NON AFRICAN AMERICAN): 27.9 ML/MIN/1.73 M^2
GLUCOSE SERPL-MCNC: 151 MG/DL (ref 70–110)
GLUCOSE SERPL-MCNC: 168 MG/DL (ref 70–110)
GLUCOSE SERPL-MCNC: 215 MG/DL (ref 70–110)
HCT VFR BLD AUTO: 26.7 % (ref 40–54)
HGB BLD-MCNC: 8.8 G/DL (ref 14–18)
IMM GRANULOCYTES # BLD AUTO: 0.03 K/UL (ref 0–0.04)
IMM GRANULOCYTES NFR BLD AUTO: 0.6 % (ref 0–0.5)
LYMPHOCYTES # BLD AUTO: 1.1 K/UL (ref 1–4.8)
LYMPHOCYTES NFR BLD: 22.8 % (ref 18–48)
MAGNESIUM SERPL-MCNC: 1.7 MG/DL (ref 1.6–2.6)
MCH RBC QN AUTO: 29.8 PG (ref 27–31)
MCHC RBC AUTO-ENTMCNC: 33 G/DL (ref 32–36)
MCV RBC AUTO: 91 FL (ref 82–98)
MONOCYTES # BLD AUTO: 0.8 K/UL (ref 0.3–1)
MONOCYTES NFR BLD: 16.1 % (ref 4–15)
NEUTROPHILS # BLD AUTO: 2.6 K/UL (ref 1.8–7.7)
NEUTROPHILS NFR BLD: 54.3 % (ref 38–73)
NRBC BLD-RTO: 0 /100 WBC
PLATELET # BLD AUTO: 228 K/UL (ref 150–350)
PMV BLD AUTO: 10 FL (ref 9.2–12.9)
POTASSIUM SERPL-SCNC: 3.8 MMOL/L (ref 3.5–5.1)
PROT SERPL-MCNC: 6.5 G/DL (ref 6–8.4)
RBC # BLD AUTO: 2.95 M/UL (ref 4.6–6.2)
SODIUM SERPL-SCNC: 139 MMOL/L (ref 136–145)
WBC # BLD AUTO: 4.83 K/UL (ref 3.9–12.7)

## 2020-11-17 PROCEDURE — 85025 COMPLETE CBC W/AUTO DIFF WBC: CPT

## 2020-11-17 PROCEDURE — 63600175 PHARM REV CODE 636 W HCPCS: Performed by: INTERNAL MEDICINE

## 2020-11-17 PROCEDURE — 80053 COMPREHEN METABOLIC PANEL: CPT

## 2020-11-17 PROCEDURE — 36415 COLL VENOUS BLD VENIPUNCTURE: CPT

## 2020-11-17 PROCEDURE — 25000003 PHARM REV CODE 250: Performed by: INTERNAL MEDICINE

## 2020-11-17 PROCEDURE — 83735 ASSAY OF MAGNESIUM: CPT

## 2020-11-17 RX ORDER — LEVOFLOXACIN 250 MG/1
250 TABLET ORAL DAILY
Qty: 12 TABLET | Refills: 0 | Status: SHIPPED | OUTPATIENT
Start: 2020-11-17 | End: 2020-11-29

## 2020-11-17 RX ADMIN — PIPERACILLIN SODIUM AND TAZOBACTAM SODIUM 3.38 G: 3; .375 INJECTION, POWDER, LYOPHILIZED, FOR SOLUTION INTRAVENOUS at 08:11

## 2020-11-17 RX ADMIN — PIPERACILLIN SODIUM AND TAZOBACTAM SODIUM 3.38 G: 3; .375 INJECTION, POWDER, LYOPHILIZED, FOR SOLUTION INTRAVENOUS at 12:11

## 2020-11-17 RX ADMIN — INSULIN ASPART 4 UNITS: 100 INJECTION, SOLUTION INTRAVENOUS; SUBCUTANEOUS at 12:11

## 2020-11-17 RX ADMIN — INSULIN ASPART 2 UNITS: 100 INJECTION, SOLUTION INTRAVENOUS; SUBCUTANEOUS at 08:11

## 2020-11-17 RX ADMIN — FAMOTIDINE 20 MG: 20 TABLET ORAL at 08:11

## 2020-11-17 NOTE — PLAN OF CARE
Pt established with [facility] home [facility].     11/17/20 1300   Final Note   Assessment Type Final Discharge Note   Anticipated Discharge Disposition Home-Health   Post-Acute Status   Post-Acute Authorization Home Health   Home Health Status Set-up Complete   Discharge Delays None known at this time

## 2020-11-17 NOTE — PROGRESS NOTES
Therapy with Vancomycin complete and / or consult / order discontinued by Dr. Briscoe on 11/16 @ 7588   Pharmacy will sign off, please re-consult as needed.  Thank you for allowing us to participate in this patient's care.  Kal Saravia 11/16/2020 11:19 PM  Dept of Pharmacy  Ext [phone]

## 2020-11-17 NOTE — PLAN OF CARE
Problem: Fall Injury Risk  Goal: Absence of Fall and Fall-Related Injury  Outcome: Ongoing, Progressing     Problem: Adult Inpatient Plan of Care  Goal: Plan of Care Review  Outcome: Ongoing, Progressing  Goal: Patient-Specific Goal (Individualization)  Outcome: Ongoing, Progressing  Goal: Absence of Hospital-Acquired Illness or Injury  Outcome: Ongoing, Progressing  Goal: Optimal Comfort and Wellbeing  Outcome: Ongoing, Progressing  Goal: Readiness for Transition of Care  Outcome: Ongoing, Progressing  Goal: Rounds/Family Conference  Outcome: Ongoing, Progressing     Problem: Diabetes Comorbidity  Goal: Blood Glucose Level Within Desired Range  Outcome: Ongoing, Progressing     Problem: Adjustment to Illness (Sepsis/Septic Shock)  Goal: Optimal Coping  Outcome: Ongoing, Progressing     Problem: Bleeding (Sepsis/Septic Shock)  Goal: Absence of Bleeding  Outcome: Ongoing, Progressing     Problem: Glycemic Control Impaired (Sepsis/Septic Shock)  Goal: Blood Glucose Level Within Desired Range  Outcome: Ongoing, Progressing     Problem: Infection (Sepsis/Septic Shock)  Goal: Absence of Infection Signs/Symptoms  Outcome: Ongoing, Progressing     Problem: Nutrition Impaired (Sepsis/Septic Shock)  Goal: Optimal Nutrition Intake  Outcome: Ongoing, Progressing     Problem: Respiratory Compromise (Sepsis/Septic Shock)  Goal: Effective Oxygenation and Ventilation  Outcome: Ongoing, Progressing     Problem: Infection  Goal: Infection Symptom Resolution  Outcome: Ongoing, Progressing

## 2020-11-17 NOTE — PROGRESS NOTES
"VANCOMYCIN PHARMACOKINETIC NOTE:  Vancomycin Day #3    Objective:    75 y.o., male, Actual Body Weight = 76.3 kg (168 lb 3.4 oz)    Diagnosis/Indication for Vancomycin:  Urinary Tract Infection   Desired Vancomycin Peak:  30-35 mcg/ml; Desired Trough: 10-15 mcg/ml    Current Vancomycin Regimen:   IV every  random hours      Antibiotics (From admission, onward)      Start     Stop Route Frequency Ordered    11/17/20 0900  vancomycin (VANCOCIN) 1,250 mg in dextrose 5 % 250 mL IVPB      -- IV Once 11/16/20 1717    11/14/20 1600  piperacillin-tazobactam 3.375 g in dextrose 5 % 50 mL IVPB (ready to mix system)     Note to Pharmacy: Ht: 5' 11" (1.803 m)  Wt: 75.3 kg (166 lb)  Estimated Creatinine Clearance: 26.4 mL/min (A) (based on SCr of 2.5 mg/dL (H)).     -- IV Every 8 hours (non-standard times) 11/14/20 1027    11/14/20 1128  vancomycin - pharmacy to dose  (vancomycin with pharmacy to dose consult)      -- IV pharmacy to manage frequency 11/14/20 1028             The patient has the following labs:     11/16/2020 Estimated Creatinine Clearance: 27.5 mL/min (A) (based on SCr of 2.4 mg/dL (H)). Lab Results   Component Value Date    BUN 34 (H) 11/16/2020     Lab Results   Component Value Date    WBC 6.85 11/14/2020          Random vancomycin:  17.2 mcg/mL collected 24 hours after Dose #1    Microbiology Results (last 7 days)       Procedure Component Value Units Date/Time    Blood culture [[id number]]     Order Status: Sent Specimen: Blood     Blood culture [[id number]] Collected: 11/14/20 0725    Order Status: Completed Specimen: Blood from Peripheral, Antecubital, Right Updated: 11/16/20 0832     Blood Culture, Routine No Growth to date      No Growth to date      No Growth to date    Urine culture [[id number]]  (Abnormal)  (Susceptibility) Collected: 11/14/20 0730    Order Status: Completed Specimen: Urine Updated: 11/16/20 0637     Urine Culture, Routine CITROBACTER FREUNDII  50,000 - 99,999 cfu/ml      Narrative:      " Specimen Source->Urine    Blood culture [[id number]]  (Abnormal) Collected: 11/14/20 0730    Order Status: Completed Specimen: Blood from Peripheral, Forearm, Right Updated: 11/16/20 0631     Blood Culture, Routine Gram stain lucius bottle: Gram negative rods      Results called to and read back by:Mahesh Slade RN-[address] [place] [address];        11/15/2020  02:15 CJD      GRAM NEGATIVE SURAJ, LACTOSE   Identification and susceptibility pending               Assessment:    Vancomycin trough is above goal range.  Vancomycin Pharmacokinetic Parameters      Plan:      Will obtain random vancomycin level 23 hours after next dose at 9:00 on 11-17      Pharmacy will continue to manage vancomycin therapy and adjust regimen as necessary.    Thank you for allowing us to participate in this patient's care.     Archie Boston 11/16/2020 7:17 PM  Department of Pharmacy  Ext [phone]

## 2020-11-17 NOTE — CARE UPDATE
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     Has had a nephrostomy tube after being unable to place a stent.     Patient re-admitted with gross hematuria from his nephrostomy tube and subsequently found to have positive urine and blood cultures.     Spoke with him over the phone today. Plan is for discharge home and follow up in clinic tomorrow to decide on definitive management of his stricture.     Continue care per primary medical team.

## 2020-11-18 ENCOUNTER — OFFICE VISIT (OUTPATIENT)
Dept: UROLOGY | Facility: CLINIC | Age: 75
End: 2020-11-18
Payer: MEDICARE

## 2020-11-18 VITALS
BODY MASS INDEX: 23.46 KG/M2 | SYSTOLIC BLOOD PRESSURE: 122 MMHG | DIASTOLIC BLOOD PRESSURE: 69 MMHG | WEIGHT: 167.56 LBS | HEIGHT: 71 IN | HEART RATE: 118 BPM | TEMPERATURE: 98 F

## 2020-11-18 DIAGNOSIS — C64.1 RENAL CELL ADENOCARCINOMA, RIGHT: ICD-10-CM

## 2020-11-18 DIAGNOSIS — N13.30 HYDRONEPHROSIS, UNSPECIFIED HYDRONEPHROSIS TYPE: ICD-10-CM

## 2020-11-18 DIAGNOSIS — N13.5 URETERAL STRICTURE, RIGHT: Primary | ICD-10-CM

## 2020-11-18 PROCEDURE — 99999 PR PBB SHADOW E&M-EST. PATIENT-LVL IV: CPT | Mod: PBBFAC,,, | Performed by: UROLOGY

## 2020-11-18 PROCEDURE — 99214 OFFICE O/P EST MOD 30 MIN: CPT | Mod: PBBFAC,PN | Performed by: UROLOGY

## 2020-11-18 PROCEDURE — 99214 PR OFFICE/OUTPT VISIT, EST, LEVL IV, 30-39 MIN: ICD-10-PCS | Mod: S$PBB,,, | Performed by: UROLOGY

## 2020-11-18 PROCEDURE — 99214 OFFICE O/P EST MOD 30 MIN: CPT | Mod: S$PBB,,, | Performed by: UROLOGY

## 2020-11-18 PROCEDURE — 99999 PR PBB SHADOW E&M-EST. PATIENT-LVL IV: ICD-10-PCS | Mod: PBBFAC,,, | Performed by: UROLOGY

## 2020-11-18 RX ORDER — PEN NEEDLE, DIABETIC 31 GX5/16"
NEEDLE, DISPOSABLE MISCELLANEOUS
COMMUNITY
Start: 2020-11-05 | End: 2021-11-08 | Stop reason: SDUPTHER

## 2020-11-18 NOTE — PROGRESS NOTES
[facility] Established Patient/H&P:    Jamil Cadet is a 75 y.o. male who presents for follow up for right ureteral stricture.    Patient with a history of bilateral renal masses s/p right robotic retroperitoneal partial nephrectomy in 4/2/2019 with Dr. Santos at [facility] - left 2 cm cyst on surveillance. 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 Per the operative report, his procedure was extremely complicated due to sticky fat.      He presented to the [facility] emergency department on 9/6/20 with severe hypoglycemia.       On review of his labs, he was incidentally found to have an elevated creatinine of 14.4 from a baseline of 1.5 with hyperkalemia.      He subsequently underwent US kidney which revealed mild right pelviectasis. CT renal stone on 9/8/20 showed interval development of right hydroureteronephrosis down to the pelvis - no obvious etiology for obstruction. Also with gallbladder distention, and a calculus of the cystic duct and non-obstructing stone in right kidney.      He underwent right diagnostic ureteroscopy with attempted right ureteral stent placement on 9/9/20. Subsequently had a nephrostomy tube placed and was discharged in stable condition.         10/24/20: Patient  followed up with Dr. Kennedy at [facility]. He underwent Mag 3 renal lasix scan and had his nephrostomy tube exchanged. Mag 3 on 10/19/20 revealed 48% right function, 52% left. Per his wife, he began with severe gross hematuria and clots from his nephrostomy tube after exchange at [facility].     He presented to the ED on 10/22 and subsequently on 10/23 complaining of persistent gross hematuria. His H/H was stable at 8/25, however he had fever to 103F. Admitted for medical management. Creatinine 2.7. CT abd/pelvis wo contrast revealed nephrostomy tube in place with mild right hydroureteronephrosis down to pelvis.      11/3/20: Patient presented again to the [facility] ED yesterday  complaining of recurrent gross hematuria after pulling his right nephrostomy tube inadvertently. Admitted for observation. Urine now clear ine nephrostomy tube with no hematuria.     11/18/20: He has continued to frequent the ED for gross hematuria from his nephrostomy tube. He has decided to transfer care into the [facility] system and desires definitive management of his right ureteral stricture. Nephrostomy tube draining red urine into drainage bag. Blood and urine cultures positive for Citrobacter.      Patient denies any chills, flank pain, dysuria or lower urinary tract symptoms.       PSA  0.28                 9/10/19     Testosterone  280                  9/10/19      Urine culture   Citrobacter 11/14/20  No growth        9/7/20       Past Medical History:   Diagnosis Date    Cancer     Diabetes mellitus, type 2     Disorder of kidney and ureter     Encounter for blood transfusion     History of renal cell carcinoma status post partial nephrectomy 9/7/2020    Hydronephrosis 9/9/2020    Hyperlipidemia     Hypertension     Polio     Septicemia due to enterococcus 11/15/2020       Past Surgical History:   Procedure Laterality Date    APPENDECTOMY      CYSTOSCOPY W/ RETROGRADES Right 9/9/2020    Procedure: CYSTOSCOPY, WITH RETROGRADE PYELOGRAM;  Surgeon: Chris Garcia Jr., MD;  Location: [facility] OR;  Service: Urology;  Laterality: Right;    SPINE SURGERY      URETHROSCOPY  9/9/2020    Procedure: URETHROSCOPY;  Surgeon: Chris Garcia Jr., MD;  Location: [facility] OR;  Service: Urology;;       Family History   Problem Relation Age of Onset    Heart disease Mother     Heart disease Father     Stroke Father     Heart disease Maternal Grandfather     Heart disease Paternal Grandmother     Heart disease Paternal Grandfather        Social History     Socioeconomic History    Marital status:      Spouse name: Not on file    Number of children: Not on file    Years of education: Not on file     Highest education level: Not on file   Occupational History    Not on file   Social Needs    Financial resource strain: Not very hard    Food insecurity     Worry: Never true     Inability: Never true    Transportation needs     Medical: No     Non-medical: No   Tobacco Use    Smoking status: Never Smoker    Smokeless tobacco: Never Used   Substance and Sexual Activity    Alcohol use: No     Frequency: Never    Drug use: No    Sexual activity: Yes     Partners: Female   Lifestyle    Physical activity     Days per week: 6 days     Minutes per session: 150+ min    Stress: To some extent   Relationships    Social connections     Talks on phone: Three times a week     Gets together: Once a week     Attends Taoism service: Not on file     Active member of club or organization: Yes     Attends meetings of clubs or organizations: More than 4 times per year     Relationship status:    Other Topics Concern    Not on file   Social History Narrative    Not on file       Review of patient's allergies indicates:  No Known Allergies    Medications Reviewed: see MAR    ROS:    Constitutional: denies fevers, chills, night sweats, fatigue, malaise  Respiratory: negative for cough, shortness of breath, wheezing, dyspnea.  Cardiovascular: negative for chest pain, varicose veins, ankle swelling, palpitations, syncope.  GI: negative for abdominal pain, heartburn, indigestion, nausea, vomiting, constipation, diarrhea, blood in stool.   Urology: as noted above in HPI  Endocrinology: negative for cold intolerance, excessive thirst, not feeling tired/sluggish, no heat intolerance.   Hematology/Lymph: negative for easy bleeding, easy bruising, swollen glands.  Musculoskeletal: negative for back pain, joint pain, joint swelling, neck pain.  Allergy-Immunology: negative for seasonal allergies, negative for unusual infections.   Skin: negative for boils, breast lumps, hives, itching, rash.   Neurology: negative for,  "dizziness, headache, tingling/numbness, tremors.   Psych: satisfied with life; negative for, anxiety, depression, suicidal thoughts.     PHYSICAL EXAM:    Vitals:    11/18/20 1154   BP: 122/69   Pulse: (!) 118   Temp: 98 °F (36.7 °C)     Body mass index is 23.37 kg/m². Weight: 76 kg (167 lb 8.8 oz) Height: 5' 11" (180.3 cm)       General: Alert, cooperative, no distress, appears stated age  Head: Normocephalic, without obvious abnormality, atraumatic  Neck: no masses, no thyromegaly, no lymphadenopathy  Eyes: PERRL, conjunctiva/corneas clear  Lungs: Respirations unlabored, normal effort, no accessory muscle use  CV: Warm and well perfused extremities  Abdomen: Soft, non-tender, no CVA tenderness, no hepatosplenomegaly, no hernia  : Right nephrostomy tube in place with red urine - draining well  Extremities: Extremities normal, atraumatic, no cyanosis or edema  Skin: Normal color, texture, and turgor, no rashes or lesions  Psych: Appropriate, well oriented, normal affect, normal mood  Neuro: Non-focal      LABS:    Recent Results (from the past 336 hour(s))   CBC W/ AUTO DIFFERENTIAL    Collection Time: 11/14/20  6:35 AM   Result Value Ref Range    WBC 11.24 3.90 - 12.70 K/uL    RBC 3.43 (L) 4.60 - 6.20 M/uL    Hemoglobin 10.4 (L) 14.0 - 18.0 g/dL    Hematocrit 31.5 (L) 40.0 - 54.0 %    MCV 92 82 - 98 fL    MCH 30.3 27.0 - 31.0 pg    MCHC 33.0 32.0 - 36.0 g/dL    RDW 13.2 11.5 - 14.5 %    Platelets 233 150 - 350 K/uL    MPV 10.8 9.2 - 12.9 fL    Immature Granulocytes 0.4 0.0 - 0.5 %    Gran # (ANC) 9.1 (H) 1.8 - 7.7 K/uL    Immature Grans (Abs) 0.04 0.00 - 0.04 K/uL    Lymph # 0.6 (L) 1.0 - 4.8 K/uL    Mono # 1.5 (H) 0.3 - 1.0 K/uL    Eos # 0.0 0.0 - 0.5 K/uL    Baso # 0.02 0.00 - 0.20 K/uL    nRBC 0 0 /100 WBC    Gran % 80.8 (H) 38.0 - 73.0 %    Lymph % 5.1 (L) 18.0 - 48.0 %    Mono % 13.3 4.0 - 15.0 %    Eosinophil % 0.2 0.0 - 8.0 %    Basophil % 0.2 0.0 - 1.9 %    Differential Method Automated    Comp. Metabolic " Panel    Collection Time: 11/14/20  6:35 AM   Result Value Ref Range    Sodium 135 (L) 136 - 145 mmol/L    Potassium 4.7 3.5 - 5.1 mmol/L    Chloride 104 95 - 110 mmol/L    CO2 18 (L) 23 - 29 mmol/L    Glucose 283 (H) 70 - 110 mg/dL    BUN 38 (H) 8 - 23 mg/dL    Creatinine 2.5 (H) 0.5 - 1.4 mg/dL    Calcium 9.2 8.7 - 10.5 mg/dL    Total Protein 7.0 6.0 - 8.4 g/dL    Albumin 3.7 3.5 - 5.2 g/dL    Total Bilirubin 1.7 (H) 0.1 - 1.0 mg/dL    Alkaline Phosphatase 73 55 - 135 U/L    AST 22 10 - 40 U/L    ALT 32 10 - 44 U/L    Anion Gap 13 8 - 16 mmol/L    eGFR if African American 27.6 (A) >60 mL/min/1.73 m^2    eGFR if non  23.9 (A) >60 mL/min/1.73 m^2   Protime-INR    Collection Time: 11/14/20  6:35 AM   Result Value Ref Range    PT 14.8 10.6 - 14.8 sec    INR 1.2    Type & Screen    Collection Time: 11/14/20  7:25 AM   Result Value Ref Range    Group & Rh O POS     Indirect Mejia POS    Blood culture    Collection Time: 11/14/20  7:25 AM    Specimen: Peripheral, Antecubital, Right; Blood   Result Value Ref Range    Blood Culture, Routine No growth after 5 days.    Antibody identification    Collection Time: 11/14/20  7:25 AM   Result Value Ref Range    Antibody Identification POS    Urinalysis, Reflex to Urine Culture Urine, Clean Catch    Collection Time: 11/14/20  7:30 AM    Specimen: Urine   Result Value Ref Range    Specimen UA Urine, Clean Catch     Color, UA Red (A) Yellow, Straw, Ida    Appearance, UA Cloudy (A) Clear    pH, UA 7.0 5.0 - 8.0    Specific Gravity, UA 1.005 1.005 - 1.030    Protein, UA SEE COMMENT Negative    Glucose, UA SEE COMMENT Negative    Ketones, UA SEE COMMENT Negative    Bilirubin (UA) SEE COMMENT Negative    Occult Blood UA 3+ (A) Negative    Nitrite, UA SEE COMMENT Negative    Urobilinogen, UA SEE COMMENT Negative EU/dL    Leukocytes, UA SEE COMMENT Negative   COVID-19 Rapid Screening    Collection Time: 11/14/20  7:30 AM   Result Value Ref Range    SARS-CoV-2 RNA,  Amplification, Qual Negative Negative   Blood culture    Collection Time: 11/14/20  7:30 AM    Specimen: Peripheral, Forearm, Right; Blood   Result Value Ref Range    Blood Culture, Routine Gram stain lucius bottle: Gram negative rods     Blood Culture, Routine       Results called to and read back by:Mahesh Slade RN-[address] [place] [address];      Blood Culture, Routine 11/15/2020  02:15 CJD     Blood Culture, Routine CITROBACTER FREUNDII (A)        Susceptibility    Citrobacter freundii - CULTURE, BLOOD     Amp/Sulbactam 16/8 Resistant mcg/mL     Ceftriaxone <=1 Sensitive mcg/mL     Cefazolin 8 Resistant mcg/mL     Ciprofloxacin <=1 Sensitive mcg/mL     Cefepime <=2 Sensitive mcg/mL     Ertapenem <=0.5 Sensitive mcg/mL     Gentamicin <=4 Sensitive mcg/mL     Levofloxacin <=2 Sensitive mcg/mL     Meropenem <=1 Sensitive mcg/mL     Piperacillin/Tazo <=16 Sensitive mcg/mL     Trimeth/Sulfa <=2/38 Sensitive mcg/mL     Tetracycline >8 Resistant mcg/mL     Tobramycin <=4 Sensitive mcg/mL   Urinalysis Microscopic    Collection Time: 11/14/20  7:30 AM   Result Value Ref Range    RBC, UA >100 (H) 0 - 4 /hpf    WBC, UA 20 (H) 0 - 5 /hpf    WBC Clumps, UA Occasional (A) None-Rare    Bacteria Occasional None-Occ /hpf    Yeast, UA None None    Squam Epithel, UA 0 /hpf    Non-Squam Epith 0 <1/hpf /hpf    Microscopic Comment SEE COMMENT    Urine culture    Collection Time: 11/14/20  7:30 AM    Specimen: Urine   Result Value Ref Range    Urine Culture, Routine CITROBACTER FREUNDII  50,000 - 99,999 cfu/ml   (A)        Susceptibility    Citrobacter freundii - CULTURE, URINE     Amp/Sulbactam >16/8 Resistant mcg/mL     Ceftriaxone <=1 Sensitive mcg/mL     Cefazolin >16 Resistant mcg/mL     Ciprofloxacin <=1 Sensitive mcg/mL     Cefepime <=2 Sensitive mcg/mL     Ertapenem <=0.5 Sensitive mcg/mL     Nitrofurantoin <=32 Sensitive mcg/mL     Gentamicin <=4 Sensitive mcg/mL     Levofloxacin <=2 Sensitive mcg/mL     Meropenem <=1 Sensitive mcg/mL      Piperacillin/Tazo <=16 Sensitive mcg/mL     Trimeth/Sulfa <=2/38 Sensitive mcg/mL     Tetracycline >8 Resistant mcg/mL     Tobramycin <=4 Sensitive mcg/mL   Lactic Acid, Plasma    Collection Time: 11/14/20  7:40 AM   Result Value Ref Range    Lactate (Lactic Acid) 1.1 0.5 - 1.9 mmol/L   Urinalysis, Reflex to Urine Culture Urine, Clean Catch    Collection Time: 11/14/20  9:35 AM    Specimen: Urine, Clean Catch   Result Value Ref Range    Specimen UA Urine, Clean Catch     Color, UA Yellow Yellow, Straw, Ida    Appearance, UA Clear Clear    pH, UA 5.0 5.0 - 8.0    Specific Gravity, UA 1.010 1.005 - 1.030    Protein, UA Negative Negative    Glucose, UA 3+ (A) Negative    Ketones, UA Negative Negative    Bilirubin (UA) Negative Negative    Occult Blood UA Negative Negative    Nitrite, UA Negative Negative    Urobilinogen, UA Negative Negative EU/dL    Leukocytes, UA Negative Negative   Urinalysis Microscopic    Collection Time: 11/14/20  9:35 AM   Result Value Ref Range    RBC, UA 4 0 - 4 /hpf    WBC, UA 0 0 - 5 /hpf    Bacteria Negative None-Occ /hpf    Yeast, UA None None    Squam Epithel, UA 0 /hpf    Hyaline Casts, UA 1 0-1/lpf /lpf    Microscopic Comment SEE COMMENT    Procalcitonin    Collection Time: 11/14/20 10:40 AM   Result Value Ref Range    Procalcitonin 0.26 0.00 - 0.50 ng/mL   POCT glucose    Collection Time: 11/14/20 11:50 AM   Result Value Ref Range    POC Glucose 243 (H) 70 - 110   POCT glucose    Collection Time: 11/14/20  4:49 PM   Result Value Ref Range    POC Glucose 337 (H) 70 - 110   CBC with Automated Differential    Collection Time: 11/14/20  5:05 PM   Result Value Ref Range    WBC 6.85 3.90 - 12.70 K/uL    RBC 2.86 (L) 4.60 - 6.20 M/uL    Hemoglobin 8.8 (L) 14.0 - 18.0 g/dL    Hematocrit 26.7 (L) 40.0 - 54.0 %    MCV 93 82 - 98 fL    MCH 30.8 27.0 - 31.0 pg    MCHC 33.0 32.0 - 36.0 g/dL    RDW 13.2 11.5 - 14.5 %    Platelets 169 150 - 350 K/uL    MPV 10.5 9.2 - 12.9 fL    Immature  Granulocytes 0.3 0.0 - 0.5 %    Gran # (ANC) 4.9 1.8 - 7.7 K/uL    Immature Grans (Abs) 0.02 0.00 - 0.04 K/uL    Lymph # 0.7 (L) 1.0 - 4.8 K/uL    Mono # 1.1 (H) 0.3 - 1.0 K/uL    Eos # 0.1 0.0 - 0.5 K/uL    Baso # 0.01 0.00 - 0.20 K/uL    nRBC 0 0 /100 WBC    Gran % 71.0 38.0 - 73.0 %    Lymph % 10.8 (L) 18.0 - 48.0 %    Mono % 16.2 (H) 4.0 - 15.0 %    Eosinophil % 1.6 0.0 - 8.0 %    Basophil % 0.1 0.0 - 1.9 %    Differential Method Automated    POCT glucose    Collection Time: 11/14/20  8:45 PM   Result Value Ref Range    POC Glucose 120 (H) 70 - 110   Comprehensive Metabolic Panel (CMP)    Collection Time: 11/15/20  4:29 AM   Result Value Ref Range    Sodium 137 136 - 145 mmol/L    Potassium 4.2 3.5 - 5.1 mmol/L    Chloride 109 95 - 110 mmol/L    CO2 22 (L) 23 - 29 mmol/L    Glucose 86 70 - 110 mg/dL    BUN 38 (H) 8 - 23 mg/dL    Creatinine 2.5 (H) 0.5 - 1.4 mg/dL    Calcium 8.7 8.7 - 10.5 mg/dL    Total Protein 6.1 6.0 - 8.4 g/dL    Albumin 3.0 (L) 3.5 - 5.2 g/dL    Total Bilirubin 2.0 (H) 0.1 - 1.0 mg/dL    Alkaline Phosphatase 51 (L) 55 - 135 U/L    AST 13 10 - 40 U/L    ALT 22 10 - 44 U/L    Anion Gap 6 (L) 8 - 16 mmol/L    eGFR if African American 27.6 (A) >60 mL/min/1.73 m^2    eGFR if non  23.9 (A) >60 mL/min/1.73 m^2   Magnesium    Collection Time: 11/15/20  4:29 AM   Result Value Ref Range    Magnesium 1.7 1.6 - 2.6 mg/dL   POCT glucose    Collection Time: 11/15/20  7:36 AM   Result Value Ref Range    POC Glucose 86 70 - 110   POCT glucose    Collection Time: 11/15/20 12:45 PM   Result Value Ref Range    POC Glucose 169 (H) 70 - 110   Vancomycin, Random    Collection Time: 11/15/20 12:56 PM   Result Value Ref Range    Vancomycin, Random 12.6 Not established ug/mL   POCT glucose    Collection Time: 11/15/20  5:57 PM   Result Value Ref Range    POC Glucose 251 (H) 70 - 110   POCT glucose    Collection Time: 11/15/20  7:37 PM   Result Value Ref Range    POC Glucose 247 (H) 70 - 110   POCT  glucose    Collection Time: 11/15/20 10:14 PM   Result Value Ref Range    POC Glucose 207 (H) 70 - 110   Comprehensive Metabolic Panel (CMP)    Collection Time: 11/16/20  5:18 AM   Result Value Ref Range    Sodium 140 136 - 145 mmol/L    Potassium 3.9 3.5 - 5.1 mmol/L    Chloride 107 95 - 110 mmol/L    CO2 20 (L) 23 - 29 mmol/L    Glucose 139 (H) 70 - 110 mg/dL    BUN 34 (H) 8 - 23 mg/dL    Creatinine 2.4 (H) 0.5 - 1.4 mg/dL    Calcium 9.3 8.7 - 10.5 mg/dL    Total Protein 6.3 6.0 - 8.4 g/dL    Albumin 3.2 (L) 3.5 - 5.2 g/dL    Total Bilirubin 1.4 (H) 0.1 - 1.0 mg/dL    Alkaline Phosphatase 59 55 - 135 U/L    AST 18 10 - 40 U/L    ALT 24 10 - 44 U/L    Anion Gap 13 8 - 16 mmol/L    eGFR if African American 29.0 (A) >60 mL/min/1.73 m^2    eGFR if non  25.1 (A) >60 mL/min/1.73 m^2   Magnesium    Collection Time: 11/16/20  5:18 AM   Result Value Ref Range    Magnesium 1.8 1.6 - 2.6 mg/dL   POCT glucose    Collection Time: 11/16/20  7:19 AM   Result Value Ref Range    POC Glucose 131 (H) 70 - 110   POCT glucose    Collection Time: 11/16/20 11:23 AM   Result Value Ref Range    POC Glucose 226 (H) 70 - 110   Vancomycin, Random    Collection Time: 11/16/20  1:55 PM   Result Value Ref Range    Vancomycin, Random 17.2 Not established ug/mL   POCT glucose    Collection Time: 11/16/20  4:20 PM   Result Value Ref Range    POC Glucose 288 (H) 70 - 110   POCT glucose    Collection Time: 11/16/20  7:31 PM   Result Value Ref Range    POC Glucose 272 (H) 70 - 110   Blood culture    Collection Time: 11/16/20  9:54 PM    Specimen: Blood   Result Value Ref Range    Blood Culture, Routine No Growth to date     Blood Culture, Routine No Growth to date     Blood Culture, Routine No Growth to date    Comprehensive Metabolic Panel (CMP)    Collection Time: 11/17/20  5:20 AM   Result Value Ref Range    Sodium 139 136 - 145 mmol/L    Potassium 3.8 3.5 - 5.1 mmol/L    Chloride 110 95 - 110 mmol/L    CO2 21 (L) 23 - 29 mmol/L     Glucose 151 (H) 70 - 110 mg/dL    BUN 34 (H) 8 - 23 mg/dL    Creatinine 2.2 (H) 0.5 - 1.4 mg/dL    Calcium 9.0 8.7 - 10.5 mg/dL    Total Protein 6.5 6.0 - 8.4 g/dL    Albumin 3.2 (L) 3.5 - 5.2 g/dL    Total Bilirubin 1.0 0.1 - 1.0 mg/dL    Alkaline Phosphatase 55 55 - 135 U/L    AST 17 10 - 40 U/L    ALT 24 10 - 44 U/L    Anion Gap 8 8 - 16 mmol/L    eGFR if African American 32.2 (A) >60 mL/min/1.73 m^2    eGFR if non  27.9 (A) >60 mL/min/1.73 m^2   Magnesium    Collection Time: 11/17/20  5:20 AM   Result Value Ref Range    Magnesium 1.7 1.6 - 2.6 mg/dL   CBC Auto Differential    Collection Time: 11/17/20  5:20 AM   Result Value Ref Range    WBC 4.83 3.90 - 12.70 K/uL    RBC 2.95 (L) 4.60 - 6.20 M/uL    Hemoglobin 8.8 (L) 14.0 - 18.0 g/dL    Hematocrit 26.7 (L) 40.0 - 54.0 %    MCV 91 82 - 98 fL    MCH 29.8 27.0 - 31.0 pg    MCHC 33.0 32.0 - 36.0 g/dL    RDW 12.7 11.5 - 14.5 %    Platelets 228 150 - 350 K/uL    MPV 10.0 9.2 - 12.9 fL    Immature Granulocytes 0.6 (H) 0.0 - 0.5 %    Gran # (ANC) 2.6 1.8 - 7.7 K/uL    Immature Grans (Abs) 0.03 0.00 - 0.04 K/uL    Lymph # 1.1 1.0 - 4.8 K/uL    Mono # 0.8 0.3 - 1.0 K/uL    Eos # 0.3 0.0 - 0.5 K/uL    Baso # 0.02 0.00 - 0.20 K/uL    nRBC 0 0 /100 WBC    Gran % 54.3 38.0 - 73.0 %    Lymph % 22.8 18.0 - 48.0 %    Mono % 16.1 (H) 4.0 - 15.0 %    Eosinophil % 5.8 0.0 - 8.0 %    Basophil % 0.4 0.0 - 1.9 %    Differential Method Automated    POCT glucose    Collection Time: 11/17/20  7:35 AM   Result Value Ref Range    POC Glucose 168 (H) 70 - 110   POCT glucose    Collection Time: 11/17/20 11:12 AM   Result Value Ref Range    POC Glucose 215 (H) 70 - 110       IMAGING:    CT abdomen pelvis wo contrast 11/14/20    1. CT of the abdomen and pelvis, similar in imaging appearance to the  previous exam.  2. Right-sided percutaneous nephrostomy tube with coil projecting to  the inferior pole the right kidney.  2. Mild right-sided hydronephrosis and moderate  right-sided  hydroureter, with a relative transition point at the distal third of  the right ureter.  4. Heterogeneous increased attenuation within the right renal pelvis  and proximal right renal collecting system suggestive of blood/clot,  debris (and less likely fungus ball, infection or noncalcified stone),  noting that underlying malignancy is not excluded. Consider  correlation with urine output and urinalysis. No radiopaque  renal/collecting system stone is identified.  5. Cholelithiasis without findings to suggest acute cholecystitis as  above.  6. The attenuation of the aortic blood pool is below 35 HU: as this  finding carries a 94% specificity for anemia, correlation with CBC is  advised (Eur Radiol 2008: 18:1863-8).  7. Mild splenomegaly.  8. Mild circumferential bladder wall thickening, consider correlation  for cystitis with urinalysis.  9. Numerous additional and incidental findings are outlined above,  unchanged from the previous exam      Assessment/Diagnosis:    1. Ureteral stricture, right  Ambulatory referral/consult to Urology   2. Hydronephrosis, unspecified hydronephrosis type     3. Renal cell adenocarcinoma, right  Ambulatory referral/consult to Urology       Plans:    - Continue right nephrostomy tube. Instructed to gently flush tube with approximately 3 cc saline as needed.   - Referral to Dr. Shin to discuss definitive management of his right mid to distal ureteral stricture s/p right partial nephrectomy on 4/2/19.

## 2020-11-18 NOTE — HOSPITAL COURSE
During his short hospital stay patient was treated for bleeding from right nephrostomy tube site, blood in nephrostomy tube, right-sided pyelonephritis and bacteremia due to Citrobacter.  After starting antibiotics his symptoms improved significantly and urine cleared up.  He was evaluated by Urology who cleared him for discharge today with outpatient appointment tomorrow.  He was discharged on 12 more days of Levaquin to finish total 14 days of appropriate antibiotics.  Repeat blood cultures are negative until date of discharge.  Overall it appears that patient is struggling with recurrent bleeding from right nephrostomy tube as well as recurrent UTI.  Dr. Garcia(urology) is planning definitive management of his stricture in near future    Physical Exam:  Vital signs reviewed.  Nursing notes reviewed  General:  Comfortable appearing, nontoxic, nondiaphoretic  Head eyes:  Anicteric sclerae, no conjunctival discharge  ENT:  Moist oral mucosa, no thrush  Pulmonary:  Comfortable work breathing, lungs are clear to auscultation bilaterally  Cardiovascular:  2+ radial pulses, regular rate and rhythm  GI:  Abdomen soft, nondistended, minimal right flank tenderness, no from a semi tube with clear yellow output  Psych:  Mood is calm affect normal, insight good  Neuro:  Nonfocal motor exam, fluent speech, alert and oriented  Skin:  Dry and warm no jaundice  :  Right-sided nephrostomy tube and bag has clear urine, right nephrostomy site has no surrounding erythema, bleeding or fluctuance        Patient was treated for following problems:  Right nephrostomy hemorrhage likely secondary to infection with urinary tract infection and bacteremia  Right nephrostomy malfunction secondary to hemorrhage, improved  Renal cell carcinoma status post partial nephrectomy and right nephrostomy tube placement  Non-insulin-dependent diabetes mellitus type 2  Hypertension  Hyperlipidemia  Anemia of chronic disease  Hyperlipidemia

## 2020-11-18 NOTE — DISCHARGE SUMMARY
[facility]  Discharge Summary      Patient Name: Jamil Cadet  MRN: [id number]  Admission Date: 11/14/2020  Hospital Length of Stay: 2 days  Discharge Date and Time:  11/17/2020 6:22 PM  Attending Physician: No att. providers found   Discharging Provider: Jared Sibley MD  Primary Care Provider: Richard Gunter MD      HPI:   75-year-old gentleman with past medical history of non-insulin-dependent diabetes mellitus type 2, hypertension, chronic anemia, hyperlipidemia, and right-sided renal cell carcinoma status post partial nephrectomy with nephrostomy tubes in place who presents today with pain and hemorrhage from right nephrostomy tube.  The patient reports that he has been having right-sided flank pain at around nephrostomy tube site for the last several days.  The pain has been progressively worsening.  Today the pain is severe in intensity and constant in timing.  He has associated hemorrhage from the tube for the last few days.  He denies any fever or chills.  No nausea or vomiting.  He is tolerating diet okay.  No chest pain or shortness of breath.  No headache or syncope.   I discussed the case with emergency room physician Dr. Chaudhry.  In the emergency room the patient had malfunction of nephrostomy tube due to clogging from bleeding, however the tube has been flushed and is now functioning properly.  Patient is afebrile and blood pressure stable.  He is medically stable for admission to floor.    * No surgery found *      Hospital Course:   During his short hospital stay patient was treated for bleeding from right nephrostomy tube site, blood in nephrostomy tube, right-sided pyelonephritis and bacteremia due to Citrobacter.  After starting antibiotics his symptoms improved significantly and urine cleared up.  He was evaluated by Urology who cleared him for discharge today with outpatient appointment tomorrow.  He was discharged on 12 more days of Levaquin to finish total  14 days of appropriate antibiotics.  Repeat blood cultures are negative until date of discharge.  Overall it appears that patient is struggling with recurrent bleeding from right nephrostomy tube as well as recurrent UTI.  Dr. Garcia(urology) is planning definitive management of his stricture in near future    Physical Exam:  Vital signs reviewed.  Nursing notes reviewed  General:  Comfortable appearing, nontoxic, nondiaphoretic  Head eyes:  Anicteric sclerae, no conjunctival discharge  ENT:  Moist oral mucosa, no thrush  Pulmonary:  Comfortable work breathing, lungs are clear to auscultation bilaterally  Cardiovascular:  2+ radial pulses, regular rate and rhythm  GI:  Abdomen soft, nondistended, minimal right flank tenderness, no from a semi tube with clear yellow output  Psych:  Mood is calm affect normal, insight good  Neuro:  Nonfocal motor exam, fluent speech, alert and oriented  Skin:  Dry and warm no jaundice  :  Right-sided nephrostomy tube and bag has clear urine, right nephrostomy site has no surrounding erythema, bleeding or fluctuance        Patient was treated for following problems:  Right nephrostomy hemorrhage likely secondary to infection with urinary tract infection and bacteremia  Right nephrostomy malfunction secondary to hemorrhage, improved  Renal cell carcinoma status post partial nephrectomy and right nephrostomy tube placement  Non-insulin-dependent diabetes mellitus type 2  Hypertension  Hyperlipidemia  Anemia of chronic disease  Hyperlipidemia     Consults:     No new Assessment & Plan notes have been filed under this hospital service since the last note was generated.  Service: Hospital Medicine    Final Active Diagnoses:    Diagnosis Date Noted POA    Infection and inflammatory reaction due to nephrostomy catheter, initial encounter [T83.512A] 11/14/2020 Yes      Problems Resolved During this Admission:    Diagnosis Date Noted Date Resolved POA    PRINCIPAL PROBLEM:  Hemorrhage from  nephrostomy tube [T83.83XA] 11/14/2020 11/17/2020 Yes    Malfunction of nephrostomy tube [T83.098A] 11/14/2020 11/17/2020 Yes       Discharged Condition: good    Disposition: Home-Health Care [facility]    Follow Up:   Contact information for follow-up providers     Chris Garcia Jr, MD On 11/18/2020.    Specialty: Urology  Contact information:  [address] [place] [address] [address]  [address]  [place] [address]  [phone]             Richard Gunter MD In 2 weeks.    Specialty: Internal Medicine  Contact information:  [address] [place] [address]  [address]  [place] [address]  [phone]                   Contact information for after-discharge care     [facility]     [facility] .    Service: Home Health Services  Contact information:  [address] [place] [address]  [phone]                           Patient Instructions:      Ambulatory referral/consult to Home Health   Standing Status: Future   Referral Priority: Routine Referral Type: Home Health Care   Referral Reason: Specialty Services Required   Requested Specialty: Home Health Services   Number of Visits Requested: 1     Diet diabetic     Notify your health care provider if you experience any of the following:  temperature >100.4     Notify your health care provider if you experience any of the following:   Order Comments: Bleeding from nephrostomy tube     Activity as tolerated       Significant Diagnostic Studies: Labs: All labs within the past 24 hours have been reviewed    Pending Diagnostic Studies:     None         Medications:  Reconciled Home Medications:      Medication List      START taking these medications    levoFLOXacin 250 MG tablet  Commonly known as: LEVAQUIN  Take 1 tablet (250 mg total) by mouth once daily. for 12 days        CHANGE how you take these medications    traZODone 50 MG tablet  Commonly known as: DESYREL  Take 50 mg by mouth every evening. HCS  What changed: Another medication with the same name was removed.  Continue taking this medication, and follow the directions you see here.        CONTINUE taking these medications    acetaminophen 325 MG tablet  Commonly known as: TYLENOL  Take 325 mg by mouth every 6 (six) hours as needed for Pain.     amLODIPine 10 MG tablet  Commonly known as: NORVASC  Take 10 mg by mouth daily as needed.     blood-glucose meter Misc  Commonly known as: TRUE METRIX GLUCOSE METER  1 Device by Misc.(Non-Drug; Combo Route) route once daily.     glimepiride 4 MG tablet  Commonly known as: AMARYL  Take 4 mg by mouth 2 (two) times a day. HCS     hydrALAZINE 25 MG tablet  Commonly known as: APRESOLINE  Take 25 mg by mouth 3 (three) times daily.     insulin aspart U-100 100 unit/mL (3 mL) Inpn pen  Commonly known as: NovoLOG Flexpen U-100 Insulin  Inject 10 Units into the skin 3 (three) times daily with meals. Check glucose before meals and then take insulin as per sliding scale     mupirocin 2 % ointment  Commonly known as: BACTROBAN  Apply topically 3 (three) times daily.     rosuvastatin 20 MG tablet  Commonly known as: CRESTOR  Take 20 mg by mouth every evening.     TRESIBA FLEXTOUCH U-100 100 unit/mL (3 mL) Inpn  Generic drug: insulin degludec  Inject 15 Units into the skin every evening.     TRUE METRIX GLUCOSE TEST STRIP Strp  Generic drug: blood sugar diagnostic            Indwelling Lines/Drains at time of discharge:   Lines/Drains/Airways     Drain                 Nephrostomy 09/11/20 0800 Right 67 days                Time spent on the discharge of patient: 34 minutes  Patient was seen and examined on the date of discharge and determined to be suitable for discharge.         Jared Sibley MD  Department of Hospital Medicine  [facility]

## 2020-11-19 ENCOUNTER — TELEPHONE (OUTPATIENT)
Dept: UROLOGY | Facility: CLINIC | Age: 75
End: 2020-11-19

## 2020-11-19 LAB — BACTERIA BLD CULT: NORMAL

## 2020-11-19 NOTE — TELEPHONE ENCOUNTER
Called and spoke to Corina with Home Health who states that patient nephrostomy tube was light pink and is now gross hematuria. No clots visible but states that there might be clots in the tube. Corina also stated that dressing has old blood on it and need orders if need changing. Per Dr. Garcia can flush nephrostomy tube with a few cc of Normal Saline. No need to change dressing. Corina verbalized understanding.

## 2020-11-19 NOTE — TELEPHONE ENCOUNTER
----- Message from Riaz Preciado sent at 11/19/2020  2:27 PM CST -----  Contact: Corina/[facility]  Corina called in regarding patient.  Corina stated patient was sent home from [facility] with a Nephrostomy tube draining into bag.  Corina stated he was seen yesterday in office by Dr. Garcia & urine was light pink. Today it is gross  gross hematuria.      Also, Corina wanted to know if she needs to get wound care orders to address the dressing on patients back?    Corina's call back number is [phone]

## 2020-11-20 ENCOUNTER — TELEPHONE (OUTPATIENT)
Dept: FAMILY MEDICINE | Facility: CLINIC | Age: 75
End: 2020-11-20

## 2020-11-20 ENCOUNTER — TELEPHONE (OUTPATIENT)
Dept: UROLOGY | Facility: CLINIC | Age: 75
End: 2020-11-20

## 2020-11-20 NOTE — TELEPHONE ENCOUNTER
Nurse navigator contacted patient to discuss referral from Dr. Garcia to Dr. Shin for a consult to discuss ureteral reimplant. Patient and wife agreed to 12/1 8:30am appointment. Location discussed.  Email sent with campus map for directions. Voiced understanding.

## 2020-11-20 NOTE — TELEPHONE ENCOUNTER
Pt called, he was having problems with his internet so he had to reschedule.  He is rescheduled to Wednesday 11/25/20 @ 1:00pm

## 2020-11-21 LAB — BACTERIA BLD CULT: NORMAL

## 2020-11-25 ENCOUNTER — OFFICE VISIT (OUTPATIENT)
Dept: FAMILY MEDICINE | Facility: CLINIC | Age: 75
End: 2020-11-25
Payer: MEDICARE

## 2020-11-25 VITALS
SYSTOLIC BLOOD PRESSURE: 143 MMHG | TEMPERATURE: 98 F | BODY MASS INDEX: 23.52 KG/M2 | WEIGHT: 168 LBS | HEIGHT: 71 IN | HEART RATE: 85 BPM | DIASTOLIC BLOOD PRESSURE: 75 MMHG

## 2020-11-25 DIAGNOSIS — Z79.4 TYPE 2 DIABETES MELLITUS WITH HYPOGLYCEMIA WITHOUT COMA, WITH LONG-TERM CURRENT USE OF INSULIN: Primary | ICD-10-CM

## 2020-11-25 DIAGNOSIS — E11.649 TYPE 2 DIABETES MELLITUS WITH HYPOGLYCEMIA WITHOUT COMA, WITH LONG-TERM CURRENT USE OF INSULIN: Primary | ICD-10-CM

## 2020-11-25 DIAGNOSIS — I10 ESSENTIAL HYPERTENSION: ICD-10-CM

## 2020-11-25 PROCEDURE — 99213 OFFICE O/P EST LOW 20 MIN: CPT | Mod: 95,,, | Performed by: INTERNAL MEDICINE

## 2020-11-25 PROCEDURE — 99213 PR OFFICE/OUTPT VISIT, EST, LEVL III, 20-29 MIN: ICD-10-PCS | Mod: 95,,, | Performed by: INTERNAL MEDICINE

## 2020-11-25 RX ORDER — INSULIN DEGLUDEC 100 U/ML
17 INJECTION, SOLUTION SUBCUTANEOUS NIGHTLY
Qty: 5 SYRINGE | Refills: 1
Start: 2020-11-25 | End: 2021-04-02

## 2020-11-25 NOTE — PROGRESS NOTES
Subjective:      Chief Complaint   Patient presents with    Hypertension    Diabetes       The chief complaint leading to consultation is: DM, BP  The patient location is:  Home  Visit type: Virtual visit with synchronous audio/video or audio only  This was a video visit in lieu of in-person visit due to the coronavirus emergency. Patient acknowledged and consented to the video visit encounter.     11.24.20 Novolog 10 , 12 Noon 10, 5 Pm 10     /66-78    Dec - 1st to see Dr Dave Quiroz Urologist in [facility] robotics Urology    Sleep fair with     As far as diabetes is concerned, he is taking injection Tresiba 15 units per day and 10 units 3 times a day before meals.  He is currently off glimepiride.    As far as blood pressure is concerned, he is taking hydralazine 25 mg 3 times a day and for some unknown reasons he is taking amlodipine only as needed.  Generally his blood pressures have been above 130s and 140s.    He continues to have a right-sided percutaneous catheter for kidney drainage and will be seeing with urologist in [facility] for possible robotic surgery.    Sleep is okay with trazodone 50 mg.          Past Surgical History:   Procedure Laterality Date    APPENDECTOMY      CYSTOSCOPY W/ RETROGRADES Right 9/9/2020    Procedure: CYSTOSCOPY, WITH RETROGRADE PYELOGRAM;  Surgeon: Chris Garcia Jr., MD;  Location: [facility] OR;  Service: Urology;  Laterality: Right;    SPINE SURGERY      URETHROSCOPY  9/9/2020    Procedure: URETHROSCOPY;  Surgeon: hCris Garcia Jr., MD;  Location: [facility] OR;  Service: Urology;;     Past Medical History:   Diagnosis Date    Cancer     Diabetes mellitus, type 2     Disorder of kidney and ureter     Encounter for blood transfusion     History of renal cell carcinoma status post partial nephrectomy 9/7/2020    Hydronephrosis 9/9/2020    Hyperlipidemia     Hypertension     Polio     Septicemia due to enterococcus 11/15/2020     Family History   Problem  "Relation Age of Onset    Heart disease Mother     Heart disease Father     Stroke Father     Heart disease Maternal Grandfather     Heart disease Paternal Grandmother     Heart disease Paternal Grandfather         Social History:   Marital Status:   Alcohol History:  reports no history of alcohol use.  Tobacco History:  reports that he has never smoked. He has never used smokeless tobacco.  Drug History:  reports no history of drug use.    Review of patient's allergies indicates:  No Known Allergies    Current Outpatient Medications   Medication Sig Dispense Refill    acetaminophen (TYLENOL) 325 MG tablet Take 325 mg by mouth every 6 (six) hours as needed for Pain.      amLODIPine (NORVASC) 10 MG tablet Take 10 mg by mouth daily as needed.       BD ULTRA-FINE SHORT PEN NEEDLE 31 gauge x 5/16" Ndle use as directed      blood-glucose meter (TRUE METRIX GLUCOSE METER) Misc 1 Device by Misc.(Non-Drug; Combo Route) route once daily. 1 each 0    hydrALAZINE (APRESOLINE) 25 MG tablet Take 25 mg by mouth 3 (three) times daily.      insulin aspart U-100 (NOVOLOG FLEXPEN U-100 INSULIN) 100 unit/mL (3 mL) InPn pen Inject 10 Units into the skin 3 (three) times daily with meals. Check glucose before meals and then take insulin as per sliding scale 5 Syringe 2    insulin degludec (TRESIBA FLEXTOUCH U-100) 100 unit/mL (3 mL) InPn Inject 15 Units into the skin every evening. 5 Syringe 1    levoFLOXacin (LEVAQUIN) 250 MG tablet Take 1 tablet (250 mg total) by mouth once daily. for 12 days 12 tablet 0    mupirocin (BACTROBAN) 2 % ointment Apply topically 3 (three) times daily. 30 g 1    rosuvastatin (CRESTOR) 20 MG tablet Take 20 mg by mouth every evening.       traZODone (DESYREL) 50 MG tablet Take 50 mg by mouth every evening. HCS      TRUE METRIX GLUCOSE TEST STRIP Strp        No current facility-administered medications for this visit.        Review of Systems   Constitutional: Negative for activity " "change, chills, diaphoresis, fever and unexpected weight change.   HENT: Negative for hearing loss, rhinorrhea and trouble swallowing.    Eyes: Negative for discharge, redness and visual disturbance.   Respiratory: Negative for cough, chest tightness and wheezing.    Cardiovascular: Negative for chest pain and palpitations.        Underlying hypertension and blood pressures are still about 140 systolic.   Gastrointestinal: Negative for abdominal distention, abdominal pain, blood in stool, constipation, diarrhea and vomiting.   Endocrine: Negative for polydipsia and polyuria.        Type 2 diabetes mellitus currently on insulins.   Genitourinary: Positive for hematuria (From the right kidney.). Negative for difficulty urinating and urgency.        Catheter percutaneous for right kidney drainage.   Musculoskeletal: Negative for arthralgias, joint swelling and neck pain.        Gout in past- rt ankle pain   Neurological: Negative for weakness and headaches.   Psychiatric/Behavioral: Positive for sleep disturbance. Negative for confusion and dysphoric mood.         Objective:      Vitals:    11/25/20 1304   BP: (!) 143/75   Pulse: 85   Temp: 97.8 °F (36.6 °C)   Weight: 76.2 kg (168 lb)   Height: 5' 11" (1.803 m)   Physical Exam:   Physical Exam  Constitutional:       General: He is not in acute distress.     Appearance: He is not ill-appearing, toxic-appearing or diaphoretic.   Neurological:      Mental Status: Mental status is at baseline.   Psychiatric:         Behavior: Behavior normal.              Assessment:       1. Type 2 diabetes mellitus with hypoglycemia without coma, with long-term current use of insulin    2. Essential hypertension      Plan:   Type 2 diabetes mellitus with hypoglycemia without coma, with long-term current use of insulin    Essential hypertension     Increase injection Tresiba to 17 units.  Continue NovoLog 10 units 3 times a day with meals.    Take amlodipine 10 mg every day since the " "blood pressures are elevated.  Follow up in about 6 weeks (around 1/6/2021) for Diabetes/HTN/Lipids.    Total time spent with patient: 15    Each patient to whom he or she provides medical services by telemedicine is:  (1) informed of the relationship between the physician and patient and the respective role of any other health care provider with respect to management of the patient; and (2) notified that he or she may decline to receive medical services by telemedicine and may withdraw from such care at any time.    This note was created using [employer] voice recognition software that occasionally misinterprets phrases or words.     Current Outpatient Medications on File Prior to Visit   Medication Sig Dispense Refill    acetaminophen (TYLENOL) 325 MG tablet Take 325 mg by mouth every 6 (six) hours as needed for Pain.      amLODIPine (NORVASC) 10 MG tablet Take 10 mg by mouth daily as needed.       BD ULTRA-FINE SHORT PEN NEEDLE 31 gauge x 5/16" Ndle use as directed      blood-glucose meter (TRUE METRIX GLUCOSE METER) Misc 1 Device by Misc.(Non-Drug; Combo Route) route once daily. 1 each 0    hydrALAZINE (APRESOLINE) 25 MG tablet Take 25 mg by mouth 3 (three) times daily.      insulin aspart U-100 (NOVOLOG FLEXPEN U-100 INSULIN) 100 unit/mL (3 mL) InPn pen Inject 10 Units into the skin 3 (three) times daily with meals. Check glucose before meals and then take insulin as per sliding scale 5 Syringe 2    levoFLOXacin (LEVAQUIN) 250 MG tablet Take 1 tablet (250 mg total) by mouth once daily. for 12 days 12 tablet 0    mupirocin (BACTROBAN) 2 % ointment Apply topically 3 (three) times daily. 30 g 1    rosuvastatin (CRESTOR) 20 MG tablet Take 20 mg by mouth every evening.       traZODone (DESYREL) 50 MG tablet Take 50 mg by mouth every evening. HCS      TRUE METRIX GLUCOSE TEST STRIP Strp       [DISCONTINUED] glimepiride (AMARYL) 4 MG tablet Take 4 mg by mouth 2 (two) times a day. HCS      [DISCONTINUED] " insulin degludec (TRESIBA FLEXTOUCH U-100) 100 unit/mL (3 mL) InPn Inject 15 Units into the skin every evening. 5 Syringe 1     No current facility-administered medications on file prior to visit.

## 2020-11-25 NOTE — PATIENT INSTRUCTIONS
Using a Blood Sugar Log    You have diabetes. This means your body has trouble regulating a sugar called glucose. To help manage your diabetes, youll need to check your blood sugar level as directed by your healthcare provider. Keeping a log of your blood sugar levels will help you track your blood sugar readings. Its a simple and easy way to see how well you are controlling your diabetes.  Checking your blood sugar level  You can check your blood sugar level with a blood glucose meter. Youll first prick the side of your finger with a tiny lancet to draw a tiny drop of blood onto the test strip. Some glucose meters let you use another place on your body to test. But these other places should not be used in some cases as they may be inaccurate. Follow the instructions for your glucose meter. And talk with your healthcare provider before doing the test on other places.  The strip goes into the meter first, then a drop of blood is placed on the tip of the strip. The meter then shows a reading that tells you the level of your blood sugar. Your readings should be in your target range as often as possible. This means not too high or too low. Staying in this range helps lower your risk for complications. Your healthcare provider will help you figure out the target range that is best for you.  Tracking your readings  Every time you check your blood sugar, use your log to keep track of your readings. Your meter will also probably have a memory feature that your healthcare provider can check at your next visit. You may be advised by your healthcare provider to check your blood sugar in the morning, at bedtime, and before and after meals. Be sure to write down all of your numbers. Also use your log to record things that might have affected your blood sugar. Some examples include being sick, certain medicines, being physically active, feeling stressed, or skipping meals.   Lessons learned from your readings  Tracking your  blood sugar readings helps you see patterns. These patterns tell you how your actions affect your blood sugar. For instance, you may have higher numbers after eating certain foods or lower numbers after exercise. They just help you understand how to stay in your target range more often, so that your diabetes remains in good control.  Sharing your log with your healthcare team  Bring your blood sugar log and glucose meter with you to all of your healthcare appointments. This can help your healthcare team make changes to your treatment plan, if needed. This may involve making changes in what you eat, what medicines you take, or how much you exercise.  To learn more  The resources below can help you learn more:  · American Diabetes Association 265-309-5607 www.diabetes.org  · [employer] 363-838-2912 www.lighthouse.org  · [employer] 105-569-3163 www.nei.nih.gov  · [employer] 807-324-7546 www.hormone.org  Date Last Reviewed: 5/1/2016  © 3919-1225 [employer]. [address] [place] [address], [place] [address]. All rights reserved. This information is not intended as a substitute for professional medical care. Always follow your healthcare professional's instructions.

## 2020-11-30 ENCOUNTER — TELEPHONE (OUTPATIENT)
Dept: FAMILY MEDICINE | Facility: CLINIC | Age: 75
End: 2020-11-30

## 2020-11-30 ENCOUNTER — PATIENT MESSAGE (OUTPATIENT)
Dept: FAMILY MEDICINE | Facility: CLINIC | Age: 75
End: 2020-11-30

## 2020-11-30 NOTE — TELEPHONE ENCOUNTER
----- Message from Richard Gunter MD sent at 11/29/2020  9:10 PM CST -----  Regarding: Blood pressure readings  Notify patient that I got his readings.  Most of the readings were within range.  Some readings were elevated.  Keep regular follow-up in January.  ----- Message -----  From: Vianney Colunga LPN  Sent: 11/27/2020   9:05 AM CST  To: Richard Gunter MD      ----- Message -----  From: Aby Enciso  Sent: 11/27/2020   8:05 AM CST  To: Lyric Ramos Staff    Blood pressure readings

## 2020-12-01 ENCOUNTER — TELEPHONE (OUTPATIENT)
Dept: UROLOGY | Facility: CLINIC | Age: 75
End: 2020-12-01

## 2020-12-01 ENCOUNTER — PATIENT MESSAGE (OUTPATIENT)
Dept: FAMILY MEDICINE | Facility: CLINIC | Age: 75
End: 2020-12-01

## 2020-12-01 ENCOUNTER — OFFICE VISIT (OUTPATIENT)
Dept: UROLOGY | Facility: CLINIC | Age: 75
End: 2020-12-01
Payer: MEDICARE

## 2020-12-01 ENCOUNTER — LAB VISIT (OUTPATIENT)
Dept: LAB | Facility: HOSPITAL | Age: 75
End: 2020-12-01
Attending: UROLOGY
Payer: MEDICARE

## 2020-12-01 VITALS
DIASTOLIC BLOOD PRESSURE: 65 MMHG | HEIGHT: 71 IN | SYSTOLIC BLOOD PRESSURE: 147 MMHG | WEIGHT: 178.56 LBS | BODY MASS INDEX: 25 KG/M2 | HEART RATE: 88 BPM

## 2020-12-01 DIAGNOSIS — C64.1 RENAL CELL ADENOCARCINOMA, RIGHT: ICD-10-CM

## 2020-12-01 DIAGNOSIS — N13.30 HYDRONEPHROSIS, RIGHT: Primary | ICD-10-CM

## 2020-12-01 DIAGNOSIS — N13.5 URETERAL STRICTURE, RIGHT: ICD-10-CM

## 2020-12-01 LAB
ANION GAP SERPL CALC-SCNC: 8 MMOL/L (ref 8–16)
BASOPHILS # BLD AUTO: 0.03 K/UL (ref 0–0.2)
BASOPHILS NFR BLD: 0.5 % (ref 0–1.9)
BUN SERPL-MCNC: 40 MG/DL (ref 8–23)
CALCIUM SERPL-MCNC: 9.1 MG/DL (ref 8.7–10.5)
CHLORIDE SERPL-SCNC: 107 MMOL/L (ref 95–110)
CO2 SERPL-SCNC: 23 MMOL/L (ref 23–29)
CREAT SERPL-MCNC: 2.5 MG/DL (ref 0.5–1.4)
DIFFERENTIAL METHOD: ABNORMAL
EOSINOPHIL # BLD AUTO: 0.2 K/UL (ref 0–0.5)
EOSINOPHIL NFR BLD: 2.6 % (ref 0–8)
ERYTHROCYTE [DISTWIDTH] IN BLOOD BY AUTOMATED COUNT: 13.7 % (ref 11.5–14.5)
EST. GFR  (AFRICAN AMERICAN): 27.6 ML/MIN/1.73 M^2
EST. GFR  (NON AFRICAN AMERICAN): 23.9 ML/MIN/1.73 M^2
GLUCOSE SERPL-MCNC: 290 MG/DL (ref 70–110)
HCT VFR BLD AUTO: 28.7 % (ref 40–54)
HGB BLD-MCNC: 9.1 G/DL (ref 14–18)
IMM GRANULOCYTES # BLD AUTO: 0.02 K/UL (ref 0–0.04)
IMM GRANULOCYTES NFR BLD AUTO: 0.3 % (ref 0–0.5)
LYMPHOCYTES # BLD AUTO: 1.2 K/UL (ref 1–4.8)
LYMPHOCYTES NFR BLD: 19 % (ref 18–48)
MCH RBC QN AUTO: 30.5 PG (ref 27–31)
MCHC RBC AUTO-ENTMCNC: 31.7 G/DL (ref 32–36)
MCV RBC AUTO: 96 FL (ref 82–98)
MONOCYTES # BLD AUTO: 0.6 K/UL (ref 0.3–1)
MONOCYTES NFR BLD: 9.6 % (ref 4–15)
NEUTROPHILS # BLD AUTO: 4.2 K/UL (ref 1.8–7.7)
NEUTROPHILS NFR BLD: 68 % (ref 38–73)
NRBC BLD-RTO: 0 /100 WBC
PLATELET # BLD AUTO: 236 K/UL (ref 150–350)
PMV BLD AUTO: 10.1 FL (ref 9.2–12.9)
POTASSIUM SERPL-SCNC: 5.1 MMOL/L (ref 3.5–5.1)
RBC # BLD AUTO: 2.98 M/UL (ref 4.6–6.2)
SODIUM SERPL-SCNC: 138 MMOL/L (ref 136–145)
WBC # BLD AUTO: 6.22 K/UL (ref 3.9–12.7)

## 2020-12-01 PROCEDURE — 99213 OFFICE O/P EST LOW 20 MIN: CPT | Mod: PBBFAC | Performed by: UROLOGY

## 2020-12-01 PROCEDURE — 80048 BASIC METABOLIC PNL TOTAL CA: CPT

## 2020-12-01 PROCEDURE — 99214 OFFICE O/P EST MOD 30 MIN: CPT | Mod: S$PBB,,, | Performed by: UROLOGY

## 2020-12-01 PROCEDURE — 99999 PR PBB SHADOW E&M-EST. PATIENT-LVL III: CPT | Mod: PBBFAC,,, | Performed by: UROLOGY

## 2020-12-01 PROCEDURE — 99999 PR PBB SHADOW E&M-EST. PATIENT-LVL III: ICD-10-PCS | Mod: PBBFAC,,, | Performed by: UROLOGY

## 2020-12-01 PROCEDURE — 99214 PR OFFICE/OUTPT VISIT, EST, LEVL IV, 30-39 MIN: ICD-10-PCS | Mod: S$PBB,,, | Performed by: UROLOGY

## 2020-12-01 PROCEDURE — 36415 COLL VENOUS BLD VENIPUNCTURE: CPT

## 2020-12-01 PROCEDURE — 87086 URINE CULTURE/COLONY COUNT: CPT | Mod: 59

## 2020-12-01 PROCEDURE — 85025 COMPLETE CBC W/AUTO DIFF WBC: CPT

## 2020-12-01 RX ORDER — AMLODIPINE BESYLATE 10 MG/1
10 TABLET ORAL DAILY
Qty: 90 TABLET | Refills: 2 | Status: SHIPPED | OUTPATIENT
Start: 2020-12-01 | End: 2020-12-28

## 2020-12-01 NOTE — H&P (VIEW-ONLY)
Subjective:       Patient ID: Jamil Cadet is a 75 y.o. male.    Chief Complaint:  Ureteral stricture      History of Present Illness  HPI  Patient is a 75 y.o. male who is established to our clinic and was initially referred by their urologist, Dr. Garcia for evaluation of a right ureteral stricture.  The patient has a past medical history significant for a right renal mass.  He underwent a right robotic retroperitoneal partial nephrectomy in early 2019 by Dr. Santos at [facility].  He denies having other prior surgeries or kidney stones.  He denies a history of prostate cancer or radiation therapy.  He was found to have right-sided hydronephrosis.  He underwent a cystoscopy with right ureteroscopy and right retrograde pyelogram.  However, he has no etiology that is apparent for a right ureteral stricture.        Review of Systems  Review of Systems  All other systems reviewed and negative except pertinent positives noted in HPI.       Objective:     Physical Exam  Constitutional:       General: He is not in acute distress.     Appearance: He is well-developed.   HENT:      Head: Normocephalic and atraumatic.   Eyes:      General: No scleral icterus.  Neck:      Trachea: No tracheal deviation.   Pulmonary:      Effort: Pulmonary effort is normal. No respiratory distress.   Neurological:      Mental Status: He is alert and oriented to person, place, and time.   Psychiatric:         Behavior: Behavior normal.         Thought Content: Thought content normal.         Judgment: Judgment normal.         Lab Review  Lab Results   Component Value Date    COLORU Yellow 11/14/2020    SPECGRAV 1.010 11/14/2020    PHUR 5.0 11/14/2020    NITRITE Negative 11/14/2020    KETONESU Negative 11/14/2020    UROBILINOGEN Negative 11/14/2020         Assessment:        1. Ureteral stricture, right    2. Renal cell adenocarcinoma, right            Plan:     Ureteral stricture, right  -     Ambulatory referral/consult to Urology  -      Basic Metabolic Panel; Future; Expected date: 12/01/2020  -     CBC Auto Differential; Future; Expected date: 12/01/2020  -     Urine culture  -     Urine culture    Renal cell adenocarcinoma, right  -     Ambulatory referral/consult to Urology    -given the lack of explanation for his right ureteral stricture, we will attempt a right ureteroscopy.  We will initially attempt a retrograde ureteroscopy but if unsuccessful we will at the same time flip in prone in do a percutaneous antegrade ureteroscopy to rule out urothelial carcinoma or a malignant stricture.  Ultimately if we disprove a malignant stricture, we will plan for a right robotic reimplant.     -I have explained the indication, risks, benefits, and alternatives of the procedure in detail.  Risks including but not limited to bleeding, infection, injury to the urethra, bladder, ureter, need for additional treatments, inability or incomplete removal of kidney stones, pain, and discomfort related to the stent were discussed in depth with the patient.  The patient voices understanding and all questions have been answered.  The patient agrees to proceed as planned with right ureteroscopy, laser lithotripsy, and ureteral stent placement.

## 2020-12-01 NOTE — PROGRESS NOTES
Subjective:       Patient ID: Jaiml Cadet is a 75 y.o. male.    Chief Complaint:  Ureteral stricture      History of Present Illness  HPI  Patient is a 75 y.o. male who is established to our clinic and was initially referred by their urologist, Dr. Garcia for evaluation of a right ureteral stricture.  The patient has a past medical history significant for a right renal mass.  He underwent a right robotic retroperitoneal partial nephrectomy in early 2019 by Dr. Santos at [facility].  He denies having other prior surgeries or kidney stones.  He denies a history of prostate cancer or radiation therapy.  He was found to have right-sided hydronephrosis.  He underwent a cystoscopy with right ureteroscopy and right retrograde pyelogram.  However, he has no etiology that is apparent for a right ureteral stricture.        Review of Systems  Review of Systems  All other systems reviewed and negative except pertinent positives noted in HPI.       Objective:     Physical Exam  Constitutional:       General: He is not in acute distress.     Appearance: He is well-developed.   HENT:      Head: Normocephalic and atraumatic.   Eyes:      General: No scleral icterus.  Neck:      Trachea: No tracheal deviation.   Pulmonary:      Effort: Pulmonary effort is normal. No respiratory distress.   Neurological:      Mental Status: He is alert and oriented to person, place, and time.   Psychiatric:         Behavior: Behavior normal.         Thought Content: Thought content normal.         Judgment: Judgment normal.         Lab Review  Lab Results   Component Value Date    COLORU Yellow 11/14/2020    SPECGRAV 1.010 11/14/2020    PHUR 5.0 11/14/2020    NITRITE Negative 11/14/2020    KETONESU Negative 11/14/2020    UROBILINOGEN Negative 11/14/2020         Assessment:        1. Ureteral stricture, right    2. Renal cell adenocarcinoma, right            Plan:     Ureteral stricture, right  -     Ambulatory referral/consult to Urology  -      Basic Metabolic Panel; Future; Expected date: 12/01/2020  -     CBC Auto Differential; Future; Expected date: 12/01/2020  -     Urine culture  -     Urine culture    Renal cell adenocarcinoma, right  -     Ambulatory referral/consult to Urology    -given the lack of explanation for his right ureteral stricture, we will attempt a right ureteroscopy.  We will initially attempt a retrograde ureteroscopy but if unsuccessful we will at the same time flip in prone in do a percutaneous antegrade ureteroscopy to rule out urothelial carcinoma or a malignant stricture.  Ultimately if we disprove a malignant stricture, we will plan for a right robotic reimplant.     -I have explained the indication, risks, benefits, and alternatives of the procedure in detail.  Risks including but not limited to bleeding, infection, injury to the urethra, bladder, ureter, need for additional treatments, inability or incomplete removal of kidney stones, pain, and discomfort related to the stent were discussed in depth with the patient.  The patient voices understanding and all questions have been answered.  The patient agrees to proceed as planned with right ureteroscopy, laser lithotripsy, and ureteral stent placement.

## 2020-12-01 NOTE — LETTER
December 1, 2020      Chris Garcia Jr., MD  [address] [place] [address] Dr  [address]  [facility] [address]           [facility] - Urology 2nd Fl  [address]  [facility] [address]  Phone: [phone]          Patient: Jamil Cadet   MR Number: [id number]   YOB: 1945   Date of Visit: 12/1/2020       Dear Dr. Chris Garcia Jr.:    Thank you for referring Jamil Cadet to me for evaluation. Attached you will find relevant portions of my assessment and plan of care.    If you have questions, please do not hesitate to call me. I look forward to following Jamil Cadet along with you.    Sincerely,    Dave Shin MD    Enclosure  CC:  No Recipients    If you would like to receive this communication electronically, please contact [email or web address] or [phone] to request more information on [employer] Link access.    For providers and/or their staff who would like to refer a patient to [facility], please contact us through our one-stop-shop provider referral line, [facility], at [phone].    If you feel you have received this communication in error or would no longer like to receive these types of communications, please e-mail [email or web address]

## 2020-12-02 ENCOUNTER — DOCUMENT SCAN (OUTPATIENT)
Dept: HOME HEALTH SERVICES | Facility: HOSPITAL | Age: 75
End: 2020-12-02

## 2020-12-02 LAB
BACTERIA UR CULT: NO GROWTH
BACTERIA UR CULT: NORMAL

## 2020-12-07 ENCOUNTER — LAB VISIT (OUTPATIENT)
Dept: FAMILY MEDICINE | Facility: CLINIC | Age: 75
End: 2020-12-07
Payer: MEDICARE

## 2020-12-07 DIAGNOSIS — N13.30 HYDRONEPHROSIS, RIGHT: ICD-10-CM

## 2020-12-07 PROCEDURE — U0003 INFECTIOUS AGENT DETECTION BY NUCLEIC ACID (DNA OR RNA); SEVERE ACUTE RESPIRATORY SYNDROME CORONAVIRUS 2 (SARS-COV-2) (CORONAVIRUS DISEASE [COVID-19]), AMPLIFIED PROBE TECHNIQUE, MAKING USE OF HIGH THROUGHPUT TECHNOLOGIES AS DESCRIBED BY CMS-2020-01-R: HCPCS

## 2020-12-08 ENCOUNTER — LAB VISIT (OUTPATIENT)
Dept: LAB | Facility: HOSPITAL | Age: 75
End: 2020-12-08
Attending: FAMILY MEDICINE
Payer: MEDICARE

## 2020-12-08 DIAGNOSIS — E83.40 DISORDER OF MAGNESIUM METABOLISM: ICD-10-CM

## 2020-12-08 DIAGNOSIS — D63.1 ANEMIA OF CHRONIC RENAL FAILURE: ICD-10-CM

## 2020-12-08 DIAGNOSIS — N18.9 ANEMIA OF CHRONIC RENAL FAILURE: ICD-10-CM

## 2020-12-08 DIAGNOSIS — Z79.899 ENCOUNTER FOR LONG-TERM (CURRENT) USE OF OTHER MEDICATIONS: ICD-10-CM

## 2020-12-08 DIAGNOSIS — Z85.528 PERSONAL HISTORY OF RENAL CANCER: ICD-10-CM

## 2020-12-08 DIAGNOSIS — N18.9 CHRONIC KIDNEY DISEASE, UNSPECIFIED: ICD-10-CM

## 2020-12-08 DIAGNOSIS — Z85.46 PERSONAL HISTORY OF MALIGNANT NEOPLASM OF PROSTATE: ICD-10-CM

## 2020-12-08 DIAGNOSIS — M10.9 GOUT, UNSPECIFIED: ICD-10-CM

## 2020-12-08 DIAGNOSIS — D50.9 IRON DEFICIENCY ANEMIA, UNSPECIFIED: ICD-10-CM

## 2020-12-08 DIAGNOSIS — E78.5 HYPERLIPEMIA: Primary | ICD-10-CM

## 2020-12-08 DIAGNOSIS — R35.0 URINARY FREQUENCY: ICD-10-CM

## 2020-12-08 DIAGNOSIS — I10 ESSENTIAL HYPERTENSION, MALIGNANT: ICD-10-CM

## 2020-12-08 DIAGNOSIS — E55.9 AVITAMINOSIS D: ICD-10-CM

## 2020-12-08 DIAGNOSIS — E11.9 DIABETES MELLITUS WITHOUT COMPLICATION: ICD-10-CM

## 2020-12-08 LAB
25(OH)D3+25(OH)D2 SERPL-MCNC: 52 NG/ML (ref 30–96)
ALBUMIN SERPL BCP-MCNC: 4 G/DL (ref 3.5–5.2)
ALBUMIN/CREAT UR: 7.8 UG/MG (ref 0–30)
ALP SERPL-CCNC: 63 U/L (ref 55–135)
ALT SERPL W/O P-5'-P-CCNC: 23 U/L (ref 10–44)
ANION GAP SERPL CALC-SCNC: 7 MMOL/L (ref 8–16)
AST SERPL-CCNC: 29 U/L (ref 10–40)
BASOPHILS # BLD AUTO: 0.01 K/UL (ref 0–0.2)
BASOPHILS NFR BLD: 0.2 % (ref 0–1.9)
BILIRUB SERPL-MCNC: 1 MG/DL (ref 0.1–1)
BILIRUB UR QL STRIP: NEGATIVE
BUN SERPL-MCNC: 34 MG/DL (ref 8–23)
CALCIUM SERPL-MCNC: 9.3 MG/DL (ref 8.7–10.5)
CHLORIDE SERPL-SCNC: 109 MMOL/L (ref 95–110)
CHOLEST SERPL-MCNC: 108 MG/DL (ref 120–199)
CHOLEST/HDLC SERPL: 2.2 {RATIO} (ref 2–5)
CLARITY UR: CLEAR
CO2 SERPL-SCNC: 24 MMOL/L (ref 23–29)
COLOR UR: YELLOW
COMPLEXED PSA SERPL-MCNC: 0.34 NG/ML (ref 0–4)
CREAT SERPL-MCNC: 2.3 MG/DL (ref 0.5–1.4)
CREAT UR-MCNC: 161 MG/DL (ref 23–375)
DIFFERENTIAL METHOD: ABNORMAL
EOSINOPHIL # BLD AUTO: 0.2 K/UL (ref 0–0.5)
EOSINOPHIL NFR BLD: 3.1 % (ref 0–8)
ERYTHROCYTE [DISTWIDTH] IN BLOOD BY AUTOMATED COUNT: 14.1 % (ref 11.5–14.5)
EST. GFR  (AFRICAN AMERICAN): 30.5 ML/MIN/1.73 M^2
EST. GFR  (NON AFRICAN AMERICAN): 26.4 ML/MIN/1.73 M^2
ESTIMATED AVG GLUCOSE: 151 MG/DL (ref 68–131)
FERRITIN SERPL-MCNC: 43 NG/ML (ref 20–300)
GLUCOSE SERPL-MCNC: 86 MG/DL (ref 70–110)
GLUCOSE UR QL STRIP: NEGATIVE
HBA1C MFR BLD HPLC: 6.9 % (ref 4.5–6.2)
HCT VFR BLD AUTO: 29 % (ref 40–54)
HDLC SERPL-MCNC: 50 MG/DL (ref 40–75)
HDLC SERPL: 46.3 % (ref 20–50)
HGB BLD-MCNC: 9.1 G/DL (ref 14–18)
HGB UR QL STRIP: NEGATIVE
IMM GRANULOCYTES # BLD AUTO: 0.03 K/UL (ref 0–0.04)
IMM GRANULOCYTES NFR BLD AUTO: 0.5 % (ref 0–0.5)
IRON SERPL-MCNC: 52 UG/DL (ref 45–160)
KETONES UR QL STRIP: NEGATIVE
LDLC SERPL CALC-MCNC: 50.8 MG/DL (ref 63–159)
LEUKOCYTE ESTERASE UR QL STRIP: NEGATIVE
LYMPHOCYTES # BLD AUTO: 0.9 K/UL (ref 1–4.8)
LYMPHOCYTES NFR BLD: 15.4 % (ref 18–48)
MAGNESIUM SERPL-MCNC: 2 MG/DL (ref 1.6–2.6)
MCH RBC QN AUTO: 30.4 PG (ref 27–31)
MCHC RBC AUTO-ENTMCNC: 31.4 G/DL (ref 32–36)
MCV RBC AUTO: 97 FL (ref 82–98)
MICROALBUMIN UR DL<=1MG/L-MCNC: 12.6 UG/ML
MONOCYTES # BLD AUTO: 0.8 K/UL (ref 0.3–1)
MONOCYTES NFR BLD: 14 % (ref 4–15)
NEUTROPHILS # BLD AUTO: 3.9 K/UL (ref 1.8–7.7)
NEUTROPHILS NFR BLD: 66.8 % (ref 38–73)
NITRITE UR QL STRIP: NEGATIVE
NONHDLC SERPL-MCNC: 58 MG/DL
NRBC BLD-RTO: 0 /100 WBC
PH UR STRIP: 6 [PH] (ref 5–8)
PLATELET # BLD AUTO: 225 K/UL (ref 150–350)
PMV BLD AUTO: 10.7 FL (ref 9.2–12.9)
POTASSIUM SERPL-SCNC: 4.8 MMOL/L (ref 3.5–5.1)
PROT SERPL-MCNC: 7 G/DL (ref 6–8.4)
PROT UR QL STRIP: ABNORMAL
RBC # BLD AUTO: 2.99 M/UL (ref 4.6–6.2)
SARS-COV-2 RNA RESP QL NAA+PROBE: NOT DETECTED
SODIUM SERPL-SCNC: 140 MMOL/L (ref 136–145)
SP GR UR STRIP: 1.01 (ref 1–1.03)
T4 FREE SERPL-MCNC: 0.83 NG/DL (ref 0.71–1.51)
TRIGL SERPL-MCNC: 36 MG/DL (ref 30–150)
TSH SERPL DL<=0.005 MIU/L-ACNC: 1.09 UIU/ML (ref 0.34–5.6)
URATE SERPL-MCNC: 8.4 MG/DL (ref 3.4–7)
URN SPEC COLLECT METH UR: ABNORMAL
UROBILINOGEN UR STRIP-ACNC: NEGATIVE EU/DL
VIT B12 SERPL-MCNC: 181 PG/ML (ref 210–950)
WBC # BLD AUTO: 5.85 K/UL (ref 3.9–12.7)

## 2020-12-08 PROCEDURE — 82607 VITAMIN B-12: CPT

## 2020-12-08 PROCEDURE — 82306 VITAMIN D 25 HYDROXY: CPT

## 2020-12-08 PROCEDURE — 82728 ASSAY OF FERRITIN: CPT

## 2020-12-08 PROCEDURE — 80053 COMPREHEN METABOLIC PANEL: CPT

## 2020-12-08 PROCEDURE — 83540 ASSAY OF IRON: CPT

## 2020-12-08 PROCEDURE — 83735 ASSAY OF MAGNESIUM: CPT

## 2020-12-08 PROCEDURE — 80061 LIPID PANEL: CPT

## 2020-12-08 PROCEDURE — 82668 ASSAY OF ERYTHROPOIETIN: CPT

## 2020-12-08 PROCEDURE — 84550 ASSAY OF BLOOD/URIC ACID: CPT

## 2020-12-08 PROCEDURE — 84439 ASSAY OF FREE THYROXINE: CPT

## 2020-12-08 PROCEDURE — 83036 HEMOGLOBIN GLYCOSYLATED A1C: CPT

## 2020-12-08 PROCEDURE — 84153 ASSAY OF PSA TOTAL: CPT

## 2020-12-08 PROCEDURE — 81003 URINALYSIS AUTO W/O SCOPE: CPT

## 2020-12-08 PROCEDURE — 82043 UR ALBUMIN QUANTITATIVE: CPT

## 2020-12-08 PROCEDURE — 84443 ASSAY THYROID STIM HORMONE: CPT

## 2020-12-08 PROCEDURE — 85025 COMPLETE CBC W/AUTO DIFF WBC: CPT

## 2020-12-08 PROCEDURE — 36415 COLL VENOUS BLD VENIPUNCTURE: CPT

## 2020-12-09 ENCOUNTER — TELEPHONE (OUTPATIENT)
Dept: UROLOGY | Facility: CLINIC | Age: 75
End: 2020-12-09

## 2020-12-09 PROBLEM — N13.5 URETERAL STRICTURE, RIGHT: Status: ACTIVE | Noted: 2020-12-09

## 2020-12-09 NOTE — PRE-PROCEDURE INSTRUCTIONS
PREOP INSTRUCTIONS:    No solid food ,milk or milk products for 8 hours prior to procedure.Clear liquids are allowed up to 2 hours before procedure.Clear liquids are:water,apple juice,gatorade & powerade.Instructed to follow the surgeon's instructions if they differ from these.Shower instructions as well as directions to the Surgery Center were given.Encouraged to wear loose fitting,comfortable clothing.Medication instructions for pm prior to and am of procedure reviewed.Instructed to avoid taking vitamins,supplements,aspirin and ibuprofen the morning of surgery. Patient's questions and concerns addressed .Patient informed of the current visitor policy and advised patient that one visitor may accompany each patient into the hospital and wait (socially distanced) until a member of the medical team provides an update at the conclusion of the procedure.When they enter the hospital both patient and visitor will have their temperature checked.All visitors are asked to arrive with a mask and to keep their mask on throughout the visit.      CHAU ENCARNACION denies any patient side effects or issues with anesthesia or sedation.      0515 arrival to [facility]//given per Dr. Roberts's office.     CHAU ENCARNACION WILL BE PROVIDING TRANSPORTATION HOME UPON DISCHARGE.    RIGHT NEPHROSTOMY TUBE - HEMATURIA REPORTED PER WIFE//DR. ROBERTS IS AWARE. WIFE REPORTS SOME SEROSANGUINOUS OOZING FROM INSERTION SITE - CLEANING W/SALINE.    UNCONTROLLED DIABETES.

## 2020-12-09 NOTE — TELEPHONE ENCOUNTER
Called pt to confirm arrival time of 515a for procedure on 12/10/2020. Gave pt NPO instructions and gave pt opportunity to ask questions. Pt verbalized understanding.    Pt was informed that only 1 person would be allowed to accompany them the morning of surgery.  Pt verbalized understanding.

## 2020-12-10 ENCOUNTER — ANESTHESIA EVENT (OUTPATIENT)
Dept: SURGERY | Facility: HOSPITAL | Age: 75
End: 2020-12-10
Payer: MEDICARE

## 2020-12-10 ENCOUNTER — ANESTHESIA (OUTPATIENT)
Dept: SURGERY | Facility: HOSPITAL | Age: 75
End: 2020-12-10
Payer: MEDICARE

## 2020-12-10 ENCOUNTER — HOSPITAL ENCOUNTER (OUTPATIENT)
Facility: HOSPITAL | Age: 75
Discharge: HOME | End: 2020-12-10
Attending: UROLOGY | Admitting: UROLOGY
Payer: MEDICARE

## 2020-12-10 VITALS
HEART RATE: 83 BPM | WEIGHT: 171 LBS | RESPIRATION RATE: 40 BRPM | TEMPERATURE: 98 F | BODY MASS INDEX: 23.94 KG/M2 | OXYGEN SATURATION: 97 % | SYSTOLIC BLOOD PRESSURE: 122 MMHG | DIASTOLIC BLOOD PRESSURE: 56 MMHG | HEIGHT: 71 IN

## 2020-12-10 DIAGNOSIS — N20.0 KIDNEY STONE: ICD-10-CM

## 2020-12-10 DIAGNOSIS — N13.5 URETERAL STRICTURE, RIGHT: Primary | ICD-10-CM

## 2020-12-10 LAB
EPO SERPL-ACNC: 10.4 MIU/ML (ref 2.6–18.5)
POCT GLUCOSE: 156 MG/DL (ref 70–110)
POCT GLUCOSE: 177 MG/DL (ref 70–110)

## 2020-12-10 PROCEDURE — 37000009 HC ANESTHESIA EA ADD 15 MINS: Performed by: UROLOGY

## 2020-12-10 PROCEDURE — 63600175 PHARM REV CODE 636 W HCPCS: Performed by: STUDENT IN AN ORGANIZED HEALTH CARE EDUCATION/TRAINING PROGRAM

## 2020-12-10 PROCEDURE — C1729 CATH, DRAINAGE: HCPCS | Performed by: UROLOGY

## 2020-12-10 PROCEDURE — 71000044 HC DOSC ROUTINE RECOVERY FIRST HOUR: Performed by: UROLOGY

## 2020-12-10 PROCEDURE — 71000015 HC POSTOP RECOV 1ST HR: Performed by: UROLOGY

## 2020-12-10 PROCEDURE — C1758 CATHETER, URETERAL: HCPCS | Performed by: UROLOGY

## 2020-12-10 PROCEDURE — 82962 GLUCOSE BLOOD TEST: CPT | Performed by: UROLOGY

## 2020-12-10 PROCEDURE — 36000706: Performed by: UROLOGY

## 2020-12-10 PROCEDURE — D9220A PRA ANESTHESIA: Mod: CRNA,,, | Performed by: NURSE ANESTHETIST, CERTIFIED REGISTERED

## 2020-12-10 PROCEDURE — 82962 GLUCOSE BLOOD TEST: CPT | Mod: 91 | Performed by: UROLOGY

## 2020-12-10 PROCEDURE — 50436: ICD-10-PCS | Mod: RT,,, | Performed by: UROLOGY

## 2020-12-10 PROCEDURE — 63600175 PHARM REV CODE 636 W HCPCS: Performed by: ANESTHESIOLOGY

## 2020-12-10 PROCEDURE — D9220A PRA ANESTHESIA: ICD-10-PCS | Mod: CRNA,,, | Performed by: NURSE ANESTHETIST, CERTIFIED REGISTERED

## 2020-12-10 PROCEDURE — C2627 CATH, SUPRAPUBIC/CYSTOSCOPIC: HCPCS | Performed by: UROLOGY

## 2020-12-10 PROCEDURE — 25000003 PHARM REV CODE 250: Performed by: NURSE ANESTHETIST, CERTIFIED REGISTERED

## 2020-12-10 PROCEDURE — 63600175 PHARM REV CODE 636 W HCPCS: Performed by: NURSE ANESTHETIST, CERTIFIED REGISTERED

## 2020-12-10 PROCEDURE — 37000008 HC ANESTHESIA 1ST 15 MINUTES: Performed by: UROLOGY

## 2020-12-10 PROCEDURE — 50436 DILAT XST TRC NDURLGC PX: CPT | Mod: RT,,, | Performed by: UROLOGY

## 2020-12-10 PROCEDURE — D9220A PRA ANESTHESIA: ICD-10-PCS | Mod: ANES,,, | Performed by: ANESTHESIOLOGY

## 2020-12-10 PROCEDURE — D9220A PRA ANESTHESIA: Mod: ANES,,, | Performed by: ANESTHESIOLOGY

## 2020-12-10 PROCEDURE — 71000045 HC DOSC ROUTINE RECOVERY EA ADD'L HR: Performed by: UROLOGY

## 2020-12-10 PROCEDURE — 25000003 PHARM REV CODE 250: Performed by: STUDENT IN AN ORGANIZED HEALTH CARE EDUCATION/TRAINING PROGRAM

## 2020-12-10 PROCEDURE — C1769 GUIDE WIRE: HCPCS | Performed by: UROLOGY

## 2020-12-10 PROCEDURE — 52000 CYSTOURETHROSCOPY: CPT | Mod: 59,,, | Performed by: UROLOGY

## 2020-12-10 PROCEDURE — 52000 PR CYSTOURETHROSCOPY: ICD-10-PCS | Mod: 59,,, | Performed by: UROLOGY

## 2020-12-10 PROCEDURE — C1726 CATH, BAL DIL, NON-VASCULAR: HCPCS | Performed by: UROLOGY

## 2020-12-10 PROCEDURE — 36000707: Performed by: UROLOGY

## 2020-12-10 RX ORDER — FENTANYL CITRATE 50 UG/ML
INJECTION, SOLUTION INTRAMUSCULAR; INTRAVENOUS
Status: DISCONTINUED | OUTPATIENT
Start: 2020-12-10 | End: 2020-12-10

## 2020-12-10 RX ORDER — ROCURONIUM BROMIDE 10 MG/ML
INJECTION, SOLUTION INTRAVENOUS
Status: DISCONTINUED | OUTPATIENT
Start: 2020-12-10 | End: 2020-12-10

## 2020-12-10 RX ORDER — FENTANYL CITRATE 50 UG/ML
25 INJECTION, SOLUTION INTRAMUSCULAR; INTRAVENOUS EVERY 5 MIN PRN
Status: DISCONTINUED | OUTPATIENT
Start: 2020-12-10 | End: 2020-12-10 | Stop reason: HOSPADM

## 2020-12-10 RX ORDER — SODIUM CHLORIDE 0.9 % (FLUSH) 0.9 %
10 SYRINGE (ML) INJECTION
Status: DISCONTINUED | OUTPATIENT
Start: 2020-12-10 | End: 2020-12-10 | Stop reason: HOSPADM

## 2020-12-10 RX ORDER — PROPOFOL 10 MG/ML
VIAL (ML) INTRAVENOUS
Status: DISCONTINUED | OUTPATIENT
Start: 2020-12-10 | End: 2020-12-10

## 2020-12-10 RX ORDER — PHENYLEPHRINE HYDROCHLORIDE 10 MG/ML
INJECTION INTRAVENOUS
Status: DISCONTINUED | OUTPATIENT
Start: 2020-12-10 | End: 2020-12-10

## 2020-12-10 RX ORDER — SODIUM CHLORIDE 9 MG/ML
INJECTION, SOLUTION INTRAVENOUS CONTINUOUS PRN
Status: DISCONTINUED | OUTPATIENT
Start: 2020-12-10 | End: 2020-12-10

## 2020-12-10 RX ORDER — ONDANSETRON 2 MG/ML
INJECTION INTRAMUSCULAR; INTRAVENOUS
Status: DISCONTINUED | OUTPATIENT
Start: 2020-12-10 | End: 2020-12-10

## 2020-12-10 RX ORDER — LIDOCAINE HCL/PF 100 MG/5ML
SYRINGE (ML) INTRAVENOUS
Status: DISCONTINUED | OUTPATIENT
Start: 2020-12-10 | End: 2020-12-10

## 2020-12-10 RX ORDER — FENTANYL CITRATE 50 UG/ML
INJECTION, SOLUTION INTRAMUSCULAR; INTRAVENOUS
Status: DISCONTINUED
Start: 2020-12-10 | End: 2020-12-10 | Stop reason: HOSPADM

## 2020-12-10 RX ORDER — MIDAZOLAM HYDROCHLORIDE 1 MG/ML
INJECTION INTRAMUSCULAR; INTRAVENOUS
Status: DISCONTINUED | OUTPATIENT
Start: 2020-12-10 | End: 2020-12-10

## 2020-12-10 RX ORDER — TRAMADOL HYDROCHLORIDE 50 MG/1
50 TABLET ORAL EVERY 6 HOURS PRN
Qty: 5 TABLET | Refills: 0 | Status: SHIPPED | OUTPATIENT
Start: 2020-12-10 | End: 2020-12-17

## 2020-12-10 RX ADMIN — MIDAZOLAM HYDROCHLORIDE 1 MG: 1 INJECTION, SOLUTION INTRAMUSCULAR; INTRAVENOUS at 06:12

## 2020-12-10 RX ADMIN — FENTANYL CITRATE 50 MCG: 50 INJECTION, SOLUTION INTRAMUSCULAR; INTRAVENOUS at 07:12

## 2020-12-10 RX ADMIN — ONDANSETRON 4 MG: 2 INJECTION, SOLUTION INTRAMUSCULAR; INTRAVENOUS at 07:12

## 2020-12-10 RX ADMIN — PHENYLEPHRINE HYDROCHLORIDE 100 MCG: 10 INJECTION INTRAVENOUS at 08:12

## 2020-12-10 RX ADMIN — GENTAMICIN SULFATE 160 MG: 40 INJECTION, SOLUTION INTRAMUSCULAR; INTRAVENOUS at 07:12

## 2020-12-10 RX ADMIN — ROCURONIUM BROMIDE 40 MG: 10 INJECTION, SOLUTION INTRAVENOUS at 07:12

## 2020-12-10 RX ADMIN — ROCURONIUM BROMIDE 10 MG: 10 INJECTION, SOLUTION INTRAVENOUS at 07:12

## 2020-12-10 RX ADMIN — AMPICILLIN SODIUM 1 G: 1 INJECTION, POWDER, FOR SOLUTION INTRAMUSCULAR; INTRAVENOUS at 07:12

## 2020-12-10 RX ADMIN — FENTANYL CITRATE 25 MCG: 50 INJECTION, SOLUTION INTRAMUSCULAR; INTRAVENOUS at 09:12

## 2020-12-10 RX ADMIN — SUGAMMADEX 200 MG: 100 INJECTION, SOLUTION INTRAVENOUS at 09:12

## 2020-12-10 RX ADMIN — PROPOFOL 50 MG: 10 INJECTION, EMULSION INTRAVENOUS at 07:12

## 2020-12-10 RX ADMIN — ROCURONIUM BROMIDE 10 MG: 10 INJECTION, SOLUTION INTRAVENOUS at 08:12

## 2020-12-10 RX ADMIN — LIDOCAINE HYDROCHLORIDE 75 MG: 20 INJECTION, SOLUTION INTRAVENOUS at 07:12

## 2020-12-10 RX ADMIN — FENTANYL CITRATE 25 MCG: 50 INJECTION INTRAMUSCULAR; INTRAVENOUS at 09:12

## 2020-12-10 RX ADMIN — SODIUM CHLORIDE: 0.9 INJECTION, SOLUTION INTRAVENOUS at 06:12

## 2020-12-10 RX ADMIN — PROPOFOL 150 MG: 10 INJECTION, EMULSION INTRAVENOUS at 07:12

## 2020-12-10 NOTE — PLAN OF CARE
Patient tolerated procedure with no nausea and some pain. Pain treated per order. See mar. Post op bleeding at site at 0950. Resident at bedside. Verbalized to reinforce dressing. Site stayed stable for the rest of patient's stay. Discharge material given and explained to patient and spouse. Both verbalized understanding. Prescriptions delivered to bedside. Patient awaiting ride via wheelchair in stable condition with no complaints.

## 2020-12-10 NOTE — ANESTHESIA POSTPROCEDURE EVALUATION
Anesthesia Post Evaluation    Patient: Jamil Cadet    Procedure(s) Performed: Procedure(s) (LRB):  URETEROSCOPY-ANTEGRADE (Right)  CYSTOSCOPY  DILATION, NEPHROSTOMY TRACT (Right)  REMOVAL, DRAIN-NEPHROSTOMY TUBE (Right)  PYELOGRAM, ANTEGRADE  REPLACEMENT, NEPHROSTOMY TUBE (Right)    Final Anesthesia Type: general    Patient location during evaluation: PACU  Patient participation: Yes- Able to Participate  Level of consciousness: awake and alert  Post-procedure vital signs: reviewed and stable  Pain management: adequate  Airway patency: patent    PONV status at discharge: No PONV  Anesthetic complications: no      Cardiovascular status: stable  Respiratory status: unassisted and spontaneous ventilation  Hydration status: euvolemic  Follow-up not needed.          Vitals Value Taken Time   /56 12/10/20 1102   Temp 36.5 °C (97.7 °F) 12/10/20 0919   Pulse 81 12/10/20 1109   Resp 21 12/10/20 1109   SpO2 100 % 12/10/20 1109   Vitals shown include unvalidated device data.      No case tracking events are documented in the log.      Pain/Vi Score: Pain Rating Prior to Med Admin: 8 (12/10/2020  9:43 AM)  Vi Score: 10 (12/10/2020 11:10 AM)

## 2020-12-10 NOTE — INTERVAL H&P NOTE
The patient has been examined and the H&P has been reviewed:    I concur with the findings and no changes have occurred since H&P was written.    Surgery risks, benefits and alternative options discussed and understood by patient/family.    Urine dipstick today negative for all tested components.          Active Hospital Problems    Diagnosis  POA    *Ureteral stricture, right [N13.5]  Yes    Renal cyst [N28.1]  Not Applicable     Chronic    Non-insulin dependent type 2 diabetes mellitus, previously with hypoglycemia and now hyperglycemia [E11.9]  Yes     Chronic    Essential hypertension [I10]  Yes     Chronic    History of renal cell carcinoma status post partial nephrectomy [C64.1]  Yes     Chronic      Resolved Hospital Problems   No resolved problems to display.

## 2020-12-10 NOTE — OP NOTE
[facility]  Operative Note    Date: 12/10/2020    Pre-Op Diagnosis:   1. Right ureteral stricture  2. Right hydronephrosis    Post-Op Diagnosis: same    Procedure(s) Performed:   1.  Cystoscopy  2.  Dilation and establishment of percutaneous renal tract  3.  Antegrade ureteroscopy  4.  Placement of right nephrostomy tube  5.  right antegrade nephrostogram   6.  Fluoroscopy < 1 hr  7.  Intraoperative independent interpretation of fluoroscopic images    Specimen(s): none     Surgeon: Dave Shin    Anesthesia: General endotracheal    Indications: Jamil Cadet is a 75 y.o. male with right ureteral stricture.  He has a history of a right renal mass for which he underwent a retroperitoneal approach of a robotic partial nephrectomy.  He developed subsequent to that right-sided hydronephrosis.  Retrograde attempt at ureteral stent placement was unsuccessful at [facility] in [place].  He presents today for further evaluation.    Findings:   1.  Unable to cannulate the right ureteral orifice at the UVJ.  Retrograde access was completely unattainable.  2.  Very tight ureteral stricture in the mid-distal right ureter seen on antegrade ureteroscopy.  Collecting system had significant clot burden.    Estimated Blood Loss: min    Drains:   1.  16fr nephrostomy tube    Procedure in Detail:  After risks, benefits and possible complications of the procedure were explained, the patient elected to undergo the procedure and informed consent was obtained. All questions were answered in the linus-operative area. The patient was transferred to the cystoscopy suite and placed on the fluoroscopy table in the supine position.  SCDs were applied and working. Time out was performed, linus-procedural antibiotics were given. MAC anesthesia was administered.  After adequate anesthesia the patient was placed in dorsal lithotomy position and prepped and draped in the usual sterile fashion.     A rigid cystoscope in a 22 Fr  sheath was introduced into the patients bladder per urethra. This passed easily.  The entire urethra was visualized and revealed no strictures or masses.  Formal cystoscopy was performed which showed the right and left ureteral orifices in the normal anatomic position.  There were no bladder tumors, minimal  trabeculations, and no stones.      Our attention was turned to the patients right ureteral orifice.  A motion wire was attempted to pass into the right ureteral orifice.  This would not pass beyond 1 mm.  A 5 Cambodian open-ended ureteral catheter was then used to help with this but was again unsuccessful.  After numerous attempts, the decision was made to transition to a percutaneous renal or antegrade approach.  Prior to this, an antegrade nephrostogram was performed through the existing nephrostomy tube which revealed contrast going to the mid ureter and not beyond.  There was also hydronephrosis.      We then moved the patient back to his stretcher and flipped him prone on the cysto table.  He was re-prepped and draped.  A motion wire was passed through the nephrostomy tube.  A dual-lumen catheter was then passed over a wire and an Amplatz wire was passed.  No wire was able to be passed beyond the area of stricture in the mid ureter.  Multiple attempts at ureteral access sheath and Amplatz dilator passages were made unsuccessfully.  Ultimately, despite our lack of need for a rigid scope, the patient required balloon dilation of a percutaneous nephrostomy May tract.  The 30 Cambodian sheath was passed over the balloon.    A flexible cystoscope was then passed through the sheath into the collecting system.  Clot was encountered as expected per the antegrade nephrostogram showing clot within the renal pelvis.  There were no tumors encountered or seen.  The ureteral scope was then used as the cystoscope could not reach the distal extent of the stricture.  We were able to pass a wire through a narrowed/strictured  segment right at the level of the iliac vessels as transmitted pulsations were seen.  An attempt at passage of the ureteral scope beyond this point was unsuccessful.  There did not appear to be any intraluminal tumors.  We were unable to pass any contrast beyond this area.    At this point we aborted any further attempts at stent or antegrade procedures.  The patient then had a 16 [place] tip catheter passed over a wire under fluoroscopic guidance.  An antegrade nephrostogram confirmed appropriate positioning of the [place] tip catheter.  The renal sheath was removed.  The nephrostomy tube was secured with a nylon drain end-stage.  Two 0 Vicryl was used to reapproximate the remainder of that incision    The patient tolerated the procedure well and was transferred to the recovery room in stable condition.      Follow up care: The patient will follow up on Monday December 14th at 10:45 a.m. to discuss definitive management of what is presumed to be a ureteral stricture.      Dave Shin MD

## 2020-12-10 NOTE — DISCHARGE SUMMARY
"[facility]  Discharge Note  Short Stay    Admit Date: 12/10/2020    Discharge Date and Time: 12/10/2020 9:21 AM      Attending Physician: Dave Shin MD     Discharge Provider: Preet Constantino MD    Diagnoses:  Active Hospital Problems    Diagnosis  POA    *Ureteral stricture, right [N13.5]  Yes    Renal cyst [N28.1]  Not Applicable     Chronic    Non-insulin dependent type 2 diabetes mellitus, previously with hypoglycemia and now hyperglycemia [E11.9]  Yes     Chronic    Essential hypertension [I10]  Yes     Chronic    History of renal cell carcinoma status post partial nephrectomy [C64.1]  Yes     Chronic      Resolved Hospital Problems   No resolved problems to display.       Discharged Condition: good    Hospital Course: Patient was admitted for right antegrade ureteroscopy and right nephrostomy tube exchange and tolerated the procedure well with no complications. The patient was discharged home in good condition on the same day.       Final Diagnoses: Same as principal problem.    Disposition: Home or Self Care    Follow up/Patient Instructions:    Medications:  Reconciled Home Medications:   Current Discharge Medication List      START taking these medications    Details   traMADoL (ULTRAM) 50 mg tablet Take 1 tablet (50 mg total) by mouth every 6 (six) hours as needed for Pain.  Qty: 5 tablet, Refills: 0    Comments: n/a          CONTINUE these medications which have NOT CHANGED    Details   amLODIPine (NORVASC) 10 MG tablet Take 1 tablet (10 mg total) by mouth once daily.  Qty: 90 tablet, Refills: 2    Comments: .      BD ULTRA-FINE SHORT PEN NEEDLE 31 gauge x 5/16" Ndle use as directed      blood-glucose meter (TRUE METRIX GLUCOSE METER) Misc 1 Device by Misc.(Non-Drug; Combo Route) route once daily.  Qty: 1 each, Refills: 0    Comments: Please provided device which compatible with his insurances.      hydrALAZINE (APRESOLINE) 25 MG tablet Take 25 mg by mouth 3 (three) times daily. "    Comments: .      insulin aspart U-100 (NOVOLOG FLEXPEN U-100 INSULIN) 100 unit/mL (3 mL) InPn pen Inject 10 Units into the skin 3 (three) times daily with meals. Check glucose before meals and then take insulin as per sliding scale  Qty: 5 Syringe, Refills: 2    Comments: Sliding scale with the patient.  Associated Diagnoses: Type 2 diabetes mellitus with hypoglycemia without coma, with long-term current use of insulin      insulin degludec (TRESIBA FLEXTOUCH U-100) 100 unit/mL (3 mL) InPn Inject 17 Units into the skin every evening.  Qty: 5 Syringe, Refills: 1    Associated Diagnoses: Type 2 diabetes mellitus with hypoglycemia without coma, with long-term current use of insulin      mupirocin (BACTROBAN) 2 % ointment Apply topically 3 (three) times daily.  Qty: 30 g, Refills: 1      rosuvastatin (CRESTOR) 20 MG tablet Take 20 mg by mouth every evening.       traZODone (DESYREL) 50 MG tablet Take 50 mg by mouth every evening. HCS      acetaminophen (TYLENOL) 325 MG tablet Take 325 mg by mouth every 6 (six) hours as needed for Pain.      TRUE METRIX GLUCOSE TEST STRIP Strp            Discharge Procedure Orders   Diet Adult Regular     No driving until:   Order Comments: Off narcotics     Notify your health care provider if you experience any of the following:  temperature >100.4     Notify your health care provider if you experience any of the following:  persistent nausea and vomiting or diarrhea     Notify your health care provider if you experience any of the following:  severe uncontrolled pain     Notify your health care provider if you experience any of the following:  redness, tenderness, or signs of infection (pain, swelling, redness, odor or green/yellow discharge around incision site)     Notify your health care provider if you experience any of the following:  difficulty breathing or increased cough     Notify your health care provider if you experience any of the following:  severe persistent headache      Notify your health care provider if you experience any of the following:  worsening rash     Notify your health care provider if you experience any of the following:  persistent dizziness, light-headedness, or visual disturbances     Notify your health care provider if you experience any of the following:  increased confusion or weakness     Activity as tolerated     Follow-up Information     Dave Shin MD On 12/14/2020.    Specialty: Urology  Why: Urology follow up  Contact information:  [facility]  [facility] [id number] [phone]

## 2020-12-10 NOTE — TRANSFER OF CARE
"Anesthesia Transfer of Care Note    Patient: Jamil Cadet    Procedure(s) Performed: Procedure(s) (LRB):  URETEROSCOPY-ANTEGRADE (Right)  CYSTOSCOPY  DILATION, NEPHROSTOMY TRACT (Right)  REMOVAL, DRAIN-NEPHROSTOMY TUBE (Right)  PYELOGRAM, ANTEGRADE  REPLACEMENT, NEPHROSTOMY TUBE (Right)    Patient location: PACU    Anesthesia Type: general    Transport from OR: Transported from OR on 6-10 L/min O2 by face mask with adequate spontaneous ventilation    Post pain: adequate analgesia    Post assessment: no apparent anesthetic complications and tolerated procedure well    Post vital signs: stable    Level of consciousness: awake and alert    Nausea/Vomiting: no vomiting    Complications: none    Transfer of care protocol was followed      Last vitals:   Visit Vitals  /71 (BP Location: Left arm, Patient Position: Lying)   Pulse (P) 87   Temp (P) 36.5 °C (97.7 °F) (Temporal)   Resp (P) 20   Ht 5' 11" (1.803 m)   Wt 77.6 kg (171 lb)   SpO2 (P) 100%   BMI 23.85 kg/m²     "

## 2020-12-10 NOTE — ANESTHESIA PREPROCEDURE EVALUATION
"                                                                                                             12/10/2020  Jamil Cadet is a 75 y.o., male.  Pre-operative evaluation for Procedure(s) (LRB):  URETEROSCOPY/ antegrade (Right)  BIOPSY, URETER (Right)  PLACEMENT-STENT (Right)    Jamil Cadet is a 75 y.o. male     Patient Active Problem List   Diagnosis    Acute renal failure    Non-insulin dependent type 2 diabetes mellitus, previously with hypoglycemia and now hyperglycemia    Essential hypertension    History of renal cell carcinoma status post partial nephrectomy    Symptomatic anemia    Hydronephrosis    Renal cyst    Hematuria    Sepsis    Hydroureter    Complication of external stoma of urinary tract    Bacteremia due to Enterococcus    Hematuria, gross    Pyelonephritis of right kidney    Acute blood loss anemia    Disorder of kidney and ureter    Infection and inflammatory reaction due to nephrostomy catheter, initial encounter    Septicemia due to enterococcus    Ureteral stricture, right       Review of patient's allergies indicates:  No Known Allergies    No current facility-administered medications on file prior to encounter.      Current Outpatient Medications on File Prior to Encounter   Medication Sig Dispense Refill    amLODIPine (NORVASC) 10 MG tablet Take 1 tablet (10 mg total) by mouth once daily. 90 tablet 2    BD ULTRA-FINE SHORT PEN NEEDLE 31 gauge x 5/16" Ndle use as directed      blood-glucose meter (TRUE METRIX GLUCOSE METER) Misc 1 Device by Misc.(Non-Drug; Combo Route) route once daily. 1 each 0    hydrALAZINE (APRESOLINE) 25 MG tablet Take 25 mg by mouth 3 (three) times daily.      insulin aspart U-100 (NOVOLOG FLEXPEN U-100 INSULIN) 100 unit/mL (3 mL) InPn pen Inject 10 Units into the skin 3 (three) times daily with meals. Check glucose before meals and then take insulin as per sliding scale 5 Syringe 2    insulin degludec (TRESIBA FLEXTOUCH U-100) " 100 unit/mL (3 mL) InPn Inject 17 Units into the skin every evening. 5 Syringe 1    mupirocin (BACTROBAN) 2 % ointment Apply topically 3 (three) times daily. 30 g 1    rosuvastatin (CRESTOR) 20 MG tablet Take 20 mg by mouth every evening.       traZODone (DESYREL) 50 MG tablet Take 50 mg by mouth every evening. HCS      acetaminophen (TYLENOL) 325 MG tablet Take 325 mg by mouth every 6 (six) hours as needed for Pain.      TRUE METRIX GLUCOSE TEST STRIP Strp          Past Surgical History:   Procedure Laterality Date    APPENDECTOMY      CYSTOSCOPY W/ RETROGRADES Right 9/9/2020    Procedure: CYSTOSCOPY, WITH RETROGRADE PYELOGRAM;  Surgeon: Chris Garcia Jr., MD;  Location: [facility];  Service: Urology;  Laterality: Right;    SPINE SURGERY      URETHROSCOPY  9/9/2020    Procedure: URETHROSCOPY;  Surgeon: Chris Garcia Jr., MD;  Location: [facility];  Service: Urology;;       Social History     Socioeconomic History    Marital status:      Spouse name: Not on file    Number of children: Not on file    Years of education: Not on file    Highest education level: Not on file   Occupational History    Not on file   Social Needs    Financial resource strain: Not very hard    Food insecurity     Worry: Never true     Inability: Never true    Transportation needs     Medical: No     Non-medical: No   Tobacco Use    Smoking status: Never Smoker    Smokeless tobacco: Never Used   Substance and Sexual Activity    Alcohol use: No     Frequency: Never    Drug use: No    Sexual activity: Yes     Partners: Female   Lifestyle    Physical activity     Days per week: 6 days     Minutes per session: 150+ min    Stress: To some extent   Relationships    Social connections     Talks on phone: Three times a week     Gets together: Once a week     Attends Spiritism service: Not on file     Active member of club or organization: Yes     Attends meetings of clubs or organizations: More than 4 times per year      Relationship status:    Other Topics Concern    Not on file   Social History Narrative    Not on file         CBC:   Recent Labs     12/08/20  1107   WBC 5.85   RBC 2.99*   HGB 9.1*   HCT 29.0*      MCV 97   MCH 30.4   MCHC 31.4*       CMP:   Recent Labs     12/08/20  1107      K 4.8      CO2 24   BUN 34*   CREATININE 2.3*   GLU 86   MG 2.0   CALCIUM 9.3   ALBUMIN 4.0   PROT 7.0   ALKPHOS 63   ALT 23   AST 29   BILITOT 1.0     Anesthesia Evaluation    I have reviewed the Patient Summary Reports.   I have reviewed the NPO Status.   I have reviewed the Medications.     Review of Systems  Anesthesia Hx:  No problems with previous Anesthesia  History of prior surgery of interest to airway management or planning:  Denies Personal Hx of Anesthesia complications.   Social:  Non-Smoker    Cardiovascular:   Hypertension    Pulmonary:  Pulmonary Normal    Hepatic/GI:  Hepatic/GI Normal    Neurological:  Neurology Normal    Endocrine:   Diabetes        Physical Exam  General:  Well nourished    Airway/Jaw/Neck:  Airway Findings: Mouth Opening: Normal Tongue: Normal  General Airway Assessment: Adult  Improves to II with phonation.  TM Distance: Normal, at least 6 cm      Dental:  Dental Findings: In tact        Mental Status:  Mental Status Findings:  Cooperative, Alert and Oriented         Anesthesia Plan  Type of Anesthesia, risks & benefits discussed:  Anesthesia Type:  general  Patient's Preference:   Intra-op Monitoring Plan: standard ASA monitors  Intra-op Monitoring Plan Comments:   Post Op Pain Control Plan: per primary service following discharge from PACU and multimodal analgesia  Post Op Pain Control Plan Comments:   Induction:   IV  Beta Blocker:  Patient is not currently on a Beta-Blocker (No further documentation required).       Informed Consent: Patient understands risks and agrees with Anesthesia plan.  Questions answered. Anesthesia consent signed with patient.  ASA Score: 2     Day  of Surgery Review of History & Physical:    H&P update referred to the surgeon.         Ready For Surgery From Anesthesia Perspective.

## 2020-12-11 ENCOUNTER — TELEPHONE (OUTPATIENT)
Dept: UROLOGY | Facility: CLINIC | Age: 75
End: 2020-12-11

## 2020-12-11 ENCOUNTER — HOSPITAL ENCOUNTER (EMERGENCY)
Facility: HOSPITAL | Age: 75
Discharge: HOME | End: 2020-12-11
Attending: EMERGENCY MEDICINE
Payer: MEDICARE

## 2020-12-11 VITALS
SYSTOLIC BLOOD PRESSURE: 127 MMHG | DIASTOLIC BLOOD PRESSURE: 68 MMHG | RESPIRATION RATE: 18 BRPM | HEART RATE: 65 BPM | TEMPERATURE: 101 F | OXYGEN SATURATION: 98 %

## 2020-12-11 DIAGNOSIS — R50.82 POSTOPERATIVE FEVER: Primary | ICD-10-CM

## 2020-12-11 DIAGNOSIS — G89.18 POST-OP PAIN: ICD-10-CM

## 2020-12-11 DIAGNOSIS — N99.528 COMPLICATION OF NEPHROSTOMY: ICD-10-CM

## 2020-12-11 LAB
ABO + RH BLD: NORMAL
ALBUMIN SERPL BCP-MCNC: 3.6 G/DL (ref 3.5–5.2)
ALP SERPL-CCNC: 68 U/L (ref 55–135)
ALT SERPL W/O P-5'-P-CCNC: 19 U/L (ref 10–44)
ANION GAP SERPL CALC-SCNC: 13 MMOL/L (ref 8–16)
AST SERPL-CCNC: 17 U/L (ref 10–40)
BACTERIA #/AREA URNS AUTO: ABNORMAL /HPF
BASOPHILS # BLD AUTO: 0.01 K/UL (ref 0–0.2)
BASOPHILS NFR BLD: 0.1 % (ref 0–1.9)
BILIRUB SERPL-MCNC: 1.4 MG/DL (ref 0.1–1)
BILIRUB UR QL STRIP: NEGATIVE
BLD GP AB SCN CELLS X3 SERPL QL: NORMAL
BUN SERPL-MCNC: 27 MG/DL (ref 6–30)
BUN SERPL-MCNC: 29 MG/DL (ref 8–23)
CALCIUM SERPL-MCNC: 8.8 MG/DL (ref 8.7–10.5)
CHLORIDE SERPL-SCNC: 105 MMOL/L (ref 95–110)
CHLORIDE SERPL-SCNC: 106 MMOL/L (ref 95–110)
CLARITY UR REFRACT.AUTO: ABNORMAL
CO2 SERPL-SCNC: 18 MMOL/L (ref 23–29)
COLOR UR AUTO: YELLOW
CREAT SERPL-MCNC: 2.4 MG/DL (ref 0.5–1.4)
CREAT SERPL-MCNC: 2.4 MG/DL (ref 0.5–1.4)
DIFFERENTIAL METHOD: ABNORMAL
EOSINOPHIL # BLD AUTO: 0 K/UL (ref 0–0.5)
EOSINOPHIL NFR BLD: 0.3 % (ref 0–8)
ERYTHROCYTE [DISTWIDTH] IN BLOOD BY AUTOMATED COUNT: 14 % (ref 11.5–14.5)
EST. GFR  (AFRICAN AMERICAN): 29 ML/MIN/1.73 M^2
EST. GFR  (NON AFRICAN AMERICAN): 25.1 ML/MIN/1.73 M^2
GLUCOSE SERPL-MCNC: 71 MG/DL (ref 70–110)
GLUCOSE SERPL-MCNC: 74 MG/DL (ref 70–110)
GLUCOSE UR QL STRIP: NEGATIVE
HCT VFR BLD AUTO: 24 % (ref 40–54)
HCT VFR BLD CALC: 20 %PCV (ref 36–54)
HGB BLD-MCNC: 7.8 G/DL (ref 14–18)
HGB UR QL STRIP: ABNORMAL
IMM GRANULOCYTES # BLD AUTO: 0.03 K/UL (ref 0–0.04)
IMM GRANULOCYTES NFR BLD AUTO: 0.3 % (ref 0–0.5)
KETONES UR QL STRIP: NEGATIVE
LEUKOCYTE ESTERASE UR QL STRIP: NEGATIVE
LYMPHOCYTES # BLD AUTO: 1 K/UL (ref 1–4.8)
LYMPHOCYTES NFR BLD: 9.2 % (ref 18–48)
MCH RBC QN AUTO: 31.8 PG (ref 27–31)
MCHC RBC AUTO-ENTMCNC: 32.5 G/DL (ref 32–36)
MCV RBC AUTO: 98 FL (ref 82–98)
MICROSCOPIC COMMENT: ABNORMAL
MONOCYTES # BLD AUTO: 1.5 K/UL (ref 0.3–1)
MONOCYTES NFR BLD: 13.8 % (ref 4–15)
NEUTROPHILS # BLD AUTO: 8.1 K/UL (ref 1.8–7.7)
NEUTROPHILS NFR BLD: 76.3 % (ref 38–73)
NITRITE UR QL STRIP: NEGATIVE
NRBC BLD-RTO: 0 /100 WBC
PH UR STRIP: 5 [PH] (ref 5–8)
PLATELET # BLD AUTO: 190 K/UL (ref 150–350)
PMV BLD AUTO: 10.1 FL (ref 9.2–12.9)
POC IONIZED CALCIUM: 1.08 MMOL/L (ref 1.06–1.42)
POC TCO2 (MEASURED): 20 MMOL/L (ref 23–29)
POTASSIUM BLD-SCNC: 3.9 MMOL/L (ref 3.5–5.1)
POTASSIUM SERPL-SCNC: 4 MMOL/L (ref 3.5–5.1)
PROT SERPL-MCNC: 6.7 G/DL (ref 6–8.4)
PROT UR QL STRIP: NEGATIVE
RBC # BLD AUTO: 2.45 M/UL (ref 4.6–6.2)
RBC #/AREA URNS AUTO: 22 /HPF (ref 0–4)
SAMPLE: ABNORMAL
SODIUM BLD-SCNC: 135 MMOL/L (ref 136–145)
SODIUM SERPL-SCNC: 137 MMOL/L (ref 136–145)
SP GR UR STRIP: 1.01 (ref 1–1.03)
SQUAMOUS #/AREA URNS AUTO: 1 /HPF
URN SPEC COLLECT METH UR: ABNORMAL
WBC # BLD AUTO: 10.54 K/UL (ref 3.9–12.7)
WBC #/AREA URNS AUTO: 3 /HPF (ref 0–5)

## 2020-12-11 PROCEDURE — 96374 THER/PROPH/DIAG INJ IV PUSH: CPT

## 2020-12-11 PROCEDURE — 80047 BASIC METABLC PNL IONIZED CA: CPT

## 2020-12-11 PROCEDURE — 99284 EMERGENCY DEPT VISIT MOD MDM: CPT | Mod: 25

## 2020-12-11 PROCEDURE — 25000003 PHARM REV CODE 250: Performed by: PHYSICIAN ASSISTANT

## 2020-12-11 PROCEDURE — 81001 URINALYSIS AUTO W/SCOPE: CPT

## 2020-12-11 PROCEDURE — 51798 US URINE CAPACITY MEASURE: CPT

## 2020-12-11 PROCEDURE — 99285 PR EMERGENCY DEPT VISIT,LEVEL V: ICD-10-PCS | Mod: ,,, | Performed by: PHYSICIAN ASSISTANT

## 2020-12-11 PROCEDURE — 82330 ASSAY OF CALCIUM: CPT

## 2020-12-11 PROCEDURE — 96375 TX/PRO/DX INJ NEW DRUG ADDON: CPT

## 2020-12-11 PROCEDURE — 96361 HYDRATE IV INFUSION ADD-ON: CPT

## 2020-12-11 PROCEDURE — 85025 COMPLETE CBC W/AUTO DIFF WBC: CPT

## 2020-12-11 PROCEDURE — 99285 EMERGENCY DEPT VISIT HI MDM: CPT | Mod: ,,, | Performed by: PHYSICIAN ASSISTANT

## 2020-12-11 PROCEDURE — 63600175 PHARM REV CODE 636 W HCPCS: Performed by: PHYSICIAN ASSISTANT

## 2020-12-11 PROCEDURE — 80053 COMPREHEN METABOLIC PANEL: CPT

## 2020-12-11 PROCEDURE — 86901 BLOOD TYPING SEROLOGIC RH(D): CPT

## 2020-12-11 RX ORDER — KETOROLAC TROMETHAMINE 30 MG/ML
10 INJECTION, SOLUTION INTRAMUSCULAR; INTRAVENOUS
Status: COMPLETED | OUTPATIENT
Start: 2020-12-11 | End: 2020-12-11

## 2020-12-11 RX ORDER — MORPHINE SULFATE 4 MG/ML
4 INJECTION, SOLUTION INTRAMUSCULAR; INTRAVENOUS
Status: COMPLETED | OUTPATIENT
Start: 2020-12-11 | End: 2020-12-11

## 2020-12-11 RX ADMIN — SODIUM CHLORIDE 1000 ML: 0.9 INJECTION, SOLUTION INTRAVENOUS at 07:12

## 2020-12-11 RX ADMIN — MORPHINE SULFATE 4 MG: 4 INJECTION INTRAVENOUS at 06:12

## 2020-12-11 RX ADMIN — KETOROLAC TROMETHAMINE 10 MG: 30 INJECTION, SOLUTION INTRAMUSCULAR at 06:12

## 2020-12-11 NOTE — HPI
This is a 75 YOM with past medical history significant for diabetes, HLD, and a right renal mass s/p right robotic retroperitoneal partial nephrectomy in early 2019 by Dr. Santos at [facility].  He presented to the [facility] emergency department on 9/6/20 with severe hypoglycemia and MELODIE with Cr of 14.4. CTRSS showed Right hydroureteronephrosis to the level of the pelvis with no obvious etiology. He was taken for a right ureteral stent placement but this was unsuccessful due to mid-distal ureteral stricture. A right nephrostomy tube was then placed by IR. He saw Dr. Shin to discuss treatment options for right ureteral stricture. Since he has no obvious etiology for a ureteral stricture, on 12/10 he underwent an investigative antegrade ureteroscopy after failed attempt at retrograde pyelogram due to stricture. There was no obvious tumor appreciated. A 30 Fr Councill tip catheter was left in place in the kidney.     He presents to the ER with fever to 100.6 at home, abdominal pain, and due to lack of drainage from his 30 Fr nephrostomy tube.   On arrival, his Tmax 100.8, otherwise VSS. He c/o abdominal pain in the lower pelvis ranging from 3-6/10. Tylenol has helped to relieve his pain. He is also concerned that he has not urinated since 12pm and his nephrostomy tube has not been draining since that time. He denies any nausea or vomiting.     WBC 10.5, Cr 2.4 (BL)  Bladder scan showed 160cc

## 2020-12-11 NOTE — TELEPHONE ENCOUNTER
I spoke with patient , who stated he is having some pain from his stomach to his penis rated 3-4 and has had fever 100.6, and 99.9  that has responded to tylenol. Patient stated he hasn't had any more drainage form his tubing in his back and has had draining from his penis.  spoke with patient on the phone , trying to guide him on making sure the tubing was open, but the patient was having difficulty, so  stated he will have  or the resident that did his urgery calol him back, patient agreed.              ----- Message from Edna De Leon sent at 12/11/2020  9:32 AM CST -----  Contact: Meron - Pt's wife  Pt's wife Meron calling to inform staff that Pt's developed a fever since procedure on yesterday. Meron states Pt has a temp of 100.6, and 99.9 after given tylenol. Please follow up with Meron for further assistance.       Confirmed patient's contact info below:     Contact Name: Meron     Phone Number: [phone]

## 2020-12-11 NOTE — FIRST PROVIDER EVALUATION
" Emergency Department TeleTriage Encounter Note      CHIEF COMPLAINT    Chief Complaint   Patient presents with    Post-op Problem     pt arrives with c/o post op complications had stent placed for kidney stone with nephrostomy tube in place to right side "pt states my kidney stopped working i havent produced urine since 12pm"pt reports abd pain        VITAL SIGNS   Initial Vitals [12/11/20 1711]   BP Pulse Resp Temp SpO2   123/60 94 18 (!) 100.8 °F (38.2 °C) 100 %      MAP       --            ALLERGIES    Review of patient's allergies indicates:  No Known Allergies    PROVIDER TRIAGE NOTE  Patient reports decreased output from right nephrostomy tube with associated abdominal fullness and fever.      ORDERS  Labs Reviewed - No data to display    ED Orders (720h ago, onward)    Start Ordered     Status Ordering Provider    12/11/20 1739 12/11/20 1738  Diet NPO  Diet effective now      Ordered JACK [place]    12/11/20 1739 12/11/20 1738  Insert peripheral IV  Once      Ordered JACK NJ    12/11/20 1739 12/11/20 1738  CBC W/ AUTO DIFFERENTIAL  STAT      Ordered JACK NJ    12/11/20 1739 12/11/20 1738  Comp. Metabolic Panel  STAT      Ordered JACK NJ    12/11/20 1739 12/11/20 1738  Urinalysis, Reflex to Urine Culture Urine, Clean Catch  STAT      Ordered JACK NJ            Virtual Visit Note: The provider triage portion of this emergency department evaluation and documentation was performed via [employer], a HIPAA-compliant telemedicine application, in concert with a tele-presenter in the room. A face to face patient evaluation with one of my colleagues will occur once the patient is placed in an emergency department room.      DISCLAIMER: This note was prepared with [email or web address] voice recognition transcription software. Garbled syntax, mangled pronouns, and other bizarre constructions may be attributed to that software system.  "

## 2020-12-12 NOTE — DISCHARGE INSTRUCTIONS
-take home pain medicine if needed, follow-up to Urology Clinic on Monday  -return for rapidly worsening pain or decreased urine output    Our goal in the emergency department is to always give you outstanding care and exceptional service. You may receive a survey by mail or e-mail in the next week regarding your experience in our ED. We would greatly appreciate your completing and returning the survey. Your feedback provides us with a way to recognize our staff who give very good care and it helps us learn how to improve when your experience was below our aspiration of excellence.

## 2020-12-12 NOTE — ASSESSMENT & PLAN NOTE
75 YOM s/p right antegrade ureteroscopy 12/10 presents to the ER with c/o low UOP and his nephrostomy tube not draining     - nephrostomy tube irrigated easily with return of some clot that was likely obstructing the tube, irrigated to crystal clear, nephrostomy tube drained well thereafter and patient stated his pain improved   - low grade fever likely from post op status  - OK to DC from urology perspective, has close follow up with Dr. Shin   - patient and wife given supplies to irrigate at home give they live 1.5 hours away, however were instructed to return here with any worsening of symptoms

## 2020-12-12 NOTE — SUBJECTIVE & OBJECTIVE
Past Medical History:   Diagnosis Date    Cancer     Diabetes mellitus, type 2     Disorder of kidney and ureter     Encounter for blood transfusion     History of renal cell carcinoma status post partial nephrectomy 9/7/2020    Hydronephrosis 9/9/2020    Hyperlipidemia     Hypertension     Polio     Septicemia due to enterococcus 11/15/2020       Past Surgical History:   Procedure Laterality Date    ANTEGRADE PYELOGRAM  12/10/2020    Procedure: PYELOGRAM, ANTEGRADE;  Surgeon: Dave Shin MD;  Location: [facility] OR [address] [place] [address];  Service: Urology;;    APPENDECTOMY      CYSTOSCOPY  12/10/2020    Procedure: CYSTOSCOPY;  Surgeon: Dave Shin MD;  [facility];  Service: Urology;;    CYSTOSCOPY W/ RETROGRADES Right 9/9/2020    Procedure: CYSTOSCOPY, WITH RETROGRADE PYELOGRAM;  Surgeon: Chris Garcia Jr., MD;  Location: [facility];  Service: Urology;  Laterality: Right;    DILATION OF NEPHROSTOMY TRACT Right 12/10/2020    Procedure: DILATION, NEPHROSTOMY TRACT;  Surgeon: Dave Shin MD;  [address];  Service: Urology;  Laterality: Right;    REMOVAL OF DRAIN Right 12/10/2020    Procedure: REMOVAL, DRAIN-NEPHROSTOMY TUBE;  Surgeon: Dave Shin MD;  Location: [facility] OR [address] [place] [address];  Service: Urology;  Laterality: Right;    REPLACEMENT OF NEPHROSTOMY TUBE Right 12/10/2020    Procedure: REPLACEMENT, NEPHROSTOMY TUBE;  Surgeon: Dave Shin MD;  Location: [facility] OR [address] [place] [address];  Service: Urology;  Laterality: Right;    SPINE SURGERY      URETEROSCOPY Right 12/10/2020    Procedure: URETEROSCOPY-ANTEGRADE;  Surgeon: Dave Shin MD;  Location: [address];  Service: Urology;  Laterality: Right;    URETHROSCOPY  9/9/2020    Procedure: URETHROSCOPY;  Surgeon: Chris Garcia Jr., MD;  Location: [facility];  Service: Urology;;       Review of patient's allergies indicates:  No Known Allergies    Family History     Problem Relation (Age of Onset)    Heart disease  Mother, Father, Maternal Grandfather, Paternal Grandmother, Paternal Grandfather    Stroke Father          Tobacco Use    Smoking status: Never Smoker    Smokeless tobacco: Never Used   Substance and Sexual Activity    Alcohol use: No     Frequency: Never    Drug use: No    Sexual activity: Yes     Partners: Female       Review of Systems   Constitutional: Negative for activity change, appetite change, fatigue and fever.   HENT: Negative for facial swelling and hearing loss.    Eyes: Negative for discharge and itching.   Respiratory: Negative for chest tightness and shortness of breath.    Cardiovascular: Negative for chest pain and leg swelling.   Gastrointestinal: Positive for abdominal distention and abdominal pain. Negative for constipation, diarrhea, nausea and vomiting.   Genitourinary: Positive for difficulty urinating. Negative for flank pain and hematuria.   Musculoskeletal: Negative for arthralgias, back pain and neck pain.   Skin: Negative for color change and pallor.   Neurological: Negative for dizziness, facial asymmetry and light-headedness.   Hematological: Negative for adenopathy.   Psychiatric/Behavioral: Negative for agitation and decreased concentration. The patient is not nervous/anxious.        Objective:     Temp:  [100.8 °F (38.2 °C)] 100.8 °F (38.2 °C)  Pulse:  [94] 94  Resp:  [18] 18  SpO2:  [100 %] 100 %  BP: (123)/(60) 123/60     There is no height or weight on file to calculate BMI.      Bladder Scan Volume (mL): 166 mL (12/11/20 1840)    Drains     Drain                 Nephrostomy 12/10/20 0900 Right 18 Fr. 1 day                Physical Exam  Constitutional:       General: He is not in acute distress.     Appearance: He is well-developed.   HENT:      Head: Normocephalic and atraumatic.   Eyes:      General: No scleral icterus.  Neck:      Trachea: No tracheal deviation.   Cardiovascular:      Rate and Rhythm: Normal rate.   Pulmonary:      Effort: Pulmonary effort is normal. No  respiratory distress.   Abdominal:      General: There is no distension.      Palpations: Abdomen is soft.      Tenderness: There is no abdominal tenderness.   Genitourinary:     Comments: Right nephrostomy tube (18 Fr) secured in place   Musculoskeletal: Normal range of motion.   Skin:     General: Skin is warm and dry.   Neurological:      Mental Status: He is alert and oriented to person, place, and time.   Psychiatric:         Behavior: Behavior normal.         Significant Labs:    BMP:  Recent Labs   Lab 12/08/20  1107 12/11/20  1846    137   K 4.8 4.0    106   CO2 24 18*   BUN 34* 29*   CREATININE 2.3* 2.4*   CALCIUM 9.3 8.8       CBC:  Recent Labs   Lab 12/08/20  1107 12/11/20  1846 12/11/20  1856   WBC 5.85 10.54  --    HGB 9.1* 7.8*  --    HCT 29.0* 24.0* 20*    190  --        All pertinent labs results from the past 24 hours have been reviewed.    Significant Imaging:  All pertinent imaging results/findings from the past 24 hours have been reviewed.

## 2020-12-12 NOTE — CONSULTS
[facility]  Urology  Consult Note    Patient Name: Jamil Cadet  MRN: [id number]  Admission Date: 12/11/2020  Hospital Length of Stay: 0   Code Status: Prior   Attending Provider: Troy Hwang DO   Consulting Provider: Sarah Ware MD  Primary Care Physician: Richard Gunter MD  Principal Problem:<principal problem not specified>    Inpatient consult to Urology  Consult performed by: Sarah Ware MD  Consult ordered by: Shania Bravo PA-C          Subjective:     HPI:  This is a 75 YOM with past medical history significant for diabetes, HLD, and a right renal mass s/p right robotic retroperitoneal partial nephrectomy in early 2019 by Dr. Santos at [facility].  He presented to the [facility] emergency department on 9/6/20 with severe hypoglycemia and MELODIE with Cr of 14.4. CTRSS showed Right hydroureteronephrosis to the level of the pelvis with no obvious etiology. He was taken for a right ureteral stent placement but this was unsuccessful due to mid-distal ureteral stricture. A right nephrostomy tube was then placed by IR. He saw Dr. Shin to discuss treatment options for right ureteral stricture. Since he has no obvious etiology for a ureteral stricture, on 12/10 he underwent an investigative antegrade ureteroscopy after failed attempt at retrograde pyelogram due to stricture. There was no obvious tumor appreciated. A 30 Fr Councill tip catheter was left in place in the kidney.     He presents to the ER with fever to 100.6 at home, abdominal pain, and due to lack of drainage from his 30 Fr nephrostomy tube.   On arrival, his Tmax 100.8, otherwise VSS. He c/o abdominal pain in the lower pelvis ranging from 3-6/10. Tylenol has helped to relieve his pain. He is also concerned that he has not urinated since 12pm and his nephrostomy tube has not been draining since that time. He denies any nausea or vomiting.     WBC 10.5, Cr 2.4 (BL)  Bladder scan showed 160cc     Past Medical History:    Diagnosis Date    Cancer     Diabetes mellitus, type 2     Disorder of kidney and ureter     Encounter for blood transfusion     History of renal cell carcinoma status post partial nephrectomy 9/7/2020    Hydronephrosis 9/9/2020    Hyperlipidemia     Hypertension     Polio     Septicemia due to enterococcus 11/15/2020       Past Surgical History:   Procedure Laterality Date    ANTEGRADE PYELOGRAM  12/10/2020    Procedure: PYELOGRAM, ANTEGRADE;  Surgeon: Dave Shin MD;  Location: [facility] OR [address] [place] [address];  Service: Urology;;    APPENDECTOMY      CYSTOSCOPY  12/10/2020    Procedure: CYSTOSCOPY;  Surgeon: Dave Shin MD;  Location: [facility] OR [address] [place] [address];  Service: Urology;;    CYSTOSCOPY W/ RETROGRADES Right 9/9/2020    Procedure: CYSTOSCOPY, WITH RETROGRADE PYELOGRAM;  Surgeon: Chris Garcia Jr., MD;  Location: [facility];  Service: Urology;  Laterality: Right;    DILATION OF NEPHROSTOMY TRACT Right 12/10/2020    Procedure: DILATION, NEPHROSTOMY TRACT;  Surgeon: Dave Shin MD;  Location: [address];  Service: Urology;  Laterality: Right;    REMOVAL OF DRAIN Right 12/10/2020    Procedure: REMOVAL, DRAIN-NEPHROSTOMY TUBE;  Surgeon: Dave Shin MD;  Location: [facility] OR [address] [place] [address];  Service: Urology;  Laterality: Right;    REPLACEMENT OF NEPHROSTOMY TUBE Right 12/10/2020    Procedure: REPLACEMENT, NEPHROSTOMY TUBE;  Surgeon: Dave Shin MD;  Location: [facility] OR [address] [place] [address];  Service: Urology;  Laterality: Right;    SPINE SURGERY      URETEROSCOPY Right 12/10/2020    Procedure: URETEROSCOPY-ANTEGRADE;  Surgeon: Dave Shin MD;  Location: [address];  Service: Urology;  Laterality: Right;    URETHROSCOPY  9/9/2020    Procedure: URETHROSCOPY;  Surgeon: Chris Garcia Jr., MD;  Location: [facility];  Service: Urology;;       Review of patient's allergies indicates:  No Known Allergies    Family History     Problem Relation (Age of Onset)    Heart disease Mother, Father, Maternal  Grandfather, Paternal Grandmother, Paternal Grandfather    Stroke Father          Tobacco Use    Smoking status: Never Smoker    Smokeless tobacco: Never Used   Substance and Sexual Activity    Alcohol use: No     Frequency: Never    Drug use: No    Sexual activity: Yes     Partners: Female       Review of Systems   Constitutional: Negative for activity change, appetite change, fatigue and fever.   HENT: Negative for facial swelling and hearing loss.    Eyes: Negative for discharge and itching.   Respiratory: Negative for chest tightness and shortness of breath.    Cardiovascular: Negative for chest pain and leg swelling.   Gastrointestinal: Positive for abdominal distention and abdominal pain. Negative for constipation, diarrhea, nausea and vomiting.   Genitourinary: Positive for difficulty urinating. Negative for flank pain and hematuria.   Musculoskeletal: Negative for arthralgias, back pain and neck pain.   Skin: Negative for color change and pallor.   Neurological: Negative for dizziness, facial asymmetry and light-headedness.   Hematological: Negative for adenopathy.   Psychiatric/Behavioral: Negative for agitation and decreased concentration. The patient is not nervous/anxious.        Objective:     Temp:  [100.8 °F (38.2 °C)] 100.8 °F (38.2 °C)  Pulse:  [94] 94  Resp:  [18] 18  SpO2:  [100 %] 100 %  BP: (123)/(60) 123/60     There is no height or weight on file to calculate BMI.      Bladder Scan Volume (mL): 166 mL (12/11/20 1840)    Drains     Drain                 Nephrostomy 12/10/20 0900 Right 18 Fr. 1 day                Physical Exam  Constitutional:       General: He is not in acute distress.     Appearance: He is well-developed.   HENT:      Head: Normocephalic and atraumatic.   Eyes:      General: No scleral icterus.  Neck:      Trachea: No tracheal deviation.   Cardiovascular:      Rate and Rhythm: Normal rate.   Pulmonary:      Effort: Pulmonary effort is normal. No respiratory distress.    Abdominal:      General: There is no distension.      Palpations: Abdomen is soft.      Tenderness: There is no abdominal tenderness.   Genitourinary:     Comments: Right nephrostomy tube (18 Fr) secured in place   Musculoskeletal: Normal range of motion.   Skin:     General: Skin is warm and dry.   Neurological:      Mental Status: He is alert and oriented to person, place, and time.   Psychiatric:         Behavior: Behavior normal.         Significant Labs:    BMP:  Recent Labs   Lab 12/08/20  1107 12/11/20  1846    137   K 4.8 4.0    106   CO2 24 18*   BUN 34* 29*   CREATININE 2.3* 2.4*   CALCIUM 9.3 8.8       CBC:  Recent Labs   Lab 12/08/20  1107 12/11/20  1846 12/11/20  1856   WBC 5.85 10.54  --    HGB 9.1* 7.8*  --    HCT 29.0* 24.0* 20*    190  --        All pertinent labs results from the past 24 hours have been reviewed.    Significant Imaging:  All pertinent imaging results/findings from the past 24 hours have been reviewed.                    Assessment and Plan:     Ureteral stricture, right  75 YOM s/p right antegrade ureteroscopy 12/10 presents to the ER with c/o low UOP and his nephrostomy tube not draining     - nephrostomy tube irrigated easily with return of some clot that was likely obstructing the tube, irrigated to crystal clear, nephrostomy tube drained well thereafter and patient stated his pain improved   - low grade fever likely from post op status  - OK to DC from urology perspective, has close follow up with Dr. Shin   - patient and wife given supplies to irrigate at home give they live 1.5 hours away, however were instructed to return here with any worsening of symptoms      Complication of nephrostomy  See ureteral stricture        VTE Risk Mitigation (From admission, onward)    None          Thank you for your consult. I will sign off. Please contact us if you have any additional questions.    Sarah Ware MD  Urology  [facility]

## 2020-12-12 NOTE — ED TRIAGE NOTES
Pt complaining of lower abdominal pain and not producing urine since 12 pm. Pt presents with right nephrostomy tube that is bleeding around it.

## 2020-12-12 NOTE — ED NOTES
I-STAT Chem-8+ Results:   Value Reference Range   Sodium 135 136-145 mmol/L   Potassium  3.9 3.5-5.1 mmol/L   Chloride 105  mmol/L   Ionized Calcium 1.08 1.06-1.42 mmol/L   CO2 (measured) 20 23-29 mmol/L   Glucose 71  mg/dL   BUN 27 6-30 mg/dL   Creatinine 2.4 0.5-1.4 mg/dL   Hematocrit 20 36-54%

## 2020-12-13 NOTE — ED PROVIDER NOTES
"Encounter Date: 12/11/2020       History     Chief Complaint   Patient presents with    Post-op Problem     pt arrives with c/o post op complications had stent placed for kidney stone with nephrostomy tube in place to right side "pt states my kidney stopped working i havent produced urine since 12pm"pt reports abd pain      Mr Cadet is a 75yoM who presents for postoperative pain and fever; pertinent PMHx dm 2, HLD, HTN, history of RCC s/p partial nephrectomy. POD 2 dilatation of right nephrectomy and tube replacement with antegrade ureteroscopy.  Patient reports onset of fever this morning, highest 100.8° F. associated with bloating and lower abdominal discomfort.  Also reports no urine output since noon today (about 8 hr) in nephrostomy site or through urethra.  He also reports bleeding around nephrostomy site.  Denies nausea/vomiting, chest pain, shortness of breath or cough.  The patients available PMH, PSH, Social History, medications, allergies, and triage vital signs were reviewed just prior to their medical evaluation.  A ten point review of systems was completed and is negative except as documented above.  Patient denies any other acute medical complaint.    Please be advised this text was dictated with Hel[employer] software and may contain errors due to translation.           Review of patient's allergies indicates:  No Known Allergies  Past Medical History:   Diagnosis Date    Cancer     Diabetes mellitus, type 2     Disorder of kidney and ureter     Encounter for blood transfusion     History of renal cell carcinoma status post partial nephrectomy 9/7/2020    Hydronephrosis 9/9/2020    Hyperlipidemia     Hypertension     Polio     Septicemia due to enterococcus 11/15/2020     Past Surgical History:   Procedure Laterality Date    ANTEGRADE PYELOGRAM  12/10/2020    Procedure: PYELOGRAM, ANTEGRADE;  Surgeon: Dave Shin MD;  Location: [facility] OR [address] [place] [address];  Service: Urology;;    APPENDECTOMY      " CYSTOSCOPY  12/10/2020    Procedure: CYSTOSCOPY;  Surgeon: Dave Shin MD;  [facility];  Service: Urology;;    CYSTOSCOPY W/ RETROGRADES Right 9/9/2020    Procedure: CYSTOSCOPY, WITH RETROGRADE PYELOGRAM;  Surgeon: Chris Garcia Jr., MD;  Location: [facility];  Service: Urology;  Laterality: Right;    DILATION OF NEPHROSTOMY TRACT Right 12/10/2020    Procedure: DILATION, NEPHROSTOMY TRACT;  Surgeon: Dave Shin MD;  [facility];  Service: Urology;  Laterality: Right;    REMOVAL OF DRAIN Right 12/10/2020    Procedure: REMOVAL, DRAIN-NEPHROSTOMY TUBE;  Surgeon: Dave Shin MD;  [facility];  Service: Urology;  Laterality: Right;    REPLACEMENT OF NEPHROSTOMY TUBE Right 12/10/2020    Procedure: REPLACEMENT, NEPHROSTOMY TUBE;  Surgeon: Dave Shin MD;  Location: [facility] OR [address] [place] [address];  Service: Urology;  Laterality: Right;    SPINE SURGERY      URETEROSCOPY Right 12/10/2020    Procedure: URETEROSCOPY-ANTEGRADE;  Surgeon: Dave Shin MD;  [facility];  Service: Urology;  Laterality: Right;    URETHROSCOPY  9/9/2020    Procedure: URETHROSCOPY;  Surgeon: Chris Garcia Jr., MD;  Location: [facility];  Service: Urology;;     Family History   Problem Relation Age of Onset    Heart disease Mother     Heart disease Father     Stroke Father     Heart disease Maternal Grandfather     Heart disease Paternal Grandmother     Heart disease Paternal Grandfather      Social History     Tobacco Use    Smoking status: Never Smoker    Smokeless tobacco: Never Used   Substance Use Topics    Alcohol use: No     Frequency: Never    Drug use: No     Review of Systems   Constitutional: Positive for fever. Negative for chills and fatigue.   HENT: Negative for facial swelling, sore throat and trouble swallowing.    Eyes: Negative for visual disturbance.   Respiratory: Negative for cough and shortness of breath.    Cardiovascular: Negative for chest  pain.   Gastrointestinal: Positive for abdominal distention. Negative for constipation, diarrhea, nausea and vomiting.   Genitourinary: Positive for decreased urine volume and hematuria (nephrostomy site). Negative for discharge, dysuria, penile pain and testicular pain.   Musculoskeletal: Negative for back pain.   Skin: Negative for rash.   Neurological: Negative for dizziness, weakness and headaches.   Hematological: Does not bruise/bleed easily.   Psychiatric/Behavioral: Negative for confusion.       Physical Exam     Initial Vitals [12/11/20 1711]   BP Pulse Resp Temp SpO2   123/60 94 18 (!) 100.8 °F (38.2 °C) 100 %      MAP       --         Physical Exam    Nursing note and vitals reviewed.  Constitutional: He appears well-developed and well-nourished. He is not diaphoretic. He appears distressed (Appears uncomfortable).   HENT:   Head: Normocephalic and atraumatic.   Right Ear: External ear normal.   Left Ear: External ear normal.   Mouth/Throat: Oropharynx is clear and moist.   Eyes: Conjunctivae and EOM are normal. No scleral icterus.   Neck: No JVD present.   Cardiovascular: Normal rate, regular rhythm and intact distal pulses.   Pulmonary/Chest: Breath sounds normal. No stridor. No respiratory distress. He has no wheezes. He has no rhonchi. He has no rales.   No cough, no tachypnea or dyspnea   Abdominal: Soft. Bowel sounds are normal. There is abdominal tenderness ( TTP lower abdomen). There is no rebound and no guarding.   Right nephrostomy site without continuous using, dried blood noted.  Site nontender, appropriate anchor sutures intact  100 cc bloody urine in bag   Musculoskeletal: Normal range of motion. No edema.   Neurological: He is alert and oriented to person, place, and time.   Skin: Skin is warm and dry. Capillary refill takes less than 2 seconds. No rash noted. No erythema.   Psychiatric: He has a normal mood and affect. His behavior is normal. Judgment and thought content normal.         ED  Course   Procedures  Labs Reviewed   CBC W/ AUTO DIFFERENTIAL - Abnormal; Notable for the following components:       Result Value    RBC 2.45 (*)     Hemoglobin 7.8 (*)     Hematocrit 24.0 (*)     MCH 31.8 (*)     Gran # (ANC) 8.1 (*)     Mono # 1.5 (*)     Gran % 76.3 (*)     Lymph % 9.2 (*)     All other components within normal limits   COMPREHENSIVE METABOLIC PANEL - Abnormal; Notable for the following components:    CO2 18 (*)     BUN 29 (*)     Creatinine 2.4 (*)     Total Bilirubin 1.4 (*)     eGFR if  29.0 (*)     eGFR if non  25.1 (*)     All other components within normal limits   URINALYSIS, REFLEX TO URINE CULTURE - Abnormal; Notable for the following components:    Appearance, UA Hazy (*)     Occult Blood UA 2+ (*)     All other components within normal limits    Narrative:     Specimen Source->Urine   URINALYSIS MICROSCOPIC - Abnormal; Notable for the following components:    RBC, UA 22 (*)     All other components within normal limits    Narrative:     Specimen Source->Urine   ISTAT PROCEDURE - Abnormal; Notable for the following components:    POC Creatinine 2.4 (*)     POC Sodium 135 (*)     POC TCO2 (MEASURED) 20 (*)     POC Hematocrit 20 (*)     All other components within normal limits   TYPE & SCREEN          Imaging Results    None          Medical Decision Making:   History:   Old Medical Records: I decided to obtain old medical records.  Old Records Summarized: records from clinic visits and records from previous admission(s).  Initial Assessment:   Patient presents postop day 2 nephrectomy tube replacement and ureteroscopy with decreased urine output from nephrectomy and urethra, mild fever and lower abdominal pain.  Other VSS  Differential Diagnosis:   DDx cystitis, dehydration, postop retention, clot in tubing. Physical exam and history taking lower clinical suspicion for shock, acute abdomen, retroperitoneal bleeding,  pneumonia,  Clinical Tests:   Lab  Tests: Ordered and Reviewed  ED Management:  Discussed with Urology who will see the patient at bedside.  Bladder scan reveals 166 cc fluid, will give IVF and reassess urine production.  Update:  Seen by Urology at bedside.  Nephrostomy tube flushed with clots removed.  They do not suspect postop infection, pending labs and reassessment after IVF challenge.  Update:  Labs show mildly worsened anemia, not surprising for postoperative state.  Creatinine at baseline, UA from urethra shows RBCs, no evidence of infection.  On re-evaluation, patient producing clear urine into nephrostomy bag and from urethra without issue.  States he feels much better.  From urology standpoint, they state he is stable for discharge home today with appointment follow-up in 2 days.  Patient reports pain resolved.  I suspect symptoms were due to clogged nephrostomy tube and dehydration. No antibiotics indicated at this time. Patient agreed to plan of care and voiced understanding. Discharged in stable condition with strict ED return precautions.    Shania Bravo PA-C  12/13/2020    I discussed the following case, diagnosis and plan of care with attending physician.    Other:   I have discussed this case with another health care provider.                             Clinical Impression:       ICD-10-CM ICD-9-CM   1. Postoperative fever  R50.82 780.62   2. Post-op pain  G89.18 338.18   3. Complication of nephrostomy  N99.528 997.5                      Disposition:   Disposition: Discharged  Condition: Stable     ED Disposition Condition    Discharge Stable        ED Prescriptions     None        Follow-up Information    None                                      Shania Bravo PA-C  12/13/20 1707

## 2020-12-14 ENCOUNTER — TELEPHONE (OUTPATIENT)
Dept: UROLOGY | Facility: CLINIC | Age: 75
End: 2020-12-14

## 2020-12-14 ENCOUNTER — OFFICE VISIT (OUTPATIENT)
Dept: UROLOGY | Facility: CLINIC | Age: 75
End: 2020-12-14
Payer: MEDICARE

## 2020-12-14 VITALS
HEIGHT: 71 IN | BODY MASS INDEX: 23.95 KG/M2 | WEIGHT: 171.06 LBS | HEART RATE: 84 BPM | DIASTOLIC BLOOD PRESSURE: 66 MMHG | SYSTOLIC BLOOD PRESSURE: 129 MMHG

## 2020-12-14 DIAGNOSIS — N13.5 URETERAL STRICTURE, RIGHT: Primary | ICD-10-CM

## 2020-12-14 DIAGNOSIS — C64.1 RENAL CELL ADENOCARCINOMA, RIGHT: Chronic | ICD-10-CM

## 2020-12-14 PROBLEM — T83.512A: Status: RESOLVED | Noted: 2020-11-14 | Resolved: 2020-12-14

## 2020-12-14 PROBLEM — N19 RENAL FAILURE: Status: RESOLVED | Noted: 2020-09-07 | Resolved: 2020-12-14

## 2020-12-14 PROBLEM — N99.528 COMPLICATION OF NEPHROSTOMY: Status: RESOLVED | Noted: 2020-10-23 | Resolved: 2020-12-14

## 2020-12-14 PROCEDURE — 99213 OFFICE O/P EST LOW 20 MIN: CPT | Mod: PBBFAC | Performed by: UROLOGY

## 2020-12-14 PROCEDURE — 99999 PR PBB SHADOW E&M-EST. PATIENT-LVL III: ICD-10-PCS | Mod: PBBFAC,,, | Performed by: UROLOGY

## 2020-12-14 PROCEDURE — 99214 PR OFFICE/OUTPT VISIT, EST, LEVL IV, 30-39 MIN: ICD-10-PCS | Mod: S$PBB,,, | Performed by: UROLOGY

## 2020-12-14 PROCEDURE — 99214 OFFICE O/P EST MOD 30 MIN: CPT | Mod: S$PBB,,, | Performed by: UROLOGY

## 2020-12-14 PROCEDURE — 99999 PR PBB SHADOW E&M-EST. PATIENT-LVL III: CPT | Mod: PBBFAC,,, | Performed by: UROLOGY

## 2020-12-14 NOTE — PROGRESS NOTES
Subjective:       Patient ID: Jamil Cadet is a 75 y.o. male.    Chief Complaint:  Ureteral stricture    History of Present Illness  HPI  Patient is a 75 y.o. male who is established to our clinic and was initially referred by his urologist, Dr. Garcia for evaluation of a right ureteral stricture.  The patient has a past medical history significant for a right renal mass and is status post a right robotic retroperitoneal partial nephrectomy in early 2019 by Dr. Santos at [facility]. He denies having other prior surgeries or kidney stones.  He denies a history of prostate cancer or radiation therapy. He was subsequently found to have right-sided hydronephrosis.  He underwent a cystoscopy with right ureteroscopy and right retrograde pyelogram.  However, he has no etiology that is apparent for a right ureteral stricture and retrograde wire or stent placement was not possible.    Per the patient, he had a renal scan in 10/2020 which showed essentially equal split function (52% left, 48% right).    On 12/17 he underwent retrograde attempts and ultimately antegrade ureteroscopy to evaluate the stricture. Retrograde access was not attainable. Antegrade access showed a very tight ureteral stricture in the mid-distal right ureter seen on antegrade ureteroscopy and significant clot burden in the collecting system. A 16 Fr Councill tip carlton catheter was placed. He was then seen in the ED the following day due to clots obstructing his nephrostomy tube. There were irrigated and ultimately cleared.    He has been doing well and managing his NT despite the caliber of the tube. It is draining clear yellow today    He presents today to discuss options and further management of his ureteral stricture.    Review of Systems  Review of Systems  All other systems reviewed and negative except pertinent positives noted in HPI.       Objective:     Physical Exam  Constitutional:       General: He is not in acute distress.      Appearance: He is well-developed.   HENT:      Head: Normocephalic and atraumatic.   Eyes:      General: No scleral icterus.  Neck:      Trachea: No tracheal deviation.   Pulmonary:      Effort: Pulmonary effort is normal. No respiratory distress.   Abdominal:      Comments: Right NT with clear yellow urine  RLQ appendectomy scar  Previous retroperitoneal robotic incisions   Neurological:      Mental Status: He is alert and oriented to person, place, and time.   Psychiatric:         Behavior: Behavior normal.         Thought Content: Thought content normal.         Judgment: Judgment normal.         Lab Review  Lab Results   Component Value Date    COLORU Yellow 12/11/2020    SPECGRAV 1.015 12/11/2020    PHUR 5.0 12/11/2020    NITRITE Negative 12/11/2020    KETONESU Negative 12/11/2020    UROBILINOGEN Negative 12/08/2020         Assessment:        1. Ureteral stricture, right    2. History of renal cell carcinoma status post partial nephrectomy            Plan:     Ureteral stricture, right    History of renal cell carcinoma status post partial nephrectomy    - plan for right robotic ureteral reimplantation, possible psoas hitch, nephropexy, possible boari flap. Will plan to send the ureteral segment for pathology given the uncertainty of the etiology.   The patient is scheduled for a right robotic ureteral reimplant, possible open. The risks and benefits were discussed with the patient in detail. The risks include but are not limited to bleeding possibly requiring a blood transfusion, infection, pain, injury to internal organs such as lung, liver, spleen, bowel, adrenals. Risks of urine leak, flank pain or hernia at area of incision, urinary infection requiring antibiotics, air embolus, pulmonary embolus and possible conversion to an open surgery. The patient will also be consented for blood. Patient understands these risks and has agreed to proceed with surgery. Consent was obtained.    Schedule surgery, pre-op  [facility] first.

## 2020-12-16 ENCOUNTER — TELEPHONE (OUTPATIENT)
Dept: FAMILY MEDICINE | Facility: CLINIC | Age: 75
End: 2020-12-16

## 2020-12-16 NOTE — TELEPHONE ENCOUNTER
----- Message from Laura Mai RN sent at 12/16/2020 11:03 AM CST -----  Regarding: Pre-op clearance  Pt is scheduled for a Robotic Reimplantation, Ureter with Dr. Shin on 1/8/21. (General anesthesia, 270 mins). Anesthesia review in progress.     Is pt cleared/optimized? Please advise.      Thank you,  Laura Mai RN  Anesthesia [facility]

## 2020-12-17 ENCOUNTER — TELEPHONE (OUTPATIENT)
Dept: PREADMISSION TESTING | Facility: HOSPITAL | Age: 75
End: 2020-12-17

## 2020-12-17 NOTE — TELEPHONE ENCOUNTER
----- Message from Laura Mai RN sent at 12/16/2020 10:59 AM CST -----  Surgery Date: 1/8/20  Pre-op Appt: 12/30          Please call pt and schedule:    labs      Thank you,  Laura Mai RN

## 2020-12-19 ENCOUNTER — PATIENT MESSAGE (OUTPATIENT)
Dept: UROLOGY | Facility: CLINIC | Age: 75
End: 2020-12-19

## 2020-12-21 ENCOUNTER — TELEPHONE (OUTPATIENT)
Dept: PREADMISSION TESTING | Facility: HOSPITAL | Age: 75
End: 2020-12-21

## 2020-12-21 NOTE — TELEPHONE ENCOUNTER
----- Message from Richard Gunter MD sent at 12/20/2020  9:27 PM CST -----  Regarding: RE: Pre-op clearance  Hello Ms Mai    Patient was seen by me on 11/25/2020 on a virtual consult for follow-up on his diabetes and blood pressure.  His diabetes at that point seem to be uncontrolled.  I had made some adjustments in his insulin regimen due to    On 12/11/2020 he had go to the emergency room and was found to have moderately significant anemia with a hemoglobin of 7.8.  No significant hyperglycemia was noted    I am not sure is is anemia has been corrected but he has increased risk given prolonged surgery with anticipated blood loss.      I am hoping that his sugars are more better controlled and blood pressures are also good.  I have not done a formal evaluation and optimization of his medical conditions.  I have sent a message to the patient to update me on his blood sugars and blood pressures.  Not sure about the anemia and if he has a follow-up upon that.  Certainly can get couple of blood transfusions around the surgery time if his blood counts are not up.    Kind regards    Dr. Lyric IGNACIO  ----- Message -----  From: Laura Mai RN  Sent: 12/16/2020  11:03 AM CST  To: Richard Gunter MD, Lyric Ramos Staff  Subject: Pre-op clearance                                 Pt is scheduled for a Robotic Reimplantation, Ureter with Dr. Shin on 1/8/21. (General anesthesia, 270 mins). Anesthesia review in progress.     Is pt cleared/optimized? Please advise.      Thank you,  Laura Mai RN  [facility]

## 2020-12-21 NOTE — TELEPHONE ENCOUNTER
Lana Cadet    How you doing with the blood sugars, blood pressures, blood counts.  I got a message from the Urology to ask me if you are optimal from medical point of view for proposed robotic surgery sometimes early next year.    Dr Lyric IGNACIO      Saulomarisela Ms Mai    Patient was seen by me on 11/25/2020 on a virtual consult for follow-up on his diabetes and blood pressure.  His diabetes at that point seem to be uncontrolled.  I had made some adjustments in his insulin regimen due to    On 12/11/2020 he had go to the emergency room and was found to have moderately significant anemia with a hemoglobin of 7.8.  No significant hyperglycemia was noted    I am not sure is is anemia has been corrected but he has increased risk given prolonged surgery with anticipated blood loss.      I am hoping that his sugars are more better controlled and blood pressures are also good.  I have not done a formal evaluation and optimization of his medical conditions.  I have sent a message to the patient to update me on his blood sugars and blood pressures.  Not sure about the anemia and if he has a follow-up upon that.  Certainly can get couple of blood transfusions around the surgery time if his blood counts are not up.    Kind regards    Dr. Lyric IGNACIO    ===View-only below this line===  ----- Message -----  From: Laura Mai RN  Sent: 12/16/2020  11:03 AM CST  To: Richard Gunter MD, Lyric Ramos Staff  Subject: Pre-op clearance                                 Pt is scheduled for a Robotic Reimplantation, Ureter with Dr. Shin on 1/8/21. (General anesthesia, 270 mins). Anesthesia review in progress.     Is pt cleared/optimized? Please advise.      Thank you,  Laura Mai RN  [facility]

## 2020-12-28 ENCOUNTER — OFFICE VISIT (OUTPATIENT)
Dept: FAMILY MEDICINE | Facility: CLINIC | Age: 75
End: 2020-12-28
Payer: MEDICARE

## 2020-12-28 ENCOUNTER — TELEPHONE (OUTPATIENT)
Dept: PREADMISSION TESTING | Facility: HOSPITAL | Age: 75
End: 2020-12-28

## 2020-12-28 VITALS
SYSTOLIC BLOOD PRESSURE: 136 MMHG | HEIGHT: 71 IN | HEART RATE: 84 BPM | BODY MASS INDEX: 24.92 KG/M2 | TEMPERATURE: 98 F | WEIGHT: 178 LBS | DIASTOLIC BLOOD PRESSURE: 68 MMHG

## 2020-12-28 DIAGNOSIS — I10 ESSENTIAL HYPERTENSION: ICD-10-CM

## 2020-12-28 DIAGNOSIS — Z79.4 TYPE 2 DIABETES MELLITUS WITH HYPOGLYCEMIA WITHOUT COMA, WITH LONG-TERM CURRENT USE OF INSULIN: ICD-10-CM

## 2020-12-28 DIAGNOSIS — D62 ANEMIA DUE TO ACUTE BLOOD LOSS: ICD-10-CM

## 2020-12-28 DIAGNOSIS — Z01.818 PREOPERATIVE EXAMINATION: Primary | Chronic | ICD-10-CM

## 2020-12-28 DIAGNOSIS — F51.04 PSYCHOPHYSIOLOGICAL INSOMNIA: ICD-10-CM

## 2020-12-28 DIAGNOSIS — E11.649 TYPE 2 DIABETES MELLITUS WITH HYPOGLYCEMIA WITHOUT COMA, WITH LONG-TERM CURRENT USE OF INSULIN: ICD-10-CM

## 2020-12-28 DIAGNOSIS — N18.4 STAGE 4 CHRONIC KIDNEY DISEASE: ICD-10-CM

## 2020-12-28 PROCEDURE — 99214 PR OFFICE/OUTPT VISIT, EST, LEVL IV, 30-39 MIN: ICD-10-PCS | Mod: S$PBB,,, | Performed by: INTERNAL MEDICINE

## 2020-12-28 PROCEDURE — 99214 OFFICE O/P EST MOD 30 MIN: CPT | Mod: S$PBB,,, | Performed by: INTERNAL MEDICINE

## 2020-12-28 PROCEDURE — 99214 OFFICE O/P EST MOD 30 MIN: CPT | Performed by: INTERNAL MEDICINE

## 2020-12-28 RX ORDER — AMLODIPINE BESYLATE 2.5 MG/1
2.5 TABLET ORAL DAILY
Qty: 30 TABLET | Refills: 2 | Status: SHIPPED | OUTPATIENT
Start: 2020-12-28 | End: 2021-03-21

## 2020-12-28 NOTE — PATIENT INSTRUCTIONS
Diabetes: Activity Tips    Being more active can help you manage your diabetes. The tips on this sheet can help you get the most from your exercise. They can also help you stay safe.  Staying Active  Its important for adults to spend less time sitting and being inactive. This is especially true if you have type 2 diabetes. When you are sitting for long periods of time, get up for short sessions of light activity every 30 minutes.  You should aim for at least 150 minutes a week of exercise or physical activity. Dont let more than 2 days go by without being active.  Benefit from briskness  Brisk activity gets your heart beating faster. This can help you increase your fitness, lose extra weight, and manage your blood sugar level. Try brisk walking. Or, if you have foot or leg problems, you can try swimming or bike riding. You can break up your exercise into chunks throughout the day. Work up to at least 30 minutes of steady, brisk exercise on most days.  Warm up and cool down  Warming up and cooling down reduce your risk of injury. They also help limit muscle soreness. Do a mild version of your activity for 5 minutes before and after your routine. You can also learn stretches that will help keep your muscles loose. Your healthcare provider may show you good ways to warm up and stretch.  Do the talk-sing test  The talk-sing test is a simple way to tell how hard youre exercising. If you can talk while exercising, youre in a safe range. If youre out of breath, slow down. If you can carry a tune, its time to  the pace. Walk up a hill. Increase the resistance on your stationary bike. Or swim faster.  What about eating?  You may be told to plan your exercise for 1 to 2 hours after a meal. In most cases, you dont need to eat while being active. If you take insulin or medicine that can cause low blood sugar, test your blood sugar before exercising. And carry a fast-acting sugar that will raise your blood sugar  level quickly. This includes glucose tablets or hard candy. Use it if you feel low blood sugar symptoms.  Safety tips  These tips can help you stay safe as you become fit:  · Exercise with a friend or carry a cell phone if you have one.  · Carry or wear identification, such as a necklace or bracelet, that says you have diabetes.  · Use the proper footwear and safety equipment for your activity.  · Drink water before, during, and after exercise.  · Dress properly for the weather.  · Dont exercise in very hot or very cold weather.  · Dont exercise if you are sick.  · If you are instructed to do so, test your blood sugar before and after you exercise. Have a small carbohydrate snack if your blood sugar is low before you start exercising.   When to stop exercising and call your healthcare provider  Stop exercising and call your healthcare provider right away if you notice any of the following:  · Pain, pressure, tightness, or heaviness in the chest  · Pain or heaviness in the neck, shoulders, back, arms, legs, or feet  · Unusual shortness of breath  · Dizziness or lightheadedness  · Unusually rapid or slow pulse  · Increased joint or muscle pain  · Nausea or vomiting  Date Last Reviewed: 5/1/2016  © 4545-4902 [employer] [address] [place] [address], [place] [address]. All rights reserved. This information is not intended as a substitute for professional medical care. Always follow your healthcare professional's instructions.

## 2020-12-28 NOTE — Clinical Note
Hello Ms petrolia  I have seen . Jamil Cadet for preoperative medical clearance today.  Medically he seems to be stable but I am waiting for repeat blood count and see if it is in good range.  I made some changes in his blood pressure  regimen and made some suggestion for blood sugars also.    Kind regards    Dr. Lyric IGNACIO

## 2020-12-28 NOTE — TELEPHONE ENCOUNTER
----- Message from Richard Gunter MD sent at 12/28/2020  9:39 AM CST -----  Hello Ms petroliaI have seen Mr. Jamil Cadet for preoperative medical clearance today.  Medically he seems to be stable but I am waiting for repeat blood count and see if it is in good range.  I made some changes in his blood pressure  regimen and made some suggestion for blood sugars also.Kind regardsDr. Lyric IGNACIO

## 2020-12-28 NOTE — Clinical Note
Pt medically stable for surgery. Await CBC to be sure that H/H Stable  Will need periop Glucose monitoring. Consider Hospitalist for medical management. Watch for creatinine    Dr Gunter

## 2020-12-28 NOTE — PROGRESS NOTES
Subjective:       Patient ID: Jamil Cadet is a 75 y.o. male.    Chief Complaint: Pre-op Exam (liver surg Dr Maddox ochsner ), Hypertension, Hyperlipidemia, Diabetes, Anemia, and Chronic Kidney Disease    Jamil Cadet is a 75 y.o. male who is here for preoperative medical assessment and optimization.  He has a history of right ureteral stricture.  He has a history of a right renal mass for which he underwent a retroperitoneal approach of a robotic partial nephrectomy.  He developed subsequent to that right-sided hydronephrosis.  Retrograde attempt at ureteral stent placement was unsuccessful at [facility] in [place].  He presents today for further evaluation.    It definitive ureteral anastomosis will be attempted robotically by Dr. Shin at [facility].    In the interim he had developed an anemia also following a procedure.  His last hemoglobin was 7.8.    He also has underlying type 2 diabetes mellitus, hypertension and dyslipidemia.    Blood sugars are somewhat fluctuant with low numbers going up to 60s and high numbers going up to 232    For diabetes he is taking a combination of Tresiba as well as NovoLog.    For cholesterol he is on rosuvastatin and for blood pressure he is taking hydralazine.  Please note that he is not taking amlodipine.    Hypertension  This is a chronic problem. The current episode started more than 1 year ago. The problem is controlled. Pertinent negatives include no chest pain, headaches, neck pain or palpitations. Risk factors for coronary artery disease include sedentary lifestyle, diabetes mellitus, male gender and dyslipidemia. Past treatments include direct vasodilators (Only hydralazine at this point.  Off amlodipine because of low BP.). Compliance problems include psychosocial issues.    Diabetes  He presents for his follow-up diabetic visit. He has type 2 diabetes mellitus. His disease course has been fluctuating. Pertinent negatives for hypoglycemia include no  confusion or headaches. Pertinent negatives for diabetes include no chest pain, no polydipsia, no polyuria and no weakness. Risk factors for coronary artery disease include sedentary lifestyle, male sex, dyslipidemia and diabetes mellitus. Current diabetic treatment includes insulin injections (Long-acting Tresiba and short-acting NovoLog.). He is compliant with treatment most of the time. His weight is fluctuating dramatically. Meal planning includes avoidance of concentrated sweets. His breakfast blood glucose range is generally <70 mg/dl. His overall blood glucose range is 130-140 mg/dl. An ACE inhibitor/angiotensin II receptor blocker is not being taken. He does not see a podiatrist.  Anemia  Presents for initial visit. There has been no abdominal pain, confusion, fever or palpitations. Past medical history includes cancer (Kidney cancer) and recent illness. There is no history of alcohol abuse.       Past Medical History:   Diagnosis Date    Acute renal failure 9/7/2020    Cancer     Diabetes mellitus, type 2     Disorder of kidney and ureter     Encounter for blood transfusion     History of renal cell carcinoma status post partial nephrectomy 9/7/2020    Hydronephrosis 9/9/2020    Hyperlipidemia     Hypertension     Infection and inflammatory reaction due to nephrostomy catheter, initial encounter 11/14/2020    Polio     Pyelonephritis of right kidney     Septicemia due to enterococcus 11/15/2020     Social History     Socioeconomic History    Marital status:      Spouse name: Not on file    Number of children: Not on file    Years of education: Not on file    Highest education level: Not on file   Occupational History    Not on file   Social Needs    Financial resource strain: Not very hard    Food insecurity     Worry: Never true     Inability: Never true    Transportation needs     Medical: No     Non-medical: No   Tobacco Use    Smoking status: Never Smoker    Smokeless  tobacco: Never Used   Substance and Sexual Activity    Alcohol use: No     Frequency: Never    Drug use: No    Sexual activity: Yes     Partners: Female   Lifestyle    Physical activity     Days per week: 6 days     Minutes per session: 150+ min    Stress: To some extent   Relationships    Social connections     Talks on phone: Three times a week     Gets together: Once a week     Attends [employer] service: Not on file     Active member of club or organization: Yes     Attends meetings of clubs or organizations: More than 4 times per year     Relationship status:    Other Topics Concern    Not on file   Social History Narrative    Not on file     Past Surgical History:   Procedure Laterality Date    ANTEGRADE PYELOGRAM  12/10/2020    Procedure: PYELOGRAM, ANTEGRADE;  Surgeon: Dave Shin MD;  Location: [address];  Service: Urology;;    APPENDECTOMY      CYSTOSCOPY  12/10/2020    Procedure: CYSTOSCOPY;  Surgeon: Dave Shin MD;  Location: [address];  Service: Urology;;    CYSTOSCOPY W/ RETROGRADES Right 9/9/2020    Procedure: CYSTOSCOPY, WITH RETROGRADE PYELOGRAM;  Surgeon: Chris Garcia Jr., MD;  Location: [facility];  Service: Urology;  Laterality: Right;    DILATION OF NEPHROSTOMY TRACT Right 12/10/2020    Procedure: DILATION, NEPHROSTOMY TRACT;  Surgeon: Dave Shin MD;  Location: [address];  Service: Urology;  Laterality: Right;    REMOVAL OF DRAIN Right 12/10/2020    Procedure: REMOVAL, DRAIN-NEPHROSTOMY TUBE;  Surgeon: Dave Shin MD;  Location: [address];  Service: Urology;  Laterality: Right;    REPLACEMENT OF NEPHROSTOMY TUBE Right 12/10/2020    Procedure: REPLACEMENT, NEPHROSTOMY TUBE;  Surgeon: Dave Shin MD;  Location: [address];  Service: Urology;  Laterality: Right;    SPINE SURGERY      URETEROSCOPY Right 12/10/2020    Procedure: URETEROSCOPY-ANTEGRADE;  Surgeon: Dave Shin MD;  Location: [address];   "Service: Urology;  Laterality: Right;    URETHROSCOPY  9/9/2020    Procedure: URETHROSCOPY;  Surgeon: Chris Garcia Jr., MD;  Location: [facility];  Service: Urology;;     Family History   Problem Relation Age of Onset    Heart disease Mother     Heart disease Father     Stroke Father     Heart disease Maternal Grandfather     Heart disease Paternal Grandmother     Heart disease Paternal Grandfather        Review of Systems   Constitutional: Negative for activity change, chills, diaphoresis, fever and unexpected weight change.   HENT: Negative for hearing loss, rhinorrhea and trouble swallowing.    Eyes: Negative for discharge, redness and visual disturbance.   Respiratory: Negative for cough, chest tightness and wheezing.    Cardiovascular: Negative for chest pain and palpitations.        Underlying hypertension and blood pressures are still about 140 systolic.   Gastrointestinal: Negative for abdominal distention, abdominal pain, blood in stool, constipation, diarrhea and vomiting.   Endocrine: Negative for polydipsia and polyuria.        Type 2 diabetes mellitus currently on insulins.   Genitourinary: Positive for hematuria (From the right kidney.). Negative for difficulty urinating and urgency.        Catheter percutaneous for right kidney drainage.   Musculoskeletal: Negative for arthralgias, joint swelling and neck pain.        Gout in past- rt ankle pain   Neurological: Negative for weakness and headaches.   Psychiatric/Behavioral: Positive for sleep disturbance. Negative for confusion and dysphoric mood.         Objective:      Blood pressure 136/68, pulse 84, temperature 97.9 °F (36.6 °C), height 5' 11" (1.803 m), weight 80.7 kg (178 lb). Body mass index is 24.83 kg/m².  Physical Exam  Vitals signs and nursing note reviewed.   Constitutional:       Appearance: He is well-developed.   HENT:      Head: Normocephalic and atraumatic.   Eyes:      Conjunctiva/sclera: Conjunctivae normal.   Neck:      " Musculoskeletal: Normal range of motion and neck supple.      Thyroid: No thyromegaly.      Vascular: No JVD.      Trachea: No tracheal deviation.   Cardiovascular:      Rate and Rhythm: Normal rate and regular rhythm.      Heart sounds: Normal heart sounds. No murmur. No friction rub. No gallop.    Pulmonary:      Effort: Pulmonary effort is normal. No respiratory distress.      Breath sounds: Normal breath sounds. No wheezing or rales.   Abdominal:      General: Bowel sounds are normal. There is no distension.      Palpations: Abdomen is soft.      Tenderness: There is no abdominal tenderness.       Musculoskeletal: Normal range of motion.        Back:       Right lower leg: No edema.      Left lower leg: No edema.      Comments: History of knee problems.   Feet:      Right foot:      Protective Sensation: 3 sites tested. 3 sites sensed.      Skin integrity: No ulcer, blister, skin breakdown, erythema or warmth.      Left foot:      Protective Sensation: 3 sites tested. 3 sites sensed.      Skin integrity: No ulcer, blister, skin breakdown, erythema or warmth.      Comments: High arch in the foot.?  Effects of poliomyelitis.  Slight claw toes.  Slightly dystrophic nails on the left side.  Skin:     General: Skin is warm and dry.   Neurological:      Mental Status: He is alert and oriented to person, place, and time.      Deep Tendon Reflexes: Reflexes are normal and symmetric.   Psychiatric:         Attention and Perception: He is attentive.         Mood and Affect: Affect is not labile.      Comments: Patient is fairly attentive and cognizant of my discussions.  Appropriate answers.           Assessment:       1. Preoperative examination    2. Type 2 diabetes mellitus with hypoglycemia without coma, with long-term current use of insulin    3. Anemia due to acute blood loss    4. Essential hypertension    5. Psychophysiological insomnia    6. Stage 4 chronic kidney disease           Admission on 12/11/2020,  Discharged on 12/11/2020   Component Date Value Ref Range Status    WBC 12/11/2020 10.54  3.90 - 12.70 K/uL Final    RBC 12/11/2020 2.45* 4.60 - 6.20 M/uL Final    Hemoglobin 12/11/2020 7.8* 14.0 - 18.0 g/dL Final    Hematocrit 12/11/2020 24.0* 40.0 - 54.0 % Final    MCV 12/11/2020 98  82 - 98 fL Final    MCH 12/11/2020 31.8* 27.0 - 31.0 pg Final    MCHC 12/11/2020 32.5  32.0 - 36.0 g/dL Final    RDW 12/11/2020 14.0  11.5 - 14.5 % Final    Platelets 12/11/2020 190  150 - 350 K/uL Final    MPV 12/11/2020 10.1  9.2 - 12.9 fL Final    Immature Granulocytes 12/11/2020 0.3  0.0 - 0.5 % Final    Gran # (ANC) 12/11/2020 8.1* 1.8 - 7.7 K/uL Final    Immature Grans (Abs) 12/11/2020 0.03  0.00 - 0.04 K/uL Final    Lymph # 12/11/2020 1.0  1.0 - 4.8 K/uL Final    Mono # 12/11/2020 1.5* 0.3 - 1.0 K/uL Final    Eos # 12/11/2020 0.0  0.0 - 0.5 K/uL Final    Baso # 12/11/2020 0.01  0.00 - 0.20 K/uL Final    nRBC 12/11/2020 0  0 /100 WBC Final    Gran % 12/11/2020 76.3* 38.0 - 73.0 % Final    Lymph % 12/11/2020 9.2* 18.0 - 48.0 % Final    Mono % 12/11/2020 13.8  4.0 - 15.0 % Final    Eosinophil % 12/11/2020 0.3  0.0 - 8.0 % Final    Basophil % 12/11/2020 0.1  0.0 - 1.9 % Final    Differential Method 12/11/2020 Automated   Final    Sodium 12/11/2020 137  136 - 145 mmol/L Final    Potassium 12/11/2020 4.0  3.5 - 5.1 mmol/L Final    Chloride 12/11/2020 106  95 - 110 mmol/L Final    CO2 12/11/2020 18* 23 - 29 mmol/L Final    Glucose 12/11/2020 74  70 - 110 mg/dL Final    BUN 12/11/2020 29* 8 - 23 mg/dL Final    Creatinine 12/11/2020 2.4* 0.5 - 1.4 mg/dL Final    Calcium 12/11/2020 8.8  8.7 - 10.5 mg/dL Final    Total Protein 12/11/2020 6.7  6.0 - 8.4 g/dL Final    Albumin 12/11/2020 3.6  3.5 - 5.2 g/dL Final    Total Bilirubin 12/11/2020 1.4* 0.1 - 1.0 mg/dL Final    Alkaline Phosphatase 12/11/2020 68  55 - 135 U/L Final    AST 12/11/2020 17  10 - 40 U/L Final    ALT 12/11/2020 19  10 - 44 U/L Final     Anion Gap 12/11/2020 13  8 - 16 mmol/L Final    eGFR if African American 12/11/2020 29.0* >60 mL/min/1.73 m^2 Final    eGFR if non  12/11/2020 25.1* >60 mL/min/1.73 m^2 Final    Specimen UA 12/11/2020 Urine, Clean Catch   Final    Color, UA 12/11/2020 Yellow  Yellow, Straw, Ida Final    Appearance, UA 12/11/2020 Hazy* Clear Final    pH, UA 12/11/2020 5.0  5.0 - 8.0 Final    Specific [place], UA 12/11/2020 1.015  1.005 - 1.030 Final    Protein, UA 12/11/2020 Negative  Negative Final    Glucose, UA 12/11/2020 Negative  Negative Final    Ketones, UA 12/11/2020 Negative  Negative Final    Bilirubin (UA) 12/11/2020 Negative  Negative Final    Occult Blood UA 12/11/2020 2+* Negative Final    Nitrite, UA 12/11/2020 Negative  Negative Final    Leukocytes, UA 12/11/2020 Negative  Negative Final    POC Glucose 12/11/2020 71  70 - 110 mg/dL Final    POC BUN 12/11/2020 27  6 - 30 mg/dL Final    POC Creatinine 12/11/2020 2.4* 0.5 - 1.4 mg/dL Final    POC Sodium 12/11/2020 135* 136 - 145 mmol/L Final    POC Potassium 12/11/2020 3.9  3.5 - 5.1 mmol/L Final    POC Chloride 12/11/2020 105  95 - 110 mmol/L Final    POC TCO2 (MEASURED) 12/11/2020 20* 23 - 29 mmol/L Final    POC Ionized Calcium 12/11/2020 1.08  1.06 - 1.42 mmol/L Final    POC Hematocrit 12/11/2020 20* 36 - 54 %PCV Final    Sample 12/11/2020 SANJIV   Final    Group & Rh 12/11/2020 O POS   Final    Indirect [place]jia 12/11/2020 NEG   Final    RBC, UA 12/11/2020 22* 0 - 4 /hpf Final    WBC, UA 12/11/2020 3  0 - 5 /hpf Final    Bacteria 12/11/2020 Rare  None-Occ /hpf Final    Squam Epithel, UA 12/11/2020 1  /hpf Final    Microscopic Comment 12/11/2020 SEE COMMENT   Final   Admission on 12/10/2020, Discharged on 12/10/2020   Component Date Value Ref Range Status    POCT Glucose 12/10/2020 177* 70 - 110 mg/dL Final    POCT Glucose 12/10/2020 156* 70 - 110 mg/dL Final   Lab Visit on 12/08/2020   Component Date Value Ref Range  Status    Cholesterol 12/08/2020 108* 120 - 199 mg/dL Final    Triglycerides 12/08/2020 36  30 - 150 mg/dL Final    HDL 12/08/2020 50  40 - 75 mg/dL Final    LDL Cholesterol 12/08/2020 50.8* 63.0 - 159.0 mg/dL Final    HDL/Cholesterol Ratio 12/08/2020 46.3  20.0 - 50.0 % Final    Total Cholesterol/HDL Ratio 12/08/2020 2.2  2.0 - 5.0 Final    Non-HDL Cholesterol 12/08/2020 58  mg/dL Final    Sodium 12/08/2020 140  136 - 145 mmol/L Final    Potassium 12/08/2020 4.8  3.5 - 5.1 mmol/L Final    Chloride 12/08/2020 109  95 - 110 mmol/L Final    CO2 12/08/2020 24  23 - 29 mmol/L Final    Glucose 12/08/2020 86  70 - 110 mg/dL Final    BUN 12/08/2020 34* 8 - 23 mg/dL Final    Creatinine 12/08/2020 2.3* 0.5 - 1.4 mg/dL Final    Calcium 12/08/2020 9.3  8.7 - 10.5 mg/dL Final    Total Protein 12/08/2020 7.0  6.0 - 8.4 g/dL Final    Albumin 12/08/2020 4.0  3.5 - 5.2 g/dL Final    Total Bilirubin 12/08/2020 1.0  0.1 - 1.0 mg/dL Final    Alkaline Phosphatase 12/08/2020 63  55 - 135 U/L Final    AST 12/08/2020 29  10 - 40 U/L Final    ALT 12/08/2020 23  10 - 44 U/L Final    Anion Gap 12/08/2020 7* 8 - 16 mmol/L Final    eGFR if  12/08/2020 30.5* >60 mL/min/1.73 m^2 Final    eGFR if non African American 12/08/2020 26.4* >60 mL/min/1.73 m^2 Final    TSH 12/08/2020 1.090  0.340 - 5.600 uIU/mL Final    Free T4 12/08/2020 0.83  0.71 - 1.51 ng/dL Final    WBC 12/08/2020 5.85  3.90 - 12.70 K/uL Final    RBC 12/08/2020 2.99* 4.60 - 6.20 M/uL Final    Hemoglobin 12/08/2020 9.1* 14.0 - 18.0 g/dL Final    Hematocrit 12/08/2020 29.0* 40.0 - 54.0 % Final    MCV 12/08/2020 97  82 - 98 fL Final    MCH 12/08/2020 30.4  27.0 - 31.0 pg Final    MCHC 12/08/2020 31.4* 32.0 - 36.0 g/dL Final    RDW 12/08/2020 14.1  11.5 - 14.5 % Final    Platelets 12/08/2020 225  150 - 350 K/uL Final    MPV 12/08/2020 10.7  9.2 - 12.9 fL Final    Immature Granulocytes 12/08/2020 0.5  0.0 - 0.5 % Final    Gran #  (ANC) 12/08/2020 3.9  1.8 - 7.7 K/uL Final    Immature Grans (Abs) 12/08/2020 0.03  0.00 - 0.04 K/uL Final    Lymph # 12/08/2020 0.9* 1.0 - 4.8 K/uL Final    Mono # 12/08/2020 0.8  0.3 - 1.0 K/uL Final    Eos # 12/08/2020 0.2  0.0 - 0.5 K/uL Final    Baso # 12/08/2020 0.01  0.00 - 0.20 K/uL Final    nRBC 12/08/2020 0  0 /100 WBC Final    Gran % 12/08/2020 66.8  38.0 - 73.0 % Final    Lymph % 12/08/2020 15.4* 18.0 - 48.0 % Final    Mono % 12/08/2020 14.0  4.0 - 15.0 % Final    Eosinophil % 12/08/2020 3.1  0.0 - 8.0 % Final    Basophil % 12/08/2020 0.2  0.0 - 1.9 % Final    Differential Method 12/08/2020 Automated   Final    Specimen UA 12/08/2020 Urine, Clean Catch   Final    Color, UA 12/08/2020 Yellow  Yellow, Straw, Ida Final    Appearance, UA 12/08/2020 Clear  Clear Final    pH, UA 12/08/2020 6.0  5.0 - 8.0 Final    Specific [place], UA 12/08/2020 1.015  1.005 - 1.030 Final    Protein, UA 12/08/2020 Trace* Negative Final    Glucose, UA 12/08/2020 Negative  Negative Final    Ketones, UA 12/08/2020 Negative  Negative Final    Bilirubin (UA) 12/08/2020 Negative  Negative Final    Occult Blood UA 12/08/2020 Negative  Negative Final    Nitrite, UA 12/08/2020 Negative  Negative Final    Urobilinogen, UA 12/08/2020 Negative  Negative EU/dL Final    Leukocytes, UA 12/08/2020 Negative  Negative Final    Microalbumin, Urine 12/08/2020 12.6  ug/mL Final    Creatinine, Urine 12/08/2020 161.0  23.0 - 375.0 mg/dL Final    Microalb/Creat Ratio 12/08/2020 7.8  0.0 - 30.0 ug/mg Final    Hemoglobin A1C 12/08/2020 6.9* 4.5 - 6.2 % Final    Estimated Avg Glucose 12/08/2020 151* 68 - 131 mg/dL Final    Vit D, 25-Hydroxy 12/08/2020 52  30 - 96 ng/mL Final    Vitamin B-12 12/08/2020 181* 210 - 950 pg/mL Final    Magnesium 12/08/2020 2.0  1.6 - 2.6 mg/dL Final    Iron 12/08/2020 52  45 - 160 ug/dL Final    Ferritin 12/08/2020 43  20.0 - 300.0 ng/mL Final    PSA Diagnostic 12/08/2020 0.34  0.00 -  4.00 ng/mL Final    Uric Acid 12/08/2020 8.4* 3.4 - 7.0 mg/dL Final    Erythropoietin 12/08/2020 10.4  2.6 - 18.5 mIU/mL Final   Lab Visit on 12/07/2020   Component Date Value Ref Range Status    SARS-CoV2 (COVID-19) Qualitative P* 12/07/2020 Not Detected  Not Detected Final   Lab Visit on 12/01/2020   Component Date Value Ref Range Status    Sodium 12/01/2020 138  136 - 145 mmol/L Final    Potassium 12/01/2020 5.1  3.5 - 5.1 mmol/L Final    Chloride 12/01/2020 107  95 - 110 mmol/L Final    CO2 12/01/2020 23  23 - 29 mmol/L Final    Glucose 12/01/2020 290* 70 - 110 mg/dL Final    BUN 12/01/2020 40* 8 - 23 mg/dL Final    Creatinine 12/01/2020 2.5* 0.5 - 1.4 mg/dL Final    Calcium 12/01/2020 9.1  8.7 - 10.5 mg/dL Final    Anion Gap 12/01/2020 8  8 - 16 mmol/L Final    eGFR if African American 12/01/2020 27.6* >60 mL/min/1.73 m^2 Final    eGFR if non African American 12/01/2020 23.9* >60 mL/min/1.73 m^2 Final    WBC 12/01/2020 6.22  3.90 - 12.70 K/uL Final    RBC 12/01/2020 2.98* 4.60 - 6.20 M/uL Final    Hemoglobin 12/01/2020 9.1* 14.0 - 18.0 g/dL Final    Hematocrit 12/01/2020 28.7* 40.0 - 54.0 % Final    MCV 12/01/2020 96  82 - 98 fL Final    MCH 12/01/2020 30.5  27.0 - 31.0 pg Final    MCHC 12/01/2020 31.7* 32.0 - 36.0 g/dL Final    RDW 12/01/2020 13.7  11.5 - 14.5 % Final    Platelets 12/01/2020 236  150 - 350 K/uL Final    MPV 12/01/2020 10.1  9.2 - 12.9 fL Final    Immature Granulocytes 12/01/2020 0.3  0.0 - 0.5 % Final    Gran # (ANC) 12/01/2020 4.2  1.8 - 7.7 K/uL Final    Immature Grans (Abs) 12/01/2020 0.02  0.00 - 0.04 K/uL Final    Lymph # 12/01/2020 1.2  1.0 - 4.8 K/uL Final    Mono # 12/01/2020 0.6  0.3 - 1.0 K/uL Final    Eos # 12/01/2020 0.2  0.0 - 0.5 K/uL Final    Baso # 12/01/2020 0.03  0.00 - 0.20 K/uL Final    nRBC 12/01/2020 0  0 /100 WBC Final    Gran % 12/01/2020 68.0  38.0 - 73.0 % Final    Lymph % 12/01/2020 19.0  18.0 - 48.0 % Final    Mono % 12/01/2020 9.6   4.0 - 15.0 % Final    Eosinophil % 12/01/2020 2.6  0.0 - 8.0 % Final    Basophil % 12/01/2020 0.5  0.0 - 1.9 % Final    Differential Method 12/01/2020 Automated   Final   Office Visit on 12/01/2020   Component Date Value Ref Range Status    Urine Culture, Routine 12/01/2020 No growth   Final    Urine Culture, Routine 12/01/2020 No significant growth   Final   No results displayed because visit has over 200 results.      Admission on 11/01/2020, Discharged on 11/03/2020   Component Date Value Ref Range Status    WBC 11/02/2020 7.10  3.90 - 12.70 K/uL Final    RBC 11/02/2020 2.91* 4.60 - 6.20 M/uL Final    Hemoglobin 11/02/2020 8.7* 14.0 - 18.0 g/dL Final    Hematocrit 11/02/2020 26.8* 40.0 - 54.0 % Final    MCV 11/02/2020 92  82 - 98 fL Final    MCH 11/02/2020 29.9  27.0 - 31.0 pg Final    MCHC 11/02/2020 32.5  32.0 - 36.0 g/dL Final    RDW 11/02/2020 12.7  11.5 - 14.5 % Final    Platelets 11/02/2020 204  150 - 350 K/uL Final    MPV 11/02/2020 10.0  9.2 - 12.9 fL Final    Immature Granulocytes 11/02/2020 0.6* 0.0 - 0.5 % Final    Gran # (ANC) 11/02/2020 4.7  1.8 - 7.7 K/uL Final    Immature Grans (Abs) 11/02/2020 0.04  0.00 - 0.04 K/uL Final    Lymph # 11/02/2020 1.3  1.0 - 4.8 K/uL Final    Mono # 11/02/2020 0.8  0.3 - 1.0 K/uL Final    Eos # 11/02/2020 0.2  0.0 - 0.5 K/uL Final    Baso # 11/02/2020 0.03  0.00 - 0.20 K/uL Final    nRBC 11/02/2020 0  0 /100 WBC Final    Gran % 11/02/2020 66.5  38.0 - 73.0 % Final    Lymph % 11/02/2020 18.5  18.0 - 48.0 % Final    Mono % 11/02/2020 10.6  4.0 - 15.0 % Final    Eosinophil % 11/02/2020 3.4  0.0 - 8.0 % Final    Basophil % 11/02/2020 0.4  0.0 - 1.9 % Final    Differential Method 11/02/2020 Automated   Final    Sodium 11/02/2020 137  136 - 145 mmol/L Final    Potassium 11/02/2020 4.6  3.5 - 5.1 mmol/L Final    Chloride 11/02/2020 105  95 - 110 mmol/L Final    CO2 11/02/2020 23  23 - 29 mmol/L Final    Glucose 11/02/2020 207* 70 - 110  mg/dL Final    BUN 11/02/2020 35* 8 - 23 mg/dL Final    Creatinine 11/02/2020 2.2* 0.5 - 1.4 mg/dL Final    Calcium 11/02/2020 8.4* 8.7 - 10.5 mg/dL Final    Total Protein 11/02/2020 6.1  6.0 - 8.4 g/dL Final    Albumin 11/02/2020 3.1* 3.5 - 5.2 g/dL Final    Total Bilirubin 11/02/2020 1.0  0.1 - 1.0 mg/dL Final    Alkaline Phosphatase 11/02/2020 69  55 - 135 U/L Final    AST 11/02/2020 15  10 - 40 U/L Final    ALT 11/02/2020 15  10 - 44 U/L Final    Anion Gap 11/02/2020 9  8 - 16 mmol/L Final    eGFR if  11/02/2020 32.2* >60 mL/min/1.73 m^2 Final    eGFR if non African American 11/02/2020 27.9* >60 mL/min/1.73 m^2 Final    Lipase 11/02/2020 45  4 - 60 U/L Final    Specimen UA 11/02/2020 Urine, Clean Catch   Final    Color, UA 11/02/2020 Red* Yellow, Straw, Ida Final    Appearance, UA 11/02/2020 Cloudy* Clear Final    pH, UA 11/02/2020 SEE COMMENT  5.0 - 8.0 Final    Specific [place], UA 11/02/2020 SEE COMMENT  1.005 - 1.030 Final    Protein, UA 11/02/2020 SEE COMMENT  Negative Final    Glucose, UA 11/02/2020 SEE COMMENT  Negative Final    Ketones, UA 11/02/2020 SEE COMMENT  Negative Final    Bilirubin (UA) 11/02/2020 SEE COMMENT  Negative Final    Occult Blood UA 11/02/2020 3+* Negative Final    Nitrite, UA 11/02/2020 SEE COMMENT  Negative Final    Urobilinogen, UA 11/02/2020 SEE COMMENT  Negative EU/dL Final    Leukocytes, UA 11/02/2020 SEE COMMENT  Negative Final    PT 11/02/2020 14.8  10.6 - 14.8 sec Final    INR 11/02/2020 1.2   Final    RBC, UA 11/02/2020 >100* 0 - 4 /hpf Final    WBC, UA 11/02/2020 >100* 0 - 5 /hpf Final    Bacteria 11/02/2020 Many* None-Occ /hpf Final    Hyaline Casts, UA 11/02/2020 0  0-1/lpf /lpf Final    Microscopic Comment 11/02/2020 SEE COMMENT   Final    Urine Culture, Routine 11/02/2020 No growth   Final    SARS-CoV-2 RNA, Amplification, Qual 11/02/2020 Negative  Negative Final    Troponin I 11/02/2020 <0.030  <=0.040 ng/mL Final     Troponin I 11/02/2020 <0.030  <=0.040 ng/mL Final    POC Glucose 11/02/2020 226* 70 - 110 Final    POC Glucose 11/02/2020 149* 70 - 110 Final    WBC 11/03/2020 6.74  3.90 - 12.70 K/uL Final    RBC 11/03/2020 3.07* 4.60 - 6.20 M/uL Final    Hemoglobin 11/03/2020 9.3* 14.0 - 18.0 g/dL Final    Hematocrit 11/03/2020 28.5* 40.0 - 54.0 % Final    MCV 11/03/2020 93  82 - 98 fL Final    MCH 11/03/2020 30.3  27.0 - 31.0 pg Final    MCHC 11/03/2020 32.6  32.0 - 36.0 g/dL Final    RDW 11/03/2020 12.7  11.5 - 14.5 % Final    Platelets 11/03/2020 217  150 - 350 K/uL Final    MPV 11/03/2020 10.1  9.2 - 12.9 fL Final    Immature Granulocytes 11/03/2020 0.4  0.0 - 0.5 % Final    Gran # (ANC) 11/03/2020 4.4  1.8 - 7.7 K/uL Final    Immature Grans (Abs) 11/03/2020 0.03  0.00 - 0.04 K/uL Final    Lymph # 11/03/2020 1.3  1.0 - 4.8 K/uL Final    Mono # 11/03/2020 0.7  0.3 - 1.0 K/uL Final    Eos # 11/03/2020 0.2  0.0 - 0.5 K/uL Final    Baso # 11/03/2020 0.03  0.00 - 0.20 K/uL Final    nRBC 11/03/2020 0  0 /100 WBC Final    Gran % 11/03/2020 66.0  38.0 - 73.0 % Final    Lymph % 11/03/2020 19.9  18.0 - 48.0 % Final    Mono % 11/03/2020 9.9  4.0 - 15.0 % Final    Eosinophil % 11/03/2020 3.4  0.0 - 8.0 % Final    Basophil % 11/03/2020 0.4  0.0 - 1.9 % Final    Differential Method 11/03/2020 Automated   Final    POC Glucose 11/03/2020 115* 70 - 110 Final    POC Glucose 11/03/2020 124* 70 - 110 Final   Lab Visit on 10/30/2020   Component Date Value Ref Range Status    Sodium 10/30/2020 137  136 - 145 mmol/L Final    Potassium 10/30/2020 4.5  3.5 - 5.1 mmol/L Final    Chloride 10/30/2020 105  95 - 110 mmol/L Final    CO2 10/30/2020 23  23 - 29 mmol/L Final    Glucose 10/30/2020 233* 70 - 110 mg/dL Final    BUN 10/30/2020 26* 8 - 23 mg/dL Final    Creatinine 10/30/2020 2.3* 0.5 - 1.4 mg/dL Final    Calcium 10/30/2020 8.5* 8.7 - 10.5 mg/dL Final    Total Protein 10/30/2020 6.5  6.0 - 8.4 g/dL Final     Albumin 10/30/2020 3.2* 3.5 - 5.2 g/dL Final    Total Bilirubin 10/30/2020 0.8  0.1 - 1.0 mg/dL Final    Alkaline Phosphatase 10/30/2020 71  55 - 135 U/L Final    AST 10/30/2020 19  10 - 40 U/L Final    ALT 10/30/2020 20  10 - 44 U/L Final    Anion Gap 10/30/2020 9  8 - 16 mmol/L Final    eGFR if African American 10/30/2020 30.5* >60 mL/min/1.73 m^2 Final    eGFR if non African American 10/30/2020 26.4* >60 mL/min/1.73 m^2 Final    CRP 10/30/2020 0.99* 0.00 - 0.75 mg/dL Final    WBC 10/30/2020 6.96  3.90 - 12.70 K/uL Final    RBC 10/30/2020 3.29* 4.60 - 6.20 M/uL Final    Hemoglobin 10/30/2020 10.1* 14.0 - 18.0 g/dL Final    Hematocrit 10/30/2020 30.7* 40.0 - 54.0 % Final    MCV 10/30/2020 93  82 - 98 fL Final    MCH 10/30/2020 30.7  27.0 - 31.0 pg Final    MCHC 10/30/2020 32.9  32.0 - 36.0 g/dL Final    RDW 10/30/2020 12.4  11.5 - 14.5 % Final    Platelets 10/30/2020 249  150 - 350 K/uL Final    MPV 10/30/2020 10.7  9.2 - 12.9 fL Final    Immature Granulocytes 10/30/2020 1.0* 0.0 - 0.5 % Final    Gran # (ANC) 10/30/2020 5.1  1.8 - 7.7 K/uL Final    Immature Grans (Abs) 10/30/2020 0.07* 0.00 - 0.04 K/uL Final    Lymph # 10/30/2020 1.0  1.0 - 4.8 K/uL Final    Mono # 10/30/2020 0.5  0.3 - 1.0 K/uL Final    Eos # 10/30/2020 0.3  0.0 - 0.5 K/uL Final    Baso # 10/30/2020 0.03  0.00 - 0.20 K/uL Final    nRBC 10/30/2020 0  0 /100 WBC Final    Gran % 10/30/2020 72.9  38.0 - 73.0 % Final    Lymph % 10/30/2020 14.1* 18.0 - 48.0 % Final    Mono % 10/30/2020 7.3  4.0 - 15.0 % Final    Eosinophil % 10/30/2020 4.3  0.0 - 8.0 % Final    Basophil % 10/30/2020 0.4  0.0 - 1.9 % Final    Differential Method 10/30/2020 Automated   Final   No results displayed because visit has over 200 results.      There may be more visits with results that are not included.         Plan:           Preoperative examination    Type 2 diabetes mellitus with hypoglycemia without coma, with long-term current use of  insulin  -     Hemoglobin A1C; Future; Expected date: 12/28/2020    Anemia due to acute blood loss  -     Iron and TIBC; Future; Expected date: 12/28/2020  -     Ferritin; Future; Expected date: 12/28/2020    Essential hypertension  -     amLODIPine (NORVASC) 2.5 MG tablet; Take 1 tablet (2.5 mg total) by mouth once daily.  Dispense: 30 tablet; Refill: 2    Psychophysiological insomnia    Stage 4 chronic kidney disease     Patient will be scheduled for ureteral reimplantation under robotic guidance.  This will be done at [facility].    The issues of following:-    1.-recent anemia with a hemoglobin of 7.8 which is attributed to acute blood loss.  Not sure whether blood loss was.  Repeat CBC has been ordered which will determine further as to his risk for potential surgery.  He does have chronic kidney disease with GFR of approximately 25 and this could itself cause anemia of chronic kidney disease.    2.-hypertension which seems to be stable with some low readings.  He takes hydralazine for the same.  This may be monitored perioperatively.  He will monitor his blood pressures with the next few days and I may consider him for low-dose amlodipine at 2.5 mg.    3.-diabetes mellitus with some fluctuations.  His blood sugars go as low as 68 or 60s even.  Certainly on the day of surgery he will not take any insulin and postoperatively once he starts feeding, his insulin should be monitored.    4.-chronic kidney disease with elevated creatinine.  This is secondary to multiple procedures and longstanding hypertension and diabetes.    Patient does present with modest risk for a prolonged surgery under general anesthesia but this is the best that he can.  I need to be sure that his hemoglobin is at least above 9 given his chronic kidney disease to maintain a reasonable outcome from the surgery.    His blood pressures hopefully are better under control in the next few days prior to surgery.  Blood sugars will show the  "fluctuations as expected but goal is to prevent any hypoglycemia.    I will add 2.5 mg of amlodipine back to his regimen.  New prescription sent to his pharmacy.     On the day of surgery, he will take amlodipine 2.5 mg with a sip of water.    On the day of surgery he will not take his NovoLog.    One night prior to surgery he will take his long acting insulin half the dosage.    He will take 8 units of injection Tresiba.    Services of an internal medicine/hospitalist/nephrologist may also be considered during his hospital stay.  This will be to monitor his kidney functions as well as diabetes control.    I will see him for regular follow-up in 3 months time and in the interim will keep in touch with each other concerning his blood pressure readings and blood sugar readings.    Spent ezra 25 minutes with patient which involved review of pts medical conditions, labs, medications and with 50% of time face-to-face discussion about medical problems, management and any applicable changes.        Current Outpatient Medications:     acetaminophen (TYLENOL) 325 MG tablet, Take 325 mg by mouth every 6 (six) hours as needed for Pain. Okay to take as needed, Disp: , Rfl:     BD ULTRA-FINE SHORT PEN NEEDLE 31 gauge x 5/16" Ndle, use as directed Non-med item, Disp: , Rfl:     blood-glucose meter (TRUE METRIX GLUCOSE METER) Misc, 1 Device by Misc.(Non-Drug; Combo Route) route once daily. (Patient taking differently: 1 Device by Misc.(Non-Drug; Combo Route) route once daily. Non-med item), Disp: 1 each, Rfl: 0    hydrALAZINE (APRESOLINE) 25 MG tablet, Take 25 mg by mouth 3 (three) times daily. Ok to take, Disp: , Rfl:     insulin aspart U-100 (NOVOLOG FLEXPEN U-100 INSULIN) 100 unit/mL (3 mL) InPn pen, Inject 10 Units into the skin 3 (three) times daily with meals. Check glucose before meals and then take insulin as per sliding scale (Patient taking differently: Inject 10 Units into the skin 3 (three) times daily with meals. " Check glucose before meals and then take insulin as per sliding scale  Hold AM of surgery), Disp: 5 Syringe, Rfl: 2    insulin degludec (TRESIBA FLEXTOUCH U-100) 100 unit/mL (3 mL) InPn, Inject 17 Units into the skin every evening. (Patient taking differently: Inject 17 Units into the skin every evening. Take only 80% or 17 Units (13.6 units) the PM prior to Surgery), Disp: 5 Syringe, Rfl: 1    mupirocin (BACTROBAN) 2 % ointment, Apply topically 3 (three) times daily. (Patient taking differently: Apply topically 3 (three) times daily. HOLD PM PRIOR TO SX AND HOLD THE AM OF SX), Disp: 30 g, Rfl: 1    rosuvastatin (CRESTOR) 20 MG tablet, Take 20 mg by mouth every evening. Ok to take, Disp: , Rfl:     traZODone (DESYREL) 50 MG tablet, Take 50 mg by mouth every evening. HCS, Disp: , Rfl:     TRUE METRIX GLUCOSE TEST STRIP Strp, Non-med item, Disp: , Rfl:     amLODIPine (NORVASC) 2.5 MG tablet, Take 1 tablet (2.5 mg total) by mouth once daily., Disp: 30 tablet, Rfl: 2

## 2020-12-30 ENCOUNTER — LAB VISIT (OUTPATIENT)
Dept: LAB | Facility: HOSPITAL | Age: 75
End: 2020-12-30
Attending: ANESTHESIOLOGY
Payer: MEDICARE

## 2020-12-30 ENCOUNTER — OFFICE VISIT (OUTPATIENT)
Dept: UROLOGY | Facility: CLINIC | Age: 75
End: 2020-12-30
Payer: MEDICARE

## 2020-12-30 DIAGNOSIS — Z01.818 PREOPERATIVE TESTING: ICD-10-CM

## 2020-12-30 DIAGNOSIS — D62 ANEMIA DUE TO ACUTE BLOOD LOSS: ICD-10-CM

## 2020-12-30 DIAGNOSIS — C64.1 RENAL CELL ADENOCARCINOMA, RIGHT: Chronic | ICD-10-CM

## 2020-12-30 DIAGNOSIS — N13.5 URETERAL STRICTURE, RIGHT: Primary | ICD-10-CM

## 2020-12-30 DIAGNOSIS — E11.649 TYPE 2 DIABETES MELLITUS WITH HYPOGLYCEMIA WITHOUT COMA, WITH LONG-TERM CURRENT USE OF INSULIN: ICD-10-CM

## 2020-12-30 DIAGNOSIS — N13.30 HYDRONEPHROSIS, UNSPECIFIED HYDRONEPHROSIS TYPE: ICD-10-CM

## 2020-12-30 DIAGNOSIS — Z79.4 TYPE 2 DIABETES MELLITUS WITH HYPOGLYCEMIA WITHOUT COMA, WITH LONG-TERM CURRENT USE OF INSULIN: ICD-10-CM

## 2020-12-30 PROBLEM — R78.81 BACTEREMIA DUE TO ENTEROCOCCUS: Status: RESOLVED | Noted: 2020-10-25 | Resolved: 2020-12-30

## 2020-12-30 PROBLEM — B95.2 BACTEREMIA DUE TO ENTEROCOCCUS: Status: RESOLVED | Noted: 2020-10-25 | Resolved: 2020-12-30

## 2020-12-30 PROBLEM — A41.81 SEPTICEMIA DUE TO ENTEROCOCCUS: Status: RESOLVED | Noted: 2020-11-15 | Resolved: 2020-12-30

## 2020-12-30 LAB
ABO + RH BLD: NORMAL
BASOPHILS # BLD AUTO: 0.03 K/UL (ref 0–0.2)
BASOPHILS NFR BLD: 0.4 % (ref 0–1.9)
BLD GP AB SCN CELLS X3 SERPL QL: NORMAL
DIFFERENTIAL METHOD: ABNORMAL
EOSINOPHIL # BLD AUTO: 0.1 K/UL (ref 0–0.5)
EOSINOPHIL NFR BLD: 1.3 % (ref 0–8)
ERYTHROCYTE [DISTWIDTH] IN BLOOD BY AUTOMATED COUNT: 14 % (ref 11.5–14.5)
ESTIMATED AVG GLUCOSE: 128 MG/DL (ref 68–131)
FERRITIN SERPL-MCNC: 37 NG/ML (ref 20–300)
HBA1C MFR BLD HPLC: 6.1 % (ref 4–5.6)
HCT VFR BLD AUTO: 29.1 % (ref 40–54)
HGB BLD-MCNC: 8.9 G/DL (ref 14–18)
IMM GRANULOCYTES # BLD AUTO: 0.02 K/UL (ref 0–0.04)
IMM GRANULOCYTES NFR BLD AUTO: 0.3 % (ref 0–0.5)
IRON SERPL-MCNC: 38 UG/DL (ref 45–160)
LYMPHOCYTES # BLD AUTO: 0.8 K/UL (ref 1–4.8)
LYMPHOCYTES NFR BLD: 9.6 % (ref 18–48)
MCH RBC QN AUTO: 30.5 PG (ref 27–31)
MCHC RBC AUTO-ENTMCNC: 30.6 G/DL (ref 32–36)
MCV RBC AUTO: 100 FL (ref 82–98)
MONOCYTES # BLD AUTO: 0.7 K/UL (ref 0.3–1)
MONOCYTES NFR BLD: 9 % (ref 4–15)
NEUTROPHILS # BLD AUTO: 6.4 K/UL (ref 1.8–7.7)
NEUTROPHILS NFR BLD: 79.4 % (ref 38–73)
NRBC BLD-RTO: 0 /100 WBC
PLATELET # BLD AUTO: 244 K/UL (ref 150–350)
PMV BLD AUTO: 10.3 FL (ref 9.2–12.9)
RBC # BLD AUTO: 2.92 M/UL (ref 4.6–6.2)
SATURATED IRON: 11 % (ref 20–50)
TOTAL IRON BINDING CAPACITY: 342 UG/DL (ref 250–450)
TRANSFERRIN SERPL-MCNC: 231 MG/DL (ref 200–375)
WBC # BLD AUTO: 7.99 K/UL (ref 3.9–12.7)

## 2020-12-30 PROCEDURE — 99499 NO LOS: ICD-10-PCS | Mod: S$PBB,,, | Performed by: UROLOGY

## 2020-12-30 PROCEDURE — 86900 BLOOD TYPING SEROLOGIC ABO: CPT

## 2020-12-30 PROCEDURE — 82728 ASSAY OF FERRITIN: CPT

## 2020-12-30 PROCEDURE — 36415 COLL VENOUS BLD VENIPUNCTURE: CPT

## 2020-12-30 PROCEDURE — 85025 COMPLETE CBC W/AUTO DIFF WBC: CPT

## 2020-12-30 PROCEDURE — 83036 HEMOGLOBIN GLYCOSYLATED A1C: CPT

## 2020-12-30 PROCEDURE — 99499 UNLISTED E&M SERVICE: CPT | Mod: S$PBB,,, | Performed by: UROLOGY

## 2020-12-30 PROCEDURE — 83540 ASSAY OF IRON: CPT

## 2020-12-30 PROCEDURE — 87086 URINE CULTURE/COLONY COUNT: CPT

## 2020-12-30 NOTE — PROGRESS NOTES
Urology - [facility]  H&P  Staff: Dave Shin MD    SUBJECTIVE:     Chief Complaint: right ureteral stricture    History of Present Illness:  Jamil Cadet is a 75 y.o. male who presents today for pre-op appointment. He is established to our clinic for management of a right ureteral stricture.    The patient has a past medical history significant for a right renal mass and is status post a right robotic retroperitoneal partial nephrectomy in early 2019 by Dr. Santos at [facility]. He denies having other prior surgeries or kidney stones.  He was subsequently found to have right-sided hydronephrosis.  He underwent a cystoscopy with right ureteroscopy and right retrograde pyelogram.  However, he has no etiology that is apparent for a right ureteral stricture and retrograde wire or stent placement was not possible. No history of radiation therapy.     Per the patient, he had a renal scan in 10/2020 which showed essentially equal split function (52% left, 48% right).    On 12/17 he underwent retrograde attempts and ultimately antegrade ureteroscopy to evaluate the stricture. Retrograde access was not attainable. Antegrade access showed a very tight ureteral stricture in the mid-distal right ureter seen on antegrade ureteroscopy and significant clot burden in the collecting system. A 16 Fr Councill tip carlton catheter was placed. He was then seen in the ED the following day due to clots obstructing his nephrostomy tube. There were irrigated and ultimately cleared.     He has been doing well and managing his NT despite the caliber of the tube. It is draining clear yellow today    He has a history of anemia, most recent hemoglobin was 7.8 12/11 from 9.1 12/8. This was the day of his ureteroscopy. Planning for labs later today.     History of open appendectomy many years ago.     Review of Systems  All other systems reviewed and negative except pertinent positives noted in HPI.     Review of patient's  allergies indicates:  No Known Allergies  Past Medical History:   Diagnosis Date    Acute renal failure 9/7/2020    Bacteremia due to Enterococcus 10/25/2020    Cancer     Diabetes mellitus, type 2     Disorder of kidney and ureter     Encounter for blood transfusion     History of renal cell carcinoma status post partial nephrectomy 9/7/2020    Hydronephrosis 9/9/2020    Hyperlipidemia     Hypertension     Infection and inflammatory reaction due to nephrostomy catheter, initial encounter 11/14/2020    Polio     Pyelonephritis of right kidney     Septicemia due to enterococcus 11/15/2020     Past Surgical History:   Procedure Laterality Date    ANTEGRADE PYELOGRAM  12/10/2020    Procedure: PYELOGRAM, ANTEGRADE;  Surgeon: Dave Shin MD;  Location: [facility] OR [address] [place] [address];  Service: Urology;;    APPENDECTOMY      CYSTOSCOPY  12/10/2020    Procedure: CYSTOSCOPY;  Surgeon: Dave Shin MD;  Location: [address];  Service: Urology;;    CYSTOSCOPY W/ RETROGRADES Right 9/9/2020    Procedure: CYSTOSCOPY, WITH RETROGRADE PYELOGRAM;  Surgeon: Chris Garcia Jr., MD;  Location: [facility];  Service: Urology;  Laterality: Right;    DILATION OF NEPHROSTOMY TRACT Right 12/10/2020    Procedure: DILATION, NEPHROSTOMY TRACT;  Surgeon: Dave Shin MD;  Location: [address];  Service: Urology;  Laterality: Right;    REMOVAL OF DRAIN Right 12/10/2020    Procedure: REMOVAL, DRAIN-NEPHROSTOMY TUBE;  Surgeon: Dave Shin MD;  Location: [address];  Service: Urology;  Laterality: Right;    REPLACEMENT OF NEPHROSTOMY TUBE Right 12/10/2020    Procedure: REPLACEMENT, NEPHROSTOMY TUBE;  Surgeon: Dave Shin MD;  Location: [address];  Service: Urology;  Laterality: Right;    SPINE SURGERY      URETEROSCOPY Right 12/10/2020    Procedure: URETEROSCOPY-ANTEGRADE;  Surgeon: Dave Shin MD;  Location: [address];  Service: Urology;  Laterality: Right;    URETHROSCOPY   "9/9/2020    Procedure: URETHROSCOPY;  Surgeon: Chris Garcia Jr., MD;  Location: [facility] OR;  Service: Urology;;     Family History   Problem Relation Age of Onset    Heart disease Mother     Heart disease Father     Stroke Father     Heart disease Maternal Grandfather     Heart disease Paternal Grandmother     Heart disease Paternal Grandfather      Social History     Tobacco Use    Smoking status: Never Smoker    Smokeless tobacco: Never Used   Substance Use Topics    Alcohol use: No     Frequency: Never    Drug use: No      Current Outpatient Medications on File Prior to Visit   Medication Sig Dispense Refill    acetaminophen (TYLENOL) 325 MG tablet Take 325 mg by mouth every 6 (six) hours as needed for Pain. Okay to take as needed      amLODIPine (NORVASC) 2.5 MG tablet Take 1 tablet (2.5 mg total) by mouth once daily. 30 tablet 2    BD ULTRA-FINE SHORT PEN NEEDLE 31 gauge x 5/16" Ndle use as directed  Non-med item      blood-glucose meter (TRUE METRIX GLUCOSE METER) Misc 1 Device by Misc.(Non-Drug; Combo Route) route once daily. (Patient taking differently: 1 Device by Misc.(Non-Drug; Combo Route) route once daily. Non-med item) 1 each 0    hydrALAZINE (APRESOLINE) 25 MG tablet Take 25 mg by mouth 3 (three) times daily. Ok to take      insulin aspart U-100 (NOVOLOG FLEXPEN U-100 INSULIN) 100 unit/mL (3 mL) InPn pen Inject 10 Units into the skin 3 (three) times daily with meals. Check glucose before meals and then take insulin as per sliding scale (Patient taking differently: Inject 10 Units into the skin 3 (three) times daily with meals. Check glucose before meals and then take insulin as per sliding scale    Hold AM of surgery) 5 Syringe 2    insulin degludec (TRESIBA FLEXTOUCH U-100) 100 unit/mL (3 mL) InPn Inject 17 Units into the skin every evening. (Patient taking differently: Inject 17 Units into the skin every evening. Take only 80% or 17 Units (13.6 units) the PM prior to Surgery) 5 " "Syringe 1    mupirocin (BACTROBAN) 2 % ointment Apply topically 3 (three) times daily. (Patient taking differently: Apply topically 3 (three) times daily. HOLD PM PRIOR TO SX AND HOLD THE AM OF SX) 30 g 1    rosuvastatin (CRESTOR) 20 MG tablet Take 20 mg by mouth every evening. Ok to take      traZODone (DESYREL) 50 MG tablet Take 50 mg by mouth every evening. HCS      TRUE METRIX GLUCOSE TEST STRIP Strp Non-med item       No current facility-administered medications on file prior to visit.         OBJECTIVE:     Anticoagulation:  No    Vital Signs (Most Recent)       Estimated body mass index is 24.83 kg/m² as calculated from the following:    Height as of 12/28/20: 5' 11" (1.803 m).    Weight as of 12/28/20: 80.7 kg (178 lb).    Physical Exam  Constitutional:       General: He is not in acute distress.     Appearance: He is well-developed.   HENT:      Head: Normocephalic and atraumatic.   Eyes:      General: No scleral icterus.  Neck:      Trachea: No tracheal deviation.   Pulmonary:      Effort: Pulmonary effort is normal. No respiratory distress.   Abdominal:      Comments: Right NT with clear yellow urine  RLQ appendectomy scar  Previous retroperitoneal robotic incisions   Neurological:      Mental Status: He is alert and oriented to person, place, and time.   Psychiatric:         Behavior: Behavior normal.         Thought Content: Thought content normal.         Judgment: Judgment normal.         Labs:    BMP  Lab Results   Component Value Date     12/11/2020    K 4.0 12/11/2020     12/11/2020    CO2 18 (L) 12/11/2020    BUN 29 (H) 12/11/2020    CREATININE 2.4 (H) 12/11/2020    CALCIUM 8.8 12/11/2020    ANIONGAP 13 12/11/2020    ESTGFRAFRICA 29.0 (A) 12/11/2020    EGFRNONAA 25.1 (A) 12/11/2020       CBC  Lab Results   Component Value Date    WBC 10.54 12/11/2020    HGB 7.8 (L) 12/11/2020    HCT 20 (LL) 12/11/2020    MCV 98 12/11/2020     12/11/2020         Lab Results   Component Value " Date    PSA 0.28 09/10/2019    PSADIAG 0.34 12/08/2020     ASSESSMENT     1. Ureteral stricture, right    2. Hydronephrosis, unspecified hydronephrosis type    3. History of renal cell carcinoma status post partial nephrectomy        PLAN:      1. To OR for right robotic ureteral reimplant with possible psoas hitch, possible Boari flap, possible nephrectomy, possible open. I have explained the indication, risks, benefits, and alternatives of the procedure in detail.  The patient voices understanding and all questions have been answered.  The patient agrees to proceed as planned.     2. Consent was obtained and signed.  3. Blood consent obtained and signed.  4. Urine culture from voided specimen and NT, cefepime and gent ordered periop  5. Follow up CBC today    Torres Stephens MD

## 2020-12-31 ENCOUNTER — ANESTHESIA EVENT (OUTPATIENT)
Dept: SURGERY | Facility: HOSPITAL | Age: 75
DRG: 655 | End: 2020-12-31
Payer: MEDICARE

## 2020-12-31 LAB
BACTERIA UR CULT: NO GROWTH
BACTERIA UR CULT: NORMAL

## 2021-01-03 ENCOUNTER — TELEPHONE (OUTPATIENT)
Dept: FAMILY MEDICINE | Facility: CLINIC | Age: 76
End: 2021-01-03

## 2021-01-03 DIAGNOSIS — D50.0 IRON DEFICIENCY ANEMIA DUE TO CHRONIC BLOOD LOSS: Primary | ICD-10-CM

## 2021-01-04 ENCOUNTER — TELEPHONE (OUTPATIENT)
Dept: FAMILY MEDICINE | Facility: CLINIC | Age: 76
End: 2021-01-04

## 2021-01-04 ENCOUNTER — TELEPHONE (OUTPATIENT)
Dept: PREADMISSION TESTING | Facility: HOSPITAL | Age: 76
End: 2021-01-04

## 2021-01-05 ENCOUNTER — LAB VISIT (OUTPATIENT)
Dept: FAMILY MEDICINE | Facility: CLINIC | Age: 76
End: 2021-01-05
Payer: MEDICARE

## 2021-01-05 ENCOUNTER — TELEPHONE (OUTPATIENT)
Dept: PREADMISSION TESTING | Facility: HOSPITAL | Age: 76
End: 2021-01-05

## 2021-01-05 DIAGNOSIS — N13.5 URETERAL STRICTURE, RIGHT: ICD-10-CM

## 2021-01-05 DIAGNOSIS — N13.30 HYDRONEPHROSIS, UNSPECIFIED HYDRONEPHROSIS TYPE: Primary | ICD-10-CM

## 2021-01-05 LAB — SARS-COV-2 RNA RESP QL NAA+PROBE: NOT DETECTED

## 2021-01-05 PROCEDURE — U0003 INFECTIOUS AGENT DETECTION BY NUCLEIC ACID (DNA OR RNA); SEVERE ACUTE RESPIRATORY SYNDROME CORONAVIRUS 2 (SARS-COV-2) (CORONAVIRUS DISEASE [COVID-19]), AMPLIFIED PROBE TECHNIQUE, MAKING USE OF HIGH THROUGHPUT TECHNOLOGIES AS DESCRIBED BY CMS-2020-01-R: HCPCS

## 2021-01-05 RX ORDER — SULFAMETHOXAZOLE AND TRIMETHOPRIM 800; 160 MG/1; MG/1
1 TABLET ORAL 2 TIMES DAILY
Qty: 5 TABLET | Refills: 0 | Status: SHIPPED | OUTPATIENT
Start: 2021-01-06 | End: 2021-01-09

## 2021-01-06 ENCOUNTER — PATIENT MESSAGE (OUTPATIENT)
Dept: FAMILY MEDICINE | Facility: CLINIC | Age: 76
End: 2021-01-06

## 2021-01-08 ENCOUNTER — ANESTHESIA (OUTPATIENT)
Dept: SURGERY | Facility: HOSPITAL | Age: 76
DRG: 655 | End: 2021-01-08
Payer: MEDICARE

## 2021-01-08 ENCOUNTER — HOSPITAL ENCOUNTER (INPATIENT)
Facility: HOSPITAL | Age: 76
LOS: 1 days | Discharge: HOME | DRG: 655 | End: 2021-01-09
Attending: UROLOGY | Admitting: UROLOGY
Payer: MEDICARE

## 2021-01-08 DIAGNOSIS — E87.5 HYPERKALEMIA: ICD-10-CM

## 2021-01-08 DIAGNOSIS — N13.5 URETERAL STRICTURE, RIGHT: Primary | ICD-10-CM

## 2021-01-08 DIAGNOSIS — Z01.818 PREOPERATIVE TESTING: ICD-10-CM

## 2021-01-08 LAB
ABO + RH BLD: NORMAL
ANION GAP SERPL CALC-SCNC: 10 MMOL/L (ref 8–16)
ANION GAP SERPL CALC-SCNC: 16 MMOL/L (ref 8–16)
ANION GAP SERPL CALC-SCNC: 16 MMOL/L (ref 8–16)
ANION GAP SERPL CALC-SCNC: 8 MMOL/L (ref 8–16)
BASOPHILS # BLD AUTO: 0.01 K/UL (ref 0–0.2)
BASOPHILS NFR BLD: 0.2 % (ref 0–1.9)
BLD GP AB SCN CELLS X3 SERPL QL: NORMAL
BUN SERPL-MCNC: 34 MG/DL (ref 8–23)
BUN SERPL-MCNC: 35 MG/DL (ref 8–23)
CALCIUM SERPL-MCNC: 7.8 MG/DL (ref 8.7–10.5)
CALCIUM SERPL-MCNC: 8.2 MG/DL (ref 8.7–10.5)
CHLORIDE SERPL-SCNC: 107 MMOL/L (ref 95–110)
CHLORIDE SERPL-SCNC: 107 MMOL/L (ref 95–110)
CHLORIDE SERPL-SCNC: 109 MMOL/L (ref 95–110)
CHLORIDE SERPL-SCNC: 110 MMOL/L (ref 95–110)
CO2 SERPL-SCNC: 12 MMOL/L (ref 23–29)
CO2 SERPL-SCNC: 12 MMOL/L (ref 23–29)
CO2 SERPL-SCNC: 18 MMOL/L (ref 23–29)
CO2 SERPL-SCNC: 18 MMOL/L (ref 23–29)
CREAT SERPL-MCNC: 2.6 MG/DL (ref 0.5–1.4)
CREAT SERPL-MCNC: 2.7 MG/DL (ref 0.5–1.4)
CREAT SERPL-MCNC: 2.9 MG/DL (ref 0.5–1.4)
CREAT SERPL-MCNC: 2.9 MG/DL (ref 0.5–1.4)
DIFFERENTIAL METHOD: ABNORMAL
EOSINOPHIL # BLD AUTO: 0 K/UL (ref 0–0.5)
EOSINOPHIL NFR BLD: 0.2 % (ref 0–8)
ERYTHROCYTE [DISTWIDTH] IN BLOOD BY AUTOMATED COUNT: 13.8 % (ref 11.5–14.5)
EST. GFR  (AFRICAN AMERICAN): 23.1 ML/MIN/1.73 M^2
EST. GFR  (AFRICAN AMERICAN): 23.1 ML/MIN/1.73 M^2
EST. GFR  (AFRICAN AMERICAN): 25.1 ML/MIN/1.73 M^2
EST. GFR  (AFRICAN AMERICAN): 26.3 ML/MIN/1.73 M^2
EST. GFR  (NON AFRICAN AMERICAN): 20 ML/MIN/1.73 M^2
EST. GFR  (NON AFRICAN AMERICAN): 20 ML/MIN/1.73 M^2
EST. GFR  (NON AFRICAN AMERICAN): 21.8 ML/MIN/1.73 M^2
EST. GFR  (NON AFRICAN AMERICAN): 22.8 ML/MIN/1.73 M^2
GLUCOSE SERPL-MCNC: 230 MG/DL (ref 70–110)
GLUCOSE SERPL-MCNC: 262 MG/DL (ref 70–110)
GLUCOSE SERPL-MCNC: 385 MG/DL (ref 70–110)
GLUCOSE SERPL-MCNC: 385 MG/DL (ref 70–110)
HCT VFR BLD AUTO: 26.8 % (ref 40–54)
HGB BLD-MCNC: 8.3 G/DL (ref 14–18)
IMM GRANULOCYTES # BLD AUTO: 0.03 K/UL (ref 0–0.04)
IMM GRANULOCYTES NFR BLD AUTO: 0.5 % (ref 0–0.5)
LYMPHOCYTES # BLD AUTO: 0.3 K/UL (ref 1–4.8)
LYMPHOCYTES NFR BLD: 4.4 % (ref 18–48)
MCH RBC QN AUTO: 30.4 PG (ref 27–31)
MCHC RBC AUTO-ENTMCNC: 31 G/DL (ref 32–36)
MCV RBC AUTO: 98 FL (ref 82–98)
MONOCYTES # BLD AUTO: 0.1 K/UL (ref 0.3–1)
MONOCYTES NFR BLD: 1.6 % (ref 4–15)
NEUTROPHILS # BLD AUTO: 6 K/UL (ref 1.8–7.7)
NEUTROPHILS NFR BLD: 93.1 % (ref 38–73)
NRBC BLD-RTO: 0 /100 WBC
PLATELET # BLD AUTO: 161 K/UL (ref 150–350)
PMV BLD AUTO: 10.5 FL (ref 9.2–12.9)
POCT GLUCOSE: 140 MG/DL (ref 70–110)
POCT GLUCOSE: 243 MG/DL (ref 70–110)
POCT GLUCOSE: 277 MG/DL (ref 70–110)
POCT GLUCOSE: 308 MG/DL (ref 70–110)
POCT GLUCOSE: 335 MG/DL (ref 70–110)
POTASSIUM SERPL-SCNC: 4.8 MMOL/L (ref 3.5–5.1)
POTASSIUM SERPL-SCNC: 6.3 MMOL/L (ref 3.5–5.1)
POTASSIUM SERPL-SCNC: 6.3 MMOL/L (ref 3.5–5.1)
POTASSIUM SERPL-SCNC: 6.5 MMOL/L (ref 3.5–5.1)
RBC # BLD AUTO: 2.73 M/UL (ref 4.6–6.2)
SODIUM SERPL-SCNC: 135 MMOL/L (ref 136–145)
SODIUM SERPL-SCNC: 135 MMOL/L (ref 136–145)
SODIUM SERPL-SCNC: 136 MMOL/L (ref 136–145)
SODIUM SERPL-SCNC: 137 MMOL/L (ref 136–145)
WBC # BLD AUTO: 6.38 K/UL (ref 3.9–12.7)

## 2021-01-08 PROCEDURE — 88305 TISSUE EXAM BY PATHOLOGIST: CPT | Performed by: PATHOLOGY

## 2021-01-08 PROCEDURE — 88331 PR  PATH CONSULT IN SURG,W FRZ SEC: ICD-10-PCS | Mod: 26,,, | Performed by: PATHOLOGY

## 2021-01-08 PROCEDURE — 82962 GLUCOSE BLOOD TEST: CPT | Performed by: UROLOGY

## 2021-01-08 PROCEDURE — 88305 TISSUE EXAM BY PATHOLOGIST: CPT | Mod: 26,,, | Performed by: PATHOLOGY

## 2021-01-08 PROCEDURE — 25000003 PHARM REV CODE 250: Performed by: STUDENT IN AN ORGANIZED HEALTH CARE EDUCATION/TRAINING PROGRAM

## 2021-01-08 PROCEDURE — D9220A PRA ANESTHESIA: ICD-10-PCS | Mod: CRNA,,, | Performed by: NURSE ANESTHETIST, CERTIFIED REGISTERED

## 2021-01-08 PROCEDURE — 80048 BASIC METABOLIC PNL TOTAL CA: CPT

## 2021-01-08 PROCEDURE — 88305 TISSUE EXAM BY PATHOLOGIST: ICD-10-PCS | Mod: 26,,, | Performed by: PATHOLOGY

## 2021-01-08 PROCEDURE — 50947 LAPARO NEW URETER/BLADDER: CPT | Mod: RT,,, | Performed by: UROLOGY

## 2021-01-08 PROCEDURE — 27200750 HC INSULATED NEEDLE/ STIMUPLEX: Performed by: STUDENT IN AN ORGANIZED HEALTH CARE EDUCATION/TRAINING PROGRAM

## 2021-01-08 PROCEDURE — 63600175 PHARM REV CODE 636 W HCPCS: Performed by: STUDENT IN AN ORGANIZED HEALTH CARE EDUCATION/TRAINING PROGRAM

## 2021-01-08 PROCEDURE — 80048 BASIC METABOLIC PNL TOTAL CA: CPT | Mod: 91

## 2021-01-08 PROCEDURE — 36000713 HC OR TIME LEV V EA ADD 15 MIN: Performed by: UROLOGY

## 2021-01-08 PROCEDURE — 37000009 HC ANESTHESIA EA ADD 15 MINS: Performed by: UROLOGY

## 2021-01-08 PROCEDURE — 63600175 PHARM REV CODE 636 W HCPCS: Performed by: NURSE ANESTHETIST, CERTIFIED REGISTERED

## 2021-01-08 PROCEDURE — 50947 PR LAP,REIMPLANT URETER W/CYSTO,STENT: ICD-10-PCS | Mod: RT,,, | Performed by: UROLOGY

## 2021-01-08 PROCEDURE — 25000003 PHARM REV CODE 250: Performed by: UROLOGY

## 2021-01-08 PROCEDURE — 37000008 HC ANESTHESIA 1ST 15 MINUTES: Performed by: UROLOGY

## 2021-01-08 PROCEDURE — D9220A PRA ANESTHESIA: Mod: CRNA,,, | Performed by: NURSE ANESTHETIST, CERTIFIED REGISTERED

## 2021-01-08 PROCEDURE — D9220A PRA ANESTHESIA: ICD-10-PCS | Mod: ANES,,, | Performed by: ANESTHESIOLOGY

## 2021-01-08 PROCEDURE — 25000003 PHARM REV CODE 250: Performed by: NURSE ANESTHETIST, CERTIFIED REGISTERED

## 2021-01-08 PROCEDURE — 11000001 HC ACUTE MED/SURG PRIVATE ROOM

## 2021-01-08 PROCEDURE — 88331 PATH CONSLTJ SURG 1 BLK 1SPC: CPT | Mod: 26,,, | Performed by: PATHOLOGY

## 2021-01-08 PROCEDURE — 94761 N-INVAS EAR/PLS OXIMETRY MLT: CPT

## 2021-01-08 PROCEDURE — 25000003 PHARM REV CODE 250: Performed by: ANESTHESIOLOGY

## 2021-01-08 PROCEDURE — 27201037 HC PRESSURE MONITORING SET UP

## 2021-01-08 PROCEDURE — 36415 COLL VENOUS BLD VENIPUNCTURE: CPT

## 2021-01-08 PROCEDURE — D9220A PRA ANESTHESIA: Mod: ANES,,, | Performed by: ANESTHESIOLOGY

## 2021-01-08 PROCEDURE — 93010 RHYTHM STRIP: ICD-10-PCS | Mod: ,,, | Performed by: INTERNAL MEDICINE

## 2021-01-08 PROCEDURE — 64461 ERECTOR SPINAE PLANE SINGLE INJECTION BLOCK: ICD-10-PCS | Mod: 59,50,, | Performed by: ANESTHESIOLOGY

## 2021-01-08 PROCEDURE — 88331 PATH CONSLTJ SURG 1 BLK 1SPC: CPT | Performed by: PATHOLOGY

## 2021-01-08 PROCEDURE — 93005 ELECTROCARDIOGRAM TRACING: CPT

## 2021-01-08 PROCEDURE — C2617 STENT, NON-COR, TEM W/O DEL: HCPCS | Performed by: UROLOGY

## 2021-01-08 PROCEDURE — 25000242 PHARM REV CODE 250 ALT 637 W/ HCPCS: Performed by: STUDENT IN AN ORGANIZED HEALTH CARE EDUCATION/TRAINING PROGRAM

## 2021-01-08 PROCEDURE — 94640 AIRWAY INHALATION TREATMENT: CPT

## 2021-01-08 PROCEDURE — 64461 PVB THORACIC SINGLE INJ SITE: CPT | Performed by: STUDENT IN AN ORGANIZED HEALTH CARE EDUCATION/TRAINING PROGRAM

## 2021-01-08 PROCEDURE — 36620 INSERTION CATHETER ARTERY: CPT | Mod: 59,,, | Performed by: ANESTHESIOLOGY

## 2021-01-08 PROCEDURE — 85025 COMPLETE CBC W/AUTO DIFF WBC: CPT

## 2021-01-08 PROCEDURE — 71000039 HC RECOVERY, EACH ADD'L HOUR: Performed by: UROLOGY

## 2021-01-08 PROCEDURE — 64461 PVB THORACIC SINGLE INJ SITE: CPT | Mod: 59,50,, | Performed by: ANESTHESIOLOGY

## 2021-01-08 PROCEDURE — 71000033 HC RECOVERY, INTIAL HOUR: Performed by: UROLOGY

## 2021-01-08 PROCEDURE — 71000016 HC POSTOP RECOV ADDL HR: Performed by: UROLOGY

## 2021-01-08 PROCEDURE — 27201423 OPTIME MED/SURG SUP & DEVICES STERILE SUPPLY: Performed by: UROLOGY

## 2021-01-08 PROCEDURE — 71000015 HC POSTOP RECOV 1ST HR: Performed by: UROLOGY

## 2021-01-08 PROCEDURE — 93010 ELECTROCARDIOGRAM REPORT: CPT | Mod: ,,, | Performed by: INTERNAL MEDICINE

## 2021-01-08 PROCEDURE — 36620 PR INSERT CATH,ART,PERCUT,SHORTTERM: ICD-10-PCS | Mod: 59,,, | Performed by: ANESTHESIOLOGY

## 2021-01-08 PROCEDURE — 36000712 HC OR TIME LEV V 1ST 15 MIN: Performed by: UROLOGY

## 2021-01-08 PROCEDURE — 86900 BLOOD TYPING SEROLOGIC ABO: CPT

## 2021-01-08 DEVICE — STENT URETERAL UNIV 6FR 26CM
Type: IMPLANTABLE DEVICE | Site: URETER | Status: NON-FUNCTIONAL
Removed: 2021-02-08

## 2021-01-08 RX ORDER — ROSUVASTATIN CALCIUM 20 MG/1
20 TABLET, COATED ORAL NIGHTLY
Status: DISCONTINUED | OUTPATIENT
Start: 2021-01-08 | End: 2021-01-09 | Stop reason: HOSPADM

## 2021-01-08 RX ORDER — FENTANYL CITRATE 50 UG/ML
25 INJECTION, SOLUTION INTRAMUSCULAR; INTRAVENOUS EVERY 5 MIN PRN
Status: DISCONTINUED | OUTPATIENT
Start: 2021-01-08 | End: 2021-01-08 | Stop reason: HOSPADM

## 2021-01-08 RX ORDER — PREGABALIN 50 MG/1
150 CAPSULE ORAL NIGHTLY
Status: DISCONTINUED | OUTPATIENT
Start: 2021-01-08 | End: 2021-01-08

## 2021-01-08 RX ORDER — NEOSTIGMINE METHYLSULFATE 1 MG/ML
INJECTION, SOLUTION INTRAVENOUS
Status: DISCONTINUED | OUTPATIENT
Start: 2021-01-08 | End: 2021-01-08

## 2021-01-08 RX ORDER — ALBUTEROL SULFATE 2.5 MG/.5ML
10 SOLUTION RESPIRATORY (INHALATION) ONCE
Status: COMPLETED | OUTPATIENT
Start: 2021-01-08 | End: 2021-01-08

## 2021-01-08 RX ORDER — CEFEPIME HYDROCHLORIDE 2 G/1
2 INJECTION, POWDER, FOR SOLUTION INTRAVENOUS
Status: COMPLETED | OUTPATIENT
Start: 2021-01-08 | End: 2021-01-08

## 2021-01-08 RX ORDER — OXYCODONE HYDROCHLORIDE 10 MG/1
10 TABLET ORAL EVERY 4 HOURS PRN
Status: DISCONTINUED | OUTPATIENT
Start: 2021-01-08 | End: 2021-01-09 | Stop reason: HOSPADM

## 2021-01-08 RX ORDER — SODIUM CHLORIDE 9 MG/ML
INJECTION, SOLUTION INTRAVENOUS CONTINUOUS
Status: DISCONTINUED | OUTPATIENT
Start: 2021-01-08 | End: 2021-01-09 | Stop reason: HOSPADM

## 2021-01-08 RX ORDER — GLUCAGON 1 MG
1 KIT INJECTION
Status: DISCONTINUED | OUTPATIENT
Start: 2021-01-08 | End: 2021-01-09 | Stop reason: HOSPADM

## 2021-01-08 RX ORDER — LORAZEPAM 2 MG/ML
0.25 INJECTION INTRAMUSCULAR ONCE AS NEEDED
Status: DISCONTINUED | OUTPATIENT
Start: 2021-01-08 | End: 2021-01-08 | Stop reason: HOSPADM

## 2021-01-08 RX ORDER — PREGABALIN 75 MG/1
75 CAPSULE ORAL NIGHTLY
Status: DISCONTINUED | OUTPATIENT
Start: 2021-01-08 | End: 2021-01-09 | Stop reason: HOSPADM

## 2021-01-08 RX ORDER — GENTAMICIN SULFATE 100 MG/100ML
240 INJECTION, SOLUTION INTRAVENOUS
Status: DISCONTINUED | OUTPATIENT
Start: 2021-01-08 | End: 2021-01-08 | Stop reason: HOSPADM

## 2021-01-08 RX ORDER — ACETAMINOPHEN 500 MG
1000 TABLET ORAL
Status: DISCONTINUED | OUTPATIENT
Start: 2021-01-08 | End: 2021-01-08 | Stop reason: HOSPADM

## 2021-01-08 RX ORDER — MIDAZOLAM HYDROCHLORIDE 1 MG/ML
INJECTION INTRAMUSCULAR; INTRAVENOUS
Status: DISCONTINUED | OUTPATIENT
Start: 2021-01-08 | End: 2021-01-08

## 2021-01-08 RX ORDER — ONDANSETRON 2 MG/ML
8 INJECTION INTRAMUSCULAR; INTRAVENOUS EVERY 6 HOURS PRN
Status: DISCONTINUED | OUTPATIENT
Start: 2021-01-08 | End: 2021-01-09 | Stop reason: HOSPADM

## 2021-01-08 RX ORDER — ACETAMINOPHEN 500 MG
1000 TABLET ORAL EVERY 6 HOURS
Status: DISCONTINUED | OUTPATIENT
Start: 2021-01-08 | End: 2021-01-09 | Stop reason: HOSPADM

## 2021-01-08 RX ORDER — PREGABALIN 75 MG/1
75 CAPSULE ORAL
Status: COMPLETED | OUTPATIENT
Start: 2021-01-08 | End: 2021-01-08

## 2021-01-08 RX ORDER — DEXAMETHASONE SODIUM PHOSPHATE 4 MG/ML
INJECTION, SOLUTION INTRA-ARTICULAR; INTRALESIONAL; INTRAMUSCULAR; INTRAVENOUS; SOFT TISSUE
Status: DISCONTINUED | OUTPATIENT
Start: 2021-01-08 | End: 2021-01-08

## 2021-01-08 RX ORDER — MUPIROCIN 20 MG/G
OINTMENT TOPICAL 2 TIMES DAILY
Status: DISCONTINUED | OUTPATIENT
Start: 2021-01-08 | End: 2021-01-09 | Stop reason: HOSPADM

## 2021-01-08 RX ORDER — HEPARIN SODIUM 5000 [USP'U]/ML
5000 INJECTION, SOLUTION INTRAVENOUS; SUBCUTANEOUS EVERY 8 HOURS
Status: DISCONTINUED | OUTPATIENT
Start: 2021-01-08 | End: 2021-01-08 | Stop reason: HOSPADM

## 2021-01-08 RX ORDER — INSULIN ASPART 100 [IU]/ML
1-10 INJECTION, SOLUTION INTRAVENOUS; SUBCUTANEOUS
Status: DISCONTINUED | OUTPATIENT
Start: 2021-01-08 | End: 2021-01-09 | Stop reason: HOSPADM

## 2021-01-08 RX ORDER — ROCURONIUM BROMIDE 10 MG/ML
INJECTION, SOLUTION INTRAVENOUS
Status: DISCONTINUED | OUTPATIENT
Start: 2021-01-08 | End: 2021-01-08

## 2021-01-08 RX ORDER — SODIUM CHLORIDE 9 MG/ML
INJECTION, SOLUTION INTRAVENOUS CONTINUOUS
Status: ACTIVE | OUTPATIENT
Start: 2021-01-08 | End: 2021-01-09

## 2021-01-08 RX ORDER — OXYCODONE HYDROCHLORIDE 5 MG/1
5 TABLET ORAL EVERY 4 HOURS PRN
Status: DISCONTINUED | OUTPATIENT
Start: 2021-01-08 | End: 2021-01-09 | Stop reason: HOSPADM

## 2021-01-08 RX ORDER — DIPHENHYDRAMINE HYDROCHLORIDE 50 MG/ML
25 INJECTION INTRAMUSCULAR; INTRAVENOUS EVERY 6 HOURS PRN
Status: DISCONTINUED | OUTPATIENT
Start: 2021-01-08 | End: 2021-01-08 | Stop reason: HOSPADM

## 2021-01-08 RX ORDER — MIDAZOLAM HYDROCHLORIDE 1 MG/ML
0.5 INJECTION INTRAMUSCULAR; INTRAVENOUS
Status: DISCONTINUED | OUTPATIENT
Start: 2021-01-08 | End: 2021-01-08 | Stop reason: HOSPADM

## 2021-01-08 RX ORDER — PROPOFOL 10 MG/ML
VIAL (ML) INTRAVENOUS
Status: DISCONTINUED | OUTPATIENT
Start: 2021-01-08 | End: 2021-01-08

## 2021-01-08 RX ORDER — IBUPROFEN 200 MG
24 TABLET ORAL
Status: DISCONTINUED | OUTPATIENT
Start: 2021-01-08 | End: 2021-01-09 | Stop reason: HOSPADM

## 2021-01-08 RX ORDER — PANTOPRAZOLE SODIUM 40 MG/1
40 TABLET, DELAYED RELEASE ORAL DAILY
Status: DISCONTINUED | OUTPATIENT
Start: 2021-01-08 | End: 2021-01-09 | Stop reason: HOSPADM

## 2021-01-08 RX ORDER — FUROSEMIDE 10 MG/ML
20 INJECTION INTRAMUSCULAR; INTRAVENOUS ONCE
Status: COMPLETED | OUTPATIENT
Start: 2021-01-08 | End: 2021-01-08

## 2021-01-08 RX ORDER — FENTANYL CITRATE 50 UG/ML
INJECTION, SOLUTION INTRAMUSCULAR; INTRAVENOUS
Status: DISCONTINUED | OUTPATIENT
Start: 2021-01-08 | End: 2021-01-08

## 2021-01-08 RX ORDER — IBUPROFEN 200 MG
16 TABLET ORAL
Status: DISCONTINUED | OUTPATIENT
Start: 2021-01-08 | End: 2021-01-09 | Stop reason: HOSPADM

## 2021-01-08 RX ORDER — LIDOCAINE HCL/PF 100 MG/5ML
SYRINGE (ML) INTRAVENOUS
Status: DISCONTINUED | OUTPATIENT
Start: 2021-01-08 | End: 2021-01-08

## 2021-01-08 RX ORDER — BUPIVACAINE HYDROCHLORIDE AND EPINEPHRINE 2.5; 5 MG/ML; UG/ML
INJECTION, SOLUTION EPIDURAL; INFILTRATION; INTRACAUDAL; PERINEURAL
Status: COMPLETED | OUTPATIENT
Start: 2021-01-08 | End: 2021-01-08

## 2021-01-08 RX ORDER — KETAMINE HCL IN 0.9 % NACL 50 MG/5 ML
SYRINGE (ML) INTRAVENOUS
Status: DISCONTINUED | OUTPATIENT
Start: 2021-01-08 | End: 2021-01-08

## 2021-01-08 RX ADMIN — HEPARIN SODIUM 5000 UNITS: 5000 INJECTION INTRAVENOUS; SUBCUTANEOUS at 06:01

## 2021-01-08 RX ADMIN — ACETAMINOPHEN 1000 MG: 500 TABLET ORAL at 06:01

## 2021-01-08 RX ADMIN — ROCURONIUM BROMIDE 10 MG: 10 INJECTION, SOLUTION INTRAVENOUS at 08:01

## 2021-01-08 RX ADMIN — PREGABALIN 75 MG: 75 CAPSULE ORAL at 06:01

## 2021-01-08 RX ADMIN — INSULIN ASPART 4 UNITS: 100 INJECTION, SOLUTION INTRAVENOUS; SUBCUTANEOUS at 09:01

## 2021-01-08 RX ADMIN — DEXAMETHASONE SODIUM PHOSPHATE 4 MG: 4 INJECTION, SOLUTION INTRAMUSCULAR; INTRAVENOUS at 07:01

## 2021-01-08 RX ADMIN — ROCURONIUM BROMIDE 10 MG: 10 INJECTION, SOLUTION INTRAVENOUS at 07:01

## 2021-01-08 RX ADMIN — SODIUM CHLORIDE 100 ML/HR: 0.9 INJECTION, SOLUTION INTRAVENOUS at 12:01

## 2021-01-08 RX ADMIN — Medication 10 MG: at 10:01

## 2021-01-08 RX ADMIN — SODIUM ZIRCONIUM CYCLOSILICATE 10 G: 10 POWDER, FOR SUSPENSION ORAL at 06:01

## 2021-01-08 RX ADMIN — MIDAZOLAM HYDROCHLORIDE 2 MG: 1 INJECTION, SOLUTION INTRAMUSCULAR; INTRAVENOUS at 06:01

## 2021-01-08 RX ADMIN — FENTANYL CITRATE 50 MCG: 50 INJECTION INTRAMUSCULAR; INTRAVENOUS at 06:01

## 2021-01-08 RX ADMIN — ROCURONIUM BROMIDE 10 MG: 10 INJECTION, SOLUTION INTRAVENOUS at 10:01

## 2021-01-08 RX ADMIN — MIDAZOLAM 1 MG: 1 INJECTION INTRAMUSCULAR; INTRAVENOUS at 06:01

## 2021-01-08 RX ADMIN — GENTAMICIN SULFATE 240 MG: 40 INJECTION, SOLUTION INTRAMUSCULAR; INTRAVENOUS at 07:01

## 2021-01-08 RX ADMIN — DEXTROSE MONOHYDRATE 25 G: 25 INJECTION, SOLUTION INTRAVENOUS at 01:01

## 2021-01-08 RX ADMIN — ALBUTEROL SULFATE 10 MG: 2.5 SOLUTION RESPIRATORY (INHALATION) at 02:01

## 2021-01-08 RX ADMIN — ROSUVASTATIN CALCIUM 20 MG: 20 TABLET, FILM COATED ORAL at 09:01

## 2021-01-08 RX ADMIN — Medication 30 MG: at 07:01

## 2021-01-08 RX ADMIN — CALCIUM GLUCONATE 1 G: 98 INJECTION, SOLUTION INTRAVENOUS at 02:01

## 2021-01-08 RX ADMIN — HEPARIN SODIUM 5000 UNITS: 5000 INJECTION INTRAVENOUS; SUBCUTANEOUS at 01:01

## 2021-01-08 RX ADMIN — MUPIROCIN: 20 OINTMENT TOPICAL at 09:01

## 2021-01-08 RX ADMIN — LIDOCAINE HYDROCHLORIDE 60 MG: 20 INJECTION, SOLUTION INTRAVENOUS at 07:01

## 2021-01-08 RX ADMIN — INSULIN ASPART 8 UNITS: 100 INJECTION, SOLUTION INTRAVENOUS; SUBCUTANEOUS at 12:01

## 2021-01-08 RX ADMIN — INSULIN ASPART 6 UNITS: 100 INJECTION, SOLUTION INTRAVENOUS; SUBCUTANEOUS at 05:01

## 2021-01-08 RX ADMIN — BUPIVACAINE HYDROCHLORIDE AND EPINEPHRINE BITARTRATE 30 ML: 2.5; .0091 INJECTION, SOLUTION EPIDURAL; INFILTRATION; INTRACAUDAL; PERINEURAL at 07:01

## 2021-01-08 RX ADMIN — PANTOPRAZOLE SODIUM 40 MG: 40 TABLET, DELAYED RELEASE ORAL at 01:01

## 2021-01-08 RX ADMIN — ROCURONIUM BROMIDE 50 MG: 10 INJECTION, SOLUTION INTRAVENOUS at 07:01

## 2021-01-08 RX ADMIN — NEOSTIGMINE METHYLSULFATE 4 MG: 1 INJECTION INTRAVENOUS at 11:01

## 2021-01-08 RX ADMIN — SODIUM CHLORIDE 125 ML/HR: 0.9 INJECTION, SOLUTION INTRAVENOUS at 09:01

## 2021-01-08 RX ADMIN — PROPOFOL 200 MG: 10 INJECTION, EMULSION INTRAVENOUS at 07:01

## 2021-01-08 RX ADMIN — PREGABALIN 75 MG: 75 CAPSULE ORAL at 09:01

## 2021-01-08 RX ADMIN — Medication 10 MG: at 08:01

## 2021-01-08 RX ADMIN — OXYCODONE 10 MG: 5 TABLET ORAL at 01:01

## 2021-01-08 RX ADMIN — CEFEPIME 2 G: 2 INJECTION, POWDER, FOR SOLUTION INTRAVENOUS at 07:01

## 2021-01-08 RX ADMIN — FENTANYL CITRATE 50 MCG: 50 INJECTION, SOLUTION INTRAMUSCULAR; INTRAVENOUS at 07:01

## 2021-01-08 RX ADMIN — FUROSEMIDE 20 MG: 10 INJECTION, SOLUTION INTRAMUSCULAR; INTRAVENOUS at 01:01

## 2021-01-08 RX ADMIN — SODIUM ZIRCONIUM CYCLOSILICATE 10 G: 10 POWDER, FOR SUSPENSION ORAL at 05:01

## 2021-01-08 RX ADMIN — ROCURONIUM BROMIDE 10 MG: 10 INJECTION, SOLUTION INTRAVENOUS at 11:01

## 2021-01-08 RX ADMIN — INSULIN HUMAN 7.85 UNITS: 100 INJECTION, SOLUTION PARENTERAL at 06:01

## 2021-01-08 RX ADMIN — ACETAMINOPHEN 1000 MG: 500 TABLET ORAL at 01:01

## 2021-01-08 RX ADMIN — FENTANYL CITRATE 50 MCG: 50 INJECTION, SOLUTION INTRAMUSCULAR; INTRAVENOUS at 06:01

## 2021-01-08 RX ADMIN — SODIUM CHLORIDE: 0.9 INJECTION, SOLUTION INTRAVENOUS at 06:01

## 2021-01-08 RX ADMIN — ALBUTEROL SULFATE 10 MG: 2.5 SOLUTION RESPIRATORY (INHALATION) at 05:01

## 2021-01-08 RX ADMIN — DEXTROSE MONOHYDRATE 25 G: 25 INJECTION, SOLUTION INTRAVENOUS at 06:01

## 2021-01-08 RX ADMIN — INSULIN HUMAN 7.85 UNITS: 100 INJECTION, SOLUTION PARENTERAL at 01:01

## 2021-01-09 VITALS
HEART RATE: 92 BPM | BODY MASS INDEX: 24.22 KG/M2 | TEMPERATURE: 98 F | RESPIRATION RATE: 24 BRPM | HEIGHT: 71 IN | WEIGHT: 173 LBS | DIASTOLIC BLOOD PRESSURE: 86 MMHG | OXYGEN SATURATION: 98 % | SYSTOLIC BLOOD PRESSURE: 140 MMHG

## 2021-01-09 LAB
ANION GAP SERPL CALC-SCNC: 10 MMOL/L (ref 8–16)
BASOPHILS # BLD AUTO: 0.01 K/UL (ref 0–0.2)
BASOPHILS NFR BLD: 0.2 % (ref 0–1.9)
BUN SERPL-MCNC: 37 MG/DL (ref 8–23)
CALCIUM SERPL-MCNC: 8.4 MG/DL (ref 8.7–10.5)
CHLORIDE SERPL-SCNC: 107 MMOL/L (ref 95–110)
CO2 SERPL-SCNC: 21 MMOL/L (ref 23–29)
CREAT SERPL-MCNC: 2.8 MG/DL (ref 0.5–1.4)
DIFFERENTIAL METHOD: ABNORMAL
EOSINOPHIL # BLD AUTO: 0.1 K/UL (ref 0–0.5)
EOSINOPHIL NFR BLD: 1.4 % (ref 0–8)
ERYTHROCYTE [DISTWIDTH] IN BLOOD BY AUTOMATED COUNT: 13.9 % (ref 11.5–14.5)
EST. GFR  (AFRICAN AMERICAN): 24.1 ML/MIN/1.73 M^2
EST. GFR  (NON AFRICAN AMERICAN): 20.8 ML/MIN/1.73 M^2
GLUCOSE SERPL-MCNC: 167 MG/DL (ref 70–110)
HCT VFR BLD AUTO: 25.2 % (ref 40–54)
HGB BLD-MCNC: 7.7 G/DL (ref 14–18)
IMM GRANULOCYTES # BLD AUTO: 0.02 K/UL (ref 0–0.04)
IMM GRANULOCYTES NFR BLD AUTO: 0.3 % (ref 0–0.5)
LYMPHOCYTES # BLD AUTO: 0.9 K/UL (ref 1–4.8)
LYMPHOCYTES NFR BLD: 14.6 % (ref 18–48)
MCH RBC QN AUTO: 30.6 PG (ref 27–31)
MCHC RBC AUTO-ENTMCNC: 30.6 G/DL (ref 32–36)
MCV RBC AUTO: 100 FL (ref 82–98)
MONOCYTES # BLD AUTO: 0.9 K/UL (ref 0.3–1)
MONOCYTES NFR BLD: 13.6 % (ref 4–15)
NEUTROPHILS # BLD AUTO: 4.5 K/UL (ref 1.8–7.7)
NEUTROPHILS NFR BLD: 69.9 % (ref 38–73)
NRBC BLD-RTO: 0 /100 WBC
PLATELET # BLD AUTO: 166 K/UL (ref 150–350)
PMV BLD AUTO: 11 FL (ref 9.2–12.9)
POCT GLUCOSE: 179 MG/DL (ref 70–110)
POTASSIUM SERPL-SCNC: 4.5 MMOL/L (ref 3.5–5.1)
RBC # BLD AUTO: 2.52 M/UL (ref 4.6–6.2)
SODIUM SERPL-SCNC: 138 MMOL/L (ref 136–145)
WBC # BLD AUTO: 6.38 K/UL (ref 3.9–12.7)

## 2021-01-09 PROCEDURE — 80048 BASIC METABOLIC PNL TOTAL CA: CPT

## 2021-01-09 PROCEDURE — 85025 COMPLETE CBC W/AUTO DIFF WBC: CPT

## 2021-01-09 PROCEDURE — 36415 COLL VENOUS BLD VENIPUNCTURE: CPT

## 2021-01-09 PROCEDURE — 25000003 PHARM REV CODE 250: Performed by: STUDENT IN AN ORGANIZED HEALTH CARE EDUCATION/TRAINING PROGRAM

## 2021-01-09 RX ORDER — ACETAMINOPHEN 500 MG
500 TABLET ORAL EVERY 6 HOURS
Qty: 28 TABLET | Refills: 0 | Status: SHIPPED | OUTPATIENT
Start: 2021-01-09 | End: 2021-01-09 | Stop reason: SDUPTHER

## 2021-01-09 RX ORDER — POLYETHYLENE GLYCOL 3350 17 G/17G
17 POWDER, FOR SOLUTION ORAL DAILY
Qty: 238 G | Refills: 0 | Status: SHIPPED | OUTPATIENT
Start: 2021-01-09 | End: 2021-01-23

## 2021-01-09 RX ORDER — POLYETHYLENE GLYCOL 3350 17 G/17G
17 POWDER, FOR SOLUTION ORAL DAILY
Qty: 14 EACH | Refills: 0 | Status: SHIPPED | OUTPATIENT
Start: 2021-01-09 | End: 2021-01-09 | Stop reason: SDUPTHER

## 2021-01-09 RX ORDER — OXYCODONE HYDROCHLORIDE 5 MG/1
5 TABLET ORAL EVERY 4 HOURS PRN
Qty: 5 TABLET | Refills: 0 | Status: SHIPPED | OUTPATIENT
Start: 2021-01-09 | End: 2021-01-09 | Stop reason: SDUPTHER

## 2021-01-09 RX ORDER — ACETAMINOPHEN 500 MG
500 TABLET ORAL EVERY 6 HOURS
Qty: 28 TABLET | Refills: 0 | Status: SHIPPED | OUTPATIENT
Start: 2021-01-09 | End: 2021-01-16

## 2021-01-09 RX ORDER — OXYCODONE HYDROCHLORIDE 5 MG/1
5 TABLET ORAL EVERY 4 HOURS PRN
Qty: 5 TABLET | Refills: 0 | Status: SHIPPED | OUTPATIENT
Start: 2021-01-09 | End: 2021-03-29

## 2021-01-09 RX ADMIN — PANTOPRAZOLE SODIUM 40 MG: 40 TABLET, DELAYED RELEASE ORAL at 08:01

## 2021-01-09 RX ADMIN — ACETAMINOPHEN 1000 MG: 500 TABLET ORAL at 12:01

## 2021-01-09 RX ADMIN — MUPIROCIN: 20 OINTMENT TOPICAL at 09:01

## 2021-01-09 RX ADMIN — INSULIN ASPART 2 UNITS: 100 INJECTION, SOLUTION INTRAVENOUS; SUBCUTANEOUS at 08:01

## 2021-01-11 ENCOUNTER — TELEPHONE (OUTPATIENT)
Dept: UROLOGY | Facility: CLINIC | Age: 76
End: 2021-01-11

## 2021-01-11 ENCOUNTER — PATIENT OUTREACH (OUTPATIENT)
Dept: ADMINISTRATIVE | Facility: CLINIC | Age: 76
End: 2021-01-11

## 2021-01-12 LAB
FINAL PATHOLOGIC DIAGNOSIS: NORMAL
FROZEN SECTION DIAGNOSIS: NORMAL
FROZEN SECTION FOOTNOTE: NORMAL
GROSS: NORMAL
Lab: NORMAL

## 2021-01-22 ENCOUNTER — PATIENT MESSAGE (OUTPATIENT)
Dept: ADMINISTRATIVE | Facility: OTHER | Age: 76
End: 2021-01-22

## 2021-02-01 ENCOUNTER — OFFICE VISIT (OUTPATIENT)
Dept: UROLOGY | Facility: CLINIC | Age: 76
End: 2021-02-01
Payer: MEDICARE

## 2021-02-01 VITALS
HEART RATE: 98 BPM | SYSTOLIC BLOOD PRESSURE: 155 MMHG | HEIGHT: 71 IN | WEIGHT: 173.06 LBS | BODY MASS INDEX: 24.23 KG/M2 | DIASTOLIC BLOOD PRESSURE: 73 MMHG

## 2021-02-01 DIAGNOSIS — T14.8XXA OPEN WOUND OF SKIN: ICD-10-CM

## 2021-02-01 DIAGNOSIS — N13.5 URETERAL STRICTURE, RIGHT: Primary | ICD-10-CM

## 2021-02-01 PROCEDURE — 87086 URINE CULTURE/COLONY COUNT: CPT

## 2021-02-01 PROCEDURE — 99024 POSTOP FOLLOW-UP VISIT: CPT | Mod: POP,,, | Performed by: UROLOGY

## 2021-02-01 PROCEDURE — 99213 OFFICE O/P EST LOW 20 MIN: CPT | Mod: PBBFAC | Performed by: UROLOGY

## 2021-02-01 PROCEDURE — 99999 PR PBB SHADOW E&M-EST. PATIENT-LVL III: CPT | Mod: PBBFAC,,, | Performed by: UROLOGY

## 2021-02-01 PROCEDURE — 99024 PR POST-OP FOLLOW-UP VISIT: ICD-10-PCS | Mod: POP,,, | Performed by: UROLOGY

## 2021-02-01 PROCEDURE — 99999 PR PBB SHADOW E&M-EST. PATIENT-LVL III: ICD-10-PCS | Mod: PBBFAC,,, | Performed by: UROLOGY

## 2021-02-01 RX ORDER — BACITRACIN ZINC 500 [USP'U]/G
OINTMENT TOPICAL 2 TIMES DAILY
Status: SHIPPED | OUTPATIENT
Start: 2021-02-01 | End: 2021-02-08

## 2021-02-02 LAB — BACTERIA UR CULT: NO GROWTH

## 2021-02-03 ENCOUNTER — EXTERNAL HOME HEALTH (OUTPATIENT)
Dept: HOME HEALTH SERVICES | Facility: HOSPITAL | Age: 76
End: 2021-02-03

## 2021-02-04 ENCOUNTER — EXTERNAL HOME HEALTH (OUTPATIENT)
Dept: HOME HEALTH SERVICES | Facility: HOSPITAL | Age: 76
End: 2021-02-04

## 2021-02-08 ENCOUNTER — PROCEDURE VISIT (OUTPATIENT)
Dept: UROLOGY | Facility: CLINIC | Age: 76
End: 2021-02-08
Payer: MEDICARE

## 2021-02-08 VITALS
WEIGHT: 179.63 LBS | DIASTOLIC BLOOD PRESSURE: 70 MMHG | SYSTOLIC BLOOD PRESSURE: 139 MMHG | BODY MASS INDEX: 25.15 KG/M2 | HEIGHT: 71 IN | RESPIRATION RATE: 18 BRPM | TEMPERATURE: 98 F | HEART RATE: 78 BPM

## 2021-02-08 DIAGNOSIS — N13.5 URETERAL STRICTURE, RIGHT: ICD-10-CM

## 2021-02-08 PROCEDURE — 52310 PR CYSTOSCOPY,REMV CALCULUS,SIMPLE: ICD-10-PCS | Mod: S$PBB,,, | Performed by: UROLOGY

## 2021-02-08 PROCEDURE — 87086 URINE CULTURE/COLONY COUNT: CPT

## 2021-02-08 PROCEDURE — 52310 CYSTOSCOPY AND TREATMENT: CPT | Mod: S$PBB,,, | Performed by: UROLOGY

## 2021-02-08 PROCEDURE — 52310 CYSTOSCOPY AND TREATMENT: CPT | Mod: PBBFAC | Performed by: UROLOGY

## 2021-02-08 RX ORDER — CIPROFLOXACIN 500 MG/1
500 TABLET ORAL
Status: COMPLETED | OUTPATIENT
Start: 2021-02-08 | End: 2021-02-08

## 2021-02-08 RX ORDER — CIPROFLOXACIN 500 MG/1
500 TABLET ORAL
Status: DISCONTINUED | OUTPATIENT
Start: 2021-02-08 | End: 2023-01-10

## 2021-02-08 RX ORDER — CIPROFLOXACIN 500 MG/1
500 TABLET ORAL 2 TIMES DAILY
Qty: 6 TABLET | Refills: 0 | Status: SHIPPED | OUTPATIENT
Start: 2021-02-08 | End: 2021-02-11

## 2021-02-08 RX ORDER — LIDOCAINE HYDROCHLORIDE 20 MG/ML
JELLY TOPICAL
Status: COMPLETED | OUTPATIENT
Start: 2021-02-08 | End: 2021-02-08

## 2021-02-08 RX ADMIN — CIPROFLOXACIN 500 MG: 500 TABLET ORAL at 12:02

## 2021-02-08 RX ADMIN — LIDOCAINE HYDROCHLORIDE: 20 JELLY TOPICAL at 12:02

## 2021-02-09 LAB — BACTERIA UR CULT: NO GROWTH

## 2021-02-20 PROBLEM — T81.41XD INFECTION FOLLOWING A PROCEDURE, SUPERFICIAL INCISIONAL SURGICAL SITE, SUBSEQUENT ENCOUNTER: Status: ACTIVE | Noted: 2020-12-26

## 2021-03-29 ENCOUNTER — OFFICE VISIT (OUTPATIENT)
Dept: FAMILY MEDICINE | Facility: CLINIC | Age: 76
End: 2021-03-29
Payer: MEDICARE

## 2021-03-29 VITALS
HEART RATE: 69 BPM | DIASTOLIC BLOOD PRESSURE: 72 MMHG | WEIGHT: 182 LBS | HEIGHT: 71 IN | SYSTOLIC BLOOD PRESSURE: 129 MMHG | BODY MASS INDEX: 25.48 KG/M2

## 2021-03-29 DIAGNOSIS — C64.1 RENAL CELL ADENOCARCINOMA, RIGHT: ICD-10-CM

## 2021-03-29 DIAGNOSIS — D50.0 IRON DEFICIENCY ANEMIA DUE TO CHRONIC BLOOD LOSS: ICD-10-CM

## 2021-03-29 DIAGNOSIS — R25.2 MUSCLE CRAMPS: ICD-10-CM

## 2021-03-29 DIAGNOSIS — Z79.4 TYPE 2 DIABETES MELLITUS WITH HYPOGLYCEMIA WITHOUT COMA, WITH LONG-TERM CURRENT USE OF INSULIN: ICD-10-CM

## 2021-03-29 DIAGNOSIS — E11.649 TYPE 2 DIABETES MELLITUS WITH HYPOGLYCEMIA WITHOUT COMA, WITH LONG-TERM CURRENT USE OF INSULIN: ICD-10-CM

## 2021-03-29 DIAGNOSIS — I10 ESSENTIAL HYPERTENSION: Primary | ICD-10-CM

## 2021-03-29 PROCEDURE — 99214 OFFICE O/P EST MOD 30 MIN: CPT | Mod: S$PBB,,, | Performed by: INTERNAL MEDICINE

## 2021-03-29 PROCEDURE — 99214 PR OFFICE/OUTPT VISIT, EST, LEVL IV, 30-39 MIN: ICD-10-PCS | Mod: S$PBB,,, | Performed by: INTERNAL MEDICINE

## 2021-03-29 PROCEDURE — 99214 OFFICE O/P EST MOD 30 MIN: CPT | Performed by: INTERNAL MEDICINE

## 2021-03-30 ENCOUNTER — LAB VISIT (OUTPATIENT)
Dept: LAB | Facility: HOSPITAL | Age: 76
End: 2021-03-30
Attending: INTERNAL MEDICINE
Payer: MEDICARE

## 2021-03-30 DIAGNOSIS — I10 ESSENTIAL HYPERTENSION: ICD-10-CM

## 2021-03-30 DIAGNOSIS — E11.649 TYPE 2 DIABETES MELLITUS WITH HYPOGLYCEMIA WITHOUT COMA, WITH LONG-TERM CURRENT USE OF INSULIN: ICD-10-CM

## 2021-03-30 DIAGNOSIS — R25.2 MUSCLE CRAMPS: ICD-10-CM

## 2021-03-30 DIAGNOSIS — D50.0 IRON DEFICIENCY ANEMIA DUE TO CHRONIC BLOOD LOSS: ICD-10-CM

## 2021-03-30 DIAGNOSIS — Z79.4 TYPE 2 DIABETES MELLITUS WITH HYPOGLYCEMIA WITHOUT COMA, WITH LONG-TERM CURRENT USE OF INSULIN: ICD-10-CM

## 2021-03-30 LAB
ALBUMIN SERPL BCP-MCNC: 4.1 G/DL (ref 3.5–5.2)
ALP SERPL-CCNC: 78 U/L (ref 55–135)
ALT SERPL W/O P-5'-P-CCNC: 23 U/L (ref 10–44)
ANION GAP SERPL CALC-SCNC: 11 MMOL/L (ref 8–16)
AST SERPL-CCNC: 24 U/L (ref 10–40)
BASOPHILS # BLD AUTO: 0.03 K/UL (ref 0–0.2)
BASOPHILS NFR BLD: 0.5 % (ref 0–1.9)
BILIRUB SERPL-MCNC: 1.2 MG/DL (ref 0.1–1)
BUN SERPL-MCNC: 34 MG/DL (ref 8–23)
CALCIUM SERPL-MCNC: 9.3 MG/DL (ref 8.7–10.5)
CHLORIDE SERPL-SCNC: 112 MMOL/L (ref 95–110)
CK SERPL-CCNC: 183 U/L (ref 20–200)
CO2 SERPL-SCNC: 21 MMOL/L (ref 23–29)
CREAT SERPL-MCNC: 1.8 MG/DL (ref 0.5–1.4)
DIFFERENTIAL METHOD: ABNORMAL
EOSINOPHIL # BLD AUTO: 0.2 K/UL (ref 0–0.5)
EOSINOPHIL NFR BLD: 3.4 % (ref 0–8)
ERYTHROCYTE [DISTWIDTH] IN BLOOD BY AUTOMATED COUNT: 13.2 % (ref 11.5–14.5)
EST. GFR  (AFRICAN AMERICAN): 40.8 ML/MIN/1.73 M^2
EST. GFR  (NON AFRICAN AMERICAN): 35.3 ML/MIN/1.73 M^2
ESTIMATED AVG GLUCOSE: 154 MG/DL (ref 68–131)
GLUCOSE SERPL-MCNC: 159 MG/DL (ref 70–110)
HBA1C MFR BLD: 7 % (ref 4.5–6.2)
HCT VFR BLD AUTO: 35.1 % (ref 40–54)
HGB BLD-MCNC: 11.1 G/DL (ref 14–18)
IMM GRANULOCYTES # BLD AUTO: 0.01 K/UL (ref 0–0.04)
IMM GRANULOCYTES NFR BLD AUTO: 0.2 % (ref 0–0.5)
IRON SERPL-MCNC: 68 UG/DL (ref 45–160)
LYMPHOCYTES # BLD AUTO: 1.2 K/UL (ref 1–4.8)
LYMPHOCYTES NFR BLD: 19.4 % (ref 18–48)
MAGNESIUM SERPL-MCNC: 2.1 MG/DL (ref 1.6–2.6)
MCH RBC QN AUTO: 29.8 PG (ref 27–31)
MCHC RBC AUTO-ENTMCNC: 31.6 G/DL (ref 32–36)
MCV RBC AUTO: 94 FL (ref 82–98)
MONOCYTES # BLD AUTO: 0.6 K/UL (ref 0.3–1)
MONOCYTES NFR BLD: 10.3 % (ref 4–15)
NEUTROPHILS # BLD AUTO: 4.1 K/UL (ref 1.8–7.7)
NEUTROPHILS NFR BLD: 66.2 % (ref 38–73)
NRBC BLD-RTO: 0 /100 WBC
PLATELET # BLD AUTO: 216 K/UL (ref 150–450)
PMV BLD AUTO: 10.8 FL (ref 9.2–12.9)
POTASSIUM SERPL-SCNC: 4.3 MMOL/L (ref 3.5–5.1)
PROT SERPL-MCNC: 6.9 G/DL (ref 6–8.4)
RBC # BLD AUTO: 3.73 M/UL (ref 4.6–6.2)
SATURATED IRON: 21 % (ref 20–50)
SODIUM SERPL-SCNC: 144 MMOL/L (ref 136–145)
TOTAL IRON BINDING CAPACITY: 325 UG/DL (ref 250–450)
TRANSFERRIN SERPL-MCNC: 232 MG/DL (ref 200–375)
WBC # BLD AUTO: 6.12 K/UL (ref 3.9–12.7)

## 2021-03-30 PROCEDURE — 82550 ASSAY OF CK (CPK): CPT | Performed by: INTERNAL MEDICINE

## 2021-03-30 PROCEDURE — 83036 HEMOGLOBIN GLYCOSYLATED A1C: CPT | Performed by: INTERNAL MEDICINE

## 2021-03-30 PROCEDURE — 80053 COMPREHEN METABOLIC PANEL: CPT | Performed by: INTERNAL MEDICINE

## 2021-03-30 PROCEDURE — 85025 COMPLETE CBC W/AUTO DIFF WBC: CPT | Performed by: INTERNAL MEDICINE

## 2021-03-30 PROCEDURE — 36415 COLL VENOUS BLD VENIPUNCTURE: CPT | Performed by: INTERNAL MEDICINE

## 2021-03-30 PROCEDURE — 83735 ASSAY OF MAGNESIUM: CPT | Performed by: INTERNAL MEDICINE

## 2021-03-30 PROCEDURE — 83540 ASSAY OF IRON: CPT | Performed by: INTERNAL MEDICINE

## 2021-04-01 DIAGNOSIS — Z79.4 TYPE 2 DIABETES MELLITUS WITH HYPOGLYCEMIA WITHOUT COMA, WITH LONG-TERM CURRENT USE OF INSULIN: ICD-10-CM

## 2021-04-01 DIAGNOSIS — E11.649 TYPE 2 DIABETES MELLITUS WITH HYPOGLYCEMIA WITHOUT COMA, WITH LONG-TERM CURRENT USE OF INSULIN: ICD-10-CM

## 2021-04-02 RX ORDER — INSULIN DEGLUDEC 100 U/ML
17 INJECTION, SOLUTION SUBCUTANEOUS NIGHTLY
Qty: 5 SYRINGE | Refills: 1
Start: 2021-04-02 | End: 2021-05-13

## 2021-04-02 RX ORDER — INSULIN ASPART 100 [IU]/ML
10 INJECTION, SOLUTION INTRAVENOUS; SUBCUTANEOUS
Qty: 5 SYRINGE | Refills: 2 | Status: SHIPPED | OUTPATIENT
Start: 2021-04-02 | End: 2022-07-13 | Stop reason: SDUPTHER

## 2021-05-03 ENCOUNTER — HOSPITAL ENCOUNTER (OUTPATIENT)
Dept: RADIOLOGY | Facility: HOSPITAL | Age: 76
Discharge: HOME | End: 2021-05-03
Attending: UROLOGY
Payer: MEDICARE

## 2021-05-03 ENCOUNTER — OFFICE VISIT (OUTPATIENT)
Dept: UROLOGY | Facility: CLINIC | Age: 76
End: 2021-05-03
Payer: MEDICARE

## 2021-05-03 VITALS
HEART RATE: 88 BPM | WEIGHT: 180 LBS | BODY MASS INDEX: 25.2 KG/M2 | SYSTOLIC BLOOD PRESSURE: 139 MMHG | DIASTOLIC BLOOD PRESSURE: 63 MMHG | HEIGHT: 71 IN

## 2021-05-03 DIAGNOSIS — C64.1 RENAL CELL ADENOCARCINOMA, RIGHT: ICD-10-CM

## 2021-05-03 DIAGNOSIS — N28.89 RENAL MASS: Primary | ICD-10-CM

## 2021-05-03 DIAGNOSIS — N13.5 URETERAL STRICTURE, RIGHT: ICD-10-CM

## 2021-05-03 PROCEDURE — 99214 OFFICE O/P EST MOD 30 MIN: CPT | Mod: PBBFAC,25 | Performed by: UROLOGY

## 2021-05-03 PROCEDURE — 99999 PR PBB SHADOW E&M-EST. PATIENT-LVL IV: CPT | Mod: PBBFAC,,, | Performed by: UROLOGY

## 2021-05-03 PROCEDURE — 76770 US EXAM ABDO BACK WALL COMP: CPT | Mod: 26,,, | Performed by: RADIOLOGY

## 2021-05-03 PROCEDURE — 76770 US EXAM ABDO BACK WALL COMP: CPT | Mod: TC

## 2021-05-03 PROCEDURE — 76770 US RETROPERITONEAL COMPLETE: ICD-10-PCS | Mod: 26,,, | Performed by: RADIOLOGY

## 2021-05-03 PROCEDURE — 99214 OFFICE O/P EST MOD 30 MIN: CPT | Mod: S$PBB,,, | Performed by: UROLOGY

## 2021-05-03 PROCEDURE — 99999 PR PBB SHADOW E&M-EST. PATIENT-LVL IV: ICD-10-PCS | Mod: PBBFAC,,, | Performed by: UROLOGY

## 2021-05-03 PROCEDURE — 99214 PR OFFICE/OUTPT VISIT, EST, LEVL IV, 30-39 MIN: ICD-10-PCS | Mod: S$PBB,,, | Performed by: UROLOGY

## 2021-05-13 ENCOUNTER — OFFICE VISIT (OUTPATIENT)
Dept: FAMILY MEDICINE | Facility: CLINIC | Age: 76
End: 2021-05-13
Payer: MEDICARE

## 2021-05-13 VITALS
BODY MASS INDEX: 25.34 KG/M2 | DIASTOLIC BLOOD PRESSURE: 65 MMHG | HEIGHT: 71 IN | WEIGHT: 181 LBS | SYSTOLIC BLOOD PRESSURE: 122 MMHG | HEART RATE: 68 BPM

## 2021-05-13 DIAGNOSIS — M17.11 PRIMARY OSTEOARTHRITIS OF RIGHT KNEE: ICD-10-CM

## 2021-05-13 DIAGNOSIS — Z79.4 TYPE 2 DIABETES MELLITUS WITH HYPOGLYCEMIA WITHOUT COMA, WITH LONG-TERM CURRENT USE OF INSULIN: ICD-10-CM

## 2021-05-13 DIAGNOSIS — E78.2 MIXED DYSLIPIDEMIA: ICD-10-CM

## 2021-05-13 DIAGNOSIS — I10 ESSENTIAL HYPERTENSION: Primary | ICD-10-CM

## 2021-05-13 DIAGNOSIS — C64.1 RENAL CELL ADENOCARCINOMA, RIGHT: ICD-10-CM

## 2021-05-13 DIAGNOSIS — N28.89 RENAL MASS, LEFT: ICD-10-CM

## 2021-05-13 DIAGNOSIS — N18.32 STAGE 3B CHRONIC KIDNEY DISEASE: ICD-10-CM

## 2021-05-13 DIAGNOSIS — E11.649 TYPE 2 DIABETES MELLITUS WITH HYPOGLYCEMIA WITHOUT COMA, WITH LONG-TERM CURRENT USE OF INSULIN: ICD-10-CM

## 2021-05-13 PROCEDURE — 99214 OFFICE O/P EST MOD 30 MIN: CPT | Mod: S$PBB,,, | Performed by: INTERNAL MEDICINE

## 2021-05-13 PROCEDURE — 99214 PR OFFICE/OUTPT VISIT, EST, LEVL IV, 30-39 MIN: ICD-10-PCS | Mod: S$PBB,,, | Performed by: INTERNAL MEDICINE

## 2021-05-13 PROCEDURE — 99213 OFFICE O/P EST LOW 20 MIN: CPT | Performed by: INTERNAL MEDICINE

## 2021-05-13 RX ORDER — ROSUVASTATIN CALCIUM 20 MG/1
20 TABLET, COATED ORAL NIGHTLY
Qty: 90 TABLET | Refills: 3 | Status: SHIPPED | OUTPATIENT
Start: 2021-05-13 | End: 2021-09-16

## 2021-09-09 ENCOUNTER — LAB VISIT (OUTPATIENT)
Dept: LAB | Facility: HOSPITAL | Age: 76
End: 2021-09-09
Attending: INTERNAL MEDICINE
Payer: MEDICARE

## 2021-09-09 DIAGNOSIS — Z79.4 TYPE 2 DIABETES MELLITUS WITH HYPOGLYCEMIA WITHOUT COMA, WITH LONG-TERM CURRENT USE OF INSULIN: ICD-10-CM

## 2021-09-09 DIAGNOSIS — I10 ESSENTIAL HYPERTENSION: ICD-10-CM

## 2021-09-09 DIAGNOSIS — E11.649 TYPE 2 DIABETES MELLITUS WITH HYPOGLYCEMIA WITHOUT COMA, WITH LONG-TERM CURRENT USE OF INSULIN: ICD-10-CM

## 2021-09-09 LAB
ANION GAP SERPL CALC-SCNC: 9 MMOL/L (ref 8–16)
BUN SERPL-MCNC: 43 MG/DL (ref 8–23)
CALCIUM SERPL-MCNC: 9.5 MG/DL (ref 8.7–10.5)
CHLORIDE SERPL-SCNC: 110 MMOL/L (ref 95–110)
CO2 SERPL-SCNC: 23 MMOL/L (ref 23–29)
CREAT SERPL-MCNC: 2.2 MG/DL (ref 0.5–1.4)
EST. GFR  (AFRICAN AMERICAN): 32 ML/MIN/1.73 M^2
EST. GFR  (NON AFRICAN AMERICAN): 27.7 ML/MIN/1.73 M^2
ESTIMATED AVG GLUCOSE: 148 MG/DL (ref 68–131)
GLUCOSE SERPL-MCNC: 173 MG/DL (ref 70–110)
HBA1C MFR BLD: 6.8 % (ref 4.5–6.2)
POTASSIUM SERPL-SCNC: 4.5 MMOL/L (ref 3.5–5.1)
SODIUM SERPL-SCNC: 142 MMOL/L (ref 136–145)

## 2021-09-09 PROCEDURE — 36415 COLL VENOUS BLD VENIPUNCTURE: CPT | Performed by: INTERNAL MEDICINE

## 2021-09-09 PROCEDURE — 80048 BASIC METABOLIC PNL TOTAL CA: CPT | Performed by: INTERNAL MEDICINE

## 2021-09-09 PROCEDURE — 83036 HEMOGLOBIN GLYCOSYLATED A1C: CPT | Performed by: INTERNAL MEDICINE

## 2021-09-16 ENCOUNTER — OFFICE VISIT (OUTPATIENT)
Dept: FAMILY MEDICINE | Facility: CLINIC | Age: 76
End: 2021-09-16
Payer: MEDICARE

## 2021-09-16 ENCOUNTER — PATIENT MESSAGE (OUTPATIENT)
Dept: FAMILY MEDICINE | Facility: CLINIC | Age: 76
End: 2021-09-16

## 2021-09-16 VITALS
DIASTOLIC BLOOD PRESSURE: 69 MMHG | WEIGHT: 174 LBS | SYSTOLIC BLOOD PRESSURE: 135 MMHG | BODY MASS INDEX: 24.36 KG/M2 | HEART RATE: 74 BPM | HEIGHT: 71 IN

## 2021-09-16 DIAGNOSIS — J98.6 ELEVATED HEMIDIAPHRAGM: ICD-10-CM

## 2021-09-16 DIAGNOSIS — C64.1 RENAL CELL ADENOCARCINOMA, RIGHT: ICD-10-CM

## 2021-09-16 DIAGNOSIS — D50.0 IRON DEFICIENCY ANEMIA DUE TO CHRONIC BLOOD LOSS: ICD-10-CM

## 2021-09-16 DIAGNOSIS — N18.32 STAGE 3B CHRONIC KIDNEY DISEASE: ICD-10-CM

## 2021-09-16 DIAGNOSIS — K80.20 CHOLELITHIASIS WITHOUT CHOLECYSTITIS: ICD-10-CM

## 2021-09-16 DIAGNOSIS — E11.649 TYPE 2 DIABETES MELLITUS WITH HYPOGLYCEMIA WITHOUT COMA, WITH LONG-TERM CURRENT USE OF INSULIN: Primary | ICD-10-CM

## 2021-09-16 DIAGNOSIS — Z23 NEED FOR INFLUENZA VACCINATION: ICD-10-CM

## 2021-09-16 DIAGNOSIS — Z79.4 TYPE 2 DIABETES MELLITUS WITH HYPOGLYCEMIA WITHOUT COMA, WITH LONG-TERM CURRENT USE OF INSULIN: Primary | ICD-10-CM

## 2021-09-16 PROCEDURE — 99213 OFFICE O/P EST LOW 20 MIN: CPT | Mod: 25 | Performed by: INTERNAL MEDICINE

## 2021-09-16 PROCEDURE — G0008 ADMIN INFLUENZA VIRUS VAC: HCPCS | Mod: PBBFAC | Performed by: INTERNAL MEDICINE

## 2021-09-16 PROCEDURE — 99214 OFFICE O/P EST MOD 30 MIN: CPT | Mod: S$PBB,,, | Performed by: INTERNAL MEDICINE

## 2021-09-16 PROCEDURE — 99214 PR OFFICE/OUTPT VISIT, EST, LEVL IV, 30-39 MIN: ICD-10-PCS | Mod: S$PBB,,, | Performed by: INTERNAL MEDICINE

## 2021-09-16 PROCEDURE — 90662 IIV NO PRSV INCREASED AG IM: CPT | Mod: PBBFAC | Performed by: INTERNAL MEDICINE

## 2021-09-16 RX ORDER — ZINC GLUCONATE 50 MG
50 TABLET ORAL DAILY
COMMUNITY

## 2021-09-16 RX ORDER — ASCORBIC ACID 500 MG
500 TABLET ORAL DAILY
COMMUNITY

## 2021-10-13 ENCOUNTER — PATIENT MESSAGE (OUTPATIENT)
Dept: FAMILY MEDICINE | Facility: CLINIC | Age: 76
End: 2021-10-13
Payer: MEDICARE

## 2021-10-20 ENCOUNTER — PATIENT MESSAGE (OUTPATIENT)
Dept: FAMILY MEDICINE | Facility: CLINIC | Age: 76
End: 2021-10-20
Payer: MEDICARE

## 2021-10-21 ENCOUNTER — LAB VISIT (OUTPATIENT)
Dept: LAB | Facility: HOSPITAL | Age: 76
End: 2021-10-21
Attending: STUDENT IN AN ORGANIZED HEALTH CARE EDUCATION/TRAINING PROGRAM
Payer: MEDICARE

## 2021-10-21 DIAGNOSIS — N18.9 ANEMIA OF CHRONIC RENAL FAILURE: ICD-10-CM

## 2021-10-21 DIAGNOSIS — I12.9 PARENCHYMAL RENAL HYPERTENSION: ICD-10-CM

## 2021-10-21 DIAGNOSIS — N18.30 CHRONIC KIDNEY DISEASE, STAGE III (MODERATE): Primary | ICD-10-CM

## 2021-10-21 DIAGNOSIS — D63.1 ANEMIA OF CHRONIC RENAL FAILURE: ICD-10-CM

## 2021-10-21 DIAGNOSIS — N39.0 URINARY TRACT INFECTION, SITE NOT SPECIFIED: ICD-10-CM

## 2021-10-21 LAB
ALBUMIN SERPL BCP-MCNC: 3.9 G/DL (ref 3.5–5.2)
ALBUMIN/CREAT UR: 14.6 UG/MG (ref 0–30)
ANION GAP SERPL CALC-SCNC: 12 MMOL/L (ref 8–16)
BACTERIA #/AREA URNS HPF: NEGATIVE /HPF
BASOPHILS # BLD AUTO: 0.02 K/UL (ref 0–0.2)
BASOPHILS NFR BLD: 0.3 % (ref 0–1.9)
BILIRUB UR QL STRIP: NEGATIVE
BUN SERPL-MCNC: 38 MG/DL (ref 8–23)
CALCIUM SERPL-MCNC: 9.8 MG/DL (ref 8.7–10.5)
CHLORIDE SERPL-SCNC: 111 MMOL/L (ref 95–110)
CLARITY UR: CLEAR
CO2 SERPL-SCNC: 22 MMOL/L (ref 23–29)
COLOR UR: YELLOW
CREAT SERPL-MCNC: 1.9 MG/DL (ref 0.5–1.4)
CREAT UR-MCNC: 91 MG/DL (ref 23–375)
CREAT UR-MCNC: 91 MG/DL (ref 23–375)
DIFFERENTIAL METHOD: ABNORMAL
EOSINOPHIL # BLD AUTO: 0.2 K/UL (ref 0–0.5)
EOSINOPHIL NFR BLD: 3.3 % (ref 0–8)
ERYTHROCYTE [DISTWIDTH] IN BLOOD BY AUTOMATED COUNT: 12.9 % (ref 11.5–14.5)
EST. GFR  (AFRICAN AMERICAN): 38.2 ML/MIN/1.73 M^2
EST. GFR  (NON AFRICAN AMERICAN): 33 ML/MIN/1.73 M^2
GLUCOSE SERPL-MCNC: 69 MG/DL (ref 70–110)
GLUCOSE UR QL STRIP: NEGATIVE
HCT VFR BLD AUTO: 35.9 % (ref 40–54)
HGB BLD-MCNC: 12 G/DL (ref 14–18)
HGB UR QL STRIP: NEGATIVE
HYALINE CASTS #/AREA URNS LPF: 0 /LPF
IMM GRANULOCYTES # BLD AUTO: 0.02 K/UL (ref 0–0.04)
IMM GRANULOCYTES NFR BLD AUTO: 0.3 % (ref 0–0.5)
KETONES UR QL STRIP: NEGATIVE
LEUKOCYTE ESTERASE UR QL STRIP: ABNORMAL
LYMPHOCYTES # BLD AUTO: 1 K/UL (ref 1–4.8)
LYMPHOCYTES NFR BLD: 16.8 % (ref 18–48)
MAGNESIUM SERPL-MCNC: 1.8 MG/DL (ref 1.6–2.6)
MCH RBC QN AUTO: 32.3 PG (ref 27–31)
MCHC RBC AUTO-ENTMCNC: 33.4 G/DL (ref 32–36)
MCV RBC AUTO: 97 FL (ref 82–98)
MICROALBUMIN UR DL<=1MG/L-MCNC: 13.3 UG/ML
MICROSCOPIC COMMENT: ABNORMAL
MONOCYTES # BLD AUTO: 0.6 K/UL (ref 0.3–1)
MONOCYTES NFR BLD: 10.7 % (ref 4–15)
NEUTROPHILS # BLD AUTO: 4.1 K/UL (ref 1.8–7.7)
NEUTROPHILS NFR BLD: 68.6 % (ref 38–73)
NITRITE UR QL STRIP: NEGATIVE
NRBC BLD-RTO: 0 /100 WBC
PH UR STRIP: 6 [PH] (ref 5–8)
PHOSPHATE SERPL-MCNC: 3.2 MG/DL (ref 2.7–4.5)
PLATELET # BLD AUTO: 217 K/UL (ref 150–450)
PMV BLD AUTO: 9.8 FL (ref 9.2–12.9)
POTASSIUM SERPL-SCNC: 4.4 MMOL/L (ref 3.5–5.1)
PROT UR QL STRIP: NEGATIVE
PROT UR-MCNC: 15 MG/DL (ref 6–15)
PROT/CREAT UR: 0.16 MG/G{CREAT} (ref 0–0.2)
PTH-INTACT SERPL-MCNC: 63.1 PG/ML (ref 9–77)
RBC # BLD AUTO: 3.71 M/UL (ref 4.6–6.2)
RBC #/AREA URNS HPF: 0 /HPF (ref 0–4)
SODIUM SERPL-SCNC: 145 MMOL/L (ref 136–145)
SP GR UR STRIP: 1.01 (ref 1–1.03)
SQUAMOUS #/AREA URNS HPF: 2 /HPF
URATE SERPL-MCNC: 7.8 MG/DL (ref 3.4–7)
URN SPEC COLLECT METH UR: ABNORMAL
UROBILINOGEN UR STRIP-ACNC: NEGATIVE EU/DL
WBC # BLD AUTO: 6 K/UL (ref 3.9–12.7)
WBC #/AREA URNS HPF: 22 /HPF (ref 0–5)

## 2021-10-21 PROCEDURE — 87086 URINE CULTURE/COLONY COUNT: CPT | Performed by: STUDENT IN AN ORGANIZED HEALTH CARE EDUCATION/TRAINING PROGRAM

## 2021-10-21 PROCEDURE — 84156 ASSAY OF PROTEIN URINE: CPT | Performed by: STUDENT IN AN ORGANIZED HEALTH CARE EDUCATION/TRAINING PROGRAM

## 2021-10-21 PROCEDURE — 81001 URINALYSIS AUTO W/SCOPE: CPT | Performed by: STUDENT IN AN ORGANIZED HEALTH CARE EDUCATION/TRAINING PROGRAM

## 2021-10-21 PROCEDURE — 83735 ASSAY OF MAGNESIUM: CPT | Performed by: STUDENT IN AN ORGANIZED HEALTH CARE EDUCATION/TRAINING PROGRAM

## 2021-10-21 PROCEDURE — 80069 RENAL FUNCTION PANEL: CPT | Performed by: STUDENT IN AN ORGANIZED HEALTH CARE EDUCATION/TRAINING PROGRAM

## 2021-10-21 PROCEDURE — 82570 ASSAY OF URINE CREATININE: CPT | Performed by: STUDENT IN AN ORGANIZED HEALTH CARE EDUCATION/TRAINING PROGRAM

## 2021-10-21 PROCEDURE — 36415 COLL VENOUS BLD VENIPUNCTURE: CPT | Performed by: STUDENT IN AN ORGANIZED HEALTH CARE EDUCATION/TRAINING PROGRAM

## 2021-10-21 PROCEDURE — 83970 ASSAY OF PARATHORMONE: CPT | Performed by: STUDENT IN AN ORGANIZED HEALTH CARE EDUCATION/TRAINING PROGRAM

## 2021-10-21 PROCEDURE — 85025 COMPLETE CBC W/AUTO DIFF WBC: CPT | Performed by: STUDENT IN AN ORGANIZED HEALTH CARE EDUCATION/TRAINING PROGRAM

## 2021-10-21 PROCEDURE — 84550 ASSAY OF BLOOD/URIC ACID: CPT | Performed by: STUDENT IN AN ORGANIZED HEALTH CARE EDUCATION/TRAINING PROGRAM

## 2021-10-24 LAB — BACTERIA UR CULT: NO GROWTH

## 2021-11-05 ENCOUNTER — HOSPITAL ENCOUNTER (OUTPATIENT)
Dept: RADIOLOGY | Facility: HOSPITAL | Age: 76
Discharge: HOME | End: 2021-11-05
Attending: UROLOGY
Payer: MEDICARE

## 2021-11-05 DIAGNOSIS — N28.89 RENAL MASS: ICD-10-CM

## 2021-11-05 PROCEDURE — 71046 X-RAY EXAM CHEST 2 VIEWS: CPT | Mod: TC,PO

## 2021-11-05 PROCEDURE — 76770 US EXAM ABDO BACK WALL COMP: CPT | Mod: TC,PO

## 2021-11-08 ENCOUNTER — PATIENT MESSAGE (OUTPATIENT)
Dept: FAMILY MEDICINE | Facility: CLINIC | Age: 76
End: 2021-11-08
Payer: MEDICARE

## 2021-11-08 ENCOUNTER — OFFICE VISIT (OUTPATIENT)
Dept: UROLOGY | Facility: CLINIC | Age: 76
End: 2021-11-08
Payer: MEDICARE

## 2021-11-08 DIAGNOSIS — N28.89 RENAL MASS: Primary | ICD-10-CM

## 2021-11-08 PROCEDURE — 99999 PR PBB SHADOW E&M-EST. PATIENT-LVL III: ICD-10-PCS | Mod: PBBFAC,,, | Performed by: UROLOGY

## 2021-11-08 PROCEDURE — 99214 PR OFFICE/OUTPT VISIT, EST, LEVL IV, 30-39 MIN: ICD-10-PCS | Mod: S$PBB,,, | Performed by: UROLOGY

## 2021-11-08 PROCEDURE — 99214 OFFICE O/P EST MOD 30 MIN: CPT | Mod: S$PBB,,, | Performed by: UROLOGY

## 2021-11-08 PROCEDURE — 99213 OFFICE O/P EST LOW 20 MIN: CPT | Mod: PBBFAC | Performed by: UROLOGY

## 2021-11-08 PROCEDURE — 99999 PR PBB SHADOW E&M-EST. PATIENT-LVL III: CPT | Mod: PBBFAC,,, | Performed by: UROLOGY

## 2021-11-10 RX ORDER — PEN NEEDLE, DIABETIC 31 GX5/16"
1 NEEDLE, DISPOSABLE MISCELLANEOUS
Qty: 100 EACH | Refills: 3 | OUTPATIENT
Start: 2021-11-10

## 2021-11-10 RX ORDER — PEN NEEDLE, DIABETIC 31 GX5/16"
1 NEEDLE, DISPOSABLE MISCELLANEOUS
Qty: 300 EACH | Refills: 3 | Status: SHIPPED | OUTPATIENT
Start: 2021-11-10 | End: 2022-07-13

## 2021-11-10 RX ORDER — CALCIUM CITRATE/VITAMIN D3 200MG-6.25
TABLET ORAL
OUTPATIENT
Start: 2021-11-10

## 2021-11-10 RX ORDER — CALCIUM CITRATE/VITAMIN D3 200MG-6.25
1 TABLET ORAL
Qty: 300 STRIP | Refills: 1 | Status: SHIPPED | OUTPATIENT
Start: 2021-11-10 | End: 2022-05-12

## 2021-11-10 RX ORDER — PEN NEEDLE, DIABETIC 31 GX5/16"
NEEDLE, DISPOSABLE MISCELLANEOUS
OUTPATIENT
Start: 2021-11-10

## 2021-11-29 ENCOUNTER — PATIENT MESSAGE (OUTPATIENT)
Dept: FAMILY MEDICINE | Facility: CLINIC | Age: 76
End: 2021-11-29
Payer: MEDICARE

## 2021-11-30 ENCOUNTER — TELEPHONE (OUTPATIENT)
Dept: UROLOGY | Facility: CLINIC | Age: 76
End: 2021-11-30
Payer: MEDICARE

## 2021-12-02 ENCOUNTER — TELEPHONE (OUTPATIENT)
Dept: UROLOGY | Facility: CLINIC | Age: 76
End: 2021-12-02
Payer: MEDICARE

## 2021-12-21 ENCOUNTER — LAB VISIT (OUTPATIENT)
Dept: LAB | Facility: HOSPITAL | Age: 76
End: 2021-12-21
Attending: STUDENT IN AN ORGANIZED HEALTH CARE EDUCATION/TRAINING PROGRAM
Payer: MEDICARE

## 2021-12-21 DIAGNOSIS — R80.9 PROTEINURIA: Primary | ICD-10-CM

## 2021-12-21 DIAGNOSIS — N17.9 ACUTE KIDNEY FAILURE, UNSPECIFIED: ICD-10-CM

## 2021-12-21 DIAGNOSIS — I12.9 PARENCHYMAL RENAL HYPERTENSION: ICD-10-CM

## 2021-12-21 DIAGNOSIS — E11.22 TYPE 2 DIABETES MELLITUS WITH END-STAGE RENAL DISEASE: ICD-10-CM

## 2021-12-21 DIAGNOSIS — N18.6 TYPE 2 DIABETES MELLITUS WITH END-STAGE RENAL DISEASE: ICD-10-CM

## 2021-12-21 LAB
ALBUMIN SERPL BCP-MCNC: 3.5 G/DL (ref 3.5–5.2)
ALBUMIN/CREAT UR: 14.5 UG/MG (ref 0–30)
ANION GAP SERPL CALC-SCNC: 10 MMOL/L (ref 8–16)
BUN SERPL-MCNC: 31 MG/DL (ref 8–23)
CALCIUM SERPL-MCNC: 9.1 MG/DL (ref 8.7–10.5)
CHLORIDE SERPL-SCNC: 107 MMOL/L (ref 95–110)
CHOLEST SERPL-MCNC: 160 MG/DL (ref 120–199)
CHOLEST/HDLC SERPL: 4.3 {RATIO} (ref 2–5)
CO2 SERPL-SCNC: 23 MMOL/L (ref 23–29)
CREAT SERPL-MCNC: 1.9 MG/DL (ref 0.5–1.4)
CREAT UR-MCNC: 71 MG/DL (ref 23–375)
EST. GFR  (AFRICAN AMERICAN): 38.2 ML/MIN/1.73 M^2
EST. GFR  (NON AFRICAN AMERICAN): 33 ML/MIN/1.73 M^2
GLUCOSE SERPL-MCNC: 97 MG/DL (ref 70–110)
HDLC SERPL-MCNC: 37 MG/DL (ref 40–75)
HDLC SERPL: 23.1 % (ref 20–50)
LDLC SERPL CALC-MCNC: 112.4 MG/DL (ref 63–159)
MICROALBUMIN UR DL<=1MG/L-MCNC: 10.3 UG/ML
NONHDLC SERPL-MCNC: 123 MG/DL
PHOSPHATE SERPL-MCNC: 3.7 MG/DL (ref 2.7–4.5)
POTASSIUM SERPL-SCNC: 4.7 MMOL/L (ref 3.5–5.1)
SODIUM SERPL-SCNC: 140 MMOL/L (ref 136–145)
TRIGL SERPL-MCNC: 53 MG/DL (ref 30–150)

## 2021-12-21 PROCEDURE — 80061 LIPID PANEL: CPT | Performed by: STUDENT IN AN ORGANIZED HEALTH CARE EDUCATION/TRAINING PROGRAM

## 2021-12-21 PROCEDURE — 36415 COLL VENOUS BLD VENIPUNCTURE: CPT | Performed by: STUDENT IN AN ORGANIZED HEALTH CARE EDUCATION/TRAINING PROGRAM

## 2021-12-21 PROCEDURE — 82570 ASSAY OF URINE CREATININE: CPT | Performed by: STUDENT IN AN ORGANIZED HEALTH CARE EDUCATION/TRAINING PROGRAM

## 2021-12-21 PROCEDURE — 83036 HEMOGLOBIN GLYCOSYLATED A1C: CPT | Performed by: STUDENT IN AN ORGANIZED HEALTH CARE EDUCATION/TRAINING PROGRAM

## 2021-12-21 PROCEDURE — 80069 RENAL FUNCTION PANEL: CPT | Performed by: STUDENT IN AN ORGANIZED HEALTH CARE EDUCATION/TRAINING PROGRAM

## 2021-12-22 ENCOUNTER — OFFICE VISIT (OUTPATIENT)
Dept: FAMILY MEDICINE | Facility: CLINIC | Age: 76
End: 2021-12-22
Payer: MEDICARE

## 2021-12-22 VITALS
WEIGHT: 177 LBS | HEIGHT: 71 IN | BODY MASS INDEX: 24.78 KG/M2 | HEART RATE: 64 BPM | SYSTOLIC BLOOD PRESSURE: 130 MMHG | DIASTOLIC BLOOD PRESSURE: 60 MMHG

## 2021-12-22 DIAGNOSIS — C64.1 RENAL CELL ADENOCARCINOMA, RIGHT: ICD-10-CM

## 2021-12-22 DIAGNOSIS — N18.32 STAGE 3B CHRONIC KIDNEY DISEASE: ICD-10-CM

## 2021-12-22 DIAGNOSIS — Z79.4 TYPE 2 DIABETES MELLITUS WITH HYPOGLYCEMIA WITHOUT COMA, WITH LONG-TERM CURRENT USE OF INSULIN: Primary | ICD-10-CM

## 2021-12-22 DIAGNOSIS — E11.649 TYPE 2 DIABETES MELLITUS WITH HYPOGLYCEMIA WITHOUT COMA, WITH LONG-TERM CURRENT USE OF INSULIN: Primary | ICD-10-CM

## 2021-12-22 DIAGNOSIS — Z12.5 SCREENING FOR PROSTATE CANCER: ICD-10-CM

## 2021-12-22 DIAGNOSIS — E78.2 MIXED DYSLIPIDEMIA: ICD-10-CM

## 2021-12-22 DIAGNOSIS — I10 ESSENTIAL HYPERTENSION: ICD-10-CM

## 2021-12-22 LAB
ESTIMATED AVG GLUCOSE: 126 MG/DL (ref 68–131)
HBA1C MFR BLD: 6 % (ref 4.5–6.2)

## 2021-12-22 PROCEDURE — 99214 OFFICE O/P EST MOD 30 MIN: CPT | Mod: S$PBB,,, | Performed by: INTERNAL MEDICINE

## 2021-12-22 PROCEDURE — 99213 OFFICE O/P EST LOW 20 MIN: CPT | Performed by: INTERNAL MEDICINE

## 2021-12-22 PROCEDURE — 99214 PR OFFICE/OUTPT VISIT, EST, LEVL IV, 30-39 MIN: ICD-10-PCS | Mod: S$PBB,,, | Performed by: INTERNAL MEDICINE

## 2021-12-22 RX ORDER — INSULIN DEGLUDEC 100 U/ML
13 INJECTION, SOLUTION SUBCUTANEOUS NIGHTLY
Qty: 5 PEN | Refills: 1
Start: 2021-12-22 | End: 2022-07-13 | Stop reason: SDUPTHER

## 2021-12-22 RX ORDER — ROSUVASTATIN CALCIUM 5 MG/1
5 TABLET, COATED ORAL NIGHTLY
Qty: 90 TABLET | Refills: 3 | Status: SHIPPED | OUTPATIENT
Start: 2021-12-22 | End: 2022-07-13

## 2022-01-19 ENCOUNTER — OFFICE VISIT (OUTPATIENT)
Dept: UROLOGY | Facility: CLINIC | Age: 77
End: 2022-01-19
Payer: MEDICARE

## 2022-01-19 VITALS
BODY MASS INDEX: 25.03 KG/M2 | DIASTOLIC BLOOD PRESSURE: 73 MMHG | HEIGHT: 71 IN | SYSTOLIC BLOOD PRESSURE: 152 MMHG | WEIGHT: 178.81 LBS | HEART RATE: 79 BPM

## 2022-01-19 DIAGNOSIS — N18.32 STAGE 3B CHRONIC KIDNEY DISEASE: ICD-10-CM

## 2022-01-19 DIAGNOSIS — N28.89 RENAL MASS: Primary | ICD-10-CM

## 2022-01-19 PROCEDURE — 99999 PR PBB SHADOW E&M-EST. PATIENT-LVL III: CPT | Mod: PBBFAC,,, | Performed by: UROLOGY

## 2022-01-19 PROCEDURE — 99999 PR PBB SHADOW E&M-EST. PATIENT-LVL III: ICD-10-PCS | Mod: PBBFAC,,, | Performed by: UROLOGY

## 2022-01-19 PROCEDURE — 99213 OFFICE O/P EST LOW 20 MIN: CPT | Mod: PBBFAC | Performed by: UROLOGY

## 2022-01-19 PROCEDURE — 99215 PR OFFICE/OUTPT VISIT, EST, LEVL V, 40-54 MIN: ICD-10-PCS | Mod: S$PBB,,, | Performed by: UROLOGY

## 2022-01-19 PROCEDURE — 99215 OFFICE O/P EST HI 40 MIN: CPT | Mod: S$PBB,,, | Performed by: UROLOGY

## 2022-01-19 RX ORDER — LOSARTAN POTASSIUM 50 MG/1
50 TABLET ORAL DAILY
COMMUNITY
Start: 2021-12-22 | End: 2022-05-24 | Stop reason: SDUPTHER

## 2022-01-19 NOTE — PROGRESS NOTES
CHIEF COMPLAINT:     Mr. Cadet is a 76 y.o. male presenting for follow up small renal mass.    PRESENTING ILLNESS:    Jamil Cadet is a 76 y.o. male with a PMH of mid/distal urteral stricture s/p robotic ureteroneocystotomy with psoas hitch and lysis of adhesions. 3 mo post op ultrasound showed resolution of right sided hydronephrosis but an incidental left lower pole solid renal mass was discovered ~2.3 cm. He is here for follow up of this now 6 months after his last US. He has a history of a right partial nephrectomy from years passed which showed: 5.1cm, FG 2, ccRCC with negative margins.     US from 11/5/21 reviewed today: showed stable 2.3 cm left lower pole solid mass.     REVIEW OF SYSTEMS:    Review of Systems   Constitutional: Negative for chills and fever.   Respiratory: Negative for shortness of breath.    Cardiovascular: Negative for chest pain.   Gastrointestinal: Negative for abdominal pain, constipation and diarrhea.   Genitourinary: Negative for dysuria, flank pain, frequency, hematuria and urgency.   Neurological: Negative for weakness.       PATIENT HISTORY:    Past Medical History:   Diagnosis Date    Acute renal failure 9/7/2020    Bacteremia due to Enterococcus 10/25/2020    Cancer     Diabetes mellitus, type 2     Disorder of kidney and ureter     Encounter for blood transfusion     History of renal cell carcinoma status post partial nephrectomy 9/7/2020    Hydronephrosis 9/9/2020    Hyperlipidemia     Hypertension     Infection and inflammatory reaction due to nephrostomy catheter, initial encounter 11/14/2020    Infection following a procedure, superficial incisional surgical site, subsequent encounter 12/26/2020    Polio     Pyelonephritis of right kidney     Septicemia due to enterococcus 11/15/2020       Family History   Problem Relation Age of Onset    Heart disease Mother     Heart disease Father     Stroke Father     Heart disease Maternal Grandfather     Heart  "disease Paternal Grandmother     Heart disease Paternal Grandfather        Allergies:  Patient has no known allergies.    Medications:    Current Outpatient Medications:     acetaminophen (TYLENOL) 325 MG tablet, Take 325 mg by mouth every 6 (six) hours as needed for Pain. Okay to take as needed, Disp: , Rfl:     ascorbic acid, vitamin C, (VITAMIN C) 500 MG tablet, Take 500 mg by mouth once daily., Disp: , Rfl:     BD ULTRA-FINE SHORT PEN NEEDLE 31 gauge x 5/16" Ndle, Inject 1 Units into the skin 3 (three) times daily before meals. use as directed Non-med item, Disp: 300 each, Rfl: 3    insulin aspart U-100 (NOVOLOG FLEXPEN U-100 INSULIN) 100 unit/mL (3 mL) InPn pen, Inject 10 Units into the skin 3 (three) times daily with meals. Check glucose before meals and then take insulin as per sliding scale, Disp: 5 Syringe, Rfl: 2    insulin degludec (TRESIBA FLEXTOUCH U-100) 100 unit/mL (3 mL) insulin pen, Inject 13 Units into the skin every evening., Disp: 5 pen, Rfl: 1    losartan (COZAAR) 50 MG tablet, Take 50 mg by mouth once daily., Disp: , Rfl:     multivitamin capsule, Take 1 capsule by mouth once daily., Disp: , Rfl:     rosuvastatin (CRESTOR) 5 MG tablet, Take 1 tablet (5 mg total) by mouth every evening., Disp: 90 tablet, Rfl: 3    traZODone (DESYREL) 50 MG tablet, Take 1 tablet (50 mg total) by mouth nightly as needed for Insomnia., Disp: 90 tablet, Rfl: 2    TRUE METRIX GLUCOSE TEST STRIP Strp, 1 strip by Subdermal route 3 (three) times daily before meals. Non-med item, Disp: 300 strip, Rfl: 1    zinc gluconate 50 mg tablet, Take 50 mg by mouth once daily., Disp: , Rfl:     blood-glucose meter (TRUE METRIX GLUCOSE METER) Misc, 1 Device by Misc.(Non-Drug; Combo Route) route once daily. (Patient taking differently: 1 Device by Misc.(Non-Drug; Combo Route) route once daily. Non-med item), Disp: 1 each, Rfl: 0    Current Facility-Administered Medications:     ciprofloxacin HCl tablet 500 mg, 500 mg, " Oral, 1 time in Clinic/HOD, Dave Shin MD    PHYSICAL EXAMINATION:      Physical Exam  Constitutional:       General: He is not in acute distress.     Appearance: He is well-developed.   HENT:      Head: Normocephalic and atraumatic.   Eyes:      General: No scleral icterus.  Neck:      Trachea: No tracheal deviation.   Pulmonary:      Effort: Pulmonary effort is normal. No respiratory distress.   Neurological:      Mental Status: He is alert and oriented to person, place, and time.   Psychiatric:         Behavior: Behavior normal.         Thought Content: Thought content normal.         Judgment: Judgment normal.     : small left epididymal cyst, no testicular lesions bilaterally    LABS:    Lab Results   Component Value Date    PSA 0.28 09/10/2019    PSADIAG 0.34 12/08/2020       IMPRESSION:  No diagnosis found.    PLAN:  Problem List Items Addressed This Visit    None         - Long talk about renal mass and it's management.  Reviewed images.Discussed options including active surveillance, biopsy, minimally invasive techniques including HFRA, cryo. Discussed open and laparascopic surgical approaches. Discussed partial and radical nephrectomy. Discussed surgery preparation, surgery, recuperation, recovery, exercise restrictions. Discussed risks, benefits, and complications. Answered questions and addressed their concerns.  -I have explained the risk, benefits, and alternatives of the procedure in detail.  The risks including but not limited to bleeding, pseudoaneurysm, AVM, injury to adjacent structures including the spleen, liver, lung, pancreas, colon and intestines, blood vessels in the abdomen including the renal artery or renal vein, ureter, loss of kidney, urinoma or urinary fistula, or need for conversion to open or radical nephrectomy were explained to the patient in depth. The patient voices understanding and all questions have been answered. The patient agrees to proceed as planned with a left  robotic retroperitoneal partial nephrectomy.    -discussed risk of progression of CKD.  Discussed possible open/cold ischemia.  Will plan for robotic approach given relative lack of complexity of the small renal mass.   Dave Shin MD

## 2022-01-31 ENCOUNTER — HOSPITAL ENCOUNTER (OUTPATIENT)
Dept: RADIOLOGY | Facility: HOSPITAL | Age: 77
Discharge: HOME | End: 2022-01-31
Attending: UROLOGY
Payer: MEDICARE

## 2022-01-31 DIAGNOSIS — N28.89 RENAL MASS: ICD-10-CM

## 2022-01-31 PROCEDURE — 74181 MRI ABDOMEN W/O CONTRAST: CPT | Mod: TC,PO

## 2022-02-02 ENCOUNTER — LAB VISIT (OUTPATIENT)
Dept: LAB | Facility: HOSPITAL | Age: 77
End: 2022-02-02
Attending: STUDENT IN AN ORGANIZED HEALTH CARE EDUCATION/TRAINING PROGRAM
Payer: MEDICARE

## 2022-02-02 DIAGNOSIS — N18.32 CHRONIC KIDNEY DISEASE (CKD) STAGE G3B/A1, MODERATELY DECREASED GLOMERULAR FILTRATION RATE (GFR) BETWEEN 30-44 ML/MIN/1.73 SQUARE METER AND ALBUMINURIA CREATININE RATIO LESS THAN 30 MG/G: Primary | ICD-10-CM

## 2022-02-02 DIAGNOSIS — E78.2 MIXED DYSLIPIDEMIA: ICD-10-CM

## 2022-02-02 DIAGNOSIS — Z79.4 TYPE 2 DIABETES MELLITUS WITH HYPOGLYCEMIA WITHOUT COMA, WITH LONG-TERM CURRENT USE OF INSULIN: ICD-10-CM

## 2022-02-02 DIAGNOSIS — Z12.5 SCREENING FOR PROSTATE CANCER: ICD-10-CM

## 2022-02-02 DIAGNOSIS — E11.649 TYPE 2 DIABETES MELLITUS WITH HYPOGLYCEMIA WITHOUT COMA, WITH LONG-TERM CURRENT USE OF INSULIN: ICD-10-CM

## 2022-02-02 DIAGNOSIS — N18.32 STAGE 3B CHRONIC KIDNEY DISEASE: ICD-10-CM

## 2022-02-02 LAB
ALBUMIN SERPL BCP-MCNC: 3.9 G/DL (ref 3.5–5.2)
ALBUMIN SERPL BCP-MCNC: 3.9 G/DL (ref 3.5–5.2)
ALBUMIN/CREAT UR: 12.6 UG/MG (ref 0–30)
ANION GAP SERPL CALC-SCNC: 10 MMOL/L (ref 8–16)
ANION GAP SERPL CALC-SCNC: 10 MMOL/L (ref 8–16)
BUN SERPL-MCNC: 45 MG/DL (ref 8–23)
BUN SERPL-MCNC: 45 MG/DL (ref 8–23)
CALCIUM SERPL-MCNC: 9.1 MG/DL (ref 8.7–10.5)
CALCIUM SERPL-MCNC: 9.1 MG/DL (ref 8.7–10.5)
CHLORIDE SERPL-SCNC: 106 MMOL/L (ref 95–110)
CHLORIDE SERPL-SCNC: 106 MMOL/L (ref 95–110)
CHOLEST SERPL-MCNC: 117 MG/DL (ref 120–199)
CHOLEST/HDLC SERPL: 3.1 {RATIO} (ref 2–5)
CO2 SERPL-SCNC: 23 MMOL/L (ref 23–29)
CO2 SERPL-SCNC: 23 MMOL/L (ref 23–29)
COMPLEXED PSA SERPL-MCNC: 0.37 NG/ML (ref 0–4)
CREAT SERPL-MCNC: 1.8 MG/DL (ref 0.5–1.4)
CREAT SERPL-MCNC: 1.8 MG/DL (ref 0.5–1.4)
CREAT UR-MCNC: 70 MG/DL (ref 23–375)
EST. GFR  (AFRICAN AMERICAN): 40.8 ML/MIN/1.73 M^2
EST. GFR  (AFRICAN AMERICAN): 40.8 ML/MIN/1.73 M^2
EST. GFR  (NON AFRICAN AMERICAN): 35.3 ML/MIN/1.73 M^2
EST. GFR  (NON AFRICAN AMERICAN): 35.3 ML/MIN/1.73 M^2
ESTIMATED AVG GLUCOSE: 146 MG/DL (ref 68–131)
GLUCOSE SERPL-MCNC: 98 MG/DL (ref 70–110)
GLUCOSE SERPL-MCNC: 98 MG/DL (ref 70–110)
HBA1C MFR BLD: 6.7 % (ref 4.5–6.2)
HDLC SERPL-MCNC: 38 MG/DL (ref 40–75)
HDLC SERPL: 32.5 % (ref 20–50)
LDLC SERPL CALC-MCNC: 68.4 MG/DL (ref 63–159)
MICROALBUMIN UR DL<=1MG/L-MCNC: 8.8 UG/ML
NONHDLC SERPL-MCNC: 79 MG/DL
PHOSPHATE SERPL-MCNC: 3.7 MG/DL (ref 2.7–4.5)
PHOSPHATE SERPL-MCNC: 3.7 MG/DL (ref 2.7–4.5)
POTASSIUM SERPL-SCNC: 4.4 MMOL/L (ref 3.5–5.1)
POTASSIUM SERPL-SCNC: 4.4 MMOL/L (ref 3.5–5.1)
SODIUM SERPL-SCNC: 139 MMOL/L (ref 136–145)
SODIUM SERPL-SCNC: 139 MMOL/L (ref 136–145)
TRIGL SERPL-MCNC: 53 MG/DL (ref 30–150)

## 2022-02-02 PROCEDURE — 82570 ASSAY OF URINE CREATININE: CPT | Performed by: STUDENT IN AN ORGANIZED HEALTH CARE EDUCATION/TRAINING PROGRAM

## 2022-02-02 PROCEDURE — 80069 RENAL FUNCTION PANEL: CPT | Performed by: INTERNAL MEDICINE

## 2022-02-02 PROCEDURE — 36415 COLL VENOUS BLD VENIPUNCTURE: CPT | Performed by: INTERNAL MEDICINE

## 2022-02-02 PROCEDURE — 83036 HEMOGLOBIN GLYCOSYLATED A1C: CPT | Performed by: INTERNAL MEDICINE

## 2022-02-02 PROCEDURE — 82043 UR ALBUMIN QUANTITATIVE: CPT | Performed by: STUDENT IN AN ORGANIZED HEALTH CARE EDUCATION/TRAINING PROGRAM

## 2022-02-02 PROCEDURE — 84153 ASSAY OF PSA TOTAL: CPT | Performed by: INTERNAL MEDICINE

## 2022-02-02 PROCEDURE — 80061 LIPID PANEL: CPT | Performed by: INTERNAL MEDICINE

## 2022-02-17 ENCOUNTER — TELEPHONE (OUTPATIENT)
Dept: UROLOGY | Facility: CLINIC | Age: 77
End: 2022-02-17
Payer: MEDICARE

## 2022-02-17 DIAGNOSIS — N28.89 RENAL MASS: Primary | ICD-10-CM

## 2022-03-19 DIAGNOSIS — N28.89 RENAL MASS: Primary | ICD-10-CM

## 2022-03-19 NOTE — PROGRESS NOTES
Spoke to patient who would like to cancel his surgery for 4/21.  He wants to continue active surveillance instead.  He is due for a renal US on 8/1/22 which i've ordered.  He will need that scheduled and f/u visit in the office after.  Ofelia, please cancel this surgery and make sure somebody gets put in its place.      Thank you

## 2022-03-21 ENCOUNTER — TELEPHONE (OUTPATIENT)
Dept: PREADMISSION TESTING | Facility: HOSPITAL | Age: 77
End: 2022-03-21
Payer: MEDICARE

## 2022-03-21 NOTE — TELEPHONE ENCOUNTER
----- Message from Cassy Max RN sent at 3/21/2022  1:35 PM CDT -----  Surgery date: 4/21/2022  PreOp appt: 4/11/2022 [place]    Please call Pt and schedule the following preop appts:    [place] (surgeon request)  POC  Lab  EKG    Thank you!  Cassy

## 2022-03-21 NOTE — PROGRESS NOTES
1024-I spoke to pt now and scheduled pt Herminio for 8/1/2022 US kid at [facility] with Shin f/u appt for Tue 8/9/2022.  Pt uses Portal and expressed his thanks.

## 2022-04-27 NOTE — PROGRESS NOTES
Subjective:       Patient ID: Jamil Cadet is a 77 y.o. male.    Chief Complaint: Diabetes, Hypertension, Hyperlipidemia, Chronic Kidney Disease, and H/O Kidney Cancer    Patient is a 77-year-old gentleman who comes follow-up.  Underlying medical issues are as below:-    1. Essential hypertension   2. Mixed dyslipidemia   3. Type 2 diabetes mellitus with hypoglycemia without coma, with long-term current use of insulin   4. History of renal cell carcinoma status post partial nephrectomy   5. Stage 3b chronic kidney disease   6. Cholelithiasis without cholecystitis       Last hemoglobin A1c was 6.7.    Recent updates include notation by Dr. Dave Shin that patient had like to cancel his surgery and follow the active surveillance bath.  This update is on 03/19/2022 and his surgery was supposed to be on 04/21/2022.    On 01/19/2022 he had a consultation with Dr. Dave Shin concerning incidental left lower pole solid renal mass which was discovered in an ultrasound in the later part of 2021. History of right partial nephrectomy was also noted.  Consideration was given for potential surveillance verses robotic nephrectomy.      In the interim, patient had sought another opinion and he saw Dr. Noe Sultana, Urology at [facility] who recommended him ablation procedure.  He did go through the ablation procedure by Radiology Department at [facility].    He will likely go through some genetic testing also.  It is unclear to me as to whom it will specifically help.    Coming back to the main issue of diabetes, his blood sugars seem to be somewhat slightly out of control.  The morning sugars seem to be all doing mostly good and seems to be in 90s or low 100s.  The evening sugars are somewhat higher in 200s.  Currently he is using a combination of NovoLog rapid acting insulin and Tresiba long-acting insulin.  He fluctuates the doses based upon how his sugars have been overall running.  His last hemoglobin A1c was slightly more  elevated at 6.8 in the month of February.    Please note that medications like metformin are not an option now because of chronic kidney disease and elevated creatinine.    He is taking rosuvastatin for cholesterol.    He takes trazodone to help him rest and sleep at night.    He is also on independent might or Lozol at 1.25 mg.    He is also following up with [facility]    Diabetes  He presents for his follow-up diabetic visit. He has type 2 diabetes mellitus. His disease course has been stable. Pertinent negatives for hypoglycemia include no confusion, headaches, seizures or speech difficulty. There are no diabetic associated symptoms. Pertinent negatives for diabetes include no chest pain, no polydipsia, no polyuria and no weakness.   Hyperlipidemia  This is a chronic (Medication was stopped due to muscle aches.) problem. The current episode started more than 1 year ago. He has no history of obesity. Associated symptoms include myalgias (Stable myalgias.). Pertinent negatives include no chest pain. He is currently on no antihyperlipidemic treatment. The current treatment provides mild improvement of lipids. Compliance problems include psychosocial issues.  Risk factors for coronary artery disease include male sex, a sedentary lifestyle, dyslipidemia and diabetes mellitus.       Past Medical History:   Diagnosis Date    Acute renal failure 9/7/2020    Bacteremia due to Enterococcus 10/25/2020    Cancer     Diabetes mellitus, type 2     Disorder of kidney and ureter     Encounter for blood transfusion     History of renal cell carcinoma status post partial nephrectomy 9/7/2020    Hydronephrosis 9/9/2020    Hyperlipidemia     Hypertension     Infection and inflammatory reaction due to nephrostomy catheter, initial encounter 11/14/2020    Infection following a procedure, superficial incisional surgical site, subsequent encounter 12/26/2020    Polio     Pyelonephritis of  right kidney     Septicemia due to enterococcus 11/15/2020     Social History     Socioeconomic History    Marital status:    Tobacco Use    Smoking status: Never Smoker    Smokeless tobacco: Never Used   Substance and Sexual Activity    Alcohol use: No    Drug use: No    Sexual activity: Yes     Partners: Female     Social Determinants of Health     Physical Activity: Sufficiently Active    Days of Exercise per Week: 5 days    Minutes of Exercise per Session: 30 min   Social Connections: Socially Integrated    Frequency of Communication with Friends and Family: Twice a week    Frequency of Social Gatherings with Friends and Family: Twice a week    Attends [employer] Services: 1 to 4 times per year    Active Member of Clubs or Organizations: Yes    Attends Club or Organization Meetings: Never    Marital Status:    Housing Stability: Low Risk     Unable to Pay for Housing in the Last Year: No    Number of Places Lived in the Last Year: 1    Unstable Housing in the Last Year: No     Past Surgical History:   Procedure Laterality Date    ANTEGRADE PYELOGRAM  12/10/2020    Procedure: PYELOGRAM, ANTEGRADE;  Surgeon: Dave Shin MD;  Location: [address];  Service: Urology;;    APPENDECTOMY      CYSTOSCOPY  12/10/2020    Procedure: CYSTOSCOPY;  Surgeon: Dave Shin MD;  Location: [address];  Service: Urology;;    CYSTOSCOPY W/ RETROGRADES Right 09/09/2020    Procedure: CYSTOSCOPY, WITH RETROGRADE PYELOGRAM;  Surgeon: Chris Garcia Jr., MD;  Location: [facility];  Service: Urology;  Laterality: Right;    DILATION OF NEPHROSTOMY TRACT Right 12/10/2020    Procedure: DILATION, NEPHROSTOMY TRACT;  Surgeon: Dave Shin MD;  Location: [address];  Service: Urology;  Laterality: Right;    RADIOFREQUENCY ABLATION KIDNEY  2022    REMOVAL OF DRAIN Right 12/10/2020    Procedure: REMOVAL, DRAIN-NEPHROSTOMY TUBE;  Surgeon: Dave Shin MD;  Location: [facility]  1ST FLR;  Service: Urology;  Laterality: Right;    REPLACEMENT OF NEPHROSTOMY TUBE Right 12/10/2020    Procedure: REPLACEMENT, NEPHROSTOMY TUBE;  Surgeon: Dave Shin MD;  Location: [facility] OR 1ST FLR;  Service: Urology;  Laterality: Right;    ROBOT-ASSISTED LAPAROSCOPIC REIMPLANTATION OF URETER USING DA PHU XI Right 01/08/2021    Procedure: XI ROBOTIC IMPLANTATION, URETER, USING PSOAS HITCH TECHNIQUE;  Surgeon: Dave Shin MD;  Location: [facility] OR 2ND FLR;  Service: Urology;  Laterality: Right;  4hrs gen with regional    SPINE SURGERY      URETEROSCOPY Right 12/10/2020    Procedure: URETEROSCOPY-ANTEGRADE;  Surgeon: Dave Shin MD;  Location: [facility] OR 1ST FLR;  Service: Urology;  Laterality: Right;    URETHROSCOPY  09/09/2020    Procedure: URETHROSCOPY;  Surgeon: Chris Garcia Jr., MD;  Location[facility];  Service: Urology;;     Family History   Problem Relation Age of Onset    Heart disease Mother     Heart disease Father     Stroke Father     Heart disease Maternal Grandfather     Heart disease Paternal Grandmother     Heart disease Paternal Grandfather        Review of Systems   Constitutional: Negative for activity change, chills, diaphoresis, fever and unexpected weight change.   HENT: Negative for congestion, hearing loss, rhinorrhea and trouble swallowing.    Eyes: Negative for discharge, redness and visual disturbance.   Respiratory: Negative for cough, chest tightness and wheezing.    Cardiovascular: Negative for chest pain, palpitations and leg swelling.        Blood pressures are stable at this point.  Please note that he is not on any blood pressure medication or cholesterol medication given his kidney disease which he should be on.   Gastrointestinal: Negative for abdominal distention, abdominal pain, blood in stool, constipation, diarrhea and vomiting.   Endocrine: Negative for cold intolerance, polydipsia and polyuria.        Type 2 diabetes mellitus currently on  "insulins.   Genitourinary: Negative for difficulty urinating, hematuria and urgency.        History of complicated kidney cyst and percutaneous nephrostomy tubes in past.  Chronic kidney disease with worsening of BUN and creatinine.   Musculoskeletal: Positive for myalgias (Stable myalgias.). Negative for arthralgias, joint swelling and neck pain.        Chronic knee pain and he has been told that the knee pain is bone on bone.  Mostly the right knee.   Neurological: Negative for seizures, speech difficulty, weakness and headaches.   Hematological: Negative for adenopathy. Does not bruise/bleed easily.        History of anemia which seems to be persisting.  Pending blood test again.   Psychiatric/Behavioral: Positive for sleep disturbance. Negative for confusion and dysphoric mood.         Objective:      Blood pressure (!) 141/70, pulse 60, height 5' 11" (1.803 m), weight 82 kg (180 lb 11.2 oz), SpO2 99 %. Body mass index is 25.2 kg/m².  Physical Exam  Vitals and nursing note reviewed.   Constitutional:       General: He is not in acute distress.     Appearance: Normal appearance. He is well-developed. He is not ill-appearing, toxic-appearing or diaphoretic.      Comments: BMI is 25.20   HENT:      Head: Normocephalic and atraumatic.   Eyes:      Conjunctiva/sclera: Conjunctivae normal.   Neck:      Thyroid: No thyromegaly.      Vascular: No JVD.      Trachea: No tracheal deviation.   Cardiovascular:      Rate and Rhythm: Normal rate and regular rhythm.      Heart sounds: Normal heart sounds. No murmur heard.    No friction rub. No gallop.   Pulmonary:      Effort: Pulmonary effort is normal. No respiratory distress.      Breath sounds: Normal breath sounds. No wheezing or rales.   Abdominal:      General: Bowel sounds are normal. There is no distension.      Palpations: Abdomen is soft.      Tenderness: There is no abdominal tenderness.       Musculoskeletal:         General: Normal range of motion.      Cervical " back: Neck supple. No rigidity.        Back:       Right lower leg: No tenderness. No edema.      Left lower leg: No tenderness. No edema.      Right foot: Deformity (Pes cavus) present.      Left foot: Deformity ( pes cavus) present.        Feet:       Comments: History of knee problems.       Feet:      Right foot:      Protective Sensation: 5 sites tested. 4 sites sensed.      Skin integrity: No ulcer, blister, skin breakdown, erythema or warmth.      Left foot:      Protective Sensation: 5 sites tested. 4 sites sensed.      Skin integrity: No ulcer, blister, skin breakdown, erythema or warmth.      Comments: High arch in the foot.?  Effects of poliomyelitis.  Slight claw toes.  Slightly dystrophic nails on the left side.  Lymphadenopathy:      Cervical: No cervical adenopathy.   Skin:     General: Skin is warm and dry.      Findings: No lesion or rash.   Neurological:      Mental Status: He is alert. Mental status is at baseline.      Deep Tendon Reflexes: Reflexes are normal and symmetric.   Psychiatric:         Attention and Perception: He is attentive.         Mood and Affect: Affect is not labile.         Behavior: Behavior normal.      Comments: Patient is fairly attentive and cognizant of my discussions.  Appropriate answers.           Assessment:       1. Essential hypertension    2. Mixed dyslipidemia    3. Type 2 diabetes mellitus with hypoglycemia without coma, with long-term current use of insulin    4. History of renal cell carcinoma status post partial nephrectomy    5. Stage 3b chronic kidney disease    6. Cholelithiasis without cholecystitis           Lab Visit on 02/02/2022   Component Date Value Ref Range Status    Glucose 02/02/2022 98  70 - 110 mg/dL Final    Sodium 02/02/2022 139  136 - 145 mmol/L Final    Potassium 02/02/2022 4.4  3.5 - 5.1 mmol/L Final    Chloride 02/02/2022 106  95 - 110 mmol/L Final    CO2 02/02/2022 23  23 - 29 mmol/L Final    BUN 02/02/2022 45 (A) 8 - 23 mg/dL  Final    Calcium 02/02/2022 9.1  8.7 - 10.5 mg/dL Final    Creatinine 02/02/2022 1.8 (A) 0.5 - 1.4 mg/dL Final    Albumin 02/02/2022 3.9  3.5 - 5.2 g/dL Final    Phosphorus 02/02/2022 3.7  2.7 - 4.5 mg/dL Final    eGFR if  02/02/2022 40.8 (A) >60 mL/min/1.73 m^2 Final    eGFR if non African American 02/02/2022 35.3 (A) >60 mL/min/1.73 m^2 Final    Anion Gap 02/02/2022 10  8 - 16 mmol/L Final    Cholesterol 02/02/2022 117 (A) 120 - 199 mg/dL Final    Triglycerides 02/02/2022 53  30 - 150 mg/dL Final    HDL 02/02/2022 38 (A) 40 - 75 mg/dL Final    LDL Cholesterol 02/02/2022 68.4  63.0 - 159.0 mg/dL Final    HDL/Cholesterol Ratio 02/02/2022 32.5  20.0 - 50.0 % Final    Total Cholesterol/HDL Ratio 02/02/2022 3.1  2.0 - 5.0 Final    Non-HDL Cholesterol 02/02/2022 79  mg/dL Final    Hemoglobin A1C 02/02/2022 6.7 (A) 4.5 - 6.2 % Final    Estimated Avg Glucose 02/02/2022 146 (A) 68 - 131 mg/dL Final    PSA, Screen 02/02/2022 0.37  0.00 - 4.00 ng/mL Final    Glucose 02/02/2022 98  70 - 110 mg/dL Final    Sodium 02/02/2022 139  136 - 145 mmol/L Final    Potassium 02/02/2022 4.4  3.5 - 5.1 mmol/L Final    Chloride 02/02/2022 106  95 - 110 mmol/L Final    CO2 02/02/2022 23  23 - 29 mmol/L Final    BUN 02/02/2022 45 (A) 8 - 23 mg/dL Final    Calcium 02/02/2022 9.1  8.7 - 10.5 mg/dL Final    Creatinine 02/02/2022 1.8 (A) 0.5 - 1.4 mg/dL Final    Albumin 02/02/2022 3.9  3.5 - 5.2 g/dL Final    Phosphorus 02/02/2022 3.7  2.7 - 4.5 mg/dL Final    eGFR if  02/02/2022 40.8 (A) >60 mL/min/1.73 m^2 Final    eGFR if non African American 02/02/2022 35.3 (A) >60 mL/min/1.73 m^2 Final    Anion Gap 02/02/2022 10  8 - 16 mmol/L Final    Microalbumin, Urine 02/02/2022 8.8  <19.9 ug/mL Final    Creatinine, Urine 02/02/2022 70.0  23.0 - 375.0 mg/dL Final    Microalb/Creat Ratio 02/02/2022 12.6  0.0 - 30.0 ug/mg Final         Plan:           Essential hypertension    Mixed  "dyslipidemia    Type 2 diabetes mellitus with hypoglycemia without coma, with long-term current use of insulin  -     Hemoglobin A1C; Future; Expected date: 06/06/2022  -     Microalbumin/Creatinine Ratio, Urine; Future; Expected date: 06/06/2022    History of renal cell carcinoma status post partial nephrectomy    Stage 3b chronic kidney disease  -     Renal Function Panel; Future; Expected date: 06/06/2022  -     Magnesium; Future; Expected date: 06/06/2022    Cholelithiasis without cholecystitis    Patient's medical conditions and updates have been reviewed.    Control of diabetes is somewhat less than stellar.    Will check hemoglobin A1c in a couple of months time.  He is on a combination of long-acting and short-acting insulin.    In future we may consider addition of medications like Kerendia or possibly Jardiance.  Will it for the next hemoglobin A1c.    In the meantime he keeps his appointment with Urology and Nephrology also.    I have advised him to get the eye checkup also done in the next 3 months.    He should be due for the booster dose of pneumococcal 23 vaccine assuming he got the Prevnar 13.    Blood pressure control is uncertain at this point.      Follow-up in 3 months.    Spent ezra 30 minutes with patient which involved review of pts medical conditions, labs, medications and with 50% of time face-to-face discussion about medical problems, management and any applicable changes.        Current Outpatient Medications:     ascorbic acid, vitamin C, (VITAMIN C) 500 MG tablet, Take 500 mg by mouth once daily., Disp: , Rfl:     BD ULTRA-FINE SHORT PEN NEEDLE 31 gauge x 5/16" Ndle, Inject 1 Units into the skin 3 (three) times daily before meals. use as directed Non-med item, Disp: 300 each, Rfl: 3    blood-glucose meter (TRUE METRIX GLUCOSE METER) Misc, 1 Device by Misc.(Non-Drug; Combo Route) route once daily. (Patient taking differently: 1 Device by Misc.(Non-Drug; Combo Route) route once daily. " Non-med item), Disp: 1 each, Rfl: 0    indapamide (LOZOL) 1.25 MG Tab, Take 1.25 mg by mouth once daily., Disp: , Rfl:     insulin aspart U-100 (NOVOLOG FLEXPEN U-100 INSULIN) 100 unit/mL (3 mL) InPn pen, Inject 10 Units into the skin 3 (three) times daily with meals. Check glucose before meals and then take insulin as per sliding scale, Disp: 5 Syringe, Rfl: 2    insulin degludec (TRESIBA FLEXTOUCH U-100) 100 unit/mL (3 mL) insulin pen, Inject 13 Units into the skin every evening., Disp: 5 pen, Rfl: 1    losartan (COZAAR) 50 MG tablet, Take 50 mg by mouth once daily., Disp: , Rfl:     multivitamin capsule, Take 1 capsule by mouth once daily., Disp: , Rfl:     rosuvastatin (CRESTOR) 5 MG tablet, Take 1 tablet (5 mg total) by mouth every evening., Disp: 90 tablet, Rfl: 3    traZODone (DESYREL) 50 MG tablet, Take 1 tablet (50 mg total) by mouth nightly as needed for Insomnia., Disp: 90 tablet, Rfl: 2    TRUE METRIX GLUCOSE TEST STRIP Strp, 1 strip by Subdermal route 3 (three) times daily before meals. Non-med item, Disp: 300 strip, Rfl: 1    zinc gluconate 50 mg tablet, Take 50 mg by mouth once daily., Disp: , Rfl:     acetaminophen (TYLENOL) 325 MG tablet, Take 325 mg by mouth every 6 (six) hours as needed for Pain. Okay to take as needed, Disp: , Rfl:     Current Facility-Administered Medications:     ciprofloxacin HCl tablet 500 mg, 500 mg, Oral, 1 time in Clinic/HOD, Dave Shin MD

## 2022-04-28 ENCOUNTER — OFFICE VISIT (OUTPATIENT)
Dept: FAMILY MEDICINE | Facility: CLINIC | Age: 77
End: 2022-04-28
Payer: MEDICARE

## 2022-04-28 ENCOUNTER — PATIENT MESSAGE (OUTPATIENT)
Dept: FAMILY MEDICINE | Facility: CLINIC | Age: 77
End: 2022-04-28

## 2022-04-28 VITALS
WEIGHT: 180.69 LBS | OXYGEN SATURATION: 99 % | HEART RATE: 60 BPM | BODY MASS INDEX: 25.3 KG/M2 | DIASTOLIC BLOOD PRESSURE: 70 MMHG | SYSTOLIC BLOOD PRESSURE: 141 MMHG | HEIGHT: 71 IN

## 2022-04-28 DIAGNOSIS — E78.2 MIXED DYSLIPIDEMIA: ICD-10-CM

## 2022-04-28 DIAGNOSIS — I10 ESSENTIAL HYPERTENSION: Primary | ICD-10-CM

## 2022-04-28 DIAGNOSIS — C64.1 RENAL CELL ADENOCARCINOMA, RIGHT: ICD-10-CM

## 2022-04-28 DIAGNOSIS — Z79.4 TYPE 2 DIABETES MELLITUS WITH HYPOGLYCEMIA WITHOUT COMA, WITH LONG-TERM CURRENT USE OF INSULIN: ICD-10-CM

## 2022-04-28 DIAGNOSIS — N18.32 STAGE 3B CHRONIC KIDNEY DISEASE: ICD-10-CM

## 2022-04-28 DIAGNOSIS — K80.20 CHOLELITHIASIS WITHOUT CHOLECYSTITIS: ICD-10-CM

## 2022-04-28 DIAGNOSIS — E11.649 TYPE 2 DIABETES MELLITUS WITH HYPOGLYCEMIA WITHOUT COMA, WITH LONG-TERM CURRENT USE OF INSULIN: ICD-10-CM

## 2022-04-28 PROCEDURE — 99214 PR OFFICE/OUTPT VISIT, EST, LEVL IV, 30-39 MIN: ICD-10-PCS | Mod: S$PBB,AQ,, | Performed by: INTERNAL MEDICINE

## 2022-04-28 PROCEDURE — 99214 OFFICE O/P EST MOD 30 MIN: CPT | Mod: S$PBB,AQ,, | Performed by: INTERNAL MEDICINE

## 2022-04-28 PROCEDURE — 99214 OFFICE O/P EST MOD 30 MIN: CPT | Performed by: INTERNAL MEDICINE

## 2022-04-28 RX ORDER — INDAPAMIDE 1.25 MG/1
1.25 TABLET ORAL DAILY
COMMUNITY
Start: 2022-02-07

## 2022-05-08 ENCOUNTER — PATIENT MESSAGE (OUTPATIENT)
Dept: FAMILY MEDICINE | Facility: CLINIC | Age: 77
End: 2022-05-08

## 2022-05-21 ENCOUNTER — PATIENT MESSAGE (OUTPATIENT)
Dept: FAMILY MEDICINE | Facility: CLINIC | Age: 77
End: 2022-05-21

## 2022-05-21 DIAGNOSIS — I10 ESSENTIAL HYPERTENSION: Primary | ICD-10-CM

## 2022-05-24 ENCOUNTER — PATIENT MESSAGE (OUTPATIENT)
Dept: FAMILY MEDICINE | Facility: CLINIC | Age: 77
End: 2022-05-24

## 2022-05-24 RX ORDER — LOSARTAN POTASSIUM 50 MG/1
50 TABLET ORAL DAILY
Qty: 90 TABLET | Refills: 3 | Status: SHIPPED | OUTPATIENT
Start: 2022-05-24 | End: 2022-07-13

## 2022-05-24 NOTE — TELEPHONE ENCOUNTER
Apparently ran out of prescription and request a new refill for losartan which has been sent to his pharmacy.  His blood pressures were elevated and probably this explains why his blood pressures were elevated.    Essential hypertension  -     losartan (COZAAR) 50 MG tablet; Take 1 tablet (50 mg total) by mouth once daily.  Dispense: 90 tablet; Refill: 3

## 2022-06-06 ENCOUNTER — PATIENT MESSAGE (OUTPATIENT)
Dept: FAMILY MEDICINE | Facility: CLINIC | Age: 77
End: 2022-06-06

## 2022-06-21 DIAGNOSIS — C64.2 CANCER OF LEFT KIDNEY: Primary | ICD-10-CM

## 2022-06-24 ENCOUNTER — LAB VISIT (OUTPATIENT)
Dept: LAB | Facility: HOSPITAL | Age: 77
End: 2022-06-24
Attending: INTERNAL MEDICINE
Payer: MEDICARE

## 2022-06-24 DIAGNOSIS — R80.9 PROTEINURIA: ICD-10-CM

## 2022-06-24 DIAGNOSIS — N18.32 CHRONIC KIDNEY DISEASE (CKD) STAGE G3B/A1, MODERATELY DECREASED GLOMERULAR FILTRATION RATE (GFR) BETWEEN 30-44 ML/MIN/1.73 SQUARE METER AND ALBUMINURIA CREATININE RATIO LESS THAN 30 MG/G: Primary | ICD-10-CM

## 2022-06-24 DIAGNOSIS — I10 ESSENTIAL HYPERTENSION, MALIGNANT: ICD-10-CM

## 2022-06-24 DIAGNOSIS — D63.1 ANEMIA OF CHRONIC RENAL FAILURE: ICD-10-CM

## 2022-06-24 DIAGNOSIS — E11.9 DIABETES MELLITUS WITHOUT COMPLICATION: ICD-10-CM

## 2022-06-24 DIAGNOSIS — N18.9 ANEMIA OF CHRONIC RENAL FAILURE: ICD-10-CM

## 2022-06-24 DIAGNOSIS — N28.1 ACQUIRED CYST OF KIDNEY: ICD-10-CM

## 2022-06-24 DIAGNOSIS — E78.5 HYPERLIPEMIA: ICD-10-CM

## 2022-06-24 DIAGNOSIS — C64.9 ADENOCARCINOMA, RENAL CELL: ICD-10-CM

## 2022-06-24 LAB
25(OH)D3+25(OH)D2 SERPL-MCNC: 45 NG/ML (ref 30–96)
ALBUMIN SERPL BCP-MCNC: 4.3 G/DL (ref 3.5–5.2)
ALBUMIN/CREAT UR: 11 UG/MG (ref 0–30)
ANION GAP SERPL CALC-SCNC: 9 MMOL/L (ref 8–16)
BACTERIA #/AREA URNS HPF: NEGATIVE /HPF
BASOPHILS # BLD AUTO: 0.02 K/UL (ref 0–0.2)
BASOPHILS NFR BLD: 0.3 % (ref 0–1.9)
BUN SERPL-MCNC: 32 MG/DL (ref 8–23)
CALCIUM SERPL-MCNC: 9 MG/DL (ref 8.7–10.5)
CHLORIDE SERPL-SCNC: 110 MMOL/L (ref 95–110)
CO2 SERPL-SCNC: 20 MMOL/L (ref 23–29)
CREAT SERPL-MCNC: 1.8 MG/DL (ref 0.5–1.4)
CREAT UR-MCNC: 93 MG/DL (ref 23–375)
DIFFERENTIAL METHOD: ABNORMAL
EOSINOPHIL # BLD AUTO: 0.2 K/UL (ref 0–0.5)
EOSINOPHIL NFR BLD: 2.7 % (ref 0–8)
ERYTHROCYTE [DISTWIDTH] IN BLOOD BY AUTOMATED COUNT: 13 % (ref 11.5–14.5)
EST. GFR  (AFRICAN AMERICAN): 40.5 ML/MIN/1.73 M^2
EST. GFR  (NON AFRICAN AMERICAN): 35 ML/MIN/1.73 M^2
FOLATE SERPL-MCNC: 13.4 NG/ML (ref 4–24)
GLUCOSE SERPL-MCNC: 145 MG/DL (ref 70–110)
HCT VFR BLD AUTO: 37.9 % (ref 40–54)
HGB BLD-MCNC: 12.6 G/DL (ref 14–18)
HYALINE CASTS #/AREA URNS LPF: 3 /LPF
IMM GRANULOCYTES # BLD AUTO: 0.03 K/UL (ref 0–0.04)
IMM GRANULOCYTES NFR BLD AUTO: 0.5 % (ref 0–0.5)
IRON SERPL-MCNC: 96 UG/DL (ref 45–160)
LYMPHOCYTES # BLD AUTO: 1 K/UL (ref 1–4.8)
LYMPHOCYTES NFR BLD: 16.2 % (ref 18–48)
MAGNESIUM SERPL-MCNC: 1.8 MG/DL (ref 1.6–2.6)
MCH RBC QN AUTO: 31.3 PG (ref 27–31)
MCHC RBC AUTO-ENTMCNC: 33.2 G/DL (ref 32–36)
MCV RBC AUTO: 94 FL (ref 82–98)
MICROALBUMIN UR DL<=1MG/L-MCNC: 10.2 UG/ML
MICROSCOPIC COMMENT: ABNORMAL
MONOCYTES # BLD AUTO: 0.5 K/UL (ref 0.3–1)
MONOCYTES NFR BLD: 8.8 % (ref 4–15)
NEUTROPHILS # BLD AUTO: 4.2 K/UL (ref 1.8–7.7)
NEUTROPHILS NFR BLD: 71.5 % (ref 38–73)
NRBC BLD-RTO: 0 /100 WBC
PHOSPHATE SERPL-MCNC: 3.4 MG/DL (ref 2.7–4.5)
PLATELET # BLD AUTO: 216 K/UL (ref 150–450)
PMV BLD AUTO: 9.5 FL (ref 9.2–12.9)
POTASSIUM SERPL-SCNC: 4.4 MMOL/L (ref 3.5–5.1)
PTH-INTACT SERPL-MCNC: 127.2 PG/ML (ref 9–77)
RBC # BLD AUTO: 4.02 M/UL (ref 4.6–6.2)
RBC #/AREA URNS HPF: 5 /HPF (ref 0–4)
RETICS/RBC NFR AUTO: 1.3 % (ref 0.4–2)
SATURATED IRON: 31 % (ref 20–50)
SODIUM SERPL-SCNC: 139 MMOL/L (ref 136–145)
SQUAMOUS #/AREA URNS HPF: 2 /HPF
TOTAL IRON BINDING CAPACITY: 312 UG/DL (ref 250–450)
TRANSFERRIN SERPL-MCNC: 223 MG/DL (ref 200–375)
URATE SERPL-MCNC: 8 MG/DL (ref 3.4–7)
VIT B12 SERPL-MCNC: 240 PG/ML (ref 210–950)
WBC # BLD AUTO: 5.92 K/UL (ref 3.9–12.7)
WBC #/AREA URNS HPF: 11 /HPF (ref 0–5)

## 2022-06-24 PROCEDURE — 83921 ORGANIC ACID SINGLE QUANT: CPT | Performed by: INTERNAL MEDICINE

## 2022-06-24 PROCEDURE — 85025 COMPLETE CBC W/AUTO DIFF WBC: CPT | Performed by: INTERNAL MEDICINE

## 2022-06-24 PROCEDURE — 84466 ASSAY OF TRANSFERRIN: CPT | Performed by: INTERNAL MEDICINE

## 2022-06-24 PROCEDURE — 83735 ASSAY OF MAGNESIUM: CPT | Performed by: INTERNAL MEDICINE

## 2022-06-24 PROCEDURE — 84550 ASSAY OF BLOOD/URIC ACID: CPT | Performed by: INTERNAL MEDICINE

## 2022-06-24 PROCEDURE — 83970 ASSAY OF PARATHORMONE: CPT | Performed by: INTERNAL MEDICINE

## 2022-06-24 PROCEDURE — 82607 VITAMIN B-12: CPT | Performed by: INTERNAL MEDICINE

## 2022-06-24 PROCEDURE — 81001 URINALYSIS AUTO W/SCOPE: CPT | Performed by: INTERNAL MEDICINE

## 2022-06-24 PROCEDURE — 82306 VITAMIN D 25 HYDROXY: CPT | Performed by: INTERNAL MEDICINE

## 2022-06-24 PROCEDURE — 82746 ASSAY OF FOLIC ACID SERUM: CPT | Performed by: INTERNAL MEDICINE

## 2022-06-24 PROCEDURE — 36415 COLL VENOUS BLD VENIPUNCTURE: CPT | Performed by: INTERNAL MEDICINE

## 2022-06-24 PROCEDURE — 82043 UR ALBUMIN QUANTITATIVE: CPT | Performed by: INTERNAL MEDICINE

## 2022-06-24 PROCEDURE — 80069 RENAL FUNCTION PANEL: CPT | Performed by: INTERNAL MEDICINE

## 2022-06-24 PROCEDURE — 85045 AUTOMATED RETICULOCYTE COUNT: CPT | Performed by: INTERNAL MEDICINE

## 2022-06-24 PROCEDURE — 82570 ASSAY OF URINE CREATININE: CPT | Performed by: INTERNAL MEDICINE

## 2022-06-30 LAB — METHLYMALONIC ACID: 340 NMOL/L (ref 0–378)

## 2022-07-10 NOTE — PROGRESS NOTES
Subjective:       Patient ID: Jamil Cadet is a 77 y.o. male.    Chief Complaint: Diabetes, Hypertension, Hyperlipidemia, Chronic Kidney Disease, and Renal Cancer    Patient is a 77-year-old gentleman who comes follow-up.  Underlying medical issues are as below:-     1.         Essential hypertension   2.         Mixed dyslipidemia   3.         Type 2 diabetes mellitus with hypoglycemia without coma, with long-term current use of insulin   4.         History of renal cell carcinoma status post partial nephrectomy   5.         Stage 3b chronic kidney disease   6.         Cholelithiasis without cholecystitis     Last visit, we had reviewed his decision to pursue a surveillance and wait and watch policy on his kidney cancer rather the going for any aggressive treatment.  He is following up with Dr. Dave Shin MD Urology with [facility].    Less than desirable control of diabetes was also noted last time.  I had planned to consider adding (cost permitting) medications like Jardiance for renal protection as well as diabetes control or simply can not [place] for maintaining renal function.        In the interim apparently he had also run out of his blood pressure medication which raised his blood pressures.    Psychosocial issues of aging, burden of multiple medical l issues also predominate in the background and do affect the spectrum of his healthcare problems.      A large panel of lab was reviewed as ordered by his nephrologist Dr. Manjeet Decker.  Hemoglobin 12.6, hematocrit 37.9, WBC 5.92 and platelets 216. Lymphocyte percentage slightly low at 16%.  Blood glucose 145. BUN 32 and creatinine 1.8.  EGFR 35.    Vitamin B12 240, folate 13.4, retic count 1.3%.  Methylmalonic acid levels-340 which is within normal range.  Vitamin-D level is 45. Magnesium 1.8.  Uric acid 8.0 which is elevated.  PTH is 127.2.  Urine microalbumin is normal.  Urinalysis had shown hyaline casts.     TODAY ON FOLLOW-UP HE IS  SIGNIFICANTLY DISAPPOINTED ON REVIEWING THE MRI REPORT OF THE KIDNEYS AND THERE IS NO SHRINKAGE OF THE TUMOR THAT WAS TARGETED WITH THERAPY.  HE WILL GET THIS OPINION AGAIN from the renal oncologist.      I have reviewed his blood pressures at home and they seem to be in good range.  There was a low on 07/11 with blood pressure of 105/49 when his sugars for some reason went low.  That was Sunday when he had gone to the [employer].    Consideration for Prevnar 20 vaccine to complete his pneumonia series of vaccination also discussed and offered.      Diabetes  He presents for his follow-up diabetic visit. He has type 2 diabetes mellitus. His disease course has been stable. Hypoglycemia symptoms include nervousness/anxiousness (Cancer related issues). Pertinent negatives for hypoglycemia include no confusion, headaches or seizures. There are no diabetic associated symptoms. Pertinent negatives for diabetes include no chest pain, no polydipsia, no polyuria and no weakness. Risk factors for coronary artery disease include male sex, stress, diabetes mellitus and dyslipidemia. Current diabetic treatment includes insulin injections. He is compliant with treatment most of the time. His weight is stable. Meal planning includes avoidance of concentrated sweets. He has not had a previous visit with a dietitian. He participates in exercise intermittently. His home blood glucose trend is decreasing steadily. An ACE inhibitor/angiotensin II receptor blocker is being taken. He does not see a podiatrist.Eye exam is current.   Hyperlipidemia  This is a chronic (Medication was stopped due to muscle aches.) problem. The current episode started more than 1 year ago. Exacerbating diseases include chronic renal disease. He has no history of obesity. Associated symptoms include myalgias (Stable myalgias.). Pertinent negatives include no chest pain. He is currently on no antihyperlipidemic treatment (Currently has stop rosuvastatin.). The  current treatment provides mild improvement of lipids. Compliance problems include psychosocial issues.  Risk factors for coronary artery disease include male sex, a sedentary lifestyle, dyslipidemia and diabetes mellitus.   Hypertension  This is a chronic problem. The current episode started more than 1 year ago. The problem is controlled. Pertinent negatives include no chest pain, headaches, neck pain or palpitations. Risk factors for coronary artery disease include male gender, dyslipidemia and diabetes mellitus. Past treatments include angiotensin blockers and diuretics. The current treatment provides moderate improvement. Compliance problems include psychosocial issues.  Identifiable causes of hypertension include chronic renal disease.       Past Medical History:   Diagnosis Date    Acute renal failure 9/7/2020    Bacteremia due to Enterococcus 10/25/2020    Cancer     Diabetes mellitus, type 2     Disorder of kidney and ureter     Encounter for blood transfusion     History of renal cell carcinoma status post partial nephrectomy 9/7/2020    Hydronephrosis 9/9/2020    Hyperlipidemia     Hypertension     Infection and inflammatory reaction due to nephrostomy catheter, initial encounter 11/14/2020    Infection following a procedure, superficial incisional surgical site, subsequent encounter 12/26/2020    Polio     Pyelonephritis of right kidney     Septicemia due to enterococcus 11/15/2020     Social History     Socioeconomic History    Marital status:    Tobacco Use    Smoking status: Never Smoker    Smokeless tobacco: Never Used   Substance and Sexual Activity    Alcohol use: No    Drug use: No    Sexual activity: Yes     Partners: Female     Social Determinants of Health     Financial Resource Strain: Low Risk     Difficulty of Paying Living Expenses: Not very hard   Food Insecurity: No Food Insecurity    Worried About Running Out of Food in the Last Year: Never true    Ran Out  of Food in the Last Year: Never true   Transportation Needs: No Transportation Needs    Lack of Transportation (Medical): No    Lack of Transportation (Non-Medical): No   Physical Activity: Sufficiently Active    Days of Exercise per Week: 7 days    Minutes of Exercise per Session: 150+ min   Stress: No Stress Concern Present    Feeling of Stress : Not at all   Social Connections: Unknown    Frequency of Communication with Friends and Family: More than three times a week    Frequency of Social Gatherings with Friends and Family: Twice a week    Active Member of Clubs or Organizations: Yes    Attends Club or Organization Meetings: More than 4 times per year    Marital Status:    Housing Stability: Unknown    Unable to Pay for Housing in the Last Year: Patient refused    Unstable Housing in the Last Year: Patient refused     Past Surgical History:   Procedure Laterality Date    ANTEGRADE PYELOGRAM  12/10/2020    Procedure: PYELOGRAM, ANTEGRADE;  Surgeon: Dave Shin MD;  Location: [address];  Service: Urology;;    APPENDECTOMY      CYSTOSCOPY  12/10/2020    Procedure: CYSTOSCOPY;  Surgeon: Dave Shin MD;  Location: [address];  Service: Urology;;    CYSTOSCOPY W/ RETROGRADES Right 09/09/2020    Procedure: CYSTOSCOPY, WITH RETROGRADE PYELOGRAM;  Surgeon: Chris Garcia Jr., MD;  Location: [facility];  Service: Urology;  Laterality: Right;    DILATION OF NEPHROSTOMY TRACT Right 12/10/2020    Procedure: DILATION, NEPHROSTOMY TRACT;  Surgeon: Dave Shin MD;  Location: [address];  Service: Urology;  Laterality: Right;    RADIOFREQUENCY ABLATION KIDNEY  2022    REMOVAL OF DRAIN Right 12/10/2020    Procedure: REMOVAL, DRAIN-NEPHROSTOMY TUBE;  Surgeon: Dave Shin MD;  Location: [address];  Service: Urology;  Laterality: Right;    REPLACEMENT OF NEPHROSTOMY TUBE Right 12/10/2020    Procedure: REPLACEMENT, NEPHROSTOMY TUBE;  Surgeon: Dave RAWLS  MD Kip;  Location: [facility] OR 1ST FLR;  Service: Urology;  Laterality: Right;    ROBOT-ASSISTED LAPAROSCOPIC REIMPLANTATION OF URETER USING DA PHU XI Right 01/08/2021    Procedure: XI ROBOTIC IMPLANTATION, URETER, USING PSOAS HITCH TECHNIQUE;  Surgeon: Dave Shin MD;  Location: [facility] OR 2ND FLR;  Service: Urology;  Laterality: Right;  4hrs gen with regional    SPINE SURGERY      URETEROSCOPY Right 12/10/2020    Procedure: URETEROSCOPY-ANTEGRADE;  Surgeon: Dave Shin MD;  Location: [facility] OR 1ST FLR;  Service: Urology;  Laterality: Right;    URETHROSCOPY  09/09/2020    Procedure: URETHROSCOPY;  Surgeon: Chris Garcia Jr., MD;  [facility];  Service: Urology;;     Family History   Problem Relation Age of Onset    Heart disease Mother     Heart disease Father     Stroke Father     Heart disease Maternal Grandfather     Heart disease Paternal Grandmother     Heart disease Paternal Grandfather        Review of Systems   Constitutional: Negative for activity change, chills, diaphoresis, fever and unexpected weight change.   HENT: Negative for hearing loss, rhinorrhea and trouble swallowing.    Eyes: Negative for discharge, redness and visual disturbance.   Respiratory: Negative for cough, chest tightness and wheezing.    Cardiovascular: Negative for chest pain, palpitations and leg swelling.        HTN Lipids   Gastrointestinal: Negative for abdominal distention, abdominal pain, blood in stool, constipation, diarrhea and vomiting.   Endocrine: Negative for cold intolerance, polydipsia and polyuria.        Type 2 DM   Genitourinary: Negative for difficulty urinating, hematuria and urgency.        Kidney cancer   Musculoskeletal: Positive for myalgias (Stable myalgias.). Negative for arthralgias, joint swelling and neck pain.   Neurological: Negative for seizures, weakness and headaches.   Psychiatric/Behavioral: Negative for confusion and dysphoric mood. The patient is nervous/anxious  "(Cancer related issues).          Objective:      Blood pressure 120/65, pulse 69, resp. rate 18, height 5' 11" (1.803 m), weight 81 kg (178 lb 8 oz), SpO2 99 %. Body mass index is 24.9 kg/m².  Physical Exam  Vitals and nursing note reviewed.   Constitutional:       General: He is not in acute distress.     Appearance: Normal appearance. He is well-developed. He is not ill-appearing, toxic-appearing or diaphoretic.      Comments: BMI is 24.90   HENT:      Head: Normocephalic and atraumatic.   Eyes:      Conjunctiva/sclera: Conjunctivae normal.   Neck:      Thyroid: No thyromegaly.      Vascular: No JVD.      Trachea: No tracheal deviation.   Cardiovascular:      Rate and Rhythm: Normal rate and regular rhythm.      Heart sounds: Normal heart sounds. No murmur heard.    No friction rub. No gallop.   Pulmonary:      Effort: Pulmonary effort is normal. No respiratory distress.      Breath sounds: Normal breath sounds. No wheezing or rales.   Abdominal:      General: Bowel sounds are normal. There is no distension.      Palpations: Abdomen is soft.      Tenderness: There is no abdominal tenderness.   Musculoskeletal:         General: No signs of injury. Normal range of motion.      Cervical back: No rigidity.      Right lower leg: No tenderness. No edema.      Left lower leg: No tenderness. No edema.      Right foot: Deformity (Pes cavus) present.      Left foot: Deformity ( pes cavus) present.        Feet:       Comments:      Feet:      Right foot:      Protective Sensation: 5 sites tested. 4 sites sensed.      Skin integrity: No ulcer, blister, skin breakdown, erythema or warmth.      Left foot:      Protective Sensation: 5 sites tested. 4 sites sensed.      Skin integrity: No ulcer, blister, skin breakdown, erythema or warmth.      Comments: High arch in the foot.?  Effects of poliomyelitis.  Slight claw toes.  Slightly dystrophic nails on the left side.  Lymphadenopathy:      Cervical: No cervical adenopathy. "   Skin:     General: Skin is warm and dry.      Findings: No lesion or rash.   Neurological:      Mental Status: He is alert. Mental status is at baseline.      Deep Tendon Reflexes: Reflexes are normal and symmetric.   Psychiatric:         Attention and Perception: He is attentive.         Mood and Affect: Affect is not labile.         Behavior: Behavior normal.      Comments: Somewhat anhedonic over his medical issues.  Especially regarding nonresolution kidney cancer.           Assessment:       1. Essential hypertension    2. Mixed dyslipidemia    3. Type 2 diabetes mellitus with hypoglycemia without coma, with long-term current use of insulin    4. History of renal cell carcinoma status post partial nephrectomy    5. Stage 3a chronic kidney disease    6. Renal mass, left    7. Need for pneumococcal vaccine           Lab Visit on 07/11/2022   Component Date Value Ref Range Status    Hemoglobin A1C 07/11/2022 6.6 (A) 4.5 - 6.2 % Final    Estimated Avg Glucose 07/11/2022 143 (A) 68 - 131 mg/dL Final    Microalbumin, Urine 07/11/2022 6.9  <19.9 ug/mL Final    Creatinine, Urine 07/11/2022 112.0  23.0 - 375.0 mg/dL Final    Microalb/Creat Ratio 07/11/2022 6.2  0.0 - 30.0 ug/mg Final    Glucose 07/11/2022 196 (A) 70 - 110 mg/dL Final    Sodium 07/11/2022 137  136 - 145 mmol/L Final    Potassium 07/11/2022 4.1  3.5 - 5.1 mmol/L Final    Chloride 07/11/2022 104  95 - 110 mmol/L Final    CO2 07/11/2022 24  23 - 29 mmol/L Final    BUN 07/11/2022 51 (A) 8 - 23 mg/dL Final    Calcium 07/11/2022 8.7  8.7 - 10.5 mg/dL Final    Creatinine 07/11/2022 2.5 (A) 0.5 - 1.4 mg/dL Final    Albumin 07/11/2022 4.0  3.5 - 5.2 g/dL Final    Phosphorus 07/11/2022 4.5  2.7 - 4.5 mg/dL Final    eGFR if  07/11/2022 27.2 (A) >60 mL/min/1.73 m^2 Final    eGFR if non African American 07/11/2022 23.5 (A) >60 mL/min/1.73 m^2 Final    Anion Gap 07/11/2022 9  8 - 16 mmol/L Final    Magnesium 07/11/2022 2.1  1.6 -  2.6 mg/dL Final   Lab Visit on 07/11/2022   Component Date Value Ref Range Status    Sodium 07/11/2022 136  136 - 145 mmol/L Final    Potassium 07/11/2022 4.0  3.5 - 5.1 mmol/L Final    Chloride 07/11/2022 103  95 - 110 mmol/L Final    CO2 07/11/2022 23  23 - 29 mmol/L Final    Glucose 07/11/2022 193 (A) 70 - 110 mg/dL Final    BUN 07/11/2022 53 (A) 8 - 23 mg/dL Final    Creatinine 07/11/2022 2.4 (A) 0.5 - 1.4 mg/dL Final    Calcium 07/11/2022 8.7  8.7 - 10.5 mg/dL Final    Anion Gap 07/11/2022 10  8 - 16 mmol/L Final    eGFR if  07/11/2022 28.6 (A) >60 mL/min/1.73 m^2 Final    eGFR if non  07/11/2022 24.7 (A) >60 mL/min/1.73 m^2 Final   Lab Visit on 06/24/2022   Component Date Value Ref Range Status    Vitamin B-12 06/24/2022 240  210 - 950 pg/mL Final    Folate 06/24/2022 13.4  4.0 - 24.0 ng/mL Final    Retic 06/24/2022 1.3  0.4 - 2.0 % Final    Methlymalonic Acid 06/24/2022 340  0 - 378 nmol/L Final    Glucose 06/24/2022 145 (A) 70 - 110 mg/dL Final    Sodium 06/24/2022 139  136 - 145 mmol/L Final    Potassium 06/24/2022 4.4  3.5 - 5.1 mmol/L Final    Chloride 06/24/2022 110  95 - 110 mmol/L Final    CO2 06/24/2022 20 (A) 23 - 29 mmol/L Final    BUN 06/24/2022 32 (A) 8 - 23 mg/dL Final    Calcium 06/24/2022 9.0  8.7 - 10.5 mg/dL Final    Creatinine 06/24/2022 1.8 (A) 0.5 - 1.4 mg/dL Final    Albumin 06/24/2022 4.3  3.5 - 5.2 g/dL Final    Phosphorus 06/24/2022 3.4  2.7 - 4.5 mg/dL Final    eGFR if  06/24/2022 40.5 (A) >60 mL/min/1.73 m^2 Final    eGFR if non African American 06/24/2022 35.0 (A) >60 mL/min/1.73 m^2 Final    Anion Gap 06/24/2022 9  8 - 16 mmol/L Final    PTH, Intact 06/24/2022 127.2 (A) 9.0 - 77.0 pg/mL Final    Uric Acid 06/24/2022 8.0 (A) 3.4 - 7.0 mg/dL Final    WBC 06/24/2022 5.92  3.90 - 12.70 K/uL Final    RBC 06/24/2022 4.02 (A) 4.60 - 6.20 M/uL Final    Hemoglobin 06/24/2022 12.6 (A) 14.0 - 18.0 g/dL Final     Hematocrit 06/24/2022 37.9 (A) 40.0 - 54.0 % Final    MCV 06/24/2022 94  82 - 98 fL Final    MCH 06/24/2022 31.3 (A) 27.0 - 31.0 pg Final    MCHC 06/24/2022 33.2  32.0 - 36.0 g/dL Final    RDW 06/24/2022 13.0  11.5 - 14.5 % Final    Platelets 06/24/2022 216  150 - 450 K/uL Final    MPV 06/24/2022 9.5  9.2 - 12.9 fL Final    Immature Granulocytes 06/24/2022 0.5  0.0 - 0.5 % Final    Gran # (ANC) 06/24/2022 4.2  1.8 - 7.7 K/uL Final    Immature Grans (Abs) 06/24/2022 0.03  0.00 - 0.04 K/uL Final    Lymph # 06/24/2022 1.0  1.0 - 4.8 K/uL Final    Mono # 06/24/2022 0.5  0.3 - 1.0 K/uL Final    Eos # 06/24/2022 0.2  0.0 - 0.5 K/uL Final    Baso # 06/24/2022 0.02  0.00 - 0.20 K/uL Final    nRBC 06/24/2022 0  0 /100 WBC Final    Gran % 06/24/2022 71.5  38.0 - 73.0 % Final    Lymph % 06/24/2022 16.2 (A) 18.0 - 48.0 % Final    Mono % 06/24/2022 8.8  4.0 - 15.0 % Final    Eosinophil % 06/24/2022 2.7  0.0 - 8.0 % Final    Basophil % 06/24/2022 0.3  0.0 - 1.9 % Final    Differential Method 06/24/2022 Automated   Final    RBC, UA 06/24/2022 5 (A) 0 - 4 /hpf Final    WBC, UA 06/24/2022 11 (A) 0 - 5 /hpf Final    Bacteria 06/24/2022 Negative  None-Occ /hpf Final    Squam Epithel, UA 06/24/2022 2  /hpf Final    Hyaline Casts, UA 06/24/2022 3 (A) 0-1/lpf /lpf Final    Microscopic Comment 06/24/2022 SEE COMMENT   Final    Microalbumin, Urine 06/24/2022 10.2  <19.9 ug/mL Final    Creatinine, Urine 06/24/2022 93.0  23.0 - 375.0 mg/dL Final    Microalb/Creat Ratio 06/24/2022 11.0  0.0 - 30.0 ug/mg Final    Vit D, 25-Hydroxy 06/24/2022 45  30 - 96 ng/mL Final    Magnesium 06/24/2022 1.8  1.6 - 2.6 mg/dL Final    Iron 06/24/2022 96  45 - 160 ug/dL Final    Transferrin 06/24/2022 223  200 - 375 mg/dL Final    TIBC 06/24/2022 312  250 - 450 ug/dL Final    Saturated Iron 06/24/2022 31  20 - 50 % Final     IMPRESSION:     1. No significant change of exophytic inferior left renal mass measuring 21 x 23 mm,  since 1/31/2022 MRI. Suspected diagnosis of this has been previously discussed, remaining unchanged.  2. Unchanged bilateral renal cysts.  3. Unchanged partially visualized distended gallbladder with nonspecific low signal intensity focus within the common duct in region of pancreatic head which could be artifactual in nature and related to flow-related artifact or, in the appropriate clinical setting, representing choledocholithiasis. Clinical and laboratory correlation is requested. If concern for choledocholithiasis, dedicated evaluation with MRCP is recommended.     Electronically signed by:  Orville Barfield MD  7/11/2022 11:03 AM CDT Workstation: [id number]             Specimen Collected: 07/11/22 08:23 Last Resulted: 07/11/22 11:03                   Plan:           Essential hypertension  -     losartan (COZAAR) 100 MG tablet; Take 1 tablet (100 mg total) by mouth once daily.  Dispense: 90 tablet; Refill: 3    Mixed dyslipidemia    Type 2 diabetes mellitus with hypoglycemia without coma, with long-term current use of insulin  -     Discontinue: insulin degludec (TRESIBA FLEXTOUCH U-100) 100 unit/mL (3 mL) insulin pen; Inject 15 Units into the skin every evening.  Dispense: 5 pen; Refill: 3  -     Discontinue: insulin aspart U-100 (NOVOLOG FLEXPEN U-100 INSULIN) 100 unit/mL (3 mL) InPn pen; Inject 10 Units into the skin 3 (three) times daily with meals. Check glucose before meals and then take insulin as per sliding scale  Dispense: 5 each; Refill: 3  -     insulin aspart U-100 (NOVOLOG FLEXPEN U-100 INSULIN) 100 unit/mL (3 mL) InPn pen; Inject 10 Units into the skin 3 (three) times daily with meals. Check glucose before meals and then take insulin as per sliding scale  Dispense: 5 each; Refill: 3  -     insulin degludec (TRESIBA FLEXTOUCH U-100) 100 unit/mL (3 mL) insulin pen; Inject 15 Units into the skin every evening.  Dispense: 5 pen; Refill: 3    History of renal cell carcinoma status post partial  nephrectomy    Stage 3a chronic kidney disease  Comments:  GFR is approximately 35 for known  Americans.  Avoid NSAIDs.  Avoid significant diuresis or unnecessary antibiotics.    Renal mass, left    Need for pneumococcal vaccine  -     (In Office Administered) Pneumococcal Conjugate Vaccine (20 Valent) (IM)      Patient's medical issues have been noted.    Blood sugar control is:-blood sugar control is in good range.  His recent hemoglobin is 6.6.    New prescription for long and short-acting insulin has been sent to mail order pharmacy and please cancel the local pharmacy prescription.  He prefers the mail order for brand name medications.    Blood pressure control is:-blood pressure control is acceptable.    Chronic kidney disease has been noted:-he was recently advised by Nephrology to hold on to the blood pressure medication and diuretics.  His creatinine had gone up to 2.5.  He might have been a little dehydrated.  This lab was done on 07/11/2022.  Magnesium level was okay.    Again the big issue seems to be finding on MRI which does not show shrinkage of the tumor.  This was expected after the ablation procedure.  He will get an opinion on this issue.  He is disappointed and frustrated over this issue.    He had stop the statins because he was not feeling good but will address this issue at his next visit.    Advised Pt about age and season appropriate immunizations/ cancer screenings.  Also seasonal influenza vaccine, update on tetanus diphtheria vaccination every 10 years.  Patient has been advised to watch diet and exercise. Avoid fried and fatty food. Compliance to medications and follow up urged.  Advised Pt. to monitor Blood sugars at home and record them.  Advised Pt  for Anti reflux measures like small feequent meals, avoid spicy and greasy food. Head end up at night.  keep a close eye on feet and keep them clean. Annual eye examination. Annual influenza vaccine.  Monitor HgbA1c every 3 to 6  "months. Monitor urine microalbumin every year.keep LDL less than 100. Monitor blood pressure and target blood pressure 120/70.  Please utilize precautions for current COVID-19 pandemic.  Try to avoid crowds or close contact with multiple people.  Minimize outside interaction.  Wash hands with soap for  frequently upon contact.Use face mask or cover.    Fup 3-4 months.    Spent ezra 30 minutes with patient which involved review of pts medical conditions, labs, medications and with 50% of time face-to-face discussion about medical problems, management and any applicable changes.      Current Outpatient Medications:     ascorbic acid, vitamin C, (VITAMIN C) 500 MG tablet, Take 500 mg by mouth once daily., Disp: , Rfl:     BD WINSOME 2ND GEN PEN NEEDLE 32 gauge x 5/32" Ndle, Inject 1 Units into the skin 3 (three) times daily., Disp: , Rfl:     blood-glucose meter (TRUE METRIX GLUCOSE METER) Misc, 1 Device by Misc.(Non-Drug; Combo Route) route once daily. (Patient taking differently: 1 Device by Misc.(Non-Drug; Combo Route) route once daily. Non-med item), Disp: 1 each, Rfl: 0    calcitRIOL (ROCALTROL) 0.25 MCG Cap, TAKE 1 CAPSULE (0.25 MCG TOTAL) BY MOUTH 3 (THREE) TIMES A WEEK MONDAY/WEDNESDAY/FRIDAY, Disp: , Rfl:     indapamide (LOZOL) 1.25 MG Tab, Take 1.25 mg by mouth once daily., Disp: , Rfl:     multivitamin capsule, Take 1 capsule by mouth once daily., Disp: , Rfl:     sodium bicarbonate 650 MG tablet, Take 650 mg by mouth once daily., Disp: , Rfl:     traZODone (DESYREL) 50 MG tablet, Take 1 tablet (50 mg total) by mouth nightly as needed for Insomnia., Disp: 90 tablet, Rfl: 2    TRUE METRIX GLUCOSE TEST STRIP Strp, USE 1 STRIP TO TEST 3 TIMES DAILY BEFORE MEALS, Disp: 300 strip, Rfl: 1    zinc gluconate 50 mg tablet, Take 50 mg by mouth once daily., Disp: , Rfl:     insulin aspart U-100 (NOVOLOG FLEXPEN U-100 INSULIN) 100 unit/mL (3 mL) InPn pen, Inject 10 Units into the skin 3 (three) times " daily with meals. Check glucose before meals and then take insulin as per sliding scale, Disp: 5 each, Rfl: 3    insulin degludec (TRESIBA FLEXTOUCH U-100) 100 unit/mL (3 mL) insulin pen, Inject 15 Units into the skin every evening., Disp: 5 pen, Rfl: 3    losartan (COZAAR) 100 MG tablet, Take 1 tablet (100 mg total) by mouth once daily., Disp: 90 tablet, Rfl: 3    rosuvastatin (CRESTOR) 5 MG tablet, Take 1 tablet (5 mg total) by mouth every evening., Disp: 90 tablet, Rfl: 3    Current Facility-Administered Medications:     ciprofloxacin HCl tablet 500 mg, 500 mg, Oral, 1 time in Clinic/HOD, Dave Shin MD

## 2022-07-11 ENCOUNTER — HOSPITAL ENCOUNTER (OUTPATIENT)
Dept: RADIOLOGY | Facility: HOSPITAL | Age: 77
Discharge: HOME | End: 2022-07-11
Attending: UROLOGY
Payer: MEDICARE

## 2022-07-11 DIAGNOSIS — C64.2 CANCER OF LEFT KIDNEY: ICD-10-CM

## 2022-07-11 PROCEDURE — 74181 MRI ABDOMEN W/O CONTRAST: CPT | Mod: TC

## 2022-07-13 ENCOUNTER — OFFICE VISIT (OUTPATIENT)
Dept: FAMILY MEDICINE | Facility: CLINIC | Age: 77
End: 2022-07-13
Payer: MEDICARE

## 2022-07-13 VITALS
RESPIRATION RATE: 18 BRPM | HEIGHT: 71 IN | BODY MASS INDEX: 24.99 KG/M2 | OXYGEN SATURATION: 99 % | SYSTOLIC BLOOD PRESSURE: 120 MMHG | WEIGHT: 178.5 LBS | DIASTOLIC BLOOD PRESSURE: 65 MMHG | HEART RATE: 69 BPM

## 2022-07-13 DIAGNOSIS — E78.2 MIXED DYSLIPIDEMIA: ICD-10-CM

## 2022-07-13 DIAGNOSIS — C64.1 RENAL CELL ADENOCARCINOMA, RIGHT: ICD-10-CM

## 2022-07-13 DIAGNOSIS — N28.89 RENAL MASS, LEFT: ICD-10-CM

## 2022-07-13 DIAGNOSIS — N18.31 STAGE 3A CHRONIC KIDNEY DISEASE: ICD-10-CM

## 2022-07-13 DIAGNOSIS — E11.649 TYPE 2 DIABETES MELLITUS WITH HYPOGLYCEMIA WITHOUT COMA, WITH LONG-TERM CURRENT USE OF INSULIN: ICD-10-CM

## 2022-07-13 DIAGNOSIS — I10 ESSENTIAL HYPERTENSION: Primary | ICD-10-CM

## 2022-07-13 DIAGNOSIS — Z79.4 TYPE 2 DIABETES MELLITUS WITH HYPOGLYCEMIA WITHOUT COMA, WITH LONG-TERM CURRENT USE OF INSULIN: ICD-10-CM

## 2022-07-13 DIAGNOSIS — Z23 NEED FOR PNEUMOCOCCAL VACCINE: ICD-10-CM

## 2022-07-13 PROCEDURE — 90677 PCV20 VACCINE IM: CPT | Mod: PBBFAC | Performed by: INTERNAL MEDICINE

## 2022-07-13 PROCEDURE — 99214 PR OFFICE/OUTPT VISIT, EST, LEVL IV, 30-39 MIN: ICD-10-PCS | Mod: S$PBB,AQ,, | Performed by: INTERNAL MEDICINE

## 2022-07-13 PROCEDURE — 99214 OFFICE O/P EST MOD 30 MIN: CPT | Mod: S$PBB,AQ,, | Performed by: INTERNAL MEDICINE

## 2022-07-13 PROCEDURE — 99214 OFFICE O/P EST MOD 30 MIN: CPT | Performed by: INTERNAL MEDICINE

## 2022-07-13 PROCEDURE — G0009 ADMIN PNEUMOCOCCAL VACCINE: HCPCS | Mod: PBBFAC | Performed by: INTERNAL MEDICINE

## 2022-07-13 RX ORDER — INSULIN ASPART 100 [IU]/ML
10 INJECTION, SOLUTION INTRAVENOUS; SUBCUTANEOUS
Qty: 5 EACH | Refills: 3 | Status: SHIPPED | OUTPATIENT
Start: 2022-07-13 | End: 2022-07-13 | Stop reason: SDUPTHER

## 2022-07-13 RX ORDER — PEN NEEDLE, DIABETIC 32GX 5/32"
1 NEEDLE, DISPOSABLE MISCELLANEOUS 3 TIMES DAILY
COMMUNITY
Start: 2022-04-27 | End: 2023-03-02

## 2022-07-13 RX ORDER — SODIUM BICARBONATE 650 MG/1
650 TABLET ORAL DAILY
COMMUNITY
Start: 2022-06-27

## 2022-07-13 RX ORDER — LOSARTAN POTASSIUM 100 MG/1
100 TABLET ORAL DAILY
Qty: 90 TABLET | Refills: 3 | Status: SHIPPED | OUTPATIENT
Start: 2022-07-13 | End: 2023-05-10 | Stop reason: SDUPTHER

## 2022-07-13 RX ORDER — INSULIN DEGLUDEC 100 U/ML
15 INJECTION, SOLUTION SUBCUTANEOUS NIGHTLY
Qty: 5 PEN | Refills: 3 | Status: SHIPPED | OUTPATIENT
Start: 2022-07-13 | End: 2023-01-10

## 2022-07-13 RX ORDER — INSULIN DEGLUDEC 100 U/ML
15 INJECTION, SOLUTION SUBCUTANEOUS NIGHTLY
Qty: 5 PEN | Refills: 3 | Status: SHIPPED | OUTPATIENT
Start: 2022-07-13 | End: 2022-07-13 | Stop reason: SDUPTHER

## 2022-07-13 RX ORDER — LOSARTAN POTASSIUM 100 MG/1
100 TABLET ORAL
COMMUNITY
End: 2022-07-13 | Stop reason: SDUPTHER

## 2022-07-13 RX ORDER — INSULIN ASPART 100 [IU]/ML
10 INJECTION, SOLUTION INTRAVENOUS; SUBCUTANEOUS
Qty: 5 EACH | Refills: 3 | Status: SHIPPED | OUTPATIENT
Start: 2022-07-13

## 2022-07-13 RX ORDER — CALCITRIOL 0.25 UG/1
CAPSULE ORAL
COMMUNITY
Start: 2022-06-27

## 2022-07-20 ENCOUNTER — LAB VISIT (OUTPATIENT)
Dept: LAB | Facility: HOSPITAL | Age: 77
End: 2022-07-20
Attending: INTERNAL MEDICINE
Payer: MEDICARE

## 2022-07-20 DIAGNOSIS — D63.1 ANEMIA OF CHRONIC RENAL FAILURE: ICD-10-CM

## 2022-07-20 DIAGNOSIS — N18.30 CHRONIC KIDNEY DISEASE, STAGE III (MODERATE): Primary | ICD-10-CM

## 2022-07-20 DIAGNOSIS — R80.9 PROTEINURIA: ICD-10-CM

## 2022-07-20 DIAGNOSIS — N39.0 URINARY TRACT INFECTION, SITE NOT SPECIFIED: ICD-10-CM

## 2022-07-20 DIAGNOSIS — I12.9 PARENCHYMAL RENAL HYPERTENSION: ICD-10-CM

## 2022-07-20 DIAGNOSIS — N18.9 ANEMIA OF CHRONIC RENAL FAILURE: ICD-10-CM

## 2022-07-20 LAB
ALBUMIN SERPL BCP-MCNC: 4.1 G/DL (ref 3.5–5.2)
ALBUMIN/CREAT UR: 12.4 UG/MG (ref 0–30)
ANION GAP SERPL CALC-SCNC: 7 MMOL/L (ref 8–16)
BACTERIA #/AREA URNS HPF: NEGATIVE /HPF
BASOPHILS # BLD AUTO: 0.02 K/UL (ref 0–0.2)
BASOPHILS NFR BLD: 0.3 % (ref 0–1.9)
BILIRUB UR QL STRIP: NEGATIVE
BUN SERPL-MCNC: 35 MG/DL (ref 8–23)
CALCIUM SERPL-MCNC: 8.9 MG/DL (ref 8.7–10.5)
CHLORIDE SERPL-SCNC: 103 MMOL/L (ref 95–110)
CLARITY UR: CLEAR
CO2 SERPL-SCNC: 25 MMOL/L (ref 23–29)
COLOR UR: YELLOW
CREAT SERPL-MCNC: 1.8 MG/DL (ref 0.5–1.4)
CREAT UR-MCNC: 78 MG/DL (ref 23–375)
DIFFERENTIAL METHOD: ABNORMAL
EOSINOPHIL # BLD AUTO: 0.2 K/UL (ref 0–0.5)
EOSINOPHIL NFR BLD: 3.8 % (ref 0–8)
ERYTHROCYTE [DISTWIDTH] IN BLOOD BY AUTOMATED COUNT: 12.7 % (ref 11.5–14.5)
EST. GFR  (AFRICAN AMERICAN): 40.5 ML/MIN/1.73 M^2
EST. GFR  (NON AFRICAN AMERICAN): 35 ML/MIN/1.73 M^2
GLUCOSE SERPL-MCNC: 99 MG/DL (ref 70–110)
GLUCOSE UR QL STRIP: NEGATIVE
HCT VFR BLD AUTO: 36.3 % (ref 40–54)
HGB BLD-MCNC: 12.1 G/DL (ref 14–18)
HGB UR QL STRIP: ABNORMAL
HYALINE CASTS #/AREA URNS LPF: 1 /LPF
IMM GRANULOCYTES # BLD AUTO: 0.01 K/UL (ref 0–0.04)
IMM GRANULOCYTES NFR BLD AUTO: 0.2 % (ref 0–0.5)
KETONES UR QL STRIP: NEGATIVE
LEUKOCYTE ESTERASE UR QL STRIP: ABNORMAL
LYMPHOCYTES # BLD AUTO: 1 K/UL (ref 1–4.8)
LYMPHOCYTES NFR BLD: 15.8 % (ref 18–48)
MAGNESIUM SERPL-MCNC: 1.8 MG/DL (ref 1.6–2.6)
MCH RBC QN AUTO: 31.4 PG (ref 27–31)
MCHC RBC AUTO-ENTMCNC: 33.3 G/DL (ref 32–36)
MCV RBC AUTO: 94 FL (ref 82–98)
MICROALBUMIN UR DL<=1MG/L-MCNC: 9.7 UG/ML
MICROSCOPIC COMMENT: ABNORMAL
MONOCYTES # BLD AUTO: 0.6 K/UL (ref 0.3–1)
MONOCYTES NFR BLD: 9 % (ref 4–15)
NEUTROPHILS # BLD AUTO: 4.4 K/UL (ref 1.8–7.7)
NEUTROPHILS NFR BLD: 70.9 % (ref 38–73)
NITRITE UR QL STRIP: NEGATIVE
NRBC BLD-RTO: 0 /100 WBC
PH UR STRIP: 6 [PH] (ref 5–8)
PHOSPHATE SERPL-MCNC: 3.5 MG/DL (ref 2.7–4.5)
PLATELET # BLD AUTO: 211 K/UL (ref 150–450)
PMV BLD AUTO: 8.9 FL (ref 9.2–12.9)
POTASSIUM SERPL-SCNC: 3.8 MMOL/L (ref 3.5–5.1)
PROT UR QL STRIP: NEGATIVE
PROT UR-MCNC: 16 MG/DL (ref 6–15)
RBC # BLD AUTO: 3.85 M/UL (ref 4.6–6.2)
RBC #/AREA URNS HPF: 18 /HPF (ref 0–4)
SODIUM SERPL-SCNC: 135 MMOL/L (ref 136–145)
SP GR UR STRIP: 1.01 (ref 1–1.03)
SQUAMOUS #/AREA URNS HPF: 1 /HPF
URATE SERPL-MCNC: 8 MG/DL (ref 3.4–7)
URN SPEC COLLECT METH UR: ABNORMAL
UROBILINOGEN UR STRIP-ACNC: NEGATIVE EU/DL
WBC # BLD AUTO: 6.13 K/UL (ref 3.9–12.7)
WBC #/AREA URNS HPF: 6 /HPF (ref 0–5)

## 2022-07-20 PROCEDURE — 36415 COLL VENOUS BLD VENIPUNCTURE: CPT | Performed by: INTERNAL MEDICINE

## 2022-07-20 PROCEDURE — 84550 ASSAY OF BLOOD/URIC ACID: CPT | Performed by: INTERNAL MEDICINE

## 2022-07-20 PROCEDURE — 81001 URINALYSIS AUTO W/SCOPE: CPT | Performed by: INTERNAL MEDICINE

## 2022-07-20 PROCEDURE — 80069 RENAL FUNCTION PANEL: CPT | Performed by: INTERNAL MEDICINE

## 2022-07-20 PROCEDURE — 83735 ASSAY OF MAGNESIUM: CPT | Performed by: INTERNAL MEDICINE

## 2022-07-20 PROCEDURE — 85025 COMPLETE CBC W/AUTO DIFF WBC: CPT | Performed by: INTERNAL MEDICINE

## 2022-07-20 PROCEDURE — 84156 ASSAY OF PROTEIN URINE: CPT | Performed by: INTERNAL MEDICINE

## 2022-07-20 PROCEDURE — 82043 UR ALBUMIN QUANTITATIVE: CPT | Performed by: INTERNAL MEDICINE

## 2022-07-20 PROCEDURE — 82570 ASSAY OF URINE CREATININE: CPT | Performed by: INTERNAL MEDICINE

## 2022-07-22 ENCOUNTER — HOSPITAL ENCOUNTER (OUTPATIENT)
Dept: RADIOLOGY | Facility: HOSPITAL | Age: 77
Discharge: HOME | End: 2022-07-22
Attending: UROLOGY
Payer: MEDICARE

## 2022-07-22 DIAGNOSIS — N28.89 RENAL MASS: ICD-10-CM

## 2022-07-22 PROCEDURE — 76770 US EXAM ABDO BACK WALL COMP: CPT | Mod: TC

## 2022-07-27 ENCOUNTER — TELEPHONE (OUTPATIENT)
Dept: FAMILY MEDICINE | Facility: CLINIC | Age: 77
End: 2022-07-27

## 2022-07-27 DIAGNOSIS — N52.9 ERECTILE DYSFUNCTION, UNSPECIFIED ERECTILE DYSFUNCTION TYPE: Primary | ICD-10-CM

## 2022-07-27 RX ORDER — SILDENAFIL 50 MG/1
50 TABLET, FILM COATED ORAL DAILY PRN
Qty: 10 TABLET | Refills: 5 | Status: SHIPPED | OUTPATIENT
Start: 2022-07-27 | End: 2023-08-10

## 2022-07-27 NOTE — TELEPHONE ENCOUNTER
MrNgozi Cadet is here with his wife and he seeks and informal consultation for being prescribing the blue pill.  (Viagra).  This enhances the quality of his intimacy and life.    Watch out for side effects of headaches, dizziness, lightheadedness, chest pain, nasal stuffiness and a very rare side effect of a permanent painful erection.    Erectile dysfunction, unspecified erectile dysfunction type  -     sildenafiL (VIAGRA) 50 MG tablet; Take 1 tablet (50 mg total) by mouth daily as needed for Erectile Dysfunction (Take 1 pill on a day as needed for erectile dysfunction).  Dispense: 10 tablet; Refill: 5

## 2022-08-09 ENCOUNTER — OFFICE VISIT (OUTPATIENT)
Dept: UROLOGY | Facility: CLINIC | Age: 77
End: 2022-08-09
Payer: MEDICARE

## 2022-08-09 VITALS
HEIGHT: 71 IN | BODY MASS INDEX: 25 KG/M2 | SYSTOLIC BLOOD PRESSURE: 155 MMHG | DIASTOLIC BLOOD PRESSURE: 79 MMHG | WEIGHT: 178.56 LBS | HEART RATE: 91 BPM

## 2022-08-09 DIAGNOSIS — C64.1 RENAL CELL ADENOCARCINOMA, RIGHT: Primary | ICD-10-CM

## 2022-08-09 PROCEDURE — 99999 PR PBB SHADOW E&M-EST. PATIENT-LVL III: ICD-10-PCS | Mod: PBBFAC,,, | Performed by: UROLOGY

## 2022-08-09 PROCEDURE — 99214 PR OFFICE/OUTPT VISIT, EST, LEVL IV, 30-39 MIN: ICD-10-PCS | Mod: S$PBB,,, | Performed by: UROLOGY

## 2022-08-09 PROCEDURE — 99999 PR PBB SHADOW E&M-EST. PATIENT-LVL III: CPT | Mod: PBBFAC,,, | Performed by: UROLOGY

## 2022-08-09 PROCEDURE — 99213 OFFICE O/P EST LOW 20 MIN: CPT | Mod: PBBFAC | Performed by: UROLOGY

## 2022-08-09 PROCEDURE — 99214 OFFICE O/P EST MOD 30 MIN: CPT | Mod: S$PBB,,, | Performed by: UROLOGY

## 2022-08-09 NOTE — PROGRESS NOTES
CHIEF COMPLAINT:     Mr. Cadet is a 77 y.o. male presenting for follow up small renal mass.    PRESENTING ILLNESS:    Jamil Cadet is a 77 y.o. male with a PMH of mid/distal urteral stricture s/p robotic ureteroneocystotomy with psoas hitch and lysis of adhesions. 3 mo post op ultrasound showed resolution of right sided hydronephrosis but an incidental left lower pole solid renal mass was discovered ~2.3 cm. He is here for follow up of this now 6 months after his last US. He has a history of a right partial nephrectomy from years passed which showed: 5.1cm, FG 2, ccRCC with negative margins.     US from 11/5/21 reviewed today: showed stable 2.3 cm left lower pole solid mass.    Patient initially scheduled for surgery but changed his mind and opted for AS.  However, he saw a nephrologist, Dr. Manjeet Decker, in [place] who encouraged him to visit with Dr. Ishmael Kennedy at [facility].  He ultimately decided on and IR ablation which was performed March of 2022 at [facility].  The patient does not want any further follow-up at [facility].    US 7/22 poor image quality for evaluate of renal mass, but MRI Abd 7/11/22 showed stable left renal mass measuring 2.1 x 2.3cm. Cr stable 1.8  this was done without contrast as the patient's renal function just prior to the MRI showed some decline.  He had attributed this to dehydration.  A recheck of his blood work confirmed improvement back to his baseline.    REVIEW OF SYSTEMS:    Review of Systems   Constitutional: Negative for chills and fever.   Respiratory: Negative for shortness of breath.    Cardiovascular: Negative for chest pain.   Gastrointestinal: Negative for abdominal pain, constipation and diarrhea.   Genitourinary: Negative for dysuria, flank pain, frequency, hematuria and urgency.   Neurological: Negative for weakness.       PATIENT HISTORY:    Past Medical History:   Diagnosis Date    Acute renal failure 9/7/2020    Bacteremia due to Enterococcus 10/25/2020    Cancer   "   Diabetes mellitus, type 2     Disorder of kidney and ureter     Encounter for blood transfusion     History of renal cell carcinoma status post partial nephrectomy 9/7/2020    Hydronephrosis 9/9/2020    Hyperlipidemia     Hypertension     Infection and inflammatory reaction due to nephrostomy catheter, initial encounter 11/14/2020    Infection following a procedure, superficial incisional surgical site, subsequent encounter 12/26/2020    Polio     Pyelonephritis of right kidney     Septicemia due to enterococcus 11/15/2020       Family History   Problem Relation Age of Onset    Heart disease Mother     Heart disease Father     Stroke Father     Heart disease Maternal Grandfather     Heart disease Paternal Grandmother     Heart disease Paternal Grandfather        Allergies:  Patient has no known allergies.    Medications:    Current Outpatient Medications:     ascorbic acid, vitamin C, (VITAMIN C) 500 MG tablet, Take 500 mg by mouth once daily., Disp: , Rfl:     BD WINSOME 2ND GEN PEN NEEDLE 32 gauge x 5/32" Ndle, Inject 1 Units into the skin 3 (three) times daily., Disp: , Rfl:     calcitRIOL (ROCALTROL) 0.25 MCG Cap, TAKE 1 CAPSULE (0.25 MCG TOTAL) BY MOUTH 3 (THREE) TIMES A WEEK MONDAY/WEDNESDAY/FRIDAY, Disp: , Rfl:     indapamide (LOZOL) 1.25 MG Tab, Take 1.25 mg by mouth once daily., Disp: , Rfl:     insulin aspart U-100 (NOVOLOG FLEXPEN U-100 INSULIN) 100 unit/mL (3 mL) InPn pen, Inject 10 Units into the skin 3 (three) times daily with meals. Check glucose before meals and then take insulin as per sliding scale, Disp: 5 each, Rfl: 3    insulin degludec (TRESIBA FLEXTOUCH U-100) 100 unit/mL (3 mL) insulin pen, Inject 15 Units into the skin every evening., Disp: 5 pen, Rfl: 3    losartan (COZAAR) 100 MG tablet, Take 1 tablet (100 mg total) by mouth once daily., Disp: 90 tablet, Rfl: 3    multivitamin capsule, Take 1 capsule by mouth once daily., Disp: , Rfl:     sildenafiL (VIAGRA) 50 " MG tablet, Take 1 tablet (50 mg total) by mouth daily as needed for Erectile Dysfunction (Take 1 pill on a day as needed for erectile dysfunction)., Disp: 10 tablet, Rfl: 5    sodium bicarbonate 650 MG tablet, Take 650 mg by mouth once daily., Disp: , Rfl:     traZODone (DESYREL) 50 MG tablet, TAKE 1 TABLET BY MOUTH NIGHTLY AS NEEDED FOR INSOMNIA., Disp: 90 tablet, Rfl: 2    TRUE METRIX GLUCOSE TEST STRIP Strp, USE 1 STRIP TO TEST 3 TIMES DAILY BEFORE MEALS, Disp: 300 strip, Rfl: 1    zinc gluconate 50 mg tablet, Take 50 mg by mouth once daily., Disp: , Rfl:     blood-glucose meter (TRUE METRIX GLUCOSE METER) Misc, 1 Device by Misc.(Non-Drug; Combo Route) route once daily. (Patient taking differently: 1 Device by Misc.(Non-Drug; Combo Route) route once daily. Non-med item), Disp: 1 each, Rfl: 0    Current Facility-Administered Medications:     ciprofloxacin HCl tablet 500 mg, 500 mg, Oral, 1 time in Clinic/HOD, Dave Shin MD    PHYSICAL EXAMINATION:      Physical Exam  Constitutional:       General: He is not in acute distress.     Appearance: He is well-developed.   HENT:      Head: Normocephalic and atraumatic.   Eyes:      General: No scleral icterus.  Neck:      Trachea: No tracheal deviation.   Pulmonary:      Effort: Pulmonary effort is normal. No respiratory distress.   Neurological:      Mental Status: He is alert and oriented to person, place, and time.   Psychiatric:         Behavior: Behavior normal.         Thought Content: Thought content normal.         Judgment: Judgment normal.     : small left epididymal cyst, no testicular lesions bilaterally    LABS:    Lab Results   Component Value Date    PSA 0.37 02/02/2022    PSA 0.28 09/10/2019    PSADIAG 0.34 12/08/2020       IMPRESSION:  No diagnosis found.    PLAN:  Problem List Items Addressed This Visit    None         -s/p ablation  -will plan MRI abdomen w/wo contrast  -we discussed the NCCN guidelines regarding status post ablation  therapy for small renal mass.  Guidelines suggest imaging at 1-6 months which a an MRI would be approximately 5-6 months post ablation depending on when to get scheduled.  If this is reassuring for no residual cancer, then he would need an annual MRI moving forward.  -we discussed that if there is some signs of residual cancer within the ablation site, a repeat ablation could be pursued.  Would not recommend surgical therapy at this point given his renal function as well as his prior ablation would make surgical therapy much more challenging and more likely to result in a total nephrectomy.      Phong Cavanaugh MD          I have reviewed the notes, assessments, and/or procedures performed by Mao, I concur with her/his documentation of Jamil Cadet.

## 2022-08-25 ENCOUNTER — PATIENT MESSAGE (OUTPATIENT)
Dept: FAMILY MEDICINE | Facility: CLINIC | Age: 77
End: 2022-08-25

## 2022-08-25 DIAGNOSIS — E11.649 TYPE 2 DIABETES MELLITUS WITH HYPOGLYCEMIA WITHOUT COMA, WITH LONG-TERM CURRENT USE OF INSULIN: Primary | ICD-10-CM

## 2022-08-25 DIAGNOSIS — Z79.4 TYPE 2 DIABETES MELLITUS WITH HYPOGLYCEMIA WITHOUT COMA, WITH LONG-TERM CURRENT USE OF INSULIN: Primary | ICD-10-CM

## 2022-08-25 RX ORDER — CALCIUM CITRATE/VITAMIN D3 200MG-6.25
1 TABLET ORAL
Qty: 300 STRIP | Refills: 1 | Status: SHIPPED | OUTPATIENT
Start: 2022-08-25 | End: 2023-01-23 | Stop reason: SDUPTHER

## 2022-08-26 ENCOUNTER — HOSPITAL ENCOUNTER (OUTPATIENT)
Dept: RADIOLOGY | Facility: HOSPITAL | Age: 77
Discharge: HOME | End: 2022-08-26
Attending: UROLOGY
Payer: MEDICARE

## 2022-08-26 DIAGNOSIS — C64.1 RENAL CELL ADENOCARCINOMA, RIGHT: ICD-10-CM

## 2022-08-26 LAB
CREAT SERPL-MCNC: 1.8 MG/DL (ref 0.5–1.4)
SAMPLE: ABNORMAL

## 2022-08-26 PROCEDURE — 25500020 PHARM REV CODE 255: Mod: PO | Performed by: UROLOGY

## 2022-08-26 PROCEDURE — 82565 ASSAY OF CREATININE: CPT | Mod: PO

## 2022-08-26 PROCEDURE — 74183 MRI ABD W/O CNTR FLWD CNTR: CPT | Mod: TC,PO

## 2022-08-26 PROCEDURE — A9585 GADOBUTROL INJECTION: HCPCS | Mod: PO | Performed by: UROLOGY

## 2022-08-26 RX ORDER — GADOBUTROL 604.72 MG/ML
7.5 INJECTION INTRAVENOUS
Status: COMPLETED | OUTPATIENT
Start: 2022-08-26 | End: 2022-08-26

## 2022-08-26 RX ADMIN — GADOBUTROL 7.5 ML: 604.72 INJECTION INTRAVENOUS at 08:08

## 2022-08-27 ENCOUNTER — PATIENT MESSAGE (OUTPATIENT)
Dept: UROLOGY | Facility: CLINIC | Age: 77
End: 2022-08-27
Payer: MEDICARE

## 2022-08-27 DIAGNOSIS — N28.89 OTHER SPECIFIED DISORDERS OF KIDNEY AND URETER: Primary | ICD-10-CM

## 2022-08-27 DIAGNOSIS — C64.1 RENAL CELL ADENOCARCINOMA, RIGHT: ICD-10-CM

## 2022-08-30 ENCOUNTER — TELEPHONE (OUTPATIENT)
Dept: UROLOGY | Facility: CLINIC | Age: 77
End: 2022-08-30
Payer: MEDICARE

## 2022-08-30 NOTE — TELEPHONE ENCOUNTER
[id number] spoke to pt and scheduled CMP, MRI, CXR for [facility] on Mon 8/21/23 with Kip OV for Wed 8/23/23 at 10 AM.  Pt expressed his thanks to Dr. Shin for replying on this past Saturday with the results of his MRI.    ----- Message from Dave Shin MD sent at 8/27/2022 10:04 AM CDT -----  Needs mri , cmp, cxr in 1 year.

## 2022-09-13 ENCOUNTER — LAB VISIT (OUTPATIENT)
Dept: LAB | Facility: HOSPITAL | Age: 77
End: 2022-09-13
Attending: INTERNAL MEDICINE
Payer: MEDICARE

## 2022-09-13 DIAGNOSIS — N18.32 CHRONIC KIDNEY DISEASE (CKD) STAGE G3B/A1, MODERATELY DECREASED GLOMERULAR FILTRATION RATE (GFR) BETWEEN 30-44 ML/MIN/1.73 SQUARE METER AND ALBUMINURIA CREATININE RATIO LESS THAN 30 MG/G: ICD-10-CM

## 2022-09-13 DIAGNOSIS — N18.9 ANEMIA OF CHRONIC RENAL FAILURE: ICD-10-CM

## 2022-09-13 DIAGNOSIS — D63.1 ANEMIA OF CHRONIC RENAL FAILURE: ICD-10-CM

## 2022-09-13 DIAGNOSIS — N39.0 URINARY TRACT INFECTION, SITE NOT SPECIFIED: Primary | ICD-10-CM

## 2022-09-13 DIAGNOSIS — I10 ESSENTIAL HYPERTENSION, MALIGNANT: ICD-10-CM

## 2022-09-13 LAB
ALBUMIN SERPL BCP-MCNC: 4.2 G/DL (ref 3.5–5.2)
ALBUMIN/CREAT UR: 7.3 UG/MG (ref 0–30)
ANION GAP SERPL CALC-SCNC: 8 MMOL/L (ref 8–16)
BACTERIA #/AREA URNS HPF: NEGATIVE /HPF
BASOPHILS # BLD AUTO: 0.03 K/UL (ref 0–0.2)
BASOPHILS NFR BLD: 0.5 % (ref 0–1.9)
BILIRUB UR QL STRIP: NEGATIVE
BUN SERPL-MCNC: 38 MG/DL (ref 8–23)
CALCIUM SERPL-MCNC: 9.3 MG/DL (ref 8.7–10.5)
CHLORIDE SERPL-SCNC: 113 MMOL/L (ref 95–110)
CLARITY UR: CLEAR
CO2 SERPL-SCNC: 20 MMOL/L (ref 23–29)
COLOR UR: YELLOW
CREAT SERPL-MCNC: 2 MG/DL (ref 0.5–1.4)
CREAT UR-MCNC: 142 MG/DL (ref 23–375)
CREAT UR-MCNC: 142 MG/DL (ref 23–375)
DIFFERENTIAL METHOD: ABNORMAL
EOSINOPHIL # BLD AUTO: 0.2 K/UL (ref 0–0.5)
EOSINOPHIL NFR BLD: 3.5 % (ref 0–8)
ERYTHROCYTE [DISTWIDTH] IN BLOOD BY AUTOMATED COUNT: 12.5 % (ref 11.5–14.5)
EST. GFR  (NO RACE VARIABLE): 33.3 ML/MIN/1.73 M^2
GLUCOSE SERPL-MCNC: 94 MG/DL (ref 70–110)
GLUCOSE UR QL STRIP: NEGATIVE
HCT VFR BLD AUTO: 37.7 % (ref 40–54)
HGB BLD-MCNC: 12.8 G/DL (ref 14–18)
HGB UR QL STRIP: ABNORMAL
HYALINE CASTS #/AREA URNS LPF: 3 /LPF
IMM GRANULOCYTES # BLD AUTO: 0.01 K/UL (ref 0–0.04)
IMM GRANULOCYTES NFR BLD AUTO: 0.2 % (ref 0–0.5)
KETONES UR QL STRIP: NEGATIVE
LEUKOCYTE ESTERASE UR QL STRIP: NEGATIVE
LYMPHOCYTES # BLD AUTO: 1 K/UL (ref 1–4.8)
LYMPHOCYTES NFR BLD: 15.6 % (ref 18–48)
MAGNESIUM SERPL-MCNC: 1.8 MG/DL (ref 1.6–2.6)
MCH RBC QN AUTO: 32.2 PG (ref 27–31)
MCHC RBC AUTO-ENTMCNC: 34 G/DL (ref 32–36)
MCV RBC AUTO: 95 FL (ref 82–98)
MICROALBUMIN UR DL<=1MG/L-MCNC: 10.3 UG/ML
MICROSCOPIC COMMENT: ABNORMAL
MONOCYTES # BLD AUTO: 0.7 K/UL (ref 0.3–1)
MONOCYTES NFR BLD: 10.2 % (ref 4–15)
NEUTROPHILS # BLD AUTO: 4.5 K/UL (ref 1.8–7.7)
NEUTROPHILS NFR BLD: 70 % (ref 38–73)
NITRITE UR QL STRIP: NEGATIVE
NRBC BLD-RTO: 0 /100 WBC
PH UR STRIP: 6 [PH] (ref 5–8)
PHOSPHATE SERPL-MCNC: 3.6 MG/DL (ref 2.7–4.5)
PLATELET # BLD AUTO: 224 K/UL (ref 150–450)
PMV BLD AUTO: 10.4 FL (ref 9.2–12.9)
POTASSIUM SERPL-SCNC: 4.1 MMOL/L (ref 3.5–5.1)
PROT UR QL STRIP: ABNORMAL
PROT UR-MCNC: 22 MG/DL (ref 6–15)
PROT/CREAT UR: 0.15 MG/G{CREAT} (ref 0–0.2)
RBC # BLD AUTO: 3.98 M/UL (ref 4.6–6.2)
RBC #/AREA URNS HPF: 4 /HPF (ref 0–4)
SODIUM SERPL-SCNC: 141 MMOL/L (ref 136–145)
SP GR UR STRIP: 1.01 (ref 1–1.03)
SQUAMOUS #/AREA URNS HPF: 1 /HPF
URATE SERPL-MCNC: 8.5 MG/DL (ref 3.4–7)
URN SPEC COLLECT METH UR: ABNORMAL
UROBILINOGEN UR STRIP-ACNC: NEGATIVE EU/DL
WBC # BLD AUTO: 6.35 K/UL (ref 3.9–12.7)
WBC #/AREA URNS HPF: 2 /HPF (ref 0–5)

## 2022-09-13 PROCEDURE — 82570 ASSAY OF URINE CREATININE: CPT | Performed by: INTERNAL MEDICINE

## 2022-09-13 PROCEDURE — 84156 ASSAY OF PROTEIN URINE: CPT | Performed by: INTERNAL MEDICINE

## 2022-09-13 PROCEDURE — 84550 ASSAY OF BLOOD/URIC ACID: CPT | Performed by: INTERNAL MEDICINE

## 2022-09-13 PROCEDURE — 82043 UR ALBUMIN QUANTITATIVE: CPT | Performed by: INTERNAL MEDICINE

## 2022-09-13 PROCEDURE — 36415 COLL VENOUS BLD VENIPUNCTURE: CPT | Performed by: INTERNAL MEDICINE

## 2022-09-13 PROCEDURE — 85025 COMPLETE CBC W/AUTO DIFF WBC: CPT | Performed by: INTERNAL MEDICINE

## 2022-09-13 PROCEDURE — 81001 URINALYSIS AUTO W/SCOPE: CPT | Performed by: INTERNAL MEDICINE

## 2022-09-13 PROCEDURE — 83735 ASSAY OF MAGNESIUM: CPT | Performed by: INTERNAL MEDICINE

## 2022-09-13 PROCEDURE — 80069 RENAL FUNCTION PANEL: CPT | Performed by: INTERNAL MEDICINE

## 2022-09-21 DIAGNOSIS — I10 HYPERTENSION: ICD-10-CM

## 2022-09-21 DIAGNOSIS — N18.32 STAGE 3B CHRONIC KIDNEY DISEASE: Primary | ICD-10-CM

## 2022-09-28 ENCOUNTER — HOSPITAL ENCOUNTER (OUTPATIENT)
Dept: CARDIOLOGY | Facility: HOSPITAL | Age: 77
Discharge: HOME | End: 2022-09-28
Attending: INTERNAL MEDICINE
Payer: MEDICARE

## 2022-09-28 VITALS — BODY MASS INDEX: 24.92 KG/M2 | HEIGHT: 71 IN | WEIGHT: 178 LBS

## 2022-09-28 DIAGNOSIS — N18.32 STAGE 3B CHRONIC KIDNEY DISEASE: ICD-10-CM

## 2022-09-28 DIAGNOSIS — I10 HYPERTENSION: ICD-10-CM

## 2022-09-28 PROCEDURE — 93306 TTE W/DOPPLER COMPLETE: CPT | Mod: 26,,, | Performed by: SPECIALIST

## 2022-09-28 PROCEDURE — 93306 TTE W/DOPPLER COMPLETE: CPT

## 2022-09-28 PROCEDURE — 93306 ECHO (CUPID ONLY): ICD-10-PCS | Mod: 26,,, | Performed by: SPECIALIST

## 2022-10-22 LAB
AV INDEX (PROSTH): 0.52
AV MEAN GRADIENT: 15 MMHG
AV PEAK GRADIENT: 15 MMHG
AV REGURGITATION PRESSURE HALF TIME: 697 MS
AV VALVE AREA: 1.81 CM2
AV VELOCITY RATIO: 0.35
BSA FOR ECHO PROCEDURE: 2.01 M2
CV ECHO LV RWT: 0.33 CM
DOP CALC AO PEAK VEL: 1.93 M/S
DOP CALC AO VTI: 40.2 CM
DOP CALC LVOT AREA: 3.5 CM2
DOP CALC LVOT DIAMETER: 2.1 CM
DOP CALC LVOT PEAK VEL: 0.67 M/S
DOP CALC LVOT STROKE VOLUME: 72.7 CM3
DOP CALCLVOT PEAK VEL VTI: 21 CM
E WAVE DECELERATION TIME: 268 MS
E/A RATIO: 1.02
E/E' RATIO: 10.31 M/S
ECHO LV POSTERIOR WALL: 0.85 CM (ref 0.6–1.1)
EJECTION FRACTION: 55 %
FRACTIONAL SHORTENING: 23 % (ref 28–44)
INTERVENTRICULAR SEPTUM: 0.85 CM (ref 0.6–1.1)
IVRT: 109 MS
LEFT ATRIUM SIZE: 4.4 CM
LEFT INTERNAL DIMENSION IN SYSTOLE: 3.95 CM (ref 2.1–4)
LEFT VENTRICLE MASS INDEX: 76 G/M2
LEFT VENTRICULAR INTERNAL DIMENSION IN DIASTOLE: 5.13 CM (ref 3.5–6)
LEFT VENTRICULAR MASS: 153.36 G
LV LATERAL E/E' RATIO: 11.17 M/S
LV SEPTAL E/E' RATIO: 9.57 M/S
MV PEAK A VEL: 0.66 M/S
MV PEAK E VEL: 0.67 M/S
MV STENOSIS PRESSURE HALF TIME: 44 MS
MV VALVE AREA P 1/2 METHOD: 5 CM2
PISA TR MAX VEL: 2.46 M/S
RA PRESSURE: 3 MMHG
RIGHT VENTRICULAR END-DIASTOLIC DIMENSION: 1.96 CM
TDI LATERAL: 0.06 M/S
TDI SEPTAL: 0.07 M/S
TDI: 0.07 M/S
TR MAX PG: 24 MMHG
TV REST PULMONARY ARTERY PRESSURE: 27 MMHG

## 2022-10-31 ENCOUNTER — CLINICAL SUPPORT (OUTPATIENT)
Dept: FAMILY MEDICINE | Facility: CLINIC | Age: 77
End: 2022-10-31
Payer: MEDICARE

## 2022-10-31 DIAGNOSIS — Z23 NEED FOR INFLUENZA VACCINATION: Primary | ICD-10-CM

## 2022-10-31 PROCEDURE — G0008 ADMIN INFLUENZA VIRUS VAC: HCPCS | Mod: PBBFAC | Performed by: INTERNAL MEDICINE

## 2022-10-31 PROCEDURE — 90662 IIV NO PRSV INCREASED AG IM: CPT | Mod: PBBFAC | Performed by: INTERNAL MEDICINE

## 2022-11-16 ENCOUNTER — PATIENT MESSAGE (OUTPATIENT)
Dept: FAMILY MEDICINE | Facility: CLINIC | Age: 77
End: 2022-11-16

## 2022-11-18 ENCOUNTER — PATIENT MESSAGE (OUTPATIENT)
Dept: FAMILY MEDICINE | Facility: CLINIC | Age: 77
End: 2022-11-18

## 2022-11-18 DIAGNOSIS — M79.606 PAIN OF LOWER EXTREMITY, UNSPECIFIED LATERALITY: Primary | ICD-10-CM

## 2022-11-18 DIAGNOSIS — Z86.12 HISTORY OF POLIOMYELITIS: ICD-10-CM

## 2022-11-18 NOTE — TELEPHONE ENCOUNTER
"  Also diclofenac is an anti-inflammatory medication like ibuprofen or Motrin and could affect your already compromised kidneys.  Have you tried gabapentin?  ===View-only below this line===      ----- Message -----       From:Jamil Cadet "Herminio"       Sent:11/16/2022  4:47 PM CST         To:Richard Gunter MD    Subject:Muscle pain in right leg    Could you ask Dr. Gunter to fill a prescription for Clonafec  (Diclofen aco)  100mg  60 capsules (ran out of pills).    I had Polio in both legs when I was young, had problems on and off with the muscles in my legs.  Clonafec was the only thing that helped with the pain taken once a day, relieved pain within two or so days .      My old GP prescribed them for me many years ago,  he has now passed away .    Thanks & God Noe Cadet  "

## 2022-12-02 ENCOUNTER — PATIENT MESSAGE (OUTPATIENT)
Dept: FAMILY MEDICINE | Facility: CLINIC | Age: 77
End: 2022-12-02

## 2022-12-02 RX ORDER — GABAPENTIN 100 MG/1
100 CAPSULE ORAL 3 TIMES DAILY
Qty: 90 CAPSULE | Refills: 3 | Status: SHIPPED | OUTPATIENT
Start: 2022-12-02 | End: 2023-04-19

## 2022-12-02 NOTE — ADDENDUM NOTE
Addended by: ROBSON LÓPEZ on: 12/2/2022 11:17 AM     Modules accepted: Orders     Complex Repair Preamble Text (Leave Blank If You Do Not Want): Extensive wide undermining was performed.

## 2022-12-02 NOTE — TELEPHONE ENCOUNTER
"Pain of lower extremity, unspecified laterality  -     gabapentin (NEURONTIN) 100 MG capsule; Take 1 capsule (100 mg total) by mouth 3 (three) times daily. Start 1 capsule at bedtime for 7 nights and then 1 capsule twice a day for 7 days and then 3 times a day to continue.  Dispense: 90 capsule; Refill: 3    History of poliomyelitis  -     gabapentin (NEURONTIN) 100 MG capsule; Take 1 capsule (100 mg total) by mouth 3 (three) times daily. Start 1 capsule at bedtime for 7 nights and then 1 capsule twice a day for 7 days and then 3 times a day to continue.  Dispense: 90 capsule; Refill: 3   To that effect, I am sending a prescription for gabapentin.  I am sending it 100 mg 3 times a day.  To your [facility] pharmacy.      Please start it at 100 mg at bedtime for 7 nights.      After that 100 mg twice a day-in morning and at bedtime for next 7 nights     After that 100 mg 3 times a day as needed.  We might have to go up to 200 or 300 mg at nighttime to give you relief.  But we have to make a slow and steady start.  ===View-only below this line===      ----- Message -----       From:Jamil Vigil"       Sent:11/19/2022  9:52 AM CST         To:Patient Medical Advice Request Message List    Subject:Muscle pain in right leg    Dr. Gunter         The problem is in the right leg below the knee in the muscle.  Had this problem in both legs in the same place  for years.  The destruction of Polio on the leg muscles in the early 40's were not good, the pain now is affecting my  walking and leg swelling .  Don't want to harm the kidneys any more then they are, I can live with the pain but can't  live with out kidneys.   Thank you as always    God Noe Cadet          ----- Message -----       From:Richard Gunter MD       Sent:11/19/2022  8:15 AM CST         To:Jamil Cadet "Herminio"    Subject:Muscle pain in right leg    I will be looking to messages today all day long from my patients.  Tell me again the nature of the pain " "specifically and if it is in the muscles or its in the joints?  And have you ever tried gabapentin before?  If not, will have to start it slowly.      ----- Message -----       From:Jamil Vigil"       Sent:11/18/2022  3:51 PM CST         To:Patient Medical Advice Request Message List    Subject:Muscle pain in right leg    Dr Gunter      Thanks for your fast response, was not aware of Diclofenac harming my kidneys.  I thank you again   for looking out for me as always and no I have not tried  Gabapentin.  Could you prescribe some for me.    Thanks for responding on your day off.    Laura Noe  Hermiino Cadet       ----- Message -----       From:Richard Gunter MD       Sent:11/18/2022  1:22 PM CST         To:Jamil Vigil"    Subject:Muscle pain in right leg    Also diclofenac is an anti-inflammatory medication like ibuprofen or Motrin and could affect your already compromised kidneys.  Have you tried gabapentin?      ----- Message -----       From:Richard Gunter MD       Sent:11/18/2022  1:19 PM CST         To:Jamil Vigil"    Subject:Muscle pain in right leg    Please reconfirm the name of medication because I could not find this particular medication in our database.  Would you be talking about a medication called diclofenac ( Voltaren) which is an anti-inflammatory medication?  This comes in 50 and 75 mg only and a tablet not capsule.      ----- Message -----       From:Jamil Vigil"       Sent:11/16/2022  4:47 PM CST         To:Richard Gunter MD    Subject:Muscle pain in right leg    Could you ask Dr. Gunter to fill a prescription for Clonafec  (Diclofen aco)  100mg  60 capsules (ran out of pills).    I had Polio in both legs when I was young, had problems on and off with the muscles in my legs.  Clonafec was the only thing that helped with the pain taken once a day, relieved pain within two or so days .      My old GP prescribed them for me many years ago,  he has now passed away " .    Thanks & God Bless    Herminio Cadet

## 2022-12-12 ENCOUNTER — LAB VISIT (OUTPATIENT)
Dept: LAB | Facility: HOSPITAL | Age: 77
End: 2022-12-12
Attending: INTERNAL MEDICINE
Payer: MEDICARE

## 2022-12-12 DIAGNOSIS — D63.1 ANEMIA OF CHRONIC RENAL FAILURE: ICD-10-CM

## 2022-12-12 DIAGNOSIS — I10 ESSENTIAL HYPERTENSION, MALIGNANT: ICD-10-CM

## 2022-12-12 DIAGNOSIS — R80.9 PROTEINURIA: ICD-10-CM

## 2022-12-12 DIAGNOSIS — N39.0 URINARY TRACT INFECTION, SITE NOT SPECIFIED: ICD-10-CM

## 2022-12-12 DIAGNOSIS — N18.32 CHRONIC KIDNEY DISEASE (CKD) STAGE G3B/A1, MODERATELY DECREASED GLOMERULAR FILTRATION RATE (GFR) BETWEEN 30-44 ML/MIN/1.73 SQUARE METER AND ALBUMINURIA CREATININE RATIO LESS THAN 30 MG/G: Primary | ICD-10-CM

## 2022-12-12 DIAGNOSIS — N18.9 ANEMIA OF CHRONIC RENAL FAILURE: ICD-10-CM

## 2022-12-12 LAB
ALBUMIN SERPL BCP-MCNC: 4 G/DL (ref 3.5–5.2)
ALBUMIN/CREAT UR: 10.9 UG/MG (ref 0–30)
ANION GAP SERPL CALC-SCNC: 8 MMOL/L (ref 8–16)
BACTERIA #/AREA URNS HPF: NEGATIVE /HPF
BASOPHILS # BLD AUTO: 0.02 K/UL (ref 0–0.2)
BASOPHILS NFR BLD: 0.3 % (ref 0–1.9)
BILIRUB UR QL STRIP: NEGATIVE
BUN SERPL-MCNC: 35 MG/DL (ref 8–23)
CALCIUM SERPL-MCNC: 8.9 MG/DL (ref 8.7–10.5)
CHLORIDE SERPL-SCNC: 107 MMOL/L (ref 95–110)
CLARITY UR: CLEAR
CO2 SERPL-SCNC: 25 MMOL/L (ref 23–29)
COLOR UR: YELLOW
CREAT SERPL-MCNC: 2 MG/DL (ref 0.5–1.4)
CREAT UR-MCNC: 97 MG/DL (ref 23–375)
CREAT UR-MCNC: 97 MG/DL (ref 23–375)
DIFFERENTIAL METHOD: ABNORMAL
EOSINOPHIL # BLD AUTO: 0.3 K/UL (ref 0–0.5)
EOSINOPHIL NFR BLD: 4.6 % (ref 0–8)
ERYTHROCYTE [DISTWIDTH] IN BLOOD BY AUTOMATED COUNT: 12 % (ref 11.5–14.5)
EST. GFR  (NO RACE VARIABLE): 33.3 ML/MIN/1.73 M^2
GLUCOSE SERPL-MCNC: 68 MG/DL (ref 70–110)
GLUCOSE UR QL STRIP: NEGATIVE
HCT VFR BLD AUTO: 38.4 % (ref 40–54)
HGB BLD-MCNC: 12.6 G/DL (ref 14–18)
HGB UR QL STRIP: ABNORMAL
HYALINE CASTS #/AREA URNS LPF: 1 /LPF
IMM GRANULOCYTES # BLD AUTO: 0.02 K/UL (ref 0–0.04)
IMM GRANULOCYTES NFR BLD AUTO: 0.3 % (ref 0–0.5)
KETONES UR QL STRIP: NEGATIVE
LEUKOCYTE ESTERASE UR QL STRIP: NEGATIVE
LYMPHOCYTES # BLD AUTO: 1.2 K/UL (ref 1–4.8)
LYMPHOCYTES NFR BLD: 19.4 % (ref 18–48)
MAGNESIUM SERPL-MCNC: 2.3 MG/DL (ref 1.6–2.6)
MCH RBC QN AUTO: 31.7 PG (ref 27–31)
MCHC RBC AUTO-ENTMCNC: 32.8 G/DL (ref 32–36)
MCV RBC AUTO: 97 FL (ref 82–98)
MICROALBUMIN UR DL<=1MG/L-MCNC: 10.6 UG/ML
MICROSCOPIC COMMENT: ABNORMAL
MONOCYTES # BLD AUTO: 0.7 K/UL (ref 0.3–1)
MONOCYTES NFR BLD: 10.7 % (ref 4–15)
NEUTROPHILS # BLD AUTO: 3.9 K/UL (ref 1.8–7.7)
NEUTROPHILS NFR BLD: 64.7 % (ref 38–73)
NITRITE UR QL STRIP: NEGATIVE
NRBC BLD-RTO: 0 /100 WBC
PH UR STRIP: 7 [PH] (ref 5–8)
PHOSPHATE SERPL-MCNC: 4.2 MG/DL (ref 2.7–4.5)
PLATELET # BLD AUTO: 263 K/UL (ref 150–450)
PMV BLD AUTO: 10.7 FL (ref 9.2–12.9)
POTASSIUM SERPL-SCNC: 4.3 MMOL/L (ref 3.5–5.1)
PROT UR QL STRIP: NEGATIVE
PROT UR-MCNC: 19 MG/DL (ref 6–15)
PROT/CREAT UR: 0.2 MG/G{CREAT} (ref 0–0.2)
RBC # BLD AUTO: 3.98 M/UL (ref 4.6–6.2)
RBC #/AREA URNS HPF: 35 /HPF (ref 0–4)
SODIUM SERPL-SCNC: 140 MMOL/L (ref 136–145)
SP GR UR STRIP: 1.01 (ref 1–1.03)
SQUAMOUS #/AREA URNS HPF: 2 /HPF
URATE SERPL-MCNC: 8 MG/DL (ref 3.4–7)
URN SPEC COLLECT METH UR: ABNORMAL
UROBILINOGEN UR STRIP-ACNC: NEGATIVE EU/DL
WBC # BLD AUTO: 6.07 K/UL (ref 3.9–12.7)
WBC #/AREA URNS HPF: 2 /HPF (ref 0–5)

## 2022-12-12 PROCEDURE — 84550 ASSAY OF BLOOD/URIC ACID: CPT | Performed by: INTERNAL MEDICINE

## 2022-12-12 PROCEDURE — 83735 ASSAY OF MAGNESIUM: CPT | Performed by: INTERNAL MEDICINE

## 2022-12-12 PROCEDURE — 82570 ASSAY OF URINE CREATININE: CPT | Performed by: INTERNAL MEDICINE

## 2022-12-12 PROCEDURE — 80069 RENAL FUNCTION PANEL: CPT | Performed by: INTERNAL MEDICINE

## 2022-12-12 PROCEDURE — 36415 COLL VENOUS BLD VENIPUNCTURE: CPT | Performed by: INTERNAL MEDICINE

## 2022-12-12 PROCEDURE — 84156 ASSAY OF PROTEIN URINE: CPT | Performed by: INTERNAL MEDICINE

## 2022-12-12 PROCEDURE — 82043 UR ALBUMIN QUANTITATIVE: CPT | Performed by: INTERNAL MEDICINE

## 2022-12-12 PROCEDURE — 81001 URINALYSIS AUTO W/SCOPE: CPT | Performed by: INTERNAL MEDICINE

## 2022-12-12 PROCEDURE — 85025 COMPLETE CBC W/AUTO DIFF WBC: CPT | Performed by: INTERNAL MEDICINE

## 2023-01-03 ENCOUNTER — TELEPHONE (OUTPATIENT)
Dept: FAMILY MEDICINE | Facility: CLINIC | Age: 78
End: 2023-01-03

## 2023-01-03 NOTE — TELEPHONE ENCOUNTER
Pt's wife called, states her  has appt. Next week with dr. Gunter and wants to know if he needs lab work before appt.  Returned pt's call no ans. LVM to just keep appt. And Dr. Gunter will order labs at visit if he feel necessary.

## 2023-01-08 NOTE — PROGRESS NOTES
rainasSubjective:         Patient ID: Jamil Cadet is a 77 y.o. male.    Chief Complaint: Hypertension, Hyperlipidemia, Diabetes, Erectile Dysfunction, and Gout    Patient is a 77-year-old gentleman who comes follow-up.  He is accompanied with his wife miss Chance who is not a patient today.  Underlying medical issues are as below:-     1.         Essential hypertension   2.         Mixed dyslipidemia   3.         Type 2 diabetes mellitus with hypoglycemia without coma, with long-term current use of insulin   4.         History of renal cell carcinoma status post partial nephrectomy   5.         Stage 3b chronic kidney disease   6.         Cholelithiasis without cholecystitis     Previous visits,  we had reviewed his decision to pursue a surveillance and wait and watch policy on his kidney cancer rather the going for any aggressive treatment.  He is following up with Dr. Dave Shin [facility].  He does have a follow-up with Dr. Shin in the month of August this year for further decisions.    He will also follow-up with Dr. Decker for kidney status in the month of March.    He has also some URI symptoms after the Jaswinder.  He has not been treated for anything.  He did go to the urgent care center and he was tested negative for flu and COVID.  He has been prescribed Augmentin twice a day which he is taking.  After that his sugars have been going somewhat on the upper side.    His wife also has similar symptoms.      Psychosocial issues of aging, burden of multiple medical l issues also predominate in the background and do affect the spectrum of his healthcare problems.      A large panel of lab was reviewed as ordered by his nephrologist Dr. Manjeet Decker.  Hemoglobin 12.6, hematocrit 37.9, WBC 5.92 and platelets 216. Lymphocyte percentage slightly low at 16%.  Blood glucose 145. BUN 32 and creatinine 1.8.  EGFR 35.    Vitamin B12 240, folate 13.4, retic count 1.3%.  Methylmalonic acid  levels-340 which is within normal range.  Vitamin-D level is 45. Magnesium 1.8.  Uric acid 8.0 which is elevated.  PTH is 127.2.  Urine microalbumin is normal.  Urinalysis had shown hyaline casts.     TODAY ON FOLLOW-UP HE IS SIGNIFICANTLY DISAPPOINTED ON REVIEWING THE MRI REPORT OF THE KIDNEYS AND THERE IS NO SHRINKAGE OF THE TUMOR THAT WAS TARGETED WITH THERAPY.  HE WILL GET THIS OPINION AGAIN from the renal oncologist.      I have reviewed his blood pressures at home and they seem to be in good range.  There was a low on 07/11 with blood pressure of 105/49 when his sugars for some reason went low.  That was Sunday when he had gone to the [facility].    Consideration for Prevnar 20 vaccine to complete his pneumonia series of vaccination also discussed and offered.        Diabetes  He presents for his follow-up diabetic visit. He has type 2 diabetes mellitus. The initial diagnosis of diabetes was made 10 years ago. His disease course has been stable. Hypoglycemia symptoms include nervousness/anxiousness (Cancer related issues). Pertinent negatives for hypoglycemia include no confusion, headaches or seizures. There are no diabetic associated symptoms. Pertinent negatives for diabetes include no chest pain, no polydipsia, no polyuria and no weakness. Risk factors for coronary artery disease include male sex, stress, diabetes mellitus and dyslipidemia. Current diabetic treatment includes insulin injections. He is compliant with treatment most of the time. His weight is stable. Meal planning includes avoidance of concentrated sweets. He has not had a previous visit with a dietitian. He participates in exercise intermittently. His home blood glucose trend is decreasing steadily. An ACE inhibitor/angiotensin II receptor blocker is being taken. He does not see a podiatrist.Eye exam is current.   Hyperlipidemia  This is a chronic (Medication was stopped due to muscle aches.) problem. The current episode started more than 1  year ago. Exacerbating diseases include chronic renal disease. He has no history of obesity. Associated symptoms include myalgias (Stable myalgias.). Pertinent negatives include no chest pain. He is currently on no antihyperlipidemic treatment (Currently has stop rosuvastatin.). The current treatment provides mild improvement of lipids. Compliance problems include psychosocial issues.  Risk factors for coronary artery disease include male sex, a sedentary lifestyle, dyslipidemia and diabetes mellitus.   Hypertension  This is a chronic problem. The current episode started more than 1 year ago. The problem is controlled. Pertinent negatives include no chest pain, headaches, neck pain or palpitations. Risk factors for coronary artery disease include male gender, dyslipidemia and diabetes mellitus. Past treatments include angiotensin blockers and diuretics. The current treatment provides moderate improvement. Compliance problems include psychosocial issues.  Identifiable causes of hypertension include chronic renal disease.     Past Medical History:   Diagnosis Date    Acute renal failure 9/7/2020    Bacteremia due to Enterococcus 10/25/2020    Cancer     Diabetes mellitus, type 2     Disorder of kidney and ureter     Encounter for blood transfusion     History of renal cell carcinoma status post partial nephrectomy 9/7/2020    Hydronephrosis 9/9/2020    Hyperlipidemia     Hypertension     Infection and inflammatory reaction due to nephrostomy catheter, initial encounter 11/14/2020    Infection following a procedure, superficial incisional surgical site, subsequent encounter 12/26/2020    Polio     Pyelonephritis of right kidney     Septicemia due to enterococcus 11/15/2020     Social History     Socioeconomic History    Marital status:    Tobacco Use    Smoking status: Never    Smokeless tobacco: Never   Substance and Sexual Activity    Alcohol use: No    Drug use: No    Sexual activity: Yes     Partners: Female      Social Determinants of Health     Financial Resource Strain: Low Risk     Difficulty of Paying Living Expenses: Not very hard   Food Insecurity: No Food Insecurity    Worried About Running Out of Food in the Last Year: Never true    Ran Out of Food in the Last Year: Never true   Transportation Needs: No Transportation Needs    Lack of Transportation (Medical): No    Lack of Transportation (Non-Medical): No   Physical Activity: Sufficiently Active    Days of Exercise per Week: 7 days    Minutes of Exercise per Session: 150+ min   Stress: No Stress Concern Present    Feeling of Stress : Not at all   Social Connections: Unknown    Frequency of Communication with Friends and Family: More than three times a week    Frequency of Social Gatherings with Friends and Family: Twice a week    Active Member of Clubs or Organizations: Yes    Attends Club or Organization Meetings: More than 4 times per year    Marital Status:    Housing Stability: Unknown    Unable to Pay for Housing in the Last Year: Patient refused    Unstable Housing in the Last Year: Patient refused     Past Surgical History:   Procedure Laterality Date    ANTEGRADE PYELOGRAM  12/10/2020    Procedure: PYELOGRAM, ANTEGRADE;  Surgeon: Dave Shin MD;  Location: [address];  Service: Urology;;    APPENDECTOMY      CYSTOSCOPY  12/10/2020    Procedure: CYSTOSCOPY;  Surgeon: Dave Shin MD;  Location: [address];  Service: Urology;;    CYSTOSCOPY W/ RETROGRADES Right 09/09/2020    Procedure: CYSTOSCOPY, WITH RETROGRADE PYELOGRAM;  Surgeon: Chris Garcia Jr., MD;  Location: [facility];  Service: Urology;  Laterality: Right;    DILATION OF NEPHROSTOMY TRACT Right 12/10/2020    Procedure: DILATION, NEPHROSTOMY TRACT;  Surgeon: Dave Shin MD;  Location: [address];  Service: Urology;  Laterality: Right;    RADIOFREQUENCY ABLATION KIDNEY  2022    REMOVAL OF DRAIN Right 12/10/2020    Procedure: REMOVAL, DRAIN-NEPHROSTOMY TUBE;   Surgeon: Dave Shin MD;  Location: [facility] OR 1ST FLR;  Service: Urology;  Laterality: Right;    REPLACEMENT OF NEPHROSTOMY TUBE Right 12/10/2020    Procedure: REPLACEMENT, NEPHROSTOMY TUBE;  Surgeon: Dave Shin MD;  Location: [facility] OR 1ST FLR;  Service: Urology;  Laterality: Right;    ROBOT-ASSISTED LAPAROSCOPIC REIMPLANTATION OF URETER USING DA PHU XI Right 01/08/2021    Procedure: XI ROBOTIC IMPLANTATION, URETER, USING PSOAS HITCH TECHNIQUE;  Surgeon: Dave Shin MD;  Location: [facility] OR 2ND FLR;  Service: Urology;  Laterality: Right;  4hrs gen with regional    SPINE SURGERY      URETEROSCOPY Right 12/10/2020    Procedure: URETEROSCOPY-ANTEGRADE;  Surgeon: Dave Shin MD;  Location: [facility] OR 1ST FLR;  Service: Urology;  Laterality: Right;    URETHROSCOPY  09/09/2020    Procedure: URETHROSCOPY;  Surgeon: Chris Garcia Jr., MD;  Location: [facility];  Service: Urology;;     Family History   Problem Relation Age of Onset    Heart disease Mother     Heart disease Father     Stroke Father     Heart disease Maternal Grandfather     Heart disease Paternal Grandmother     Heart disease Paternal Grandfather        Review of Systems   Constitutional:  Negative for activity change, chills, diaphoresis, fever and unexpected weight change.   HENT:  Positive for congestion and postnasal drip. Negative for hearing loss, rhinorrhea and trouble swallowing.         Symptoms of cold and cough.   Eyes:  Negative for discharge, redness and visual disturbance.   Respiratory:  Negative for cough, chest tightness and wheezing.    Cardiovascular:  Negative for chest pain, palpitations and leg swelling.        HTN Lipids   Gastrointestinal:  Negative for abdominal distention, abdominal pain, blood in stool, constipation, diarrhea and vomiting.   Endocrine: Negative for cold intolerance, polydipsia and polyuria.        Type 2 DM   Genitourinary:  Negative for difficulty urinating, hematuria and urgency.         "Kidney cancer   Musculoskeletal:  Positive for myalgias (Stable myalgias.). Negative for arthralgias, joint swelling and neck pain.   Neurological:  Negative for seizures, weakness and headaches.   Psychiatric/Behavioral:  Negative for confusion and dysphoric mood. The patient is nervous/anxious (Cancer related issues).        Objective:      Blood pressure 131/72, pulse 76, height 5' 11" (1.803 m), weight 79.3 kg (174 lb 12.8 oz). Body mass index is 24.38 kg/m².  Physical Exam  Vitals and nursing note reviewed.   Constitutional:       General: He is not in acute distress.     Appearance: Normal appearance. He is well-developed. He is not ill-appearing, toxic-appearing or diaphoretic.      Comments: BMI is 24.38   HENT:      Head: Normocephalic and atraumatic.   Eyes:      Conjunctiva/sclera: Conjunctivae normal.   Neck:      Thyroid: No thyromegaly.      Vascular: No JVD.      Trachea: No tracheal deviation.   Cardiovascular:      Rate and Rhythm: Normal rate and regular rhythm.      Heart sounds: Normal heart sounds. No murmur heard.    No friction rub. No gallop.   Pulmonary:      Effort: Pulmonary effort is normal. No respiratory distress.      Breath sounds: Normal breath sounds. No wheezing or rales.   Abdominal:      General: There is no distension.      Palpations: Abdomen is soft.      Tenderness: There is no abdominal tenderness.   Musculoskeletal:         General: No signs of injury.      Cervical back: No rigidity.      Right lower leg: No tenderness. No edema.      Left lower leg: No tenderness. No edema.      Right foot: Deformity (Pes cavus) present.      Left foot: Deformity ( pes cavus) present.        Feet:       Comments:      Feet:      Right foot:      Protective Sensation: 5 sites tested.  4 sites sensed.      Skin integrity: No ulcer, blister, skin breakdown, erythema or warmth.      Left foot:      Protective Sensation: 5 sites tested.  4 sites sensed.      Skin integrity: No ulcer, blister, " skin breakdown, erythema or warmth.      Comments: High arch in the foot.?  Effects of poliomyelitis.  Slight claw toes.  Slightly dystrophic nails on the both great toes  Lymphadenopathy:      Cervical: No cervical adenopathy.   Skin:     General: Skin is warm and dry.      Findings: No lesion or rash.   Neurological:      Mental Status: He is alert. Mental status is at baseline.   Psychiatric:         Attention and Perception: He is attentive.         Mood and Affect: Affect is not labile.         Behavior: Behavior normal.      Comments: Somewhat anhedonic over his medical issues.  Especially regarding nonresolution kidney cancer.         Assessment:       1. Type 2 diabetes mellitus with hypoglycemia without coma, with long-term current use of insulin    2. Mixed dyslipidemia    3. Stage 3a chronic kidney disease    4. Erectile dysfunction, unspecified erectile dysfunction type    5. Acute bronchitis, unspecified organism           Lab Visit on 12/12/2022   Component Date Value Ref Range Status    Magnesium 12/12/2022 2.3  1.6 - 2.6 mg/dL Final    Glucose 12/12/2022 68 (L)  70 - 110 mg/dL Final    Sodium 12/12/2022 140  136 - 145 mmol/L Final    Potassium 12/12/2022 4.3  3.5 - 5.1 mmol/L Final    Chloride 12/12/2022 107  95 - 110 mmol/L Final    CO2 12/12/2022 25  23 - 29 mmol/L Final    BUN 12/12/2022 35 (H)  8 - 23 mg/dL Final    Calcium 12/12/2022 8.9  8.7 - 10.5 mg/dL Final    Creatinine 12/12/2022 2.0 (H)  0.5 - 1.4 mg/dL Final    Albumin 12/12/2022 4.0  3.5 - 5.2 g/dL Final    Phosphorus 12/12/2022 4.2  2.7 - 4.5 mg/dL Final    eGFR 12/12/2022 33.3 (A)  >60 mL/min/1.73 m^2 Final    Anion Gap 12/12/2022 8  8 - 16 mmol/L Final    Uric Acid 12/12/2022 8.0 (H)  3.4 - 7.0 mg/dL Final    Microalbumin, Urine 12/12/2022 10.6  <19.9 ug/mL Final    Creatinine, Urine 12/12/2022 97.0  23.0 - 375.0 mg/dL Final    Microalb/Creat Ratio 12/12/2022 10.9  0.0 - 30.0 ug/mg Final    Specimen UA 12/12/2022 Urine, Clean Catch    Final    Color, UA 12/12/2022 Yellow  Yellow, Straw, Ida Final    Appearance, UA 12/12/2022 Clear  Clear Final    pH, UA 12/12/2022 7.0  5.0 - 8.0 Final    Specific Gravity, UA 12/12/2022 1.015  1.005 - 1.030 Final    Protein, UA 12/12/2022 Negative  Negative Final    Glucose, UA 12/12/2022 Negative  Negative Final    Ketones, UA 12/12/2022 Negative  Negative Final    Bilirubin (UA) 12/12/2022 Negative  Negative Final    Occult Blood UA 12/12/2022 1+ (A)  Negative Final    Nitrite, UA 12/12/2022 Negative  Negative Final    Urobilinogen, UA 12/12/2022 Negative  Negative EU/dL Final    Leukocytes, UA 12/12/2022 Negative  Negative Final    Protein, Urine Random 12/12/2022 19 (H)  6 - 15 mg/dL Final    Creatinine, Urine 12/12/2022 97.0  23.0 - 375.0 mg/dL Final    Prot/Creat Ratio, Urine 12/12/2022 0.20  0.00 - 0.20 Final    WBC 12/12/2022 6.07  3.90 - 12.70 K/uL Final    RBC 12/12/2022 3.98 (L)  4.60 - 6.20 M/uL Final    Hemoglobin 12/12/2022 12.6 (L)  14.0 - 18.0 g/dL Final    Hematocrit 12/12/2022 38.4 (L)  40.0 - 54.0 % Final    MCV 12/12/2022 97  82 - 98 fL Final    MCH 12/12/2022 31.7 (H)  27.0 - 31.0 pg Final    MCHC 12/12/2022 32.8  32.0 - 36.0 g/dL Final    RDW 12/12/2022 12.0  11.5 - 14.5 % Final    Platelets 12/12/2022 263  150 - 450 K/uL Final    MPV 12/12/2022 10.7  9.2 - 12.9 fL Final    Immature Granulocytes 12/12/2022 0.3  0.0 - 0.5 % Final    Gran # (ANC) 12/12/2022 3.9  1.8 - 7.7 K/uL Final    Immature Grans (Abs) 12/12/2022 0.02  0.00 - 0.04 K/uL Final    Lymph # 12/12/2022 1.2  1.0 - 4.8 K/uL Final    Mono # 12/12/2022 0.7  0.3 - 1.0 K/uL Final    Eos # 12/12/2022 0.3  0.0 - 0.5 K/uL Final    Baso # 12/12/2022 0.02  0.00 - 0.20 K/uL Final    nRBC 12/12/2022 0  0 /100 WBC Final    Gran % 12/12/2022 64.7  38.0 - 73.0 % Final    Lymph % 12/12/2022 19.4  18.0 - 48.0 % Final    Mono % 12/12/2022 10.7  4.0 - 15.0 % Final    Eosinophil % 12/12/2022 4.6  0.0 - 8.0 % Final    Basophil % 12/12/2022 0.3  0.0  - 1.9 % Final    Differential Method 12/12/2022 Automated   Final    RBC, UA 12/12/2022 35 (H)  0 - 4 /hpf Final    WBC, UA 12/12/2022 2  0 - 5 /hpf Final    Bacteria 12/12/2022 Negative  None-Occ /hpf Final    Squam Epithel, UA 12/12/2022 2  /hpf Final    Hyaline Casts, UA 12/12/2022 1  0-1/lpf /lpf Final    Microscopic Comment 12/12/2022 SEE COMMENT   Final      Ref Range & Units 3 wk ago   (12/12/22) 3 mo ago   (9/13/22) 5 mo ago   (7/20/22) 6 mo ago   (7/11/22) 6 mo ago   (7/11/22) 6 mo ago   (6/24/22) 11 mo ago   (2/2/22) 11 mo ago   (2/2/22)   Glucose 70 - 110 mg/dL 68 Low   94  99  196 High   193 High   145 High   98  98      Creatinine 0.5 - 1.4 mg/dL 2.0 High   2.0 High   1.8 High   2.5 High   2.4 High   1.8 High   1.8 High   1.8 High       Creatinine 0.5 - 1.4 mg/dL 2.0 High   2.0 High   1.8 High   2.5 High   2.4 High   1.8 High   1.8 High   1.8 High     Summary-echocardiogram read on 09/28/2022.    The left ventricle is normal in size with normal systolic function.  The estimated ejection fraction is 55%.  Normal left ventricular diastolic function.  Atrial fibrillation not observed.  Normal right ventricular size with normal right ventricular systolic function.  Mild-to-moderate aortic regurgitation.  There is mild aortic valve stenosis.  Aortic valve area is 1.81 cm2; peak velocity is 1.93 m/s; mean gradient is 15 mmHg.  Mild mitral regurgitation.  Mild to moderate tricuspid regurgitation.  Normal central venous pressure (3 mmHg).  The estimated PA systolic pressure is 27 mmHg.         Plan:           Type 2 diabetes mellitus with hypoglycemia without coma, with long-term current use of insulin  Comments:  Increase long-acting insulin Tresiba to 17 units.  Blood sugars have been somewhat elevated after he had the upper respiratory infection.  Orders:  -     Hemoglobin A1C; Future; Expected date: 01/10/2023  -     insulin degludec (TRESIBA FLEXTOUCH U-100) 100 unit/mL (3 mL) insulin pen; Inject 17 Units  into the skin every evening.  Dispense: 5 pen; Refill: 3    Mixed dyslipidemia  Comments:  Not on any medications.    Stage 3a chronic kidney disease  Comments:  Following Dr. Manjeet Decker.  Currently his creatinine is 2.0.  Continue to remain hydrated.    Erectile dysfunction, unspecified erectile dysfunction type  Comments:  He is taking 50 mg of Viagra and does not seem to help him.  Will talk about it next time.    Acute bronchitis, unspecified organism  Comments:  Bronchitis symptoms.  He has been prescribed Augmentin.  Getting better.  He was tested negative for COVID and flu at urgent care.  Orders:  -     promethazine-dextromethorphan (PROMETHAZINE-DM) 6.25-15 mg/5 mL Syrp; Take 5 mLs by mouth every 4 (four) hours as needed. Do not drive on this because of drowsiness.  Dispense: 120 mL; Refill: 0      Patient's medical conditions have been reviewed again.  Blood sugar has been going somewhat on the elevated side especially after he had the bronchitis and upper respiratory tract infection.  He slowly and steadily recuperating from it.    He has been following up with Dr. Decker for chronic kidney disease stage 3 a.  His current creatinine is 2.0.  Continue with precautions including avoiding unnecessary antibiotics and medications.      Keep hydrated.      Blood sugars have been somewhat elevated and I will increase the Tresiba to 17 units and he will give me an update of his blood sugars sometimes in 1 week time.  He also continues on short-acting insulin.    Continue with COVID precautions.      He has been given a cough syrup to be taken 1 tsp every 8 hours as needed.  Please do not drive on this cough syrup because of drowsiness.        Advised Pt about age and season appropriate immunizations/ cancer screenings.  Also seasonal influenza vaccine, update on tetanus diphtheria vaccination every 10 years.  Patient has been advised to watch diet and exercise. Avoid fried and fatty food. Compliance to  "medications and follow up urged.  Advised Pt. to monitor Blood sugars at home and record them.  Advised Pt  for Anti reflux measures like small feequent meals, avoid spicy and greasy food. Head end up at night.  keep a close eye on feet and keep them clean. Annual eye examination. Annual influenza vaccine.  Monitor HgbA1c every 3 to 6 months. Monitor urine microalbumin every year.keep LDL less than 100. Monitor blood pressure and target blood pressure 120/70.  Please utilize precautions for current COVID-19 pandemic.  Try to avoid crowds or close contact with multiple people.  Minimize outside interaction.  Wash hands with soap for  frequently upon contact.Use face mask or cover.    Fup-4 months    Spent ezra 30 minutes with patient which involved review of pts medical conditions, labs, medications and with 50% of time face-to-face discussion about medical problems, management and any applicable changes.      Current Outpatient Medications:     ascorbic acid, vitamin C, (VITAMIN C) 500 MG tablet, Take 500 mg by mouth once daily., Disp: , Rfl:     BD WINSOME 2ND GEN PEN NEEDLE 32 gauge x 5/32" Ndle, Inject 1 Units into the skin 3 (three) times daily., Disp: , Rfl:     blood-glucose meter (TRUE METRIX GLUCOSE METER) Misc, 1 Device by Misc.(Non-Drug; Combo Route) route once daily. (Patient taking differently: 1 Device by Misc.(Non-Drug; Combo Route) route once daily. Non-med item), Disp: 1 each, Rfl: 0    calcitRIOL (ROCALTROL) 0.25 MCG Cap, TAKE 1 CAPSULE (0.25 MCG TOTAL) BY MOUTH 3 (THREE) TIMES A WEEK MONDAY/WEDNESDAY/FRIDAY, Disp: , Rfl:     indapamide (LOZOL) 1.25 MG Tab, Take 1.25 mg by mouth once daily., Disp: , Rfl:     insulin aspart U-100 (NOVOLOG FLEXPEN U-100 INSULIN) 100 unit/mL (3 mL) InPn pen, Inject 10 Units into the skin 3 (three) times daily with meals. Check glucose before meals and then take insulin as per sliding scale (Patient taking differently: Inject 8 Units into the skin 3 (three) times " daily with meals. Check glucose before meals and then take insulin as per sliding scale), Disp: 5 each, Rfl: 3    losartan (COZAAR) 100 MG tablet, Take 1 tablet (100 mg total) by mouth once daily., Disp: 90 tablet, Rfl: 3    sildenafiL (VIAGRA) 50 MG tablet, Take 1 tablet (50 mg total) by mouth daily as needed for Erectile Dysfunction (Take 1 pill on a day as needed for erectile dysfunction)., Disp: 10 tablet, Rfl: 5    sodium bicarbonate 650 MG tablet, Take 650 mg by mouth once daily., Disp: , Rfl:     traZODone (DESYREL) 50 MG tablet, TAKE 1 TABLET BY MOUTH NIGHTLY AS NEEDED FOR INSOMNIA., Disp: 90 tablet, Rfl: 2    TRUE METRIX GLUCOSE TEST STRIP Strp, Inject 1 each into the skin 3 (three) times daily before meals., Disp: 300 strip, Rfl: 1    vitamin D (VITAMIN D3) 1000 units Tab, Take 1,000 Units by mouth once daily., Disp: , Rfl:     zinc gluconate 50 mg tablet, Take 50 mg by mouth once daily., Disp: , Rfl:     allopurinoL (ZYLOPRIM) 100 MG tablet, Take 100 mg by mouth., Disp: , Rfl:     gabapentin (NEURONTIN) 100 MG capsule, Take 1 capsule (100 mg total) by mouth 3 (three) times daily. Start 1 capsule at bedtime for 7 nights and then 1 capsule twice a day for 7 days and then 3 times a day to continue. (Patient not taking: Reported on 1/10/2023), Disp: 90 capsule, Rfl: 3    insulin degludec (TRESIBA FLEXTOUCH U-100) 100 unit/mL (3 mL) insulin pen, Inject 17 Units into the skin every evening., Disp: 5 pen, Rfl: 3    multivitamin capsule, Take 1 capsule by mouth once daily., Disp: , Rfl:     promethazine-dextromethorphan (PROMETHAZINE-DM) 6.25-15 mg/5 mL Syrp, Take 5 mLs by mouth every 4 (four) hours as needed. Do not drive on this because of drowsiness., Disp: 120 mL, Rfl: 0    Current Facility-Administered Medications:     ciprofloxacin HCl tablet 500 mg, 500 mg, Oral, 1 time in Clinic/HOD, Dave Shin MD

## 2023-01-10 ENCOUNTER — OFFICE VISIT (OUTPATIENT)
Dept: FAMILY MEDICINE | Facility: CLINIC | Age: 78
End: 2023-01-10
Payer: MEDICARE

## 2023-01-10 ENCOUNTER — LAB VISIT (OUTPATIENT)
Dept: LAB | Facility: HOSPITAL | Age: 78
End: 2023-01-10
Attending: INTERNAL MEDICINE
Payer: MEDICARE

## 2023-01-10 VITALS
DIASTOLIC BLOOD PRESSURE: 72 MMHG | HEIGHT: 71 IN | HEART RATE: 76 BPM | SYSTOLIC BLOOD PRESSURE: 131 MMHG | BODY MASS INDEX: 24.47 KG/M2 | WEIGHT: 174.81 LBS

## 2023-01-10 DIAGNOSIS — Z79.4 TYPE 2 DIABETES MELLITUS WITH HYPOGLYCEMIA WITHOUT COMA, WITH LONG-TERM CURRENT USE OF INSULIN: Chronic | ICD-10-CM

## 2023-01-10 DIAGNOSIS — J20.9 ACUTE BRONCHITIS, UNSPECIFIED ORGANISM: ICD-10-CM

## 2023-01-10 DIAGNOSIS — N18.31 STAGE 3A CHRONIC KIDNEY DISEASE: Chronic | ICD-10-CM

## 2023-01-10 DIAGNOSIS — Z79.4 TYPE 2 DIABETES MELLITUS WITH HYPOGLYCEMIA WITHOUT COMA, WITH LONG-TERM CURRENT USE OF INSULIN: Primary | Chronic | ICD-10-CM

## 2023-01-10 DIAGNOSIS — E78.2 MIXED DYSLIPIDEMIA: Chronic | ICD-10-CM

## 2023-01-10 DIAGNOSIS — N52.9 ERECTILE DYSFUNCTION, UNSPECIFIED ERECTILE DYSFUNCTION TYPE: Chronic | ICD-10-CM

## 2023-01-10 DIAGNOSIS — E11.649 TYPE 2 DIABETES MELLITUS WITH HYPOGLYCEMIA WITHOUT COMA, WITH LONG-TERM CURRENT USE OF INSULIN: Chronic | ICD-10-CM

## 2023-01-10 DIAGNOSIS — E11.649 TYPE 2 DIABETES MELLITUS WITH HYPOGLYCEMIA WITHOUT COMA, WITH LONG-TERM CURRENT USE OF INSULIN: Primary | Chronic | ICD-10-CM

## 2023-01-10 LAB
ESTIMATED AVG GLUCOSE: 146 MG/DL (ref 68–131)
HBA1C MFR BLD: 6.7 % (ref 4.5–6.2)

## 2023-01-10 PROCEDURE — 99214 OFFICE O/P EST MOD 30 MIN: CPT | Mod: S$PBB,AQ,, | Performed by: INTERNAL MEDICINE

## 2023-01-10 PROCEDURE — 99214 OFFICE O/P EST MOD 30 MIN: CPT | Performed by: INTERNAL MEDICINE

## 2023-01-10 PROCEDURE — 83036 HEMOGLOBIN GLYCOSYLATED A1C: CPT | Performed by: INTERNAL MEDICINE

## 2023-01-10 PROCEDURE — 36415 COLL VENOUS BLD VENIPUNCTURE: CPT | Performed by: INTERNAL MEDICINE

## 2023-01-10 PROCEDURE — 99214 PR OFFICE/OUTPT VISIT, EST, LEVL IV, 30-39 MIN: ICD-10-PCS | Mod: S$PBB,AQ,, | Performed by: INTERNAL MEDICINE

## 2023-01-10 RX ORDER — ALLOPURINOL 100 MG/1
100 TABLET ORAL
COMMUNITY
Start: 2022-12-21

## 2023-01-10 RX ORDER — PROMETHAZINE HYDROCHLORIDE AND DEXTROMETHORPHAN HYDROBROMIDE 6.25; 15 MG/5ML; MG/5ML
5 SYRUP ORAL EVERY 4 HOURS PRN
Qty: 120 ML | Refills: 0 | Status: SHIPPED | OUTPATIENT
Start: 2023-01-10 | End: 2023-01-20

## 2023-01-10 RX ORDER — INSULIN DEGLUDEC 100 U/ML
17 INJECTION, SOLUTION SUBCUTANEOUS NIGHTLY
Qty: 5 PEN | Refills: 3
Start: 2023-01-10 | End: 2023-08-14

## 2023-01-10 RX ORDER — CHOLECALCIFEROL (VITAMIN D3) 25 MCG
1000 TABLET ORAL DAILY
COMMUNITY
End: 2023-01-10 | Stop reason: ALTCHOICE

## 2023-01-23 ENCOUNTER — PATIENT MESSAGE (OUTPATIENT)
Dept: FAMILY MEDICINE | Facility: CLINIC | Age: 78
End: 2023-01-23

## 2023-01-23 DIAGNOSIS — Z79.4 TYPE 2 DIABETES MELLITUS WITH HYPOGLYCEMIA WITHOUT COMA, WITH LONG-TERM CURRENT USE OF INSULIN: ICD-10-CM

## 2023-01-23 DIAGNOSIS — E11.649 TYPE 2 DIABETES MELLITUS WITH HYPOGLYCEMIA WITHOUT COMA, WITH LONG-TERM CURRENT USE OF INSULIN: ICD-10-CM

## 2023-01-23 RX ORDER — CALCIUM CITRATE/VITAMIN D3 200MG-6.25
1 TABLET ORAL
Qty: 300 STRIP | Refills: 1 | Status: SHIPPED | OUTPATIENT
Start: 2023-01-23 | End: 2023-10-30

## 2023-01-25 ENCOUNTER — PATIENT MESSAGE (OUTPATIENT)
Dept: FAMILY MEDICINE | Facility: CLINIC | Age: 78
End: 2023-01-25

## 2023-01-25 DIAGNOSIS — Z79.4 TYPE 2 DIABETES MELLITUS WITH HYPOGLYCEMIA WITHOUT COMA, WITH LONG-TERM CURRENT USE OF INSULIN: ICD-10-CM

## 2023-01-25 DIAGNOSIS — E11.649 TYPE 2 DIABETES MELLITUS WITH HYPOGLYCEMIA WITHOUT COMA, WITH LONG-TERM CURRENT USE OF INSULIN: ICD-10-CM

## 2023-01-26 RX ORDER — CALCIUM CITRATE/VITAMIN D3 200MG-6.25
1 TABLET ORAL
Qty: 300 STRIP | Refills: 1 | OUTPATIENT
Start: 2023-01-26

## 2023-01-30 ENCOUNTER — PATIENT MESSAGE (OUTPATIENT)
Dept: FAMILY MEDICINE | Facility: CLINIC | Age: 78
End: 2023-01-30

## 2023-03-15 ENCOUNTER — LAB VISIT (OUTPATIENT)
Dept: LAB | Facility: HOSPITAL | Age: 78
End: 2023-03-15
Attending: INTERNAL MEDICINE
Payer: MEDICARE

## 2023-03-15 DIAGNOSIS — N18.9 ANEMIA OF CHRONIC RENAL FAILURE: ICD-10-CM

## 2023-03-15 DIAGNOSIS — R97.20 ELEVATED PROSTATE SPECIFIC ANTIGEN (PSA): ICD-10-CM

## 2023-03-15 DIAGNOSIS — E87.22 CHRONIC METABOLIC ACIDOSIS: ICD-10-CM

## 2023-03-15 DIAGNOSIS — N25.81 SECONDARY HYPERPARATHYROIDISM OF RENAL ORIGIN: ICD-10-CM

## 2023-03-15 DIAGNOSIS — N18.32 CHRONIC KIDNEY DISEASE (CKD) STAGE G3B/A1, MODERATELY DECREASED GLOMERULAR FILTRATION RATE (GFR) BETWEEN 30-44 ML/MIN/1.73 SQUARE METER AND ALBUMINURIA CREATININE RATIO LESS THAN 30 MG/G: ICD-10-CM

## 2023-03-15 DIAGNOSIS — D63.1 ANEMIA OF CHRONIC RENAL FAILURE: ICD-10-CM

## 2023-03-15 DIAGNOSIS — Z90.5 ACQUIRED ABSENCE OF KIDNEY: ICD-10-CM

## 2023-03-15 DIAGNOSIS — C64.1 MALIGNANT NEOPLASM OF RIGHT KIDNEY, EXCEPT RENAL PELVIS: ICD-10-CM

## 2023-03-15 DIAGNOSIS — E11.29 TYPE II DIABETES MELLITUS WITH RENAL MANIFESTATIONS: Primary | ICD-10-CM

## 2023-03-15 DIAGNOSIS — E79.0 URICACIDEMIA: ICD-10-CM

## 2023-03-15 LAB
25(OH)D3+25(OH)D2 SERPL-MCNC: 40 NG/ML (ref 30–96)
ALBUMIN SERPL BCP-MCNC: 3.9 G/DL (ref 3.5–5.2)
ALBUMIN/CREAT UR: 7 UG/MG (ref 0–30)
ALBUMIN/CREAT UR: 7 UG/MG (ref 0–30)
ANION GAP SERPL CALC-SCNC: 6 MMOL/L (ref 8–16)
BACTERIA #/AREA URNS HPF: NEGATIVE /HPF
BASOPHILS # BLD AUTO: 0.02 K/UL (ref 0–0.2)
BASOPHILS NFR BLD: 0.3 % (ref 0–1.9)
BUN SERPL-MCNC: 34 MG/DL (ref 8–23)
CALCIUM SERPL-MCNC: 9.3 MG/DL (ref 8.7–10.5)
CHLORIDE SERPL-SCNC: 106 MMOL/L (ref 95–110)
CO2 SERPL-SCNC: 24 MMOL/L (ref 23–29)
COMPLEXED PSA SERPL-MCNC: 0.34 NG/ML (ref 0–4)
CREAT SERPL-MCNC: 1.6 MG/DL (ref 0.5–1.4)
CREAT UR-MCNC: 86 MG/DL (ref 23–375)
CREAT UR-MCNC: 86 MG/DL (ref 23–375)
DIFFERENTIAL METHOD: ABNORMAL
EOSINOPHIL # BLD AUTO: 0.2 K/UL (ref 0–0.5)
EOSINOPHIL NFR BLD: 3.2 % (ref 0–8)
ERYTHROCYTE [DISTWIDTH] IN BLOOD BY AUTOMATED COUNT: 12.9 % (ref 11.5–14.5)
EST. GFR  (NO RACE VARIABLE): 43.6 ML/MIN/1.73 M^2
GLUCOSE SERPL-MCNC: 96 MG/DL (ref 70–110)
HCT VFR BLD AUTO: 38 % (ref 40–54)
HGB BLD-MCNC: 12.3 G/DL (ref 14–18)
HYALINE CASTS #/AREA URNS LPF: 1 /LPF
IMM GRANULOCYTES # BLD AUTO: 0.01 K/UL (ref 0–0.04)
IMM GRANULOCYTES NFR BLD AUTO: 0.2 % (ref 0–0.5)
LYMPHOCYTES # BLD AUTO: 1 K/UL (ref 1–4.8)
LYMPHOCYTES NFR BLD: 15 % (ref 18–48)
MAGNESIUM SERPL-MCNC: 1.8 MG/DL (ref 1.6–2.6)
MCH RBC QN AUTO: 31.1 PG (ref 27–31)
MCHC RBC AUTO-ENTMCNC: 32.4 G/DL (ref 32–36)
MCV RBC AUTO: 96 FL (ref 82–98)
MICROALBUMIN UR DL<=1MG/L-MCNC: 6 UG/ML
MICROALBUMIN UR DL<=1MG/L-MCNC: 6 UG/ML
MICROSCOPIC COMMENT: NORMAL
MONOCYTES # BLD AUTO: 0.5 K/UL (ref 0.3–1)
MONOCYTES NFR BLD: 8.5 % (ref 4–15)
NEUTROPHILS # BLD AUTO: 4.6 K/UL (ref 1.8–7.7)
NEUTROPHILS NFR BLD: 72.8 % (ref 38–73)
NRBC BLD-RTO: 0 /100 WBC
PHOSPHATE SERPL-MCNC: 3.2 MG/DL (ref 2.7–4.5)
PLATELET # BLD AUTO: 271 K/UL (ref 150–450)
PMV BLD AUTO: 10.5 FL (ref 9.2–12.9)
POTASSIUM SERPL-SCNC: 4.3 MMOL/L (ref 3.5–5.1)
PTH-INTACT SERPL-MCNC: 87.6 PG/ML (ref 9–77)
RBC # BLD AUTO: 3.96 M/UL (ref 4.6–6.2)
RBC #/AREA URNS HPF: 0 /HPF (ref 0–4)
SODIUM SERPL-SCNC: 136 MMOL/L (ref 136–145)
SQUAMOUS #/AREA URNS HPF: 1 /HPF
URATE SERPL-MCNC: 6.4 MG/DL (ref 3.4–7)
WBC # BLD AUTO: 6.32 K/UL (ref 3.9–12.7)
WBC #/AREA URNS HPF: 0 /HPF (ref 0–5)

## 2023-03-15 PROCEDURE — 84153 ASSAY OF PSA TOTAL: CPT | Performed by: INTERNAL MEDICINE

## 2023-03-15 PROCEDURE — 83735 ASSAY OF MAGNESIUM: CPT | Performed by: INTERNAL MEDICINE

## 2023-03-15 PROCEDURE — 36415 COLL VENOUS BLD VENIPUNCTURE: CPT | Performed by: INTERNAL MEDICINE

## 2023-03-15 PROCEDURE — 85025 COMPLETE CBC W/AUTO DIFF WBC: CPT | Performed by: INTERNAL MEDICINE

## 2023-03-15 PROCEDURE — 82570 ASSAY OF URINE CREATININE: CPT | Performed by: INTERNAL MEDICINE

## 2023-03-15 PROCEDURE — 83970 ASSAY OF PARATHORMONE: CPT | Performed by: INTERNAL MEDICINE

## 2023-03-15 PROCEDURE — 81001 URINALYSIS AUTO W/SCOPE: CPT | Performed by: INTERNAL MEDICINE

## 2023-03-15 PROCEDURE — 83036 HEMOGLOBIN GLYCOSYLATED A1C: CPT | Performed by: INTERNAL MEDICINE

## 2023-03-15 PROCEDURE — 82306 VITAMIN D 25 HYDROXY: CPT | Performed by: INTERNAL MEDICINE

## 2023-03-15 PROCEDURE — 80069 RENAL FUNCTION PANEL: CPT | Performed by: INTERNAL MEDICINE

## 2023-03-15 PROCEDURE — 84550 ASSAY OF BLOOD/URIC ACID: CPT | Performed by: INTERNAL MEDICINE

## 2023-03-16 LAB
ESTIMATED AVG GLUCOSE: 148 MG/DL (ref 68–131)
HBA1C MFR BLD: 6.8 % (ref 4.5–6.2)

## 2023-04-19 ENCOUNTER — OFFICE VISIT (OUTPATIENT)
Dept: FAMILY MEDICINE | Facility: CLINIC | Age: 78
End: 2023-04-19
Payer: MEDICARE

## 2023-04-19 VITALS
WEIGHT: 176 LBS | SYSTOLIC BLOOD PRESSURE: 142 MMHG | DIASTOLIC BLOOD PRESSURE: 77 MMHG | HEART RATE: 64 BPM | BODY MASS INDEX: 24.64 KG/M2 | HEIGHT: 71 IN

## 2023-04-19 DIAGNOSIS — C64.9 RENAL CELL CARCINOMA, UNSPECIFIED LATERALITY: ICD-10-CM

## 2023-04-19 DIAGNOSIS — I10 ESSENTIAL HYPERTENSION: ICD-10-CM

## 2023-04-19 DIAGNOSIS — E78.2 MIXED DYSLIPIDEMIA: ICD-10-CM

## 2023-04-19 DIAGNOSIS — E11.649 TYPE 2 DIABETES MELLITUS WITH HYPOGLYCEMIA WITHOUT COMA, WITH LONG-TERM CURRENT USE OF INSULIN: Primary | Chronic | ICD-10-CM

## 2023-04-19 DIAGNOSIS — N52.9 ERECTILE DYSFUNCTION, UNSPECIFIED ERECTILE DYSFUNCTION TYPE: ICD-10-CM

## 2023-04-19 DIAGNOSIS — F51.04 PSYCHOPHYSIOLOGICAL INSOMNIA: ICD-10-CM

## 2023-04-19 DIAGNOSIS — Z79.4 TYPE 2 DIABETES MELLITUS WITH HYPOGLYCEMIA WITHOUT COMA, WITH LONG-TERM CURRENT USE OF INSULIN: Primary | Chronic | ICD-10-CM

## 2023-04-19 DIAGNOSIS — N18.31 STAGE 3A CHRONIC KIDNEY DISEASE: ICD-10-CM

## 2023-04-19 PROCEDURE — 99214 PR OFFICE/OUTPT VISIT, EST, LEVL IV, 30-39 MIN: ICD-10-PCS | Mod: S$PBB,AQ,, | Performed by: INTERNAL MEDICINE

## 2023-04-19 PROCEDURE — 99213 OFFICE O/P EST LOW 20 MIN: CPT | Performed by: INTERNAL MEDICINE

## 2023-04-19 PROCEDURE — 99214 OFFICE O/P EST MOD 30 MIN: CPT | Mod: S$PBB,AQ,, | Performed by: INTERNAL MEDICINE

## 2023-04-19 RX ORDER — AMLODIPINE BESYLATE 2.5 MG/1
2.5 TABLET ORAL NIGHTLY
Qty: 90 TABLET | Refills: 3 | Status: SHIPPED | OUTPATIENT
Start: 2023-04-19 | End: 2023-05-06 | Stop reason: SDUPTHER

## 2023-04-19 RX ORDER — TRAZODONE HYDROCHLORIDE 100 MG/1
100 TABLET ORAL NIGHTLY
Qty: 90 TABLET | Refills: 3 | Status: SHIPPED | OUTPATIENT
Start: 2023-04-19

## 2023-04-19 NOTE — PROGRESS NOTES
Subjective:       Patient ID: Jamil Cadet is a 78 y.o. male.    Chief Complaint: Diabetes, Hypertension, Hyperlipidemia, Chronic Kidney Disease, and  cholelithiasis without cholecystitis    Patient is a 78-year-old gentleman who comes for 3 months follow-up.  He is accompanied with his wife miss Chance who is not a patient today.  Underlying medical issues are as below:-     1.         Essential hypertension   2.         Mixed dyslipidemia   3.         Type 2 diabetes mellitus with hypoglycemia without coma, with long-term current use of insulin   4.         History of renal cell carcinoma status post partial nephrectomy   5.         Stage 3b chronic kidney disease   6.         Cholelithiasis without cholecystitis     Previous visit in January we had increased his Tresiba insulin to 17 units.    //////////////  I have reviewed his recent labs as ordered by his nephrologist Dr. Manjeet Decker.  Intact PTH is 87.6.  PSA 0.34.  Hemoglobin A1c is 6.8.  Vitamin-D is 40.  Magnesium 1.8.  Uric acid 6.4.  Hemoglobin 12.3 and hematocrit 38.0.  Platelets 271.  BUN 34 and creatinine 1.6.  EGFR is 43.6 sodium 136 and potassium 4.3.  Albumin 3.9      His blood sugars from home have been reviewed.  On most of the occasions his blood sugars are doing okay in the morning but tend to go up in the evening.  On couple of occasions his blood sugars were both elevated in the morning and evening.      Most of his blood pressure readings are in 130s to 150s except for 1 day on 04/14 it was 116/63.  Please see the recordings of his blood pressure in the assessment section for which a screen shot has been taken.      His creatinine has shown improvement and it has come down.  GFR is improved from 33-44.    ///////////////////////////////    Prior to that,  we had reviewed his decision to pursue a surveillance and wait and watch policy on his kidney cancer rather the going for any aggressive treatment.  He is following up with Dr. Acosta  [facility].  He does have a follow-up with Dr. Shin in the month of August this year for further decisions.    He also is following up with Dr. Manjeet Decker MD Nephrology for stage IIIA kidney disease. Last time I had posed a question of whether he was on excess of vitamin D or Rocaltrol, it is okay with Dr. Decker as per the patient.    Last visit we had also noted residual effects of a upper respiratory tract infection with cough and at least twice being treated with Augmentin.  He was given promethazine DM for symptomatic relief of cough.  Given his chronic kidney disease, he was recommended not to jump for unnecessary antibiotics.    Last visit also we had noted that he had a MRI which did not show shrinkage of his kidney tumor.  He was disappointed with that.  It was a plan for him to seek a 2nd opinion from a renal oncologist.      Psychosocial issues of aging, burden of multiple medical l issues also predominate in the background and do affect the spectrum of his healthcare problems.    Previous visit:-  A large panel of lab was reviewed as ordered by his nephrologist Dr. Manjeet Decker.  Hemoglobin 12.6, hematocrit 37.9, WBC 5.92 and platelets 216. Lymphocyte percentage slightly low at 16%.  Blood glucose 145. BUN 32 and creatinine 1.8.  EGFR 35.    Vitamin B12 240, folate 13.4, retic count 1.3%.  Methylmalonic acid levels-340 which is within normal range.  Vitamin-D level is 45. Magnesium 1.8.  Uric acid 8.0 which is elevated.  PTH is 127.2.  Urine microalbumin is normal.  Urinalysis had shown hyaline casts.     ////////////////////////////     Insomnia:- continues to have insomnia and currently on trazodone 50 mg.  This has been going on for several months to perhaps years.  Multiple causes.      Would like to increase trazodone to 100 mg.    Erectile dysfunction:  - after the surgery he had for kidneys, he is unable to get an erection.  He has tried Viagra at 50 mg and even  100 mg but with no response.  I have advised him to check with his urologist at his routine follow-up and have further discussion on this issue and if any other nonmedical alternatives maybe feasible like vacuum pump or perhaps prosthetic surgery.    /////////////////////////////////////////////////////  I have reviewed his blood pressures at home and they seem to be in good range.  There was a low on 07/11 with blood pressure of 105/49 when his sugars for some reason went low.  That was Sunday when [place] had [place].    Consideration for Prevnar 20 vaccine to complete his pneumonia series of vaccination also discussed and offered.        Diabetes  He presents for his follow-up diabetic visit. He has type 2 diabetes mellitus. The initial diagnosis of diabetes was made 10 years ago. His disease course has been stable. Hypoglycemia symptoms include nervousness/anxiousness (Cancer related issues). Pertinent negatives for hypoglycemia include no confusion, headaches or seizures. There are no diabetic associated symptoms. Pertinent negatives for diabetes include no chest pain, no polydipsia, no polyuria and no weakness. Symptoms are worsening (Slight rise in A1c to 6.8.). Risk factors for coronary artery disease include male sex, stress, diabetes mellitus and dyslipidemia. Current diabetic treatment includes insulin injections. He is compliant with treatment most of the time. His weight is stable. Meal planning includes avoidance of concentrated sweets. He has not had a previous visit with a dietitian. He participates in exercise intermittently. His home blood glucose trend is increasing steadily. His breakfast blood glucose range is generally 110-130 mg/dl. His dinner blood glucose range is generally 140-180 mg/dl. An ACE inhibitor/angiotensin II receptor blocker is being taken. He does not see a podiatrist.Eye exam is current.   Hyperlipidemia  This is a chronic (Medication was stopped due to muscle aches.)  problem. The current episode started more than 1 year ago. Exacerbating diseases include chronic renal disease. He has no history of obesity. Associated symptoms include myalgias (Stable myalgias.). Pertinent negatives include no chest pain. He is currently on no antihyperlipidemic treatment (Currently has stop rosuvastatin.). The current treatment provides mild improvement of lipids. Compliance problems include psychosocial issues.  Risk factors for coronary artery disease include male sex, a sedentary lifestyle, dyslipidemia and diabetes mellitus.   Hypertension  This is a chronic problem. The current episode started more than 1 year ago. The problem is uncontrolled. Pertinent negatives include no chest pain, headaches, neck pain or palpitations. Risk factors for coronary artery disease include male gender, dyslipidemia and diabetes mellitus. Past treatments include angiotensin blockers and diuretics. The current treatment provides moderate improvement. Compliance problems include psychosocial issues (LITTLE BIT EXTRA SALT IN THE FOOD.).  Identifiable causes of hypertension include chronic renal disease.     Past Medical History:   Diagnosis Date    Acute renal failure 9/7/2020    Bacteremia due to Enterococcus 10/25/2020    Cancer     Diabetes mellitus, type 2     Disorder of kidney and ureter     Encounter for blood transfusion     History of renal cell carcinoma status post partial nephrectomy 9/7/2020    Hydronephrosis 9/9/2020    Hyperlipidemia     Hypertension     Infection and inflammatory reaction due to nephrostomy catheter, initial encounter 11/14/2020    Infection following a procedure, superficial incisional surgical site, subsequent encounter 12/26/2020    Polio     Pyelonephritis of right kidney     Septicemia due to enterococcus 11/15/2020     Social History     Socioeconomic History    Marital status:    Tobacco Use    Smoking status: Never    Smokeless tobacco: Never   Substance and Sexual  Activity    Alcohol use: No    Drug use: No    Sexual activity: Yes     Partners: Female     Social Determinants of Health     Financial Resource Strain: Unknown    Difficulty of Paying Living Expenses: Patient refused   Food Insecurity: Unknown    Worried About Running Out of Food in the Last Year: Patient refused    Ran Out of Food in the Last Year: Patient refused   Transportation Needs: Unknown    Lack of Transportation (Medical): Patient refused    Lack of Transportation (Non-Medical): Patient refused   Physical Activity: Unknown    Days of Exercise per Week: Patient refused    Minutes of Exercise per Session: 150+ min   Stress: Unknown    Feeling of Stress : Patient refused   Social Connections: Unknown    Frequency of Communication with Friends and Family: Patient refused    Frequency of Social Gatherings with Friends and Family: Patient refused    Active Member of Clubs or Organizations: Patient refused    Attends Club or Organization Meetings: Patient refused    Marital Status:    Housing Stability: Unknown    Unable to Pay for Housing in the Last Year: Patient refused    Unstable Housing in the Last Year: Patient refused     Past Surgical History:   Procedure Laterality Date    ANTEGRADE PYELOGRAM  12/10/2020    Procedure: PYELOGRAM, ANTEGRADE;  Surgeon: Dave Shin MD;  Location: [address];  Service: Urology;;    APPENDECTOMY      CYSTOSCOPY  12/10/2020    Procedure: CYSTOSCOPY;  Surgeon: Dave Shin MD;  Location: [address];  Service: Urology;;    CYSTOSCOPY W/ RETROGRADES Right 09/09/2020    Procedure: CYSTOSCOPY, WITH RETROGRADE PYELOGRAM;  Surgeon: Chris Garcia Jr., MD;  Location: [facility];  Service: Urology;  Laterality: Right;    DILATION OF NEPHROSTOMY TRACT Right 12/10/2020    Procedure: DILATION, NEPHROSTOMY TRACT;  Surgeon: Dave Shin MD;  Location: [address];  Service: Urology;  Laterality: Right;    RADIOFREQUENCY ABLATION KIDNEY  2022    REMOVAL  OF DRAIN Right 12/10/2020    Procedure: REMOVAL, DRAIN-NEPHROSTOMY TUBE;  Surgeon: Dave Shin MD;  Location: [facility] OR 1ST FLR;  Service: Urology;  Laterality: Right;    REPLACEMENT OF NEPHROSTOMY TUBE Right 12/10/2020    Procedure: REPLACEMENT, NEPHROSTOMY TUBE;  Surgeon: Dave Shin MD;  Location: [facility] OR 1ST FLR;  Service: Urology;  Laterality: Right;    ROBOT-ASSISTED LAPAROSCOPIC REIMPLANTATION OF URETER USING DA PHU XI Right 01/08/2021    Procedure: XI ROBOTIC IMPLANTATION, URETER, USING PSOAS HITCH TECHNIQUE;  Surgeon: Dave Shin MD;  Location: [facility] OR 2ND FLR;  Service: Urology;  Laterality: Right;  4hrs gen with regional    SPINE SURGERY      URETEROSCOPY Right 12/10/2020    Procedure: URETEROSCOPY-ANTEGRADE;  Surgeon: Dave Shin MD;  Location: [facility] OR 1ST FLR;  Service: Urology;  Laterality: Right;    URETHROSCOPY  09/09/2020    Procedure: URETHROSCOPY;  Surgeon: Chris Garcia Jr., MD;  Location: [facility] OR;  Service: Urology;;     Family History   Problem Relation Age of Onset    Heart disease Mother     Heart disease Father     Stroke Father     Heart disease Maternal Grandfather     Heart disease Paternal Grandmother     Heart disease Paternal Grandfather        Review of Systems   Constitutional:  Negative for activity change, chills, diaphoresis, fever and unexpected weight change (Gained a couple of lb since the last documentation.).   HENT:  Negative for congestion, hearing loss, postnasal drip, rhinorrhea and trouble swallowing.         Symptoms of cold and cough.   Eyes:  Negative for discharge, redness and visual disturbance.   Respiratory:  Negative for cough, chest tightness and wheezing.    Cardiovascular:  Negative for chest pain, palpitations and leg swelling.        HTN Lipids-Somewhat elevated blood pressures.   Gastrointestinal:  Negative for abdominal distention, abdominal pain, blood in stool, constipation, diarrhea and vomiting.   Endocrine: Negative for  "cold intolerance, polydipsia and polyuria.        Type 2 DM-Slight worsening of A1c to 6.8.   Genitourinary:  Negative for difficulty urinating, hematuria and urgency.        Kidney cancer   Musculoskeletal:  Positive for myalgias (Stable myalgias.). Negative for arthralgias, joint swelling and neck pain.   Neurological:  Negative for seizures, weakness and headaches.   Psychiatric/Behavioral:  Negative for confusion and dysphoric mood. The patient is nervous/anxious (Cancer related issues).        Objective:      Blood pressure (!) 142/77, pulse 64, height 5' 11" (1.803 m), weight 79.8 kg (176 lb). Body mass index is 24.55 kg/m².  Physical Exam  Vitals and nursing note reviewed.   Constitutional:       General: He is not in acute distress.     Appearance: Normal appearance. He is well-developed. He is not ill-appearing, toxic-appearing or diaphoretic.      Comments: BMI is 24.55   HENT:      Head: Normocephalic and atraumatic.   Eyes:      Conjunctiva/sclera: Conjunctivae normal.   Neck:      Thyroid: No thyromegaly.      Vascular: No JVD.      Trachea: No tracheal deviation.   Cardiovascular:      Rate and Rhythm: Normal rate and regular rhythm.      Heart sounds: Normal heart sounds. No murmur heard.    No friction rub. No gallop.   Pulmonary:      Effort: Pulmonary effort is normal. No respiratory distress.      Breath sounds: Normal breath sounds. No wheezing or rales.   Abdominal:      General: There is no distension.      Palpations: Abdomen is soft.      Tenderness: There is no abdominal tenderness.   Musculoskeletal:         General: No signs of injury.      Cervical back: No rigidity.      Right lower leg: No tenderness. No edema.      Left lower leg: No tenderness. No edema.      Right foot: Deformity (Pes cavus) present.      Left foot: Deformity ( pes cavus) present.        Feet:       Comments:      Feet:      Right foot:      Protective Sensation: 5 sites tested.  4 sites sensed.      Skin integrity: " No ulcer, blister, skin breakdown, erythema or warmth.      Left foot:      Protective Sensation: 5 sites tested.  4 sites sensed.      Skin integrity: No ulcer, blister, skin breakdown, erythema or warmth.      Comments: High arch in the foot.?  Effects of poliomyelitis.  Slight claw toes.  Slightly dystrophic nails on the both great toes  Lymphadenopathy:      Cervical: No cervical adenopathy.   Skin:     General: Skin is warm and dry.      Findings: No lesion or rash.   Neurological:      Mental Status: He is alert. Mental status is at baseline.   Psychiatric:         Attention and Perception: He is attentive.         Mood and Affect: Affect is not labile.         Behavior: Behavior normal.      Comments: Somewhat anhedonic over his medical issues.  Especially regarding nonresolution kidney cancer.         Assessment:             Type 2 diabetes mellitus with hypoglycemia without coma, with long-term current use of insulin  Comments:  Current hemoglobin A1c 6.8.  This is reasonable.  Perhaps slight increase in Tresiba will be done  Orders:  -     Hemoglobin A1C; Future; Expected date: 05/19/2023    Stage 3a chronic kidney disease  Comments:  Creatinine is 1.6.  EGFR is 43.  Following up with Dr. Manjeet Decker.      Mixed dyslipidemia  -     Lipid Panel; Future; Expected date: 05/19/2023    Erectile dysfunction, unspecified erectile dysfunction type    Essential hypertension  -     amLODIPine (NORVASC) 2.5 MG tablet; Take 1 tablet (2.5 mg total) by mouth every evening.  Dispense: 90 tablet; Refill: 3    Renal cell carcinoma, unspecified laterality    Psychophysiological insomnia  -     traZODone (DESYREL) 100 MG tablet; Take 1 tablet (100 mg total) by mouth every evening.  Dispense: 90 tablet; Refill: 3           Lab Visit on 03/15/2023   Component Date Value Ref Range Status    Hemoglobin A1C 03/15/2023 6.8 (H)  4.5 - 6.2 % Final    Estimated Avg Glucose 03/15/2023 148 (H)  68 - 131 mg/dL Final    PSA Diagnostic  03/15/2023 0.34  0.00 - 4.00 ng/mL Final    Glucose 03/15/2023 96  70 - 110 mg/dL Final    Sodium 03/15/2023 136  136 - 145 mmol/L Final    Potassium 03/15/2023 4.3  3.5 - 5.1 mmol/L Final    Chloride 03/15/2023 106  95 - 110 mmol/L Final    CO2 03/15/2023 24  23 - 29 mmol/L Final    BUN 03/15/2023 34 (H)  8 - 23 mg/dL Final    Calcium 03/15/2023 9.3  8.7 - 10.5 mg/dL Final    Creatinine 03/15/2023 1.6 (H)  0.5 - 1.4 mg/dL Final    Albumin 03/15/2023 3.9  3.5 - 5.2 g/dL Final    Phosphorus 03/15/2023 3.2  2.7 - 4.5 mg/dL Final    eGFR 03/15/2023 43.6 (A)  >60 mL/min/1.73 m^2 Final    Anion Gap 03/15/2023 6 (L)  8 - 16 mmol/L Final    PTH, Intact 03/15/2023 87.6 (H)  9.0 - 77.0 pg/mL Final    Uric Acid 03/15/2023 6.4  3.4 - 7.0 mg/dL Final    WBC 03/15/2023 6.32  3.90 - 12.70 K/uL Final    RBC 03/15/2023 3.96 (L)  4.60 - 6.20 M/uL Final    Hemoglobin 03/15/2023 12.3 (L)  14.0 - 18.0 g/dL Final    Hematocrit 03/15/2023 38.0 (L)  40.0 - 54.0 % Final    MCV 03/15/2023 96  82 - 98 fL Final    MCH 03/15/2023 31.1 (H)  27.0 - 31.0 pg Final    MCHC 03/15/2023 32.4  32.0 - 36.0 g/dL Final    RDW 03/15/2023 12.9  11.5 - 14.5 % Final    Platelets 03/15/2023 271  150 - 450 K/uL Final    MPV 03/15/2023 10.5  9.2 - 12.9 fL Final    Immature Granulocytes 03/15/2023 0.2  0.0 - 0.5 % Final    Gran # (ANC) 03/15/2023 4.6  1.8 - 7.7 K/uL Final    Immature Grans (Abs) 03/15/2023 0.01  0.00 - 0.04 K/uL Final    Lymph # 03/15/2023 1.0  1.0 - 4.8 K/uL Final    Mono # 03/15/2023 0.5  0.3 - 1.0 K/uL Final    Eos # 03/15/2023 0.2  0.0 - 0.5 K/uL Final    Baso # 03/15/2023 0.02  0.00 - 0.20 K/uL Final    nRBC 03/15/2023 0  0 /100 WBC Final    Gran % 03/15/2023 72.8  38.0 - 73.0 % Final    Lymph % 03/15/2023 15.0 (L)  18.0 - 48.0 % Final    Mono % 03/15/2023 8.5  4.0 - 15.0 % Final    Eosinophil % 03/15/2023 3.2  0.0 - 8.0 % Final    Basophil % 03/15/2023 0.3  0.0 - 1.9 % Final    Differential Method 03/15/2023 Automated   Final    RBC, UA  03/15/2023 0  0 - 4 /hpf Final    WBC, UA 03/15/2023 0  0 - 5 /hpf Final    Bacteria 03/15/2023 Negative  None-Occ /hpf Final    Squam Epithel, UA 03/15/2023 1  /hpf Final    Hyaline Casts, UA 03/15/2023 1  0-1/lpf /lpf Final    Microscopic Comment 03/15/2023 SEE COMMENT   Final    Microalbumin, Urine 03/15/2023 6.0  <19.9 ug/mL Final    Creatinine, Urine 03/15/2023 86.0  23.0 - 375.0 mg/dL Final    Microalb/Creat Ratio 03/15/2023 7.0  0.0 - 30.0 ug/mg Final    Microalbumin, Urine 03/15/2023 6.0  <19.9 ug/mL Final    Creatinine, Urine 03/15/2023 86.0  23.0 - 375.0 mg/dL Final    Microalb/Creat Ratio 03/15/2023 7.0  0.0 - 30.0 ug/mg Final    Magnesium 03/15/2023 1.8  1.6 - 2.6 mg/dL Final    Vit D, 25-Hydroxy 03/15/2023 40  30 - 96 ng/mL Final         Plan:           Type 2 diabetes mellitus with hypoglycemia without coma, with long-term current use of insulin  Comments:  Current hemoglobin A1c 6.8.  This is reasonable.  Perhaps slight increase in Tresiba will be done  Orders:  -     Hemoglobin A1C; Future; Expected date: 05/19/2023    Stage 3a chronic kidney disease  Comments:  Creatinine is 1.6.  EGFR is 43.  Following up with Dr. Manjeet Decker.      Mixed dyslipidemia  -     Lipid Panel; Future; Expected date: 05/19/2023    Erectile dysfunction, unspecified erectile dysfunction type    Essential hypertension  -     amLODIPine (NORVASC) 2.5 MG tablet; Take 1 tablet (2.5 mg total) by mouth every evening.  Dispense: 90 tablet; Refill: 3    Renal cell carcinoma, unspecified laterality    Psychophysiological insomnia  -     traZODone (DESYREL) 100 MG tablet; Take 1 tablet (100 mg total) by mouth every evening.  Dispense: 90 tablet; Refill: 3      PATIENT'S MEDICAL CONDITIONS HAVE BEEN REVIEWED.          Blood pressures are somewhat elevated and I will add amlodipine 2.5 mg at bedtime to his regimen.  New prescription sent.      Sleep continues to be an issue and trazodone at 50 mg does not seem to be helping him.  I  "will increase it to 100 mg and see how he does.      Blood sugars have crept up a little bit but will continue to watch.  It is 6.8 at this point and he continues on insulin.      We need to look into whether we can add Jardiance or Kerendia in future to protect his kidneys.    Erectile dysfunction continues at this point with no relief from Viagra at 50 mg and also self taken 100 mg.  He should discuss this issue with the urologist at his next follow-up potentially if the cancer is under control.      He has recently seen Dr. Decker also with no major changes in his regimen.      No recent attack of gout.      Did discuss about salt intake and I advised him not to be too generous with the salt.      Continue with COVID precautions.      Follow-up in 3 months and check lipid panel and A1c report at that time.      Get eye checkup also done.  His ophthalmologist is Dr. Ranjan Monet in [facility], and I have requested him to forward those results to me.    Spent ezra 30 minutes with patient which involved review of pts medical conditions, labs, medications and with 50% of time face-to-face discussion about medical problems, management and any applicable changes.          Current Outpatient Medications:     allopurinoL (ZYLOPRIM) 100 MG tablet, Take 100 mg by mouth., Disp: , Rfl:     ascorbic acid, vitamin C, (VITAMIN C) 500 MG tablet, Take 500 mg by mouth once daily., Disp: , Rfl:     BD WINSOME 2ND GEN PEN NEEDLE 32 gauge x 5/32" Ndle, INJECT 1 UNITS INTO THE SKIN 3 (THREE) TIMES DAILY BEFORE MEALS. USE AS DIRECTED NON-MED ITEM, Disp: 300 each, Rfl: 3    calcitRIOL (ROCALTROL) 0.25 MCG Cap, TAKE 1 CAPSULE (0.25 MCG TOTAL) BY MOUTH 3 (THREE) TIMES A WEEK MONDAY/WEDNESDAY/FRIDAY, Disp: , Rfl:     indapamide (LOZOL) 1.25 MG Tab, Take 1.25 mg by mouth once daily., Disp: , Rfl:     insulin aspart U-100 (NOVOLOG FLEXPEN U-100 INSULIN) 100 unit/mL (3 mL) InPn pen, Inject 10 Units into the skin 3 (three) times daily with meals. " Check glucose before meals and then take insulin as per sliding scale (Patient taking differently: Inject 8 Units into the skin 3 (three) times daily with meals. Check glucose before meals and then take insulin as per sliding scale), Disp: 5 each, Rfl: 3    insulin degludec (TRESIBA FLEXTOUCH U-100) 100 unit/mL (3 mL) insulin pen, Inject 17 Units into the skin every evening., Disp: 5 pen, Rfl: 3    losartan (COZAAR) 100 MG tablet, Take 1 tablet (100 mg total) by mouth once daily., Disp: 90 tablet, Rfl: 3    sildenafiL (VIAGRA) 50 MG tablet, Take 1 tablet (50 mg total) by mouth daily as needed for Erectile Dysfunction (Take 1 pill on a day as needed for erectile dysfunction)., Disp: 10 tablet, Rfl: 5    sodium bicarbonate 650 MG tablet, Take 650 mg by mouth once daily., Disp: , Rfl:     TRUE METRIX GLUCOSE TEST STRIP Strp, Inject 1 each into the skin 3 (three) times daily before meals., Disp: 300 strip, Rfl: 1    zinc gluconate 50 mg tablet, Take 50 mg by mouth once daily., Disp: , Rfl:     amLODIPine (NORVASC) 2.5 MG tablet, Take 1 tablet (2.5 mg total) by mouth every evening., Disp: 90 tablet, Rfl: 3    blood-glucose meter (TRUE METRIX GLUCOSE METER) Misc, 1 Device by Misc.(Non-Drug; Combo Route) route once daily. (Patient taking differently: 1 Device by Misc.(Non-Drug; Combo Route) route once daily. Non-med item), Disp: 1 each, Rfl: 0    traZODone (DESYREL) 100 MG tablet, Take 1 tablet (100 mg total) by mouth every evening., Disp: 90 tablet, Rfl: 3

## 2023-04-25 ENCOUNTER — PATIENT MESSAGE (OUTPATIENT)
Dept: FAMILY MEDICINE | Facility: CLINIC | Age: 78
End: 2023-04-25

## 2023-05-03 ENCOUNTER — PATIENT MESSAGE (OUTPATIENT)
Dept: FAMILY MEDICINE | Facility: CLINIC | Age: 78
End: 2023-05-03

## 2023-05-03 DIAGNOSIS — I10 ESSENTIAL HYPERTENSION: ICD-10-CM

## 2023-05-06 ENCOUNTER — PATIENT MESSAGE (OUTPATIENT)
Dept: FAMILY MEDICINE | Facility: CLINIC | Age: 78
End: 2023-05-06

## 2023-05-06 RX ORDER — AMLODIPINE BESYLATE 5 MG/1
5 TABLET ORAL NIGHTLY
Qty: 90 TABLET | Refills: 3 | Status: SHIPPED | OUTPATIENT
Start: 2023-05-06 | End: 2023-06-19 | Stop reason: SDUPTHER

## 2023-05-06 NOTE — TELEPHONE ENCOUNTER
BP elevated     Your blood pressures are somewhat on the high side.  I will increase your amlodipine to 5 mg now and continue to monitor your blood pressures.  You may want to double up on the dosage and I will send a new prescription.    This Patient Portal message has not been read.     Dr. Gunter per your request please find 7 additional days of my blood pressure.    (Taking Amlodipne 2.5 mg)      4/27  136-61  P63 am          5/1 137-64  p67 am               130-66  P86 pm                  137-61  p74 pm  4/28  148-59  P66 am           5/2  146-70 p76 am             142-66  P72 pm                    121-62 p74 pm  4/29  139-66  P70 am            5/3  141-72 p67 am             140-59  P78 pm                     141-62 p70 pm  4/30  136-63  P67 am             145-69  P74 pm

## 2023-05-10 DIAGNOSIS — I10 ESSENTIAL HYPERTENSION: ICD-10-CM

## 2023-05-11 RX ORDER — LOSARTAN POTASSIUM 100 MG/1
100 TABLET ORAL DAILY
Qty: 90 TABLET | Refills: 3 | Status: SHIPPED | OUTPATIENT
Start: 2023-05-11 | End: 2023-08-21 | Stop reason: SDUPTHER

## 2023-06-14 NOTE — ED NOTES
NEPHROSTOMY TUBE FLUSHED WITH 10 THAN 20CC NS.  PATENCY RESTORED. GRAVITY DRANAGE SYSTEM CHANGE-STERILE TECHNIQUE PER PROTOCOL.  TOLERATES WELL.   H/O right periorbital swelling, seen by opthalmology, noted it was possibly shingles and was treated with an antiviral for the week

## 2023-06-19 DIAGNOSIS — I10 ESSENTIAL HYPERTENSION: ICD-10-CM

## 2023-06-20 RX ORDER — AMLODIPINE BESYLATE 5 MG/1
5 TABLET ORAL NIGHTLY
Qty: 90 TABLET | Refills: 3 | Status: SHIPPED | OUTPATIENT
Start: 2023-06-20 | End: 2024-01-02 | Stop reason: SDUPTHER

## 2023-06-21 ENCOUNTER — LAB VISIT (OUTPATIENT)
Dept: LAB | Facility: HOSPITAL | Age: 78
End: 2023-06-21
Attending: INTERNAL MEDICINE
Payer: MEDICARE

## 2023-06-21 DIAGNOSIS — N18.32 CHRONIC KIDNEY DISEASE (CKD) STAGE G3B/A1, MODERATELY DECREASED GLOMERULAR FILTRATION RATE (GFR) BETWEEN 30-44 ML/MIN/1.73 SQUARE METER AND ALBUMINURIA CREATININE RATIO LESS THAN 30 MG/G: ICD-10-CM

## 2023-06-21 DIAGNOSIS — E11.29 TYPE II DIABETES MELLITUS WITH RENAL MANIFESTATIONS: ICD-10-CM

## 2023-06-21 DIAGNOSIS — E87.22 CHRONIC METABOLIC ACIDOSIS: ICD-10-CM

## 2023-06-21 DIAGNOSIS — N25.81 SECONDARY HYPERPARATHYROIDISM OF RENAL ORIGIN: ICD-10-CM

## 2023-06-21 DIAGNOSIS — Z79.4 TYPE 2 DIABETES MELLITUS WITH HYPOGLYCEMIA WITHOUT COMA, WITH LONG-TERM CURRENT USE OF INSULIN: Chronic | ICD-10-CM

## 2023-06-21 DIAGNOSIS — E79.0 URICACIDEMIA: Primary | ICD-10-CM

## 2023-06-21 DIAGNOSIS — C64.1 MALIGNANT NEOPLASM OF RIGHT KIDNEY, EXCEPT RENAL PELVIS: ICD-10-CM

## 2023-06-21 DIAGNOSIS — E78.5 HYPERLIPEMIA: ICD-10-CM

## 2023-06-21 DIAGNOSIS — E78.2 MIXED DYSLIPIDEMIA: ICD-10-CM

## 2023-06-21 DIAGNOSIS — E11.649 TYPE 2 DIABETES MELLITUS WITH HYPOGLYCEMIA WITHOUT COMA, WITH LONG-TERM CURRENT USE OF INSULIN: Chronic | ICD-10-CM

## 2023-06-21 DIAGNOSIS — Z90.5 ACQUIRED ABSENCE OF KIDNEY: ICD-10-CM

## 2023-06-21 LAB
25(OH)D3+25(OH)D2 SERPL-MCNC: 49 NG/ML (ref 30–96)
ALBUMIN SERPL BCP-MCNC: 4 G/DL (ref 3.5–5.2)
ALBUMIN/CREAT UR: 3.8 UG/MG (ref 0–30)
ANION GAP SERPL CALC-SCNC: 7 MMOL/L (ref 8–16)
BACTERIA #/AREA URNS HPF: NEGATIVE /HPF
BASOPHILS # BLD AUTO: 0.02 K/UL (ref 0–0.2)
BASOPHILS NFR BLD: 0.3 % (ref 0–1.9)
BUN SERPL-MCNC: 39 MG/DL (ref 8–23)
CALCIUM SERPL-MCNC: 9 MG/DL (ref 8.7–10.5)
CHLORIDE SERPL-SCNC: 108 MMOL/L (ref 95–110)
CHOLEST SERPL-MCNC: 169 MG/DL (ref 120–199)
CHOLEST/HDLC SERPL: 4.1 {RATIO} (ref 2–5)
CO2 SERPL-SCNC: 23 MMOL/L (ref 23–29)
CREAT SERPL-MCNC: 2.1 MG/DL (ref 0.5–1.4)
CREAT UR-MCNC: 95 MG/DL (ref 23–375)
CREAT UR-MCNC: 95 MG/DL (ref 23–375)
DIFFERENTIAL METHOD: ABNORMAL
EOSINOPHIL # BLD AUTO: 0.2 K/UL (ref 0–0.5)
EOSINOPHIL NFR BLD: 3.6 % (ref 0–8)
ERYTHROCYTE [DISTWIDTH] IN BLOOD BY AUTOMATED COUNT: 13 % (ref 11.5–14.5)
EST. GFR  (NO RACE VARIABLE): 31.2 ML/MIN/1.73 M^2
ESTIMATED AVG GLUCOSE: 137 MG/DL (ref 68–131)
GLUCOSE SERPL-MCNC: 77 MG/DL (ref 70–110)
HBA1C MFR BLD: 6.4 % (ref 4.5–6.2)
HCT VFR BLD AUTO: 36.8 % (ref 40–54)
HDLC SERPL-MCNC: 41 MG/DL (ref 40–75)
HDLC SERPL: 24.3 % (ref 20–50)
HGB BLD-MCNC: 12.4 G/DL (ref 14–18)
HYALINE CASTS #/AREA URNS LPF: 1 /LPF
IMM GRANULOCYTES # BLD AUTO: 0.03 K/UL (ref 0–0.04)
IMM GRANULOCYTES NFR BLD AUTO: 0.4 % (ref 0–0.5)
LDLC SERPL CALC-MCNC: 115.6 MG/DL (ref 63–159)
LYMPHOCYTES # BLD AUTO: 1.1 K/UL (ref 1–4.8)
LYMPHOCYTES NFR BLD: 15.9 % (ref 18–48)
MAGNESIUM SERPL-MCNC: 1.9 MG/DL (ref 1.6–2.6)
MCH RBC QN AUTO: 32.4 PG (ref 27–31)
MCHC RBC AUTO-ENTMCNC: 33.7 G/DL (ref 32–36)
MCV RBC AUTO: 96 FL (ref 82–98)
MICROALBUMIN UR DL<=1MG/L-MCNC: 3.6 UG/ML
MICROSCOPIC COMMENT: NORMAL
MONOCYTES # BLD AUTO: 0.6 K/UL (ref 0.3–1)
MONOCYTES NFR BLD: 8.9 % (ref 4–15)
NEUTROPHILS # BLD AUTO: 4.8 K/UL (ref 1.8–7.7)
NEUTROPHILS NFR BLD: 70.9 % (ref 38–73)
NONHDLC SERPL-MCNC: 128 MG/DL
NRBC BLD-RTO: 0 /100 WBC
PHOSPHATE SERPL-MCNC: 4 MG/DL (ref 2.7–4.5)
PLATELET # BLD AUTO: 259 K/UL (ref 150–450)
PMV BLD AUTO: 10.4 FL (ref 9.2–12.9)
POTASSIUM SERPL-SCNC: 4.4 MMOL/L (ref 3.5–5.1)
PROT UR-MCNC: 11 MG/DL (ref 6–15)
PROT/CREAT UR: 0.12 MG/G{CREAT} (ref 0–0.2)
PTH-INTACT SERPL-MCNC: 90.5 PG/ML (ref 9–77)
RBC # BLD AUTO: 3.83 M/UL (ref 4.6–6.2)
RBC #/AREA URNS HPF: 0 /HPF (ref 0–4)
SODIUM SERPL-SCNC: 138 MMOL/L (ref 136–145)
SQUAMOUS #/AREA URNS HPF: 1 /HPF
TRIGL SERPL-MCNC: 62 MG/DL (ref 30–150)
URATE SERPL-MCNC: 7.8 MG/DL (ref 3.4–7)
WBC # BLD AUTO: 6.74 K/UL (ref 3.9–12.7)
WBC #/AREA URNS HPF: 1 /HPF (ref 0–5)

## 2023-06-21 PROCEDURE — 82043 UR ALBUMIN QUANTITATIVE: CPT | Performed by: INTERNAL MEDICINE

## 2023-06-21 PROCEDURE — 81001 URINALYSIS AUTO W/SCOPE: CPT | Performed by: INTERNAL MEDICINE

## 2023-06-21 PROCEDURE — 84550 ASSAY OF BLOOD/URIC ACID: CPT | Performed by: INTERNAL MEDICINE

## 2023-06-21 PROCEDURE — 83970 ASSAY OF PARATHORMONE: CPT | Performed by: INTERNAL MEDICINE

## 2023-06-21 PROCEDURE — 80069 RENAL FUNCTION PANEL: CPT | Performed by: INTERNAL MEDICINE

## 2023-06-21 PROCEDURE — 80061 LIPID PANEL: CPT | Performed by: INTERNAL MEDICINE

## 2023-06-21 PROCEDURE — 82306 VITAMIN D 25 HYDROXY: CPT | Performed by: INTERNAL MEDICINE

## 2023-06-21 PROCEDURE — 85025 COMPLETE CBC W/AUTO DIFF WBC: CPT | Performed by: INTERNAL MEDICINE

## 2023-06-21 PROCEDURE — 83735 ASSAY OF MAGNESIUM: CPT | Performed by: INTERNAL MEDICINE

## 2023-06-21 PROCEDURE — 83036 HEMOGLOBIN GLYCOSYLATED A1C: CPT | Performed by: INTERNAL MEDICINE

## 2023-06-21 PROCEDURE — 82570 ASSAY OF URINE CREATININE: CPT | Performed by: INTERNAL MEDICINE

## 2023-06-21 PROCEDURE — 36415 COLL VENOUS BLD VENIPUNCTURE: CPT | Performed by: INTERNAL MEDICINE

## 2023-06-22 ENCOUNTER — PATIENT MESSAGE (OUTPATIENT)
Dept: FAMILY MEDICINE | Facility: CLINIC | Age: 78
End: 2023-06-22

## 2023-08-10 ENCOUNTER — OFFICE VISIT (OUTPATIENT)
Dept: FAMILY MEDICINE | Facility: CLINIC | Age: 78
End: 2023-08-10
Payer: MEDICARE

## 2023-08-10 VITALS
BODY MASS INDEX: 24.92 KG/M2 | HEIGHT: 71 IN | SYSTOLIC BLOOD PRESSURE: 122 MMHG | DIASTOLIC BLOOD PRESSURE: 58 MMHG | WEIGHT: 178 LBS | HEART RATE: 72 BPM

## 2023-08-10 DIAGNOSIS — E11.649 TYPE 2 DIABETES MELLITUS WITH HYPOGLYCEMIA WITHOUT COMA, WITH LONG-TERM CURRENT USE OF INSULIN: ICD-10-CM

## 2023-08-10 DIAGNOSIS — Z79.4 TYPE 2 DIABETES MELLITUS WITH HYPOGLYCEMIA WITHOUT COMA, WITH LONG-TERM CURRENT USE OF INSULIN: ICD-10-CM

## 2023-08-10 DIAGNOSIS — C64.9 RENAL CELL CARCINOMA, UNSPECIFIED LATERALITY: Chronic | ICD-10-CM

## 2023-08-10 DIAGNOSIS — F51.04 PSYCHOPHYSIOLOGICAL INSOMNIA: Chronic | ICD-10-CM

## 2023-08-10 DIAGNOSIS — E78.2 MIXED DYSLIPIDEMIA: ICD-10-CM

## 2023-08-10 DIAGNOSIS — Z86.12 HISTORY OF POLIOMYELITIS: ICD-10-CM

## 2023-08-10 DIAGNOSIS — I10 ESSENTIAL HYPERTENSION: Primary | ICD-10-CM

## 2023-08-10 DIAGNOSIS — N18.31 STAGE 3A CHRONIC KIDNEY DISEASE: Chronic | ICD-10-CM

## 2023-08-10 PROCEDURE — 99214 PR OFFICE/OUTPT VISIT, EST, LEVL IV, 30-39 MIN: ICD-10-PCS | Mod: S$PBB,AQ,, | Performed by: INTERNAL MEDICINE

## 2023-08-10 PROCEDURE — 99213 OFFICE O/P EST LOW 20 MIN: CPT | Performed by: INTERNAL MEDICINE

## 2023-08-10 PROCEDURE — 99214 OFFICE O/P EST MOD 30 MIN: CPT | Mod: S$PBB,AQ,, | Performed by: INTERNAL MEDICINE

## 2023-08-10 RX ORDER — ATORVASTATIN CALCIUM 10 MG/1
10 TABLET, FILM COATED ORAL
COMMUNITY
Start: 2023-06-26

## 2023-08-10 NOTE — PROGRESS NOTES
Subjective:       Patient ID: Jamil Cadet is a 78 y.o. male.    Chief Complaint: Hypertension, Hyperlipidemia, Diabetes, Chronic Kidney Disease, and H/O Kidney Cancer    Patient is a 78-year-old gentleman who comes for 3 months follow-up.  He is accompanied with his wife miss Chance who is not a patient today.  Underlying medical issues are as below:-     1.         Essential hypertension   2.         Mixed dyslipidemia   3.         Type 2 diabetes mellitus with hypoglycemia without coma, with long-term current use of insulin   4.         History of renal cell carcinoma status post partial nephrectomy   5.         Stage 3b chronic kidney disease following up with Dr. Shin at [facility]  6.         Cholelithiasis without cholecystitis       Overall he is doing okay.  Blood pressures seem to be under control at home.  Blood sugars are fairly under control with a combination of NovoLog FlexPen and Tresiba long-acting insulin.    For blood pressure he is taking amlodipine and losartan.      Currently for long-acting insulin he is on Tresiba 17 units and NovoLog as per sliding scale.    Trazodone gives him a few hours of good sleep at night at 100 mg.      He will be following up with Dr. Shin in future to follow on his kidney cancer.    Last set of labs were done in June 21 23 as ordered by Dr. Manjeet Decker.  Hemoglobin 12.4 and hematocrit 36.8.  Platelets 259 and WBC 6.74.  Lymphocytes slightly low 15.9%.  BUN 13 and creatinine 2.1.  EGFR 31.2.  Albumin 4.0, calcium 9.0.  Potassium 4.4 and sodium 138.  Glucose 77.  Total cholesterol 169, triglycerides 62 and HDL 41.  Hemoglobin A1c 6.4.  PTH intact 90.5 uric acid 7.8 and vitamin-D 49.  Urine microalbumin is normal.    Prior to that,  we had reviewed his decision to pursue a surveillance and wait and watch policy on his kidney cancer rather the going for any aggressive treatment.  He is following up with Dr. Dave Shin MD Urology with [facility].  He does have a follow-up with Dr. Shin in the month of August this year for further decisions.    He also is following up with Dr. Manjeet Decker MD Nephrology for stage IIIA kidney disease. Last time I had posed a question of whether he was on excess of vitamin D or Rocaltrol, it is okay with Dr. Decker as per the patient.    Last visit also we had noted that he had a MRI which did not show shrinkage of his kidney tumor.  He was disappointed with that.  It was a plan for him to seek a 2nd opinion from a renal oncologist.      Psychosocial issues of aging, burden of multiple medical l issues also predominate in the background and do affect the spectrum of his healthcare problems.        Insomnia:-stable over several years.  Gets a few hours of reasonable sleep with 100 mg of trazodone.  Previously was on 50 mg.    Erectile dysfunction:  - currently I do not see any active medications.  after the surgery he had for kidneys, he is unable to get an erection.  He has tried Viagra at 50 mg and even 100 mg but with no response.  I have advised him to check with his urologist at his routine follow-up and have further discussion on this issue and if any other nonmedical alternatives maybe feasible like vacuum pump or perhaps prosthetic surgery.      Patient's documentation of blood pressures have been reviewed.        Diabetes  He presents for his follow-up diabetic visit. He has type 2 diabetes mellitus. The initial diagnosis of diabetes was made 10 years ago. His disease course has been stable. Hypoglycemia symptoms include nervousness/anxiousness (Cancer related issues). Pertinent negatives for hypoglycemia include no confusion, headaches or seizures. There are no diabetic associated symptoms. Pertinent negatives for diabetes include no chest pain, no polydipsia, no polyuria and no weakness. Symptoms are worsening (Slight rise in A1c to 6.8.). Risk factors for coronary artery disease include male sex, stress,  diabetes mellitus and dyslipidemia. Current diabetic treatment includes insulin injections. He is compliant with treatment most of the time. His weight is stable. Meal planning includes avoidance of concentrated sweets. He has not had a previous visit with a dietitian. He participates in exercise intermittently. His home blood glucose trend is increasing steadily. His breakfast blood glucose range is generally 110-130 mg/dl. His dinner blood glucose range is generally 140-180 mg/dl. An ACE inhibitor/angiotensin II receptor blocker is being taken. He does not see a podiatrist.Eye exam is current.   Hyperlipidemia  This is a chronic (Medication was stopped due to muscle aches.) problem. The current episode started more than 1 year ago. Exacerbating diseases include chronic renal disease. He has no history of obesity. Associated symptoms include myalgias (Stable myalgias.). Pertinent negatives include no chest pain. He is currently on no antihyperlipidemic treatment (Currently has stop rosuvastatin.). The current treatment provides mild improvement of lipids. Compliance problems include psychosocial issues.  Risk factors for coronary artery disease include male sex, a sedentary lifestyle, dyslipidemia and diabetes mellitus.   Hypertension  This is a chronic problem. The current episode started more than 1 year ago. The problem is uncontrolled. Pertinent negatives include no chest pain, headaches, neck pain or palpitations. Risk factors for coronary artery disease include male gender, dyslipidemia and diabetes mellitus. Past treatments include angiotensin blockers and diuretics. The current treatment provides moderate improvement. Compliance problems include psychosocial issues (LITTLE BIT EXTRA SALT IN THE FOOD.).  Identifiable causes of hypertension include chronic renal disease.       Past Medical History:   Diagnosis Date    Acute renal failure 9/7/2020    Bacteremia due to Enterococcus 10/25/2020    Cancer      Diabetes mellitus, type 2     Disorder of kidney and ureter     Encounter for blood transfusion     History of renal cell carcinoma status post partial nephrectomy 9/7/2020    Hydronephrosis 9/9/2020    Hyperlipidemia     Hypertension     Infection and inflammatory reaction due to nephrostomy catheter, initial encounter 11/14/2020    Infection following a procedure, superficial incisional surgical site, subsequent encounter 12/26/2020    Polio     Pyelonephritis of right kidney     Septicemia due to enterococcus 11/15/2020     Social History     Socioeconomic History    Marital status:    Tobacco Use    Smoking status: Never    Smokeless tobacco: Never   Substance and Sexual Activity    Alcohol use: No    Drug use: No    Sexual activity: Yes     Partners: Female     Social Determinants of Health     Financial Resource Strain: Low Risk  (8/10/2023)    Overall Financial Resource Strain (CARDIA)     Difficulty of Paying Living Expenses: Not very hard   Food Insecurity: No Food Insecurity (8/10/2023)    Hunger Vital Sign     Worried About Running Out of Food in the Last Year: Never true     Ran Out of Food in the Last Year: Never true   Transportation Needs: No Transportation Needs (8/10/2023)    PRAPARE - Transportation     Lack of Transportation (Medical): No     Lack of Transportation (Non-Medical): No   Physical Activity: Inactive (8/10/2023)    Exercise Vital Sign     Days of Exercise per Week: 0 days     Minutes of Exercise per Session: 0 min   Stress: Stress Concern Present (8/10/2023)    [employer] - Occupational Stress Questionnaire     Feeling of Stress : To some extent   Social Connections: Moderately Integrated (8/10/2023)    Social Connection and Isolation Panel [NHANES]     Frequency of Communication with Friends and Family: Three times a week     Frequency of Social Gatherings with Friends and Family: Three times a week     Attends Cheondo[employer] Services: 1 to 4 times per  year     Active Member of Clubs or Organizations: No     Attends Club or Organization Meetings: Never     Marital Status:    Housing Stability: Low Risk  (8/10/2023)    Housing Stability Vital Sign     Unable to Pay for Housing in the Last Year: No     Number of Places Lived in the Last Year: 1     Unstable Housing in the Last Year: No     Past Surgical History:   Procedure Laterality Date    ANTEGRADE PYELOGRAM  12/10/2020    Procedure: PYELOGRAM, ANTEGRADE;  Surgeon: Dave Shin MD;  Location: [facility] OR [address] [place] [address];  Service: Urology;;    APPENDECTOMY      CYSTOSCOPY  12/10/2020    Procedure: CYSTOSCOPY;  Surgeon: Dave Shin MD;  Location: [facility] OR [address] [place] [address];  Service: Urology;;    CYSTOSCOPY W/ RETROGRADES Right 09/09/2020    Procedure: CYSTOSCOPY, WITH RETROGRADE PYELOGRAM;  Surgeon: Chris Garcia Jr., MD;  Location: [facility];  Service: Urology;  Laterality: Right;    DILATION OF NEPHROSTOMY TRACT Right 12/10/2020    Procedure: DILATION, NEPHROSTOMY TRACT;  Surgeon: Dave Shin MD;  Location: [facility] OR [address] [place] [address];  Service: Urology;  Laterality: Right;    RADIOFREQUENCY ABLATION KIDNEY  2022    REMOVAL OF DRAIN Right 12/10/2020    Procedure: REMOVAL, DRAIN-NEPHROSTOMY TUBE;  Surgeon: Dave Shin MD;  Location: [address];  Service: Urology;  Laterality: Right;    REPLACEMENT OF NEPHROSTOMY TUBE Right 12/10/2020    Procedure: REPLACEMENT, NEPHROSTOMY TUBE;  Surgeon: Dave Shin MD;  Location: [facility] OR [address] [place] [address];  Service: Urology;  Laterality: Right;    ROBOT-ASSISTED LAPAROSCOPIC REIMPLANTATION OF URETER USING DA PHU XI Right 01/08/2021    Procedure: XI ROBOTIC IMPLANTATION, URETER, USING PSOAS HITCH TECHNIQUE;  Surgeon: Dave Shin MD;  Location: [facility] OR 2ND FLR;  Service: Urology;  Laterality: Right;  4hrs gen with regional    SPINE SURGERY      URETEROSCOPY Right 12/10/2020    Procedure: URETEROSCOPY-ANTEGRADE;  Surgeon: Dave Shin MD;  Location: [facility] OR [facility]  "FLR;  Service: Urology;  Laterality: Right;    URETHROSCOPY  09/09/2020    Procedure: URETHROSCOPY;  Surgeon: Chris Garcia Jr., MD;  Location: [facility];  Service: Urology;;     Family History   Problem Relation Age of Onset    Heart disease Mother     Heart disease Father     Stroke Father     Heart disease Maternal Grandfather     Heart disease Paternal Grandmother     Heart disease Paternal Grandfather        Review of Systems   Constitutional:  Negative for activity change, chills, diaphoresis, fever and unexpected weight change (Gained a couple of lb since the last documentation.).   HENT:  Negative for congestion, hearing loss, postnasal drip, rhinorrhea and trouble swallowing.         No upper respiratory symptoms today.   Eyes:  Negative for discharge, redness and visual disturbance.   Respiratory:  Negative for cough, chest tightness and wheezing.    Cardiovascular:  Negative for chest pain, palpitations and leg swelling.        Blood pressures are in acceptable range.   Gastrointestinal:  Negative for abdominal distention, abdominal pain, blood in stool, constipation, diarrhea and vomiting.   Endocrine: Negative for cold intolerance, polydipsia and polyuria.        Improvement in A1c level to 6.4.   Genitourinary:  Negative for difficulty urinating, hematuria and urgency.        Kidney cancer-chronic kidney disease stage 3 and pending further subdivision classification.   Musculoskeletal:  Positive for myalgias (Stable myalgias.). Negative for arthralgias, joint swelling and neck pain.   Neurological:  Negative for seizures, weakness and headaches.   Psychiatric/Behavioral:  Negative for confusion and dysphoric mood. The patient is nervous/anxious (Cancer related issues).          Objective:      Blood pressure (!) 122/58, pulse 72, height 5' 11" (1.803 m), weight 80.7 kg (178 lb). Body mass index is 24.83 kg/m².  Physical Exam  Vitals and nursing note reviewed.   Constitutional:       General: He is not " in acute distress.     Appearance: Normal appearance. He is well-developed. He is not ill-appearing, toxic-appearing or diaphoretic.      Comments: BMI is 24.83   HENT:      Head: Normocephalic and atraumatic.   Eyes:      Conjunctiva/sclera: Conjunctivae normal.   Neck:      Thyroid: No thyromegaly.      Vascular: No JVD.      Trachea: No tracheal deviation.   Cardiovascular:      Rate and Rhythm: Normal rate and regular rhythm.      Pulses:           Dorsalis pedis pulses are 1+ on the right side and 1+ on the left side.      Heart sounds: Normal heart sounds. No murmur heard.     No friction rub. No gallop.   Pulmonary:      Effort: Pulmonary effort is normal. No respiratory distress.      Breath sounds: Normal breath sounds. No wheezing or rales.   Abdominal:      General: There is no distension.      Palpations: Abdomen is soft.      Tenderness: There is no abdominal tenderness.   Musculoskeletal:         General: No signs of injury.      Cervical back: No rigidity.      Right lower leg: No tenderness. No edema.      Left lower leg: No tenderness. No edema.      Right foot: Deformity (Pes cavus) present.      Left foot: Deformity ( pes cavus) present.        Feet:       Comments:      Feet:      Right foot:      Protective Sensation: 5 sites tested.  4 sites sensed.      Skin integrity: No ulcer, blister, skin breakdown, erythema or warmth.      Left foot:      Protective Sensation: 5 sites tested.  4 sites sensed.      Skin integrity: No ulcer, blister, skin breakdown, erythema or warmth.      Comments: High arch in the foot.?  Effects of poliomyelitis.  Slight claw toes.  Slightly dystrophic nails on the both great toes  Lymphadenopathy:      Cervical: No cervical adenopathy.   Skin:     General: Skin is warm and dry.      Findings: No lesion or rash.   Neurological:      Mental Status: He is alert. Mental status is at baseline.   Psychiatric:         Attention and Perception: He is attentive.         Mood  and Affect: Affect is not labile.         Behavior: Behavior normal.      Comments: Somewhat anhedonic over his medical issues.  Especially regarding nonresolution kidney cancer.             Assessment:             Essential hypertension  Comments:  Blood pressures are doing okay.    Type 2 diabetes mellitus with hypoglycemia without coma, with long-term current use of insulin    Stage 3a chronic kidney disease  Comments:  Discussed about chronic and long-term kidney protection.  Follows with Dr. Manjeet Decker.  Recent bump in creatinine 0.1 and GFR 31.  He attributes that to dehyd    Mixed dyslipidemia  Comments:  Good cholesterol numbers recently.  Liver tests are normal.    Renal cell carcinoma, unspecified laterality  Comments:  Following up with Dr. Shin.    Psychophysiological insomnia  Comments:  Currently on trazodone 100 mg and stable.    History of poliomyelitis  Comments:  Some residual foot deformities.  Fairly functional with no problems.       Component Ref Range & Units 1 mo ago  (6/21/23) 4 mo ago  (3/15/23) 7 mo ago  (1/10/23) 1 yr ago  (7/11/22) 1 yr ago  (2/2/22) 1 yr ago  (12/21/21) 1 yr ago  (9/9/21)   Hemoglobin A1C 4.5 - 6.2 % 6.4 High   6.8 High  CM  6.7 High  CM  6.6 High  CM  6.7 High  CM  6.0 CM  6.8 High  CM     Component Ref Range & Units 1 mo ago  (6/21/23) 4 mo ago  (3/15/23) 8 mo ago  (12/12/22) 11 mo ago  (9/13/22) 1 yr ago  (7/20/22) 1 yr ago  (7/11/22) 1 yr ago  (6/24/22)   Magnesium 1.6 - 2.6 mg/dL 1.9  1.8  2.3  1.8  1.8  2.1  1.8      BUN 8 - 23 mg/dL 39 High   34 High   35 High   38 High   35 High   51 High   53 High     Calcium 8.7 - 10.5 mg/dL 9.0  9.3  8.9  9.3  8.9  8.7  8.7    Creatinine 0.5 - 1.4 mg/dL 2.1 High   1.6 High   2.0 High   2.0 High   1.8 High   2.5 High   2.4 High       Lab Visit on 06/21/2023   Component Date Value Ref Range Status    Cholesterol 06/21/2023 169  120 - 199 mg/dL Final    Triglycerides 06/21/2023 62  30 - 150 mg/dL Final    HDL 06/21/2023 41   40 - 75 mg/dL Final    LDL Cholesterol 06/21/2023 115.6  63.0 - 159.0 mg/dL Final    HDL/Cholesterol Ratio 06/21/2023 24.3  20.0 - 50.0 % Final    Total Cholesterol/HDL Ratio 06/21/2023 4.1  2.0 - 5.0 Final    Non-HDL Cholesterol 06/21/2023 128  mg/dL Final    Hemoglobin A1C 06/21/2023 6.4 (H)  4.5 - 6.2 % Final    Estimated Avg Glucose 06/21/2023 137 (H)  68 - 131 mg/dL Final    Glucose 06/21/2023 77  70 - 110 mg/dL Final    Sodium 06/21/2023 138  136 - 145 mmol/L Final    Potassium 06/21/2023 4.4  3.5 - 5.1 mmol/L Final    Chloride 06/21/2023 108  95 - 110 mmol/L Final    CO2 06/21/2023 23  23 - 29 mmol/L Final    BUN 06/21/2023 39 (H)  8 - 23 mg/dL Final    Calcium 06/21/2023 9.0  8.7 - 10.5 mg/dL Final    Creatinine 06/21/2023 2.1 (H)  0.5 - 1.4 mg/dL Final    Albumin 06/21/2023 4.0  3.5 - 5.2 g/dL Final    Phosphorus 06/21/2023 4.0  2.7 - 4.5 mg/dL Final    eGFR 06/21/2023 31.2 (A)  >60 mL/min/1.73 m^2 Final    Anion Gap 06/21/2023 7 (L)  8 - 16 mmol/L Final    Protein, Urine Random 06/21/2023 11  6 - 15 mg/dL Final    Creatinine, Urine 06/21/2023 95.0  23.0 - 375.0 mg/dL Final    Prot/Creat Ratio, Urine 06/21/2023 0.12  0.00 - 0.20 Final    Microalbumin, Urine 06/21/2023 3.6  <19.9 ug/mL Final    Creatinine, Urine 06/21/2023 95.0  23.0 - 375.0 mg/dL Final    Microalb/Creat Ratio 06/21/2023 3.8  0.0 - 30.0 ug/mg Final    Magnesium 06/21/2023 1.9  1.6 - 2.6 mg/dL Final    PTH, Intact 06/21/2023 90.5 (H)  9.0 - 77.0 pg/mL Final    Uric Acid 06/21/2023 7.8 (H)  3.4 - 7.0 mg/dL Final    WBC 06/21/2023 6.74  3.90 - 12.70 K/uL Final    RBC 06/21/2023 3.83 (L)  4.60 - 6.20 M/uL Final    Hemoglobin 06/21/2023 12.4 (L)  14.0 - 18.0 g/dL Final    Hematocrit 06/21/2023 36.8 (L)  40.0 - 54.0 % Final    MCV 06/21/2023 96  82 - 98 fL Final    MCH 06/21/2023 32.4 (H)  27.0 - 31.0 pg Final    MCHC 06/21/2023 33.7  32.0 - 36.0 g/dL Final    RDW 06/21/2023 13.0  11.5 - 14.5 % Final    Platelets 06/21/2023 259  150 - 450 K/uL  Final    MPV 06/21/2023 10.4  9.2 - 12.9 fL Final    Immature Granulocytes 06/21/2023 0.4  0.0 - 0.5 % Final    Gran # (ANC) 06/21/2023 4.8  1.8 - 7.7 K/uL Final    Immature Grans (Abs) 06/21/2023 0.03  0.00 - 0.04 K/uL Final    Lymph # 06/21/2023 1.1  1.0 - 4.8 K/uL Final    Mono # 06/21/2023 0.6  0.3 - 1.0 K/uL Final    Eos # 06/21/2023 0.2  0.0 - 0.5 K/uL Final    Baso # 06/21/2023 0.02  0.00 - 0.20 K/uL Final    nRBC 06/21/2023 0  0 /100 WBC Final    Gran % 06/21/2023 70.9  38.0 - 73.0 % Final    Lymph % 06/21/2023 15.9 (L)  18.0 - 48.0 % Final    Mono % 06/21/2023 8.9  4.0 - 15.0 % Final    Eosinophil % 06/21/2023 3.6  0.0 - 8.0 % Final    Basophil % 06/21/2023 0.3  0.0 - 1.9 % Final    Differential Method 06/21/2023 Automated   Final    RBC, UA 06/21/2023 0  0 - 4 /hpf Final    WBC, UA 06/21/2023 1  0 - 5 /hpf Final    Bacteria 06/21/2023 Negative  None-Occ /hpf Final    Squam Epithel, UA 06/21/2023 1  /hpf Final    Hyaline Casts, UA 06/21/2023 1  0-1/lpf /lpf Final    Microscopic Comment 06/21/2023 SEE COMMENT   Final    Vit D, 25-Hydroxy 06/21/2023 49  30 - 96 ng/mL Final     -s/p ablation  -will plan MRI abdomen w/wo contrast  -we discussed the NCCN guidelines regarding status post ablation therapy for small renal mass.  Guidelines suggest imaging at 1-6 months which a an MRI would be approximately 5-6 months post ablation depending on when to get scheduled.  If this is reassuring for no residual cancer, then he would need an annual MRI moving forward.  -we discussed that if there is some signs of residual cancer within the ablation site, a repeat ablation could be pursued.  Would not recommend surgical therapy at this point given his renal function as well as his prior ablation would make surgical therapy much more challenging and more likely to result in a total nephrectomy.       Plan:           Essential hypertension  Comments:  Blood pressures are doing okay.    Type 2 diabetes mellitus with  hypoglycemia without coma, with long-term current use of insulin    Stage 3a chronic kidney disease  Comments:  Discussed about chronic and long-term kidney protection.  Follows with Dr. Manjeet Decker.  Recent bump in creatinine 0.1 and GFR 31.  He attributes that to dehyd    Mixed dyslipidemia  Comments:  Good cholesterol numbers recently.  Liver tests are normal.    Renal cell carcinoma, unspecified laterality  Comments:  Following up with Dr. Shin.    Psychophysiological insomnia  Comments:  Currently on trazodone 100 mg and stable.    History of poliomyelitis  Comments:  Some residual foot deformities.  Fairly functional with no problems.      Overall Herminio is doing okay.  Last creatinine was 2.1 and he will be scheduled for another test in 10 or 11 days time by Dr. Manjeet Decker.  After that he will be following up with Dr. Shin for his kidney cancer.      Recently he had a 2 or 3 day duration when his feet were somewhat reddish and swollen.  Cause of that is uncertain.  He did not take excess salt.  He was not exposed to the sunlight.  He was not standing for long hours except that he was sitting on a computer chair somewhat unusually for more than 6 hours.    Blood sugars are doing okay at least in the morning with some rise which is expected in the evening.  His hemoglobin A1c was in acceptable range in recent past.  His body weight is fairly stable at this point and perhaps a couple of lb of gain since the last documentation.  Blood pressures are doing okay.    Continue with chronic kidney disease precautions including remaining hydrated.  Avoid any antibiotics that might not be necessary if you go to urgent care center.  Avoid anti-inflammatory medications if possible.  If you ever go to the hospital for any CT scan which involves dyes or contrast, you should be aware that these can affect the kidneys and the doctor who ordered these tests should be also aware of that.      A new handicap paper also has  "been filed for [place] [employer].  He is changing his car now.      Advised Pt about age and season appropriate immunizations/ cancer screenings.  Also seasonal influenza vaccine, update on tetanus diphtheria vaccination every 10 years.  Patient has been advised to watch diet and exercise. Avoid fried and fatty food. Compliance to medications and follow up urged.  Advised Pt. to monitor Blood sugars at home and record them.  Advised Pt  for Anti reflux measures like small feequent meals, avoid spicy and greasy food. Head end up at night.  keep a close eye on feet and keep them clean. Annual eye examination. Annual influenza vaccine.  Monitor HgbA1c every 3 to 6 months. Monitor urine microalbumin every year.keep LDL less than 100. Monitor blood pressure and target blood pressure 120/70.  Please utilize precautions for current COVID-19 pandemic.  Try to avoid crowds or close contact with multiple people.  Minimize outside interaction.  Wash hands with soap for  frequently upon contact.Use face mask or cover.    Fup 4 m    Spent ezra 30 minutes with patient which involved review of pts medical conditions, labs, medications and with 50% of time face-to-face discussion about medical problems, management and any applicable changes.      Current Outpatient Medications:     allopurinoL (ZYLOPRIM) 100 MG tablet, Take 100 mg by mouth., Disp: , Rfl:     amLODIPine (NORVASC) 5 MG tablet, Take 1 tablet (5 mg total) by mouth every evening., Disp: 90 tablet, Rfl: 3    ascorbic acid, vitamin C, (VITAMIN C) 500 MG tablet, Take 500 mg by mouth once daily., Disp: , Rfl:     BD WINSOME 2ND GEN PEN NEEDLE 32 gauge x 5/32" Ndle, INJECT 1 UNITS INTO THE SKIN 3 (THREE) TIMES DAILY BEFORE MEALS. USE AS DIRECTED NON-MED ITEM, Disp: 300 each, Rfl: 3    calcitRIOL (ROCALTROL) 0.25 MCG Cap, TAKE 1 CAPSULE (0.25 MCG TOTAL) BY MOUTH 3 (THREE) TIMES A WEEK MONDAY/WEDNESDAY/FRIDAY, Disp: , Rfl:     indapamide (LOZOL) 1.25 MG Tab, " Take 1.25 mg by mouth once daily., Disp: , Rfl:     insulin aspart U-100 (NOVOLOG FLEXPEN U-100 INSULIN) 100 unit/mL (3 mL) InPn pen, Inject 10 Units into the skin 3 (three) times daily with meals. Check glucose before meals and then take insulin as per sliding scale (Patient taking differently: Inject 8 Units into the skin 3 (three) times daily with meals. Check glucose before meals and then take insulin as per sliding scale), Disp: 5 each, Rfl: 3    insulin degludec (TRESIBA FLEXTOUCH U-100) 100 unit/mL (3 mL) insulin pen, Inject 17 Units into the skin every evening., Disp: 5 pen, Rfl: 3    losartan (COZAAR) 100 MG tablet, Take 1 tablet (100 mg total) by mouth once daily., Disp: 90 tablet, Rfl: 3    sodium bicarbonate 650 MG tablet, Take 650 mg by mouth once daily., Disp: , Rfl:     traZODone (DESYREL) 100 MG tablet, Take 1 tablet (100 mg total) by mouth every evening., Disp: 90 tablet, Rfl: 3    TRUE METRIX GLUCOSE TEST STRIP Strp, Inject 1 each into the skin 3 (three) times daily before meals., Disp: 300 strip, Rfl: 1    zinc gluconate 50 mg tablet, Take 50 mg by mouth once daily., Disp: , Rfl:     atorvastatin (LIPITOR) 10 MG tablet, Take 10 mg by mouth., Disp: , Rfl:     blood-glucose meter (TRUE METRIX GLUCOSE METER) Misc, 1 Device by Misc.(Non-Drug; Combo Route) route once daily. (Patient taking differently: 1 Device by Misc.(Non-Drug; Combo Route) route once daily. Non-med item), Disp: 1 each, Rfl: 0

## 2023-08-14 DIAGNOSIS — Z79.4 TYPE 2 DIABETES MELLITUS WITH HYPOGLYCEMIA WITHOUT COMA, WITH LONG-TERM CURRENT USE OF INSULIN: Chronic | ICD-10-CM

## 2023-08-14 DIAGNOSIS — E11.649 TYPE 2 DIABETES MELLITUS WITH HYPOGLYCEMIA WITHOUT COMA, WITH LONG-TERM CURRENT USE OF INSULIN: Chronic | ICD-10-CM

## 2023-08-14 RX ORDER — INSULIN DEGLUDEC 100 U/ML
15 INJECTION, SOLUTION SUBCUTANEOUS NIGHTLY
Qty: 15 ML | Refills: 3 | Status: SHIPPED | OUTPATIENT
Start: 2023-08-14

## 2023-08-21 ENCOUNTER — PATIENT MESSAGE (OUTPATIENT)
Dept: FAMILY MEDICINE | Facility: CLINIC | Age: 78
End: 2023-08-21

## 2023-08-21 ENCOUNTER — HOSPITAL ENCOUNTER (OUTPATIENT)
Dept: RADIOLOGY | Facility: HOSPITAL | Age: 78
Discharge: HOME | End: 2023-08-21
Attending: UROLOGY
Payer: MEDICARE

## 2023-08-21 DIAGNOSIS — C64.1 RENAL CELL ADENOCARCINOMA, RIGHT: ICD-10-CM

## 2023-08-21 DIAGNOSIS — N28.89 OTHER SPECIFIED DISORDERS OF KIDNEY AND URETER: ICD-10-CM

## 2023-08-21 LAB
CREAT SERPL-MCNC: 2.1 MG/DL (ref 0.5–1.4)
SAMPLE: ABNORMAL

## 2023-08-21 PROCEDURE — 74183 MRI ABD W/O CNTR FLWD CNTR: CPT | Mod: TC,PO

## 2023-08-21 PROCEDURE — 71046 X-RAY EXAM CHEST 2 VIEWS: CPT | Mod: TC,PO

## 2023-08-21 PROCEDURE — 82565 ASSAY OF CREATININE: CPT | Mod: 59,PO

## 2023-08-21 PROCEDURE — A9585 GADOBUTROL INJECTION: HCPCS | Mod: PO | Performed by: UROLOGY

## 2023-08-21 PROCEDURE — 25500020 PHARM REV CODE 255: Mod: PO | Performed by: UROLOGY

## 2023-08-21 RX ORDER — GADOBUTROL 604.72 MG/ML
8 INJECTION INTRAVENOUS
Status: COMPLETED | OUTPATIENT
Start: 2023-08-21 | End: 2023-08-21

## 2023-08-21 RX ADMIN — GADOBUTROL 8 ML: 604.72 INJECTION INTRAVENOUS at 08:08

## 2023-08-22 ENCOUNTER — PATIENT MESSAGE (OUTPATIENT)
Dept: FAMILY MEDICINE | Facility: CLINIC | Age: 78
End: 2023-08-22

## 2023-08-23 ENCOUNTER — OFFICE VISIT (OUTPATIENT)
Dept: UROLOGY | Facility: CLINIC | Age: 78
End: 2023-08-23
Payer: MEDICARE

## 2023-08-23 VITALS
DIASTOLIC BLOOD PRESSURE: 65 MMHG | BODY MASS INDEX: 24.91 KG/M2 | WEIGHT: 177.94 LBS | HEIGHT: 71 IN | SYSTOLIC BLOOD PRESSURE: 140 MMHG | HEART RATE: 61 BPM

## 2023-08-23 DIAGNOSIS — I10 ESSENTIAL HYPERTENSION: Chronic | ICD-10-CM

## 2023-08-23 DIAGNOSIS — C64.1 RENAL CELL ADENOCARCINOMA, RIGHT: Primary | Chronic | ICD-10-CM

## 2023-08-23 PROCEDURE — 99214 OFFICE O/P EST MOD 30 MIN: CPT | Mod: S$PBB,,, | Performed by: UROLOGY

## 2023-08-23 PROCEDURE — 99999 PR PBB SHADOW E&M-EST. PATIENT-LVL IV: ICD-10-PCS | Mod: PBBFAC,,, | Performed by: UROLOGY

## 2023-08-23 PROCEDURE — 99214 PR OFFICE/OUTPT VISIT, EST, LEVL IV, 30-39 MIN: ICD-10-PCS | Mod: S$PBB,,, | Performed by: UROLOGY

## 2023-08-23 PROCEDURE — 99999 PR PBB SHADOW E&M-EST. PATIENT-LVL IV: CPT | Mod: PBBFAC,,, | Performed by: UROLOGY

## 2023-08-23 PROCEDURE — 99214 OFFICE O/P EST MOD 30 MIN: CPT | Mod: PBBFAC | Performed by: UROLOGY

## 2023-08-23 NOTE — PROGRESS NOTES
CHIEF COMPLAINT:     Mr. Cadet is a 78 y.o. male presenting for follow up small renal mass.    PRESENTING ILLNESS:    Jamil Cadet is a 78 y.o. male with a PMH of mid/distal urteral stricture s/p robotic ureteroneocystotomy with psoas hitch and lysis of adhesions. 3 mo post op ultrasound showed resolution of right sided hydronephrosis but an incidental left lower pole solid renal mass was discovered ~2.3 cm. He is here for follow up. He has a history of a right partial nephrectomy from years passed which showed: 5.1cm, FG 2, ccRCC with negative margins.         Patient initially scheduled for surgery but changed his mind and opted for AS.  However, he saw a nephrologist, Dr. Manjeet Decker, in [place] who encouraged him to visit with Dr. Ishmael Kennedy at [facility].  He ultimately decided on an IR ablation which was performed March of 2022 at [facility].  The patient does not want any further follow-up at [facility].    CMP from 08/21/2023 showed a serum creatinine of 1.6, EGFR 43.   MRI of the abdomen with and without contrast from 08/21/2023 was independently reviewed today and shared with the patient and shows stable findings of complex right renal cyst status post right partial nephrectomy, left exophytic mass smaller in size and no obvious concerning parameters.  Stable intra aortocaval lymph node.  No obvious evidence of recurrence or metastasis.      REVIEW OF SYSTEMS:    Review of Systems   Constitutional: Negative for chills and fever.   Respiratory: Negative for shortness of breath.    Cardiovascular: Negative for chest pain.   Gastrointestinal: Negative for abdominal pain, constipation and diarrhea.   Genitourinary: Negative for dysuria, flank pain, frequency, hematuria and urgency.   Neurological: Negative for weakness.       PATIENT HISTORY:    Past Medical History:   Diagnosis Date    Acute renal failure 9/7/2020    Bacteremia due to Enterococcus 10/25/2020    Cancer     Diabetes mellitus, type 2     Disorder  "of kidney and ureter     Encounter for blood transfusion     History of renal cell carcinoma status post partial nephrectomy 9/7/2020    Hydronephrosis 9/9/2020    Hyperlipidemia     Hypertension     Infection and inflammatory reaction due to nephrostomy catheter, initial encounter 11/14/2020    Infection following a procedure, superficial incisional surgical site, subsequent encounter 12/26/2020    Polio     Pyelonephritis of right kidney     Septicemia due to enterococcus 11/15/2020       Family History   Problem Relation Age of Onset    Heart disease Mother     Heart disease Father     Stroke Father     Heart disease Maternal Grandfather     Heart disease Paternal Grandmother     Heart disease Paternal Grandfather        Allergies:  Patient has no known allergies.    Medications:    Current Outpatient Medications:     allopurinoL (ZYLOPRIM) 100 MG tablet, Take 100 mg by mouth., Disp: , Rfl:     amLODIPine (NORVASC) 5 MG tablet, Take 1 tablet (5 mg total) by mouth every evening., Disp: 90 tablet, Rfl: 3    ascorbic acid, vitamin C, (VITAMIN C) 500 MG tablet, Take 500 mg by mouth once daily., Disp: , Rfl:     atorvastatin (LIPITOR) 10 MG tablet, Take 10 mg by mouth., Disp: , Rfl:     BD WINSOME 2ND GEN PEN NEEDLE 32 gauge x 5/32" Ndle, INJECT 1 UNITS INTO THE SKIN 3 (THREE) TIMES DAILY BEFORE MEALS. USE AS DIRECTED NON-MED ITEM, Disp: 300 each, Rfl: 3    calcitRIOL (ROCALTROL) 0.25 MCG Cap, TAKE 1 CAPSULE (0.25 MCG TOTAL) BY MOUTH 3 (THREE) TIMES A WEEK MONDAY/WEDNESDAY/FRIDAY, Disp: , Rfl:     indapamide (LOZOL) 1.25 MG Tab, Take 1.25 mg by mouth once daily., Disp: , Rfl:     insulin aspart U-100 (NOVOLOG FLEXPEN U-100 INSULIN) 100 unit/mL (3 mL) InPn pen, Inject 10 Units into the skin 3 (three) times daily with meals. Check glucose before meals and then take insulin as per sliding scale (Patient taking differently: Inject 8 Units into the skin 3 (three) times daily with meals. Check glucose before meals and then " take insulin as per sliding scale), Disp: 5 each, Rfl: 3    losartan (COZAAR) 100 MG tablet, Take 1 tablet (100 mg total) by mouth once daily., Disp: 90 tablet, Rfl: 3    sodium bicarbonate 650 MG tablet, Take 650 mg by mouth once daily., Disp: , Rfl:     traZODone (DESYREL) 100 MG tablet, Take 1 tablet (100 mg total) by mouth every evening., Disp: 90 tablet, Rfl: 3    TRESIBA FLEXTOUCH U-100 100 unit/mL (3 mL) insulin pen, INJECT 15 UNITS            SUBCUTANEOUSLY EVERY       EVENING, Disp: 15 mL, Rfl: 3    TRUE METRIX GLUCOSE TEST STRIP Strp, Inject 1 each into the skin 3 (three) times daily before meals., Disp: 300 strip, Rfl: 1    zinc gluconate 50 mg tablet, Take 50 mg by mouth once daily., Disp: , Rfl:     blood-glucose meter (TRUE METRIX GLUCOSE METER) Misc, 1 Device by Misc.(Non-Drug; Combo Route) route once daily. (Patient taking differently: 1 Device by Misc.(Non-Drug; Combo Route) route once daily. Non-med item), Disp: 1 each, Rfl: 0    PHYSICAL EXAMINATION:      Physical Exam  Constitutional:       General: He is not in acute distress.     Appearance: He is well-developed.   HENT:      Head: Normocephalic and atraumatic.   Eyes:      General: No scleral icterus.  Neck:      Trachea: No tracheal deviation.   Pulmonary:      Effort: Pulmonary effort is normal. No respiratory distress.   Neurological:      Mental Status: He is alert and oriented to person, place, and time.   Psychiatric:         Behavior: Behavior normal.         Thought Content: Thought content normal.         Judgment: Judgment normal.     : small left epididymal cyst, no testicular lesions bilaterally    LABS:    Lab Results   Component Value Date    PSA 0.37 02/02/2022    PSA 0.28 09/10/2019    PSADIAG 0.34 03/15/2023    PSADIAG 0.34 12/08/2020       IMPRESSION:  No diagnosis found.    PLAN:  Problem List Items Addressed This Visit    None        -s/p ablation  -MRI of the abd from 8/21/23 was independently reviewed today and shared  with the patient and shows no evidence of recurrence.     -we discussed the NCCN guidelines regarding status post ablation therapy for small renal mass.  Guidelines suggest imaging at 1-6 months which a an MRI would be approximately 5-6 months post ablation depending on when to get scheduled.  If this is reassuring for no residual cancer, then he would need an annual MRI moving forward.  -we discussed that if there is some signs of residual cancer within the ablation site, a repeat ablation could be pursued.  Would not recommend surgical therapy at this point given his renal function as well as his prior ablation would make surgical therapy much more challenging and more likely to result in a total nephrectomy.    -f/u in 1 year for MRI abd, CXR, CMP      Dave Shin MD

## 2023-08-26 ENCOUNTER — PATIENT MESSAGE (OUTPATIENT)
Dept: FAMILY MEDICINE | Facility: CLINIC | Age: 78
End: 2023-08-26

## 2023-09-07 ENCOUNTER — LAB VISIT (OUTPATIENT)
Dept: LAB | Facility: HOSPITAL | Age: 78
End: 2023-09-07
Attending: INTERNAL MEDICINE
Payer: MEDICARE

## 2023-09-07 ENCOUNTER — PATIENT MESSAGE (OUTPATIENT)
Dept: FAMILY MEDICINE | Facility: CLINIC | Age: 78
End: 2023-09-07

## 2023-09-07 DIAGNOSIS — E87.22 CHRONIC METABOLIC ACIDOSIS: ICD-10-CM

## 2023-09-07 DIAGNOSIS — Z90.5 ACQUIRED ABSENCE OF KIDNEY: ICD-10-CM

## 2023-09-07 DIAGNOSIS — R80.9 PROTEINURIA: ICD-10-CM

## 2023-09-07 DIAGNOSIS — N28.1 ACQUIRED CYST OF KIDNEY: ICD-10-CM

## 2023-09-07 DIAGNOSIS — E11.29 TYPE II DIABETES MELLITUS WITH RENAL MANIFESTATIONS: Primary | ICD-10-CM

## 2023-09-07 DIAGNOSIS — C64.1 MALIGNANT NEOPLASM OF RIGHT KIDNEY, EXCEPT RENAL PELVIS: ICD-10-CM

## 2023-09-07 DIAGNOSIS — N25.81 SECONDARY HYPERPARATHYROIDISM OF RENAL ORIGIN: ICD-10-CM

## 2023-09-07 DIAGNOSIS — E79.0 URICACIDEMIA: ICD-10-CM

## 2023-09-07 DIAGNOSIS — E78.5 HYPERLIPEMIA: ICD-10-CM

## 2023-09-07 LAB
25(OH)D3+25(OH)D2 SERPL-MCNC: 45 NG/ML (ref 30–96)
ALBUMIN SERPL BCP-MCNC: 4 G/DL (ref 3.5–5.2)
ALBUMIN/CREAT UR: 51.6 UG/MG (ref 0–30)
ALP SERPL-CCNC: 73 U/L (ref 55–135)
ALT SERPL W/O P-5'-P-CCNC: 18 U/L (ref 10–44)
ANION GAP SERPL CALC-SCNC: 5 MMOL/L (ref 8–16)
AST SERPL-CCNC: 22 U/L (ref 10–40)
BACTERIA #/AREA URNS HPF: NEGATIVE /HPF
BASOPHILS # BLD AUTO: 0.02 K/UL (ref 0–0.2)
BASOPHILS NFR BLD: 0.3 % (ref 0–1.9)
BILIRUB SERPL-MCNC: 1 MG/DL (ref 0.1–1)
BUN SERPL-MCNC: 39 MG/DL (ref 8–23)
CALCIUM SERPL-MCNC: 9 MG/DL (ref 8.7–10.5)
CHLORIDE SERPL-SCNC: 108 MMOL/L (ref 95–110)
CHOLEST SERPL-MCNC: 125 MG/DL (ref 120–199)
CHOLEST/HDLC SERPL: 3.7 {RATIO} (ref 2–5)
CO2 SERPL-SCNC: 22 MMOL/L (ref 23–29)
CREAT SERPL-MCNC: 2.1 MG/DL (ref 0.5–1.4)
CREAT UR-MCNC: 25 MG/DL (ref 23–375)
CREAT UR-MCNC: 25 MG/DL (ref 23–375)
DIFFERENTIAL METHOD: ABNORMAL
EOSINOPHIL # BLD AUTO: 0.2 K/UL (ref 0–0.5)
EOSINOPHIL NFR BLD: 4 % (ref 0–8)
ERYTHROCYTE [DISTWIDTH] IN BLOOD BY AUTOMATED COUNT: 12.9 % (ref 11.5–14.5)
EST. GFR  (NO RACE VARIABLE): 31.2 ML/MIN/1.73 M^2
ESTIMATED AVG GLUCOSE: 143 MG/DL (ref 68–131)
GLUCOSE SERPL-MCNC: 138 MG/DL (ref 70–110)
HBA1C MFR BLD: 6.6 % (ref 4.5–6.2)
HCT VFR BLD AUTO: 33.7 % (ref 40–54)
HDLC SERPL-MCNC: 34 MG/DL (ref 40–75)
HDLC SERPL: 27.2 % (ref 20–50)
HGB BLD-MCNC: 11.1 G/DL (ref 14–18)
HYALINE CASTS #/AREA URNS LPF: 1 /LPF
IMM GRANULOCYTES # BLD AUTO: 0.01 K/UL (ref 0–0.04)
IMM GRANULOCYTES NFR BLD AUTO: 0.2 % (ref 0–0.5)
LDLC SERPL CALC-MCNC: 82.6 MG/DL (ref 63–159)
LYMPHOCYTES # BLD AUTO: 1.1 K/UL (ref 1–4.8)
LYMPHOCYTES NFR BLD: 18.1 % (ref 18–48)
MCH RBC QN AUTO: 32.1 PG (ref 27–31)
MCHC RBC AUTO-ENTMCNC: 32.9 G/DL (ref 32–36)
MCV RBC AUTO: 97 FL (ref 82–98)
MICROALBUMIN UR DL<=1MG/L-MCNC: 12.9 UG/ML
MICROSCOPIC COMMENT: NORMAL
MONOCYTES # BLD AUTO: 0.6 K/UL (ref 0.3–1)
MONOCYTES NFR BLD: 9.9 % (ref 4–15)
NEUTROPHILS # BLD AUTO: 4 K/UL (ref 1.8–7.7)
NEUTROPHILS NFR BLD: 67.5 % (ref 38–73)
NONHDLC SERPL-MCNC: 91 MG/DL
NRBC BLD-RTO: 0 /100 WBC
PHOSPHATE SERPL-MCNC: 3.8 MG/DL (ref 2.7–4.5)
PLATELET # BLD AUTO: 249 K/UL (ref 150–450)
PMV BLD AUTO: 9.9 FL (ref 9.2–12.9)
POTASSIUM SERPL-SCNC: 4.3 MMOL/L (ref 3.5–5.1)
PROT SERPL-MCNC: 6.9 G/DL (ref 6–8.4)
PROT UR-MCNC: <6 MG/DL (ref 6–15)
PROT/CREAT UR: NORMAL MG/G{CREAT} (ref 0–0.2)
PTH-INTACT SERPL-MCNC: 100.9 PG/ML (ref 9–77)
RBC # BLD AUTO: 3.46 M/UL (ref 4.6–6.2)
RBC #/AREA URNS HPF: 0 /HPF (ref 0–4)
SODIUM SERPL-SCNC: 135 MMOL/L (ref 136–145)
SQUAMOUS #/AREA URNS HPF: 1 /HPF
TRIGL SERPL-MCNC: 42 MG/DL (ref 30–150)
URATE SERPL-MCNC: 6.5 MG/DL (ref 3.4–7)
WBC # BLD AUTO: 5.98 K/UL (ref 3.9–12.7)
WBC #/AREA URNS HPF: 1 /HPF (ref 0–5)

## 2023-09-07 PROCEDURE — 82570 ASSAY OF URINE CREATININE: CPT | Performed by: INTERNAL MEDICINE

## 2023-09-07 PROCEDURE — 84550 ASSAY OF BLOOD/URIC ACID: CPT | Performed by: INTERNAL MEDICINE

## 2023-09-07 PROCEDURE — 36415 COLL VENOUS BLD VENIPUNCTURE: CPT | Performed by: INTERNAL MEDICINE

## 2023-09-07 PROCEDURE — 80053 COMPREHEN METABOLIC PANEL: CPT | Performed by: INTERNAL MEDICINE

## 2023-09-07 PROCEDURE — 82306 VITAMIN D 25 HYDROXY: CPT | Performed by: INTERNAL MEDICINE

## 2023-09-07 PROCEDURE — 83036 HEMOGLOBIN GLYCOSYLATED A1C: CPT | Performed by: INTERNAL MEDICINE

## 2023-09-07 PROCEDURE — 81001 URINALYSIS AUTO W/SCOPE: CPT | Performed by: INTERNAL MEDICINE

## 2023-09-07 PROCEDURE — 80061 LIPID PANEL: CPT | Performed by: INTERNAL MEDICINE

## 2023-09-07 PROCEDURE — 85025 COMPLETE CBC W/AUTO DIFF WBC: CPT | Performed by: INTERNAL MEDICINE

## 2023-09-07 PROCEDURE — 83970 ASSAY OF PARATHORMONE: CPT | Performed by: INTERNAL MEDICINE

## 2023-09-07 PROCEDURE — 84156 ASSAY OF PROTEIN URINE: CPT | Performed by: INTERNAL MEDICINE

## 2023-09-07 PROCEDURE — 84100 ASSAY OF PHOSPHORUS: CPT | Performed by: INTERNAL MEDICINE

## 2023-10-03 ENCOUNTER — CLINICAL SUPPORT (OUTPATIENT)
Dept: FAMILY MEDICINE | Facility: CLINIC | Age: 78
End: 2023-10-03
Payer: MEDICARE

## 2023-10-03 DIAGNOSIS — Z23 NEED FOR INFLUENZA VACCINATION: Primary | ICD-10-CM

## 2023-10-03 PROCEDURE — 90662 IIV NO PRSV INCREASED AG IM: CPT | Mod: PBBFAC | Performed by: INTERNAL MEDICINE

## 2023-10-03 PROCEDURE — 99999PBSHW FLU VACCINE - QUADRIVALENT - HIGH DOSE (65+) PRESERVATIVE FREE IM: ICD-10-PCS | Mod: PBBFAC,,,

## 2023-10-03 PROCEDURE — 99999PBSHW FLU VACCINE - QUADRIVALENT - HIGH DOSE (65+) PRESERVATIVE FREE IM: Mod: PBBFAC,,,

## 2023-10-03 NOTE — PROGRESS NOTES
Pt presented to clinic for high dose flu immunization. Administered in left arm. Pt advised to wait 15 min. Pt tolerated well.

## 2023-10-29 DIAGNOSIS — Z79.4 TYPE 2 DIABETES MELLITUS WITH HYPOGLYCEMIA WITHOUT COMA, WITH LONG-TERM CURRENT USE OF INSULIN: ICD-10-CM

## 2023-10-29 DIAGNOSIS — E11.649 TYPE 2 DIABETES MELLITUS WITH HYPOGLYCEMIA WITHOUT COMA, WITH LONG-TERM CURRENT USE OF INSULIN: ICD-10-CM

## 2023-10-30 RX ORDER — CALCIUM CITRATE/VITAMIN D3 200MG-6.25
TABLET ORAL
Qty: 300 STRIP | Refills: 1 | Status: SHIPPED | OUTPATIENT
Start: 2023-10-30

## 2023-11-06 DIAGNOSIS — R60.0 LOCALIZED EDEMA: Primary | ICD-10-CM

## 2023-12-05 ENCOUNTER — HOSPITAL ENCOUNTER (OUTPATIENT)
Dept: CARDIOLOGY | Facility: HOSPITAL | Age: 78
Discharge: HOME | End: 2023-12-05
Attending: NURSE PRACTITIONER
Payer: MEDICARE

## 2023-12-05 VITALS — BODY MASS INDEX: 24.78 KG/M2 | HEIGHT: 71 IN | WEIGHT: 177 LBS

## 2023-12-05 DIAGNOSIS — R60.0 LOCALIZED EDEMA: ICD-10-CM

## 2023-12-05 PROCEDURE — 93306 ECHO (CUPID ONLY): ICD-10-PCS | Mod: 26,,, | Performed by: INTERNAL MEDICINE

## 2023-12-05 PROCEDURE — 93306 TTE W/DOPPLER COMPLETE: CPT | Mod: 26,,, | Performed by: INTERNAL MEDICINE

## 2023-12-05 PROCEDURE — 93306 TTE W/DOPPLER COMPLETE: CPT

## 2023-12-06 LAB
AORTIC ROOT ANNULUS: 3.7 CM
AORTIC VALVE CUSP SEPERATION: 1.4 CM
AV INDEX (PROSTH): 0.61
AV MEAN GRADIENT: 7 MMHG
AV PEAK GRADIENT: 13 MMHG
AV REGURGITATION PRESSURE HALF TIME: 809 MS
AV VALVE AREA BY VELOCITY RATIO: 1.58 CM²
AV VALVE AREA: 1.91 CM²
AV VELOCITY RATIO: 0.5
BSA FOR ECHO PROCEDURE: 2.01 M2
CV ECHO LV RWT: 0.56 CM
DOP CALC AO PEAK VEL: 1.77 M/S
DOP CALC AO VTI: 33.3 CM
DOP CALC LVOT AREA: 3.1 CM2
DOP CALC LVOT DIAMETER: 2 CM
DOP CALC LVOT PEAK VEL: 0.89 M/S
DOP CALC LVOT STROKE VOLUME: 63.74 CM3
DOP CALCLVOT PEAK VEL VTI: 20.3 CM
E WAVE DECELERATION TIME: 274 MSEC
E/A RATIO: 0.81
E/E' RATIO: 7.44 M/S
ECHO LV POSTERIOR WALL: 1.35 CM (ref 0.6–1.1)
FRACTIONAL SHORTENING: 31 % (ref 28–44)
INTERVENTRICULAR SEPTUM: 1.48 CM (ref 0.6–1.1)
IVRT: 100 MSEC
LEFT ATRIUM SIZE: 4.4 CM
LEFT INTERNAL DIMENSION IN SYSTOLE: 3.31 CM (ref 2.1–4)
LEFT VENTRICLE DIASTOLIC VOLUME INDEX: 54 ML/M2
LEFT VENTRICLE DIASTOLIC VOLUME: 108 ML
LEFT VENTRICLE MASS INDEX: 140 G/M2
LEFT VENTRICLE SYSTOLIC VOLUME INDEX: 22.3 ML/M2
LEFT VENTRICLE SYSTOLIC VOLUME: 44.5 ML
LEFT VENTRICULAR INTERNAL DIMENSION IN DIASTOLE: 4.81 CM (ref 3.5–6)
LEFT VENTRICULAR MASS: 279.04 G
LV LATERAL E/E' RATIO: 6.7 M/S
LV SEPTAL E/E' RATIO: 8.38 M/S
LVOT MG: 2 MMHG
LVOT MV: 0.6 CM/S
MV PEAK A VEL: 0.83 M/S
MV PEAK E VEL: 0.67 M/S
MV STENOSIS PRESSURE HALF TIME: 55 MS
MV VALVE AREA P 1/2 METHOD: 4 CM2
PISA AR MAX VEL: 3.14 M/S
PISA TR MAX VEL: 2.32 M/S
RA PRESSURE ESTIMATED: 3 MMHG
RIGHT VENTRICULAR END-DIASTOLIC DIMENSION: 3.2 CM
RV TB RVSP: 5 MMHG
TDI LATERAL: 0.1 M/S
TDI SEPTAL: 0.08 M/S
TDI: 0.09 M/S
TR MAX PG: 22 MMHG
TV REST PULMONARY ARTERY PRESSURE: 25 MMHG
Z-SCORE OF LEFT VENTRICULAR DIMENSION IN END DIASTOLE: -1.92
Z-SCORE OF LEFT VENTRICULAR DIMENSION IN END SYSTOLE: -0.62

## 2023-12-29 NOTE — PROGRESS NOTES
Subjective:       Patient ID: Jamil Cadet is a 78 y.o. male.    Chief Complaint: Diabetes, Hypertension, Gout, and Chronic Kidney Disease    Patient is a 78-year-old gentleman who comes for 4 months follow-up.  He is accompanied with his wife miss Chance who is not a patient today.  Underlying medical issues are as below:-     1.         Essential hypertension -some of the blood pressure readings off late have been elevated.  2.         Mixed dyslipidemia   3.         Type 2 diabetes mellitus with hypoglycemia without coma, with long-term current use of insulin   4.         History of renal cell carcinoma status post partial nephrectomy   5.         Stage 3b chronic kidney disease following up with Dr. Decker at [facility]  6.         Cholelithiasis without cholecystitis     Overall he is doing okay.      He has not taking allopurinol because this was discontinued with a nephrologist.      Blood sugars are reasonable with a combination of injection Tresiba and NovoLog.  The lowest blood sugar recorded is 77.  The highest was 236 because he ate some spaghetti on the day of Christmas.    Coming back to blood pressure, he is taking  indapamide 1.25 mg and losartan 100 mg.  Our records indicated that he was supposed to be on amlodipine but currently he is not taking it.      Trazodone gives him a few hours of good sleep at night at 100 mg.      His recent labs done by Nephrology show a hemoglobin of 12.5 and hematocrit of 37.4.  Platelets 242 and WBC 7.24.    BUN is 48 and creatinine is 1.9 and his GFR is estimated to be 35.2.  PTH is 60.1.  Vitamin-D level is 52.  Magnesium 1.7 and uric acid 5.7.  Phosphorus level is 3.8.  Potassium 4.1 and sodium 139.  Blood glucose 85.  Urinalysis is bland.      He keeps his follow-up with Dr. Shin and a MRI will be scheduled in future to review his kidney cancer status.    He will be following up with Dr. Shin in future to follow on his kidney cancer.      Prior to that,  we  had reviewed his decision to pursue a surveillance and wait and watch policy on his kidney cancer rather the going for any aggressive treatment.  He is following up with Dr. Dave Shin MD Urology with [facility].  He does have a follow-up with Dr. Shin in the month of August this year for further decisions.      Last visit also we had noted that he had a MRI which did not show shrinkage of his kidney tumor.  He was disappointed with that.  It was a plan for him to seek a 2nd opinion from a renal oncologist.      Psychosocial issues of aging, burden of multiple medical l issues also predominate in the background and do affect the spectrum of his healthcare problems.    Insomnia:-stable over several years.  Gets a few hours of reasonable sleep with 100 mg of trazodone.  Previously was on 50 mg.    Erectile dysfunction:  - currently I do not see any active medications.  after the surgery he had for kidneys, he is unable to get an erection.  He has tried Viagra at 50 mg and even 100 mg but with no response.  I have advised him to check with his urologist at his routine follow-up and have further discussion on this issue and if any other nonmedical alternatives maybe feasible like vacuum pump or perhaps prosthetic surgery.      Patient's documentation of blood pressures have been reviewed.        Diabetes  He presents for his follow-up diabetic visit. He has type 2 diabetes mellitus. The initial diagnosis of diabetes was made 10 years ago. His disease course has been stable. Hypoglycemia symptoms include nervousness/anxiousness (Cancer related issues). Pertinent negatives for hypoglycemia include no confusion, headaches or seizures. There are no diabetic associated symptoms. Pertinent negatives for diabetes include no chest pain, no polydipsia, no polyuria and no weakness. Symptoms are worsening (Slight rise in A1c to 6.8.). Risk factors for coronary artery disease include male sex, stress, diabetes mellitus  and dyslipidemia. Current diabetic treatment includes insulin injections. He is compliant with treatment most of the time. His weight is stable. Meal planning includes avoidance of concentrated sweets. He has not had a previous visit with a dietitian. He participates in exercise intermittently. His home blood glucose trend is increasing steadily. His breakfast blood glucose range is generally 110-130 mg/dl. His dinner blood glucose range is generally 140-180 mg/dl. An ACE inhibitor/angiotensin II receptor blocker is being taken. He does not see a podiatrist.Eye exam is current.   Hyperlipidemia  This is a chronic (Medication was stopped due to muscle aches.) problem. The current episode started more than 1 year ago. Exacerbating diseases include chronic renal disease. He has no history of obesity. Associated symptoms include myalgias (Stable myalgias.). Pertinent negatives include no chest pain. He is currently on no antihyperlipidemic treatment (Currently has stop rosuvastatin.). The current treatment provides mild improvement of lipids. Compliance problems include psychosocial issues.  Risk factors for coronary artery disease include male sex, a sedentary lifestyle, dyslipidemia and diabetes mellitus.   Hypertension  This is a chronic problem. The current episode started more than 1 year ago. The problem is uncontrolled. Pertinent negatives include no chest pain, headaches, neck pain or palpitations. Risk factors for coronary artery disease include male gender, dyslipidemia and diabetes mellitus. Past treatments include angiotensin blockers and diuretics. The current treatment provides moderate improvement. Compliance problems include psychosocial issues (LITTLE BIT EXTRA SALT IN THE FOOD.).  Identifiable causes of hypertension include chronic renal disease.       Past Medical History:   Diagnosis Date    Acute renal failure 9/7/2020    Bacteremia due to Enterococcus 10/25/2020    Cancer     Diabetes mellitus,  type 2     Disorder of kidney and ureter     Encounter for blood transfusion     History of renal cell carcinoma status post partial nephrectomy 9/7/2020    Hydronephrosis 9/9/2020    Hyperlipidemia     Hypertension     Infection and inflammatory reaction due to nephrostomy catheter, initial encounter 11/14/2020    Infection following a procedure, superficial incisional surgical site, subsequent encounter 12/26/2020    Polio     Pyelonephritis of right kidney     Septicemia due to enterococcus 11/15/2020     Social History     Socioeconomic History    Marital status:    Tobacco Use    Smoking status: Never    Smokeless tobacco: Never   Substance and Sexual Activity    Alcohol use: No    Drug use: No    Sexual activity: Yes     Partners: Female     Social Determinants of Health     Financial Resource Strain: Low Risk  (1/2/2024)    Overall Financial Resource Strain (CARDIA)     Difficulty of Paying Living Expenses: Not very hard   Food Insecurity: No Food Insecurity (1/2/2024)    Hunger Vital Sign     Worried About Running Out of Food in the Last Year: Never true     Ran Out of Food in the Last Year: Never true   Transportation Needs: No Transportation Needs (1/2/2024)    PRAPARE - Transportation     Lack of Transportation (Medical): No     Lack of Transportation (Non-Medical): No   Physical Activity: Inactive (1/2/2024)    Exercise Vital Sign     Days of Exercise per Week: 0 days     Minutes of Exercise per Session: 0 min   Stress: Stress Concern Present (1/2/2024)    [employer] - Occupational Stress Questionnaire     Feeling of Stress : To some extent   Social Connections: Moderately Integrated (1/2/2024)    Social Connection and Isolation Panel [NHANES]     Frequency of Communication with Friends and Family: More than three times a week     Frequency of Social Gatherings with Friends and Family: More than three times a week     Attends Jehovah's witness Services: 1 to 4 times per year      Active Member of Clubs or Organizations: No     Attends Club or Organization Meetings: Never     Marital Status:    Housing Stability: Low Risk  (1/2/2024)    Housing Stability Vital Sign     Unable to Pay for Housing in the Last Year: No     Number of Places Lived in the Last Year: 1     Unstable Housing in the Last Year: No     Past Surgical History:   Procedure Laterality Date    ANTEGRADE PYELOGRAM  12/10/2020    Procedure: PYELOGRAM, ANTEGRADE;  Surgeon: Dave Shin MD;  Location: [facility] [address] [place] [address];  Service: Urology;;    APPENDECTOMY      CYSTOSCOPY  12/10/2020    Procedure: CYSTOSCOPY;  Surgeon: Dave Shin MD;  Location: [facility] [address] [place] [address];  Service: Urology;;    CYSTOSCOPY W/ RETROGRADES Right 09/09/2020    Procedure: CYSTOSCOPY, WITH RETROGRADE PYELOGRAM;  Surgeon: Chris Garcia Jr., MD;  Location: [facility];  Service: Urology;  Laterality: Right;    DILATION OF NEPHROSTOMY TRACT Right 12/10/2020    Procedure: DILATION, NEPHROSTOMY TRACT;  Surgeon: Dave Shin MD;  Location: [facility] [address] [place] [address];  Service: Urology;  Laterality: Right;    RADIOFREQUENCY ABLATION KIDNEY  2022    REMOVAL OF DRAIN Right 12/10/2020    Procedure: REMOVAL, DRAIN-NEPHROSTOMY TUBE;  Surgeon: Dave Shin MD;  Location: [facility] [address] [place] [address];  Service: Urology;  Laterality: Right;    REPLACEMENT OF NEPHROSTOMY TUBE Right 12/10/2020    Procedure: REPLACEMENT, NEPHROSTOMY TUBE;  Surgeon: Dave Shin MD;  Location: [facility] [address] [place] [address];  Service: Urology;  Laterality: Right;    ROBOT-ASSISTED LAPAROSCOPIC REIMPLANTATION OF URETER USING DA PHU XI Right 01/08/2021    Procedure: XI ROBOTIC IMPLANTATION, URETER, USING PSOAS HITCH TECHNIQUE;  Surgeon: Dave Shin MD;  Location: [facility] 2ND FLR;  Service: Urology;  Laterality: Right;  4hrs gen with regional    SPINE SURGERY      URETEROSCOPY Right 12/10/2020    Procedure: URETEROSCOPY-ANTEGRADE;  Surgeon: Dave Shin MD;  Location: [facility] [address] [place] [address];   "Service: Urology;  Laterality: Right;    URETHROSCOPY  09/09/2020    Procedure: URETHROSCOPY;  Surgeon: Chris Garcia Jr., MD;  Location: [facility];  Service: Urology;;     Family History   Problem Relation Age of Onset    Heart disease Mother     Heart disease Father     Stroke Father     Heart disease Maternal Grandfather     Heart disease Paternal Grandmother     Heart disease Paternal Grandfather        Review of Systems   Constitutional:  Negative for activity change, chills, diaphoresis, fever and unexpected weight change (Gained a couple of lb since the last documentation.).   HENT:  Negative for congestion, hearing loss, postnasal drip, rhinorrhea and trouble swallowing.         No upper respiratory symptoms today.   Eyes:  Negative for discharge, redness and visual disturbance.   Respiratory:  Negative for cough, chest tightness and wheezing.    Cardiovascular:  Negative for chest pain, palpitations and leg swelling.        Blood pressures are in acceptable range.   Gastrointestinal:  Negative for abdominal distention, abdominal pain, blood in stool, constipation, diarrhea and vomiting.   Endocrine: Negative for cold intolerance, polydipsia and polyuria.        Improvement in A1c level to 6.4.   Genitourinary:  Negative for difficulty urinating, hematuria and urgency.        Kidney cancer-chronic kidney disease stage 3 and pending further subdivision classification.   Musculoskeletal:  Positive for myalgias (Stable myalgias.). Negative for arthralgias, joint swelling and neck pain.   Neurological:  Negative for seizures, weakness and headaches.   Psychiatric/Behavioral:  Negative for confusion and dysphoric mood. The patient is nervous/anxious (Cancer related issues).          Objective:      Blood pressure 139/66, resp. rate 14, height 5' 11" (1.803 m), weight 82.1 kg (181 lb). Body mass index is 25.24 kg/m².  Physical Exam  Vitals and nursing note reviewed.   Constitutional:       General: He is not in " acute distress.     Appearance: Normal appearance. He is well-developed. He is not ill-appearing, toxic-appearing or diaphoretic.      Comments: BMI is 25.24   HENT:      Head: Normocephalic and atraumatic.   Eyes:      Conjunctiva/sclera: Conjunctivae normal.   Neck:      Thyroid: No thyromegaly.      Vascular: No JVD.      Trachea: No tracheal deviation.   Cardiovascular:      Rate and Rhythm: Normal rate and regular rhythm.      Pulses:           Dorsalis pedis pulses are 1+ on the right side and 1+ on the left side.      Heart sounds: Normal heart sounds. No murmur heard.     No friction rub. No gallop.   Pulmonary:      Effort: Pulmonary effort is normal. No respiratory distress.      Breath sounds: Normal breath sounds. No wheezing or rales.   Abdominal:      General: There is no distension.      Palpations: Abdomen is soft.      Tenderness: There is no abdominal tenderness.   Musculoskeletal:         General: No signs of injury.      Cervical back: No rigidity.      Right lower leg: No tenderness. No edema.      Left lower leg: No tenderness. No edema.      Right foot: Deformity (Pes cavus) present.      Left foot: Deformity ( pes cavus) present.        Legs:         Feet:       Comments:      Feet:      Right foot:      Protective Sensation: 5 sites tested.  4 sites sensed.      Skin integrity: No ulcer, blister, skin breakdown, erythema or warmth.      Left foot:      Protective Sensation: 5 sites tested.  4 sites sensed.      Skin integrity: No ulcer, blister, skin breakdown, erythema or warmth.      Comments: High arch in the foot.?  Effects of poliomyelitis.  Slight claw toes.  Slightly dystrophic nails on the both great toes  Lymphadenopathy:      Cervical: No cervical adenopathy.   Skin:     General: Skin is warm and dry.      Findings: No lesion or rash.   Neurological:      Mental Status: He is alert. Mental status is at baseline.   Psychiatric:         Attention and Perception: He is attentive.          Mood and Affect: Affect is not labile.         Behavior: Behavior normal.      Comments: Somewhat anhedonic over his medical issues.  Especially regarding nonresolution kidney cancer.           Assessment/Plan:       1. Type 2 diabetes mellitus with hypoglycemia without coma, with long-term current use of insulin    2. Essential hypertension  -     amLODIPine (NORVASC) 5 MG tablet; Take 1 tablet (5 mg total) by mouth every evening.  Dispense: 90 tablet; Refill: 3    3. Stage 3a chronic kidney disease    4. Mixed dyslipidemia    5. Renal cell carcinoma, unspecified laterality    6. Psychophysiological insomnia    7. History of poliomyelitis    8. Pain of right lower extremity  -     Ambulatory referral/consult to Orthopedics; Future; Expected date: 01/16/2024    Blood pressures are somewhat elevated and will resume amlodipine at 5 mg assuming that this was stopped because of trivial reasons of leg edema and no other issues.  New prescription has been sent.  He will continue to monitor his blood pressures.    His blood sugars are okay and I will await for the next A1c level which has been ordered with the Nephrology.      Sleep is decent with trazodone.      No recent attack of gout and the medication was apparently stopped.    His recent uric acid level is within normal range.      Continue with COVID precautions and consider taking an update on the vaccine for COVID as well as RSV down the road.  Consider taking a tetanus vaccination from the local pharmacy also.    Advised Pt about age and season appropriate immunizations/ cancer screenings.  Also seasonal influenza vaccine, update on tetanus diphtheria vaccination every 10 years.  Patient has been advised to watch diet and exercise. Avoid fried and fatty food. Compliance to medications and follow up urged.  Advised Pt. to monitor Blood sugars at home and record them.  Advised Pt  for Anti reflux measures like small feequent meals, avoid spicy and greasy food.  Head end up at night.  keep a close eye on feet and keep them clean. Annual eye examination. Annual influenza vaccine.  Monitor HgbA1c every 3 to 6 months. Monitor urine microalbumin every year.keep LDL less than 100. Monitor blood pressure and target blood pressure 120/70.  Please utilize precautions for current COVID-19 pandemic.  Try to avoid crowds or close contact with multiple people.  Minimize outside interaction.  Wash hands with soap for  frequently upon contact.Use face mask or cover.    Fup- 3 months    Spent ezra 30 minutes with patient which involved review of pts medical conditions, labs, medications and with 50% of time face-to-face discussion about medical problems, management and any applicable changes.         Hospital Outpatient Visit on 12/05/2023   Component Date Value Ref Range Status    BSA 12/05/2023 2.01  m2 Final    LVOT stroke volume 12/05/2023 63.74  cm3 Final    LVIDd 12/05/2023 4.81  3.5 - 6.0 cm Final    LV Systolic Volume 12/05/2023 44.50  mL Final    LV Systolic Volume Index 12/05/2023 22.3  mL/m2 Final    LVIDs 12/05/2023 3.31  2.1 - 4.0 cm Final    LV Diastolic Volume 12/05/2023 108.00  mL Final    LV Diastolic Volume Index 12/05/2023 54.00  mL/m2 Final    IVS 12/05/2023 1.48 (A)  0.6 - 1.1 cm Final    LVOT diameter 12/05/2023 2.00  cm Final    LVOT area 12/05/2023 3.1  cm2 Final    FS 12/05/2023 31  28 - 44 % Final    Left Ventricle Relative Wall Thick* 12/05/2023 0.56  cm Final    Posterior Wall 12/05/2023 1.35 (A)  0.6 - 1.1 cm Final    LV mass 12/05/2023 279.04  g Final    LV Mass Index 12/05/2023 140  g/m2 Final    MV Peak E Josiah 12/05/2023 0.67  m/s Final    TDI LATERAL 12/05/2023 0.10  m/s Final    TDI SEPTAL 12/05/2023 0.08  m/s Final    E/E' ratio 12/05/2023 7.44  m/s Final    MV Peak A Josiah 12/05/2023 0.83  m/s Final    TR Max Josiah 12/05/2023 2.32  m/s Final    E/A ratio 12/05/2023 0.81   Final    IVRT 12/05/2023 100.00  msec Final    E wave deceleration time  12/05/2023 274.00  msec Final    LV SEPTAL E/E' RATIO 12/05/2023 8.38  m/s Final    LV LATERAL E/E' RATIO 12/05/2023 6.70  m/s Final    LVOT peak reba 12/05/2023 0.89  m/s Final    Left Ventricular Outflow Tract Lauren* 12/05/2023 0.60  cm/s Final    Left Ventricular Outflow Tract Lauren* 12/05/2023 2.00  mmHg Final    RVDD 12/05/2023 3.20  cm Final    LA size 12/05/2023 4.40  cm Final    AV regurgitation pressure 1/2 time 12/05/2023 809  ms Final    AR Max Reba 12/05/2023 3.14  m/s Final    AV mean gradient 12/05/2023 7  mmHg Final    AV peak gradient 12/05/2023 13  mmHg Final    Ao peak reba 12/05/2023 1.77  m/s Final    Ao VTI 12/05/2023 33.30  cm Final    LVOT peak VTI 12/05/2023 20.30  cm Final    AV valve area 12/05/2023 1.91  cm² Final    AV Velocity Ratio 12/05/2023 0.50   Final    AV index (prosthetic) 12/05/2023 0.61   Final    AHSAN by Velocity Ratio 12/05/2023 1.58  cm² Final    MV stenosis pressure 1/2 time 12/05/2023 55.00  ms Final    MV valve area p 1/2 method 12/05/2023 4.00  cm2 Final    Triscuspid Valve Regurgitation Pea* 12/05/2023 22  mmHg Final    Ao root annulus 12/05/2023 3.70  cm Final    Mean e' 12/05/2023 0.09  m/s Final    ZLVIDS 12/05/2023 -0.62   Final    ZLVIDD 12/05/2023 -1.92   Final    AORTIC VALVE CUSP SEPERATION 12/05/2023 1.40  cm Final    TV resting pulmonary artery pressu* 12/05/2023 25  mmHg Final    RV TB RVSP 12/05/2023 5  mmHg Final    Est. RA pres 12/05/2023 3  mmHg Final   Lab Visit on 12/05/2023   Component Date Value Ref Range Status    Protein, Urine Random 12/05/2023 12  6 - 15 mg/dL Final    WBC 12/05/2023 7.24  3.90 - 12.70 K/uL Final    RBC 12/05/2023 3.86 (L)  4.60 - 6.20 M/uL Final    Hemoglobin 12/05/2023 12.5 (L)  14.0 - 18.0 g/dL Final    Hematocrit 12/05/2023 37.4 (L)  40.0 - 54.0 % Final    MCV 12/05/2023 97  82 - 98 fL Final    MCH 12/05/2023 32.4 (H)  27.0 - 31.0 pg Final    MCHC 12/05/2023 33.4  32.0 - 36.0 g/dL Final    RDW 12/05/2023 12.4  11.5 - 14.5 % Final     Platelets 12/05/2023 242  150 - 450 K/uL Final    MPV 12/05/2023 9.7  9.2 - 12.9 fL Final    Immature Granulocytes 12/05/2023 0.1  0.0 - 0.5 % Final    Gran # (ANC) 12/05/2023 5.2  1.8 - 7.7 K/uL Final    Immature Grans (Abs) 12/05/2023 0.01  0.00 - 0.04 K/uL Final    Lymph # 12/05/2023 1.2  1.0 - 4.8 K/uL Final    Mono # 12/05/2023 0.6  0.3 - 1.0 K/uL Final    Eos # 12/05/2023 0.3  0.0 - 0.5 K/uL Final    Baso # 12/05/2023 0.03  0.00 - 0.20 K/uL Final    nRBC 12/05/2023 0  0 /100 WBC Final    Gran % 12/05/2023 72.1  38.0 - 73.0 % Final    Lymph % 12/05/2023 16.2 (L)  18.0 - 48.0 % Final    Mono % 12/05/2023 7.7  4.0 - 15.0 % Final    Eosinophil % 12/05/2023 3.5  0.0 - 8.0 % Final    Basophil % 12/05/2023 0.4  0.0 - 1.9 % Final    Differential Method 12/05/2023 Automated   Final    Glucose 12/05/2023 85  70 - 110 mg/dL Final    Sodium 12/05/2023 139  136 - 145 mmol/L Final    Potassium 12/05/2023 4.1  3.5 - 5.1 mmol/L Final    Chloride 12/05/2023 107  95 - 110 mmol/L Final    CO2 12/05/2023 25  23 - 29 mmol/L Final    BUN 12/05/2023 48 (H)  8 - 23 mg/dL Final    Calcium 12/05/2023 9.7  8.7 - 10.5 mg/dL Final    Creatinine 12/05/2023 1.9 (H)  0.5 - 1.4 mg/dL Final    Albumin 12/05/2023 4.4  3.5 - 5.2 g/dL Final    Phosphorus 12/05/2023 3.8  2.7 - 4.5 mg/dL Final    eGFR 12/05/2023 35.2 (A)  >60 mL/min/1.73 m^2 Final    Anion Gap 12/05/2023 7 (L)  8 - 16 mmol/L Final    Specimen UA 12/05/2023 Urine, Clean Catch   Final    Color, UA 12/05/2023 Yellow  Yellow, Straw, Ida Final    Appearance, UA 12/05/2023 Clear  Clear Final    pH, UA 12/05/2023 6.0  5.0 - 8.0 Final    Specific Gravity, UA 12/05/2023 1.015  1.005 - 1.030 Final    Protein, UA 12/05/2023 Negative  Negative Final    Glucose, UA 12/05/2023 Negative  Negative Final    Ketones, UA 12/05/2023 Negative  Negative Final    Bilirubin (UA) 12/05/2023 Negative  Negative Final    Occult Blood UA 12/05/2023 Negative  Negative Final    Nitrite, UA 12/05/2023 Negative   "Negative Final    Urobilinogen, UA 12/05/2023 Negative  Negative EU/dL Final    Leukocytes, UA 12/05/2023 Negative  Negative Final    Microalbumin, Urine 12/05/2023 <7.0  <19.9 ug/mL Final    Creatinine, Urine 12/05/2023 72.6  23.0 - 375.0 mg/dL Final    Microalb/Creat Ratio 12/05/2023 Unable to calculate  0.0 - 30.0 ug/mg Final    PTH, Intact 12/05/2023 60.1  9.0 - 77.0 pg/mL Final    Vit D, 25-Hydroxy 12/05/2023 52  30 - 96 ng/mL Final    Magnesium 12/05/2023 1.7  1.6 - 2.6 mg/dL Final    Uric Acid 12/05/2023 5.7  3.4 - 7.0 mg/dL Final   Overall blood pressures are somewhat on the high side.    The big question is if he is taking amlodipine which he was stated not.  If so, why was discontinued.  Is there any problem like constipation or swelling in the legs or did not bring his blood pressure is too low.  The family will check on this issue and notify me accordingly.    Blood sugars are stable but will check a hemoglobin A1c at his next lab.      Continue with COVID precautions.    Follow up in about 3 months (around 4/2/2024), or if symptoms worsen or fail to improve, for Diabetes/HTN/Lipids.    Spent ezra 30 minutes with patient which involved review of pts medical conditions, labs, medications and with 50% of time face-to-face discussion about medical problems, management and any applicable changes.       Current Outpatient Medications:     ascorbic acid, vitamin C, (VITAMIN C) 500 MG tablet, Take 500 mg by mouth once daily., Disp: , Rfl:     atorvastatin (LIPITOR) 10 MG tablet, Take 10 mg by mouth., Disp: , Rfl:     BD WINSOME 2ND GEN PEN NEEDLE 32 gauge x 5/32" Ndle, INJECT 1 UNITS INTO THE SKIN 3 (THREE) TIMES DAILY BEFORE MEALS. USE AS DIRECTED NON-MED ITEM, Disp: 300 each, Rfl: 3    calcitRIOL (ROCALTROL) 0.25 MCG Cap, TAKE 1 CAPSULE (0.25 MCG TOTAL) BY MOUTH 3 (THREE) TIMES A WEEK MONDAY/WEDNESDAY/FRIDAY, Disp: , Rfl:     losartan (COZAAR) 100 MG tablet, Take 1 tablet (100 mg total) by mouth once daily., " Disp: 90 tablet, Rfl: 3    sodium bicarbonate 650 MG tablet, Take 650 mg by mouth once daily., Disp: , Rfl:     traZODone (DESYREL) 100 MG tablet, Take 1 tablet (100 mg total) by mouth every evening., Disp: 90 tablet, Rfl: 3    TRESIBA FLEXTOUCH U-100 100 unit/mL (3 mL) insulin pen, INJECT 15 UNITS            SUBCUTANEOUSLY EVERY       EVENING, Disp: 15 mL, Rfl: 3    TRUE METRIX GLUCOSE TEST STRIP Strp, TEST 3 (THREE) TIMES DAILY BEFORE MEALS., Disp: 300 strip, Rfl: 1    zinc gluconate 50 mg tablet, Take 50 mg by mouth once daily., Disp: , Rfl:     allopurinoL (ZYLOPRIM) 100 MG tablet, Take 100 mg by mouth., Disp: , Rfl:     amLODIPine (NORVASC) 5 MG tablet, Take 1 tablet (5 mg total) by mouth every evening., Disp: 90 tablet, Rfl: 3    blood-glucose meter (TRUE METRIX GLUCOSE METER) Misc, 1 Device by Misc.(Non-Drug; Combo Route) route once daily. (Patient taking differently: 1 Device by Misc.(Non-Drug; Combo Route) route once daily. Non-med item), Disp: 1 each, Rfl: 0    indapamide (LOZOL) 1.25 MG Tab, Take 1.25 mg by mouth once daily., Disp: , Rfl:     insulin aspart U-100 (NOVOLOG FLEXPEN U-100 INSULIN) 100 unit/mL (3 mL) InPn pen, Inject 10 Units into the skin 3 (three) times daily with meals. Check glucose before meals and then take insulin as per sliding scale (Patient taking differently: Inject 8 Units into the skin 3 (three) times daily with meals. Check glucose before meals and then take insulin as per sliding scale), Disp: 5 each, Rfl: 3

## 2024-01-02 ENCOUNTER — OFFICE VISIT (OUTPATIENT)
Dept: FAMILY MEDICINE | Facility: CLINIC | Age: 79
End: 2024-01-02
Payer: MEDICARE

## 2024-01-02 VITALS
BODY MASS INDEX: 25.34 KG/M2 | DIASTOLIC BLOOD PRESSURE: 66 MMHG | WEIGHT: 181 LBS | SYSTOLIC BLOOD PRESSURE: 139 MMHG | RESPIRATION RATE: 14 BRPM | HEIGHT: 71 IN

## 2024-01-02 DIAGNOSIS — E78.2 MIXED DYSLIPIDEMIA: ICD-10-CM

## 2024-01-02 DIAGNOSIS — Z79.4 TYPE 2 DIABETES MELLITUS WITH HYPOGLYCEMIA WITHOUT COMA, WITH LONG-TERM CURRENT USE OF INSULIN: Primary | ICD-10-CM

## 2024-01-02 DIAGNOSIS — M79.604 PAIN OF RIGHT LOWER EXTREMITY: ICD-10-CM

## 2024-01-02 DIAGNOSIS — I10 ESSENTIAL HYPERTENSION: ICD-10-CM

## 2024-01-02 DIAGNOSIS — C64.9 RENAL CELL CARCINOMA, UNSPECIFIED LATERALITY: ICD-10-CM

## 2024-01-02 DIAGNOSIS — F51.04 PSYCHOPHYSIOLOGICAL INSOMNIA: ICD-10-CM

## 2024-01-02 DIAGNOSIS — E11.649 TYPE 2 DIABETES MELLITUS WITH HYPOGLYCEMIA WITHOUT COMA, WITH LONG-TERM CURRENT USE OF INSULIN: Primary | ICD-10-CM

## 2024-01-02 DIAGNOSIS — N18.31 STAGE 3A CHRONIC KIDNEY DISEASE: ICD-10-CM

## 2024-01-02 DIAGNOSIS — Z86.12 HISTORY OF POLIOMYELITIS: ICD-10-CM

## 2024-01-02 PROCEDURE — 99999 PR PBB SHADOW E&M-EST. PATIENT-LVL V: CPT | Mod: PBBFAC,,, | Performed by: INTERNAL MEDICINE

## 2024-01-02 PROCEDURE — 99214 OFFICE O/P EST MOD 30 MIN: CPT | Mod: S$PBB,AQ,, | Performed by: INTERNAL MEDICINE

## 2024-01-02 PROCEDURE — 99215 OFFICE O/P EST HI 40 MIN: CPT | Mod: PBBFAC,PN | Performed by: INTERNAL MEDICINE

## 2024-01-02 RX ORDER — AMLODIPINE BESYLATE 5 MG/1
5 TABLET ORAL NIGHTLY
Qty: 90 TABLET | Refills: 3 | Status: SHIPPED | OUTPATIENT
Start: 2024-01-02 | End: 2025-01-01

## 2024-01-03 DIAGNOSIS — M79.661 PAIN IN RIGHT LOWER LEG: Primary | ICD-10-CM

## 2024-01-05 ENCOUNTER — TELEPHONE (OUTPATIENT)
Dept: ORTHOPEDICS | Facility: CLINIC | Age: 79
End: 2024-01-05

## 2024-01-05 ENCOUNTER — OFFICE VISIT (OUTPATIENT)
Dept: ORTHOPEDICS | Facility: CLINIC | Age: 79
End: 2024-01-05
Payer: MEDICARE

## 2024-01-05 ENCOUNTER — HOSPITAL ENCOUNTER (OUTPATIENT)
Dept: RADIOLOGY | Facility: HOSPITAL | Age: 79
Discharge: HOME | End: 2024-01-05
Attending: ORTHOPAEDIC SURGERY
Payer: MEDICARE

## 2024-01-05 VITALS — HEIGHT: 71 IN | BODY MASS INDEX: 25.34 KG/M2 | WEIGHT: 181 LBS

## 2024-01-05 DIAGNOSIS — M79.604 PAIN OF RIGHT LOWER EXTREMITY: ICD-10-CM

## 2024-01-05 DIAGNOSIS — R20.2 NUMBNESS AND TINGLING OF RIGHT LEG: Primary | ICD-10-CM

## 2024-01-05 DIAGNOSIS — M79.661 PAIN IN RIGHT LOWER LEG: ICD-10-CM

## 2024-01-05 DIAGNOSIS — R20.0 NUMBNESS AND TINGLING OF RIGHT LEG: Primary | ICD-10-CM

## 2024-01-05 PROCEDURE — 73590 X-RAY EXAM OF LOWER LEG: CPT | Mod: TC,PO,RT

## 2024-01-05 PROCEDURE — 20610 DRAIN/INJ JOINT/BURSA W/O US: CPT | Mod: S$PBB,RT,, | Performed by: ORTHOPAEDIC SURGERY

## 2024-01-05 PROCEDURE — 20610 DRAIN/INJ JOINT/BURSA W/O US: CPT | Mod: PBBFAC,PO,RT | Performed by: ORTHOPAEDIC SURGERY

## 2024-01-05 PROCEDURE — 99212 OFFICE O/P EST SF 10 MIN: CPT | Mod: PBBFAC,25,PO | Performed by: ORTHOPAEDIC SURGERY

## 2024-01-05 PROCEDURE — 99204 OFFICE O/P NEW MOD 45 MIN: CPT | Mod: 25,S$PBB,, | Performed by: ORTHOPAEDIC SURGERY

## 2024-01-05 PROCEDURE — 99999 PR PBB SHADOW E&M-EST. PATIENT-LVL II: CPT | Mod: PBBFAC,,, | Performed by: ORTHOPAEDIC SURGERY

## 2024-01-05 PROCEDURE — 73590 X-RAY EXAM OF LOWER LEG: CPT | Mod: 26,RT,, | Performed by: RADIOLOGY

## 2024-01-05 PROCEDURE — 99999PBSHW PR PBB SHADOW TECHNICAL ONLY FILED TO HB: Mod: PBBFAC,,,

## 2024-01-05 RX ORDER — TRIAMCINOLONE ACETONIDE 40 MG/ML
40 INJECTION, SUSPENSION INTRA-ARTICULAR; INTRAMUSCULAR
Status: DISCONTINUED | OUTPATIENT
Start: 2024-01-05 | End: 2024-01-05 | Stop reason: HOSPADM

## 2024-01-05 RX ADMIN — TRIAMCINOLONE ACETONIDE 40 MG: 40 INJECTION, SUSPENSION INTRA-ARTICULAR; INTRAMUSCULAR at 12:01

## 2024-01-05 NOTE — PROCEDURES
Large Joint Aspiration/Injection: R knee joint    Date/Time: 1/5/2024 12:30 PM    Performed by: Gabo Hess MD  Authorized by: Gabo Hess MD    Consent Done?:  Yes (Verbal)  Indications:  Pain and arthritis  Site marked: the procedure site was marked    Timeout: prior to procedure the correct patient, procedure, and site was verified    Local anesthetic:  Lidocaine 1% without epinephrine  Anesthetic total (ml):  3      Details:  Needle Size:  22 G  Approach:  Anterolateral  Location:  Knee  Site:  R knee joint  Medications:  40 mg triamcinolone acetonide 40 mg/mL  Patient tolerance:  Patient tolerated the procedure well with no immediate complications

## 2024-01-05 NOTE — PROGRESS NOTES
Patient ID: Jamil Cadet is a 78 y.o. male    Chief Complaint:   Chief Complaint   Patient presents with    Right Lower Leg - Pain       History of Present Illness:    Pleasant 78-year-old male here for evaluation of right leg pain.  Reports mostly lateral right leg pain and calf pain.  Also reports numbness in the right lateral thigh.  This has been going on for the past few weeks now.  He does have diabetes and history of polio as a child.  No known long term deficits from this.  Has had knee injections in the past including steroid and viscosupplementation.  Denies any knee pain or groin pain today.    PAST MEDICAL HISTORY:   Past Medical History:   Diagnosis Date    Acute renal failure 9/7/2020    Bacteremia due to Enterococcus 10/25/2020    Cancer     Diabetes mellitus, type 2     Disorder of kidney and ureter     Encounter for blood transfusion     History of renal cell carcinoma status post partial nephrectomy 9/7/2020    Hydronephrosis 9/9/2020    Hyperlipidemia     Hypertension     Infection and inflammatory reaction due to nephrostomy catheter, initial encounter 11/14/2020    Infection following a procedure, superficial incisional surgical site, subsequent encounter 12/26/2020    Polio     Pyelonephritis of right kidney     Septicemia due to enterococcus 11/15/2020     PAST SURGICAL HISTORY:   Past Surgical History:   Procedure Laterality Date    ANTEGRADE PYELOGRAM  12/10/2020    Procedure: PYELOGRAM, ANTEGRADE;  Surgeon: Dave Shin MD;  Location: [address];  Service: Urology;;    APPENDECTOMY      CYSTOSCOPY  12/10/2020    Procedure: CYSTOSCOPY;  Surgeon: Dave Shin MD;  Location: [address];  Service: Urology;;    CYSTOSCOPY W/ RETROGRADES Right 09/09/2020    Procedure: CYSTOSCOPY, WITH RETROGRADE PYELOGRAM;  Surgeon: Chris Garcia Jr., MD;  Location: [facility];  Service: Urology;  Laterality: Right;    DILATION OF NEPHROSTOMY TRACT Right 12/10/2020    Procedure: DILATION,  NEPHROSTOMY TRACT;  Surgeon: Dave Shin MD;  Location: [facility] OR 1ST FLR;  Service: Urology;  Laterality: Right;    RADIOFREQUENCY ABLATION KIDNEY  2022    REMOVAL OF DRAIN Right 12/10/2020    Procedure: REMOVAL, DRAIN-NEPHROSTOMY TUBE;  Surgeon: Dave Shin MD;  Location: [facility] OR 1ST FLR;  Service: Urology;  Laterality: Right;    REPLACEMENT OF NEPHROSTOMY TUBE Right 12/10/2020    Procedure: REPLACEMENT, NEPHROSTOMY TUBE;  Surgeon: Dave Shin MD;  Location: [facility] OR 1ST FLR;  Service: Urology;  Laterality: Right;    ROBOT-ASSISTED LAPAROSCOPIC REIMPLANTATION OF URETER USING DA PHU XI Right 01/08/2021    Procedure: XI ROBOTIC IMPLANTATION, URETER, USING PSOAS HITCH TECHNIQUE;  Surgeon: Dave Shin MD;  Location: [facility] OR 2ND FLR;  Service: Urology;  Laterality: Right;  4hrs gen with regional    SPINE SURGERY      URETEROSCOPY Right 12/10/2020    Procedure: URETEROSCOPY-ANTEGRADE;  Surgeon: Dave Shin MD;  Location: [facility] OR 1ST FLR;  Service: Urology;  Laterality: Right;    URETHROSCOPY  09/09/2020    Procedure: URETHROSCOPY;  Surgeon: Chris Garcia Jr., MD;  Location: [facility] OR;  Service: Urology;;     FAMILY HISTORY:   Family History   Problem Relation Age of Onset    Heart disease Mother     Heart disease Father     Stroke Father     Heart disease Maternal Grandfather     Heart disease Paternal Grandmother     Heart disease Paternal Grandfather      SOCIAL HISTORY:   Social History     Occupational History    Not on file   Tobacco Use    Smoking status: Never    Smokeless tobacco: Never   Substance and Sexual Activity    Alcohol use: No    Drug use: No    Sexual activity: Yes     Partners: Female        MEDICATIONS:   Current Outpatient Medications:     allopurinoL (ZYLOPRIM) 100 MG tablet, Take 100 mg by mouth., Disp: , Rfl:     amLODIPine (NORVASC) 5 MG tablet, Take 1 tablet (5 mg total) by mouth every evening., Disp: 90 tablet, Rfl: 3    ascorbic acid, vitamin C, (VITAMIN C)  "500 MG tablet, Take 500 mg by mouth once daily., Disp: , Rfl:     atorvastatin (LIPITOR) 10 MG tablet, Take 10 mg by mouth., Disp: , Rfl:     BD WINSOME 2ND GEN PEN NEEDLE 32 gauge x 5/32" Ndle, INJECT 1 UNITS INTO THE SKIN 3 (THREE) TIMES DAILY BEFORE MEALS. USE AS DIRECTED NON-MED ITEM, Disp: 300 each, Rfl: 3    blood-glucose meter (TRUE METRIX GLUCOSE METER) Misc, 1 Device by Misc.(Non-Drug; Combo Route) route once daily. (Patient taking differently: 1 Device by Misc.(Non-Drug; Combo Route) route once daily. Non-med item), Disp: 1 each, Rfl: 0    calcitRIOL (ROCALTROL) 0.25 MCG Cap, TAKE 1 CAPSULE (0.25 MCG TOTAL) BY MOUTH 3 (THREE) TIMES A WEEK MONDAY/WEDNESDAY/FRIDAY, Disp: , Rfl:     indapamide (LOZOL) 1.25 MG Tab, Take 1.25 mg by mouth once daily., Disp: , Rfl:     insulin aspart U-100 (NOVOLOG FLEXPEN U-100 INSULIN) 100 unit/mL (3 mL) InPn pen, Inject 10 Units into the skin 3 (three) times daily with meals. Check glucose before meals and then take insulin as per sliding scale (Patient taking differently: Inject 8 Units into the skin 3 (three) times daily with meals. Check glucose before meals and then take insulin as per sliding scale), Disp: 5 each, Rfl: 3    losartan (COZAAR) 100 MG tablet, Take 1 tablet (100 mg total) by mouth once daily., Disp: 90 tablet, Rfl: 3    sodium bicarbonate 650 MG tablet, Take 650 mg by mouth once daily., Disp: , Rfl:     traZODone (DESYREL) 100 MG tablet, Take 1 tablet (100 mg total) by mouth every evening., Disp: 90 tablet, Rfl: 3    TRESIBA FLEXTOUCH U-100 100 unit/mL (3 mL) insulin pen, INJECT 15 UNITS            SUBCUTANEOUSLY EVERY       EVENING, Disp: 15 mL, Rfl: 3    TRUE METRIX GLUCOSE TEST STRIP Strp, TEST 3 (THREE) TIMES DAILY BEFORE MEALS., Disp: 300 strip, Rfl: 1    zinc gluconate 50 mg tablet, Take 50 mg by mouth once daily., Disp: , Rfl:   ALLERGIES: Review of patient's allergies indicates:  No Known Allergies      Physical Exam     There were no vitals filed for " this visit.  Alert and oriented to person, place and time. No acute distress. Well-groomed, not ill appearing. Pupils round and reactive, normal respiratory effort, no audible wheezing.     On exam he has no significant pain with range of motion of the right knee.  There is mild joint effusion.  No calf tenderness.  Neurovascularly intact to the bilateral lower extremities.  Full range of motion of the right ankle.  Ankle plantar flexion and dorsiflexion intact.  No pain with range of motion of the right hip.  He does have some varus deformity of the bilateral knees.  Stable to varus and valgus stress.        Imaging:       X-Ray: I have reviewed all pertinent results/findings and my personal findings are:  Severe DJD of the right knee as well as the tibiotalar joint.  No evidence of fracture.      Assessment & Plan    Numbness and tingling of right leg  -     EMG W/ ULTRASOUND AND NERVE CONDUCTION TEST 2 Extremities; Future    Pain of right lower extremity  -     Ambulatory referral/consult to Orthopedics  -     EMG W/ ULTRASOUND AND NERVE CONDUCTION TEST 2 Extremities; Future         Treatment options were discussed with him in detail.  I went over his x-rays which do show arthritis of the right knee and right ankle.  He has overall not very symptomatic in regards to his right knee and right ankle however he does have a lot of lateral leg pain which may be referred from the knee.  He does have history of polio.  He has right lower extremity numbness and paresthesias without back pain.  We did discuss EMG of the right lower extremity to evaluate for possible neuropathy or nerve compression.  In regards to his right knee I would like to do a diagnostic/therapeutic right knee injection today.  This was well tolerated.  Follow up for EMG results

## 2024-01-11 DIAGNOSIS — Z00.00 ENCOUNTER FOR MEDICARE ANNUAL WELLNESS EXAM: ICD-10-CM

## 2024-02-19 ENCOUNTER — OFFICE VISIT (OUTPATIENT)
Dept: PHYSICAL MEDICINE AND REHAB | Facility: CLINIC | Age: 79
End: 2024-02-19
Payer: MEDICARE

## 2024-02-19 VITALS
WEIGHT: 181 LBS | HEIGHT: 71 IN | BODY MASS INDEX: 25.34 KG/M2 | DIASTOLIC BLOOD PRESSURE: 74 MMHG | SYSTOLIC BLOOD PRESSURE: 138 MMHG | HEART RATE: 78 BPM

## 2024-02-19 DIAGNOSIS — R20.0 NUMBNESS AND TINGLING OF RIGHT LEG: ICD-10-CM

## 2024-02-19 DIAGNOSIS — R20.2 NUMBNESS AND TINGLING OF RIGHT LEG: ICD-10-CM

## 2024-02-19 DIAGNOSIS — M79.604 PAIN OF RIGHT LOWER EXTREMITY: ICD-10-CM

## 2024-02-19 DIAGNOSIS — G62.9 POLYNEUROPATHY: Primary | ICD-10-CM

## 2024-02-19 PROCEDURE — 99499 UNLISTED E&M SERVICE: CPT | Mod: S$PBB,,, | Performed by: STUDENT IN AN ORGANIZED HEALTH CARE EDUCATION/TRAINING PROGRAM

## 2024-02-19 PROCEDURE — 99999 PR PBB SHADOW E&M-EST. PATIENT-LVL III: CPT | Mod: PBBFAC,,, | Performed by: STUDENT IN AN ORGANIZED HEALTH CARE EDUCATION/TRAINING PROGRAM

## 2024-02-19 PROCEDURE — 95910 NRV CNDJ TEST 7-8 STUDIES: CPT | Mod: PBBFAC,PN | Performed by: STUDENT IN AN ORGANIZED HEALTH CARE EDUCATION/TRAINING PROGRAM

## 2024-02-19 PROCEDURE — 99213 OFFICE O/P EST LOW 20 MIN: CPT | Mod: PBBFAC,PN | Performed by: STUDENT IN AN ORGANIZED HEALTH CARE EDUCATION/TRAINING PROGRAM

## 2024-02-19 PROCEDURE — 95910 NRV CNDJ TEST 7-8 STUDIES: CPT | Mod: 26,S$PBB,, | Performed by: STUDENT IN AN ORGANIZED HEALTH CARE EDUCATION/TRAINING PROGRAM

## 2024-02-19 PROCEDURE — 95886 MUSC TEST DONE W/N TEST COMP: CPT | Mod: 26,S$PBB,, | Performed by: STUDENT IN AN ORGANIZED HEALTH CARE EDUCATION/TRAINING PROGRAM

## 2024-02-19 PROCEDURE — 95886 MUSC TEST DONE W/N TEST COMP: CPT | Mod: PBBFAC,PN | Performed by: STUDENT IN AN ORGANIZED HEALTH CARE EDUCATION/TRAINING PROGRAM

## 2024-02-19 RX ORDER — ALLOPURINOL 100 MG/1
100 TABLET ORAL
COMMUNITY
Start: 2024-01-19

## 2024-02-19 NOTE — PROGRESS NOTES
[facility]  Physical Medicine and Rehabilitation   [address] [place] [address], [address]  [place] [address]      Full Name: Jamil Cadet Gender: Male  Patient ID: [id number] YOB: 1945      Visit Date: 2/19/2024 10:04  Age: 78 Years 10 Months Old  Examining Physician: Gabo Loredo MD  Referring Physician: Gabo Bowen MD  Height: 5 feet 11 inch  Weight: 181 lbs  History: Pain and numbness in BLE. Low calf in RLE is numb and the upper thigh in the RLE goes numb. denies the presence of back pain.       Sensory NCS      Nerve / Sites Rec. Site Onset Lat Peak Lat NP Amp PP Amp Segments Distance Velocity     ms ms µV µV  cm m/s   L Sural - Ankle (Calf)      Calf Ankle 2.03 2.60 4.9 5.6 Calf - Ankle 14 69      Ref.   ?4.20 ?5.0 ?5.0 Ref.     R Sural - Ankle (Calf)      Calf Ankle 2.97 3.49 4.4 6.4 Calf - Ankle 14 47      Ref.   ?4.20 ?5.0 ?5.0 Ref.     L Superficial peroneal - Ankle      Lat leg Ankle 2.03 2.45 4.7 40.3 Lat leg - Ankle 14 69      Ref.   ?4.40 ?5.0 ?5.0 Ref.     R Superficial peroneal - Ankle      Lat leg Ankle 1.77 2.29 7.6 16.8 Lat leg - Ankle 14 79      Ref.   ?4.40 ?5.0 ?5.0 Ref.         Motor NCS      Nerve / Sites Muscle Latency Ref. Amplitude Ref. Amp % Duration Segments Distance Lat Diff Velocity Ref.     ms ms mV mV % ms  cm ms m/s m/s   L Peroneal - EDB      Ankle EDB NR ?6.20 NR ?2.0 NR NR Ankle - EDB 8         Fib head EDB NR  NR  NR NR Fib head - Ankle 32 NR NR ?39      Pop fossa EDB NR  NR  NR NR Pop fossa - Fib head 10 NR NR ?39   R Peroneal - EDB      Ankle EDB 6.51 ?6.20 0.3 ?2.0 100 6.56 Ankle - EDB 8         Fib head EDB 17.03  0.2  77.8 5.05 Fib head - Ankle 33 10.52 31 ?39      Pop fossa EDB 19.69  0.2  66.1 5.05 Pop fossa - Fib head 10 2.66 38 ?39   L Tibial - AH      Ankle AH 6.04 ?6.00 3.8 ?3.0 100 5.42 Ankle - AH 8         Pop fossa AH 15.99  2.9  75.5 8.85 Pop fossa - Ankle 43 9.95 43 ?39   R Tibial - AH      Ankle AH 6.09 ?6.00 1.0 ?3.0 100  5.94 Ankle - AH 8         Pop fossa AH 17.24  0.7  72.9 8.75 Pop fossa - Ankle 43 11.15 39 ?39   L Peroneal - Tib Ant      Fib Head Tib Ant 3.96  2.0  100 10.21 Fib Head - Tib Ant 12         Pop fossa Tib Ant 5.83  1.8  93.1 7.34 Pop fossa - Fib Head 10 1.88 53    R Peroneal - Tib Ant      Fib Head Tib Ant 3.96  2.1  100 12.34 Fib Head - Tib Ant 12         Pop fossa Tib Ant 5.89  1.9  90 11.04 Pop fossa - Fib Head 10 1.93 52        EMG Summary Table     Spontaneous MUAP Recruitment   Muscle IA Fib PSW Fasc Other Amp Dur. PPP Pattern   R. Lumbar paraspinals N None None None .       L. Lumbar paraspinals N None None None .       L. Gluteus medius N None None None . N N N N   R. Gluteus medius N None None None . N N N N   L. Gluteus nicole N None None None . N N N N   R. Gluteus nicole N None None None . N N N N   L. Vastus medialis N None None None . N N N N   R. Vastus medialis N None None None . N N N N   L. Tibialis anterior N None None None . N N N N   R. Tibialis anterior N None None None . N N N N   L. Tibialis posterior N None None None . N N N N   R. Tibialis posterior N None None None . N N N N   L. Peroneus longus N None None None . N N N N   R. Peroneus longus N None None None . N N N N   L. Gastrocnemius (Medial head) 3+ 3+ 3+ None . N N N Reduced   R. Gastrocnemius (Medial head) 3+ 3+ 3+ None . N N N Reduced       Summary    The motor conduction test was performed on 6 nerve(s). There were no results within the specified normal range. Findings were unremarkable in 2 nerve(s): L Peroneal - Tib Ant, R Peroneal - Tib Ant. Results outside the specified normal range were found in 4 nerve(s), as follows:  In the L Peroneal - EDB study  the response was considered absent for Ankle stimulation  the response was considered absent for Fib head stimulation  the response was considered absent for Pop fossa stimulation  In the R Peroneal - EDB study  the take off latency result was increased for Ankle  stimulation  the peak amplitude result was reduced for Ankle stimulation  the take off velocity result was reduced for Fib head - Ankle segment  the take off velocity result was reduced for Pop fossa - Fib head segment  In the L Tibial - AH study  the take off latency result was increased for Ankle stimulation  In the R Tibial - AH study  the take off latency result was increased for Ankle stimulation  the peak amplitude result was reduced for Ankle stimulation  the take off velocity result was reduced for Pop fossa - Ankle segment    The sensory conduction test was performed on 4 nerve(s). The results were normal in 1 nerve(s): R Superficial peroneal - Ankle. Results outside the specified normal range were found in 3 nerve(s), as follows:  In the L Sural - Ankle (Calf) study  the peak amplitude result was reduced for Calf stimulation  In the R Sural - Ankle (Calf) study  the peak amplitude result was reduced for Calf stimulation  In the L Superficial peroneal - Ankle study  the peak amplitude result was reduced for Lat leg stimulation    The needle EMG examination was performed in 16 muscles. It was normal in 14 muscle(s): R. Lumbar paraspinals, L. Lumbar paraspinals, L. Gluteus medius, R. Gluteus medius, L. Gluteus nicole, R. Gluteus nicole, L. Vastus medialis, R. Vastus medialis, L. Tibialis anterior, R. Tibialis anterior, L. Tibialis posterior, R. Tibialis posterior, L. Peroneus longus, R. Peroneus longus. The study was abnormal in 2 muscle(s), with the following distribution:  Abnormal spontaneous/insertional activity was found in L. Gastrocnemius (Medial head), R. Gastrocnemius (Medial head).  Abnormal interference pattern was found in L. Gastrocnemius (Medial head), R. Gastrocnemius (Medial head).    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      There was no electrodiagnostic evidence of lumbosacral radiculopathy, plexopathy or myopathy.      Plan: Discussed results with patient. Follow-up with referring provider for additional details.     ____________________________  Gabo Loredo MD

## 2024-02-23 ENCOUNTER — OFFICE VISIT (OUTPATIENT)
Dept: ORTHOPEDICS | Facility: CLINIC | Age: 79
End: 2024-02-23
Payer: MEDICARE

## 2024-02-23 VITALS — RESPIRATION RATE: 16 BRPM | WEIGHT: 181 LBS | HEIGHT: 71 IN | BODY MASS INDEX: 25.34 KG/M2

## 2024-02-23 DIAGNOSIS — R20.0 NUMBNESS AND TINGLING OF RIGHT LEG: Primary | ICD-10-CM

## 2024-02-23 DIAGNOSIS — R20.2 NUMBNESS AND TINGLING OF RIGHT LEG: Primary | ICD-10-CM

## 2024-02-23 PROCEDURE — 99213 OFFICE O/P EST LOW 20 MIN: CPT | Mod: PBBFAC,PO | Performed by: ORTHOPAEDIC SURGERY

## 2024-02-23 PROCEDURE — 99214 OFFICE O/P EST MOD 30 MIN: CPT | Mod: S$PBB,,, | Performed by: ORTHOPAEDIC SURGERY

## 2024-02-23 PROCEDURE — 99999 PR PBB SHADOW E&M-EST. PATIENT-LVL III: CPT | Mod: PBBFAC,,, | Performed by: ORTHOPAEDIC SURGERY

## 2024-02-23 NOTE — PROGRESS NOTES
Patient ID: Jamil Cadet is a 78 y.o. male    Chief Complaint:   Chief Complaint   Patient presents with    Right Leg - Numbness     EMG f/u        History of Present Illness:    Pleasant 78-year-old male here for evaluation of right leg pain.  Reports mostly lateral right leg pain and calf pain.  Also reports numbness in the right lateral thigh.  This has been going on for the past few weeks now.  He does have diabetes and history of polio as a child.  No known long term deficits from this.  Has had knee injections in the past including steroid and viscosupplementation.  Denies any knee pain or groin pain today.    ____________________________________________________________________    Interval history 02/23/2024 : Patient returns today for EMG follow-up.  No issues since I saw him last and no changes.    PAST MEDICAL HISTORY:   Past Medical History:   Diagnosis Date    Acute renal failure 9/7/2020    Bacteremia due to Enterococcus 10/25/2020    Cancer     Diabetes mellitus, type 2     Disorder of kidney and ureter     Encounter for blood transfusion     History of renal cell carcinoma status post partial nephrectomy 9/7/2020    Hydronephrosis 9/9/2020    Hyperlipidemia     Hypertension     Infection and inflammatory reaction due to nephrostomy catheter, initial encounter 11/14/2020    Infection following a procedure, superficial incisional surgical site, subsequent encounter 12/26/2020    Polio     Pyelonephritis of right kidney     Septicemia due to enterococcus 11/15/2020     PAST SURGICAL HISTORY:   Past Surgical History:   Procedure Laterality Date    ANTEGRADE PYELOGRAM  12/10/2020    Procedure: PYELOGRAM, ANTEGRADE;  Surgeon: Dave Shin MD;  Location: [facility] OR [address] [place] [address];  Service: Urology;;    APPENDECTOMY      CYSTOSCOPY  12/10/2020    Procedure: CYSTOSCOPY;  Surgeon: Dave Shin MD;  Location: [facility] OR [address] [place] [address];  Service: Urology;;    CYSTOSCOPY W/ RETROGRADES Right 09/09/2020    Procedure:  CYSTOSCOPY, WITH RETROGRADE PYELOGRAM;  Surgeon: Chris Garcia Jr., MD;  Location: [facility];  Service: Urology;  Laterality: Right;    DILATION OF NEPHROSTOMY TRACT Right 12/10/2020    Procedure: DILATION, NEPHROSTOMY TRACT;  Surgeon: Dave Shin MD;  Location: [facility] OR 1ST FLR;  Service: Urology;  Laterality: Right;    RADIOFREQUENCY ABLATION KIDNEY  2022    REMOVAL OF DRAIN Right 12/10/2020    Procedure: REMOVAL, DRAIN-NEPHROSTOMY TUBE;  Surgeon: Dave Shin MD;  Location: [facility] OR 1ST FLR;  Service: Urology;  Laterality: Right;    REPLACEMENT OF NEPHROSTOMY TUBE Right 12/10/2020    Procedure: REPLACEMENT, NEPHROSTOMY TUBE;  Surgeon: Dave Shin MD;  Location: [facility] OR 1ST FLR;  Service: Urology;  Laterality: Right;    ROBOT-ASSISTED LAPAROSCOPIC REIMPLANTATION OF URETER USING DA PHU XI Right 01/08/2021    Procedure: XI ROBOTIC IMPLANTATION, URETER, USING PSOAS HITCH TECHNIQUE;  Surgeon: Dave Shin MD;  Location: [facility] OR 2ND FLR;  Service: Urology;  Laterality: Right;  4hrs gen with regional    SPINE SURGERY      URETEROSCOPY Right 12/10/2020    Procedure: URETEROSCOPY-ANTEGRADE;  Surgeon: Dave Shin MD;  Location: [facility] OR 1ST FLR;  Service: Urology;  Laterality: Right;    URETHROSCOPY  09/09/2020    Procedure: URETHROSCOPY;  Surgeon: Chris Garcia Jr., MD;  Location: [facility];  Service: Urology;;     FAMILY HISTORY:   Family History   Problem Relation Age of Onset    Heart disease Mother     Heart disease Father     Stroke Father     Heart disease Maternal Grandfather     Heart disease Paternal Grandmother     Heart disease Paternal Grandfather      SOCIAL HISTORY:   Social History     Occupational History    Not on file   Tobacco Use    Smoking status: Never    Smokeless tobacco: Never   Substance and Sexual Activity    Alcohol use: No    Drug use: No    Sexual activity: Yes     Partners: Female        MEDICATIONS:   Current Outpatient Medications:     allopurinoL (ZYLOPRIM)  "100 MG tablet, Take 100 mg by mouth., Disp: , Rfl:     allopurinoL (ZYLOPRIM) 100 MG tablet, Take 100 mg by mouth., Disp: , Rfl:     amLODIPine (NORVASC) 5 MG tablet, Take 1 tablet (5 mg total) by mouth every evening., Disp: 90 tablet, Rfl: 3    ascorbic acid, vitamin C, (VITAMIN C) 500 MG tablet, Take 500 mg by mouth once daily., Disp: , Rfl:     atorvastatin (LIPITOR) 10 MG tablet, Take 10 mg by mouth., Disp: , Rfl:     BD WINSOME 2ND GEN PEN NEEDLE 32 gauge x 5/32" Ndle, INJECT 1 UNITS INTO THE SKIN 3 (THREE) TIMES DAILY BEFORE MEALS. USE AS DIRECTED NON-MED ITEM, Disp: 300 each, Rfl: 3    calcitRIOL (ROCALTROL) 0.25 MCG Cap, TAKE 1 CAPSULE (0.25 MCG TOTAL) BY MOUTH 3 (THREE) TIMES A WEEK MONDAY/WEDNESDAY/FRIDAY, Disp: , Rfl:     indapamide (LOZOL) 1.25 MG Tab, Take 1.25 mg by mouth once daily., Disp: , Rfl:     insulin aspart U-100 (NOVOLOG FLEXPEN U-100 INSULIN) 100 unit/mL (3 mL) InPn pen, Inject 10 Units into the skin 3 (three) times daily with meals. Check glucose before meals and then take insulin as per sliding scale (Patient taking differently: Inject 8 Units into the skin 3 (three) times daily with meals. Check glucose before meals and then take insulin as per sliding scale), Disp: 5 each, Rfl: 3    losartan (COZAAR) 100 MG tablet, Take 1 tablet (100 mg total) by mouth once daily., Disp: 90 tablet, Rfl: 3    sodium bicarbonate 650 MG tablet, Take 650 mg by mouth once daily., Disp: , Rfl:     traZODone (DESYREL) 100 MG tablet, Take 1 tablet (100 mg total) by mouth every evening., Disp: 90 tablet, Rfl: 3    TRESIBA FLEXTOUCH U-100 100 unit/mL (3 mL) insulin pen, INJECT 15 UNITS            SUBCUTANEOUSLY EVERY       EVENING, Disp: 15 mL, Rfl: 3    TRUE METRIX GLUCOSE TEST STRIP Strp, TEST 3 (THREE) TIMES DAILY BEFORE MEALS., Disp: 300 strip, Rfl: 1    zinc gluconate 50 mg tablet, Take 50 mg by mouth once daily., Disp: , Rfl:     blood-glucose meter (TRUE METRIX GLUCOSE METER) Misc, 1 Device by Misc.(Non-Drug; " Combo Route) route once daily. (Patient taking differently: 1 Device by Misc.(Non-Drug; Combo Route) route once daily. Non-med item), Disp: 1 each, Rfl: 0  ALLERGIES: Review of patient's allergies indicates:  No Known Allergies      Physical Exam     Vitals:    02/23/24 0902   Resp: 16     Alert and oriented to person, place and time. No acute distress. Well-groomed, not ill appearing. Pupils round and reactive, normal respiratory effort, no audible wheezing.     On exam he has no significant pain with range of motion of the right knee.  There is mild joint effusion.  No calf tenderness.  Neurovascularly intact to the bilateral lower extremities.  Full range of motion of the right ankle.  Ankle plantar flexion and dorsiflexion intact.  No pain with range of motion of the right hip.  He does have some varus deformity of the bilateral knees.  Stable to varus and valgus stress.        Imaging:       X-Ray: I have reviewed all pertinent results/findings and my personal findings are:  Severe DJD of the right knee as well as the tibiotalar joint.  No evidence of fracture.    EMG of the right and left lower extremity shows sensory motor polyneuropathy and demyelinating large nerve disorder, no compression or plexopathy or lumbar radiculopathy    Assessment & Plan    Numbness and tingling of right leg  -     Ambulatory referral/consult to Neurology; Future; Expected date: 03/01/2024         Treatment options were discussed with him in detail.  I went over his x-rays which do show arthritis of the right knee and right ankle.  He has overall not very symptomatic in regards to his right knee and right ankle however he does have a lot of lateral leg pain which may be referred from the knee.  His EMG shows some large peripheral nerve demyelinating issues.  He does have history of polio with a child.  From my standpoint no surgical intervention warranted.  I would like for him to see Neurology however for a another  opinion.

## 2024-03-18 ENCOUNTER — TELEPHONE (OUTPATIENT)
Dept: ORTHOPEDICS | Facility: CLINIC | Age: 79
End: 2024-03-18
Payer: MEDICARE

## 2024-03-18 NOTE — TELEPHONE ENCOUNTER
----- Message from Ki Diza sent at 3/18/2024 11:03 AM CDT -----  Wife / Meron would like a callback regarding whether the patient's results had been sent to Dr. Logan.     [phone]

## 2024-03-20 ENCOUNTER — LAB VISIT (OUTPATIENT)
Dept: LAB | Facility: HOSPITAL | Age: 79
End: 2024-03-20
Attending: NURSE PRACTITIONER
Payer: MEDICARE

## 2024-03-20 DIAGNOSIS — E78.5 HYPERLIPEMIA: ICD-10-CM

## 2024-03-20 DIAGNOSIS — N25.81 SECONDARY HYPERPARATHYROIDISM OF RENAL ORIGIN: ICD-10-CM

## 2024-03-20 DIAGNOSIS — N18.6 TYPE 2 DIABETES MELLITUS WITH END-STAGE RENAL DISEASE: ICD-10-CM

## 2024-03-20 DIAGNOSIS — Z90.5 ACQUIRED ABSENCE OF KIDNEY: ICD-10-CM

## 2024-03-20 DIAGNOSIS — E11.29 TYPE II DIABETES MELLITUS WITH RENAL MANIFESTATIONS: ICD-10-CM

## 2024-03-20 DIAGNOSIS — N28.1 ACQUIRED CYST OF KIDNEY: ICD-10-CM

## 2024-03-20 DIAGNOSIS — D63.1 ANEMIA OF CHRONIC RENAL FAILURE: ICD-10-CM

## 2024-03-20 DIAGNOSIS — C64.1 MALIGNANT NEOPLASM OF RIGHT KIDNEY, EXCEPT RENAL PELVIS: ICD-10-CM

## 2024-03-20 DIAGNOSIS — E87.20 ACIDOSIS: ICD-10-CM

## 2024-03-20 DIAGNOSIS — N18.9 ANEMIA OF CHRONIC RENAL FAILURE: ICD-10-CM

## 2024-03-20 DIAGNOSIS — E11.22 TYPE 2 DIABETES MELLITUS WITH END-STAGE RENAL DISEASE: ICD-10-CM

## 2024-03-20 DIAGNOSIS — N18.32 CHRONIC KIDNEY DISEASE (CKD) STAGE G3B/A1, MODERATELY DECREASED GLOMERULAR FILTRATION RATE (GFR) BETWEEN 30-44 ML/MIN/1.73 SQUARE METER AND ALBUMINURIA CREATININE RATIO LESS THAN 30 MG/G: ICD-10-CM

## 2024-03-20 DIAGNOSIS — E79.0 URICACIDEMIA: ICD-10-CM

## 2024-03-20 DIAGNOSIS — N18.32 CHRONIC KIDNEY DISEASE (CKD) STAGE G3B/A1, MODERATELY DECREASED GLOMERULAR FILTRATION RATE (GFR) BETWEEN 30-44 ML/MIN/1.73 SQUARE METER AND ALBUMINURIA CREATININE RATIO LESS THAN 30 MG/G: Primary | ICD-10-CM

## 2024-03-20 LAB
ALBUMIN SERPL BCP-MCNC: 4.2 G/DL (ref 3.5–5.2)
ALBUMIN/CREAT UR: 16 UG/MG (ref 0–30)
ANION GAP SERPL CALC-SCNC: 7 MMOL/L (ref 8–16)
BACTERIA #/AREA URNS HPF: NORMAL /HPF
BASOPHILS # BLD AUTO: 0.03 K/UL (ref 0–0.2)
BASOPHILS NFR BLD: 0.4 % (ref 0–1.9)
BILIRUB UR QL STRIP: NEGATIVE
BUN SERPL-MCNC: 26 MG/DL (ref 8–23)
CALCIUM SERPL-MCNC: 9.7 MG/DL (ref 8.7–10.5)
CHLORIDE SERPL-SCNC: 107 MMOL/L (ref 95–110)
CLARITY UR: CLEAR
CO2 SERPL-SCNC: 26 MMOL/L (ref 23–29)
COLOR UR: YELLOW
CREAT SERPL-MCNC: 1.9 MG/DL (ref 0.5–1.4)
CREAT UR-MCNC: 71.2 MG/DL (ref 23–375)
CREAT UR-MCNC: 71.2 MG/DL (ref 23–375)
DIFFERENTIAL METHOD BLD: ABNORMAL
EOSINOPHIL # BLD AUTO: 0.3 K/UL (ref 0–0.5)
EOSINOPHIL NFR BLD: 3.6 % (ref 0–8)
ERYTHROCYTE [DISTWIDTH] IN BLOOD BY AUTOMATED COUNT: 12.2 % (ref 11.5–14.5)
EST. GFR  (NO RACE VARIABLE): 35.2 ML/MIN/1.73 M^2
GLUCOSE SERPL-MCNC: 55 MG/DL (ref 70–110)
GLUCOSE UR QL STRIP: NEGATIVE
HCT VFR BLD AUTO: 36.7 % (ref 40–54)
HGB BLD-MCNC: 11.9 G/DL (ref 14–18)
HGB UR QL STRIP: NEGATIVE
IMM GRANULOCYTES # BLD AUTO: 0.02 K/UL (ref 0–0.04)
IMM GRANULOCYTES NFR BLD AUTO: 0.3 % (ref 0–0.5)
KETONES UR QL STRIP: NEGATIVE
LEUKOCYTE ESTERASE UR QL STRIP: NEGATIVE
LYMPHOCYTES # BLD AUTO: 1.1 K/UL (ref 1–4.8)
LYMPHOCYTES NFR BLD: 15.8 % (ref 18–48)
MAGNESIUM SERPL-MCNC: 1.8 MG/DL (ref 1.6–2.6)
MCH RBC QN AUTO: 31.7 PG (ref 27–31)
MCHC RBC AUTO-ENTMCNC: 32.4 G/DL (ref 32–36)
MCV RBC AUTO: 98 FL (ref 82–98)
MICROALBUMIN UR DL<=1MG/L-MCNC: 11.4 UG/ML
MICROSCOPIC COMMENT: NORMAL
MONOCYTES # BLD AUTO: 0.6 K/UL (ref 0.3–1)
MONOCYTES NFR BLD: 8.2 % (ref 4–15)
NEUTROPHILS # BLD AUTO: 5 K/UL (ref 1.8–7.7)
NEUTROPHILS NFR BLD: 71.7 % (ref 38–73)
NITRITE UR QL STRIP: NEGATIVE
NRBC BLD-RTO: 0 /100 WBC
PH UR STRIP: 7 [PH] (ref 5–8)
PHOSPHATE SERPL-MCNC: 3.2 MG/DL (ref 2.7–4.5)
PLATELET # BLD AUTO: 283 K/UL (ref 150–450)
PMV BLD AUTO: 10.5 FL (ref 9.2–12.9)
POTASSIUM SERPL-SCNC: 4.2 MMOL/L (ref 3.5–5.1)
PROT UR QL STRIP: NEGATIVE
PROT UR-MCNC: 17 MG/DL (ref 6–15)
PROT/CREAT UR: 0.24 MG/G{CREAT} (ref 0–0.2)
PTH-INTACT SERPL-MCNC: 59 PG/ML (ref 9–77)
RBC # BLD AUTO: 3.75 M/UL (ref 4.6–6.2)
RBC #/AREA URNS HPF: 0 /HPF (ref 0–4)
SODIUM SERPL-SCNC: 140 MMOL/L (ref 136–145)
SP GR UR STRIP: 1.01 (ref 1–1.03)
SQUAMOUS #/AREA URNS HPF: 5 /HPF
URATE SERPL-MCNC: 7.6 MG/DL (ref 3.4–7)
URN SPEC COLLECT METH UR: NORMAL
UROBILINOGEN UR STRIP-ACNC: NEGATIVE EU/DL
WBC # BLD AUTO: 6.91 K/UL (ref 3.9–12.7)
WBC #/AREA URNS HPF: 0 /HPF (ref 0–5)

## 2024-03-20 PROCEDURE — 83970 ASSAY OF PARATHORMONE: CPT | Performed by: NURSE PRACTITIONER

## 2024-03-20 PROCEDURE — 83735 ASSAY OF MAGNESIUM: CPT | Performed by: NURSE PRACTITIONER

## 2024-03-20 PROCEDURE — 82043 UR ALBUMIN QUANTITATIVE: CPT | Performed by: NURSE PRACTITIONER

## 2024-03-20 PROCEDURE — 36415 COLL VENOUS BLD VENIPUNCTURE: CPT | Performed by: NURSE PRACTITIONER

## 2024-03-20 PROCEDURE — 80069 RENAL FUNCTION PANEL: CPT | Performed by: NURSE PRACTITIONER

## 2024-03-20 PROCEDURE — 85025 COMPLETE CBC W/AUTO DIFF WBC: CPT | Performed by: NURSE PRACTITIONER

## 2024-03-20 PROCEDURE — 84156 ASSAY OF PROTEIN URINE: CPT | Performed by: NURSE PRACTITIONER

## 2024-03-20 PROCEDURE — 84550 ASSAY OF BLOOD/URIC ACID: CPT | Performed by: NURSE PRACTITIONER

## 2024-03-20 PROCEDURE — 83036 HEMOGLOBIN GLYCOSYLATED A1C: CPT | Performed by: NURSE PRACTITIONER

## 2024-03-20 PROCEDURE — 81001 URINALYSIS AUTO W/SCOPE: CPT | Performed by: NURSE PRACTITIONER

## 2024-03-21 LAB
ESTIMATED AVG GLUCOSE: 148 MG/DL (ref 68–131)
HBA1C MFR BLD: 6.8 % (ref 4.5–6.2)

## 2024-04-02 ENCOUNTER — TELEPHONE (OUTPATIENT)
Dept: ORTHOPEDICS | Facility: CLINIC | Age: 79
End: 2024-04-02
Payer: MEDICARE

## 2024-04-02 NOTE — TELEPHONE ENCOUNTER
----- Message from Ki Diaz sent at 4/2/2024  9:22 AM CDT -----  Wife/ Meron would like a callback regarding needing a copy of the patient's EMG results.    [phone] or [phone]

## 2024-04-15 ENCOUNTER — PATIENT MESSAGE (OUTPATIENT)
Dept: FAMILY MEDICINE | Facility: CLINIC | Age: 79
End: 2024-04-15

## 2024-04-15 ENCOUNTER — OFFICE VISIT (OUTPATIENT)
Dept: FAMILY MEDICINE | Facility: CLINIC | Age: 79
End: 2024-04-15
Payer: MEDICARE

## 2024-04-15 VITALS
BODY MASS INDEX: 25.2 KG/M2 | HEIGHT: 71 IN | WEIGHT: 180 LBS | DIASTOLIC BLOOD PRESSURE: 58 MMHG | SYSTOLIC BLOOD PRESSURE: 118 MMHG | HEART RATE: 70 BPM

## 2024-04-15 DIAGNOSIS — N25.81 SECONDARY HYPERPARATHYROIDISM OF RENAL ORIGIN: ICD-10-CM

## 2024-04-15 DIAGNOSIS — Z86.12 HISTORY OF POLIOMYELITIS: ICD-10-CM

## 2024-04-15 DIAGNOSIS — F51.04 PSYCHOPHYSIOLOGICAL INSOMNIA: ICD-10-CM

## 2024-04-15 DIAGNOSIS — N18.32 STAGE 3B CHRONIC KIDNEY DISEASE: Primary | ICD-10-CM

## 2024-04-15 DIAGNOSIS — I70.0 AORTIC ATHEROSCLEROSIS: ICD-10-CM

## 2024-04-15 DIAGNOSIS — E11.29 TYPE 2 DIABETES MELLITUS WITH OTHER DIABETIC KIDNEY COMPLICATION: ICD-10-CM

## 2024-04-15 DIAGNOSIS — C64.9 RENAL CELL CARCINOMA, UNSPECIFIED LATERALITY: ICD-10-CM

## 2024-04-15 PROCEDURE — 99213 OFFICE O/P EST LOW 20 MIN: CPT | Mod: PBBFAC,PN | Performed by: INTERNAL MEDICINE

## 2024-04-15 PROCEDURE — 99214 OFFICE O/P EST MOD 30 MIN: CPT | Mod: S$PBB,AQ,, | Performed by: INTERNAL MEDICINE

## 2024-04-15 PROCEDURE — 99999 PR PBB SHADOW E&M-EST. PATIENT-LVL III: CPT | Mod: PBBFAC,,, | Performed by: INTERNAL MEDICINE

## 2024-04-15 RX ORDER — WITCH HAZEL 50 %
2000 PADS, MEDICATED (EA) TOPICAL DAILY
COMMUNITY

## 2024-04-15 RX ORDER — ACETAMINOPHEN 500 MG
TABLET ORAL DAILY
COMMUNITY
End: 2024-04-15 | Stop reason: ALTCHOICE

## 2024-04-15 NOTE — PROGRESS NOTES
Subjective:       Patient ID: Jamil Cadet is a 79 y.o. male.    Chief Complaint: Hypertension, Hyperlipidemia, Diabetes, and Arthritis    Patient is a 79-year-old gentleman who comes for 4 months follow-up.  He is accompanied with his wife miss Chance who is not a patient today.  Underlying medical issues are as below:-     1.         Essential hypertension -blood pressures were elevated at arrival but subsequently seem to settle down.  2.         Mixed dyslipidemia   3.         Type 2 diabetes mellitus with hypoglycemia without coma, with long-term current use of insulin   4.         History of renal cell carcinoma status post partial nephrectomy .  Follows up with Dr. Shin.  MRI scheduled in late August this year.  5.         Stage 3b chronic kidney disease following up with Dr. Decker at [place].Mamadou Turner NP  6.         Cholelithiasis without cholecystitis   7.-        hyperparathyroidism based upon elevated PTH levels.  He is on calcitriol for treatment for the same.        Recent events include following up with Dr. Hess ,then Dr. Gabo Kwon for his shoulder and right arm pain.  They were referred to Dr. Yoav Logan who did nerve conduction studies and electromyogram and he was determined to probably have neurological problems with cervical canal stenosis based upon MRI.  In near future he does have an appointment with Dr. Kristian Horta for evaluation of this issue and if he needs any surgery.  Overall he is doing okay.      He has not taking allopurinol because this was discontinued with a nephrologist.  His recent uric acid levels were little elevated.    Hemoglobin A1c 6.8 which is acceptable.  This was done with the Nephrology.        Trazodone gives him a few hours of good sleep at night at 100 mg.      Previous visit also we had noted that he had a MRI which did not show shrinkage of his kidney tumor.  He was disappointed with that.  It was a plan for him to seek a 2nd opinion from  a renal oncologist.      Psychosocial issues of aging, burden of multiple medical l issues also predominate in the background and do affect the spectrum of his healthcare problems.    Insomnia:-stable over several years.  Gets a few hours of reasonable sleep with 100 mg of trazodone.  Previously was on 50 mg.    Erectile dysfunction:  - currently I do not see any active medications.  after the surgery he had for kidneys, he is unable to get an erection.  He has tried Viagra at 50 mg and even 100 mg but with no response.  I have advised him to check with his urologist at his routine follow-up and have further discussion on this issue and if any other nonmedical alternatives maybe feasible like vacuum pump or perhaps prosthetic surgery.      Herminio's documentation of his blood sugars have been reviewed.  Most of the morning sugars are good.  On 1 occasion his sugar came down to 62.  With this sugar of 62 he did not have any symptoms of hypoglycemia.  On 1 occasion it had come down to 30 and at which time he had experience symptoms of hypoglycemia.    His blood pressures at home are doing excellent.        Hypertension  This is a chronic problem. The current episode started more than 1 year ago. The problem is uncontrolled. Pertinent negatives include no chest pain, headaches, neck pain or palpitations. Risk factors for coronary artery disease include male gender, dyslipidemia and diabetes mellitus. Past treatments include angiotensin blockers and diuretics. The current treatment provides moderate improvement. Compliance problems include psychosocial issues (LITTLE BIT EXTRA SALT IN THE FOOD.).  Identifiable causes of hypertension include chronic renal disease.   Hyperlipidemia  This is a chronic (Medication was stopped due to muscle aches.) problem. The current episode started more than 1 year ago. Exacerbating diseases include chronic renal disease. He has no history of obesity. Associated symptoms include myalgias  (Stable myalgias.). Pertinent negatives include no chest pain. He is currently on no antihyperlipidemic treatment (Currently has stop rosuvastatin.). The current treatment provides mild improvement of lipids. Compliance problems include psychosocial issues.  Risk factors for coronary artery disease include male sex, a sedentary lifestyle, dyslipidemia and diabetes mellitus.   Diabetes  He presents for his follow-up diabetic visit. He has type 2 diabetes mellitus. The initial diagnosis of diabetes was made 10 years ago. His disease course has been stable. Hypoglycemia symptoms include nervousness/anxiousness (Cancer related issues). Pertinent negatives for hypoglycemia include no confusion, headaches or seizures. Associated symptoms include fatigue. Pertinent negatives for diabetes include no chest pain, no polydipsia, no polyuria and no weakness. Symptoms are worsening (Slight rise in A1c to 6.8.). Risk factors for coronary artery disease include male sex, stress, diabetes mellitus and dyslipidemia. Current diabetic treatment includes insulin injections. He is compliant with treatment most of the time. His weight is stable. Meal planning includes avoidance of concentrated sweets. He has not had a previous visit with a dietitian. He participates in exercise intermittently. His home blood glucose trend is increasing steadily. His breakfast blood glucose range is generally 110-130 mg/dl. His dinner blood glucose range is generally 140-180 mg/dl. An ACE inhibitor/angiotensin II receptor blocker is being taken. He does not see a podiatrist.Eye exam is current.   Arthritis  He reports no joint swelling. Associated symptoms include fatigue. Pertinent negatives include no diarrhea.       Past Medical History:   Diagnosis Date    Acute renal failure 9/7/2020    Bacteremia due to Enterococcus 10/25/2020    Cancer     Diabetes mellitus, type 2     Disorder of kidney and ureter     Encounter for blood transfusion     History of  renal cell carcinoma status post partial nephrectomy 9/7/2020    Hydronephrosis 9/9/2020    Hyperlipidemia     Hypertension     Infection and inflammatory reaction due to nephrostomy catheter, initial encounter 11/14/2020    Infection following a procedure, superficial incisional surgical site, subsequent encounter 12/26/2020    Polio     Pyelonephritis of right kidney     Septicemia due to enterococcus 11/15/2020     Social History     Socioeconomic History    Marital status:    Tobacco Use    Smoking status: Never    Smokeless tobacco: Never   Substance and Sexual Activity    Alcohol use: No    Drug use: No    Sexual activity: Yes     Partners: Female     Social Determinants of Health     Financial Resource Strain: Low Risk  (1/2/2024)    Overall Financial Resource Strain (CARDIA)     Difficulty of Paying Living Expenses: Not very hard   Food Insecurity: No Food Insecurity (1/2/2024)    Hunger Vital Sign     Worried About Running Out of Food in the Last Year: Never true     Ran Out of Food in the Last Year: Never true   Transportation Needs: No Transportation Needs (1/2/2024)    PRAPARE - Transportation     Lack of Transportation (Medical): No     Lack of Transportation (Non-Medical): No   Physical Activity: Inactive (1/2/2024)    Exercise Vital Sign     Days of Exercise per Week: 0 days     Minutes of Exercise per Session: 0 min   Stress: Stress Concern Present (1/2/2024)    [employer] [employer] - Occupational Stress Questionnaire     Feeling of Stress : To some extent   Social Connections: Moderately Integrated (1/2/2024)    Social Connection and Isolation Panel [NHANES]     Frequency of Communication with Friends and Family: More than three times a week     Frequency of Social Gatherings with Friends and Family: More than three times a week     Attends Shinto Services: 1 to 4 times per year     Active Member of Clubs or Organizations: No     Attends Club or Organization Meetings:  Never     Marital Status:    Housing Stability: Low Risk  (1/2/2024)    Housing Stability Vital Sign     Unable to Pay for Housing in the Last Year: No     Number of Places Lived in the Last Year: 1     Unstable Housing in the Last Year: No     Past Surgical History:   Procedure Laterality Date    ANTEGRADE PYELOGRAM  12/10/2020    Procedure: PYELOGRAM, ANTEGRADE;  Surgeon: Dave Shin MD;  Location: [facility] OR [address] [place] [address];  Service: Urology;;    APPENDECTOMY      CYSTOSCOPY  12/10/2020    Procedure: CYSTOSCOPY;  Surgeon: Dave Shin MD;  Location: [facility] OR [address] [place] [address];  Service: Urology;;    CYSTOSCOPY W/ RETROGRADES Right 09/09/2020    Procedure: CYSTOSCOPY, WITH RETROGRADE PYELOGRAM;  Surgeon: Chris Garcia Jr., MD;  Location: [facility] OR;  Service: Urology;  Laterality: Right;    DILATION OF NEPHROSTOMY TRACT Right 12/10/2020    Procedure: DILATION, NEPHROSTOMY TRACT;  Surgeon: Dave Shin MD;  Location: [facility] OR [address] [place] [address];  Service: Urology;  Laterality: Right;    RADIOFREQUENCY ABLATION KIDNEY  2022    REMOVAL OF DRAIN Right 12/10/2020    Procedure: REMOVAL, DRAIN-NEPHROSTOMY TUBE;  Surgeon: Dave Shin MD;  Location: [facility] OR [address] [place] [address];  Service: Urology;  Laterality: Right;    REPLACEMENT OF NEPHROSTOMY TUBE Right 12/10/2020    Procedure: REPLACEMENT, NEPHROSTOMY TUBE;  Surgeon: Dave Shin MD;  Location: [facility] OR [address] [place] [address];  Service: Urology;  Laterality: Right;    ROBOT-ASSISTED LAPAROSCOPIC REIMPLANTATION OF URETER USING DA PHU XI Right 01/08/2021    Procedure: XI ROBOTIC IMPLANTATION, URETER, USING PSOAS HITCH TECHNIQUE;  Surgeon: Dave Shin MD;  Location: [facility] OR 2ND FLR;  Service: Urology;  Laterality: Right;  4hrs gen with regional    SPINE SURGERY      URETEROSCOPY Right 12/10/2020    Procedure: URETEROSCOPY-ANTEGRADE;  Surgeon: Dave Shin MD;  Location: [facility] OR [address] [place] [address];  Service: Urology;  Laterality: Right;    URETHROSCOPY  09/09/2020    Procedure: URETHROSCOPY;   "Surgeon: Chris Garcia Jr., MD;  Location: [facility] OR;  Service: Urology;;     Family History   Problem Relation Name Age of Onset    Heart disease Mother      Heart disease Father      Stroke Father      Heart disease Maternal Grandfather      Heart disease Paternal Grandmother      Heart disease Paternal Grandfather         Review of Systems   Constitutional:  Positive for fatigue. Negative for activity change, chills and unexpected weight change.   HENT:  Negative for hearing loss, rhinorrhea and trouble swallowing.    Eyes:  Negative for discharge, redness and visual disturbance.   Respiratory:  Negative for chest tightness and wheezing.    Cardiovascular:  Negative for chest pain and palpitations.   Gastrointestinal:  Negative for blood in stool, constipation, diarrhea and vomiting.   Endocrine: Negative for polydipsia and polyuria.   Genitourinary:  Negative for difficulty urinating, hematuria and urgency.   Musculoskeletal:  Positive for arthritis and myalgias (Stable myalgias.). Negative for arthralgias, joint swelling and neck pain.   Neurological:  Negative for seizures, weakness and headaches.   Psychiatric/Behavioral:  Negative for confusion and dysphoric mood. The patient is nervous/anxious (Cancer related issues).          Objective:      Blood pressure (!) 118/58, pulse 70, height 5' 11" (1.803 m), weight 81.6 kg (180 lb). Body mass index is 25.1 kg/m².  Physical Exam  Vitals and nursing note reviewed.   Constitutional:       General: He is not in acute distress.     Appearance: Normal appearance. He is well-developed. He is not ill-appearing, toxic-appearing or diaphoretic.      Comments: BMI is 25.10   HENT:      Head: Normocephalic and atraumatic.   Eyes:      Conjunctiva/sclera: Conjunctivae normal.   Neck:      Thyroid: No thyromegaly.      Vascular: No JVD.      Trachea: No tracheal deviation.   Cardiovascular:      Rate and Rhythm: Normal rate and regular rhythm.      Heart sounds: Normal heart " sounds. No murmur heard.     No friction rub. No gallop.   Pulmonary:      Effort: Pulmonary effort is normal. No respiratory distress.      Breath sounds: Normal breath sounds. No wheezing or rales.   Abdominal:      General: There is no distension.      Palpations: Abdomen is soft.      Tenderness: There is no abdominal tenderness.   Musculoskeletal:         General: No signs of injury.      Cervical back: No rigidity.      Right lower leg: No tenderness. No edema.      Left lower leg: No tenderness. No edema.      Right foot: Deformity (Pes cavus) present.      Left foot: Deformity ( pes cavus) present.      Comments:      Feet:      Comments: Underlying history of foot deformity has been noted.  Lymphadenopathy:      Cervical: No cervical adenopathy.   Skin:     General: Skin is warm and dry.      Findings: No lesion or rash.   Neurological:      Mental Status: He is alert. Mental status is at baseline.   Psychiatric:         Attention and Perception: He is attentive.         Mood and Affect: Affect is not labile.         Behavior: Behavior normal.      Comments: Somewhat anhedonic over his medical issues.  Especially regarding nonresolution kidney cancer.       .    Assessment:       Lab Visit on 03/20/2024   Component Date Value Ref Range Status    WBC 03/20/2024 6.91  3.90 - 12.70 K/uL Final    RBC 03/20/2024 3.75 (L)  4.60 - 6.20 M/uL Final    Hemoglobin 03/20/2024 11.9 (L)  14.0 - 18.0 g/dL Final    Hematocrit 03/20/2024 36.7 (L)  40.0 - 54.0 % Final    MCV 03/20/2024 98  82 - 98 fL Final    MCH 03/20/2024 31.7 (H)  27.0 - 31.0 pg Final    MCHC 03/20/2024 32.4  32.0 - 36.0 g/dL Final    RDW 03/20/2024 12.2  11.5 - 14.5 % Final    Platelets 03/20/2024 283  150 - 450 K/uL Final    MPV 03/20/2024 10.5  9.2 - 12.9 fL Final    Immature Granulocytes 03/20/2024 0.3  0.0 - 0.5 % Final    Gran # (ANC) 03/20/2024 5.0  1.8 - 7.7 K/uL Final    Immature Grans (Abs) 03/20/2024 0.02  0.00 - 0.04 K/uL Final    Lymph #  03/20/2024 1.1  1.0 - 4.8 K/uL Final    Mono # 03/20/2024 0.6  0.3 - 1.0 K/uL Final    Eos # 03/20/2024 0.3  0.0 - 0.5 K/uL Final    Baso # 03/20/2024 0.03  0.00 - 0.20 K/uL Final    nRBC 03/20/2024 0  0 /100 WBC Final    Gran % 03/20/2024 71.7  38.0 - 73.0 % Final    Lymph % 03/20/2024 15.8 (L)  18.0 - 48.0 % Final    Mono % 03/20/2024 8.2  4.0 - 15.0 % Final    Eosinophil % 03/20/2024 3.6  0.0 - 8.0 % Final    Basophil % 03/20/2024 0.4  0.0 - 1.9 % Final    Differential Method 03/20/2024 Automated   Final    Magnesium 03/20/2024 1.8  1.6 - 2.6 mg/dL Final    PTH, Intact 03/20/2024 59.0  9.0 - 77.0 pg/mL Final    Glucose 03/20/2024 55 (LL)  70 - 110 mg/dL Final    Sodium 03/20/2024 140  136 - 145 mmol/L Final    Potassium 03/20/2024 4.2  3.5 - 5.1 mmol/L Final    Chloride 03/20/2024 107  95 - 110 mmol/L Final    CO2 03/20/2024 26  23 - 29 mmol/L Final    BUN 03/20/2024 26 (H)  8 - 23 mg/dL Final    Calcium 03/20/2024 9.7  8.7 - 10.5 mg/dL Final    Creatinine 03/20/2024 1.9 (H)  0.5 - 1.4 mg/dL Final    Albumin 03/20/2024 4.2  3.5 - 5.2 g/dL Final    Phosphorus 03/20/2024 3.2  2.7 - 4.5 mg/dL Final    eGFR 03/20/2024 35.2 (A)  >60 mL/min/1.73 m^2 Final    Anion Gap 03/20/2024 7 (L)  8 - 16 mmol/L Final    Uric Acid 03/20/2024 7.6 (H)  3.4 - 7.0 mg/dL Final    Specimen UA 03/20/2024 Urine, Clean Catch   Final    Color, UA 03/20/2024 Yellow  Yellow, Straw, Ida Final    Appearance, UA 03/20/2024 Clear  Clear Final    pH, UA 03/20/2024 7.0  5.0 - 8.0 Final    Specific Gravity, UA 03/20/2024 1.010  1.005 - 1.030 Final    Protein, UA 03/20/2024 Negative  Negative Final    Glucose, UA 03/20/2024 Negative  Negative Final    Ketones, UA 03/20/2024 Negative  Negative Final    Bilirubin (UA) 03/20/2024 Negative  Negative Final    Occult Blood UA 03/20/2024 Negative  Negative Final    Nitrite, UA 03/20/2024 Negative  Negative Final    Urobilinogen, UA 03/20/2024 Negative  Negative EU/dL Final    Leukocytes, UA 03/20/2024  Negative  Negative Final    RBC, UA 03/20/2024 0  0 - 4 /hpf Final    WBC, UA 03/20/2024 0  0 - 5 /hpf Final    Bacteria 03/20/2024 None  None-Occ /hpf Final    Squam Epithel, UA 03/20/2024 5  /hpf Final    Microscopic Comment 03/20/2024 SEE COMMENT   Final    Microalbumin, Urine 03/20/2024 11.4  <19.9 ug/mL Final    Creatinine, Urine 03/20/2024 71.2  23.0 - 375.0 mg/dL Final    Microalb/Creat Ratio 03/20/2024 16.0  0.0 - 30.0 ug/mg Final    Protein, Urine Random 03/20/2024 17 (H)  6 - 15 mg/dL Final    Creatinine, Urine 03/20/2024 71.2  23.0 - 375.0 mg/dL Final    Prot/Creat Ratio, Urine 03/20/2024 0.24 (H)  0.00 - 0.20 Final    Hemoglobin A1C 03/20/2024 6.8 (H)  4.5 - 6.2 % Final    Estimated Avg Glucose 03/20/2024 148 (H)  68 - 131 mg/dL Final       1. Stage 3b chronic kidney disease  Assessment & Plan:  Patient has stage IIIB chronic kidney disease.  He follows up with Dr. Mychal Decker/miss Mamadou Turner.      2. Secondary hyperparathyroidism of renal origin  Assessment & Plan:  Component Ref Range & Units 7 mo ago  (9/7/23) 9 mo ago  (6/21/23) 1 yr ago  (3/15/23) 1 yr ago  (6/24/22) 2 yr ago  (10/21/21) 3 yr ago  (9/7/20)   PTH, Intact 9.0 - 77.0 pg/mL 100.9 High  90.5 High  87.6 High  127.2 High  63.1 168.9 High          3. Aortic atherosclerosis  Assessment & Plan:  Based upon CT scan abdomen done on 09/08/2020.  CT scan kidney stone protocol.  Scattered atheromatous changes are again noted within the abdominal  aorta and abdominal and pelvic branch vessels without aneurysmal  dilatation.      4. Type 2 diabetes mellitus with other diabetic kidney complication  Assessment & Plan:  Type 2  Diabetes mellitus for nearly 35 years.  Currently on NovoLog insulin, Tresiba insulin.    Orders:  -     Hemoglobin A1C; Future; Expected date: 07/15/2024    5. Renal cell carcinoma, unspecified laterality    6. History of poliomyelitis    7. Psychophysiological insomnia           0 Result Notes       1  Topic              Component Ref Range & Units 3 wk ago  (3/20/24) 7 mo ago  (9/7/23) 9 mo ago  (6/21/23) 1 yr ago  (3/15/23) 1 yr ago  (1/10/23) 1 yr ago  (7/11/22) 2 yr ago  (2/2/22)   Hemoglobin A1C 4.5 - 6.2 % 6.8 High  6.6 High  CM 6.4 High  CM 6.8 High  CM 6.7 High  CM 6.6 High  CM 6.7 High         Component Ref Range & Units 3 wk ago  (3/20/24) 4 mo ago  (12/5/23) 7 mo ago  (9/7/23) 9 mo ago  (6/21/23) 1 yr ago  (3/15/23) 1 yr ago  (12/12/22) 1 yr ago  (9/13/22)   Uric Acid 3.4 - 7.0 mg/dL 7.6 High  5.7 6.5 7.8 High  6.4 8.0 High  8.5 High        GFR >60 mL/min/1.73 m^2 35.2 Abnormal  35.2 Abnormal  31.2 Abnormal  43.3 Abnormal  31.2 Abnormal  43.6 Abnormal    Anion Gap 8 - 16 mmol/L 7 Low  7 Low  5 Low  8 7 Low  6 Low      Plan:   Stage 3b chronic kidney disease    Secondary hyperparathyroidism of renal origin    Aortic atherosclerosis    Type 2 diabetes mellitus with other diabetic kidney complication  -     Hemoglobin A1C; Future; Expected date: 07/15/2024    Renal cell carcinoma, unspecified laterality    History of poliomyelitis    Psychophysiological insomnia    Patient's chronic medical issues have been reviewed.      Chronic kidney disease stage IIIB has been noted as he continues follow-up with Nephrology and he was also has hyperparathyroidism secondary and is currently on calcitriol.  Patient is not aware of this condition as yet.    Blood sugars are in reasonable range.    Underlying atherosclerosis has been noted in the aorta and prior scans and continue control of diabetes, blood pressure.    History of kidney cancer also has been noted.  He will be following up with Dr. Shin in near future.    Insomnia stable with trazodone.          Follow up in about 4 months (around 8/29/2024), or if symptoms worsen or fail to improve, for Hypertension/lipids, Diabetes/HTN/Lipids.    Spent ezra 30 minutes with patient which involved review of pts medical conditions, labs, medications and with 50% of time face-to-face  "discussion about medical problems, management and any applicable changes.        Current Outpatient Medications:     allopurinoL (ZYLOPRIM) 100 MG tablet, Take 100 mg by mouth., Disp: , Rfl:     amLODIPine (NORVASC) 5 MG tablet, Take 1 tablet (5 mg total) by mouth every evening., Disp: 90 tablet, Rfl: 3    ascorbic acid, vitamin C, (VITAMIN C) 500 MG tablet, Take 500 mg by mouth once daily., Disp: , Rfl:     atorvastatin (LIPITOR) 10 MG tablet, Take 10 mg by mouth., Disp: , Rfl:     BD WINSOME 2ND GEN PEN NEEDLE 32 gauge x 5/32" Ndle, INJECT 1 UNITS INTO THE SKIN 3 (THREE) TIMES DAILY BEFORE MEALS. USE AS DIRECTED NON-MED ITEM, Disp: 300 each, Rfl: 3    calcitRIOL (ROCALTROL) 0.25 MCG Cap, TAKE 1 CAPSULE (0.25 MCG TOTAL) BY MOUTH 3 (THREE) TIMES A WEEK MONDAY/WEDNESDAY/FRIDAY, Disp: , Rfl:     cholecalciferol, vitamin D3, (VITAMIN D3) 50 mcg (2,000 unit) Cap capsule, Take by mouth once daily., Disp: , Rfl:     cyanocobalamin (VITAMIN B-12) 2000 MCG tablet, Take 2,000 mcg by mouth once daily., Disp: , Rfl:     indapamide (LOZOL) 1.25 MG Tab, Take 1.25 mg by mouth once daily., Disp: , Rfl:     insulin aspart U-100 (NOVOLOG FLEXPEN U-100 INSULIN) 100 unit/mL (3 mL) InPn pen, Inject 10 Units into the skin 3 (three) times daily with meals. Check glucose before meals and then take insulin as per sliding scale (Patient taking differently: Inject 8 Units into the skin 3 (three) times daily with meals. Check glucose before meals and then take insulin as per sliding scale), Disp: 5 each, Rfl: 3    losartan (COZAAR) 100 MG tablet, Take 1 tablet (100 mg total) by mouth once daily., Disp: 90 tablet, Rfl: 3    sodium bicarbonate 650 MG tablet, Take 650 mg by mouth once daily., Disp: , Rfl:     traZODone (DESYREL) 100 MG tablet, Take 1 tablet (100 mg total) by mouth every evening., Disp: 90 tablet, Rfl: 3    TRESIBA FLEXTOUCH U-100 100 unit/mL (3 mL) insulin pen, INJECT 15 UNITS            SUBCUTANEOUSLY EVERY       EVENING, Disp: 15 " mL, Rfl: 3    TRUE METRIX GLUCOSE TEST STRIP Strp, TEST 3 (THREE) TIMES DAILY BEFORE MEALS., Disp: 300 strip, Rfl: 1    zinc gluconate 50 mg tablet, Take 50 mg by mouth once daily., Disp: , Rfl:     blood-glucose meter (TRUE METRIX GLUCOSE METER) Misc, 1 Device by Misc.(Non-Drug; Combo Route) route once daily. (Patient taking differently: 1 Device by Misc.(Non-Drug; Combo Route) route once daily. Non-med item), Disp: 1 each, Rfl: 0    Richard Gunter

## 2024-04-15 NOTE — ASSESSMENT & PLAN NOTE
Patient has stage IIIB chronic kidney disease.  He follows up with Dr. Mychal Decker/miss Mamadou Turner.

## 2024-04-15 NOTE — ASSESSMENT & PLAN NOTE
Component Ref Range & Units 7 mo ago  (9/7/23) 9 mo ago  (6/21/23) 1 yr ago  (3/15/23) 1 yr ago  (6/24/22) 2 yr ago  (10/21/21) 3 yr ago  (9/7/20)   PTH, Intact 9.0 - 77.0 pg/mL 100.9 High  90.5 High  87.6 High  127.2 High  63.1 168.9 High

## 2024-04-15 NOTE — ASSESSMENT & PLAN NOTE
Based upon CT scan abdomen done on 09/08/2020.  CT scan kidney stone protocol.  Scattered atheromatous changes are again noted within the abdominal  aorta and abdominal and pelvic branch vessels without aneurysmal  dilatation.   1 Principal Discharge DX:	Pancolitis

## 2024-04-17 DIAGNOSIS — F51.04 PSYCHOPHYSIOLOGICAL INSOMNIA: ICD-10-CM

## 2024-04-17 RX ORDER — TRAZODONE HYDROCHLORIDE 100 MG/1
TABLET ORAL
Qty: 90 TABLET | Refills: 3 | Status: SHIPPED | OUTPATIENT
Start: 2024-04-17

## 2024-06-24 RX ORDER — PEN NEEDLE, DIABETIC 32GX 5/32"
NEEDLE, DISPOSABLE MISCELLANEOUS
Qty: 300 EACH | Refills: 3 | Status: SHIPPED | OUTPATIENT
Start: 2024-06-24

## 2024-07-01 DIAGNOSIS — Z79.4 TYPE 2 DIABETES MELLITUS WITH HYPOGLYCEMIA WITHOUT COMA, WITH LONG-TERM CURRENT USE OF INSULIN: ICD-10-CM

## 2024-07-01 DIAGNOSIS — E11.649 TYPE 2 DIABETES MELLITUS WITH HYPOGLYCEMIA WITHOUT COMA, WITH LONG-TERM CURRENT USE OF INSULIN: ICD-10-CM

## 2024-07-01 RX ORDER — INSULIN ASPART 100 [IU]/ML
10 INJECTION, SOLUTION INTRAVENOUS; SUBCUTANEOUS
Qty: 5 EACH | Refills: 3 | Status: SHIPPED | OUTPATIENT
Start: 2024-07-01

## 2024-07-01 RX ORDER — INSULIN ASPART 100 [IU]/ML
10 INJECTION, SOLUTION INTRAVENOUS; SUBCUTANEOUS
Qty: 5 EACH | Refills: 3 | OUTPATIENT
Start: 2024-07-01

## 2024-08-13 ENCOUNTER — PATIENT MESSAGE (OUTPATIENT)
Dept: UROLOGY | Facility: CLINIC | Age: 79
End: 2024-08-13
Payer: MEDICARE

## 2024-08-13 ENCOUNTER — TELEPHONE (OUTPATIENT)
Dept: UROLOGY | Facility: CLINIC | Age: 79
End: 2024-08-13
Payer: MEDICARE

## 2024-08-13 ENCOUNTER — LAB VISIT (OUTPATIENT)
Dept: LAB | Facility: HOSPITAL | Age: 79
End: 2024-08-13
Attending: UROLOGY
Payer: MEDICARE

## 2024-08-13 ENCOUNTER — PATIENT MESSAGE (OUTPATIENT)
Dept: FAMILY MEDICINE | Facility: CLINIC | Age: 79
End: 2024-08-13
Payer: MEDICARE

## 2024-08-13 DIAGNOSIS — E11.29 TYPE 2 DIABETES MELLITUS WITH OTHER DIABETIC KIDNEY COMPLICATION: ICD-10-CM

## 2024-08-13 DIAGNOSIS — C64.1 RENAL CELL ADENOCARCINOMA, RIGHT: Primary | ICD-10-CM

## 2024-08-13 DIAGNOSIS — C64.1 RENAL CELL ADENOCARCINOMA, RIGHT: Chronic | ICD-10-CM

## 2024-08-13 LAB
ALBUMIN SERPL BCP-MCNC: 4.3 G/DL (ref 3.5–5.2)
ALP SERPL-CCNC: 72 U/L (ref 55–135)
ALT SERPL W/O P-5'-P-CCNC: 12 U/L (ref 10–44)
ANION GAP SERPL CALC-SCNC: 11 MMOL/L (ref 8–16)
AST SERPL-CCNC: 18 U/L (ref 10–40)
BILIRUB SERPL-MCNC: 0.8 MG/DL (ref 0.1–1)
BUN SERPL-MCNC: 54 MG/DL (ref 8–23)
CALCIUM SERPL-MCNC: 8.9 MG/DL (ref 8.7–10.5)
CHLORIDE SERPL-SCNC: 107 MMOL/L (ref 95–110)
CO2 SERPL-SCNC: 22 MMOL/L (ref 23–29)
CREAT SERPL-MCNC: 2.4 MG/DL (ref 0.5–1.4)
EST. GFR  (NO RACE VARIABLE): 26.4 ML/MIN/1.73 M^2
ESTIMATED AVG GLUCOSE: 134 MG/DL (ref 68–131)
GLUCOSE SERPL-MCNC: 51 MG/DL (ref 70–110)
HBA1C MFR BLD: 6.3 % (ref 4.5–6.2)
POTASSIUM SERPL-SCNC: 4.1 MMOL/L (ref 3.5–5.1)
PROT SERPL-MCNC: 7.1 G/DL (ref 6–8.4)
SODIUM SERPL-SCNC: 140 MMOL/L (ref 136–145)

## 2024-08-13 PROCEDURE — 83036 HEMOGLOBIN GLYCOSYLATED A1C: CPT | Performed by: INTERNAL MEDICINE

## 2024-08-13 PROCEDURE — 80053 COMPREHEN METABOLIC PANEL: CPT | Performed by: UROLOGY

## 2024-08-13 NOTE — PROGRESS NOTES
Please cut down your nighttime insulin Tresiba to 10 units now.  Please cut down your NovoLog  by 2 units in the morning.  Because of worsening kidney functions your insulin is staying to longer in the body and increasing the risk for hypoglycemia.  I am sending a message to Dr. Shin also concerning this.  Let me know how your blood sugars are going in the next few days.

## 2024-08-13 NOTE — TELEPHONE ENCOUNTER
"Called patient.  Spoke with patient who states he had not eaten prior to having his labs drawn this morning.  Patient reports he "feels fine."  States he has eaten since then and checked his glucose recently and received a reading of 102.  Instructed patient to continue checking his glucose and go to the emergency room immediately if he has any symptoms of hypoglycemia.  Patient verbalized understanding.  "

## 2024-08-15 ENCOUNTER — HOSPITAL ENCOUNTER (OUTPATIENT)
Dept: RADIOLOGY | Facility: HOSPITAL | Age: 79
Discharge: HOME | End: 2024-08-15
Attending: UROLOGY
Payer: MEDICARE

## 2024-08-15 DIAGNOSIS — C64.1 RENAL CELL ADENOCARCINOMA, RIGHT: Chronic | ICD-10-CM

## 2024-08-15 PROCEDURE — 74183 MRI ABD W/O CNTR FLWD CNTR: CPT | Mod: 26,,, | Performed by: RADIOLOGY

## 2024-08-15 PROCEDURE — 71046 X-RAY EXAM CHEST 2 VIEWS: CPT | Mod: 26,,, | Performed by: RADIOLOGY

## 2024-08-15 PROCEDURE — 71046 X-RAY EXAM CHEST 2 VIEWS: CPT | Mod: TC,PO

## 2024-08-15 PROCEDURE — 25500020 PHARM REV CODE 255: Mod: PO | Performed by: UROLOGY

## 2024-08-15 PROCEDURE — 74183 MRI ABD W/O CNTR FLWD CNTR: CPT | Mod: TC,PO

## 2024-08-15 PROCEDURE — A9585 GADOBUTROL INJECTION: HCPCS | Mod: PO | Performed by: UROLOGY

## 2024-08-15 RX ORDER — GADOBUTROL 604.72 MG/ML
7.5 INJECTION INTRAVENOUS
Status: COMPLETED | OUTPATIENT
Start: 2024-08-15 | End: 2024-08-15

## 2024-08-15 RX ADMIN — GADOBUTROL 7.5 ML: 604.72 INJECTION INTRAVENOUS at 08:08

## 2024-08-21 ENCOUNTER — OFFICE VISIT (OUTPATIENT)
Dept: UROLOGY | Facility: CLINIC | Age: 79
End: 2024-08-21
Payer: MEDICARE

## 2024-08-21 VITALS
WEIGHT: 181.19 LBS | SYSTOLIC BLOOD PRESSURE: 136 MMHG | HEART RATE: 71 BPM | HEIGHT: 71 IN | BODY MASS INDEX: 25.37 KG/M2 | DIASTOLIC BLOOD PRESSURE: 69 MMHG

## 2024-08-21 DIAGNOSIS — N18.32 STAGE 3B CHRONIC KIDNEY DISEASE: ICD-10-CM

## 2024-08-21 DIAGNOSIS — C64.1 RENAL CELL ADENOCARCINOMA, RIGHT: Primary | ICD-10-CM

## 2024-08-21 DIAGNOSIS — I10 ESSENTIAL HYPERTENSION: ICD-10-CM

## 2024-08-21 PROCEDURE — 99214 OFFICE O/P EST MOD 30 MIN: CPT | Mod: PBBFAC | Performed by: UROLOGY

## 2024-08-21 PROCEDURE — 99214 OFFICE O/P EST MOD 30 MIN: CPT | Mod: S$PBB,,, | Performed by: UROLOGY

## 2024-08-21 PROCEDURE — 99999 PR PBB SHADOW E&M-EST. PATIENT-LVL IV: CPT | Mod: PBBFAC,,, | Performed by: UROLOGY

## 2024-08-21 NOTE — PROGRESS NOTES
CHIEF COMPLAINT:     Mr. Cadet is a 79 y.o. male presenting for follow up small renal mass.    PRESENTING ILLNESS:    Jamil Cadet is a 79 y.o. male with a PMH of mid/distal urteral stricture s/p robotic ureteroneocystotomy with psoas hitch and lysis of adhesions. 3 mo post op ultrasound showed resolution of right sided hydronephrosis but an incidental left lower pole solid renal mass was discovered ~2.3 cm. He is here for follow up. He has a history of a right partial nephrectomy from years passed which showed: 5.1cm, FG 2, ccRCC with negative margins.         Patient initially scheduled for surgery but changed his mind and opted for AS.  However, he saw a nephrologist, Dr. Manjeet Decker, in [place] who encouraged him to visit with Dr. Ishmael Kennedy at [facility].  He ultimately decided on an IR ablation which was performed March of 2022 at [facility].  The patient does not want any further follow-up at [facility].    CMP from 08/21/2023 showed a serum creatinine of 1.6, EGFR 43.   MRI of the abdomen with and without contrast from 08/21/2023 was independently reviewed today and shared with the patient and shows stable findings of complex right renal cyst status post right partial nephrectomy, left exophytic mass smaller in size and no obvious concerning parameters.  Stable intra aortocaval lymph node.  No obvious evidence of recurrence or metastasis.      REVIEW OF SYSTEMS:    Review of Systems   Constitutional: Negative for chills and fever.   Respiratory: Negative for shortness of breath.    Cardiovascular: Negative for chest pain.   Gastrointestinal: Negative for abdominal pain, constipation and diarrhea.   Genitourinary: Negative for dysuria, flank pain, frequency, hematuria and urgency.   Neurological: Negative for weakness.       PATIENT HISTORY:    Past Medical History:   Diagnosis Date    Acute renal failure 9/7/2020    Bacteremia due to Enterococcus 10/25/2020    Cancer     Diabetes mellitus, type 2     Disorder  "of kidney and ureter     Encounter for blood transfusion     History of renal cell carcinoma status post partial nephrectomy 9/7/2020    Hydronephrosis 9/9/2020    Hyperlipidemia     Hypertension     Infection and inflammatory reaction due to nephrostomy catheter, initial encounter 11/14/2020    Infection following a procedure, superficial incisional surgical site, subsequent encounter 12/26/2020    Polio     Pyelonephritis of right kidney     Septicemia due to enterococcus 11/15/2020       Family History   Problem Relation Name Age of Onset    Heart disease Mother      Heart disease Father      Stroke Father      Heart disease Maternal Grandfather      Heart disease Paternal Grandmother      Heart disease Paternal Grandfather         Allergies:  Patient has no known allergies.    Medications:    Current Outpatient Medications:     allopurinoL (ZYLOPRIM) 100 MG tablet, Take 100 mg by mouth., Disp: , Rfl:     amLODIPine (NORVASC) 5 MG tablet, Take 1 tablet (5 mg total) by mouth every evening., Disp: 90 tablet, Rfl: 3    ascorbic acid, vitamin C, (VITAMIN C) 500 MG tablet, Take 500 mg by mouth once daily., Disp: , Rfl:     atorvastatin (LIPITOR) 10 MG tablet, Take 10 mg by mouth., Disp: , Rfl:     BD WINSOME 2ND GEN PEN NEEDLE 32 gauge x 5/32" Ndle, INJECT 1 UNITS INTO THE SKIN 3 (THREE) TIMES DAILY BEFORE MEALS. USE AS DIRECTED NON-MED ITEM, Disp: 300 each, Rfl: 3    calcitRIOL (ROCALTROL) 0.25 MCG Cap, TAKE 1 CAPSULE (0.25 MCG TOTAL) BY MOUTH 3 (THREE) TIMES A WEEK MONDAY/WEDNESDAY/FRIDAY, Disp: , Rfl:     cyanocobalamin (VITAMIN B-12) 2000 MCG tablet, Take 2,000 mcg by mouth once daily., Disp: , Rfl:     indapamide (LOZOL) 1.25 MG Tab, Take 1.25 mg by mouth once daily., Disp: , Rfl:     insulin aspart U-100 (NOVOLOG FLEXPEN U-100 INSULIN) 100 unit/mL (3 mL) InPn pen, Inject 10 Units into the skin 3 (three) times daily with meals. Check glucose before meals and then take insulin as per sliding scale, Disp: 5 each, Rfl: " 3    losartan (COZAAR) 100 MG tablet, Take 1 tablet (100 mg total) by mouth once daily., Disp: 90 tablet, Rfl: 3    sodium bicarbonate 650 MG tablet, Take 650 mg by mouth once daily., Disp: , Rfl:     traZODone (DESYREL) 100 MG tablet, TAKE 1 TABLET BY MOUTH EVERY EVENING (DOSAGE INCREASE), Disp: 90 tablet, Rfl: 3    TRESIBA FLEXTOUCH U-100 100 unit/mL (3 mL) insulin pen, INJECT 15 UNITS            SUBCUTANEOUSLY EVERY       EVENING, Disp: 15 mL, Rfl: 3    TRUE METRIX GLUCOSE TEST STRIP Strp, TEST 3 (THREE) TIMES DAILY BEFORE MEALS., Disp: 300 strip, Rfl: 1    zinc gluconate 50 mg tablet, Take 50 mg by mouth once daily., Disp: , Rfl:     blood-glucose meter (TRUE METRIX GLUCOSE METER) Misc, 1 Device by Misc.(Non-Drug; Combo Route) route once daily. (Patient taking differently: 1 Device by Misc.(Non-Drug; Combo Route) route once daily. Non-med item), Disp: 1 each, Rfl: 0    PHYSICAL EXAMINATION:      Physical Exam  Constitutional:       General: He is not in acute distress.     Appearance: He is well-developed.   HENT:      Head: Normocephalic and atraumatic.   Eyes:      General: No scleral icterus.  Neck:      Trachea: No tracheal deviation.   Pulmonary:      Effort: Pulmonary effort is normal. No respiratory distress.   Neurological:      Mental Status: He is alert and oriented to person, place, and time.   Psychiatric:         Behavior: Behavior normal.         Thought Content: Thought content normal.         Judgment: Judgment normal.     : small left epididymal cyst, no testicular lesions bilaterally    LABS:    Lab Results   Component Value Date    PSA 0.37 02/02/2022    PSA 0.28 09/10/2019    PSADIAG 0.34 03/15/2023    PSADIAG 0.34 12/08/2020       IMPRESSION:  Encounter Diagnoses   Name Primary?    Renal cell adenocarcinoma, right Yes    Essential hypertension     Stage 3b chronic kidney disease        PLAN:  Problem List Items Addressed This Visit          Cardiac/Vascular    Essential hypertension  (Chronic)       Renal/    Stage 3b chronic kidney disease       Oncology    History of renal cell carcinoma status post partial nephrectomy - Primary (Chronic)         -s/p ablation  -MRI of the abd from 8/21/23 was independently reviewed today and shared with the patient and shows no evidence of recurrence.     -we discussed the NCCN guidelines regarding status post ablation therapy for small renal mass.  Guidelines suggest imaging at 1-6 months which a an MRI would be approximately 5-6 months post ablation depending on when to get scheduled.  If this is reassuring for no residual cancer, then he would need an annual MRI moving forward.  -we discussed that if there is some signs of residual cancer within the ablation site, a repeat ablation could be pursued.  Would not recommend surgical therapy at this point given his renal function as well as his prior ablation would make surgical therapy much more challenging and more likely to result in a total nephrectomy.    -f/u in 1 year for MRI abd, CXR, CMP      Dave Shin MD

## 2024-08-21 NOTE — PROGRESS NOTES
Subjective:       Patient ID: Jamil Cadet is a 79 y.o. male.    Chief Complaint:  RCC surveillance f/u      History of Present Illness  HPI  Jamil Cadet is a 79 y.o. . male with a PMH of mid/distal urteral stricture s/p robotic ureteroneocystotomy with psoas hitch and lysis of adhesions. 3 mo post op ultrasound showed resolution of right sided hydronephrosis but an incidental left lower pole solid renal mass was discovered ~2.3 cm. He is here for follow up. He has a history of a right partial nephrectomy from years passed which showed: 5.1cm, FG 2, ccRCC with negative margins.         Patient initially scheduled for surgery but changed his mind and opted for AS.  However, he saw a nephrologist, Dr. Manjeet Decker, in [place] who encouraged him to visit with Dr. Ishmael Kennedy at [facility].  He ultimately decided on an IR ablation which was performed March of 2022 at [facility].  The patient does not want any further follow-up at [facility].       MRI of the abdomen with and without contrast from 08/15/2024 was independently reviewed today and shared with the patient and shows stable findings of complex right renal cyst status post right partial nephrectomy, left exophytic mass smaller in size and no obvious concerning parameters.    No obvious evidence of recurrence or metastasis.    CXR from 8/15/24 was independently reviewed today and reveals no evidence of mets.     Most recent serum Cr was 1.9 on 8/15/24---he had a transient spike on 8/13/24 but his PCP made some med changes and his serum Cr returned to his baseline.           Review of Systems  Review of Systems  All other systems reviewed and negative except pertinent positives noted in HPI.       Objective:     Physical Exam  Constitutional:       General: He is not in acute distress.     Appearance: He is well-developed.   HENT:      Head: Normocephalic and atraumatic.   Eyes:      General: No scleral icterus.  Neck:      Trachea: No tracheal deviation.    Pulmonary:      Effort: Pulmonary effort is normal. No respiratory distress.   Neurological:      Mental Status: He is alert and oriented to person, place, and time.   Psychiatric:         Behavior: Behavior normal.         Thought Content: Thought content normal.         Judgment: Judgment normal.         Lab Review  Lab Results   Component Value Date    COLORU Yellow 03/20/2024    SPECGRAV 1.010 03/20/2024    PHUR 7.0 03/20/2024    NITRITE Negative 03/20/2024    KETONESU Negative 03/20/2024    UROBILINOGEN Negative 03/20/2024         Assessment:        1. Renal cell adenocarcinoma, right    2. Essential hypertension    3. Stage 3b chronic kidney disease            Plan:     Renal cell adenocarcinoma, right  -     Comprehensive Metabolic Panel; Future; Expected date: 08/21/2025  -     MRI Abdomen W WO Contrast; Future; Expected date: 08/21/2025  -     X-Ray Chest PA And Lateral; Future; Expected date: 08/21/2025    Essential hypertension    Stage 3b chronic kidney disease    -s/p ablation  -MRI of the abd from 8/15/24 was independently reviewed today and shared with the patient and shows no evidence of recurrence.     -we discussed the NCCN guidelines regarding status post ablation therapy for small renal mass.  Guidelines suggest imaging at 1, 6, and 12 months post ablation and then annually thereafter.      -f/u in 1 year for MRI abd, CXR, CMP    -serum Cr returned to baseline at 1.9.  no hydronephrosis.     -BP reviewed today and normal  -continue current medications  -encouraged f/u and discussion of BP with PCP.

## 2024-08-25 NOTE — PROGRESS NOTES
Subjective:       Patient ID: Jamil Cadet is a 79 y.o. male.    Chief Complaint: Diabetes, Follow-up, Hypertension, Hyperlipidemia, Renal Cancer, Secondary Hyperparathyroidism, and Chronic Kidney Disease (Dr Manjeet Decker MD)    Mr Herminio Cadet is a 79-year-old gentleman who comes for 4 months follow-up.  He is accompanied with his wife miss Chance who is not a patient today.  Underlying medical issues are as below:-     1.         Essential hypertension -blood pressures were elevated at arrival but subsequently seem to settle down.  2.         Mixed dyslipidemia   3.         Type 2 diabetes mellitus with hypoglycemia without coma, with long-term current use of insulin   4.         History of renal cell carcinoma status post partial nephrectomy .  Follows up with Dr. Shin.  MRI scheduled in late August this year.  5.         Stage 3b chronic kidney disease following up with Dr. Decker at [place].Mamadou Turner NP  6.         Cholelithiasis without cholecystitis   7.-        hyperparathyroidism based upon elevated PTH levels.  He is on calcitriol for treatment for the same.     Previous visit with me 4 months ago we had noticed was unsure    Also noted was somewhat low blood sugar episodes with which he was  generally asymptomatic except when it came down to 30s or 40s.    Currently insulin has been maintained with a Tresiba long-acting and NovoLog short-acting insulin.  Recent A1c 6.3.    Chronic insomnia was noted with stable need for trazodone which gave him a few hours of restful sleep.    In the interim he had also seen Dr. Manjeet Decker nephrology     Creatinine levels had picked up to 2.4 and currently is 1.9 and still in CKD stage IIIB.  He was on sodium bicarb and calcitriol for the same with indapamide 1.25 mg.    Recent visit to see Dr Shin Urology:- (Summary C and P )     PMH of mid/distal urteral stricture s/p robotic ureteroneocystotomy with psoas hitch and lysis of adhesions. 3 mo post op  ultrasound showed resolution of right sided hydronephrosis but an incidental left lower pole solid renal mass was discovered ~2.3 cm. He is here for follow up. He has a history of a right partial nephrectomy from years passed which showed: 5.1cm, FG 2, ccRCC with negative margins.    Patient initially scheduled for surgery but changed his mind and opted for AS.  However, he saw a nephrologist, Dr. Manjeet Decker, in [place] who encouraged him to visit with Dr. Ishmael Kennedy at [facility].  He ultimately decided on an IR ablation which was performed March of 2022 at [facility].  The patient does not want any further follow-up at [facility].   MRI of the abdomen with and without contrast from 08/15/2024 was independently reviewed today and shared with the patient and shows stable findings of complex right renal cyst status post right partial nephrectomy, left exophytic mass smaller in size and no obvious concerning parameters.    No obvious evidence of recurrence or metastasis.   CXR from 8/15/24 was independently reviewed today and reveals no evidence of mets.   Most recent serum Cr was 1.9 on 8/15/24---he had a transient spike on 8/13/24 but his PCP made some med changes and his serum Cr returned to his baseline.       Hypertension  This is a chronic problem. The current episode started more than 1 year ago. The problem is uncontrolled. Pertinent negatives include no chest pain, headaches, neck pain or palpitations. Risk factors for coronary artery disease include male gender, dyslipidemia and diabetes mellitus. Past treatments include angiotensin blockers and diuretics. The current treatment provides moderate improvement. Compliance problems include psychosocial issues (LITTLE BIT EXTRA SALT IN THE FOOD.).  Identifiable causes of hypertension include chronic renal disease.   Hyperlipidemia  This is a chronic (Medication was stopped due to muscle aches.) problem. The current episode started more than 1 year ago. Exacerbating  diseases include chronic renal disease. He has no history of obesity. Associated symptoms include myalgias (Stable myalgias.). Pertinent negatives include no chest pain. He is currently on no antihyperlipidemic treatment (Currently has stop rosuvastatin.). The current treatment provides mild improvement of lipids. Compliance problems include psychosocial issues.  Risk factors for coronary artery disease include male sex, a sedentary lifestyle, dyslipidemia and diabetes mellitus.   Diabetes  He presents for his follow-up diabetic visit. He has type 2 diabetes mellitus. The initial diagnosis of diabetes was made 10 years ago. His disease course has been stable. Hypoglycemia symptoms include nervousness/anxiousness (Cancer related issues). Pertinent negatives for hypoglycemia include no confusion, headaches or seizures. Associated symptoms include fatigue. Pertinent negatives for diabetes include no chest pain, no polydipsia, no polyuria and no weakness. Symptoms are worsening (Slight rise in A1c to 6.8.). Risk factors for coronary artery disease include male sex, stress, diabetes mellitus and dyslipidemia. Current diabetic treatment includes insulin injections. He is compliant with treatment most of the time. His weight is stable. Meal planning includes avoidance of concentrated sweets. He has not had a previous visit with a dietitian. He participates in exercise intermittently. His home blood glucose trend is increasing steadily. His breakfast blood glucose range is generally 110-130 mg/dl. His dinner blood glucose range is generally 140-180 mg/dl. An ACE inhibitor/angiotensin II receptor blocker is being taken. He does not see a podiatrist.Eye exam is current.   Arthritis  He reports no joint swelling. Associated symptoms include fatigue. Pertinent negatives include no diarrhea.       Past Medical History:   Diagnosis Date    Acute renal failure 9/7/2020    Bacteremia due to Enterococcus 10/25/2020    Cancer      Diabetes mellitus, type 2     Disorder of kidney and ureter     Encounter for blood transfusion     History of renal cell carcinoma status post partial nephrectomy 9/7/2020    Hydronephrosis 9/9/2020    Hyperlipidemia     Hypertension     Infection and inflammatory reaction due to nephrostomy catheter, initial encounter 11/14/2020    Infection following a procedure, superficial incisional surgical site, subsequent encounter 12/26/2020    Polio     Pyelonephritis of right kidney     Septicemia due to enterococcus 11/15/2020     Social History     Socioeconomic History    Marital status:    Tobacco Use    Smoking status: Never    Smokeless tobacco: Never   Substance and Sexual Activity    Alcohol use: No    Drug use: No    Sexual activity: Yes     Partners: Female     Social Determinants of Health     Financial Resource Strain: Low Risk  (8/15/2024)    Overall Financial Resource Strain (CARDIA)     Difficulty of Paying Living Expenses: Not hard at all   Food Insecurity: No Food Insecurity (8/15/2024)    Hunger Vital Sign     Worried About Running Out of Food in the Last Year: Never true     Ran Out of Food in the Last Year: Never true   Transportation Needs: No Transportation Needs (1/2/2024)    PRAPARE - Transportation     Lack of Transportation (Medical): No     Lack of Transportation (Non-Medical): No   Physical Activity: Sufficiently Active (8/15/2024)    Exercise Vital Sign     Days of Exercise per Week: 7 days     Minutes of Exercise per Session: 150+ min   Stress: No Stress Concern Present (8/15/2024)    [employer] [place] [employer] - Occupational Stress Questionnaire     Feeling of Stress : Not at all   Housing Stability: Low Risk  (1/2/2024)    Housing Stability Vital Sign     Unable to Pay for Housing in the Last Year: No     Number of Places Lived in the Last Year: 1     Unstable Housing in the Last Year: No     Past Surgical History:   Procedure Laterality Date    ANTEGRADE PYELOGRAM   12/10/2020    Procedure: PYELOGRAM, ANTEGRADE;  Surgeon: Dave Shin MD;  [facility];  Service: Urology;;    APPENDECTOMY      CYSTOSCOPY  12/10/2020    Procedure: CYSTOSCOPY;  Surgeon: Dave Shin MD;  Location: [facility] OR 1ST FLR;  Service: Urology;;    CYSTOSCOPY W/ RETROGRADES Right 09/09/2020    Procedure: CYSTOSCOPY, WITH RETROGRADE PYELOGRAM;  Surgeon: Chris Garcia Jr., MD;  Location: [facility];  Service: Urology;  Laterality: Right;    DILATION OF NEPHROSTOMY TRACT Right 12/10/2020    Procedure: DILATION, NEPHROSTOMY TRACT;  Surgeon: Dave Shin MD;  Location: [facility] OR 1ST FLR;  Service: Urology;  Laterality: Right;    RADIOFREQUENCY ABLATION KIDNEY  2022    REMOVAL OF DRAIN Right 12/10/2020    Procedure: REMOVAL, DRAIN-NEPHROSTOMY TUBE;  Surgeon: Dave Shin MD;  [facility];  Service: Urology;  Laterality: Right;    REPLACEMENT OF NEPHROSTOMY TUBE Right 12/10/2020    Procedure: REPLACEMENT, NEPHROSTOMY TUBE;  Surgeon: Dave Shin MD;  [facility];  Service: Urology;  Laterality: Right;    ROBOT-ASSISTED LAPAROSCOPIC REIMPLANTATION OF URETER USING DA PHU XI Right 01/08/2021    Procedure: XI ROBOTIC IMPLANTATION, URETER, USING PSOAS HITCH TECHNIQUE;  Surgeon: Dave Shin MD;  Location: [facility] OR 2ND FLR;  Service: Urology;  Laterality: Right;  4hrs gen with regional    SPINE SURGERY      URETEROSCOPY Right 12/10/2020    Procedure: URETEROSCOPY-ANTEGRADE;  Surgeon: Dave Shin MD;  [facility];  Service: Urology;  Laterality: Right;    URETHROSCOPY  09/09/2020    Procedure: URETHROSCOPY;  Surgeon: Chris Garcia Jr., MD;  Location: [facility];  Service: Urology;;     Family History   Problem Relation Name Age of Onset    Heart disease Mother      Heart disease Father      Stroke Father      Heart disease Maternal Grandfather      Heart disease Paternal Grandmother      Heart disease Paternal Grandfather    "      Review of Systems   Constitutional:  Positive for fatigue. Negative for activity change, chills and unexpected weight change.   HENT:  Negative for hearing loss, rhinorrhea and trouble swallowing.    Eyes:  Negative for discharge, redness and visual disturbance.   Respiratory:  Negative for chest tightness and wheezing.    Cardiovascular:  Negative for chest pain and palpitations.   Gastrointestinal:  Negative for blood in stool, constipation, diarrhea and vomiting.   Endocrine: Negative for polydipsia and polyuria.   Genitourinary:  Negative for difficulty urinating, hematuria and urgency.   Musculoskeletal:  Positive for arthritis and myalgias (Stable myalgias.). Negative for arthralgias, joint swelling and neck pain.   Neurological:  Negative for seizures, weakness and headaches.   Psychiatric/Behavioral:  Negative for confusion and dysphoric mood. The patient is nervous/anxious (Cancer related issues).          Objective:      Blood pressure 134/65, pulse 60, height 5' 11" (1.803 m), weight 81.2 kg (179 lb). Body mass index is 24.97 kg/m².  Physical Exam  Vitals and nursing note reviewed.   Constitutional:       General: He is not in acute distress.     Appearance: Normal appearance. He is well-developed. He is not ill-appearing, toxic-appearing or diaphoretic.      Comments: BMI is  24.97   HENT:      Head: Normocephalic and atraumatic.   Eyes:      Conjunctiva/sclera: Conjunctivae normal.   Neck:      Thyroid: No thyromegaly.      Vascular: No JVD.      Trachea: No tracheal deviation.   Cardiovascular:      Rate and Rhythm: Normal rate and regular rhythm.      Heart sounds: Normal heart sounds.   Pulmonary:      Effort: Pulmonary effort is normal.      Breath sounds: Normal breath sounds.   Abdominal:      General: There is no distension.      Palpations: Abdomen is soft.      Tenderness: There is no abdominal tenderness.   Musculoskeletal:         General: No signs of injury.      Cervical back: No " rigidity.      Right lower leg: No tenderness. No edema.      Left lower leg: No tenderness. No edema.      Right foot: Deformity: Pes cavus.      Left foot: Deformity:  pes cavus.      Comments:      Lymphadenopathy:      Cervical: No cervical adenopathy.   Skin:     General: Skin is warm and dry.      Findings: No lesion or rash.   Neurological:      Mental Status: He is alert. Mental status is at baseline.   Psychiatric:         Attention and Perception: He is attentive.         Mood and Affect: Affect is not labile.         Behavior: Behavior normal.      Comments: Somewhat anhedonic over his medical issues.  Especially regarding nonresolution kidney cancer. Somewhat anxious when we discussed about newer technologies like Jyoti or Dexcom.           Assessment:       Lab Visit on 08/15/2024   Component Date Value Ref Range Status    Sodium 08/15/2024 139  136 - 145 mmol/L Final    Potassium 08/15/2024 4.8  3.5 - 5.1 mmol/L Final    Chloride 08/15/2024 109  95 - 110 mmol/L Final    CO2 08/15/2024 25  23 - 29 mmol/L Final    Glucose 08/15/2024 118 (H)  70 - 110 mg/dL Final    BUN 08/15/2024 46 (H)  8 - 23 mg/dL Final    Creatinine 08/15/2024 1.9 (H)  0.5 - 1.4 mg/dL Final    Calcium 08/15/2024 9.2  8.7 - 10.5 mg/dL Final    Total Protein 08/15/2024 6.7  6.0 - 8.4 g/dL Final    Albumin 08/15/2024 3.9  3.5 - 5.2 g/dL Final    Total Bilirubin 08/15/2024 0.4  0.1 - 1.0 mg/dL Final    Alkaline Phosphatase 08/15/2024 74  55 - 135 U/L Final    AST 08/15/2024 16  10 - 40 U/L Final    ALT 08/15/2024 11  10 - 44 U/L Final    eGFR 08/15/2024 35.0 (A)  >60 mL/min/1.73 m^2 Final    Anion Gap 08/15/2024 5 (L)  8 - 16 mmol/L Final   Lab Visit on 08/13/2024   Component Date Value Ref Range Status    Sodium 08/13/2024 140  136 - 145 mmol/L Final    Potassium 08/13/2024 4.1  3.5 - 5.1 mmol/L Final    Chloride 08/13/2024 107  95 - 110 mmol/L Final    CO2 08/13/2024 22 (L)  23 - 29 mmol/L Final    Glucose 08/13/2024 51 (LL)  70 - 110  mg/dL Final    BUN 08/13/2024 54 (H)  8 - 23 mg/dL Final    Creatinine 08/13/2024 2.4 (H)  0.5 - 1.4 mg/dL Final    Calcium 08/13/2024 8.9  8.7 - 10.5 mg/dL Final    Total Protein 08/13/2024 7.1  6.0 - 8.4 g/dL Final    Albumin 08/13/2024 4.3  3.5 - 5.2 g/dL Final    Total Bilirubin 08/13/2024 0.8  0.1 - 1.0 mg/dL Final    Alkaline Phosphatase 08/13/2024 72  55 - 135 U/L Final    AST 08/13/2024 18  10 - 40 U/L Final    ALT 08/13/2024 12  10 - 44 U/L Final    eGFR 08/13/2024 26.4 (A)  >60 mL/min/1.73 m^2 Final    Anion Gap 08/13/2024 11  8 - 16 mmol/L Final    Hemoglobin A1C 08/13/2024 6.3 (H)  4.5 - 6.2 % Final    Estimated Avg Glucose 08/13/2024 134 (H)  68 - 131 mg/dL Final     Glucose 70 - 110 mg/dL 118 High  51 Low Panic  CM 55 Low Panic  CM 85 138 High  131 High  77   BUN 8 - 23 mg/dL 46 High  54 High  26 High  48 High  39 High  34 High  39 High    Creatinine 0.5 - 1.4 mg/dL 1.9 High  2.4 High  1.9 High  1.9 High  2.1 High  1.6 High  2.1 High      1. Hypoglycemia associated with diabetes    2. Type 2 diabetes mellitus with hypoglycemia without coma, with long-term current use of insulin    3. Stage 3b chronic kidney disease    4. Secondary hyperparathyroidism of renal origin    5. Renal cell carcinoma, unspecified laterality    6. Mixed dyslipidemia    7. Type 2 diabetes mellitus with other diabetic kidney complication                      Component Ref Range & Units 12 d ago  (8/13/24) 5 mo ago  (3/20/24) 11 mo ago  (9/7/23) 1 yr ago  (6/21/23) 1 yr ago  (3/15/23) 1 yr ago  (1/10/23) 2 yr ago  (7/11/22)   Hemoglobin A1C 4.5 - 6.2 % 6.3 High  6.8 High  CM 6.6 High  CM 6.4 High  CM 6.8 High  CM 6.7 High  CM 6.6 High        Impression:     Previous partial right nephrectomy and ablation at the lower pole of the left kidney with no evidence of local recurrence of neoplasm.     Multiple additional probable cysts and complex cysts as described.  Of note, an enlarging probable hemorrhagic/proteinaceous cyst measures  10 mm in the upper cortex of the left kidney.  Targeted ultrasound is recommended for further assessment.     Apparent postoperative changes within the paraspinal soft tissues at L3-4 with findings likely representing subacute Schmorl's node defects along the inferior endplate of L3 and superior endplate of L4.     Distension of the gallbladder containing gallstones and/or gallbladder sludge.        Electronically signed by:Linda Hill  Date:                                            08/15/2024  Time:                                           10:24           Exam Ended: 08/15/24 09:13 CDT Last Resulted: 08/15/24 10:24 CDT           Plan:   Hypoglycemia associated with diabetes    Type 2 diabetes mellitus with hypoglycemia without coma, with long-term current use of insulin    Stage 3b chronic kidney disease    Secondary hyperparathyroidism of renal origin    Renal cell carcinoma, unspecified laterality    Mixed dyslipidemia    Type 2 diabetes mellitus with other diabetic kidney complication      Last 2 blood works had shown hypoglycemia on 03/20/2024 and also on 08/13/2024.  Given the worsening of his kidney functions, perhaps the insulin   Catabolism was slow which led to hypoglycemic episodes in the morning.  Somewhat bothersome an interesting issue is that patient was unaware of the low sugar excursions.      We did discuss about device like Jyoti or Dexcom which will give him adequate warning.  Patient states that he is not very technology savvy and we miss up with the device.  Will leave this conversations for future now.      Recently had cut down Tresiba to 10 units at my advice and I am going to increase it to 12 units at nighttime because I am seeing some rise in blood sugars.  He only takes 2 units additional before breakfast and not before lunch or supper.  I will not change that though we may have to address it slowly and steadily once his confidence increases.      There is a lot on his plate with  "other medical and social issues.      Evening sugars are going up a little bit.    Morning sugars are in 130s and 110s and 120s.    I will increase the Tresiba to 12 units now and will stay put at that.      Please note that he takes only a breakfast NovoLog.  He does not take NovoLog before lunch or before supper.      His follow up with the Urology also has been noted      Follow up in about 4 months (around 12/27/2024), or if symptoms worsen or fail to improve, for Diabetes/HTN/Lipids.      Current Outpatient Medications:     allopurinoL (ZYLOPRIM) 100 MG tablet, Take 100 mg by mouth., Disp: , Rfl:     amLODIPine (NORVASC) 5 MG tablet, Take 1 tablet (5 mg total) by mouth every evening., Disp: 90 tablet, Rfl: 3    ascorbic acid, vitamin C, (VITAMIN C) 500 MG tablet, Take 500 mg by mouth once daily., Disp: , Rfl:     atorvastatin (LIPITOR) 10 MG tablet, Take 10 mg by mouth., Disp: , Rfl:     BD WINSOME 2ND GEN PEN NEEDLE 32 gauge x 5/32" Ndle, INJECT 1 UNITS INTO THE SKIN 3 (THREE) TIMES DAILY BEFORE MEALS. USE AS DIRECTED NON-MED ITEM, Disp: 300 each, Rfl: 3    calcitRIOL (ROCALTROL) 0.25 MCG Cap, TAKE 1 CAPSULE (0.25 MCG TOTAL) BY MOUTH 3 (THREE) TIMES A WEEK MONDAY/WEDNESDAY/FRIDAY, Disp: , Rfl:     cyanocobalamin (VITAMIN B-12) 2000 MCG tablet, Take 2,000 mcg by mouth once daily., Disp: , Rfl:     indapamide (LOZOL) 1.25 MG Tab, Take 1.25 mg by mouth once daily., Disp: , Rfl:     insulin aspart U-100 (NOVOLOG FLEXPEN U-100 INSULIN) 100 unit/mL (3 mL) InPn pen, Inject 10 Units into the skin 3 (three) times daily with meals. Check glucose before meals and then take insulin as per sliding scale (Patient taking differently: Inject 2 Units into the skin once daily. Check glucose before meals and then take insulin as per sliding scale), Disp: 5 each, Rfl: 3    losartan (COZAAR) 100 MG tablet, Take 1 tablet (100 mg total) by mouth once daily., Disp: 90 tablet, Rfl: 3    sodium bicarbonate 650 MG tablet, Take 650 mg by " mouth once daily., Disp: , Rfl:     traZODone (DESYREL) 100 MG tablet, TAKE 1 TABLET BY MOUTH EVERY EVENING (DOSAGE INCREASE), Disp: 90 tablet, Rfl: 3    TRESIBA FLEXTOUCH U-100 100 unit/mL (3 mL) insulin pen, INJECT 15 UNITS            SUBCUTANEOUSLY EVERY       EVENING (Patient taking differently: Inject 10 Units into the skin every evening.), Disp: 15 mL, Rfl: 3    TRUE METRIX GLUCOSE TEST STRIP Strp, TEST 3 (THREE) TIMES DAILY BEFORE MEALS., Disp: 300 strip, Rfl: 1    zinc gluconate 50 mg tablet, Take 50 mg by mouth once daily., Disp: , Rfl:     blood-glucose meter (TRUE METRIX GLUCOSE METER) Misc, 1 Device by Misc.(Non-Drug; Combo Route) route once daily. (Patient taking differently: 1 Device by Misc.(Non-Drug; Combo Route) route once daily. Non-med item), Disp: 1 each, Rfl: 0    Richard Gunter

## 2024-08-27 ENCOUNTER — PATIENT MESSAGE (OUTPATIENT)
Dept: FAMILY MEDICINE | Facility: CLINIC | Age: 79
End: 2024-08-27

## 2024-08-27 ENCOUNTER — OFFICE VISIT (OUTPATIENT)
Dept: FAMILY MEDICINE | Facility: CLINIC | Age: 79
End: 2024-08-27
Payer: MEDICARE

## 2024-08-27 VITALS
HEART RATE: 60 BPM | HEIGHT: 71 IN | WEIGHT: 179 LBS | BODY MASS INDEX: 25.06 KG/M2 | DIASTOLIC BLOOD PRESSURE: 65 MMHG | SYSTOLIC BLOOD PRESSURE: 134 MMHG

## 2024-08-27 DIAGNOSIS — E78.2 MIXED DYSLIPIDEMIA: ICD-10-CM

## 2024-08-27 DIAGNOSIS — C64.9 RENAL CELL CARCINOMA, UNSPECIFIED LATERALITY: ICD-10-CM

## 2024-08-27 DIAGNOSIS — E11.29 TYPE 2 DIABETES MELLITUS WITH OTHER DIABETIC KIDNEY COMPLICATION: ICD-10-CM

## 2024-08-27 DIAGNOSIS — N18.32 STAGE 3B CHRONIC KIDNEY DISEASE: ICD-10-CM

## 2024-08-27 DIAGNOSIS — N25.81 SECONDARY HYPERPARATHYROIDISM OF RENAL ORIGIN: ICD-10-CM

## 2024-08-27 DIAGNOSIS — Z79.4 TYPE 2 DIABETES MELLITUS WITH HYPOGLYCEMIA WITHOUT COMA, WITH LONG-TERM CURRENT USE OF INSULIN: ICD-10-CM

## 2024-08-27 DIAGNOSIS — E11.649 TYPE 2 DIABETES MELLITUS WITH HYPOGLYCEMIA WITHOUT COMA, WITH LONG-TERM CURRENT USE OF INSULIN: ICD-10-CM

## 2024-08-27 DIAGNOSIS — E11.649 HYPOGLYCEMIA ASSOCIATED WITH DIABETES: Primary | ICD-10-CM

## 2024-08-27 PROCEDURE — 99213 OFFICE O/P EST LOW 20 MIN: CPT | Mod: S$PBB,AQ,, | Performed by: INTERNAL MEDICINE

## 2024-08-27 PROCEDURE — 99213 OFFICE O/P EST LOW 20 MIN: CPT | Mod: PBBFAC,PN | Performed by: INTERNAL MEDICINE

## 2024-08-27 PROCEDURE — 99999 PR PBB SHADOW E&M-EST. PATIENT-LVL III: CPT | Mod: PBBFAC,,, | Performed by: INTERNAL MEDICINE

## 2024-09-13 DIAGNOSIS — I10 ESSENTIAL HYPERTENSION: ICD-10-CM

## 2024-09-13 RX ORDER — LOSARTAN POTASSIUM 100 MG/1
100 TABLET ORAL
Qty: 90 TABLET | Refills: 3 | Status: SHIPPED | OUTPATIENT
Start: 2024-09-13

## 2024-09-18 ENCOUNTER — LAB VISIT (OUTPATIENT)
Dept: LAB | Facility: HOSPITAL | Age: 79
End: 2024-09-18
Attending: INTERNAL MEDICINE
Payer: MEDICARE

## 2024-09-18 DIAGNOSIS — N18.9 ANEMIA OF CHRONIC RENAL FAILURE: ICD-10-CM

## 2024-09-18 DIAGNOSIS — R80.9 PROTEINURIA: ICD-10-CM

## 2024-09-18 DIAGNOSIS — I10 ESSENTIAL HYPERTENSION, MALIGNANT: ICD-10-CM

## 2024-09-18 DIAGNOSIS — E79.0 URICACIDEMIA: ICD-10-CM

## 2024-09-18 DIAGNOSIS — Z79.4 TYPE 2 DIABETES MELLITUS WITH HYPOGLYCEMIA WITHOUT COMA, WITH LONG-TERM CURRENT USE OF INSULIN: ICD-10-CM

## 2024-09-18 DIAGNOSIS — E78.2 MIXED DYSLIPIDEMIA: ICD-10-CM

## 2024-09-18 DIAGNOSIS — E78.5 HYPERLIPEMIA: ICD-10-CM

## 2024-09-18 DIAGNOSIS — Z90.5 ACQUIRED ABSENCE OF KIDNEY: ICD-10-CM

## 2024-09-18 DIAGNOSIS — D63.1 ANEMIA OF CHRONIC RENAL FAILURE: ICD-10-CM

## 2024-09-18 DIAGNOSIS — E11.29 TYPE II DIABETES MELLITUS WITH RENAL MANIFESTATIONS: ICD-10-CM

## 2024-09-18 DIAGNOSIS — E11.649 TYPE 2 DIABETES MELLITUS WITH HYPOGLYCEMIA WITHOUT COMA, WITH LONG-TERM CURRENT USE OF INSULIN: ICD-10-CM

## 2024-09-18 DIAGNOSIS — N18.32 CHRONIC KIDNEY DISEASE (CKD) STAGE G3B/A1, MODERATELY DECREASED GLOMERULAR FILTRATION RATE (GFR) BETWEEN 30-44 ML/MIN/1.73 SQUARE METER AND ALBUMINURIA CREATININE RATIO LESS THAN 30 MG/G: Primary | ICD-10-CM

## 2024-09-18 DIAGNOSIS — N18.32 CHRONIC KIDNEY DISEASE (CKD) STAGE G3B/A1, MODERATELY DECREASED GLOMERULAR FILTRATION RATE (GFR) BETWEEN 30-44 ML/MIN/1.73 SQUARE METER AND ALBUMINURIA CREATININE RATIO LESS THAN 30 MG/G: ICD-10-CM

## 2024-09-18 LAB
25(OH)D3+25(OH)D2 SERPL-MCNC: 51 NG/ML (ref 30–96)
ALBUMIN SERPL BCP-MCNC: 4 G/DL (ref 3.5–5.2)
ALBUMIN/CREAT UR: NORMAL UG/MG (ref 0–30)
ANION GAP SERPL CALC-SCNC: 9 MMOL/L (ref 8–16)
BASOPHILS # BLD AUTO: 0.02 K/UL (ref 0–0.2)
BASOPHILS NFR BLD: 0.3 % (ref 0–1.9)
BILIRUB UR QL STRIP: NEGATIVE
BUN SERPL-MCNC: 40 MG/DL (ref 8–23)
CALCIUM SERPL-MCNC: 9.1 MG/DL (ref 8.7–10.5)
CHLORIDE SERPL-SCNC: 107 MMOL/L (ref 95–110)
CHOLEST SERPL-MCNC: 134 MG/DL (ref 120–199)
CHOLEST/HDLC SERPL: 3.9 {RATIO} (ref 2–5)
CLARITY UR: CLEAR
CO2 SERPL-SCNC: 23 MMOL/L (ref 23–29)
COLOR UR: YELLOW
CREAT SERPL-MCNC: 2 MG/DL (ref 0.5–1.4)
CREAT UR-MCNC: 77.7 MG/DL (ref 23–375)
CREAT UR-MCNC: 77.7 MG/DL (ref 23–375)
DIFFERENTIAL METHOD BLD: ABNORMAL
EOSINOPHIL # BLD AUTO: 0.2 K/UL (ref 0–0.5)
EOSINOPHIL NFR BLD: 3.2 % (ref 0–8)
ERYTHROCYTE [DISTWIDTH] IN BLOOD BY AUTOMATED COUNT: 12.8 % (ref 11.5–14.5)
EST. GFR  (NO RACE VARIABLE): 32.9 ML/MIN/1.73 M^2
GLUCOSE SERPL-MCNC: 129 MG/DL (ref 70–110)
GLUCOSE UR QL STRIP: NEGATIVE
HCT VFR BLD AUTO: 35.1 % (ref 40–54)
HDLC SERPL-MCNC: 34 MG/DL (ref 40–75)
HDLC SERPL: 25.4 % (ref 20–50)
HGB BLD-MCNC: 11.7 G/DL (ref 14–18)
HGB UR QL STRIP: NEGATIVE
IMM GRANULOCYTES # BLD AUTO: 0.02 K/UL (ref 0–0.04)
IMM GRANULOCYTES NFR BLD AUTO: 0.3 % (ref 0–0.5)
KETONES UR QL STRIP: NEGATIVE
LDLC SERPL CALC-MCNC: 84.2 MG/DL (ref 63–159)
LEUKOCYTE ESTERASE UR QL STRIP: NEGATIVE
LYMPHOCYTES # BLD AUTO: 1 K/UL (ref 1–4.8)
LYMPHOCYTES NFR BLD: 14.8 % (ref 18–48)
MAGNESIUM SERPL-MCNC: 1.8 MG/DL (ref 1.6–2.6)
MCH RBC QN AUTO: 32.4 PG (ref 27–31)
MCHC RBC AUTO-ENTMCNC: 33.3 G/DL (ref 32–36)
MCV RBC AUTO: 97 FL (ref 82–98)
MICROALBUMIN UR DL<=1MG/L-MCNC: <7 UG/ML
MONOCYTES # BLD AUTO: 0.6 K/UL (ref 0.3–1)
MONOCYTES NFR BLD: 9.3 % (ref 4–15)
NEUTROPHILS # BLD AUTO: 4.7 K/UL (ref 1.8–7.7)
NEUTROPHILS NFR BLD: 72.1 % (ref 38–73)
NITRITE UR QL STRIP: NEGATIVE
NONHDLC SERPL-MCNC: 100 MG/DL
NRBC BLD-RTO: 0 /100 WBC
PH UR STRIP: 6 [PH] (ref 5–8)
PHOSPHATE SERPL-MCNC: 3.7 MG/DL (ref 2.7–4.5)
PLATELET # BLD AUTO: 249 K/UL (ref 150–450)
PMV BLD AUTO: 10.6 FL (ref 9.2–12.9)
POTASSIUM SERPL-SCNC: 4.4 MMOL/L (ref 3.5–5.1)
PROT UR QL STRIP: NEGATIVE
PROT UR-MCNC: 10 MG/DL (ref 6–15)
PROT/CREAT UR: 0.13 MG/G{CREAT} (ref 0–0.2)
PTH-INTACT SERPL-MCNC: 67.7 PG/ML (ref 9–77)
RBC # BLD AUTO: 3.61 M/UL (ref 4.6–6.2)
SODIUM SERPL-SCNC: 139 MMOL/L (ref 136–145)
SP GR UR STRIP: 1.01 (ref 1–1.03)
TRIGL SERPL-MCNC: 79 MG/DL (ref 30–150)
URATE SERPL-MCNC: 7.6 MG/DL (ref 3.4–7)
URN SPEC COLLECT METH UR: NORMAL
UROBILINOGEN UR STRIP-ACNC: NEGATIVE EU/DL
WBC # BLD AUTO: 6.47 K/UL (ref 3.9–12.7)

## 2024-09-18 PROCEDURE — 82043 UR ALBUMIN QUANTITATIVE: CPT | Performed by: INTERNAL MEDICINE

## 2024-09-18 PROCEDURE — 82306 VITAMIN D 25 HYDROXY: CPT | Performed by: INTERNAL MEDICINE

## 2024-09-18 PROCEDURE — 80069 RENAL FUNCTION PANEL: CPT | Performed by: INTERNAL MEDICINE

## 2024-09-18 PROCEDURE — 80061 LIPID PANEL: CPT | Performed by: INTERNAL MEDICINE

## 2024-09-18 PROCEDURE — 84156 ASSAY OF PROTEIN URINE: CPT | Performed by: INTERNAL MEDICINE

## 2024-09-18 PROCEDURE — 36415 COLL VENOUS BLD VENIPUNCTURE: CPT | Performed by: INTERNAL MEDICINE

## 2024-09-18 PROCEDURE — 81003 URINALYSIS AUTO W/O SCOPE: CPT | Performed by: INTERNAL MEDICINE

## 2024-09-18 PROCEDURE — 84550 ASSAY OF BLOOD/URIC ACID: CPT | Performed by: INTERNAL MEDICINE

## 2024-09-18 PROCEDURE — 85025 COMPLETE CBC W/AUTO DIFF WBC: CPT | Performed by: INTERNAL MEDICINE

## 2024-09-18 PROCEDURE — 83735 ASSAY OF MAGNESIUM: CPT | Performed by: INTERNAL MEDICINE

## 2024-09-18 PROCEDURE — 82570 ASSAY OF URINE CREATININE: CPT | Performed by: INTERNAL MEDICINE

## 2024-09-18 PROCEDURE — 83970 ASSAY OF PARATHORMONE: CPT | Performed by: INTERNAL MEDICINE

## 2024-09-23 DIAGNOSIS — Z79.4 TYPE 2 DIABETES MELLITUS WITH HYPOGLYCEMIA WITHOUT COMA, WITH LONG-TERM CURRENT USE OF INSULIN: Chronic | ICD-10-CM

## 2024-09-23 DIAGNOSIS — E11.649 TYPE 2 DIABETES MELLITUS WITH HYPOGLYCEMIA WITHOUT COMA, WITH LONG-TERM CURRENT USE OF INSULIN: Chronic | ICD-10-CM

## 2024-09-23 RX ORDER — INSULIN DEGLUDEC 100 U/ML
15 INJECTION, SOLUTION SUBCUTANEOUS NIGHTLY
Qty: 15 ML | Refills: 3 | Status: SHIPPED | OUTPATIENT
Start: 2024-09-23

## 2024-09-24 DIAGNOSIS — E11.29 TYPE 2 DIABETES MELLITUS WITH OTHER DIABETIC KIDNEY COMPLICATION: ICD-10-CM

## 2024-09-24 DIAGNOSIS — I10 HTN (HYPERTENSION): ICD-10-CM

## 2024-09-24 DIAGNOSIS — Z90.5 HX OF PARTIAL NEPHRECTOMY: ICD-10-CM

## 2024-09-24 DIAGNOSIS — N18.32 STAGE 3B CHRONIC KIDNEY DISEASE: Primary | ICD-10-CM

## 2024-12-26 DIAGNOSIS — Z90.5 ACQUIRED ABSENCE OF KIDNEY: ICD-10-CM

## 2024-12-26 DIAGNOSIS — N18.32 CHRONIC KIDNEY DISEASE (CKD) STAGE G3B/A1, MODERATELY DECREASED GLOMERULAR FILTRATION RATE (GFR) BETWEEN 30-44 ML/MIN/1.73 SQUARE METER AND ALBUMINURIA CREATININE RATIO LESS THAN 30 MG/G: Primary | ICD-10-CM

## 2024-12-26 DIAGNOSIS — E11.29 TYPE II DIABETES MELLITUS WITH RENAL MANIFESTATIONS: ICD-10-CM

## 2024-12-26 DIAGNOSIS — I10 ESSENTIAL HYPERTENSION, MALIGNANT: ICD-10-CM

## 2025-01-02 ENCOUNTER — PATIENT MESSAGE (OUTPATIENT)
Dept: FAMILY MEDICINE | Facility: CLINIC | Age: 80
End: 2025-01-02

## 2025-01-02 ENCOUNTER — HOSPITAL ENCOUNTER (OUTPATIENT)
Dept: RADIOLOGY | Facility: HOSPITAL | Age: 80
Discharge: HOME | End: 2025-01-02
Attending: INTERNAL MEDICINE
Payer: MEDICARE

## 2025-01-02 ENCOUNTER — OFFICE VISIT (OUTPATIENT)
Dept: FAMILY MEDICINE | Facility: CLINIC | Age: 80
End: 2025-01-02
Payer: MEDICARE

## 2025-01-02 VITALS
RESPIRATION RATE: 17 BRPM | SYSTOLIC BLOOD PRESSURE: 130 MMHG | BODY MASS INDEX: 24.57 KG/M2 | WEIGHT: 175.5 LBS | OXYGEN SATURATION: 95 % | HEART RATE: 75 BPM | DIASTOLIC BLOOD PRESSURE: 69 MMHG | HEIGHT: 71 IN

## 2025-01-02 DIAGNOSIS — M25.562 CHRONIC PAIN OF LEFT KNEE: ICD-10-CM

## 2025-01-02 DIAGNOSIS — I10 ESSENTIAL HYPERTENSION: ICD-10-CM

## 2025-01-02 DIAGNOSIS — E11.649 HYPOGLYCEMIA ASSOCIATED WITH DIABETES: ICD-10-CM

## 2025-01-02 DIAGNOSIS — N25.81 SECONDARY HYPERPARATHYROIDISM OF RENAL ORIGIN: ICD-10-CM

## 2025-01-02 DIAGNOSIS — Z86.12 HISTORY OF POLIOMYELITIS: ICD-10-CM

## 2025-01-02 DIAGNOSIS — C64.9 RENAL CELL CARCINOMA, UNSPECIFIED LATERALITY: ICD-10-CM

## 2025-01-02 DIAGNOSIS — F51.04 PSYCHOPHYSIOLOGICAL INSOMNIA: ICD-10-CM

## 2025-01-02 DIAGNOSIS — N18.32 TYPE 2 DIABETES MELLITUS WITH STAGE 3B CHRONIC KIDNEY DISEASE, WITH LONG-TERM CURRENT USE OF INSULIN: Primary | Chronic | ICD-10-CM

## 2025-01-02 DIAGNOSIS — J31.0 RHINITIS, CHRONIC: ICD-10-CM

## 2025-01-02 DIAGNOSIS — R04.0 EPISTAXIS: ICD-10-CM

## 2025-01-02 DIAGNOSIS — G89.29 CHRONIC PAIN OF LEFT KNEE: ICD-10-CM

## 2025-01-02 DIAGNOSIS — Z79.4 TYPE 2 DIABETES MELLITUS WITH STAGE 3B CHRONIC KIDNEY DISEASE, WITH LONG-TERM CURRENT USE OF INSULIN: Primary | Chronic | ICD-10-CM

## 2025-01-02 DIAGNOSIS — E11.22 TYPE 2 DIABETES MELLITUS WITH STAGE 3B CHRONIC KIDNEY DISEASE, WITH LONG-TERM CURRENT USE OF INSULIN: Primary | Chronic | ICD-10-CM

## 2025-01-02 DIAGNOSIS — I70.0 AORTIC ATHEROSCLEROSIS: ICD-10-CM

## 2025-01-02 DIAGNOSIS — E78.2 MIXED DYSLIPIDEMIA: ICD-10-CM

## 2025-01-02 PROCEDURE — 73560 X-RAY EXAM OF KNEE 1 OR 2: CPT | Mod: 26,LT,, | Performed by: RADIOLOGY

## 2025-01-02 PROCEDURE — 99214 OFFICE O/P EST MOD 30 MIN: CPT | Mod: S$PBB,,, | Performed by: INTERNAL MEDICINE

## 2025-01-02 PROCEDURE — 99215 OFFICE O/P EST HI 40 MIN: CPT | Mod: PBBFAC,PN | Performed by: INTERNAL MEDICINE

## 2025-01-02 PROCEDURE — 99999 PR PBB SHADOW E&M-EST. PATIENT-LVL V: CPT | Mod: PBBFAC,,, | Performed by: INTERNAL MEDICINE

## 2025-01-02 PROCEDURE — 73560 X-RAY EXAM OF KNEE 1 OR 2: CPT | Mod: TC,PO,LT

## 2025-01-02 RX ORDER — FLUTICASONE PROPIONATE 50 MCG
1 SPRAY, SUSPENSION (ML) NASAL DAILY
Qty: 11.1 ML | Refills: 3 | Status: SHIPPED | OUTPATIENT
Start: 2025-01-02

## 2025-01-02 NOTE — PROGRESS NOTES
Subjective:       Patient ID: Jamil Cadet is a 79 y.o. male.    Chief Complaint: Diabetes (Follow up), Knee Pain (Symptoms 3 weeks ago, denies injury states the pain started in his back and has traveled down the left leg into the knee. States more pain with mobility and weight bearing), Hypertension, Hyperlipidemia, Chronic Kidney Disease, and Epistaxis    History of Present Illness    CHIEF COMPLAINT:  Jamil, an 79-year-old gentleman, presents for a follow-up visit to discuss recent lab results and reports new symptoms of epistaxis and left knee pain.    HPI:  Jamil reports epistaxis for the past 2-3 months, occurring suddenly approximately once every 2 weeks, coinciding with eating. He describes it as unusual and socially uncomfortable, especially in public dining settings.    Jamil also complains of left knee pain that began about 3-4 weeks ago. Initially, he had mild pain in his left side back lasting for a few minutes. Approximately 3 days before [place], he had severe back pain. The day after [place], the back pain resolved and shifted to half of his left knee. He reports no pain when sitting or lying down, but discomfort when standing up. The pain is localized to the lower part of the knee. Jamil has been applying ice to the affected area. He denies any specific trauma but recalls retrieving [employer] decorations from the attic around the time the symptoms began.    Jamil has a history of poliomyelitis, which affected his ability to walk for 2 years when he was about 1 years old. He notes that over the years, his legs have atrophied.    Jamil denies chest pain, cough, mucus, swelling in the knee joint, crepitus in the knee, and current back pain.    MEDICATIONS:  Jamil is on Amlodipine 5 mg and Indapamide 1.25 mg for hypertension. He is also taking Atorvastatin 10 mg for hyperlipidemia. For Diabetes mellitus type 2, he is on Novolog FlexPen and Tresiba insulin (long-acting). He is on  Allopurinol 100 mg daily for hyperuricemia.    MEDICAL HISTORY:  Jamil has a history of hypertension, hyperlipidemia, Diabetes mellitus type 2, chronic kidney disease, hyperuricemia, and renal cancer. He also has secondary hyperparathyroidism. At age 1, he experienced poliomyelitis, which resulted in an inability to walk for 2 years. Jamil received a flu vaccine from [facility] pharmacy. He has received 2 doses of the COVID-19 vaccine in the past. A pneumonia vaccine was administered on 7/13/22.    SURGICAL HISTORY:  Jamil underwent a partial nephrectomy where 20% of his right kidney was removed due to renal cancer.    TEST RESULTS:  Jamil's creatinine levels have fluctuated over time, with the most recent reading in September at 2.0. Prior readings were 1.9, 2.4, and 1.9. His kidney flow rate is 32. Uric acid on 9-18-24 was 7.6. Intact PTH was 67.7, while PTH on 6- was elevated at 127, indicating secondary hyperparathyroidism. Vitamin D on 9-18-24 was normal at 51. A1c on 8/13/24 was 6.3. Recent blood sugar readings ranged from 100 (lowest on 12/28) to 203 (highest on 12/25), with readings of 128 (morning) and 146 (evening) on 12/31. Blood counts in September showed myelinemia, but platelets were okay.    IMAGING:  An MRI is scheduled for the 10th to examine 2 cysts on the patient's left kidney.      ROS:  Cardiovascular: -chest pain  Respiratory: -cough  Musculoskeletal: +joint pain, +back pain         Past Medical History:   Diagnosis Date    Acute renal failure 9/7/2020    Bacteremia due to Enterococcus 10/25/2020    Cancer     Diabetes mellitus, type 2     Disorder of kidney and ureter     Encounter for blood transfusion     History of renal cell carcinoma status post partial nephrectomy 9/7/2020    Hydronephrosis 9/9/2020    Hyperlipidemia     Hypertension     Infection and inflammatory reaction due to nephrostomy catheter, initial encounter 11/14/2020    Infection following a procedure, superficial  incisional surgical site, subsequent encounter 12/26/2020    Polio     Pyelonephritis of right kidney     Septicemia due to enterococcus 11/15/2020     Social History     Socioeconomic History    Marital status:    Tobacco Use    Smoking status: Never    Smokeless tobacco: Never   Substance and Sexual Activity    Alcohol use: No    Drug use: No    Sexual activity: Yes     Partners: Female     Social Drivers of Health     Financial Resource Strain: Low Risk  (8/15/2024)    Overall Financial Resource Strain (CARDIA)     Difficulty of Paying Living Expenses: Not hard at all   Food Insecurity: No Food Insecurity (8/15/2024)    Hunger Vital Sign     Worried About Running Out of Food in the Last Year: Never true     Ran Out of Food in the Last Year: Never true   Transportation Needs: No Transportation Needs (1/2/2024)    PRAPARE - Transportation     Lack of Transportation (Medical): No     Lack of Transportation (Non-Medical): No   Physical Activity: Sufficiently Active (8/15/2024)    Exercise Vital Sign     Days of Exercise per Week: 7 days     Minutes of Exercise per Session: 150+ min   Stress: No Stress Concern Present (8/15/2024)    [employer] [place] [employer] - Occupational Stress Questionnaire     Feeling of Stress : Not at all   Housing Stability: Low Risk  (1/2/2024)    Housing Stability Vital Sign     Unable to Pay for Housing in the Last Year: No     Number of Places Lived in the Last Year: 1     Unstable Housing in the Last Year: No     Past Surgical History:   Procedure Laterality Date    ANTEGRADE PYELOGRAM  12/10/2020    Procedure: PYELOGRAM, ANTEGRADE;  Surgeon: Dave Shin MD;  Location: [address];  Service: Urology;;    APPENDECTOMY      CYSTOSCOPY  12/10/2020    Procedure: CYSTOSCOPY;  Surgeon: Dave Shin MD;  Location: [address];  Service: Urology;;    CYSTOSCOPY W/ RETROGRADES Right 09/09/2020    Procedure: CYSTOSCOPY, WITH RETROGRADE PYELOGRAM;  Surgeon:  "Chris Garcia Jr., MD;  Location: [facility];  Service: Urology;  Laterality: Right;    DILATION OF NEPHROSTOMY TRACT Right 12/10/2020    Procedure: DILATION, NEPHROSTOMY TRACT;  Surgeon: Dave Shin MD;  Location: [facility] OR 1ST FLR;  Service: Urology;  Laterality: Right;    RADIOFREQUENCY ABLATION KIDNEY  2022    REMOVAL OF DRAIN Right 12/10/2020    Procedure: REMOVAL, DRAIN-NEPHROSTOMY TUBE;  Surgeon: Dave Shin MD;  Location: [facility] OR 1ST FLR;  Service: Urology;  Laterality: Right;    REPLACEMENT OF NEPHROSTOMY TUBE Right 12/10/2020    Procedure: REPLACEMENT, NEPHROSTOMY TUBE;  Surgeon: Dave Shin MD;  Location: [facility] OR 1ST FLR;  Service: Urology;  Laterality: Right;    ROBOT-ASSISTED LAPAROSCOPIC REIMPLANTATION OF URETER USING DA PHU XI Right 01/08/2021    Procedure: XI ROBOTIC IMPLANTATION, URETER, USING PSOAS HITCH TECHNIQUE;  Surgeon: Dave Shin MD;  Location: [facility] OR 2ND FLR;  Service: Urology;  Laterality: Right;  4hrs gen with regional    SPINE SURGERY      URETEROSCOPY Right 12/10/2020    Procedure: URETEROSCOPY-ANTEGRADE;  Surgeon: Dave Shin MD;  Location: [facility] OR 1ST FLR;  Service: Urology;  Laterality: Right;    URETHROSCOPY  09/09/2020    Procedure: URETHROSCOPY;  Surgeon: Chris Garcia Jr., MD;  Location: [facility];  Service: Urology;;     Family History   Problem Relation Name Age of Onset    Heart disease Mother      Heart disease Father      Stroke Father      Heart disease Maternal Grandfather      Heart disease Paternal Grandmother      Heart disease Paternal Grandfather         Objective:      Physical Exam  Blood pressure 130/69, pulse 75, resp. rate 17, height 5' 11" (1.803 m), weight 79.6 kg (175 lb 8 oz), SpO2 95%. Body mass index is 24.48 kg/m².  Physical Exam    General: No acute distress. Well-developed. Well-nourished.  Eyes: EOMI. Sclerae anicteric.  HENT: Normocephalic. Atraumatic. Nares patent. Moist oral mucosa.  Ears: Bilateral TMs clear. " Bilateral EACs clear.  Cardiovascular: Regular rate. Regular rhythm. No murmurs. No rubs. No gallops. Normal S1, S2.  Respiratory: Normal respiratory effort. Clear to auscultation bilaterally. No rales. No rhonchi. No wheezing.  Abdomen: Soft. Non-tender. Non-distended. Normoactive bowel sounds.  Musculoskeletal: No  obvious deformity. Limping while walking.  Extremities: No lower extremity edema. Edema in legs.  Neurological: Alert & oriented x3. No slurred speech. Normal gait.  Psychiatric: Normal mood. Normal affect. Good insight. Good judgment.  Skin: Warm. Dry. No rash.  MSK: Knee - Left: Full active knee ROM (Left). Full passive knee ROM (Left).              Assessment:       No visits with results within 3 Month(s) from this visit.   Latest known visit with results is:   Lab Visit on 09/18/2024   Component Date Value Ref Range Status    Cholesterol 09/18/2024 134  120 - 199 mg/dL Final    Triglycerides 09/18/2024 79  30 - 150 mg/dL Final    HDL 09/18/2024 34 (L)  40 - 75 mg/dL Final    LDL Cholesterol 09/18/2024 84.2  63.0 - 159.0 mg/dL Final    HDL/Cholesterol Ratio 09/18/2024 25.4  20.0 - 50.0 % Final    Total Cholesterol/HDL Ratio 09/18/2024 3.9  2.0 - 5.0 Final    Non-HDL Cholesterol 09/18/2024 100  mg/dL Final    WBC 09/18/2024 6.47  3.90 - 12.70 K/uL Final    RBC 09/18/2024 3.61 (L)  4.60 - 6.20 M/uL Final    Hemoglobin 09/18/2024 11.7 (L)  14.0 - 18.0 g/dL Final    Hematocrit 09/18/2024 35.1 (L)  40.0 - 54.0 % Final    MCV 09/18/2024 97  82 - 98 fL Final    MCH 09/18/2024 32.4 (H)  27.0 - 31.0 pg Final    MCHC 09/18/2024 33.3  32.0 - 36.0 g/dL Final    RDW 09/18/2024 12.8  11.5 - 14.5 % Final    Platelets 09/18/2024 249  150 - 450 K/uL Final    MPV 09/18/2024 10.6  9.2 - 12.9 fL Final    Immature Granulocytes 09/18/2024 0.3  0.0 - 0.5 % Final    Gran # (ANC) 09/18/2024 4.7  1.8 - 7.7 K/uL Final    Immature Grans (Abs) 09/18/2024 0.02  0.00 - 0.04 K/uL Final    Lymph # 09/18/2024 1.0  1.0 - 4.8 K/uL  Final    Mono # 09/18/2024 0.6  0.3 - 1.0 K/uL Final    Eos # 09/18/2024 0.2  0.0 - 0.5 K/uL Final    Baso # 09/18/2024 0.02  0.00 - 0.20 K/uL Final    nRBC 09/18/2024 0  0 /100 WBC Final    Gran % 09/18/2024 72.1  38.0 - 73.0 % Final    Lymph % 09/18/2024 14.8 (L)  18.0 - 48.0 % Final    Mono % 09/18/2024 9.3  4.0 - 15.0 % Final    Eosinophil % 09/18/2024 3.2  0.0 - 8.0 % Final    Basophil % 09/18/2024 0.3  0.0 - 1.9 % Final    Differential Method 09/18/2024 Automated   Final    Glucose 09/18/2024 129 (H)  70 - 110 mg/dL Final    Sodium 09/18/2024 139  136 - 145 mmol/L Final    Potassium 09/18/2024 4.4  3.5 - 5.1 mmol/L Final    Chloride 09/18/2024 107  95 - 110 mmol/L Final    CO2 09/18/2024 23  23 - 29 mmol/L Final    BUN 09/18/2024 40 (H)  8 - 23 mg/dL Final    Calcium 09/18/2024 9.1  8.7 - 10.5 mg/dL Final    Creatinine 09/18/2024 2.0 (H)  0.5 - 1.4 mg/dL Final    Albumin 09/18/2024 4.0  3.5 - 5.2 g/dL Final    Phosphorus 09/18/2024 3.7  2.7 - 4.5 mg/dL Final    eGFR 09/18/2024 32.9 (A)  >60 mL/min/1.73 m^2 Final    Anion Gap 09/18/2024 9  8 - 16 mmol/L Final    Specimen UA 09/18/2024 Urine, Clean Catch   Final    Color, UA 09/18/2024 Yellow  Yellow, Straw, [place] Final    Appearance, UA 09/18/2024 Clear  Clear Final    pH, UA 09/18/2024 6.0  5.0 - 8.0 Final    Specific Gravity, UA 09/18/2024 1.010  1.005 - 1.030 Final    Protein, UA 09/18/2024 Negative  Negative Final    Glucose, UA 09/18/2024 Negative  Negative Final    Ketones, UA 09/18/2024 Negative  Negative Final    Bilirubin (UA) 09/18/2024 Negative  Negative Final    Occult Blood UA 09/18/2024 Negative  Negative Final    Nitrite, UA 09/18/2024 Negative  Negative Final    Urobilinogen, UA 09/18/2024 Negative  Negative EU/dL Final    Leukocytes, UA 09/18/2024 Negative  Negative Final    Microalbumin, Urine 09/18/2024 <7.0  <19.9 ug/mL Final    Creatinine, Urine 09/18/2024 77.7  23.0 - 375.0 mg/dL Final    Microalb/Creat Ratio 09/18/2024 Unable to calculate   0.0 - 30.0 ug/mg Final    Protein, Urine Random 09/18/2024 10  6 - 15 mg/dL Final    Creatinine, Urine 09/18/2024 77.7  23.0 - 375.0 mg/dL Final    Prot/Creat Ratio, Urine 09/18/2024 0.13  0.00 - 0.20 Final    PTH, Intact 09/18/2024 67.7  9.0 - 77.0 pg/mL Final    Vit D, 25-Hydroxy 09/18/2024 51  30 - 96 ng/mL Final    Magnesium 09/18/2024 1.8  1.6 - 2.6 mg/dL Final    Uric Acid 09/18/2024 7.6 (H)  3.4 - 7.0 mg/dL Final     Component Ref Range & Units 4 mo ago  (8/13/24) 9 mo ago  (3/20/24) 1 yr ago  (9/7/23) 1 yr ago  (6/21/23) 1 yr ago  (3/15/23) 1 yr ago  (1/10/23) 2 yr ago  (7/11/22)   Hemoglobin A1C 4.5 - 6.2 % 6.3 High  6.8 High  CM 6.6 High  CM 6.4 High  CM 6.8 High  CM 6.7 High  CM 6.6 High      Component Ref Range & Units 3 mo ago  (9/18/24) 9 mo ago  (3/20/24) 1 yr ago  (12/5/23) 1 yr ago  (9/7/23) 1 yr ago  (6/21/23) 1 yr ago  (3/15/23) 2 yr ago  (12/12/22)   Uric Acid 3.4 - 7.0 mg/dL 7.6 High  7.6 High  5.7 6.5 7.8 High  6.4 8.0 High      Assessment & Plan    IMPRESSION:  - Assessed chronic kidney disease: stable creatinine (2.0) and GFR (32)  - Evaluated diabetes control: blood sugars generally between 100-160s, with occasional elevations (203 on 12/25)  - Continued allopurinol 100mg daily for uric acid management  - Addressed recent onset epistaxis: likely due to dry nasal passages, exacerbated by eating  - Evaluated left knee pain: complete range of motion, possible relation to history of poliomyelitis  - Considered potential muscle atrophy related to aging and chronic conditions    E11.65 TYPE 2 DIABETES MELLITUS WITH HYPERGLYCEMIA:  - Added A1c test to the patient's lab orders.  - Monitor the patient's blood sugar regularly.  - Recent readings show morning blood sugar of 128 and evening blood sugar of 146 on 12/31.  - The lowest blood sugar was 100 on 12/28 and the highest was 203 on 12/25 following [place] treats.  - Blood sugars generally average between 100-160.  - Last A1c level was 6.3 on  8/13/24, indicating good glycemic control.  - Continue current treatment with Novolog FlexPen and long-acting Tresiba insulin.  - On 12/31, the patient took 4 units of Novolog in the morning and 12 units in the evening.    I10 ESSENTIAL (PRIMARY) HYPERTENSION:  - Continue current treatment with amlodipine 5 mg and indapamide 1.25 mg for hypertension management.    N18.32 CHRONIC KIDNEY DISEASE, STAGE 3B:  - Monitor creatinine levels, with recent values of 2.0, 1.9, 2.4, and 1.9.  - Current kidney flow rate is 32.  - Perform scheduled kidney test on January 10th as ordered.    E78.5 HYPERLIPIDEMIA, UNSPECIFIED:  - Continue current treatment with atorvastatin 10 mg for hyperlipidemia management.    E79.0 HYPERURICEMIA WITHOUT SIGNS OF INFLAMMATORY ARTHRITIS AND TOPHACEOUS DISEASE:  - Monitor uric acid levels.  - Last level on 9-18-24 was 7.6.  - Continue current treatment with allopurinol 100 mg daily for hyperuricemia.  - Maintain stable dosage.    Z85.528 PERSONAL HISTORY OF OTHER MALIGNANT NEOPLASM OF KIDNEY:  - Monitor two cysts on the left kidney.  - Perform scheduled MRI on the 10th for further evaluation.  - Note: Jamil has history of right renal cancer with 20% of right kidney removed.    N25.81 SECONDARY HYPERPARATHYROIDISM OF RENAL ORIGIN:  - Monitor intact PTH levels.  - Current level is 67.7, which is within normal range.  - Note previous elevation up to 127 on 6-.    B91 SEQUELAE OF POLIOMYELITIS:  - Monitor leg atrophy due to polio.    M17.12 UNILATERAL PRIMARY OSTEOARTHRITIS, LEFT KNEE:  - Instruct the patient to apply ice to left knee for pain relief.  - Order left knee X-ray for further evaluation.  - Refer the patient to orthopedics for left knee pain assessment.  - Monitor left knee pain, which started about 3-4 weeks ago, particularly when standing.  - Examine the knee: no pain on palpation of suprapatellar region or sides of the knee, no swelling, full range of motion.  - Note slight limp  while walking.    R04.0 EPISTAXIS:  - Educate the patient about potential causes of epistaxis, including dry sinuses and congestion.  - Instruct the patient to gently clean nostrils after showering/bathing.  - Monitor nasal bleeding occurring once every 2 weeks, often while eating, which started about 2-3 months ago.  - Examine patient's nostrils.  - Assess for possible dry nose as a cause of bleeding.  - Check blood counts to rule out any blood disorders causing increased bleeding.  - Prescribe an allergy nasal spray to be used twice daily in each nostril.    Z79.84 LONG TERM (CURRENT) USE OF ORAL HYPOGLYCEMIC DRUGS:  -   Continue long-term oral hypoglycemic drugs for diabetes management.    ** NEEDS REVIEW **  J31.0 CHRONIC RHINITIS:  - Initiate treatment with nasal allergy spray: 2 sprays in each nostril daily in the morning after cleaning nostrils.         Plan:   Type 2 diabetes mellitus with stage 3b chronic kidney disease, with long-term current use of insulin  Comments:  stage IIIB kidney disease.  Following up with Nephrology remain hydrated.  Avoid NSAIDs.  Currently on sodium bicarb and secondary hyperparathyroidism  Orders:  -     Hemoglobin A1C; Future; Expected date: 01/03/2025    Essential hypertension    Hypoglycemia associated with diabetes    Secondary hyperparathyroidism of renal origin    Renal cell carcinoma, unspecified laterality    Mixed dyslipidemia    Aortic atherosclerosis    History of poliomyelitis  -     X-Ray Knee 1 or 2 View Left; Future; Expected date: 01/02/2025    Psychophysiological insomnia    Chronic pain of left knee  -     X-Ray Knee 1 or 2 View Left; Future; Expected date: 01/02/2025    Epistaxis  -     fluticasone propionate (FLONASE) 50 mcg/actuation nasal spray; 1 spray (50 mcg total) by Each Nostril route once daily.  Dispense: 11.1 mL; Refill: 3    Rhinitis, chronic  -     fluticasone propionate (FLONASE) 50 mcg/actuation nasal spray; 1 spray (50 mcg total) by Each  "Nostril route once daily.  Dispense: 11.1 mL; Refill: 3     Patient's medical conditions has been noted   Last A1c level was 6.3 on 08/13/2024 indicating reasonable control with his diabetes mellitus involving a combination of long and short-acting insulins.   Chronic kidney disease stage IIIB has been noted as he continues to follow up with Nephrology panel of labs has been ordered for him.  I will include a A1c level in that.   History of kidney cancer has also been noted.    Appropriate immunizations also has been discussed including RSV, COVID precautions and vaccination influenza vaccination.    Ophthalmology examination also has been discussed   Uric acid level has been noted to be high at 7.6 and consideration for allopurinol has been given which he will discuss with Nephrology also.  Diet discussion also done and to avoid high purine diets like mushrooms, shellfish is, red meats and excess alcohol which he does not in any case.    X-ray of knee shows arthritis, bursitis.  May need referral to orthopedics message sent to patient    Follow up in about 4 months (around 5/2/2025), or if symptoms worsen or fail to improve, for Hypertension/lipids/ CKD.      Current Outpatient Medications:     allopurinoL (ZYLOPRIM) 100 MG tablet, Take 100 mg by mouth., Disp: , Rfl:     amLODIPine (NORVASC) 5 MG tablet, Take 1 tablet (5 mg total) by mouth every evening., Disp: 90 tablet, Rfl: 3    ascorbic acid, vitamin C, (VITAMIN C) 500 MG tablet, Take 500 mg by mouth once daily., Disp: , Rfl:     atorvastatin (LIPITOR) 10 MG tablet, Take 10 mg by mouth., Disp: , Rfl:     BD WINSOME 2ND GEN PEN NEEDLE 32 gauge x 5/32" Ndle, INJECT 1 UNITS INTO THE SKIN 3 (THREE) TIMES DAILY BEFORE MEALS. USE AS DIRECTED NON-MED ITEM, Disp: 300 each, Rfl: 3    blood-glucose meter (TRUE METRIX GLUCOSE METER) Misc, 1 Device by Misc.(Non-Drug; Combo Route) route once daily., Disp: 1 each, Rfl: 0    calcitRIOL (ROCALTROL) 0.25 MCG Cap, TAKE 1 CAPSULE " (0.25 MCG TOTAL) BY MOUTH 3 (THREE) TIMES A WEEK MONDAY/WEDNESDAY/FRIDAY, Disp: , Rfl:     cyanocobalamin (VITAMIN B-12) 2000 MCG tablet, Take 2,000 mcg by mouth once daily., Disp: , Rfl:     indapamide (LOZOL) 1.25 MG Tab, Take 1.25 mg by mouth once daily., Disp: , Rfl:     insulin aspart U-100 (NOVOLOG FLEXPEN U-100 INSULIN) 100 unit/mL (3 mL) InPn pen, Inject 10 Units into the skin 3 (three) times daily with meals. Check glucose before meals and then take insulin as per sliding scale, Disp: 5 each, Rfl: 3    losartan (COZAAR) 100 MG tablet, TAKE 1 TABLET BY MOUTH EVERY DAY, Disp: 90 tablet, Rfl: 3    sodium bicarbonate 650 MG tablet, Take 650 mg by mouth once daily., Disp: , Rfl:     traZODone (DESYREL) 100 MG tablet, TAKE 1 TABLET BY MOUTH EVERY EVENING (DOSAGE INCREASE), Disp: 90 tablet, Rfl: 3    TRESIBA FLEXTOUCH U-100 100 unit/mL (3 mL) insulin pen, INJECT 15 UNITS            SUBCUTANEOUSLY EVERY       EVENING, Disp: 15 mL, Rfl: 3    TRUE METRIX GLUCOSE TEST STRIP Strp, TEST 3 (THREE) TIMES DAILY BEFORE MEALS., Disp: 300 strip, Rfl: 1    zinc gluconate 50 mg tablet, Take 50 mg by mouth once daily., Disp: , Rfl:     fluticasone propionate (FLONASE) 50 mcg/actuation nasal spray, 1 spray (50 mcg total) by Each Nostril route once daily., Disp: 11.1 mL, Rfl: 3    This note was generated with the assistance of ambient listening technology. Verbal consent was obtained by the patient and accompanying visitor(s) for the recording of patient appointment to facilitate this note. I attest to having reviewed and edited the generated note for accuracy, though some syntax or spelling errors may persist. Please contact the author of this note for any clarification.      Richard Gunter

## 2025-01-03 ENCOUNTER — PATIENT MESSAGE (OUTPATIENT)
Dept: FAMILY MEDICINE | Facility: CLINIC | Age: 80
End: 2025-01-03
Payer: MEDICARE

## 2025-01-03 DIAGNOSIS — Z86.12 HISTORY OF POLIOMYELITIS: Primary | ICD-10-CM

## 2025-01-03 DIAGNOSIS — M25.562 CHRONIC PAIN OF LEFT KNEE: ICD-10-CM

## 2025-01-03 DIAGNOSIS — G89.29 CHRONIC PAIN OF LEFT KNEE: ICD-10-CM

## 2025-01-07 NOTE — TELEPHONE ENCOUNTER
History of poliomyelitis  -     Ambulatory referral/consult to Orthopedics; Future; Expected date: 2025    Chronic pain of left knee  -     Ambulatory referral/consult to Orthopedics; Future; Expected date: 2025       Referral has been sent to Dr. Gabo Hess M.D. with earlier appointments available. Their office will call you for an appointment    Ambulatory referral/consult to Orthopedics [[id number]] (Order [id number])  Outpatient Referral  Date and Time: 2025  8:27 PM Department: [facility] Rel By/Authorizing: Richard Gunter MD     Linked Results    Procedure Abnormality Status   Ambulatory referral/consult to Orthopedics       Department    Name Address Phone Fax   [facility] - [address] [place] [address] [address]  [place] [address] [phone]     Patient Demographics    Patient Name  Manjula Caballero Legal Sex  Male   1945 [facility]   Address  [address] [place] [address] Phone  [phone] (Home)  [phone] (Mobile) *Preferred*     Primary Coverage    Payer Plan Sponsor Code Group Number Group Name   MEDICARE MEDICARE PART A & B          Primary Subscriber    Subscriber ID Subscriber Name Subscriber Address   [id number] MANJULA CABALLERO [address] [place] [address]       Secondary Coverage    Payer Plan Sponsor Code Group Number Group Name   [facility] BCBS FEDERAL STANDARD  [id number]        Secondary Subscriber    Subscriber ID Subscriber Name Subscriber Address   [id number] MANJULA CABALLERO [address] [place] [address]       Reason for Exam  Priority: Routine  Dx: History of poliomyelitis [Z86.12 (ICD-10-CM)]; Chronic pain of left knee [M25.562, G89.29 (ICD-10-CM)]   Comments: Please evaluate patient for persistent left knee pain and finding of arthritis in the knee joint.   History of poliomyelitis in both the legs in remote past with some weakness.    Dr. Lyric IGNACIO  "    Referral Details    Referred By   Referred To   Richard Gunter MD  [address] [facility]  [address]  [place] [address]  Phone: [phone]  Fax: [phone] Diagnoses: History of poliomyelitis  Chronic pain of left knee  Order: Ambulatory referral/consult to Orthopedics  Reason:  Gabo Hess MD  [address] [place] [address]   [place] [address]  Phone: [phone]  Fax: [phone]          Future Order Information    ===View-only below this line===      ----- Message -----       From:Jamil Cadet "Herminio"       Sent:1/6/2025 11:44 AM CST         To:Test Results Message Message List    Subject:Message about your results       Pick  the one that is as good as you ....   Thank you        Herminio Cadet      ----- Message -----       From:Richard Gunter MD       Sent:1/4/2025  3:13 PM CST         To:Jamil Vigil"    Subject:Message about your results    Yes given your persistent pain, I would like to refer to orthopedics.  Are usually refer to either Dr. Gabo Hess M.D. or Dr. Buddy Zamora and will make a referral to either 1 of the if okay with you.      ----- Message -----       From:Jamil Cadet "Herminio"       Sent:1/3/2025 10:45 AM CST         To:Test Results Message Message List    Subject:Message about your results    Dr. sánchez always thank you for your help, if you feel a orthopedics can help lets do it..       Thank you...Herminio Cadet      ----- Message -----       From:Richard Gunter MD       Sent:1/2/2025  9:27 PM CST         To:Jamil Vigil"    Subject:Message about your results     X-ray of knee shows combination of arthritis and bursitis . I may consider referral to orthopedics also .let me know accordingly  "

## 2025-01-07 NOTE — HOSPITAL COURSE
Patient was admitted with acute anemia and blood loss from nephrostomy tube and the tube might have been dislodged .     hemoglobin was trended and it is stable. urology reviewed and to acute intervention recommended.  The patient was discharged in stable condition to follow up outpatient.  He is to continue his insulin infusion for recent bacteremia  as  OP.  His urine is clear at IA .     5

## 2025-01-08 ENCOUNTER — LAB VISIT (OUTPATIENT)
Dept: LAB | Facility: HOSPITAL | Age: 80
End: 2025-01-08
Attending: NURSE PRACTITIONER
Payer: MEDICARE

## 2025-01-08 DIAGNOSIS — E11.29 TYPE II DIABETES MELLITUS WITH RENAL MANIFESTATIONS: ICD-10-CM

## 2025-01-08 DIAGNOSIS — I10 ESSENTIAL HYPERTENSION, MALIGNANT: ICD-10-CM

## 2025-01-08 DIAGNOSIS — Z90.5 ACQUIRED ABSENCE OF KIDNEY: ICD-10-CM

## 2025-01-08 DIAGNOSIS — N18.32 CHRONIC KIDNEY DISEASE (CKD) STAGE G3B/A1, MODERATELY DECREASED GLOMERULAR FILTRATION RATE (GFR) BETWEEN 30-44 ML/MIN/1.73 SQUARE METER AND ALBUMINURIA CREATININE RATIO LESS THAN 30 MG/G: ICD-10-CM

## 2025-01-08 LAB
ALBUMIN SERPL BCP-MCNC: 4.3 G/DL (ref 3.5–5.2)
ALBUMIN/CREAT UR: 10.6 UG/MG (ref 0–30)
ANION GAP SERPL CALC-SCNC: 9 MMOL/L (ref 8–16)
BASOPHILS # BLD AUTO: 0.03 K/UL (ref 0–0.2)
BASOPHILS NFR BLD: 0.4 % (ref 0–1.9)
BILIRUB UR QL STRIP: NEGATIVE
BUN SERPL-MCNC: 30 MG/DL (ref 8–23)
CALCIUM SERPL-MCNC: 9.4 MG/DL (ref 8.7–10.5)
CHLORIDE SERPL-SCNC: 107 MMOL/L (ref 95–110)
CLARITY UR: CLEAR
CO2 SERPL-SCNC: 23 MMOL/L (ref 23–29)
COLOR UR: YELLOW
CREAT SERPL-MCNC: 1.7 MG/DL (ref 0.5–1.4)
CREAT UR-MCNC: 98.1 MG/DL (ref 23–375)
CREAT UR-MCNC: 98.1 MG/DL (ref 23–375)
DIFFERENTIAL METHOD BLD: ABNORMAL
EOSINOPHIL # BLD AUTO: 0.3 K/UL (ref 0–0.5)
EOSINOPHIL NFR BLD: 3.1 % (ref 0–8)
ERYTHROCYTE [DISTWIDTH] IN BLOOD BY AUTOMATED COUNT: 12.3 % (ref 11.5–14.5)
EST. GFR  (NO RACE VARIABLE): 40 ML/MIN/1.73 M^2
GLUCOSE SERPL-MCNC: 75 MG/DL (ref 70–110)
GLUCOSE UR QL STRIP: NEGATIVE
HCT VFR BLD AUTO: 36.7 % (ref 40–54)
HGB BLD-MCNC: 12.2 G/DL (ref 14–18)
HGB UR QL STRIP: NEGATIVE
IMM GRANULOCYTES # BLD AUTO: 0.03 K/UL (ref 0–0.04)
IMM GRANULOCYTES NFR BLD AUTO: 0.4 % (ref 0–0.5)
KETONES UR QL STRIP: NEGATIVE
LEUKOCYTE ESTERASE UR QL STRIP: NEGATIVE
LYMPHOCYTES # BLD AUTO: 1.1 K/UL (ref 1–4.8)
LYMPHOCYTES NFR BLD: 13.9 % (ref 18–48)
MAGNESIUM SERPL-MCNC: 1.5 MG/DL (ref 1.6–2.6)
MCH RBC QN AUTO: 32.1 PG (ref 27–31)
MCHC RBC AUTO-ENTMCNC: 33.2 G/DL (ref 32–36)
MCV RBC AUTO: 97 FL (ref 82–98)
MICROALBUMIN UR DL<=1MG/L-MCNC: 10.4 UG/ML
MONOCYTES # BLD AUTO: 0.6 K/UL (ref 0.3–1)
MONOCYTES NFR BLD: 7.9 % (ref 4–15)
NEUTROPHILS # BLD AUTO: 6 K/UL (ref 1.8–7.7)
NEUTROPHILS NFR BLD: 74.3 % (ref 38–73)
NITRITE UR QL STRIP: NEGATIVE
NRBC BLD-RTO: 0 /100 WBC
PH UR STRIP: 6 [PH] (ref 5–8)
PHOSPHATE SERPL-MCNC: 3.3 MG/DL (ref 2.7–4.5)
PLATELET # BLD AUTO: 260 K/UL (ref 150–450)
PMV BLD AUTO: 10.7 FL (ref 9.2–12.9)
POTASSIUM SERPL-SCNC: 4.1 MMOL/L (ref 3.5–5.1)
PROT UR QL STRIP: NEGATIVE
PROT UR-MCNC: 23 MG/DL (ref 6–15)
PROT/CREAT UR: 0.23 MG/G{CREAT} (ref 0–0.2)
PTH-INTACT SERPL-MCNC: 61.9 PG/ML (ref 9–77)
RBC # BLD AUTO: 3.8 M/UL (ref 4.6–6.2)
SODIUM SERPL-SCNC: 139 MMOL/L (ref 136–145)
SP GR UR STRIP: 1.01 (ref 1–1.03)
URATE SERPL-MCNC: 6.3 MG/DL (ref 3.4–7)
URN SPEC COLLECT METH UR: NORMAL
UROBILINOGEN UR STRIP-ACNC: NEGATIVE EU/DL
WBC # BLD AUTO: 8.11 K/UL (ref 3.9–12.7)

## 2025-01-08 PROCEDURE — 84550 ASSAY OF BLOOD/URIC ACID: CPT | Performed by: NURSE PRACTITIONER

## 2025-01-08 PROCEDURE — 80069 RENAL FUNCTION PANEL: CPT | Performed by: NURSE PRACTITIONER

## 2025-01-08 PROCEDURE — 81003 URINALYSIS AUTO W/O SCOPE: CPT | Performed by: NURSE PRACTITIONER

## 2025-01-08 PROCEDURE — 83735 ASSAY OF MAGNESIUM: CPT | Performed by: NURSE PRACTITIONER

## 2025-01-08 PROCEDURE — 83970 ASSAY OF PARATHORMONE: CPT | Performed by: NURSE PRACTITIONER

## 2025-01-08 PROCEDURE — 82570 ASSAY OF URINE CREATININE: CPT | Performed by: NURSE PRACTITIONER

## 2025-01-08 PROCEDURE — 84156 ASSAY OF PROTEIN URINE: CPT | Performed by: NURSE PRACTITIONER

## 2025-01-08 PROCEDURE — 36415 COLL VENOUS BLD VENIPUNCTURE: CPT | Performed by: NURSE PRACTITIONER

## 2025-01-08 PROCEDURE — 85025 COMPLETE CBC W/AUTO DIFF WBC: CPT | Performed by: NURSE PRACTITIONER

## 2025-01-10 ENCOUNTER — OFFICE VISIT (OUTPATIENT)
Dept: ORTHOPEDICS | Facility: CLINIC | Age: 80
End: 2025-01-10
Payer: MEDICARE

## 2025-01-10 ENCOUNTER — HOSPITAL ENCOUNTER (OUTPATIENT)
Dept: RADIOLOGY | Facility: HOSPITAL | Age: 80
Discharge: HOME | End: 2025-01-10
Attending: NURSE PRACTITIONER
Payer: MEDICARE

## 2025-01-10 ENCOUNTER — PATIENT MESSAGE (OUTPATIENT)
Dept: FAMILY MEDICINE | Facility: CLINIC | Age: 80
End: 2025-01-10
Payer: MEDICARE

## 2025-01-10 DIAGNOSIS — E11.29 TYPE 2 DIABETES MELLITUS WITH OTHER DIABETIC KIDNEY COMPLICATION: ICD-10-CM

## 2025-01-10 DIAGNOSIS — Z90.5 HX OF PARTIAL NEPHRECTOMY: ICD-10-CM

## 2025-01-10 DIAGNOSIS — I10 HTN (HYPERTENSION): ICD-10-CM

## 2025-01-10 DIAGNOSIS — N18.32 STAGE 3B CHRONIC KIDNEY DISEASE: ICD-10-CM

## 2025-01-10 DIAGNOSIS — Z86.12 HISTORY OF POLIOMYELITIS: ICD-10-CM

## 2025-01-10 DIAGNOSIS — I10 ESSENTIAL HYPERTENSION: ICD-10-CM

## 2025-01-10 DIAGNOSIS — G89.29 CHRONIC PAIN OF LEFT KNEE: ICD-10-CM

## 2025-01-10 DIAGNOSIS — M25.562 CHRONIC PAIN OF LEFT KNEE: ICD-10-CM

## 2025-01-10 PROCEDURE — A9585 GADOBUTROL INJECTION: HCPCS | Mod: PO | Performed by: NURSE PRACTITIONER

## 2025-01-10 PROCEDURE — 25500020 PHARM REV CODE 255: Mod: PO | Performed by: NURSE PRACTITIONER

## 2025-01-10 PROCEDURE — 74183 MRI ABD W/O CNTR FLWD CNTR: CPT | Mod: 26,,, | Performed by: RADIOLOGY

## 2025-01-10 PROCEDURE — 99999 PR PBB SHADOW E&M-EST. PATIENT-LVL I: CPT | Mod: PBBFAC,,, | Performed by: ORTHOPAEDIC SURGERY

## 2025-01-10 PROCEDURE — 74183 MRI ABD W/O CNTR FLWD CNTR: CPT | Mod: TC,PO

## 2025-01-10 PROCEDURE — 99211 OFF/OP EST MAY X REQ PHY/QHP: CPT | Mod: PBBFAC,PO | Performed by: ORTHOPAEDIC SURGERY

## 2025-01-10 RX ORDER — TRIAMCINOLONE ACETONIDE 40 MG/ML
40 INJECTION, SUSPENSION INTRA-ARTICULAR; INTRAMUSCULAR
Status: DISCONTINUED | OUTPATIENT
Start: 2025-01-10 | End: 2025-01-10 | Stop reason: HOSPADM

## 2025-01-10 RX ORDER — GADOBUTROL 604.72 MG/ML
7.5 INJECTION INTRAVENOUS
Status: COMPLETED | OUTPATIENT
Start: 2025-01-10 | End: 2025-01-10

## 2025-01-10 RX ADMIN — TRIAMCINOLONE ACETONIDE 40 MG: 40 INJECTION, SUSPENSION INTRA-ARTICULAR; INTRAMUSCULAR at 12:01

## 2025-01-10 RX ADMIN — GADOBUTROL 7.5 ML: 604.72 INJECTION INTRAVENOUS at 10:01

## 2025-01-10 NOTE — PROCEDURES
Large Joint Aspiration/Injection: L knee joint    Date/Time: 1/10/2025 12:00 PM    Performed by: Gabo Hess MD  Authorized by: Gabo Hess MD    Consent Done?:  Yes (Verbal)  Indications:  Pain  Site marked: the procedure site was marked    Timeout: prior to procedure the correct patient, procedure, and site was verified    Prep: patient was prepped and draped in usual sterile fashion    Local anesthetic:  Lidocaine 1% without epinephrine  Anesthetic total (ml):  3      Details:  Needle Size:  22 G  Approach:  Anterolateral  Location:  Knee  Site:  L knee joint  Medications:  40 mg triamcinolone acetonide 40 mg/mL  Patient tolerance:  Patient tolerated the procedure well with no immediate complications

## 2025-01-10 NOTE — PROGRESS NOTES
Patient ID: Jamil Cadet is a 79 y.o. male    Chief Complaint: No chief complaint on file.      History of Present Illness:    Pleasant 79-year-old male here for evaluation of left knee pain.  Reports about a 4 week history of left knee pain which began initially in his lower back and hip and then eventually to the lateral aspect of his knee.  Now his pain is mostly over the lateral knee.  Denies any significant swelling.  Denies any numbness or tingling.  No pain past the knee and into the foot.  Denies any mechanical symptoms or instability.  No recent injections or surgeries to the left knee.    PAST MEDICAL HISTORY:   Past Medical History:   Diagnosis Date    Acute renal failure 9/7/2020    Bacteremia due to Enterococcus 10/25/2020    Cancer     Diabetes mellitus, type 2     Disorder of kidney and ureter     Encounter for blood transfusion     History of renal cell carcinoma status post partial nephrectomy 9/7/2020    Hydronephrosis 9/9/2020    Hyperlipidemia     Hypertension     Infection and inflammatory reaction due to nephrostomy catheter, initial encounter 11/14/2020    Infection following a procedure, superficial incisional surgical site, subsequent encounter 12/26/2020    Polio     Pyelonephritis of right kidney     Septicemia due to enterococcus 11/15/2020     PAST SURGICAL HISTORY:   Past Surgical History:   Procedure Laterality Date    ANTEGRADE PYELOGRAM  12/10/2020    Procedure: PYELOGRAM, ANTEGRADE;  Surgeon: Dave Shin MD;  Location: [address];  Service: Urology;;    APPENDECTOMY      CYSTOSCOPY  12/10/2020    Procedure: CYSTOSCOPY;  Surgeon: Dave Shin MD;  Location: [address];  Service: Urology;;    CYSTOSCOPY W/ RETROGRADES Right 09/09/2020    Procedure: CYSTOSCOPY, WITH RETROGRADE PYELOGRAM;  Surgeon: Chris Garcia Jr., MD;  Location: [facility];  Service: Urology;  Laterality: Right;    DILATION OF NEPHROSTOMY TRACT Right 12/10/2020    Procedure: DILATION,  NEPHROSTOMY TRACT;  Surgeon: Dave Shin MD;  Location: [facility] OR 1ST FLR;  Service: Urology;  Laterality: Right;    RADIOFREQUENCY ABLATION KIDNEY  2022    REMOVAL OF DRAIN Right 12/10/2020    Procedure: REMOVAL, DRAIN-NEPHROSTOMY TUBE;  Surgeon: Dave Shin MD;  Location: [facility] OR 1ST FLR;  Service: Urology;  Laterality: Right;    REPLACEMENT OF NEPHROSTOMY TUBE Right 12/10/2020    Procedure: REPLACEMENT, NEPHROSTOMY TUBE;  Surgeon: Dave Shin MD;  Location: [facility] OR 1ST FLR;  Service: Urology;  Laterality: Right;    ROBOT-ASSISTED LAPAROSCOPIC REIMPLANTATION OF URETER USING DA PHU XI Right 01/08/2021    Procedure: XI ROBOTIC IMPLANTATION, URETER, USING PSOAS HITCH TECHNIQUE;  Surgeon: Dave Shin MD;  Location: [facility] OR 2ND FLR;  Service: Urology;  Laterality: Right;  4hrs gen with regional    SPINE SURGERY      URETEROSCOPY Right 12/10/2020    Procedure: URETEROSCOPY-ANTEGRADE;  Surgeon: Dave Shin MD;  Location: [facility] OR 1ST FLR;  Service: Urology;  Laterality: Right;    URETHROSCOPY  09/09/2020    Procedure: URETHROSCOPY;  Surgeon: Chris Garcia Jr., MD;  Location: [facility] OR;  Service: Urology;;     FAMILY HISTORY:   Family History   Problem Relation Name Age of Onset    Heart disease Mother      Heart disease Father      Stroke Father      Heart disease Maternal Grandfather      Heart disease Paternal Grandmother      Heart disease Paternal Grandfather       SOCIAL HISTORY:   Social History     Occupational History    Not on file   Tobacco Use    Smoking status: Never    Smokeless tobacco: Never   Substance and Sexual Activity    Alcohol use: No    Drug use: No    Sexual activity: Yes     Partners: Female        MEDICATIONS:   Current Outpatient Medications:     allopurinoL (ZYLOPRIM) 100 MG tablet, Take 100 mg by mouth., Disp: , Rfl:     amLODIPine (NORVASC) 5 MG tablet, Take 1 tablet (5 mg total) by mouth every evening., Disp: 90 tablet, Rfl: 3    ascorbic acid, vitamin C,  "(VITAMIN C) 500 MG tablet, Take 500 mg by mouth once daily., Disp: , Rfl:     atorvastatin (LIPITOR) 10 MG tablet, Take 10 mg by mouth., Disp: , Rfl:     BD WINSOME 2ND GEN PEN NEEDLE 32 gauge x 5/32" Ndle, INJECT 1 UNITS INTO THE SKIN 3 (THREE) TIMES DAILY BEFORE MEALS. USE AS DIRECTED NON-MED ITEM, Disp: 300 each, Rfl: 3    blood-glucose meter (TRUE METRIX GLUCOSE METER) Misc, 1 Device by Misc.(Non-Drug; Combo Route) route once daily., Disp: 1 each, Rfl: 0    calcitRIOL (ROCALTROL) 0.25 MCG Cap, TAKE 1 CAPSULE (0.25 MCG TOTAL) BY MOUTH 3 (THREE) TIMES A WEEK MONDAY/WEDNESDAY/FRIDAY, Disp: , Rfl:     cyanocobalamin (VITAMIN B-12) 2000 MCG tablet, Take 2,000 mcg by mouth once daily., Disp: , Rfl:     fluticasone propionate (FLONASE) 50 mcg/actuation nasal spray, 1 spray (50 mcg total) by Each Nostril route once daily., Disp: 11.1 mL, Rfl: 3    indapamide (LOZOL) 1.25 MG Tab, Take 1.25 mg by mouth once daily., Disp: , Rfl:     insulin aspart U-100 (NOVOLOG FLEXPEN U-100 INSULIN) 100 unit/mL (3 mL) InPn pen, Inject 10 Units into the skin 3 (three) times daily with meals. Check glucose before meals and then take insulin as per sliding scale, Disp: 5 each, Rfl: 3    losartan (COZAAR) 100 MG tablet, TAKE 1 TABLET BY MOUTH EVERY DAY, Disp: 90 tablet, Rfl: 3    sodium bicarbonate 650 MG tablet, Take 650 mg by mouth once daily., Disp: , Rfl:     traZODone (DESYREL) 100 MG tablet, TAKE 1 TABLET BY MOUTH EVERY EVENING (DOSAGE INCREASE), Disp: 90 tablet, Rfl: 3    TRESIBA FLEXTOUCH U-100 100 unit/mL (3 mL) insulin pen, INJECT 15 UNITS            SUBCUTANEOUSLY EVERY       EVENING, Disp: 15 mL, Rfl: 3    TRUE METRIX GLUCOSE TEST STRIP Strp, TEST 3 (THREE) TIMES DAILY BEFORE MEALS., Disp: 300 strip, Rfl: 1    zinc gluconate 50 mg tablet, Take 50 mg by mouth once daily., Disp: , Rfl:   No current facility-administered medications for this visit.  ALLERGIES: Review of patient's allergies indicates:  No Known Allergies      Physical " Exam     There were no vitals filed for this visit.  Alert and oriented to person, place and time. No acute distress. Well-groomed, not ill appearing. Pupils round and reactive, normal respiratory effort, no audible wheezing.     GENERAL:  A well-developed, well-nourished 79 y.o. male who is alert and       oriented in no acute distress.      Gait: He  walks with a antalgic gait.                   EXTREMITIES:  Examination of lower extremities reveals there is no visible mass or deformity.    Left knee:  ROM     Ligamentously stable to varus/valgus stress.    Anterior and posterior drawers negative.    No pain over pes bursa.    No warmth    No erythema     Effusion No    lateral joint line tenderness    Negative Patellofemoral grind/crepitus       The skin over both lower extremities is normal and unremarkable.  He has a  painless range of motion of the hips and ankles bilaterally.   Sensation is intact in both lower extremities.    There are no motor deficits in the lower extremities bilaterally.   Pedal pulses are palpable distally bilaterally.    He has no calf tenderness to palpation nor edema.       Imaging:       X-Ray: I have reviewed all pertinent results/findings and my personal findings are:  Moderate to severe lateral compartment disease      Assessment & Plan    History of poliomyelitis  -     Ambulatory referral/consult to Orthopedics    Chronic pain of left knee  -     Ambulatory referral/consult to Orthopedics         Treatment options discussed with him in detail.  I went over his x-rays which do show lateral compartment disease and DJD of the knee.  May also have a component of lumbar radiculopathy although this does seem to be better today.  Most of his pain is isolated to the lateral knee.  We discussed diagnostic/therapeutic injection of the left knee to see if this improves his pain.  If no pain relief would recommend evaluation of the lumbar spine.

## 2025-01-13 RX ORDER — AMLODIPINE BESYLATE 5 MG/1
5 TABLET ORAL NIGHTLY
Qty: 90 TABLET | Refills: 3 | Status: SHIPPED | OUTPATIENT
Start: 2025-01-13

## 2025-01-30 DIAGNOSIS — M54.16 RADICULOPATHY, LUMBAR REGION: Primary | ICD-10-CM

## 2025-01-31 ENCOUNTER — HOSPITAL ENCOUNTER (OUTPATIENT)
Dept: RADIOLOGY | Facility: HOSPITAL | Age: 80
Discharge: HOME | End: 2025-01-31
Attending: NEUROLOGICAL SURGERY
Payer: MEDICARE

## 2025-01-31 DIAGNOSIS — M54.16 RADICULOPATHY, LUMBAR REGION: ICD-10-CM

## 2025-01-31 PROCEDURE — 72148 MRI LUMBAR SPINE W/O DYE: CPT | Mod: 26,,, | Performed by: RADIOLOGY

## 2025-01-31 PROCEDURE — 72148 MRI LUMBAR SPINE W/O DYE: CPT | Mod: TC,PO

## 2025-02-20 ENCOUNTER — TELEPHONE (OUTPATIENT)
Dept: UROLOGY | Facility: CLINIC | Age: 80
End: 2025-02-20
Payer: MEDICARE

## 2025-02-21 ENCOUNTER — TELEPHONE (OUTPATIENT)
Dept: UROLOGY | Facility: CLINIC | Age: 80
End: 2025-02-21
Payer: MEDICARE

## 2025-02-21 NOTE — TELEPHONE ENCOUNTER
Spoke with patient.  Notified of no concerning findings on recent MRI per Dr. Manzanares.  Patient verbalized understanding.  Will keep appt for 4/1/25.

## 2025-02-24 DIAGNOSIS — Z00.00 ENCOUNTER FOR MEDICARE ANNUAL WELLNESS EXAM: ICD-10-CM

## 2025-03-09 DIAGNOSIS — F51.04 PSYCHOPHYSIOLOGICAL INSOMNIA: ICD-10-CM

## 2025-03-09 NOTE — TELEPHONE ENCOUNTER
No care due was identified.  [facility] Embedded Care Due Messages. Reference number: [id number].   3/09/2025 9:27:16 AM CDT

## 2025-03-10 RX ORDER — TRAZODONE HYDROCHLORIDE 100 MG/1
TABLET ORAL
Qty: 90 TABLET | Refills: 3 | Status: SHIPPED | OUTPATIENT
Start: 2025-03-10

## 2025-03-10 NOTE — TELEPHONE ENCOUNTER
Refill Decision Note   Jamil Chichi  is requesting a refill authorization.  Brief Assessment and Rationale for Refill:  Approve     Medication Therapy Plan:         Comments:     Note composed:2:56 AM 03/10/2025

## 2025-03-25 DIAGNOSIS — R04.0 EPISTAXIS: ICD-10-CM

## 2025-03-25 DIAGNOSIS — J31.0 RHINITIS, CHRONIC: ICD-10-CM

## 2025-03-25 RX ORDER — FLUTICASONE PROPIONATE 50 MCG
SPRAY, SUSPENSION (ML) NASAL
Qty: 48 ML | Refills: 1 | Status: SHIPPED | OUTPATIENT
Start: 2025-03-25

## 2025-03-25 NOTE — TELEPHONE ENCOUNTER
Refill Routing Note   Medication(s) are not appropriate for processing by [facility] for the following reason(s):        New or recently adjusted medication    [facility] action(s):  Defer               Appointments  past 12m or future 3m with PCP    Date Provider   Last Visit   1/2/2025 Richard Gunter MD   Next Visit   5/6/2025 Richard Gunter MD   ED visits in past 90 days: 0        Note composed:1:27 PM 03/25/2025

## 2025-03-25 NOTE — TELEPHONE ENCOUNTER
No care due was identified.  [facility] Embedded Care Due Messages. Reference number: [id number].   3/25/2025 10:31:50 AM CDT

## 2025-04-01 ENCOUNTER — OFFICE VISIT (OUTPATIENT)
Dept: UROLOGY | Facility: CLINIC | Age: 80
End: 2025-04-01
Payer: MEDICARE

## 2025-04-01 VITALS
HEART RATE: 67 BPM | BODY MASS INDEX: 25.06 KG/M2 | HEIGHT: 71 IN | DIASTOLIC BLOOD PRESSURE: 68 MMHG | WEIGHT: 179 LBS | SYSTOLIC BLOOD PRESSURE: 140 MMHG

## 2025-04-01 DIAGNOSIS — C64.1 RENAL CELL ADENOCARCINOMA, RIGHT: Primary | ICD-10-CM

## 2025-04-01 DIAGNOSIS — N28.1 RENAL CYST: Chronic | ICD-10-CM

## 2025-04-01 DIAGNOSIS — N18.32 STAGE 3B CHRONIC KIDNEY DISEASE: ICD-10-CM

## 2025-04-01 DIAGNOSIS — I10 ESSENTIAL HYPERTENSION: ICD-10-CM

## 2025-04-01 PROCEDURE — G2211 COMPLEX E/M VISIT ADD ON: HCPCS | Mod: S$PBB,,, | Performed by: UROLOGY

## 2025-04-01 PROCEDURE — 99214 OFFICE O/P EST MOD 30 MIN: CPT | Mod: S$PBB,,, | Performed by: UROLOGY

## 2025-04-01 PROCEDURE — 99213 OFFICE O/P EST LOW 20 MIN: CPT | Mod: PBBFAC | Performed by: UROLOGY

## 2025-04-01 PROCEDURE — 99999 PR PBB SHADOW E&M-EST. PATIENT-LVL III: CPT | Mod: PBBFAC,,, | Performed by: UROLOGY

## 2025-04-01 RX ORDER — OXYCODONE AND ACETAMINOPHEN 5; 325 MG/1; MG/1
1 TABLET ORAL EVERY 4 HOURS PRN
COMMUNITY
Start: 2025-02-10

## 2025-04-01 NOTE — PROGRESS NOTES
Subjective:       Patient ID: Jamil Cadet is a 80 y.o. male.    Chief Complaint:  RCC surveillance f/u      History of Present Illness  HPI  Jamil Cadet is a 80 y.o. . male with a PMH of mid/distal urteral stricture s/p robotic ureteroneocystotomy with psoas hitch and lysis of adhesions. 3 mo post op ultrasound showed resolution of right sided hydronephrosis but an incidental left lower pole solid renal mass was discovered ~2.3 cm. He is here for follow up. He has a history of a right partial nephrectomy from years passed which showed: 5.1cm, FG 2, ccRCC with negative margins.     Patient initially scheduled for surgery but changed his mind and opted for AS.  However, he saw a nephrologist, Dr. Manjeet Decker, in [place] who encouraged him to visit with Dr. Ishmael Kennedy at [facility].  He ultimately decided on an IR ablation which was performed March of 2022 at [facility].  The patient does not want any further follow-up at [facility].     MRI of the abdomen with and without contrast from 01/10/2025 was independently reviewed today and shared with the patient and shows stable findings of complex right renal cyst status post right partial nephrectomy, left exophytic mass similar in size and no obvious concerning parameters.    No obvious evidence of recurrence or metastasis. Small bony lesion likely non specific.     Most recent serum Cr was 1.7 on 1/8/25---at patient baseline.       Review of Systems  Review of Systems  All other systems reviewed and negative except pertinent positives noted in HPI.       Objective:     Physical Exam  Constitutional:       General: He is not in acute distress.     Appearance: He is well-developed.   HENT:      Head: Normocephalic and atraumatic.   Eyes:      General: No scleral icterus.  Neck:      Trachea: No tracheal deviation.   Pulmonary:      Effort: Pulmonary effort is normal. No respiratory distress.   Neurological:      Mental Status: He is alert and oriented to person,  place, and time.   Psychiatric:         Behavior: Behavior normal.         Thought Content: Thought content normal.         Judgment: Judgment normal.         Lab Review  Lab Results   Component Value Date    COLORU Yellow 01/08/2025    SPECGRAV 1.015 01/08/2025    PHUR 6.0 01/08/2025    NITRITE Negative 01/08/2025    KETONESU Negative 01/08/2025    UROBILINOGEN Negative 01/08/2025         Assessment:        1. Renal cell adenocarcinoma, right    2. Stage 3b chronic kidney disease    3. Renal cyst    4. Essential hypertension              Plan:     Renal cell adenocarcinoma, right    Stage 3b chronic kidney disease    Renal cyst    Essential hypertension      -s/p ablation  -MRI of the abd from 1/10/25 was independently reviewed today and shared with the patient and shows no evidence of recurrence.     -we discussed the NCCN guidelines regarding status post ablation therapy for small renal mass.  Guidelines suggest imaging at 1, 6, and 12 months post ablation and then annually thereafter. Patient ablation was in March 2022, now on yearly follow up.     -Keep scheduled follow up in 08/2025 with CXR/CMP/MRI. Bony lesion likely benign incidental finding, however will continue to monitor. If interval growth will pursue further evaluation.     -serum Cr at baseline at 1.7    -BP reviewed today and normal  -continue current medications  -encouraged f/u and discussion of BP with PCP.       The patient was seen and examined with the resident physician.  All questions were answered.  The plan was discussed with the patient and I concur with the resident physician's documentation.    - code applied: patient requires or will require a continuous, longitudinal, and active collaborative plan of care related to this patient's health condition, RCC --the management of which requires the direction of a practitioner with specialized clinical knowledge, skill, and expertise.

## 2025-04-21 ENCOUNTER — TELEPHONE (OUTPATIENT)
Dept: FAMILY MEDICINE | Facility: CLINIC | Age: 80
End: 2025-04-21
Payer: MEDICARE

## 2025-04-21 DIAGNOSIS — N18.32 STAGE 3B CHRONIC KIDNEY DISEASE: ICD-10-CM

## 2025-04-21 DIAGNOSIS — N25.81 SECONDARY HYPERPARATHYROIDISM OF RENAL ORIGIN: Primary | ICD-10-CM

## 2025-04-21 NOTE — TELEPHONE ENCOUNTER
----- Message from Nia sent at 4/21/2025  2:05 PM CDT -----  Contact: Theresa  Wife Theresa calling to get a referral for patient on nephrologist. Dr. minoo Perry [phone]

## 2025-04-21 NOTE — TELEPHONE ENCOUNTER
Pended please sign     Secondary hyperparathyroidism of renal origin  -     Ambulatory referral/consult to Nephrology; Future; Expected date: 04/28/2025    Stage 3b chronic kidney disease  -     Ambulatory referral/consult to Nephrology; Future; Expected date: 04/28/2025

## 2025-05-06 ENCOUNTER — OFFICE VISIT (OUTPATIENT)
Dept: FAMILY MEDICINE | Facility: CLINIC | Age: 80
End: 2025-05-06
Payer: MEDICARE

## 2025-05-06 ENCOUNTER — LAB VISIT (OUTPATIENT)
Dept: LAB | Facility: HOSPITAL | Age: 80
End: 2025-05-06
Attending: INTERNAL MEDICINE
Payer: MEDICARE

## 2025-05-06 ENCOUNTER — RESULTS FOLLOW-UP (OUTPATIENT)
Dept: FAMILY MEDICINE | Facility: CLINIC | Age: 80
End: 2025-05-06

## 2025-05-06 ENCOUNTER — PATIENT MESSAGE (OUTPATIENT)
Dept: FAMILY MEDICINE | Facility: CLINIC | Age: 80
End: 2025-05-06

## 2025-05-06 VITALS
WEIGHT: 176.38 LBS | HEIGHT: 71 IN | DIASTOLIC BLOOD PRESSURE: 62 MMHG | BODY MASS INDEX: 24.69 KG/M2 | SYSTOLIC BLOOD PRESSURE: 132 MMHG | HEART RATE: 63 BPM

## 2025-05-06 DIAGNOSIS — I70.0 AORTIC ATHEROSCLEROSIS: ICD-10-CM

## 2025-05-06 DIAGNOSIS — G62.9 POLYNEUROPATHY: ICD-10-CM

## 2025-05-06 DIAGNOSIS — Z79.4 TYPE 2 DIABETES MELLITUS WITH STAGE 3B CHRONIC KIDNEY DISEASE, WITH LONG-TERM CURRENT USE OF INSULIN: ICD-10-CM

## 2025-05-06 DIAGNOSIS — I10 ESSENTIAL HYPERTENSION: Chronic | ICD-10-CM

## 2025-05-06 DIAGNOSIS — C64.1 RENAL CELL ADENOCARCINOMA, RIGHT: Chronic | ICD-10-CM

## 2025-05-06 DIAGNOSIS — N18.32 STAGE 3B CHRONIC KIDNEY DISEASE: ICD-10-CM

## 2025-05-06 DIAGNOSIS — E11.22 TYPE 2 DIABETES MELLITUS WITH STAGE 3B CHRONIC KIDNEY DISEASE, WITH LONG-TERM CURRENT USE OF INSULIN: ICD-10-CM

## 2025-05-06 DIAGNOSIS — N18.32 TYPE 2 DIABETES MELLITUS WITH STAGE 3B CHRONIC KIDNEY DISEASE, WITH LONG-TERM CURRENT USE OF INSULIN: Primary | Chronic | ICD-10-CM

## 2025-05-06 DIAGNOSIS — N18.32 TYPE 2 DIABETES MELLITUS WITH STAGE 3B CHRONIC KIDNEY DISEASE, WITH LONG-TERM CURRENT USE OF INSULIN: ICD-10-CM

## 2025-05-06 DIAGNOSIS — E11.22 TYPE 2 DIABETES MELLITUS WITH STAGE 3B CHRONIC KIDNEY DISEASE, WITH LONG-TERM CURRENT USE OF INSULIN: Primary | Chronic | ICD-10-CM

## 2025-05-06 DIAGNOSIS — Z79.4 TYPE 2 DIABETES MELLITUS WITH STAGE 3B CHRONIC KIDNEY DISEASE, WITH LONG-TERM CURRENT USE OF INSULIN: Primary | Chronic | ICD-10-CM

## 2025-05-06 DIAGNOSIS — N25.81 SECONDARY HYPERPARATHYROIDISM OF RENAL ORIGIN: ICD-10-CM

## 2025-05-06 DIAGNOSIS — Z86.12 HISTORY OF POLIOMYELITIS: ICD-10-CM

## 2025-05-06 LAB
ALBUMIN SERPL BCP-MCNC: 3.7 G/DL (ref 3.5–5.2)
ANION GAP (OHS): 8 MMOL/L (ref 8–16)
BUN SERPL-MCNC: 36 MG/DL (ref 8–23)
CALCIUM SERPL-MCNC: 9 MG/DL (ref 8.7–10.5)
CHLORIDE SERPL-SCNC: 109 MMOL/L (ref 95–110)
CO2 SERPL-SCNC: 21 MMOL/L (ref 23–29)
CREAT SERPL-MCNC: 1.7 MG/DL (ref 0.5–1.4)
EAG (OHS): 140 MG/DL (ref 68–131)
GFR SERPLBLD CREATININE-BSD FMLA CKD-EPI: 40 ML/MIN/1.73/M2
GLUCOSE SERPL-MCNC: 70 MG/DL (ref 70–110)
HBA1C MFR BLD: 6.5 % (ref 4–5.6)
PHOSPHATE SERPL-MCNC: 3.5 MG/DL (ref 2.7–4.5)
POTASSIUM SERPL-SCNC: 4.5 MMOL/L (ref 3.5–5.1)
SODIUM SERPL-SCNC: 138 MMOL/L (ref 136–145)

## 2025-05-06 PROCEDURE — 83036 HEMOGLOBIN GLYCOSYLATED A1C: CPT

## 2025-05-06 PROCEDURE — 99999 PR PBB SHADOW E&M-EST. PATIENT-LVL III: CPT | Mod: PBBFAC,,, | Performed by: INTERNAL MEDICINE

## 2025-05-06 PROCEDURE — 36415 COLL VENOUS BLD VENIPUNCTURE: CPT | Mod: PN

## 2025-05-06 PROCEDURE — G2211 COMPLEX E/M VISIT ADD ON: HCPCS | Mod: S$PBB,,, | Performed by: INTERNAL MEDICINE

## 2025-05-06 PROCEDURE — 99214 OFFICE O/P EST MOD 30 MIN: CPT | Mod: S$PBB,,, | Performed by: INTERNAL MEDICINE

## 2025-05-06 PROCEDURE — 80069 RENAL FUNCTION PANEL: CPT

## 2025-05-06 PROCEDURE — 99213 OFFICE O/P EST LOW 20 MIN: CPT | Mod: PBBFAC,PN | Performed by: INTERNAL MEDICINE

## 2025-05-06 RX ORDER — INSULIN ASPART 100 [IU]/ML
10 INJECTION, SOLUTION INTRAVENOUS; SUBCUTANEOUS
Qty: 5 EACH | Refills: 3 | Status: SHIPPED | OUTPATIENT
Start: 2025-05-06

## 2025-05-06 RX ORDER — INSULIN DEGLUDEC 100 U/ML
15 INJECTION, SOLUTION SUBCUTANEOUS NIGHTLY
Qty: 15 ML | Refills: 3 | Status: SHIPPED | OUTPATIENT
Start: 2025-05-06

## 2025-05-06 NOTE — PROGRESS NOTES
Subjective:       Patient ID: Jamil Cadet is a 80 y.o. male.    Chief Complaint: Diabetes, Hyperlipidemia, Hypertension, Gout, and Chronic Kidney Disease    History of Present Illness    CHIEF COMPLAINT:  Jamil presents for a follow-up visit to discuss ongoing management of multiple chronic conditions including diabetes, hypertension, hyperlipidemia, and gout.    HPI:  Jamil reports home blood pressure readings ranging from 120s to 130s. He is currently taking multiple medications for blood pressure management, including Amlodipine 5 mg, Losartan 100 mg, and Indapamide 1.25 mg.     For diabetes management, he is on insulin therapy, taking Tresiba 14 units at night and Novolog 4 units in the morning and evening.       He has occasional palpitations, describing intermittent pulse irregularities. He has not had an eye checkup this year. He reports some swelling in his feet, possibly due to fluid buildup.     He recently switched nephrologists from Dr. Decker' group to Dr. Perry, following Dr. Decker' passing. He has an upcoming appointment with Dr. Perry on June 24th and with Dr. Shin, a surgeon, in August.  Chronic kidney disease has been noted secondary to multifactorial reasons including diabetes, multiple investigations involving.  Previous intact PTH levels were elevated.    Hyperlipidemia seems to be stable as he continues with atorvastatin 10 mg citing no difficulties.      Blood pressures are stable at this point with a combination of losartan 100, amlodipine 5 mg.  He is also on diuretics with indapamide 1.25.    No recent attack of gout as he continues with allopurinol 100 mg per day.    Chronic kidney disease has been noted given underlying diabetes, history of kidney cancer treatment and several intimate in past.        MEDICATIONS:  Jamil is on Amlodipine 5 mg, Losartan 100 mg, and Indapamide 1.25 mg daily for blood pressure management. He is also taking Atorvastatin 10 mg daily for cholesterol  control. For sleep, he takes Trazodone 100 mg daily at night. His diabetes is managed with Tresiba (insulin) 14 units daily at night and Novolog (insulin) 4 units twice daily in the morning and night. He takes sodium bicarbonate tablets twice daily and Allopurinol for gout.    MEDICAL HISTORY:  Jamil has a history of diabetes, hypercholesterolemia, hypertension, gout, mild anemia, and kidney disease. He completed the pneumonia vaccine series in 2022 and the shingles vaccine series in 2020. Jamil received the RSV vaccine on March 17th.    TEST RESULTS:  In January, the patient's lab results showed a creatinine level of 1.7, which is his best so far, previously reaching up to 2.4 and 2.0. His GFR (kidney flow rate) was 40 mL per minute. Glucose was 75, sodium 139, potassium 4.1, BUN 30, and calcium 9.4. Jamil's hemoglobin was 12.2, indicating mild anemia, with a hematocrit of 36.7. His uric acid level was at its best at 6.3, and PTH was good at 61.9. During the visit, an EKG via Apple Watch showed a normal rhythm with occasional early or delayed beats.    SOCIAL HISTORY:  Smoking: Never smoked cigarettes      ROS:  General: -fever, -chills, -fatigue, -weight gain, -weight loss  Cardiovascular: -chest pain, -palpitations, +lower extremity edema, +feeling of skipped beats  Respiratory: -cough, -shortness of breath  Gastrointestinal: -abdominal pain, -nausea, -vomiting, -diarrhea, -constipation, -blood in stool  Skin: -rash, -lesion  Neurological: -headache, -dizziness, -numbness, -tingling, +decreased sensation in extremities         Past Medical History:   Diagnosis Date    Acute renal failure 9/7/2020    Bacteremia due to Enterococcus 10/25/2020    Cancer     Diabetes mellitus, type 2     Disorder of kidney and ureter     Encounter for blood transfusion     History of renal cell carcinoma status post partial nephrectomy 9/7/2020    Hydronephrosis 9/9/2020    Hyperlipidemia     Hypertension     Infection and  inflammatory reaction due to nephrostomy catheter, initial encounter 11/14/2020    Infection following a procedure, superficial incisional surgical site, subsequent encounter 12/26/2020    Polio     Pyelonephritis of right kidney     Septicemia due to enterococcus 11/15/2020     Social History[1]  Past Surgical History:   Procedure Laterality Date    ANTEGRADE PYELOGRAM  12/10/2020    Procedure: PYELOGRAM, ANTEGRADE;  Surgeon: Dave Shin MD;  Location: [facility] OR [address] [place] [address];  Service: Urology;;    APPENDECTOMY      BACK SURGERY  2025    CYSTOSCOPY  12/10/2020    Procedure: CYSTOSCOPY;  Surgeon: Dave Shin MD;  [facility];  Service: Urology;;    CYSTOSCOPY W/ RETROGRADES Right 09/09/2020    Procedure: CYSTOSCOPY, WITH RETROGRADE PYELOGRAM;  Surgeon: Chris Garcia Jr., MD;  Location: [facility] OR;  Service: Urology;  Laterality: Right;    DILATION OF NEPHROSTOMY TRACT Right 12/10/2020    Procedure: DILATION, NEPHROSTOMY TRACT;  Surgeon: Dave Shin MD;  [facility];  Service: Urology;  Laterality: Right;    RADIOFREQUENCY ABLATION KIDNEY  2022    REMOVAL OF DRAIN Right 12/10/2020    Procedure: REMOVAL, DRAIN-NEPHROSTOMY TUBE;  Surgeon: Dave Shin MD;  Location: [facility] OR 1ST FLR;  Service: Urology;  Laterality: Right;    REPLACEMENT OF NEPHROSTOMY TUBE Right 12/10/2020    Procedure: REPLACEMENT, NEPHROSTOMY TUBE;  Surgeon: Dave Shin MD;  Location: [facility] OR 1ST FLR;  Service: Urology;  Laterality: Right;    ROBOT-ASSISTED LAPAROSCOPIC REIMPLANTATION OF URETER USING DA PHU XI Right 01/08/2021    Procedure: XI ROBOTIC IMPLANTATION, URETER, USING PSOAS HITCH TECHNIQUE;  Surgeon: Dave Shin MD;  Location: [facility] OR 2ND FLR;  Service: Urology;  Laterality: Right;  4hrs gen with regional    SPINE SURGERY      URETEROSCOPY Right 12/10/2020    Procedure: URETEROSCOPY-ANTEGRADE;  Surgeon: Dave Shin MD;  Location: [facility] OR 1ST FLR;  Service: Urology;   "Laterality: Right;    URETHROSCOPY  09/09/2020    Procedure: URETHROSCOPY;  Surgeon: Chris Garcia Jr., MD;  Location: [facility] OR;  Service: Urology;;     Family History   Problem Relation Name Age of Onset    Heart disease Mother      Heart disease Father      Stroke Father      Heart disease Maternal Grandfather      Heart disease Paternal Grandmother      Heart disease Paternal Grandfather         Objective:      Physical Exam  Cardiovascular:      Pulses:           Dorsalis pedis pulses are 1+ on the right side and 1+ on the left side.   Musculoskeletal:      Right foot: Normal range of motion. Deformity (pes cavus) present.      Left foot: Normal range of motion. Deformity (pes cavus) present.   Feet:      Right foot:      Protective Sensation: 5 sites tested.  5 sites sensed.      Skin integrity: No ulcer or blister.      Toenail Condition: Right toenails are abnormally thick.      Left foot:      Protective Sensation: 5 sites tested.  5 sites sensed.      Skin integrity: No ulcer or blister.      Toenail Condition: Left toenails are abnormally thick.      Comments: Dystrophic toe nails Big Toe      Blood pressure 132/62, pulse 63, height 5' 11" (1.803 m), weight 80 kg (176 lb 5.9 oz). Body mass index is 24.6 kg/m².  Physical Exam    Vitals: Blood pressure: 132/62.  General: No acute distress. Well-developed. Well-nourished.  Eyes: EOMI. Sclerae anicteric.  Cardiovascular: Regular rate. Regular rhythm. No murmurs. No rubs. No gallops. Normal S1, S2.  Respiratory: Normal respiratory effort. Clear to auscultation bilaterally. No rales. No rhonchi. No wheezing.  Musculoskeletal: No  obvious deformity.  Extremities: No lower extremity edema.  Neurological: Alert & oriented to context and situation.  Psychiatric:  Appropriate and euthymic Skin: Warm. Dry.  Diabetic Foot: Cold sensation in feet. Swelling in feet.  Arch deformity.  Please note above          Heart rhythm obtained from examiner's apple watch show " slightly irregular rhythm but sinus.  Artifacts also noted.       Assessment:       Lab Visit on 05/06/2025   Component Date Value Ref Range Status    Hemoglobin A1c 05/06/2025 6.5 (H)  4.0 - 5.6 % Final    Estimated Average Glucose 05/06/2025 140 (H)  68 - 131 mg/dL Final    Sodium 05/06/2025 138  136 - 145 mmol/L Final    Potassium 05/06/2025 4.5  3.5 - 5.1 mmol/L Final    Chloride 05/06/2025 109  95 - 110 mmol/L Final    CO2 05/06/2025 21 (L)  23 - 29 mmol/L Final    Glucose 05/06/2025 70  70 - 110 mg/dL Final    BUN 05/06/2025 36 (H)  8 - 23 mg/dL Final    Creatinine 05/06/2025 1.7 (H)  0.5 - 1.4 mg/dL Final    Calcium 05/06/2025 9.0  8.7 - 10.5 mg/dL Final    Albumin 05/06/2025 3.7  3.5 - 5.2 g/dL Final    Phosphorus Level 05/06/2025 3.5  2.7 - 4.5 mg/dL Final    Anion Gap 05/06/2025 8  8 - 16 mmol/L Final    eGFR 05/06/2025 40 (L)  >60 mL/min/1.73/m2 Final     Component  Ref Range & Units (hover) 4 mo ago  (1/2/25) 8 mo ago  (8/13/24) 1 yr ago  (3/20/24) 1 yr ago  (9/7/23) 1 yr ago  (6/21/23) 2 yr ago  (3/15/23) 2 yr ago  (1/10/23)   Hemoglobin A1C 6.7 High  6.3 High  CM 6.8 High  CM 6.6 High  CM 6.4 High  CM 6.8 High  CM 6.7 High      Component  Ref Range & Units (hover) 3 mo ago  (1/8/25) 7 mo ago  (9/18/24) 1 yr ago  (3/20/24) 1 yr ago  (12/5/23) 1 yr ago  (9/7/23) 1 yr ago  (6/21/23) 2 yr ago  (3/15/23)   Uric Acid 6.3 7.6 High  7.6 High  5.7 6.5 7.8 High  6.4       Assessment & Plan    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  - Creatinine 1.7 and GFR 40 mL/min, improved  from January.  - Hemoglobin 12.2 and hematocrit 36.7, indicating mild anemia.  - Uric acid level 6.3, best so far.  - Performed foot exam and sensory testing.  - Slight swelling in foot, possibly due to fluid retention.  - Performed EKG using Apple Watch, noting occasional early or delayed beats, overall not concerning.    E11.21 TYPE 2 DIABETES MELLITUS WITH DIABETIC NEPHROPATHY:  - Monitored blood sugar running in the 120s-130s at home, with glucose level of 75 in January.  - Ordered A1C test to assess glycemic control.  - Explained importance of regular eye check-ups and proper nail care for foot health.  - Continued Tresiba, a long-acting insulin, 14 units daily.    I12.9 HYPERTENSIVE CHRONIC KIDNEY DISEASE WITH STAGE 1 THROUGH STAGE 4 CHRONIC KIDNEY DISEASE, OR UNSPECIFIED CHRONIC KIDNEY DISEASE:  - Blood pressure today measured at 132/62.  - Instructed patient to continue home BP monitoring.  - Continued current antihypertensive regimen of Amlodipine 5 mg, Losartan 100 mg, and Indapamide 1.25 mg.  - Will check laboratory studies for kidney function assessment.    N18.32 CHRONIC KIDNEY DISEASE, STAGE 3B:  - Creatinine improved to 1.7 in January (best so far), with GFR of 40 mL/min.  - Continued sodium bicarbonate tablet twice daily.  - Will monitor kidney function with upcoming laboratory studies.    E78.5 HYPERLIPIDEMIA, UNSPECIFIED:  - Continued Atorvastatin 10 mg for cholesterol management.  - Will check cholesterol levels at next laboratory assessment.    M10.9 GOUT, UNSPECIFIED:  - Continued Allopurinol for gout management.    D64.9 ANEMIA, UNSPECIFIED:  - Monitored mild anemia from January with hemoglobin of 12.2 and hematocrit of 36.7.  - Will check complete blood counts in upcoming laboratory studies.    R00.2 PALPITATIONS:  - Jamil reports pulse occasionally skipping beats.  - EKG using Apple Watch showed irregular heartbeat pattern.    R60.0 LOCALIZED EDEMA:  - Observed mild swelling, possibly fluid  retention.  - Advised patient to take more ice pills and possibly one extra diuretic (Indapamide) per week to manage edema.    R20.8 OTHER DISTURBANCES OF SKIN SENSATION:  - Jamil reports feeling cold in certain areas, with some areas feeling less cold.    Z79.4 LONG TERM (CURRENT) USE OF INSULIN:  - Current insulin regimen includes Tresiba 14 units at night and Novolog 4 units in the morning and 4 units at night for diabetes management.         Plan:   Type 2 diabetes mellitus with stage 3b chronic kidney disease, with long-term current use of insulin  Comments:  Check labs for next visit.  Insulin regimen reviewed.  Blood sugars from home reviewed.  Orders:  -     Hemoglobin A1C; Future; Expected date: 05/07/2025  -     insulin aspart U-100 (NOVOLOG FLEXPEN U-100 INSULIN) 100 unit/mL (3 mL) InPn pen; Inject 10 Units into the skin 3 (three) times daily with meals. Check glucose before meals and then take insulin as per sliding scale  Dispense: 5 each; Refill: 3  -     insulin degludec (TRESIBA FLEXTOUCH U-100) 100 unit/mL (3 mL) insulin pen; Inject 15 Units into the skin every evening.  Dispense: 15 mL; Refill: 3  -     Ambulatory referral/consult to Ophthalmology; Future; Expected date: 05/20/2025    Essential hypertension  Comments:  Currently on amlodipine 5 mg and losartan 100 mg.  Continue same.    History of renal cell carcinoma status post partial nephrectomy  Comments:  He will be following up with Dr. Shin and has a long history dealing with this issue.    Stage 3b chronic kidney disease  Comments:  GFR is approximately 30.  Follow up with Nephrology.  He takes sodium bicarbonate.  BP control/dm control important  Orders:  -     Renal Function Panel; Future; Expected date: 05/20/2025    Polyneuropathy  Comments:  Diminished sensation of temperature in the feet.    Secondary hyperparathyroidism of renal origin  Comments:  He is currently not on any medication like Cinacalcet    Aortic  atherosclerosis  Comments:  Continue secondary preventive measures.    History of poliomyelitis  Comments:  Mild weakness in legs and deformity in the feet.    Chronic medical issues has been noted.  Control of diabetes reviewed with use of NovoLog and Tresiba and blood sugars from home also reviewed showing reasonably good control.  No hypoglycemic episodes detected.  Blood pressure control appropriate.  Foot examination done today with some evidence of neuropathy especially with diminished sensations of temperature on the right foot.  Chronic kidney disease stage IIIB noted with some improvement in renal functions which indicates that a final staging yet could be pending assuming good hydration and removing all offending agents.  His previous nephrologist (Dr. Manjeet Decker) has  and he will be shifting his care to Dr. Perry.  I have advised the patient to try to do all the labs within the [facility] system so that we also get a timely received.  Immunizations reviewed  He is pending a diabetic eye checkup.  Labs has been reviewed today and his A1c 6.5 indicating a decent control and his creatinine is stable at 1.7 with a GFR of 40 indicating stable status of CKD stage IIIB.  Follow up with Nephrology also.    Follow up in about 4 months (around 2025), or if symptoms worsen or fail to improve, for Diabetes/HTN/Lipids.    Current Medications[2]    This note was generated with the assistance of ambient listening technology. Verbal consent was obtained by the patient and accompanying visitor(s) for the recording of patient appointment to facilitate this note. I attest to having reviewed and edited the generated note for accuracy, though some syntax or spelling errors may persist. Please contact the author of this note for any clarification.      Richard Gunter    Visit today included increased complexity associated with the care of the episodic problem HTN DM addressed and managing the longitudinal care of the  "patient due to the serious and/or complex managed problem(s) HTN DM.          [1]   Social History  Socioeconomic History    Marital status:    Tobacco Use    Smoking status: Never    Smokeless tobacco: Never   Substance and Sexual Activity    Alcohol use: No    Drug use: No    Sexual activity: Yes     Partners: Female     Social Drivers of Health     Financial Resource Strain: Low Risk  (8/15/2024)    Overall Financial Resource Strain (CARDIA)     Difficulty of Paying Living Expenses: Not hard at all   Food Insecurity: No Food Insecurity (8/15/2024)    Hunger Vital Sign     Worried About Running Out of Food in the Last Year: Never true     Ran Out of Food in the Last Year: Never true   Transportation Needs: No Transportation Needs (1/2/2024)    PRAPARE - Transportation     Lack of Transportation (Medical): No     Lack of Transportation (Non-Medical): No   Physical Activity: Sufficiently Active (8/15/2024)    Exercise Vital Sign     Days of Exercise per Week: 7 days     Minutes of Exercise per Session: 150+ min   Stress: No Stress Concern Present (8/15/2024)    [employer] - Occupational Stress Questionnaire     Feeling of Stress : Not at all   Housing Stability: Low Risk  (1/2/2024)    Housing Stability Vital Sign     Unable to Pay for Housing in the Last Year: No     Number of Places Lived in the Last Year: 1     Unstable Housing in the Last Year: No   [2]   Current Outpatient Medications:     allopurinoL (ZYLOPRIM) 100 MG tablet, Take 100 mg by mouth., Disp: , Rfl:     amLODIPine (NORVASC) 5 MG tablet, TAKE 1 TABLET BY MOUTH EVERY DAY IN THE EVENING, Disp: 90 tablet, Rfl: 3    ascorbic acid, vitamin C, (VITAMIN C) 500 MG tablet, Take 500 mg by mouth once daily., Disp: , Rfl:     atorvastatin (LIPITOR) 10 MG tablet, Take 10 mg by mouth., Disp: , Rfl:     BD WINSOME 2ND GEN PEN NEEDLE 32 gauge x 5/32" Ndle, INJECT 1 UNITS INTO THE SKIN 3 (THREE) TIMES DAILY BEFORE MEALS. USE AS " DIRECTED NON-MED ITEM, Disp: 300 each, Rfl: 3    calcitRIOL (ROCALTROL) 0.25 MCG Cap, TAKE 1 CAPSULE (0.25 MCG TOTAL) BY MOUTH 3 (THREE) TIMES A WEEK MONDAY/WEDNESDAY/FRIDAY, Disp: , Rfl:     cyanocobalamin (VITAMIN B-12) 2000 MCG tablet, Take 2,000 mcg by mouth once daily., Disp: , Rfl:     indapamide (LOZOL) 1.25 MG Tab, Take 1.25 mg by mouth once daily., Disp: , Rfl:     losartan (COZAAR) 100 MG tablet, TAKE 1 TABLET BY MOUTH EVERY DAY, Disp: 90 tablet, Rfl: 3    sodium bicarbonate 650 MG tablet, Take 650 mg by mouth once daily., Disp: , Rfl:     traZODone (DESYREL) 100 MG tablet, TAKE 1 TABLET BY MOUTH EVERY EVENING (DOSAGE INCREASE), Disp: 90 tablet, Rfl: 3    TRUE METRIX GLUCOSE TEST STRIP Strp, TEST 3 (THREE) TIMES DAILY BEFORE MEALS., Disp: 300 strip, Rfl: 1    zinc gluconate 50 mg tablet, Take 50 mg by mouth once daily., Disp: , Rfl:     blood-glucose meter (TRUE METRIX GLUCOSE METER) Misc, 1 Device by Misc.(Non-Drug; Combo Route) route once daily., Disp: 1 each, Rfl: 0    insulin aspart U-100 (NOVOLOG FLEXPEN U-100 INSULIN) 100 unit/mL (3 mL) InPn pen, Inject 10 Units into the skin 3 (three) times daily with meals. Check glucose before meals and then take insulin as per sliding scale, Disp: 5 each, Rfl: 3    insulin degludec (TRESIBA FLEXTOUCH U-100) 100 unit/mL (3 mL) insulin pen, Inject 15 Units into the skin every evening., Disp: 15 mL, Rfl: 3

## 2025-05-07 NOTE — PROGRESS NOTES
Your A1c level is 6.5 which is decent.  Chemistry test is okay with creatinine being stable at 1.7.  Other electrolytes are okay.  In view of chronic kidney disease, please remain hydrated let us keep a follow up with Dr. Perry also.

## 2025-05-30 ENCOUNTER — PATIENT MESSAGE (OUTPATIENT)
Dept: FAMILY MEDICINE | Facility: CLINIC | Age: 80
End: 2025-05-30
Payer: MEDICARE

## 2025-06-24 ENCOUNTER — TELEPHONE (OUTPATIENT)
Dept: FAMILY MEDICINE | Facility: CLINIC | Age: 80
End: 2025-06-24
Payer: MEDICARE

## 2025-06-24 NOTE — TELEPHONE ENCOUNTER
----- Message from Nia sent at 6/24/2025 11:08 AM CDT -----  Contact: Meron  Wife Meron calling in regarding to refill a prescription on an antibiotic. Due to urine being dark believes patient has an bladder infection.   Normally uses [facility] however would like to use [facility] this time   [phone]

## 2025-06-25 ENCOUNTER — HOSPITAL ENCOUNTER (INPATIENT)
Facility: HOSPITAL | Age: 80
LOS: 4 days | Discharge: HOME | DRG: 418 | End: 2025-06-29
Attending: STUDENT IN AN ORGANIZED HEALTH CARE EDUCATION/TRAINING PROGRAM | Admitting: INTERNAL MEDICINE
Payer: MEDICARE

## 2025-06-25 ENCOUNTER — OFFICE VISIT (OUTPATIENT)
Dept: URGENT CARE | Facility: CLINIC | Age: 80
End: 2025-06-25
Payer: MEDICARE

## 2025-06-25 VITALS
OXYGEN SATURATION: 97 % | WEIGHT: 170 LBS | SYSTOLIC BLOOD PRESSURE: 125 MMHG | DIASTOLIC BLOOD PRESSURE: 63 MMHG | BODY MASS INDEX: 23.8 KG/M2 | TEMPERATURE: 98 F | HEIGHT: 71 IN | HEART RATE: 84 BPM | RESPIRATION RATE: 20 BRPM

## 2025-06-25 DIAGNOSIS — N17.9 AKI (ACUTE KIDNEY INJURY): ICD-10-CM

## 2025-06-25 DIAGNOSIS — R07.9 CHEST PAIN: ICD-10-CM

## 2025-06-25 DIAGNOSIS — R14.0 ABDOMINAL DISTENTION: ICD-10-CM

## 2025-06-25 DIAGNOSIS — R11.2 NAUSEA AND VOMITING, UNSPECIFIED VOMITING TYPE: ICD-10-CM

## 2025-06-25 DIAGNOSIS — R50.9 FEVER, UNSPECIFIED FEVER CAUSE: ICD-10-CM

## 2025-06-25 DIAGNOSIS — Z13.6 SCREENING FOR CARDIOVASCULAR CONDITION: ICD-10-CM

## 2025-06-25 DIAGNOSIS — K81.9 CHOLECYSTITIS: Primary | ICD-10-CM

## 2025-06-25 DIAGNOSIS — G89.18 POST-OP PAIN: ICD-10-CM

## 2025-06-25 DIAGNOSIS — K83.8 DILATED BILE DUCT: ICD-10-CM

## 2025-06-25 DIAGNOSIS — K81.0 ACUTE CHOLECYSTITIS: ICD-10-CM

## 2025-06-25 DIAGNOSIS — R17 JAUNDICE: ICD-10-CM

## 2025-06-25 DIAGNOSIS — R10.9 ABDOMINAL PAIN, UNSPECIFIED ABDOMINAL LOCATION: Primary | ICD-10-CM

## 2025-06-25 PROBLEM — K80.00 CHOLELITHIASIS WITH ACUTE CHOLECYSTITIS: Status: ACTIVE | Noted: 2025-06-25

## 2025-06-25 LAB
ABSOLUTE EOSINOPHIL (SMH): 0.04 K/UL
ABSOLUTE MONOCYTE (SMH): 0.97 K/UL (ref 0.3–1)
ABSOLUTE NEUTROPHIL COUNT (SMH): 6.2 K/UL (ref 1.8–7.7)
ALBUMIN SERPL-MCNC: 3.7 G/DL (ref 3.5–5.2)
ALP SERPL-CCNC: 158 UNIT/L (ref 55–135)
ALT SERPL-CCNC: 214 UNIT/L (ref 10–44)
ANION GAP (SMH): 12 MMOL/L (ref 8–16)
AST SERPL-CCNC: 65 UNIT/L (ref 10–40)
BASOPHILS # BLD AUTO: 0.01 K/UL
BASOPHILS NFR BLD AUTO: 0.1 %
BILIRUB SERPL-MCNC: 5.9 MG/DL (ref 0.1–1)
BILIRUBIN, UA POC OHS: ABNORMAL
BLOOD, UA POC OHS: NEGATIVE
BNP SERPL-MCNC: 70 PG/ML
BUN SERPL-MCNC: 49 MG/DL (ref 8–23)
CALCIUM SERPL-MCNC: 9.2 MG/DL (ref 8.7–10.5)
CHLORIDE SERPL-SCNC: 99 MMOL/L (ref 95–110)
CK SERPL-CCNC: 59 U/L (ref 20–200)
CLARITY, UA POC OHS: ABNORMAL
CO2 SERPL-SCNC: 21 MMOL/L (ref 23–29)
COLOR, UA POC OHS: ABNORMAL
CREAT SERPL-MCNC: 2.2 MG/DL (ref 0.5–1.4)
CREAT SERPL-MCNC: 2.4 MG/DL (ref 0.5–1.4)
ERYTHROCYTE [DISTWIDTH] IN BLOOD BY AUTOMATED COUNT: 12.5 % (ref 11.5–14.5)
GFR SERPLBLD CREATININE-BSD FMLA CKD-EPI: 29 ML/MIN/1.73/M2
GLUCOSE SERPL-MCNC: 139 MG/DL (ref 70–110)
GLUCOSE, UA POC OHS: NEGATIVE
HCT VFR BLD AUTO: 35.5 % (ref 40–54)
HCV AB SERPL QL IA: NORMAL
HGB BLD-MCNC: 12 GM/DL (ref 14–18)
HIV 1+2 AB+HIV1 P24 AG SERPL QL IA: NORMAL
IMM GRANULOCYTES # BLD AUTO: 0.05 K/UL (ref 0–0.04)
IMM GRANULOCYTES NFR BLD AUTO: 0.6 % (ref 0–0.5)
KETONES, UA POC OHS: NEGATIVE
LACTATE SERPL-SCNC: 0.9 MMOL/L (ref 0.5–1.9)
LDH SERPL L TO P-CCNC: 1.44 MMOL/L (ref 0.5–2.2)
LEUKOCYTES, UA POC OHS: NEGATIVE
LYMPHOCYTES # BLD AUTO: 0.49 K/UL (ref 1–4.8)
MCH RBC QN AUTO: 31.8 PG (ref 27–31)
MCHC RBC AUTO-ENTMCNC: 33.8 G/DL (ref 32–36)
MCV RBC AUTO: 94 FL (ref 82–98)
NITRITE, UA POC OHS: NEGATIVE
NUCLEATED RBC (/100WBC) (SMH): 0 /100 WBC
OHS QRS DURATION: 126 MS
OHS QTC CALCULATION: 451 MS
PH, UA POC OHS: 6
PLATELET # BLD AUTO: 176 K/UL (ref 150–450)
PMV BLD AUTO: 11.3 FL (ref 9.2–12.9)
POTASSIUM SERPL-SCNC: 3.3 MMOL/L (ref 3.5–5.1)
PROT SERPL-MCNC: 6.9 GM/DL (ref 6–8.4)
PROTEIN, UA POC OHS: 100
RBC # BLD AUTO: 3.77 M/UL (ref 4.6–6.2)
RELATIVE EOSINOPHIL (SMH): 0.5 % (ref 0–8)
RELATIVE LYMPHOCYTE (SMH): 6.3 % (ref 18–48)
RELATIVE MONOCYTE (SMH): 12.5 % (ref 4–15)
RELATIVE NEUTROPHIL (SMH): 80 % (ref 38–73)
SAMPLE: ABNORMAL
SAMPLE: NORMAL
SODIUM SERPL-SCNC: 132 MMOL/L (ref 136–145)
SPECIFIC GRAVITY, UA POC OHS: 1.02
TROPONIN HIGH SENSITIVE (SMH): 15.4 PG/ML
UROBILINOGEN, UA POC OHS: 1
WBC # BLD AUTO: 7.77 K/UL (ref 3.9–12.7)

## 2025-06-25 PROCEDURE — 93005 ELECTROCARDIOGRAM TRACING: CPT | Performed by: INTERNAL MEDICINE

## 2025-06-25 PROCEDURE — 36415 COLL VENOUS BLD VENIPUNCTURE: CPT | Performed by: EMERGENCY MEDICINE

## 2025-06-25 PROCEDURE — 25000003 PHARM REV CODE 250: Performed by: INTERNAL MEDICINE

## 2025-06-25 PROCEDURE — 99205 OFFICE O/P NEW HI 60 MIN: CPT | Mod: S$GLB,,,

## 2025-06-25 PROCEDURE — 86803 HEPATITIS C AB TEST: CPT | Performed by: EMERGENCY MEDICINE

## 2025-06-25 PROCEDURE — 85025 COMPLETE CBC W/AUTO DIFF WBC: CPT | Performed by: EMERGENCY MEDICINE

## 2025-06-25 PROCEDURE — 96375 TX/PRO/DX INJ NEW DRUG ADDON: CPT

## 2025-06-25 PROCEDURE — 63600175 PHARM REV CODE 636 W HCPCS: Performed by: STUDENT IN AN ORGANIZED HEALTH CARE EDUCATION/TRAINING PROGRAM

## 2025-06-25 PROCEDURE — 63600175 PHARM REV CODE 636 W HCPCS: Mod: JZ | Performed by: INTERNAL MEDICINE

## 2025-06-25 PROCEDURE — 83880 ASSAY OF NATRIURETIC PEPTIDE: CPT | Performed by: STUDENT IN AN ORGANIZED HEALTH CARE EDUCATION/TRAINING PROGRAM

## 2025-06-25 PROCEDURE — 93010 ELECTROCARDIOGRAM REPORT: CPT | Mod: ,,, | Performed by: INTERNAL MEDICINE

## 2025-06-25 PROCEDURE — 99291 CRITICAL CARE FIRST HOUR: CPT

## 2025-06-25 PROCEDURE — 87040 BLOOD CULTURE FOR BACTERIA: CPT | Performed by: EMERGENCY MEDICINE

## 2025-06-25 PROCEDURE — 25000003 PHARM REV CODE 250: Performed by: STUDENT IN AN ORGANIZED HEALTH CARE EDUCATION/TRAINING PROGRAM

## 2025-06-25 PROCEDURE — 80053 COMPREHEN METABOLIC PANEL: CPT | Performed by: EMERGENCY MEDICINE

## 2025-06-25 PROCEDURE — 84484 ASSAY OF TROPONIN QUANT: CPT | Performed by: STUDENT IN AN ORGANIZED HEALTH CARE EDUCATION/TRAINING PROGRAM

## 2025-06-25 PROCEDURE — 87389 HIV-1 AG W/HIV-1&-2 AB AG IA: CPT | Performed by: EMERGENCY MEDICINE

## 2025-06-25 PROCEDURE — 93010 ELECTROCARDIOGRAM REPORT: CPT | Mod: 76,,, | Performed by: INTERNAL MEDICINE

## 2025-06-25 PROCEDURE — 96365 THER/PROPH/DIAG IV INF INIT: CPT

## 2025-06-25 PROCEDURE — 11000001 HC ACUTE MED/SURG PRIVATE ROOM

## 2025-06-25 PROCEDURE — 83605 ASSAY OF LACTIC ACID: CPT | Performed by: EMERGENCY MEDICINE

## 2025-06-25 PROCEDURE — 81003 URINALYSIS AUTO W/O SCOPE: CPT | Mod: QW,S$GLB,,

## 2025-06-25 PROCEDURE — 82565 ASSAY OF CREATININE: CPT

## 2025-06-25 PROCEDURE — 82550 ASSAY OF CK (CPK): CPT | Performed by: STUDENT IN AN ORGANIZED HEALTH CARE EDUCATION/TRAINING PROGRAM

## 2025-06-25 RX ORDER — IBUPROFEN 200 MG
16 TABLET ORAL
Status: DISCONTINUED | OUTPATIENT
Start: 2025-06-25 | End: 2025-06-29 | Stop reason: HOSPADM

## 2025-06-25 RX ORDER — LANOLIN ALCOHOL/MO/W.PET/CERES
800 CREAM (GRAM) TOPICAL
Status: DISCONTINUED | OUTPATIENT
Start: 2025-06-25 | End: 2025-06-29 | Stop reason: HOSPADM

## 2025-06-25 RX ORDER — HYDROMORPHONE HYDROCHLORIDE 1 MG/ML
0.5 INJECTION, SOLUTION INTRAMUSCULAR; INTRAVENOUS; SUBCUTANEOUS
Status: DISCONTINUED | OUTPATIENT
Start: 2025-06-25 | End: 2025-06-27

## 2025-06-25 RX ORDER — SODIUM,POTASSIUM PHOSPHATES 280-250MG
2 POWDER IN PACKET (EA) ORAL
Status: DISCONTINUED | OUTPATIENT
Start: 2025-06-25 | End: 2025-06-29 | Stop reason: HOSPADM

## 2025-06-25 RX ORDER — FENTANYL CITRATE 50 UG/ML
25 INJECTION, SOLUTION INTRAMUSCULAR; INTRAVENOUS
Refills: 0 | Status: COMPLETED | OUTPATIENT
Start: 2025-06-25 | End: 2025-06-25

## 2025-06-25 RX ORDER — ALUMINUM HYDROXIDE, MAGNESIUM HYDROXIDE, AND SIMETHICONE 1200; 120; 1200 MG/30ML; MG/30ML; MG/30ML
30 SUSPENSION ORAL 4 TIMES DAILY PRN
Status: DISCONTINUED | OUTPATIENT
Start: 2025-06-25 | End: 2025-06-29 | Stop reason: HOSPADM

## 2025-06-25 RX ORDER — ACETAMINOPHEN 325 MG/1
650 TABLET ORAL EVERY 4 HOURS PRN
Status: DISCONTINUED | OUTPATIENT
Start: 2025-06-25 | End: 2025-06-29 | Stop reason: HOSPADM

## 2025-06-25 RX ORDER — TRAZODONE HYDROCHLORIDE 50 MG/1
50 TABLET ORAL NIGHTLY PRN
Status: DISCONTINUED | OUTPATIENT
Start: 2025-06-25 | End: 2025-06-29 | Stop reason: HOSPADM

## 2025-06-25 RX ORDER — ONDANSETRON HYDROCHLORIDE 2 MG/ML
4 INJECTION, SOLUTION INTRAVENOUS EVERY 6 HOURS PRN
Status: DISCONTINUED | OUTPATIENT
Start: 2025-06-25 | End: 2025-06-29 | Stop reason: HOSPADM

## 2025-06-25 RX ORDER — NALOXONE HCL 0.4 MG/ML
0.02 VIAL (ML) INJECTION
Status: DISCONTINUED | OUTPATIENT
Start: 2025-06-25 | End: 2025-06-29 | Stop reason: HOSPADM

## 2025-06-25 RX ORDER — TALC
6 POWDER (GRAM) TOPICAL NIGHTLY PRN
Status: DISCONTINUED | OUTPATIENT
Start: 2025-06-25 | End: 2025-06-29 | Stop reason: HOSPADM

## 2025-06-25 RX ORDER — IBUPROFEN 200 MG
24 TABLET ORAL
Status: DISCONTINUED | OUTPATIENT
Start: 2025-06-25 | End: 2025-06-29 | Stop reason: HOSPADM

## 2025-06-25 RX ORDER — SODIUM CHLORIDE 9 MG/ML
INJECTION, SOLUTION INTRAVENOUS CONTINUOUS
Status: DISCONTINUED | OUTPATIENT
Start: 2025-06-25 | End: 2025-06-29

## 2025-06-25 RX ORDER — ACETAMINOPHEN 325 MG/1
650 TABLET ORAL EVERY 8 HOURS PRN
Status: DISCONTINUED | OUTPATIENT
Start: 2025-06-25 | End: 2025-06-25

## 2025-06-25 RX ORDER — INSULIN ASPART 100 [IU]/ML
0-10 INJECTION, SOLUTION INTRAVENOUS; SUBCUTANEOUS EVERY 6 HOURS PRN
Status: DISCONTINUED | OUTPATIENT
Start: 2025-06-25 | End: 2025-06-28

## 2025-06-25 RX ORDER — GLUCAGON 1 MG
1 KIT INJECTION
Status: DISCONTINUED | OUTPATIENT
Start: 2025-06-25 | End: 2025-06-29 | Stop reason: HOSPADM

## 2025-06-25 RX ORDER — PANTOPRAZOLE SODIUM 40 MG/10ML
40 INJECTION, POWDER, LYOPHILIZED, FOR SOLUTION INTRAVENOUS DAILY
Status: DISCONTINUED | OUTPATIENT
Start: 2025-06-26 | End: 2025-06-28

## 2025-06-25 RX ADMIN — PIPERACILLIN SODIUM AND TAZOBACTAM SODIUM 4.5 G: 4; .5 INJECTION, POWDER, LYOPHILIZED, FOR SOLUTION INTRAVENOUS at 03:06

## 2025-06-25 RX ADMIN — HYDROMORPHONE HYDROCHLORIDE 0.5 MG: 1 INJECTION, SOLUTION INTRAMUSCULAR; INTRAVENOUS; SUBCUTANEOUS at 08:06

## 2025-06-25 RX ADMIN — SODIUM CHLORIDE: 9 INJECTION, SOLUTION INTRAVENOUS at 11:06

## 2025-06-25 RX ADMIN — HYDROMORPHONE HYDROCHLORIDE 0.5 MG: 1 INJECTION, SOLUTION INTRAMUSCULAR; INTRAVENOUS; SUBCUTANEOUS at 11:06

## 2025-06-25 RX ADMIN — FENTANYL CITRATE 25 MCG: 50 INJECTION INTRAMUSCULAR; INTRAVENOUS at 05:06

## 2025-06-25 NOTE — HPI
80 year old pt getting admitted with acute cholecystitis/Transaminitis/Jaundice  Pt has been suffering from abdominal pain/committing and fever for past 2 days  Today symptoms went worse. He went to Urgent care and was referred to ER  Imaging showed cholecystitis and MRCP was ordered from ER

## 2025-06-25 NOTE — PROGRESS NOTES
"Subjective:       Patient ID: Jamil Cadet is a 80 y.o. male.    Vitals:  height is 5' 11" (1.803 m) and weight is 77.1 kg (170 lb). His oral temperature is 98.3 °F (36.8 °C). His blood pressure is 125/63 and his pulse is 84. His respiration is 20 and oxygen saturation is 97%.     Chief Complaint: Abdominal Pain    This is a 80 y.o. male who presents today with a chief complaint of abdominal pain, fever (highest was 102), vomiting, dark colored urine after being outside for about 30 minutes 2 days ago. Pt states that he feels very weak.  Treated with tylenol with temporary relief of fever.  No appetite.  Abdominal pain as mostly resolved. States he has a history of kidney problems and is concerned with the dark colored urine.   Patient presents with:  Abdominal Pain         Abdominal Pain  This is a new problem. The current episode started in the past 7 days. The problem has been gradually worsening. The pain is at a severity of 5/10. The pain is moderate. The quality of the pain is sharp and aching. Associated symptoms include a fever and vomiting. He has tried acetaminophen for the symptoms. The treatment provided moderate relief.       Constitution: Positive for fatigue and fever.   HENT: Negative.     Neck: neck negative.   Cardiovascular: Negative.    Eyes: Negative.    Respiratory: Negative.     Gastrointestinal:  Positive for abdominal pain and vomiting.   Endocrine: negative.   Genitourinary: Negative.    Musculoskeletal: Negative.    Skin: Negative.    Allergic/Immunologic: Negative.    Neurological: Negative.    Hematologic/Lymphatic: Negative.    Psychiatric/Behavioral: Negative.             Objective:      Physical Exam   Constitutional: He is oriented to person, place, and time. He appears ill.   HENT:   Head: Normocephalic and atraumatic.   Ears:   Right Ear: External ear normal.   Left Ear: External ear normal.   Nose: Nose normal.   Eyes: Conjunctivae are normal. Pupils are equal, round, and " reactive to light. Extraocular movement intact   Neck: Neck supple.   Cardiovascular: Normal rate, regular rhythm, normal heart sounds and normal pulses.   Pulmonary/Chest: Effort normal and breath sounds normal.   Abdominal: Normal appearance. He exhibits distension. There is abdominal tenderness.   Musculoskeletal:      Comments: Generalized weakness, pt walks at a much slower pace than normal (according to spouse)   Neurological: no focal deficit. He is alert, oriented to person, place, and time and at baseline.   Skin: jaundice  Nursing note and vitals reviewed.        Past medical history and current medications reviewed.       Assessment:           1. Abdominal pain, unspecified abdominal location    2. Jaundice    3. Fever, unspecified fever cause    4. Nausea and vomiting, unspecified vomiting type    5. Abdominal distention              Plan:         Abdominal pain, unspecified abdominal location  -     POCT Urinalysis(Instrument)  -     Refer to Emergency Dept.    Jaundice  -     Refer to Emergency Dept.    Fever, unspecified fever cause  -     Refer to Emergency Dept.    Nausea and vomiting, unspecified vomiting type  -     Refer to Emergency Dept.    Abdominal distention  -     Refer to Emergency Dept.             There are no Patient Instructions on file for this visit.               Advised pt to go directly to ER. Pt declines EMS wife states that she will transport pt to [facility], Report given to RN at 1231

## 2025-06-25 NOTE — H&P
[facility] - Emergency Dept  Hospital Medicine  History & Physical    Patient Name: Jamil Cadet  MRN: [id number]  Patient Class: OP- Observation  Admission Date: 6/25/2025  Attending Physician: Stewart Grant MD   Primary Care Provider: Richard Gunter MD         Patient information was obtained from patient, past medical records, ER records, and ER MD .     Subjective:     Principal Problem:Cholelithiasis with acute cholecystitis    Chief Complaint:   Chief Complaint   Patient presents with    Fatigue     SENT FROM  FOR EVAL OF JAUNDICE    Vomiting    Abdominal Pain    Fever        HPI: 80 year old pt getting admitted with acute cholecystitis/Transaminitis/Jaundice  Pt has been suffering from abdominal pain/committing and fever for past 2 days  Today symptoms went worse. He went to Urgent care and was referred to ER  Imaging showed cholecystitis and MRCP was ordered from ER       Past Medical History:   Diagnosis Date    Acute renal failure 9/7/2020    Bacteremia due to Enterococcus 10/25/2020    Cancer     Diabetes mellitus, type 2     Disorder of kidney and ureter     Encounter for blood transfusion     History of renal cell carcinoma status post partial nephrectomy 9/7/2020    Hydronephrosis 9/9/2020    Hyperlipidemia     Hypertension     Infection and inflammatory reaction due to nephrostomy catheter, initial encounter 11/14/2020    Infection following a procedure, superficial incisional surgical site, subsequent encounter 12/26/2020    Polio     Pyelonephritis of right kidney     Septicemia due to enterococcus 11/15/2020       Past Surgical History:   Procedure Laterality Date    ANTEGRADE PYELOGRAM  12/10/2020    Procedure: PYELOGRAM, ANTEGRADE;  Surgeon: Dave Shin MD;  Location: [facility] OR [address] [place] [address];  Service: Urology;;    APPENDECTOMY      BACK SURGERY  2025    CYSTOSCOPY  12/10/2020    Procedure: CYSTOSCOPY;  Surgeon: Dave Shin MD;  Location: [facility] OR [address] [place] [address];  Service:  Urology;;    CYSTOSCOPY W/ RETROGRADES Right 09/09/2020    Procedure: CYSTOSCOPY, WITH RETROGRADE PYELOGRAM;  Surgeon: Chris Garcia Jr., MD;  Location: [facility];  Service: Urology;  Laterality: Right;    DILATION OF NEPHROSTOMY TRACT Right 12/10/2020    Procedure: DILATION, NEPHROSTOMY TRACT;  Surgeon: Dave Shin MD;  Location: [facility] OR 1ST FLR;  Service: Urology;  Laterality: Right;    RADIOFREQUENCY ABLATION KIDNEY  2022    REMOVAL OF DRAIN Right 12/10/2020    Procedure: REMOVAL, DRAIN-NEPHROSTOMY TUBE;  Surgeon: Dave Shin MD;  Location: [facility] OR 1ST FLR;  Service: Urology;  Laterality: Right;    REPLACEMENT OF NEPHROSTOMY TUBE Right 12/10/2020    Procedure: REPLACEMENT, NEPHROSTOMY TUBE;  Surgeon: Dave Shin MD;  Location: [facility] OR 1ST FLR;  Service: Urology;  Laterality: Right;    ROBOT-ASSISTED LAPAROSCOPIC REIMPLANTATION OF URETER USING DA PHU XI Right 01/08/2021    Procedure: XI ROBOTIC IMPLANTATION, URETER, USING PSOAS HITCH TECHNIQUE;  Surgeon: Dave Shin MD;  Location: [facility] OR 2ND FLR;  Service: Urology;  Laterality: Right;  4hrs gen with regional    SPINE SURGERY      URETEROSCOPY Right 12/10/2020    Procedure: URETEROSCOPY-ANTEGRADE;  Surgeon: Dave Shin MD;  Location: [facility] OR 1ST FLR;  Service: Urology;  Laterality: Right;    URETHROSCOPY  09/09/2020    Procedure: URETHROSCOPY;  Surgeon: Chris Garcia Jr., MD;  Location: [facility];  Service: Urology;;       Review of patient's allergies indicates:  No Known Allergies    No current facility-administered medications on file prior to encounter.     Current Outpatient Medications on File Prior to Encounter   Medication Sig    allopurinoL (ZYLOPRIM) 100 MG tablet Take 100 mg by mouth.    amLODIPine (NORVASC) 5 MG tablet TAKE 1 TABLET BY MOUTH EVERY DAY IN THE EVENING    ascorbic acid, vitamin C, (VITAMIN C) 500 MG tablet Take 500 mg by mouth once daily.    atorvastatin (LIPITOR) 10 MG tablet Take 10 mg by mouth every  "Mon, Wed, Fri.    BD WINSOME 2ND GEN PEN NEEDLE 32 gauge x 5/32" Ndle INJECT 1 UNITS INTO THE SKIN 3 (THREE) TIMES DAILY BEFORE MEALS. USE AS DIRECTED NON-MED ITEM    blood-glucose meter (TRUE METRIX GLUCOSE METER) Misc 1 Device by Misc.(Non-Drug; Combo Route) route once daily.    calcitRIOL (ROCALTROL) 0.25 MCG Cap TAKE 1 CAPSULE (0.25 MCG TOTAL) BY MOUTH 3 (THREE) TIMES A WEEK MONDAY/WEDNESDAY/FRIDAY    cyanocobalamin (VITAMIN B-12) 2000 MCG tablet Take 2,000 mcg by mouth once daily.    indapamide (LOZOL) 1.25 MG Tab Take 1.25 mg by mouth once daily.    insulin aspart U-100 (NOVOLOG FLEXPEN U-100 INSULIN) 100 unit/mL (3 mL) InPn pen Inject 10 Units into the skin 3 (three) times daily with meals. Check glucose before meals and then take insulin as per sliding scale (Patient taking differently: Inject 4 Units into the skin 3 (three) times daily with meals. Check glucose before meals and then take insulin as per sliding scale)    insulin degludec (TRESIBA FLEXTOUCH U-100) 100 unit/mL (3 mL) insulin pen Inject 15 Units into the skin every evening. (Patient taking differently: Inject 14 Units into the skin every evening.)    losartan (COZAAR) 100 MG tablet TAKE 1 TABLET BY MOUTH EVERY DAY    sodium bicarbonate 650 MG tablet Take 650 mg by mouth once daily.    traZODone (DESYREL) 100 MG tablet TAKE 1 TABLET BY MOUTH EVERY EVENING (DOSAGE INCREASE)    TRUE METRIX GLUCOSE TEST STRIP Strp TEST 3 (THREE) TIMES DAILY BEFORE MEALS.    zinc gluconate 50 mg tablet Take 50 mg by mouth once daily.     Family History       Problem Relation (Age of Onset)    Heart disease Mother, Father, Maternal Grandfather, Paternal Grandmother, Paternal Grandfather    Stroke Father          Tobacco Use    Smoking status: Never     Passive exposure: Past    Smokeless tobacco: Never   Substance and Sexual Activity    Alcohol use: No    Drug use: No    Sexual activity: Yes     Partners: Female     Review of Systems   Constitutional:  Positive for " fever. Negative for activity change and appetite change.   HENT:  Negative for congestion and dental problem.    Eyes:  Negative for discharge and itching.   Respiratory:  Negative for shortness of breath.    Cardiovascular:  Negative for chest pain.   Gastrointestinal:  Positive for abdominal pain and vomiting. Negative for abdominal distention.   Endocrine: Negative for cold intolerance.   Genitourinary:  Negative for difficulty urinating and dysuria.   Musculoskeletal:  Negative for arthralgias and back pain.   Skin:  Negative for color change.   Neurological:  Negative for dizziness and facial asymmetry.   Hematological:  Negative for adenopathy.   Psychiatric/Behavioral:  Negative for agitation and behavioral problems.      Objective:     Vital Signs (Most Recent):  Temp: 98.8 °F (37.1 °C) (06/25/25 1358)  Pulse: 74 (06/25/25 1632)  Resp: 18 (06/25/25 1705)  BP: 136/63 (06/25/25 1630)  SpO2: 97 % (06/25/25 1632) Vital Signs (24h Range):  Temp:  [98.3 °F (36.8 °C)-98.8 °F (37.1 °C)] 98.8 °F (37.1 °C)  Pulse:  [71-92] 74  Resp:  [18-20] 18  SpO2:  [95 %-100 %] 97 %  BP: (125-145)/(62-71) 136/63     Weight: 77.1 kg (170 lb)  Body mass index is 23.71 kg/m².     Physical Exam  Vitals and nursing note reviewed.   Constitutional:       Appearance: He is well-developed.   HENT:      Head: Atraumatic.      Right Ear: External ear normal.      Left Ear: External ear normal.      Nose: Nose normal.      Mouth/Throat:      Mouth: Mucous membranes are moist.   Eyes:      General: Scleral icterus present.      Extraocular Movements: Extraocular movements intact.   Cardiovascular:      Rate and Rhythm: Normal rate.   Pulmonary:      Effort: Pulmonary effort is normal.   Abdominal:      Palpations: Abdomen is soft.   Musculoskeletal:         General: Normal range of motion.      Cervical back: Full passive range of motion without pain and normal range of motion.   Skin:     General: Skin is warm.   Neurological:      Mental  Status: He is alert and oriented to person, place, and time.   Psychiatric:         Behavior: Behavior normal.                Significant Labs: All pertinent labs within the past 24 hours have been reviewed.  CBC:   Recent Labs   Lab 06/25/25  1503   WBC 7.77   HGB 12.0*   HCT 35.5*        CMP:   Recent Labs   Lab 06/25/25  1503   *   K 3.3*   CL 99   CO2 21*   *   BUN 49*   CREATININE 2.2*   CALCIUM 9.2   PROT 6.9   ALBUMIN 3.7   BILITOT 5.9*   ALKPHOS 158*   AST 65*   *   ANIONGAP 12       Significant Imaging: I have reviewed all pertinent imaging results/findings within the past 24 hours.  Assessment/Plan:     Assessment & Plan  Cholelithiasis with acute cholecystitis  Blood cultures  Iv abx  Surgeon consulted     MELODIE (acute kidney injury)  Maintain iv fluids .  Recent Labs     06/25/25  1503   CREATININE 2.2*   EGFRNORACEVR 29*       Common bile duct dilation  Per USS done today :. Common duct diameter of 8-10 mm, not significantly changed from 08/15/2024 MRI.   Pt has significant jaundice and labs showing transaminitis  Follow up MRCP  GI MD Consulted     Type 2 diabetes mellitus with other diabetic kidney complication  Maintain present insulin regime    History of renal cell carcinoma status post partial nephrectomy  Aware       VTE Risk Mitigation (From admission, onward)           Ordered     IP VTE HIGH RISK PATIENT  Once         06/25/25 1811     Place sequential compression device  Until discontinued         06/25/25 1811                                   On 06/25/2025, patient should be placed in hospital observation services under my care.             Stewart Grant MD  Department of Hospital Medicine  [facility] - Emergency Dept

## 2025-06-25 NOTE — ED PROVIDER NOTES
Encounter Date: 6/25/2025       History     Chief Complaint   Patient presents with    Fatigue     SENT FROM  FOR EVAL OF JAUNDICE    Vomiting    Abdominal Pain    Fever     80-year-old male sent to emergency department for vomiting, fever, epigastric abdominal pain and new jaundice, ongoing for 2 days    The history is provided by the patient, the spouse and medical records.     Review of patient's allergies indicates:  No Known Allergies  Past Medical History:   Diagnosis Date    Acute renal failure 9/7/2020    Bacteremia due to Enterococcus 10/25/2020    Cancer     Diabetes mellitus, type 2     Disorder of kidney and ureter     Encounter for blood transfusion     History of renal cell carcinoma status post partial nephrectomy 9/7/2020    Hydronephrosis 9/9/2020    Hyperlipidemia     Hypertension     Infection and inflammatory reaction due to nephrostomy catheter, initial encounter 11/14/2020    Infection following a procedure, superficial incisional surgical site, subsequent encounter 12/26/2020    Polio     Pyelonephritis of right kidney     Septicemia due to enterococcus 11/15/2020     Past Surgical History:   Procedure Laterality Date    ANTEGRADE PYELOGRAM  12/10/2020    Procedure: PYELOGRAM, ANTEGRADE;  Surgeon: Dave Shin MD;  Location: [address];  Service: Urology;;    APPENDECTOMY      BACK SURGERY  2025    CYSTOSCOPY  12/10/2020    Procedure: CYSTOSCOPY;  Surgeon: Dave Shin MD;  Location: [address];  Service: Urology;;    CYSTOSCOPY W/ RETROGRADES Right 09/09/2020    Procedure: CYSTOSCOPY, WITH RETROGRADE PYELOGRAM;  Surgeon: Chris Garcia Jr., MD;  Location: [facility];  Service: Urology;  Laterality: Right;    DILATION OF NEPHROSTOMY TRACT Right 12/10/2020    Procedure: DILATION, NEPHROSTOMY TRACT;  Surgeon: Dave Shin MD;  Location: [address];  Service: Urology;  Laterality: Right;    RADIOFREQUENCY ABLATION KIDNEY  2022    REMOVAL OF DRAIN Right 12/10/2020     Procedure: REMOVAL, DRAIN-NEPHROSTOMY TUBE;  Surgeon: Dave Shin MD;  Location: [facility] OR 1ST FLR;  Service: Urology;  Laterality: Right;    REPLACEMENT OF NEPHROSTOMY TUBE Right 12/10/2020    Procedure: REPLACEMENT, NEPHROSTOMY TUBE;  Surgeon: Dave Shin MD;  Location: [facility] OR 1ST FLR;  Service: Urology;  Laterality: Right;    ROBOT-ASSISTED LAPAROSCOPIC REIMPLANTATION OF URETER USING DA PHU XI Right 01/08/2021    Procedure: XI ROBOTIC IMPLANTATION, URETER, USING PSOAS HITCH TECHNIQUE;  Surgeon: Dave Shin MD;  Location: [facility] OR 2ND FLR;  Service: Urology;  Laterality: Right;  4hrs gen with regional    SPINE SURGERY      URETEROSCOPY Right 12/10/2020    Procedure: URETEROSCOPY-ANTEGRADE;  Surgeon: Dave Shin MD;  Location: [facility] OR 1ST FLR;  Service: Urology;  Laterality: Right;    URETHROSCOPY  09/09/2020    Procedure: URETHROSCOPY;  Surgeon: Chris Garcia Jr., MD;  Location: [facility] OR;  Service: Urology;;     Family History   Problem Relation Name Age of Onset    Heart disease Mother      Heart disease Father      Stroke Father      Heart disease Maternal Grandfather      Heart disease Paternal Grandmother      Heart disease Paternal Grandfather       Social History[1]  Review of Systems   All other systems reviewed and are negative.      Physical Exam     Initial Vitals [06/25/25 1358]   BP Pulse Resp Temp SpO2   129/71 92 19 98.8 °F (37.1 °C) 97 %      MAP       --         Physical Exam    Nursing note and vitals reviewed.  Constitutional: Vital signs are normal. He is not diaphoretic.  Non-toxic appearance.   HENT:   Head: Normocephalic.   Eyes: Scleral icterus is present.   Pulmonary/Chest: No stridor. No respiratory distress.   Bilateral chest rise   Abdominal: There is abdominal tenderness.   Epigastric tenderness to palpation There is no guarding.   Musculoskeletal:         General: No tenderness.      Cervical back: No rigidity.     Neurological: He is alert.   Skin: Skin  is warm and dry. No rash noted.   Psychiatric: His speech is normal. He is not actively hallucinating.   Not anxious  or agitated         ED Course   Critical Care    Date/Time: 6/25/2025 6:15 PM    Performed by: David Howell Jr., DO  Authorized by: David Howell Jr., DO  Direct patient critical care time: 10 minutes  Additional history critical care time: 10 minutes  Ordering / reviewing critical care time: 10 minutes  Documentation critical care time: 10 minutes  Consulting other physicians critical care time: 7 minutes  Total critical care time (exclusive of procedural time) : 47 minutes        Labs Reviewed   COMPREHENSIVE METABOLIC PANEL - Abnormal       Result Value    Sodium 132 (*)     Potassium 3.3 (*)     Chloride 99      CO2 21 (*)     Glucose 139 (*)     BUN 49 (*)     Creatinine 2.2 (*)     Calcium 9.2      Protein Total 6.9      Albumin 3.7      Bilirubin Total 5.9 (*)      (*)     AST 65 (*)      (*)     Anion Gap 12      eGFR 29 (*)    CBC WITH DIFFERENTIAL - Abnormal    WBC 7.77      RBC 3.77 (*)     Hgb 12.0 (*)     Hct 35.5 (*)     MCV 94      MCH 31.8 (*)     MCHC 33.8      RDW 12.5      Platelet Count 176      MPV 11.3      Nucleated RBC 0      Neut % 80.0 (*)     Lymph % 6.3 (*)     Mono % 12.5      Eos % 0.5      Basophil % 0.1      Imm Grans % 0.6 (*)     Neut # 6.2      Lymph # 0.49 (*)     Mono # 0.97      Eos # 0.04      Baso # 0.01      Imm Grans # 0.05 (*)    TROPONIN I HIGH SENSITIVITY - Abnormal    Troponin High Sensitive 15.4 (*)    ISTAT CREATININE - Abnormal    POC Creatinine 2.4 (*)     Sample VENOUS     CK - Normal    CPK 59     B-TYPE NATRIURETIC PEPTIDE - Normal    BNP 70     LACTIC ACID, PLASMA - Normal    Lactic Acid Level 0.9      Narrative:     Falsely low lactic acid results can be found in samples containing >=13.0 mg/dL total bilirubin and/or >=3.5 mg/dL direct bilirubin.    CULTURE, BLOOD   CULTURE, BLOOD   CBC W/ AUTO DIFFERENTIAL    Narrative:      The following orders were created for panel order CBC auto differential.  Procedure                               Abnormality         Status                     ---------                               -----------         ------                     CBC with Differential[[id number]]       Abnormal            Final result                 Please view results for these tests on the individual orders.   URINALYSIS, REFLEX TO URINE CULTURE   HEPATITIS C ANTIBODY   HEP C VIRUS HOLD SPECIMEN   HIV 1 / 2 ANTIBODY   HIV VIRUS CONFIRMATION HOLD SPECIMEN   ISTAT LACTATE    POC Lactate 1.44      Sample VENOUS     POCT LACTATE   POCT CREATININE   POCT GLUCOSE MONITORING CONTINUOUS        ECG Results              EKG 12-lead (Final result)        Collection Time Result Time QRS Duration OHS QTC Calculation    06/25/25 14:03:49 06/25/25 17:33:32 126 451                     Final result by Interface, Lab In [facility] (06/25/25 17:33:36)                   Narrative:    Test Reason : Z13.6,    Vent. Rate :  83 BPM     Atrial Rate :  83 BPM     P-R Int : 154 ms          QRS Dur : 126 ms      QT Int : 384 ms       P-R-T Axes :  52 106  -1 degrees    QTcB Int : 451 ms    Normal sinus rhythm with sinus arrhythmia  Right bundle branch block  Abnormal ECG  Confirmed by Asuncion Rolle ([id number]) on 6/25/2025 5:33:31 PM    Referred By: AAAREFERRAL SELF           Confirmed By: Asuncion Rolle                                  Imaging Results              CT Abdomen Pelvis  Without Contrast (Final result)  Result time 06/25/25 16:17:10      Final result by Adama Morales DO (06/25/25 16:17:10)                   Impression:      1. Distended gallbladder with calcified gallstones in the neck of the gallbladder.  There is mild pericholecystic fat stranding.  There is also mild thickening of the 1st and 2nd portions of the duodenum with perienteric fat stranding.  Findings could reflect cholecystitis, enteritis or combination there of.  2.  Small right pleural effusion.  3. Atelectasis and mild bronchiectasis of the right lung base.  4. Additional incidental observations as described.      Electronically signed by: Adama Morales  Date:    06/25/2025  Time:    16:17               Narrative:      CMS MANDATED QUALITY DATA - CT RADIATION - 436    All CT scans at this facility utilize dose modulation, iterative reconstruction, and/or weight based dosing when appropriate to reduce radiation dose to as low as reasonably achievable.    EXAMINATION:  CT ABDOMEN PELVIS WITHOUT CONTRAST    CLINICAL HISTORY:  Abdominal abscess/infection suspected;    TECHNIQUE:  CT abdomen and pelvis without IV or oral contrast.    COMPARISON:  CT abdomen pelvis 11/14/2021.    FINDINGS:  There is atelectasis and mild bronchiectasis of the right lung base.  Small right pleural effusion.  The heart is normal size.    The liver is normal size.  There are no gross hepatic lesions.  The gallbladder is distended.  There are calcified gallstones in the neck of the gallbladder.  There is mild pericholecystic fat stranding.  The common bile duct is within normal limits.  The pancreas, spleen and adrenal glands are unremarkable.  The kidneys are normal size.  There is focal scarring of the medial right kidney with fibrosis of the adjacent perinephric fat, similar to previous exam.  Bilateral small renal cysts again observed.  There is no hydronephrosis or nephrolithiasis.  The ureters are normal caliber without obstructions.  The bladder is fluid filled with no focal wall abnormalities.  Prostate gland is unremarkable.    There is diverticulosis of the sigmoid colon.  There is mild bowel wall thickening of the 1st and 2nd portion of duodenum with perienteric fat stranding.  Large and small bowel are otherwise normal caliber.  Stomach is mostly decompressed and unremarkable.    The abdominal aorta is normal caliber with calcified plaque formation.  There are no enlarged intra-abdominal  lymph nodes.  Small fat filled umbilical hernia.  There are degenerative changes of the spine.  There is no acute osseous abnormality.                                       X-Ray Chest AP Portable (Final result)  Result time 06/25/25 14:53:19   Procedure changed from X-Ray Chest PA And Lateral     Final result by Harish Paz MD (06/25/25 14:53:19)                   Impression:      1. Chronic elevation of the right hemidiaphragm with mild right basilar atelectasis.  2. Chronic bilateral rotator cuff tears.      Electronically signed by: Harish Paz  Date:    06/25/2025  Time:    14:53               Narrative:    EXAMINATION:  XR CHEST AP PORTABLE    CLINICAL HISTORY:  Sepsis;    COMPARISON:  August 2024    FINDINGS:  Heart size is normal.  The mediastinum is unremarkable.  The left lung is clear.  There is mild right basilar atelectasis with chronic elevation of the right hemidiaphragm.  No acute osseous abnormalities are identified.  Chronic bilateral rotator cuff tears are noted.                                       US Abdomen Limited (Final result)  Result time 06/25/25 14:53:50      Final result by Orville Barfield MD (06/25/25 14:53:50)                   Impression:      1. Distended gallbladder containing diffuse low-level internal echoes and cholelithiasis.  Gallbladder at similar imaging appearance on prior imaging studies dating back to CT 11/14/2020.  Clinical and laboratory correlation is needed to assess for potential acute cholecystitis, as sonographic Brumfield sign is reported as positive.  2. Common duct diameter of 8-10 mm, not significantly changed from 08/15/2024 MRI.  This could be related to patient's age.  Correlation with bilirubin level is requested to assess for any other evidence of common duct obstruction.  Further evaluation with MRCP can be considered.  3. Partially visualized right pleural effusion.  4. Right renal cyst.      Electronically signed by: Orville  Lico  Date:    06/25/2025  Time:    14:53               Narrative:    EXAMINATION:  US ABDOMEN LIMITED    CLINICAL HISTORY:  Abdominal pain;    COMPARISON:  MRI 01/10/2025, 08/15/2024, CT 11/14/2020    FINDINGS:  Liver maintains normal echogenicity without focal mass or intrahepatic bile duct dilation. Hepatopedal flow present in main portal vein.    Diffuse low-level internal echoes suggesting sludge lies throughout distended gallbladder.  Cholelithiasis measuring 17 mm is also present in the gallbladder.  No gallbladder wall thickening or pericholecystic fluid.  Common duct measures 8-10 mm in diameter.  This remains similar to that partially visualized on 08/15/2024 MRI.    Bowel gas obscures the pancreas.  Right kidney is free of hydronephrosis, containing multifocal simple cysts measuring up to 23 mm.  Visualized abdominal aorta is nonaneurysmal. Juxtahepatic IVC is unremarkable.  Graft    Right pleural effusion is partially visualized.                                       Medications   traZODone tablet 50 mg (has no administration in time range)   melatonin tablet 6 mg (has no administration in time range)   ondansetron injection 4 mg (has no administration in time range)   aluminum-magnesium hydroxide-simethicone 200-200-20 mg/5 mL suspension 30 mL (has no administration in time range)   acetaminophen tablet 650 mg (has no administration in time range)   naloxone 0.4 mg/mL injection 0.02 mg (has no administration in time range)   potassium bicarbonate disintegrating tablet 50 mEq (has no administration in time range)   potassium bicarbonate disintegrating tablet 35 mEq (has no administration in time range)   potassium bicarbonate disintegrating tablet 60 mEq (has no administration in time range)   magnesium oxide tablet 800 mg (has no administration in time range)   magnesium oxide tablet 800 mg (has no administration in time range)   potassium, sodium phosphates 280-160-250 mg packet 2 packet (has no  administration in time range)   potassium, sodium phosphates 280-160-250 mg packet 2 packet (has no administration in time range)   potassium, sodium phosphates 280-160-250 mg packet 2 packet (has no administration in time range)   glucose chewable tablet 16 g (has no administration in time range)   glucose chewable tablet 24 g (has no administration in time range)   dextrose 50% injection 12.5 g (has no administration in time range)   dextrose 50% injection 25 g (has no administration in time range)   glucagon (human recombinant) injection 1 mg (has no administration in time range)   insulin aspart U-100 pen 0-10 Units (has no administration in time range)   0.9% NaCl infusion (has no administration in time range)   pantoprazole injection 40 mg (has no administration in time range)   HYDROmorphone injection 0.5 mg (has no administration in time range)   piperacillin-tazobactam (ZOSYN) 4.5 g in D5W 100 mL IVPB (MB+) (has no administration in time range)   piperacillin-tazobactam (ZOSYN) 4.5 g in D5W 100 mL IVPB (MB+) (0 g Intravenous Stopped 6/25/25 1616)   fentaNYL 50 mcg/mL injection 25 mcg (25 mcg Intravenous Given 6/25/25 1705)     Medical Decision Making  80-year-old male sent to emergency department for vomiting, fever, epigastric abdominal pain and new jaundice, ongoing for 2 days      -workup demonstrates concern for cholecystitis with MELODIE    -also elevated LFTs and bilirubin  -discussed case with hospitalist, GI physician and general surgeon and recommended further workup with MRCP.    -patient and wife updated and agreeable with MRI and admission for further evaluation    -patient administered IV Zosyn here in ED    Amount and/or Complexity of Data Reviewed  Independent Historian: spouse  Labs: ordered. Decision-making details documented in ED Course.  Radiology: ordered.  ECG/medicine tests: ordered and independent interpretation performed.     Details: EKG rate 83, , , no STEMI  Discussion  of management or test interpretation with external provider(s): Spoke with Hospitalist Dr. Grant, Will admit for further management and evaluation  Hospitalist, general surgeon and GI physician    Risk  Prescription drug management.  Decision regarding hospitalization.               ED Course as of 06/25/25 1819 Wed Jun 25, 2025   1514 POC Creatinine(!): 2.4 [KB]   1514 POC Lactate: 1.44 [KB]   1651 Spoke with Hospitalist Dr. Grant, Will admit for further management and evaluation  GI physician  and general surgeon Dr. Clarke notified [KB]      ED Course User Index  [KB] David Howell Jr., DO                           Clinical Impression:  Final diagnoses:  [Z13.6] Screening for cardiovascular condition  [K81.9] Cholecystitis (Primary)  [K81.0] Acute cholecystitis  [N17.9] MELODIE (acute kidney injury)          ED Disposition Condition    Observation                         [1]   Social History  Tobacco Use    Smoking status: Never     Passive exposure: Past    Smokeless tobacco: Never   Substance Use Topics    Alcohol use: No    Drug use: No        David Howell Jr., DO  06/25/25 1819

## 2025-06-25 NOTE — Clinical Note
Diagnosis: Acute cholecystitis [575.0.ICD-9-CM]   Future Attending Provider: ABDIEL BRITO [[id number]]   Place in Observation: [facility] [[id number]]   Special Needs:: No Special Needs [1]

## 2025-06-25 NOTE — ASSESSMENT & PLAN NOTE
Per USS done today :. Common duct diameter of 8-10 mm, not significantly changed from 08/15/2024 MRI.   Pt has significant jaundice and labs showing transaminitis  Follow up MRCP  GI MD Consulted

## 2025-06-25 NOTE — SUBJECTIVE & OBJECTIVE
Past Medical History:   Diagnosis Date    Acute renal failure 9/7/2020    Bacteremia due to Enterococcus 10/25/2020    Cancer     Diabetes mellitus, type 2     Disorder of kidney and ureter     Encounter for blood transfusion     History of renal cell carcinoma status post partial nephrectomy 9/7/2020    Hydronephrosis 9/9/2020    Hyperlipidemia     Hypertension     Infection and inflammatory reaction due to nephrostomy catheter, initial encounter 11/14/2020    Infection following a procedure, superficial incisional surgical site, subsequent encounter 12/26/2020    Polio     Pyelonephritis of right kidney     Septicemia due to enterococcus 11/15/2020       Past Surgical History:   Procedure Laterality Date    ANTEGRADE PYELOGRAM  12/10/2020    Procedure: PYELOGRAM, ANTEGRADE;  Surgeon: Dave Shin MD;  Location: [facility] OR [address] [place] [address];  Service: Urology;;    APPENDECTOMY      BACK SURGERY  2025    CYSTOSCOPY  12/10/2020    Procedure: CYSTOSCOPY;  Surgeon: Dave Shin MD;  Location: [address];  Service: Urology;;    CYSTOSCOPY W/ RETROGRADES Right 09/09/2020    Procedure: CYSTOSCOPY, WITH RETROGRADE PYELOGRAM;  Surgeon: Chris Garcia Jr., MD;  Location: [facility];  Service: Urology;  Laterality: Right;    DILATION OF NEPHROSTOMY TRACT Right 12/10/2020    Procedure: DILATION, NEPHROSTOMY TRACT;  Surgeon: Dave Shin MD;  Location: [address];  Service: Urology;  Laterality: Right;    RADIOFREQUENCY ABLATION KIDNEY  2022    REMOVAL OF DRAIN Right 12/10/2020    Procedure: REMOVAL, DRAIN-NEPHROSTOMY TUBE;  Surgeon: Dave Shin MD;  Location: [address];  Service: Urology;  Laterality: Right;    REPLACEMENT OF NEPHROSTOMY TUBE Right 12/10/2020    Procedure: REPLACEMENT, NEPHROSTOMY TUBE;  Surgeon: Dave Shin MD;  Location: [facility] OR [address] [place] [address];  Service: Urology;  Laterality: Right;    ROBOT-ASSISTED LAPAROSCOPIC REIMPLANTATION OF URETER USING DA PHU XI Right 01/08/2021     "Procedure: XI ROBOTIC IMPLANTATION, URETER, USING PSOAS HITCH TECHNIQUE;  Surgeon: Dave Shin MD;  Location: [facility] OR 2ND FLR;  Service: Urology;  Laterality: Right;  4hrs gen with regional    SPINE SURGERY      URETEROSCOPY Right 12/10/2020    Procedure: URETEROSCOPY-ANTEGRADE;  Surgeon: Dave Shin MD;  Location: [facility] OR 1ST FLR;  Service: Urology;  Laterality: Right;    URETHROSCOPY  09/09/2020    Procedure: URETHROSCOPY;  Surgeon: Chris Garcia Jr., MD;  Location: [facility] OR;  Service: Urology;;       Review of patient's allergies indicates:  No Known Allergies    No current facility-administered medications on file prior to encounter.     Current Outpatient Medications on File Prior to Encounter   Medication Sig    allopurinoL (ZYLOPRIM) 100 MG tablet Take 100 mg by mouth.    amLODIPine (NORVASC) 5 MG tablet TAKE 1 TABLET BY MOUTH EVERY DAY IN THE EVENING    ascorbic acid, vitamin C, (VITAMIN C) 500 MG tablet Take 500 mg by mouth once daily.    atorvastatin (LIPITOR) 10 MG tablet Take 10 mg by mouth every Mon, Wed, Fri.    BD WINSOME 2ND GEN PEN NEEDLE 32 gauge x 5/32" Ndle INJECT 1 UNITS INTO THE SKIN 3 (THREE) TIMES DAILY BEFORE MEALS. USE AS DIRECTED NON-MED ITEM    blood-glucose meter (TRUE METRIX GLUCOSE METER) Misc 1 Device by Misc.(Non-Drug; Combo Route) route once daily.    calcitRIOL (ROCALTROL) 0.25 MCG Cap TAKE 1 CAPSULE (0.25 MCG TOTAL) BY MOUTH 3 (THREE) TIMES A WEEK MONDAY/WEDNESDAY/FRIDAY    cyanocobalamin (VITAMIN B-12) 2000 MCG tablet Take 2,000 mcg by mouth once daily.    indapamide (LOZOL) 1.25 MG Tab Take 1.25 mg by mouth once daily.    insulin aspart U-100 (NOVOLOG FLEXPEN U-100 INSULIN) 100 unit/mL (3 mL) InPn pen Inject 10 Units into the skin 3 (three) times daily with meals. Check glucose before meals and then take insulin as per sliding scale (Patient taking differently: Inject 4 Units into the skin 3 (three) times daily with meals. Check glucose before meals and then take " insulin as per sliding scale)    insulin degludec (TRESIBA FLEXTOUCH U-100) 100 unit/mL (3 mL) insulin pen Inject 15 Units into the skin every evening. (Patient taking differently: Inject 14 Units into the skin every evening.)    losartan (COZAAR) 100 MG tablet TAKE 1 TABLET BY MOUTH EVERY DAY    sodium bicarbonate 650 MG tablet Take 650 mg by mouth once daily.    traZODone (DESYREL) 100 MG tablet TAKE 1 TABLET BY MOUTH EVERY EVENING (DOSAGE INCREASE)    TRUE METRIX GLUCOSE TEST STRIP Strp TEST 3 (THREE) TIMES DAILY BEFORE MEALS.    zinc gluconate 50 mg tablet Take 50 mg by mouth once daily.     Family History       Problem Relation (Age of Onset)    Heart disease Mother, Father, Maternal Grandfather, Paternal Grandmother, Paternal Grandfather    Stroke Father          Tobacco Use    Smoking status: Never     Passive exposure: Past    Smokeless tobacco: Never   Substance and Sexual Activity    Alcohol use: No    Drug use: No    Sexual activity: Yes     Partners: Female     Review of Systems   Constitutional:  Positive for fever. Negative for activity change and appetite change.   HENT:  Negative for congestion and dental problem.    Eyes:  Negative for discharge and itching.   Respiratory:  Negative for shortness of breath.    Cardiovascular:  Negative for chest pain.   Gastrointestinal:  Positive for abdominal pain and vomiting. Negative for abdominal distention.   Endocrine: Negative for cold intolerance.   Genitourinary:  Negative for difficulty urinating and dysuria.   Musculoskeletal:  Negative for arthralgias and back pain.   Skin:  Negative for color change.   Neurological:  Negative for dizziness and facial asymmetry.   Hematological:  Negative for adenopathy.   Psychiatric/Behavioral:  Negative for agitation and behavioral problems.      Objective:     Vital Signs (Most Recent):  Temp: 98.8 °F (37.1 °C) (06/25/25 1358)  Pulse: 74 (06/25/25 1632)  Resp: 18 (06/25/25 1705)  BP: 136/63 (06/25/25 1630)  SpO2:  97 % (06/25/25 1632) Vital Signs (24h Range):  Temp:  [98.3 °F (36.8 °C)-98.8 °F (37.1 °C)] 98.8 °F (37.1 °C)  Pulse:  [71-92] 74  Resp:  [18-20] 18  SpO2:  [95 %-100 %] 97 %  BP: (125-145)/(62-71) 136/63     Weight: 77.1 kg (170 lb)  Body mass index is 23.71 kg/m².     Physical Exam  Vitals and nursing note reviewed.   Constitutional:       Appearance: He is well-developed.   HENT:      Head: Atraumatic.      Right Ear: External ear normal.      Left Ear: External ear normal.      Nose: Nose normal.      Mouth/Throat:      Mouth: Mucous membranes are moist.   Eyes:      General: Scleral icterus present.      Extraocular Movements: Extraocular movements intact.   Cardiovascular:      Rate and Rhythm: Normal rate.   Pulmonary:      Effort: Pulmonary effort is normal.   Abdominal:      Palpations: Abdomen is soft.   Musculoskeletal:         General: Normal range of motion.      Cervical back: Full passive range of motion without pain and normal range of motion.   Skin:     General: Skin is warm.   Neurological:      Mental Status: He is alert and oriented to person, place, and time.   Psychiatric:         Behavior: Behavior normal.                Significant Labs: All pertinent labs within the past 24 hours have been reviewed.  CBC:   Recent Labs   Lab 06/25/25  1503   WBC 7.77   HGB 12.0*   HCT 35.5*        CMP:   Recent Labs   Lab 06/25/25  1503   *   K 3.3*   CL 99   CO2 21*   *   BUN 49*   CREATININE 2.2*   CALCIUM 9.2   PROT 6.9   ALBUMIN 3.7   BILITOT 5.9*   ALKPHOS 158*   AST 65*   *   ANIONGAP 12       Significant Imaging: I have reviewed all pertinent imaging results/findings within the past 24 hours.

## 2025-06-26 ENCOUNTER — ANESTHESIA (OUTPATIENT)
Dept: SURGERY | Facility: HOSPITAL | Age: 80
End: 2025-06-26
Payer: MEDICARE

## 2025-06-26 ENCOUNTER — ANESTHESIA EVENT (OUTPATIENT)
Dept: SURGERY | Facility: HOSPITAL | Age: 80
End: 2025-06-26
Payer: MEDICARE

## 2025-06-26 PROBLEM — N18.9 ACUTE ON CHRONIC RENAL FAILURE: Status: ACTIVE | Noted: 2025-06-25

## 2025-06-26 PROBLEM — R74.01 TRANSAMINITIS: Status: ACTIVE | Noted: 2025-06-26

## 2025-06-26 LAB
ABSOLUTE EOSINOPHIL (SMH): 0.07 K/UL
ABSOLUTE MONOCYTE (SMH): 0.82 K/UL (ref 0.3–1)
ABSOLUTE NEUTROPHIL COUNT (SMH): 4 K/UL (ref 1.8–7.7)
ALBUMIN SERPL-MCNC: 3.1 G/DL (ref 3.5–5.2)
ALP SERPL-CCNC: 175 UNIT/L (ref 55–135)
ALT SERPL-CCNC: 149 UNIT/L (ref 10–44)
AMORPH CRY UR QL COMP ASSIST: NORMAL
ANION GAP (SMH): 10 MMOL/L (ref 8–16)
AST SERPL-CCNC: 43 UNIT/L (ref 10–40)
BASOPHILS # BLD AUTO: 0.01 K/UL
BASOPHILS NFR BLD AUTO: 0.2 %
BILIRUB SERPL-MCNC: 6.3 MG/DL (ref 0.1–1)
BILIRUB UR QL STRIP.AUTO: NEGATIVE
BUN SERPL-MCNC: 52 MG/DL (ref 8–23)
CALCIUM SERPL-MCNC: 8.5 MG/DL (ref 8.7–10.5)
CHLORIDE SERPL-SCNC: 100 MMOL/L (ref 95–110)
CLARITY UR: ABNORMAL
CO2 SERPL-SCNC: 24 MMOL/L (ref 23–29)
COLOR UR AUTO: YELLOW
CREAT SERPL-MCNC: 2.5 MG/DL (ref 0.5–1.4)
ERYTHROCYTE [DISTWIDTH] IN BLOOD BY AUTOMATED COUNT: 12.6 % (ref 11.5–14.5)
GFR SERPLBLD CREATININE-BSD FMLA CKD-EPI: 25 ML/MIN/1.73/M2
GLUCOSE SERPL-MCNC: 137 MG/DL (ref 70–110)
GLUCOSE UR QL STRIP: NEGATIVE
HCT VFR BLD AUTO: 32.7 % (ref 40–54)
HGB BLD-MCNC: 10.8 GM/DL (ref 14–18)
HGB UR QL STRIP: NEGATIVE
IMM GRANULOCYTES # BLD AUTO: 0.03 K/UL (ref 0–0.04)
IMM GRANULOCYTES NFR BLD AUTO: 0.6 % (ref 0–0.5)
KETONES UR QL STRIP: NEGATIVE
LEUKOCYTE ESTERASE UR QL STRIP: ABNORMAL
LYMPHOCYTES # BLD AUTO: 0.41 K/UL (ref 1–4.8)
MAGNESIUM SERPL-MCNC: 2 MG/DL (ref 1.6–2.6)
MCH RBC QN AUTO: 31.8 PG (ref 27–31)
MCHC RBC AUTO-ENTMCNC: 33 G/DL (ref 32–36)
MCV RBC AUTO: 96 FL (ref 82–98)
MICROSCOPIC COMMENT: NORMAL
NITRITE UR QL STRIP: NEGATIVE
NUCLEATED RBC (/100WBC) (SMH): 0 /100 WBC
OHS QRS DURATION: 134 MS
OHS QTC CALCULATION: 428 MS
PH UR STRIP: 6 [PH]
PLATELET # BLD AUTO: 164 K/UL (ref 150–450)
PMV BLD AUTO: 10.9 FL (ref 9.2–12.9)
POCT GLUCOSE: 133 MG/DL (ref 70–110)
POCT GLUCOSE: 134 MG/DL (ref 70–110)
POCT GLUCOSE: 151 MG/DL (ref 70–110)
POTASSIUM SERPL-SCNC: 3.6 MMOL/L (ref 3.5–5.1)
PROT SERPL-MCNC: 6 GM/DL (ref 6–8.4)
PROT UR QL STRIP: ABNORMAL
RBC # BLD AUTO: 3.4 M/UL (ref 4.6–6.2)
RBC #/AREA URNS AUTO: 1 /HPF
RELATIVE EOSINOPHIL (SMH): 1.3 % (ref 0–8)
RELATIVE LYMPHOCYTE (SMH): 7.8 % (ref 18–48)
RELATIVE MONOCYTE (SMH): 15.5 % (ref 4–15)
RELATIVE NEUTROPHIL (SMH): 74.6 % (ref 38–73)
SODIUM SERPL-SCNC: 134 MMOL/L (ref 136–145)
SP GR UR STRIP: 1.02
SQUAMOUS #/AREA URNS AUTO: 4 /HPF
UROBILINOGEN UR STRIP-ACNC: NEGATIVE EU/DL
WBC # BLD AUTO: 5.29 K/UL (ref 3.9–12.7)
WBC #/AREA URNS AUTO: 3 /HPF

## 2025-06-26 PROCEDURE — 99223 1ST HOSP IP/OBS HIGH 75: CPT | Mod: ,,, | Performed by: STUDENT IN AN ORGANIZED HEALTH CARE EDUCATION/TRAINING PROGRAM

## 2025-06-26 PROCEDURE — 63600175 PHARM REV CODE 636 W HCPCS: Performed by: INTERNAL MEDICINE

## 2025-06-26 PROCEDURE — 74328 X-RAY BILE DUCT ENDOSCOPY: CPT | Mod: TC | Performed by: INTERNAL MEDICINE

## 2025-06-26 PROCEDURE — 80053 COMPREHEN METABOLIC PANEL: CPT | Performed by: INTERNAL MEDICINE

## 2025-06-26 PROCEDURE — 0FC98ZZ EXTIRPATION OF MATTER FROM COMMON BILE DUCT, VIA NATURAL OR ARTIFICIAL OPENING ENDOSCOPIC: ICD-10-PCS | Performed by: INTERNAL MEDICINE

## 2025-06-26 PROCEDURE — 63600175 PHARM REV CODE 636 W HCPCS: Performed by: NURSE ANESTHETIST, CERTIFIED REGISTERED

## 2025-06-26 PROCEDURE — 25000003 PHARM REV CODE 250: Performed by: INTERNAL MEDICINE

## 2025-06-26 PROCEDURE — C2625 STENT, NON-COR, TEM W/DEL SY: HCPCS | Performed by: INTERNAL MEDICINE

## 2025-06-26 PROCEDURE — 81003 URINALYSIS AUTO W/O SCOPE: CPT | Performed by: EMERGENCY MEDICINE

## 2025-06-26 PROCEDURE — 94761 N-INVAS EAR/PLS OXIMETRY MLT: CPT

## 2025-06-26 PROCEDURE — 83735 ASSAY OF MAGNESIUM: CPT | Performed by: INTERNAL MEDICINE

## 2025-06-26 PROCEDURE — 36415 COLL VENOUS BLD VENIPUNCTURE: CPT | Performed by: INTERNAL MEDICINE

## 2025-06-26 PROCEDURE — 43264 ERCP REMOVE DUCT CALCULI: CPT | Performed by: INTERNAL MEDICINE

## 2025-06-26 PROCEDURE — 11000001 HC ACUTE MED/SURG PRIVATE ROOM

## 2025-06-26 PROCEDURE — 37000008 HC ANESTHESIA 1ST 15 MINUTES: Performed by: INTERNAL MEDICINE

## 2025-06-26 PROCEDURE — 99900031 HC PATIENT EDUCATION (STAT)

## 2025-06-26 PROCEDURE — 27202125 HC BALLOON, EXTRACTION (ANY): Performed by: INTERNAL MEDICINE

## 2025-06-26 PROCEDURE — 85025 COMPLETE CBC W/AUTO DIFF WBC: CPT | Performed by: INTERNAL MEDICINE

## 2025-06-26 PROCEDURE — 82962 GLUCOSE BLOOD TEST: CPT | Performed by: INTERNAL MEDICINE

## 2025-06-26 PROCEDURE — 25000003 PHARM REV CODE 250: Performed by: NURSE ANESTHETIST, CERTIFIED REGISTERED

## 2025-06-26 PROCEDURE — C1769 GUIDE WIRE: HCPCS | Performed by: INTERNAL MEDICINE

## 2025-06-26 PROCEDURE — 43274 ERCP DUCT STENT PLACEMENT: CPT | Performed by: INTERNAL MEDICINE

## 2025-06-26 PROCEDURE — 0F778DZ DILATION OF COMMON HEPATIC DUCT WITH INTRALUMINAL DEVICE, VIA NATURAL OR ARTIFICIAL OPENING ENDOSCOPIC: ICD-10-PCS | Performed by: INTERNAL MEDICINE

## 2025-06-26 PROCEDURE — 37000009 HC ANESTHESIA EA ADD 15 MINS: Performed by: INTERNAL MEDICINE

## 2025-06-26 PROCEDURE — 25500020 PHARM REV CODE 255: Performed by: INTERNAL MEDICINE

## 2025-06-26 RX ORDER — ONDANSETRON HYDROCHLORIDE 2 MG/ML
INJECTION, SOLUTION INTRAVENOUS
Status: DISCONTINUED | OUTPATIENT
Start: 2025-06-26 | End: 2025-06-26

## 2025-06-26 RX ORDER — SUCCINYLCHOLINE CHLORIDE 20 MG/ML
INJECTION INTRAMUSCULAR; INTRAVENOUS
Status: DISCONTINUED | OUTPATIENT
Start: 2025-06-26 | End: 2025-06-26

## 2025-06-26 RX ORDER — SODIUM CHLORIDE, SODIUM LACTATE, POTASSIUM CHLORIDE, CALCIUM CHLORIDE 600; 310; 30; 20 MG/100ML; MG/100ML; MG/100ML; MG/100ML
INJECTION, SOLUTION INTRAVENOUS CONTINUOUS PRN
Status: DISCONTINUED | OUTPATIENT
Start: 2025-06-26 | End: 2025-06-26

## 2025-06-26 RX ORDER — OXYCODONE HYDROCHLORIDE 5 MG/1
5 TABLET ORAL EVERY 4 HOURS PRN
Refills: 0 | Status: DISCONTINUED | OUTPATIENT
Start: 2025-06-26 | End: 2025-06-29 | Stop reason: HOSPADM

## 2025-06-26 RX ORDER — GLUCAGON 1 MG
1 KIT INJECTION
Status: DISCONTINUED | OUTPATIENT
Start: 2025-06-26 | End: 2025-06-27 | Stop reason: HOSPADM

## 2025-06-26 RX ORDER — LIDOCAINE HYDROCHLORIDE 10 MG/ML
INJECTION, SOLUTION EPIDURAL; INFILTRATION; INTRACAUDAL; PERINEURAL
Status: DISCONTINUED | OUTPATIENT
Start: 2025-06-26 | End: 2025-06-26

## 2025-06-26 RX ORDER — DIPHENHYDRAMINE HYDROCHLORIDE 50 MG/ML
12.5 INJECTION, SOLUTION INTRAMUSCULAR; INTRAVENOUS
Status: DISCONTINUED | OUTPATIENT
Start: 2025-06-26 | End: 2025-06-27 | Stop reason: HOSPADM

## 2025-06-26 RX ORDER — ONDANSETRON HYDROCHLORIDE 2 MG/ML
4 INJECTION, SOLUTION INTRAVENOUS DAILY PRN
Status: DISCONTINUED | OUTPATIENT
Start: 2025-06-26 | End: 2025-06-27 | Stop reason: HOSPADM

## 2025-06-26 RX ORDER — ROCURONIUM BROMIDE 10 MG/ML
INJECTION, SOLUTION INTRAVENOUS
Status: DISCONTINUED | OUTPATIENT
Start: 2025-06-26 | End: 2025-06-26

## 2025-06-26 RX ORDER — OXYCODONE HYDROCHLORIDE 5 MG/1
5 TABLET ORAL
Status: DISCONTINUED | OUTPATIENT
Start: 2025-06-26 | End: 2025-06-27 | Stop reason: HOSPADM

## 2025-06-26 RX ORDER — PROPOFOL 10 MG/ML
VIAL (ML) INTRAVENOUS
Status: DISCONTINUED | OUTPATIENT
Start: 2025-06-26 | End: 2025-06-26

## 2025-06-26 RX ORDER — HYDROMORPHONE HYDROCHLORIDE 1 MG/ML
0.2 INJECTION, SOLUTION INTRAMUSCULAR; INTRAVENOUS; SUBCUTANEOUS EVERY 5 MIN PRN
Status: DISCONTINUED | OUTPATIENT
Start: 2025-06-26 | End: 2025-06-27 | Stop reason: HOSPADM

## 2025-06-26 RX ADMIN — LIDOCAINE HYDROCHLORIDE 50 MG: 10 INJECTION, SOLUTION EPIDURAL; INFILTRATION; INTRACAUDAL; PERINEURAL at 05:06

## 2025-06-26 RX ADMIN — ROCURONIUM BROMIDE 15 MG: 10 INJECTION, SOLUTION INTRAVENOUS at 05:06

## 2025-06-26 RX ADMIN — ROCURONIUM BROMIDE 5 MG: 10 INJECTION, SOLUTION INTRAVENOUS at 05:06

## 2025-06-26 RX ADMIN — PANTOPRAZOLE SODIUM 40 MG: 40 INJECTION, POWDER, FOR SOLUTION INTRAVENOUS at 10:06

## 2025-06-26 RX ADMIN — PIPERACILLIN SODIUM AND TAZOBACTAM SODIUM 4.5 G: 4; .5 INJECTION, POWDER, LYOPHILIZED, FOR SOLUTION INTRAVENOUS at 09:06

## 2025-06-26 RX ADMIN — ONDANSETRON 4 MG: 2 INJECTION INTRAMUSCULAR; INTRAVENOUS at 05:06

## 2025-06-26 RX ADMIN — PIPERACILLIN SODIUM AND TAZOBACTAM SODIUM 4.5 G: 4; .5 INJECTION, POWDER, LYOPHILIZED, FOR SOLUTION INTRAVENOUS at 03:06

## 2025-06-26 RX ADMIN — HYDROMORPHONE HYDROCHLORIDE 0.5 MG: 1 INJECTION, SOLUTION INTRAMUSCULAR; INTRAVENOUS; SUBCUTANEOUS at 10:06

## 2025-06-26 RX ADMIN — SODIUM CHLORIDE: 9 INJECTION, SOLUTION INTRAVENOUS at 07:06

## 2025-06-26 RX ADMIN — Medication 120 MG: at 05:06

## 2025-06-26 RX ADMIN — PIPERACILLIN SODIUM AND TAZOBACTAM SODIUM 4.5 G: 4; .5 INJECTION, POWDER, LYOPHILIZED, FOR SOLUTION INTRAVENOUS at 12:06

## 2025-06-26 RX ADMIN — SUGAMMADEX 200 MG: 100 INJECTION, SOLUTION INTRAVENOUS at 06:06

## 2025-06-26 RX ADMIN — SODIUM CHLORIDE, SODIUM LACTATE, POTASSIUM CHLORIDE, AND CALCIUM CHLORIDE: .6; .31; .03; .02 INJECTION, SOLUTION INTRAVENOUS at 05:06

## 2025-06-26 RX ADMIN — SODIUM CHLORIDE: 9 INJECTION, SOLUTION INTRAVENOUS at 09:06

## 2025-06-26 RX ADMIN — HYDROMORPHONE HYDROCHLORIDE 0.5 MG: 1 INJECTION, SOLUTION INTRAMUSCULAR; INTRAVENOUS; SUBCUTANEOUS at 04:06

## 2025-06-26 RX ADMIN — SODIUM CHLORIDE: 9 INJECTION, SOLUTION INTRAVENOUS at 01:06

## 2025-06-26 RX ADMIN — PIPERACILLIN SODIUM AND TAZOBACTAM SODIUM 4.5 G: 4; .5 INJECTION, POWDER, LYOPHILIZED, FOR SOLUTION INTRAVENOUS at 11:06

## 2025-06-26 RX ADMIN — PROPOFOL 140 MG: 10 INJECTION, EMULSION INTRAVENOUS at 05:06

## 2025-06-26 RX ADMIN — HYDROMORPHONE HYDROCHLORIDE 0.5 MG: 1 INJECTION, SOLUTION INTRAMUSCULAR; INTRAVENOUS; SUBCUTANEOUS at 09:06

## 2025-06-26 NOTE — PROGRESS NOTES
[facility]  Progress Note    Patient Name: Jamil Cadet  MRN: [id number]  Patient Class: IP- Inpatient   Admission Date: 6/25/2025  Length of Stay: 1 days  Attending Physician: Heath Montilla MD  Primary Care Provider: Richard Gunter MD        Subjective     Principal Problem:Cholelithiasis with acute cholecystitis        HPI:  80 year old pt getting admitted with acute cholecystitis/Transaminitis/Jaundice  Pt has been suffering from abdominal pain/committing and fever for past 2 days  Today symptoms went worse. He went to Urgent care and was referred to ER  Imaging showed cholecystitis and MRCP was ordered from ER       Overview/Hospital Course:  Mr. Cadet has been monitored closely during his hospitalization. He was admitted on 6/25 with jaundice, transamanitis, acute cholecystitis, and concerns for choledocholithiasis. Imaging revealed CBD dilation and Moderately distended gallbladder with small gallstones, mild wall thickening, and mild pericholecystic edema. GI and general surgery were consulted. He was empirically started on zosyn. Transaminitis and Tbili worsened and GI plans for ERCP on 6/26 and Gen Surgery plans for lap cholecystectomy on 6/27. Electrolytes have been monitored and replaced. He has known CKDIIIB with history of partial right nephrectomy and ablation Left renal mass d/t RCC. He is followed by Dr. Shin - urology and is was previously a patient of Dr. Decker and is planning to establish with Dr. Perry.    Interval History: pt seen and examined at bedside. No acute events overnight. Pain not well controlled and medication adjusted. He is waiting to have ERCP. Questions answered. Continue NPO until ERCP can likely have clears after and NPO at [facility] for lap cholecystectomy in AM. Continue empiric zosyn.    Review of Systems   Gastrointestinal:  Positive for abdominal pain and nausea.     Objective:     Vital Signs (Most Recent):  Temp: 98 °F (36.7 °C)  (06/26/25 1200)  Pulse: 68 (06/26/25 1200)  Resp: 16 (06/26/25 1200)  BP: 120/62 (06/26/25 1200)  SpO2: 97 % (06/26/25 1200) Vital Signs (24h Range):  Temp:  [98 °F (36.7 °C)-98.1 °F (36.7 °C)] 98 °F (36.7 °C)  Pulse:  [67-83] 68  Resp:  [16-20] 16  SpO2:  [92 %-99 %] 97 %  BP: (117-146)/(58-64) 120/62     Weight: 76.4 kg (168 lb 6.4 oz)  Body mass index is 23.49 kg/m².    Intake/Output Summary (Last 24 hours) at 6/26/2025 1550  Last data filed at 6/26/2025 0942  Gross per 24 hour   Intake 1105 ml   Output --   Net 1105 ml         Physical Exam  Vitals and nursing note reviewed.   Constitutional:       General: He is not in acute distress.  HENT:      Head: Normocephalic and atraumatic.      Nose: Nose normal.   Eyes:      General: Scleral icterus present.      Conjunctiva/sclera: Conjunctivae normal.      Pupils: Pupils are equal, round, and reactive to light.   Cardiovascular:      Rate and Rhythm: Normal rate and regular rhythm.   Pulmonary:      Effort: Pulmonary effort is normal.      Breath sounds: Normal breath sounds.   Abdominal:      Palpations: Abdomen is soft.      Tenderness: There is abdominal tenderness.   Musculoskeletal:         General: Normal range of motion.      Cervical back: Normal range of motion and neck supple.   Skin:     General: Skin is warm and dry.      Capillary Refill: Capillary refill takes less than 2 seconds.      Coloration: Skin is jaundiced.   Neurological:      Mental Status: He is alert and oriented to person, place, and time. Mental status is at baseline.   Psychiatric:         Mood and Affect: Mood normal.         Behavior: Behavior normal.               Significant Labs: All pertinent labs within the past 24 hours have been reviewed.  CBC:   Recent Labs   Lab 06/25/25  1503 06/26/25  0444   WBC 7.77 5.29   HGB 12.0* 10.8*   HCT 35.5* 32.7*    164     CMP:   Recent Labs   Lab 06/25/25  1503 06/26/25  0444   * 134*   K 3.3* 3.6   CL 99 100   CO2 21* 24   GLU  139* 137*   BUN 49* 52*   CREATININE 2.2* 2.5*   CALCIUM 9.2 8.5*   PROT 6.9 6.0   ALBUMIN 3.7 3.1*   BILITOT 5.9* 6.3*   ALKPHOS 158* 175*   AST 65* 43*   * 149*   ANIONGAP 12 10       Significant Imaging: I have reviewed all pertinent imaging results/findings within the past 24 hours.    MRI MRCP Abdomen WO Contrast 3D WO Independent WS XPD  Result Date: 6/26/2025  EXAMINATION: MRI MRCP ABDOMEN WO CONTRAST 3D WO INDEPENDENT WS XPD CLINICAL HISTORY: cholecystitis; COMPARISON: CT, ultrasound June 25, 2025 FINDINGS: Multiplanar noncontrast imaging was performed, utilizing magnetic resonance cholangiopancreatography protocol. The common bile duct reaches maximal transverse diameter of 10 mm.  No filling defects are identified.  No mass or stricture is demonstrated.  The intrahepatic biliary tree is nondilated. The gallbladder is moderately distended, with mild gallbladder wall thickening and mild pericholecystic edema. No hepatic mass or contour abnormality is identified.  The spleen is mildly enlarged at 13.6 cm in longitudinal dimension.  No pancreatic mass lesion is identified.  There is no peripancreatic edema.  The pancreatic duct is normal in caliber. Multiple small bilateral renal cysts are noted.  Incompletely assessed on this examination and not entirely included in the field of view, there is an indeterminate mass at the lower pole of the left kidney, measuring at least 5.4 cm in size.     1. Mildly dilated common bile duct.  No evidence of stricture or common duct stone. 2. Moderately distended gallbladder with small gallstones, mild wall thickening, and mild pericholecystic edema.  Correlate for possible acute cholecystitis. 3. Incompletely assessed on this examination, there is an indeterminate exophytic mass at the lower pole of the left kidney measuring 5.4 cm in greatest dimension, indeterminate.  By report, patient has had previous ablation of a left renal neoplasm, which may account for the  above.  Pre and post infusion CT abdomen could be utilized for more detailed assessment. Electronically signed by: Harish Ceballosmiquel Date:    06/26/2025 Time:    07:42    CT Abdomen Pelvis  Without Contrast  Result Date: 6/25/2025  CMS MANDATED QUALITY DATA - CT RADIATION - 436 All CT scans at this facility utilize dose modulation, iterative reconstruction, and/or weight based dosing when appropriate to reduce radiation dose to as low as reasonably achievable. EXAMINATION: CT ABDOMEN PELVIS WITHOUT CONTRAST CLINICAL HISTORY: Abdominal abscess/infection suspected; TECHNIQUE: CT abdomen and pelvis without IV or oral contrast. COMPARISON: CT abdomen pelvis 11/14/2021. FINDINGS: There is atelectasis and mild bronchiectasis of the right lung base.  Small right pleural effusion.  The heart is normal size. The liver is normal size.  There are no gross hepatic lesions.  The gallbladder is distended.  There are calcified gallstones in the neck of the gallbladder.  There is mild pericholecystic fat stranding.  The common bile duct is within normal limits.  The pancreas, spleen and adrenal glands are unremarkable.  The kidneys are normal size.  There is focal scarring of the medial right kidney with fibrosis of the adjacent perinephric fat, similar to previous exam.  Bilateral small renal cysts again observed.  There is no hydronephrosis or nephrolithiasis.  The ureters are normal caliber without obstructions.  The bladder is fluid filled with no focal wall abnormalities.  Prostate gland is unremarkable. There is diverticulosis of the sigmoid colon.  There is mild bowel wall thickening of the 1st and 2nd portion of duodenum with perienteric fat stranding.  Large and small bowel are otherwise normal caliber.  Stomach is mostly decompressed and unremarkable. The abdominal aorta is normal caliber with calcified plaque formation.  There are no enlarged intra-abdominal lymph nodes.  Small fat filled umbilical hernia.  There are  degenerative changes of the spine.  There is no acute osseous abnormality.     1. Distended gallbladder with calcified gallstones in the neck of the gallbladder.  There is mild pericholecystic fat stranding.  There is also mild thickening of the 1st and 2nd portions of the duodenum with perienteric fat stranding.  Findings could reflect cholecystitis, enteritis or combination there of. 2. Small right pleural effusion. 3. Atelectasis and mild bronchiectasis of the right lung base. 4. Additional incidental observations as described. Electronically signed by: Adama Morales Date:    06/25/2025 Time:    16:17    US Abdomen Limited  Result Date: 6/25/2025  EXAMINATION: US ABDOMEN LIMITED CLINICAL HISTORY: Abdominal pain; COMPARISON: MRI 01/10/2025, 08/15/2024, CT 11/14/2020 FINDINGS: Liver maintains normal echogenicity without focal mass or intrahepatic bile duct dilation. Hepatopedal flow present in main portal vein. Diffuse low-level internal echoes suggesting sludge lies throughout distended gallbladder.  Cholelithiasis measuring 17 mm is also present in the gallbladder.  No gallbladder wall thickening or pericholecystic fluid.  Common duct measures 8-10 mm in diameter.  This remains similar to that partially visualized on 08/15/2024 MRI. Bowel gas obscures the pancreas.  Right kidney is free of hydronephrosis, containing multifocal simple cysts measuring up to 23 mm.  Visualized abdominal aorta is nonaneurysmal. Juxtahepatic IVC is unremarkable.  Graft Right pleural effusion is partially visualized.     1. Distended gallbladder containing diffuse low-level internal echoes and cholelithiasis.  Gallbladder at similar imaging appearance on prior imaging studies dating back to CT 11/14/2020.  Clinical and laboratory correlation is needed to assess for potential acute cholecystitis, as sonographic Brumfield sign is reported as positive. 2. Common duct diameter of 8-10 mm, not significantly changed from 08/15/2024 MRI.  This  could be related to patient's age.  Correlation with bilirubin level is requested to assess for any other evidence of common duct obstruction.  Further evaluation with MRCP can be considered. 3. Partially visualized right pleural effusion. 4. Right renal cyst. Electronically signed by: Orville Barfield Date:    06/25/2025 Time:    14:53    X-Ray Chest AP Portable  Result Date: 6/25/2025  EXAMINATION: XR CHEST AP PORTABLE CLINICAL HISTORY: Sepsis; COMPARISON: August 2024 FINDINGS: Heart size is normal.  The mediastinum is unremarkable.  The left lung is clear.  There is mild right basilar atelectasis with chronic elevation of the right hemidiaphragm.  No acute osseous abnormalities are identified.  Chronic bilateral rotator cuff tears are noted.     1. Chronic elevation of the right hemidiaphragm with mild right basilar atelectasis. 2. Chronic bilateral rotator cuff tears. Electronically signed by: Harish Paz Date:    06/25/2025 Time:    14:53          Assessment & Plan  Cholelithiasis with acute cholecystitis  Admit to med/surg  Empiric zosyn  GI consulted  Gen surgery consulted  ERCP 6/26 followed by lap cholecystecomy 6/27  Pain and nausea control  IV hydration while NPO     Acute on chronic renal failure  Baseline creatinine 1.7-2  IV hydration   improving  Recent Labs     06/25/25  1503 06/26/25  0444   CREATININE 2.2* 2.5*   EGFRNORACEVR 29* 25*       Common bile duct dilation  Per USS done today :. Common duct diameter of 8-10 mm, not significantly changed from 08/15/2024 MRI.   Pt has significant jaundice and labs showing transaminitis  GI MD Consulted - ERCP 6/26    Type 2 diabetes mellitus with other diabetic kidney complication  Patient's FSGs are controlled on current medication regimen.  Last A1c reviewed-   Lab Results   Component Value Date    HGBA1C 6.5 (H) 05/06/2025     Most recent fingerstick glucose reviewed-   Recent Labs   Lab 06/26/25  1202   POCTGLUCOSE 134*     Current correctional scale   Medium  Maintain anti-hyperglycemic dose as follows-   Antihyperglycemics (From admission, onward)      Start     Stop Route Frequency Ordered    06/25/25 1911  insulin aspart U-100 pen 0-10 Units         -- SubQ Every 6 hours PRN 06/25/25 1811          Hold Oral hypoglycemics while patient is in the hospital.    History of renal cell carcinoma status post partial nephrectomy  Aware     Transaminitis  Likely d/t choledocholithiasis  ERCP 6/26  IV hydration   Trend CMP    VTE Risk Mitigation (From admission, onward)           Ordered     IP VTE HIGH RISK PATIENT  Once         06/25/25 1811     Place sequential compression device  Until discontinued         06/25/25 1811                    Discharge Planning   CA: 6/29/2025     Code Status: Full Code   Medical Readiness for Discharge Date:   Discharge Plan A: Home                        Binta Sheets NP  Department of Hospital Medicine   [facility]

## 2025-06-26 NOTE — ASSESSMENT & PLAN NOTE
Baseline creatinine 1.7-2  IV hydration   improving  Recent Labs     06/25/25  1503 06/26/25  0444   CREATININE 2.2* 2.5*   EGFRNORACEVR 29* 25*

## 2025-06-26 NOTE — CARE UPDATE
06/26/25 0715   Patient Assessment/Suction   Level of Consciousness (AVPU) alert   Respiratory Effort Normal;Unlabored   Expansion/Accessory Muscles/Retractions no use of accessory muscles   All Lung Fields Breath Sounds clear   Rhythm/Pattern, Respiratory unlabored   Cough Frequency no cough   PRE-TX-O2   Device (Oxygen Therapy) room air   SpO2 (!) 92 %   Pulse Oximetry Type Intermittent   $ Pulse Oximetry - Multiple Charge Pulse Oximetry - Multiple   Pulse 83   Resp 19   Education   $ Education 15 min;Other (see comment)  (pulse)

## 2025-06-26 NOTE — CONSULTS
[facility]  General Surgery  Consult Note    Inpatient consult to General Surgery  Consult performed by: Darwin Clarke MD  Consult ordered by: Stewart Grant MD        Subjective:     Chief Complaint/Reason for Admission:  Abdominal pain and hyperbilirubinemia    History of Present Illness:  This is an 80-year-old male who presented with progressive right upper quadrant abdominal pain.  He also has an elevated bilirubin.  Imaging yesterday was concerning for possible cholecystitis versus duodenitis.    No current facility-administered medications on file prior to encounter.     Current Outpatient Medications on File Prior to Encounter   Medication Sig    allopurinoL (ZYLOPRIM) 100 MG tablet Take 100 mg by mouth Daily.    amLODIPine (NORVASC) 5 MG tablet TAKE 1 TABLET BY MOUTH EVERY DAY IN THE EVENING    ascorbic acid, vitamin C, (VITAMIN C) 500 MG tablet Take 500 mg by mouth once daily.    atorvastatin (LIPITOR) 10 MG tablet Take 10 mg by mouth every Mon, Wed, Fri.    calcitRIOL (ROCALTROL) 0.25 MCG Cap Take 0.25 mcg by mouth As instructed. Mon, Tues, Wed, Thurs, Fri    cyanocobalamin (VITAMIN B-12) 2000 MCG tablet Take 2,000 mcg by mouth once daily.    indapamide (LOZOL) 1.25 MG Tab Take 1.25 mg by mouth once daily.    insulin aspart U-100 (NOVOLOG FLEXPEN U-100 INSULIN) 100 unit/mL (3 mL) InPn pen Inject 10 Units into the skin 3 (three) times daily with meals. Check glucose before meals and then take insulin as per sliding scale (Patient taking differently: Inject 4 Units into the skin every morning. Check glucose before meals and then take insulin as per sliding scale)    insulin degludec (TRESIBA FLEXTOUCH U-100) 100 unit/mL (3 mL) insulin pen Inject 15 Units into the skin every evening. (Patient taking differently: Inject 14 Units into the skin every evening.)    losartan (COZAAR) 100 MG tablet TAKE 1 TABLET BY MOUTH EVERY DAY (Patient taking differently: Take 100 mg by mouth every morning.)     "sodium bicarbonate 650 MG tablet Take 650 mg by mouth 2 (two) times daily.    traZODone (DESYREL) 100 MG tablet TAKE 1 TABLET BY MOUTH EVERY EVENING (DOSAGE INCREASE) (Patient taking differently: Take 100 mg by mouth every evening.)    zinc gluconate 50 mg tablet Take 50 mg by mouth once daily.    BD WINSOME 2ND GEN PEN NEEDLE 32 gauge x 5/32" Ndle INJECT 1 UNITS INTO THE SKIN 3 (THREE) TIMES DAILY BEFORE MEALS. USE AS DIRECTED NON-MED ITEM    blood-glucose meter (TRUE METRIX GLUCOSE METER) Misc 1 Device by Misc.(Non-Drug; Combo Route) route once daily.    TRUE METRIX GLUCOSE TEST STRIP Strp TEST 3 (THREE) TIMES DAILY BEFORE MEALS.       Review of patient's allergies indicates:  No Known Allergies    Past Medical History:   Diagnosis Date    Acute renal failure 9/7/2020    Bacteremia due to Enterococcus 10/25/2020    Cancer     Diabetes mellitus, type 2     Disorder of kidney and ureter     Encounter for blood transfusion     History of renal cell carcinoma status post partial nephrectomy 9/7/2020    Hydronephrosis 9/9/2020    Hyperlipidemia     Hypertension     Infection and inflammatory reaction due to nephrostomy catheter, initial encounter 11/14/2020    Infection following a procedure, superficial incisional surgical site, subsequent encounter 12/26/2020    Polio     Pyelonephritis of right kidney     Septicemia due to enterococcus 11/15/2020     Past Surgical History:   Procedure Laterality Date    ANTEGRADE PYELOGRAM  12/10/2020    Procedure: PYELOGRAM, ANTEGRADE;  Surgeon: Dave Shin MD;  Location: [address];  Service: Urology;;    APPENDECTOMY      BACK SURGERY  2025    CYSTOSCOPY  12/10/2020    Procedure: CYSTOSCOPY;  Surgeon: Dave Shin MD;  Location: [address];  Service: Urology;;    CYSTOSCOPY W/ RETROGRADES Right 09/09/2020    Procedure: CYSTOSCOPY, WITH RETROGRADE PYELOGRAM;  Surgeon: Chris Garcia Jr., MD;  Location: [facility] OR;  Service: Urology;  Laterality: Right;    DILATION " OF NEPHROSTOMY TRACT Right 12/10/2020    Procedure: DILATION, NEPHROSTOMY TRACT;  Surgeon: Dave Shin MD;  Location: [facility] OR 1ST FLR;  Service: Urology;  Laterality: Right;    RADIOFREQUENCY ABLATION KIDNEY  2022    REMOVAL OF DRAIN Right 12/10/2020    Procedure: REMOVAL, DRAIN-NEPHROSTOMY TUBE;  Surgeon: Dave Shin MD;  Location: [facility] OR 1ST FLR;  Service: Urology;  Laterality: Right;    REPLACEMENT OF NEPHROSTOMY TUBE Right 12/10/2020    Procedure: REPLACEMENT, NEPHROSTOMY TUBE;  Surgeon: Dave Shin MD;  Location: [facility] OR 1ST FLR;  Service: Urology;  Laterality: Right;    ROBOT-ASSISTED LAPAROSCOPIC REIMPLANTATION OF URETER USING DA PHU XI Right 01/08/2021    Procedure: XI ROBOTIC IMPLANTATION, URETER, USING PSOAS HITCH TECHNIQUE;  Surgeon: Dave Shin MD;  Location: [facility] OR 2ND FLR;  Service: Urology;  Laterality: Right;  4hrs gen with regional    SPINE SURGERY      URETEROSCOPY Right 12/10/2020    Procedure: URETEROSCOPY-ANTEGRADE;  Surgeon: Dave Shin MD;  Location: [facility] OR 1ST FLR;  Service: Urology;  Laterality: Right;    URETHROSCOPY  09/09/2020    Procedure: URETHROSCOPY;  Surgeon: Chris Garcia Jr., MD;  Location: [facility] OR;  Service: Urology;;     Family History       Problem Relation (Age of Onset)    Heart disease Mother, Father, Maternal Grandfather, Paternal Grandmother, Paternal Grandfather    Stroke Father          Tobacco Use    Smoking status: Never     Passive exposure: Past    Smokeless tobacco: Never   Substance and Sexual Activity    Alcohol use: No    Drug use: No    Sexual activity: Yes     Partners: Female     Review of Systems   Constitutional: Negative.  Negative for fatigue and fever.   HENT: Negative.     Eyes: Negative.    Respiratory: Negative.  Negative for shortness of breath.    Cardiovascular: Negative.  Negative for chest pain.   Gastrointestinal:  Positive for abdominal pain.   Endocrine: Negative.    Genitourinary: Negative.     Musculoskeletal: Negative.    Skin:  Positive for color change.   Allergic/Immunologic: Negative.    Neurological: Negative.    Hematological: Negative.    Psychiatric/Behavioral: Negative.       Objective:     Vital Signs (Most Recent):  Temp: 98 °F (36.7 °C) (06/26/25 1200)  Pulse: 68 (06/26/25 1200)  Resp: 16 (06/26/25 1200)  BP: 120/62 (06/26/25 1200)  SpO2: 97 % (06/26/25 1200) Vital Signs (24h Range):  Temp:  [98 °F (36.7 °C)-98.1 °F (36.7 °C)] 98 °F (36.7 °C)  Pulse:  [67-83] 68  Resp:  [16-20] 16  SpO2:  [92 %-100 %] 97 %  BP: (117-146)/(58-64) 120/62     Weight: 76.4 kg (168 lb 6.4 oz)  Body mass index is 23.49 kg/m².      Intake/Output Summary (Last 24 hours) at 6/26/2025 1454  Last data filed at 6/26/2025 0942  Gross per 24 hour   Intake 1105 ml   Output --   Net 1105 ml       Physical Exam  Constitutional:       General: He is not in acute distress.     Appearance: Normal appearance. He is not ill-appearing, toxic-appearing or diaphoretic.   HENT:      Head: Normocephalic.      Nose: Nose normal.   Eyes:      Conjunctiva/sclera: Conjunctivae normal.   Cardiovascular:      Rate and Rhythm: Normal rate and regular rhythm.   Pulmonary:      Effort: Pulmonary effort is normal.   Abdominal:      Tenderness: There is abdominal tenderness.      Comments: Tenderness in the right upper quadrant.   Musculoskeletal:         General: Normal range of motion.      Cervical back: Normal range of motion.   Skin:     General: Skin is warm.      Coloration: Skin is jaundiced.   Neurological:      General: No focal deficit present.      Mental Status: He is alert.   Psychiatric:         Mood and Affect: Mood normal.         Significant Labs:  CBC:   Recent Labs   Lab 06/26/25  0444   WBC 5.29   RBC 3.40*   HGB 10.8*   HCT 32.7*      MCV 96   MCH 31.8*   MCHC 33.0     CMP:   Recent Labs   Lab 06/26/25  0444   *   CALCIUM 8.5*   ALBUMIN 3.1*   PROT 6.0   *   K 3.6   CO2 24      BUN 52*  "  CREATININE 2.5*   ALKPHOS 175*   *   AST 43*   BILITOT 6.3*     Coagulation: No results for input(s): "PT", "INR", "APTT" in the last 48 hours.  Lactic Acid:   Recent Labs   Lab 06/25/25  1724   LACTATE 0.9       Significant Diagnostics:  CT scan was reviewed.  The gallbladder is very dilated with stones in the neck.  MRCP shows dilation of the bile duct without conclusive evidence of choledocholithiasis    Assessment/Plan:     Active Diagnoses:    Diagnosis Date Noted POA    PRINCIPAL PROBLEM:  Cholelithiasis with acute cholecystitis [K80.00] 06/25/2025 Yes    MELODIE (acute kidney injury) [N17.9] 06/25/2025 Yes    Common bile duct dilation [K83.8] 06/25/2025 Yes    History of renal cell carcinoma status post partial nephrectomy [C64.1] 09/07/2020 Yes     Chronic    Type 2 diabetes mellitus with other diabetic kidney complication [E11.29] 09/07/2020 Yes      Problems Resolved During this Admission:     80-year-old male admitted for abdominal pain, a very distended gallbladder with stones in the neck on CT scan.  No obvious obstructing pathology seen on MRCP although the duct is dilated.  Patient also has some evidence of duodenitis    Given fairly profound and new onset elevation in bilirubin, recommend proceeding with ERCP for more definitive bile duct evaluation  Tentative plan for cholecystectomy tomorrow for possible cholecystitis/Mirizzi syndrome.    Okay for clears this evening from my standpoint after ERCP and NPO at midnight      Thank you for your consult. I will follow-up with patient. Please contact us if you have any additional questions.    Darwin Clarke MD  General Surgery  [facility]  "

## 2025-06-26 NOTE — ANESTHESIA PREPROCEDURE EVALUATION
06/26/2025  Jamil Cadet is a 80 y.o., male.      Patient Active Problem List   Diagnosis    Type 2 diabetes mellitus with other diabetic kidney complication    Essential hypertension    History of renal cell carcinoma status post partial nephrectomy    Symptomatic anemia    Hydronephrosis    Renal cyst    Hydroureter    Hematuria, gross    Acute blood loss anemia    Ureteral stricture, right    Infection following a procedure, superficial incisional surgical site, subsequent encounter    Mixed dyslipidemia    Polyneuropathy    Stage 3b chronic kidney disease    Secondary hyperparathyroidism of renal origin    Aortic atherosclerosis    Cholelithiasis with acute cholecystitis    MELODIE (acute kidney injury)    Common bile duct dilation       Past Surgical History:   Procedure Laterality Date    ANTEGRADE PYELOGRAM  12/10/2020    Procedure: PYELOGRAM, ANTEGRADE;  Surgeon: Dave Shin MD;  Location: [facility] OR [address] [place] [address];  Service: Urology;;    APPENDECTOMY      BACK SURGERY  2025    CYSTOSCOPY  12/10/2020    Procedure: CYSTOSCOPY;  Surgeon: Dave Shin MD;  Location: [address];  Service: Urology;;    CYSTOSCOPY W/ RETROGRADES Right 09/09/2020    Procedure: CYSTOSCOPY, WITH RETROGRADE PYELOGRAM;  Surgeon: Chris Garcia Jr., MD;  [place] [facility];  Service: Urology;  Laterality: Right;    DILATION OF NEPHROSTOMY TRACT Right 12/10/2020    Procedure: DILATION, NEPHROSTOMY TRACT;  Surgeon: Dave Shin MD;  Location: [address];  Service: Urology;  Laterality: Right;    RADIOFREQUENCY ABLATION KIDNEY  2022    REMOVAL OF DRAIN Right 12/10/2020    Procedure: REMOVAL, DRAIN-NEPHROSTOMY TUBE;  Surgeon: Dave Shin MD;  Location: [address];  Service: Urology;  Laterality: Right;    REPLACEMENT OF NEPHROSTOMY TUBE Right 12/10/2020    Procedure: REPLACEMENT, NEPHROSTOMY TUBE;   Surgeon: Dave Shin MD;  Location: [facility] OR 1ST FLR;  Service: Urology;  Laterality: Right;    ROBOT-ASSISTED LAPAROSCOPIC REIMPLANTATION OF URETER USING DA PHU XI Right 01/08/2021    Procedure: XI ROBOTIC IMPLANTATION, URETER, USING PSOAS HITCH TECHNIQUE;  Surgeon: Dave Shin MD;  Location: [facility] OR 2ND FLR;  Service: Urology;  Laterality: Right;  4hrs gen with regional    SPINE SURGERY      URETEROSCOPY Right 12/10/2020    Procedure: URETEROSCOPY-ANTEGRADE;  Surgeon: Dave Shin MD;  Location: [facility] OR 1ST FLR;  Service: Urology;  Laterality: Right;    URETHROSCOPY  09/09/2020    Procedure: URETHROSCOPY;  Surgeon: Chris Garcia Jr., MD;  Location: [facility] OR;  Service: Urology;;        Tobacco Use:  The patient  reports that he has never smoked. He has been exposed to tobacco smoke. He has never used smokeless tobacco.     Results for orders placed or performed during the hospital encounter of 06/25/25   EKG 12-lead    Collection Time: 06/25/25 11:51 PM   Result Value Ref Range    QRS Duration 134 ms    [facility] QTC Calculation 428 ms    Narrative    Test Reason : R07.9,    Vent. Rate :  69 BPM     Atrial Rate :  69 BPM     P-R Int : 172 ms          QRS Dur : 134 ms      QT Int : 400 ms       P-R-T Axes :  92 -23 -24 degrees    QTcB Int : 428 ms    Normal sinus rhythm  Right bundle branch block  Inferior infarct ,age undetermined  Abnormal ECG  No previous ECGs available    Referred By: AAAREFERRAL SELF           Confirmed By:         Imaging Results              MRI MRCP Abdomen WO Contrast 3D WO Independent WS XPD (Final result)  Result time 06/26/25 07:42:27      Final result by Harish Paz MD (06/26/25 07:42:27)                   Impression:      1. Mildly dilated common bile duct.  No evidence of stricture or common duct stone.  2. Moderately distended gallbladder with small gallstones, mild wall thickening, and mild pericholecystic edema.  Correlate for possible acute  cholecystitis.  3. Incompletely assessed on this examination, there is an indeterminate exophytic mass at the lower pole of the left kidney measuring 5.4 cm in greatest dimension, indeterminate.  By report, patient has had previous ablation of a left renal neoplasm, which may account for the above.  Pre and post infusion CT abdomen could be utilized for more detailed assessment.      Electronically signed by: Harish Smithtim  Date:    06/26/2025  Time:    07:42               Narrative:    EXAMINATION:  MRI MRCP ABDOMEN WO CONTRAST 3D WO INDEPENDENT WS XPD    CLINICAL HISTORY:  cholecystitis;    COMPARISON:  CT, ultrasound June 25, 2025    FINDINGS:  Multiplanar noncontrast imaging was performed, utilizing magnetic resonance cholangiopancreatography protocol.    The common bile duct reaches maximal transverse diameter of 10 mm.  No filling defects are identified.  No mass or stricture is demonstrated.  The intrahepatic biliary tree is nondilated.    The gallbladder is moderately distended, with mild gallbladder wall thickening and mild pericholecystic edema.    No hepatic mass or contour abnormality is identified.  The spleen is mildly enlarged at 13.6 cm in longitudinal dimension.  No pancreatic mass lesion is identified.  There is no peripancreatic edema.  The pancreatic duct is normal in caliber.    Multiple small bilateral renal cysts are noted.  Incompletely assessed on this examination and not entirely included in the field of view, there is an indeterminate mass at the lower pole of the left kidney, measuring at least 5.4 cm in size.                                       CT Abdomen Pelvis  Without Contrast (Final result)  Result time 06/25/25 16:17:10      Final result by Adama Morales DO (06/25/25 16:17:10)                   Impression:      1. Distended gallbladder with calcified gallstones in the neck of the gallbladder.  There is mild pericholecystic fat stranding.  There is also mild thickening of  the 1st and 2nd portions of the duodenum with perienteric fat stranding.  Findings could reflect cholecystitis, enteritis or combination there of.  2. Small right pleural effusion.  3. Atelectasis and mild bronchiectasis of the right lung base.  4. Additional incidental observations as described.      Electronically signed by: Adama Morales  Date:    06/25/2025  Time:    16:17               Narrative:      CMS MANDATED QUALITY DATA - CT RADIATION - 436    All CT scans at this facility utilize dose modulation, iterative reconstruction, and/or weight based dosing when appropriate to reduce radiation dose to as low as reasonably achievable.    EXAMINATION:  CT ABDOMEN PELVIS WITHOUT CONTRAST    CLINICAL HISTORY:  Abdominal abscess/infection suspected;    TECHNIQUE:  CT abdomen and pelvis without IV or oral contrast.    COMPARISON:  CT abdomen pelvis 11/14/2021.    FINDINGS:  There is atelectasis and mild bronchiectasis of the right lung base.  Small right pleural effusion.  The heart is normal size.    The liver is normal size.  There are no gross hepatic lesions.  The gallbladder is distended.  There are calcified gallstones in the neck of the gallbladder.  There is mild pericholecystic fat stranding.  The common bile duct is within normal limits.  The pancreas, spleen and adrenal glands are unremarkable.  The kidneys are normal size.  There is focal scarring of the medial right kidney with fibrosis of the adjacent perinephric fat, similar to previous exam.  Bilateral small renal cysts again observed.  There is no hydronephrosis or nephrolithiasis.  The ureters are normal caliber without obstructions.  The bladder is fluid filled with no focal wall abnormalities.  Prostate gland is unremarkable.    There is diverticulosis of the sigmoid colon.  There is mild bowel wall thickening of the 1st and 2nd portion of duodenum with perienteric fat stranding.  Large and small bowel are otherwise normal caliber.  Stomach is  mostly decompressed and unremarkable.    The abdominal aorta is normal caliber with calcified plaque formation.  There are no enlarged intra-abdominal lymph nodes.  Small fat filled umbilical hernia.  There are degenerative changes of the spine.  There is no acute osseous abnormality.                                       X-Ray Chest AP Portable (Final result)  Result time 06/25/25 14:53:19   Procedure changed from X-Ray Chest PA And Lateral     Final result by Harish Paz MD (06/25/25 14:53:19)                   Impression:      1. Chronic elevation of the right hemidiaphragm with mild right basilar atelectasis.  2. Chronic bilateral rotator cuff tears.      Electronically signed by: Harish Paz  Date:    06/25/2025  Time:    14:53               Narrative:    EXAMINATION:  XR CHEST AP PORTABLE    CLINICAL HISTORY:  Sepsis;    COMPARISON:  August 2024    FINDINGS:  Heart size is normal.  The mediastinum is unremarkable.  The left lung is clear.  There is mild right basilar atelectasis with chronic elevation of the right hemidiaphragm.  No acute osseous abnormalities are identified.  Chronic bilateral rotator cuff tears are noted.                                       US Abdomen Limited (Final result)  Result time 06/25/25 14:53:50      Final result by Orville Barfiedl MD (06/25/25 14:53:50)                   Impression:      1. Distended gallbladder containing diffuse low-level internal echoes and cholelithiasis.  Gallbladder at similar imaging appearance on prior imaging studies dating back to CT 11/14/2020.  Clinical and laboratory correlation is needed to assess for potential acute cholecystitis, as sonographic Brumfield sign is reported as positive.  2. Common duct diameter of 8-10 mm, not significantly changed from 08/15/2024 MRI.  This could be related to patient's age.  Correlation with bilirubin level is requested to assess for any other evidence of common duct obstruction.  Further evaluation with  MRCP can be considered.  3. Partially visualized right pleural effusion.  4. Right renal cyst.      Electronically signed by: Orville Barfield  Date:    06/25/2025  Time:    14:53               Narrative:    EXAMINATION:  US ABDOMEN LIMITED    CLINICAL HISTORY:  Abdominal pain;    COMPARISON:  MRI 01/10/2025, 08/15/2024, CT 11/14/2020    FINDINGS:  Liver maintains normal echogenicity without focal mass or intrahepatic bile duct dilation. Hepatopedal flow present in main portal vein.    Diffuse low-level internal echoes suggesting sludge lies throughout distended gallbladder.  Cholelithiasis measuring 17 mm is also present in the gallbladder.  No gallbladder wall thickening or pericholecystic fluid.  Common duct measures 8-10 mm in diameter.  This remains similar to that partially visualized on 08/15/2024 MRI.    Bowel gas obscures the pancreas.  Right kidney is free of hydronephrosis, containing multifocal simple cysts measuring up to 23 mm.  Visualized abdominal aorta is nonaneurysmal. Juxtahepatic IVC is unremarkable.  Graft    Right pleural effusion is partially visualized.                                       Lab Results   Component Value Date    WBC 5.29 06/26/2025    HGB 10.8 (L) 06/26/2025    HCT 32.7 (L) 06/26/2025    MCV 96 06/26/2025     06/26/2025     BMP  Lab Results   Component Value Date     (L) 06/26/2025    K 3.6 06/26/2025     06/26/2025    CO2 24 06/26/2025    BUN 52 (H) 06/26/2025    CREATININE 2.5 (H) 06/26/2025    CALCIUM 8.5 (L) 06/26/2025    ANIONGAP 10 06/26/2025     (H) 06/26/2025     (H) 06/25/2025    GLU 70 05/06/2025       No results found for this or any previous visit.        Anesthesia Evaluation    I have reviewed the Patient Summary Reports.     I have reviewed the Nursing Notes. I have reviewed the NPO Status.   I have reviewed the Medications.     Review of Systems  Anesthesia Hx:  No problems with previous Anesthesia             Denies Family Hx of  Anesthesia complications.    Denies Personal Hx of Anesthesia complications.                    Social:  Non-Smoker       Hematology/Oncology:       -- Anemia:                    --  Cancer in past history:       Other (see Oncology comments)          Oncology Comments: RCC s/p partial nephrectomy     EENT/Dental:  EENT/Dental Normal  crowns          Cardiovascular:     Hypertension              ECG has been reviewed. Left bundle branch block.                           Pulmonary:  Pulmonary Normal                       Renal/:  Chronic Renal Disease, ARF                Hepatic/GI:  Hepatic/GI Normal       Dilated bile duct.  Elevated liver enzymes.             Musculoskeletal:  Arthritis (right knee)               Neurological:  Neurology Normal                                      Endocrine:  Diabetes, poorly controlled, type 2           Psych:  Psychiatric Normal                    Physical Exam  General:  Well nourished       Airway/Jaw/Neck:  Airway Findings: Mouth Opening: Normal     Tongue: Normal      General Airway Assessment: Adult      Mallampati: III   TM Distance: Normal, at least 6 cm   Jaw/Neck Findings:     Neck ROM: Normal ROM         Eyes/Ears/Nose:  Eyes/Ears/Nose Findings:     Dental:  Dental Findings: In tact      Chest/Lungs:  Chest/Lungs Findings:  Clear to auscultation, Normal Respiratory Rate       Heart/Vascular:  Heart Findings: Rate: Normal  Rhythm: Regular Rhythm  Sounds: Normal                    Abdomen:  Abdomen Findings:       Musculoskeletal:  Musculoskeletal Findings:    Skin:  Skin Findings:       Mental Status:  Mental Status Findings:  Alert and Oriented         Anesthesia Plan  Type of Anesthesia, risks & benefits discussed:  Anesthesia Type:  general, Gen ETT    Patient's Preference:   Plan Factors:          Intra-op Monitoring Plan: standard ASA monitors  Intra-op Monitoring Plan Comments:   Post Op Pain Control Plan: multimodal analgesia  Post Op Pain Control Plan Comments:      Induction:   IV  Beta Blocker:  Patient is not currently on a Beta-Blocker (No further documentation required).       Informed Consent: Informed consent signed with the Patient and all parties understand the risks and agree with anesthesia plan.  All questions answered.  Anesthesia consent signed with patient.  ASA Score: 3     Day of Surgery Review of History & Physical:        Anesthesia Plan Notes: No Tylenol        Ready For Surgery From Anesthesia Perspective.             Physical Exam  General: Well nourished    Airway:  Mallampati: III   Mouth Opening: Normal  TM Distance: Normal, at least 6 cm  Tongue: Normal  Neck ROM: Normal ROM    Dental:  In tact    Chest/Lungs:  Clear to auscultation, Normal Respiratory Rate    Heart:  Rate: Normal  Rhythm: Regular Rhythm  Sounds: Normal        Anesthesia Plan  Type of Anesthesia, risks & benefits discussed:    Anesthesia Type: general, Gen ETT  Intra-op Monitoring Plan: standard ASA monitors  Post Op Pain Control Plan: multimodal analgesia  Induction:  IV  Informed Consent: Informed consent signed with the Patient and all parties understand the risks and agree with anesthesia plan.  All questions answered.   ASA Score: 3  Anesthesia Plan Notes: No Tylenol    Ready For Surgery From Anesthesia Perspective.     .

## 2025-06-26 NOTE — ASSESSMENT & PLAN NOTE
Admit to med/surg  Empiric zosyn  GI consulted  Gen surgery consulted  ERCP 6/26 followed by lap cholecystecomy 6/27  Pain and nausea control  IV hydration while NPO

## 2025-06-26 NOTE — PLAN OF CARE
Dc assessment completed with pt at bedside. Confirmed demographics, pcp, and pharm. Pt lives with wife and drives to and from his own appts. Wife will drive him home at dc. Denies HH HD DME o2 and coumadin.     No needs at this time.     [facility]  Initial Discharge Assessment       Primary Care Provider: Richard Gunter MD    Admission Diagnosis: Acute cholecystitis [K81.0]  Cholecystitis [K81.9]    Admission Date: 6/25/2025  Expected Discharge Date: 6/29/2025    Transition of Care Barriers: None    Payor: MEDICARE / Plan: MEDICARE PART A & B / Product Type: Government /     Extended Emergency Contact Information  Primary Emergency Contact: ALYSHA CABALLERO  Home Phone: [phone]  Mobile Phone: [phone]  Relation: Spouse    Discharge Plan A: Home  Discharge Plan B: Home      [id number] - [place], [place] - [address]  [address] [place] [address]  Phone: [phone] Fax: [phone]    [facility] [address] [facility] - [facility] to [facility]  [place] [address]  Phone: [phone] Fax: [phone]      Initial Assessment (most recent)       Adult Discharge Assessment - 06/26/25 1200          Discharge Assessment    Assessment Type Discharge Planning Assessment     Confirmed/corrected address, phone number and insurance Yes     Confirmed Demographics Correct on Facesheet     Source of Information family;patient     Communicated CA with patient/caregiver No     People in Home spouse     Do you expect to return to your current living situation? No     Do you have help at home or someone to help you manage your care at home? Yes     Prior to hospitilization cognitive status: Alert/Oriented     Current cognitive status: Alert/Oriented     Equipment Currently Used at Home none     Readmission within 30 days? No     Patient currently being followed by outpatient case management? No     Do you  currently have service(s) that help you manage your care at home? No     Do you take prescription medications? Yes     Do you have prescription coverage? Yes     Do you have any problems affording any of your prescribed medications? No     Is the patient taking medications as prescribed? yes     Who is going to help you get home at discharge? wife     How do you get to doctors appointments? car, drives self;family or friend will provide     Are you on dialysis? No     Do you take coumadin? No     Discharge Plan A Home     Discharge Plan B Home     DME Needed Upon Discharge  none     Discharge Plan discussed with: Patient     Transition of Care Barriers None

## 2025-06-26 NOTE — SUBJECTIVE & OBJECTIVE
Interval History: pt seen and examined at bedside. No acute events overnight. Pain not well controlled and medication adjusted. He is waiting to have ERCP. Questions answered. Continue NPO until ERCP can likely have clears after and NPO at [facility] for lap cholecystectomy in AM. Continue empiric zosyn.    Review of Systems   Gastrointestinal:  Positive for abdominal pain and nausea.     Objective:     Vital Signs (Most Recent):  Temp: 98 °F (36.7 °C) (06/26/25 1200)  Pulse: 68 (06/26/25 1200)  Resp: 16 (06/26/25 1200)  BP: 120/62 (06/26/25 1200)  SpO2: 97 % (06/26/25 1200) Vital Signs (24h Range):  Temp:  [98 °F (36.7 °C)-98.1 °F (36.7 °C)] 98 °F (36.7 °C)  Pulse:  [67-83] 68  Resp:  [16-20] 16  SpO2:  [92 %-99 %] 97 %  BP: (117-146)/(58-64) 120/62     Weight: 76.4 kg (168 lb 6.4 oz)  Body mass index is 23.49 kg/m².    Intake/Output Summary (Last 24 hours) at 6/26/2025 1550  Last data filed at 6/26/2025 0942  Gross per 24 hour   Intake 1105 ml   Output --   Net 1105 ml         Physical Exam  Vitals and nursing note reviewed.   Constitutional:       General: He is not in acute distress.  HENT:      Head: Normocephalic and atraumatic.      Nose: Nose normal.   Eyes:      General: Scleral icterus present.      Conjunctiva/sclera: Conjunctivae normal.      Pupils: Pupils are equal, round, and reactive to light.   Cardiovascular:      Rate and Rhythm: Normal rate and regular rhythm.   Pulmonary:      Effort: Pulmonary effort is normal.      Breath sounds: Normal breath sounds.   Abdominal:      Palpations: Abdomen is soft.      Tenderness: There is abdominal tenderness.   Musculoskeletal:         General: Normal range of motion.      Cervical back: Normal range of motion and neck supple.   Skin:     General: Skin is warm and dry.      Capillary Refill: Capillary refill takes less than 2 seconds.      Coloration: Skin is jaundiced.   Neurological:      Mental Status: He is alert and oriented to person, place, and time. Mental  status is at baseline.   Psychiatric:         Mood and Affect: Mood normal.         Behavior: Behavior normal.               Significant Labs: All pertinent labs within the past 24 hours have been reviewed.  CBC:   Recent Labs   Lab 06/25/25  1503 06/26/25  0444   WBC 7.77 5.29   HGB 12.0* 10.8*   HCT 35.5* 32.7*    164     CMP:   Recent Labs   Lab 06/25/25  1503 06/26/25  0444   * 134*   K 3.3* 3.6   CL 99 100   CO2 21* 24   * 137*   BUN 49* 52*   CREATININE 2.2* 2.5*   CALCIUM 9.2 8.5*   PROT 6.9 6.0   ALBUMIN 3.7 3.1*   BILITOT 5.9* 6.3*   ALKPHOS 158* 175*   AST 65* 43*   * 149*   ANIONGAP 12 10       Significant Imaging: I have reviewed all pertinent imaging results/findings within the past 24 hours.    MRI MRCP Abdomen WO Contrast 3D WO Independent WS XPD  Result Date: 6/26/2025  EXAMINATION: MRI MRCP ABDOMEN WO CONTRAST 3D WO INDEPENDENT WS XPD CLINICAL HISTORY: cholecystitis; COMPARISON: CT, ultrasound June 25, 2025 FINDINGS: Multiplanar noncontrast imaging was performed, utilizing magnetic resonance cholangiopancreatography protocol. The common bile duct reaches maximal transverse diameter of 10 mm.  No filling defects are identified.  No mass or stricture is demonstrated.  The intrahepatic biliary tree is nondilated. The gallbladder is moderately distended, with mild gallbladder wall thickening and mild pericholecystic edema. No hepatic mass or contour abnormality is identified.  The spleen is mildly enlarged at 13.6 cm in longitudinal dimension.  No pancreatic mass lesion is identified.  There is no peripancreatic edema.  The pancreatic duct is normal in caliber. Multiple small bilateral renal cysts are noted.  Incompletely assessed on this examination and not entirely included in the field of view, there is an indeterminate mass at the lower pole of the left kidney, measuring at least 5.4 cm in size.     1. Mildly dilated common bile duct.  No evidence of stricture or common  duct stone. 2. Moderately distended gallbladder with small gallstones, mild wall thickening, and mild pericholecystic edema.  Correlate for possible acute cholecystitis. 3. Incompletely assessed on this examination, there is an indeterminate exophytic mass at the lower pole of the left kidney measuring 5.4 cm in greatest dimension, indeterminate.  By report, patient has had previous ablation of a left renal neoplasm, which may account for the above.  Pre and post infusion CT abdomen could be utilized for more detailed assessment. Electronically signed by: Harish Paz Date:    06/26/2025 Time:    07:42    CT Abdomen Pelvis  Without Contrast  Result Date: 6/25/2025  CMS MANDATED QUALITY DATA - CT RADIATION - 436 All CT scans at this facility utilize dose modulation, iterative reconstruction, and/or weight based dosing when appropriate to reduce radiation dose to as low as reasonably achievable. EXAMINATION: CT ABDOMEN PELVIS WITHOUT CONTRAST CLINICAL HISTORY: Abdominal abscess/infection suspected; TECHNIQUE: CT abdomen and pelvis without IV or oral contrast. COMPARISON: CT abdomen pelvis 11/14/2021. FINDINGS: There is atelectasis and mild bronchiectasis of the right lung base.  Small right pleural effusion.  The heart is normal size. The liver is normal size.  There are no gross hepatic lesions.  The gallbladder is distended.  There are calcified gallstones in the neck of the gallbladder.  There is mild pericholecystic fat stranding.  The common bile duct is within normal limits.  The pancreas, spleen and adrenal glands are unremarkable.  The kidneys are normal size.  There is focal scarring of the medial right kidney with fibrosis of the adjacent perinephric fat, similar to previous exam.  Bilateral small renal cysts again observed.  There is no hydronephrosis or nephrolithiasis.  The ureters are normal caliber without obstructions.  The bladder is fluid filled with no focal wall abnormalities.  Prostate gland  is unremarkable. There is diverticulosis of the sigmoid colon.  There is mild bowel wall thickening of the 1st and 2nd portion of duodenum with perienteric fat stranding.  Large and small bowel are otherwise normal caliber.  Stomach is mostly decompressed and unremarkable. The abdominal aorta is normal caliber with calcified plaque formation.  There are no enlarged intra-abdominal lymph nodes.  Small fat filled umbilical hernia.  There are degenerative changes of the spine.  There is no acute osseous abnormality.     1. Distended gallbladder with calcified gallstones in the neck of the gallbladder.  There is mild pericholecystic fat stranding.  There is also mild thickening of the 1st and 2nd portions of the duodenum with perienteric fat stranding.  Findings could reflect cholecystitis, enteritis or combination there of. 2. Small right pleural effusion. 3. Atelectasis and mild bronchiectasis of the right lung base. 4. Additional incidental observations as described. Electronically signed by: Adama Morales Date:    06/25/2025 Time:    16:17    US Abdomen Limited  Result Date: 6/25/2025  EXAMINATION: US ABDOMEN LIMITED CLINICAL HISTORY: Abdominal pain; COMPARISON: MRI 01/10/2025, 08/15/2024, CT 11/14/2020 FINDINGS: Liver maintains normal echogenicity without focal mass or intrahepatic bile duct dilation. Hepatopedal flow present in main portal vein. Diffuse low-level internal echoes suggesting sludge lies throughout distended gallbladder.  Cholelithiasis measuring 17 mm is also present in the gallbladder.  No gallbladder wall thickening or pericholecystic fluid.  Common duct measures 8-10 mm in diameter.  This remains similar to that partially visualized on 08/15/2024 MRI. Bowel gas obscures the pancreas.  Right kidney is free of hydronephrosis, containing multifocal simple cysts measuring up to 23 mm.  Visualized abdominal aorta is nonaneurysmal. Juxtahepatic IVC is unremarkable.  Graft Right pleural effusion is  partially visualized.     1. Distended gallbladder containing diffuse low-level internal echoes and cholelithiasis.  Gallbladder at similar imaging appearance on prior imaging studies dating back to CT 11/14/2020.  Clinical and laboratory correlation is needed to assess for potential acute cholecystitis, as sonographic Brumfield sign is reported as positive. 2. Common duct diameter of 8-10 mm, not significantly changed from 08/15/2024 MRI.  This could be related to patient's age.  Correlation with bilirubin level is requested to assess for any other evidence of common duct obstruction.  Further evaluation with MRCP can be considered. 3. Partially visualized right pleural effusion. 4. Right renal cyst. Electronically signed by: Orville Barfield Date:    06/25/2025 Time:    14:53    X-Ray Chest AP Portable  Result Date: 6/25/2025  EXAMINATION: XR CHEST AP PORTABLE CLINICAL HISTORY: Sepsis; COMPARISON: August 2024 FINDINGS: Heart size is normal.  The mediastinum is unremarkable.  The left lung is clear.  There is mild right basilar atelectasis with chronic elevation of the right hemidiaphragm.  No acute osseous abnormalities are identified.  Chronic bilateral rotator cuff tears are noted.     1. Chronic elevation of the right hemidiaphragm with mild right basilar atelectasis. 2. Chronic bilateral rotator cuff tears. Electronically signed by: Harish Paz Date:    06/25/2025 Time:    14:53

## 2025-06-26 NOTE — HOSPITAL COURSE
Mr. Cadet has been monitored closely during his hospitalization. He was admitted on 6/25 with jaundice, transamanitis, acute cholecystitis, and concerns for choledocholithiasis. Imaging revealed CBD dilation and Moderately distended gallbladder with small gallstones, mild wall thickening, and mild pericholecystic edema. GI and general surgery were consulted. He was empirically started on zosyn. He has known CKDIIIB with history of partial right nephrectomy and ablation Left renal mass d/t RCC. He is followed by Dr. Shin - urology and is was previously a patient of Dr. Decker and is planning to establish with Dr. Perry. He had an ERCP on 6/26 with stent placement which revealed acute cholangitis. He had a lap cholecystectomy with removal of gangrenous gallbladder which ruptured upon removal on 6/27. Transaminases have trended down postoperatively. Diet was advanced and he began passing flatus. He was cleared for discharge by general surgery on 6/29. He will be discharged to complete a course of augmentin (renally dosed). He will need close outpt f/u with Dr. Clarke, Dr. Alfred, and his PCP. He needs a repeat ERCP in 6-8 weeks. He was seen and examined on the date of discharge. Strict return prxn provided.

## 2025-06-26 NOTE — ANESTHESIA PREPROCEDURE EVALUATION
06/26/2025  Jamil Cadet is a 80 y.o., male.      Results for orders placed or performed during the hospital encounter of 06/25/25   EKG 12-lead    Collection Time: 06/25/25 11:51 PM   Result Value Ref Range    QRS Duration 134 ms    [facility] QTC Calculation 428 ms    Narrative    Test Reason : R07.9,    Vent. Rate :  69 BPM     Atrial Rate :  69 BPM     P-R Int : 172 ms          QRS Dur : 134 ms      QT Int : 400 ms       P-R-T Axes :  92 -23 -24 degrees    QTcB Int : 428 ms    Normal sinus rhythm  Right bundle branch block  Inferior infarct ,age undetermined  Abnormal ECG  No previous ECGs available    Referred By: AAAREFERRAL SELF           Confirmed By:         Imaging Results              MRI MRCP Abdomen WO Contrast 3D WO Independent WS XPD (Final result)  Result time 06/26/25 07:42:27      Final result by Harish Paz MD (06/26/25 07:42:27)                   Impression:      1. Mildly dilated common bile duct.  No evidence of stricture or common duct stone.  2. Moderately distended gallbladder with small gallstones, mild wall thickening, and mild pericholecystic edema.  Correlate for possible acute cholecystitis.  3. Incompletely assessed on this examination, there is an indeterminate exophytic mass at the lower pole of the left kidney measuring 5.4 cm in greatest dimension, indeterminate.  By report, patient has had previous ablation of a left renal neoplasm, which may account for the above.  Pre and post infusion CT abdomen could be utilized for more detailed assessment.      Electronically signed by: Harish Paz  Date:    06/26/2025  Time:    07:42               Narrative:    EXAMINATION:  MRI MRCP ABDOMEN WO CONTRAST 3D WO INDEPENDENT WS XPD    CLINICAL HISTORY:  cholecystitis;    COMPARISON:  CT, ultrasound June 25, 2025    FINDINGS:  Multiplanar noncontrast imaging was performed,  utilizing magnetic resonance cholangiopancreatography protocol.    The common bile duct reaches maximal transverse diameter of 10 mm.  No filling defects are identified.  No mass or stricture is demonstrated.  The intrahepatic biliary tree is nondilated.    The gallbladder is moderately distended, with mild gallbladder wall thickening and mild pericholecystic edema.    No hepatic mass or contour abnormality is identified.  The spleen is mildly enlarged at 13.6 cm in longitudinal dimension.  No pancreatic mass lesion is identified.  There is no peripancreatic edema.  The pancreatic duct is normal in caliber.    Multiple small bilateral renal cysts are noted.  Incompletely assessed on this examination and not entirely included in the field of view, there is an indeterminate mass at the lower pole of the left kidney, measuring at least 5.4 cm in size.                                       CT Abdomen Pelvis  Without Contrast (Final result)  Result time 06/25/25 16:17:10      Final result by Adama Morales DO (06/25/25 16:17:10)                   Impression:      1. Distended gallbladder with calcified gallstones in the neck of the gallbladder.  There is mild pericholecystic fat stranding.  There is also mild thickening of the 1st and 2nd portions of the duodenum with perienteric fat stranding.  Findings could reflect cholecystitis, enteritis or combination there of.  2. Small right pleural effusion.  3. Atelectasis and mild bronchiectasis of the right lung base.  4. Additional incidental observations as described.      Electronically signed by: Adama Morales  Date:    06/25/2025  Time:    16:17               Narrative:      CMS MANDATED QUALITY DATA - CT RADIATION - 436    All CT scans at this facility utilize dose modulation, iterative reconstruction, and/or weight based dosing when appropriate to reduce radiation dose to as low as reasonably achievable.    EXAMINATION:  CT ABDOMEN PELVIS WITHOUT  CONTRAST    CLINICAL HISTORY:  Abdominal abscess/infection suspected;    TECHNIQUE:  CT abdomen and pelvis without IV or oral contrast.    COMPARISON:  CT abdomen pelvis 11/14/2021.    FINDINGS:  There is atelectasis and mild bronchiectasis of the right lung base.  Small right pleural effusion.  The heart is normal size.    The liver is normal size.  There are no gross hepatic lesions.  The gallbladder is distended.  There are calcified gallstones in the neck of the gallbladder.  There is mild pericholecystic fat stranding.  The common bile duct is within normal limits.  The pancreas, spleen and adrenal glands are unremarkable.  The kidneys are normal size.  There is focal scarring of the medial right kidney with fibrosis of the adjacent perinephric fat, similar to previous exam.  Bilateral small renal cysts again observed.  There is no hydronephrosis or nephrolithiasis.  The ureters are normal caliber without obstructions.  The bladder is fluid filled with no focal wall abnormalities.  Prostate gland is unremarkable.    There is diverticulosis of the sigmoid colon.  There is mild bowel wall thickening of the 1st and 2nd portion of duodenum with perienteric fat stranding.  Large and small bowel are otherwise normal caliber.  Stomach is mostly decompressed and unremarkable.    The abdominal aorta is normal caliber with calcified plaque formation.  There are no enlarged intra-abdominal lymph nodes.  Small fat filled umbilical hernia.  There are degenerative changes of the spine.  There is no acute osseous abnormality.                                       X-Ray Chest AP Portable (Final result)  Result time 06/25/25 14:53:19   Procedure changed from X-Ray Chest PA And Lateral     Final result by Harish Paz MD (06/25/25 14:53:19)                   Impression:      1. Chronic elevation of the right hemidiaphragm with mild right basilar atelectasis.  2. Chronic bilateral rotator cuff tears.      Electronically  signed by: Harish Paz  Date:    06/25/2025  Time:    14:53               Narrative:    EXAMINATION:  XR CHEST AP PORTABLE    CLINICAL HISTORY:  Sepsis;    COMPARISON:  August 2024    FINDINGS:  Heart size is normal.  The mediastinum is unremarkable.  The left lung is clear.  There is mild right basilar atelectasis with chronic elevation of the right hemidiaphragm.  No acute osseous abnormalities are identified.  Chronic bilateral rotator cuff tears are noted.                                       US Abdomen Limited (Final result)  Result time 06/25/25 14:53:50      Final result by Orville Barfield MD (06/25/25 14:53:50)                   Impression:      1. Distended gallbladder containing diffuse low-level internal echoes and cholelithiasis.  Gallbladder at similar imaging appearance on prior imaging studies dating back to CT 11/14/2020.  Clinical and laboratory correlation is needed to assess for potential acute cholecystitis, as sonographic Brumfield sign is reported as positive.  2. Common duct diameter of 8-10 mm, not significantly changed from 08/15/2024 MRI.  This could be related to patient's age.  Correlation with bilirubin level is requested to assess for any other evidence of common duct obstruction.  Further evaluation with MRCP can be considered.  3. Partially visualized right pleural effusion.  4. Right renal cyst.      Electronically signed by: Orville Barfield  Date:    06/25/2025  Time:    14:53               Narrative:    EXAMINATION:  US ABDOMEN LIMITED    CLINICAL HISTORY:  Abdominal pain;    COMPARISON:  MRI 01/10/2025, 08/15/2024, CT 11/14/2020    FINDINGS:  Liver maintains normal echogenicity without focal mass or intrahepatic bile duct dilation. Hepatopedal flow present in main portal vein.    Diffuse low-level internal echoes suggesting sludge lies throughout distended gallbladder.  Cholelithiasis measuring 17 mm is also present in the gallbladder.  No gallbladder wall thickening or  pericholecystic fluid.  Common duct measures 8-10 mm in diameter.  This remains similar to that partially visualized on 08/15/2024 MRI.    Bowel gas obscures the pancreas.  Right kidney is free of hydronephrosis, containing multifocal simple cysts measuring up to 23 mm.  Visualized abdominal aorta is nonaneurysmal. Juxtahepatic IVC is unremarkable.  Graft    Right pleural effusion is partially visualized.                                       Lab Results   Component Value Date    WBC 5.29 06/26/2025    HGB 10.8 (L) 06/26/2025    HCT 32.7 (L) 06/26/2025    MCV 96 06/26/2025     06/26/2025     BMP  Lab Results   Component Value Date     (L) 06/26/2025    K 3.6 06/26/2025     06/26/2025    CO2 24 06/26/2025    BUN 52 (H) 06/26/2025    CREATININE 2.5 (H) 06/26/2025    CALCIUM 8.5 (L) 06/26/2025    ANIONGAP 10 06/26/2025     (H) 06/26/2025     (H) 06/25/2025    GLU 70 05/06/2025       Results for orders placed during the hospital encounter of 12/05/23    Echo    Interpretation Summary    Left Ventricle: The left ventricle is normal in size. Mildly increased wall thickness. There is mild concentric hypertrophy. Normal wall motion. There is normal systolic function with a visually estimated ejection fraction of 60 - 65%. There is normal diastolic function.    Right Ventricle: Normal right ventricular cavity size. Wall thickness is normal. Right ventricle wall motion  is normal. Systolic function is normal.    Left Atrium: Left atrium is mildly dilated.    Aortic Valve: The aortic valve is a trileaflet valve. There is mild aortic valve sclerosis.    Mitral Valve: There is mild regurgitation with a centrally directed jet.    Tricuspid Valve: There is mild regurgitation with a centrally directed jet.    IVC/SVC: Normal venous pressure at 3 mmHg.         Pre-op Assessment    I have reviewed the Patient Summary Reports.     I have reviewed the Nursing Notes. I have reviewed the NPO Status.   I  have reviewed the Medications.     Review of Systems  Anesthesia Hx:   History of prior surgery of interest to airway management or planning: nephrectomy.         Denies Family Hx of Anesthesia complications.    Denies Personal Hx of Anesthesia complications.                    Social:  Non-Smoker, No Alcohol Use       Hematology/Oncology:       -- Anemia:                    --  Cancer in past history:              surgery       Cardiovascular:     Hypertension           hyperlipidemia   ECG has been reviewed.                            Renal/:  Chronic Renal Disease   History of partial nephrectomy  History of hydronephrosis  History of MELODIE   Neoplasm/Tumor, Renal Neoplasm, Renal Cell Carcinoma (CA).   Kidney Function/Disease, Chronic Kidney Disease (CKD) , CKD Stage III (GFR 30-59)            Hepatic/GI:        Acute cholecystitis         Biliary Disease, Biliary Obstruction  Liver Disease, Abnormal Liver Enzymes        Neurological:    Neuromuscular Disease,       History of Polio  Polyneuropathy                            Endocrine:  Diabetes, poorly controlled, type 2, using insulin               Physical Exam  General: Well nourished, Cooperative, Alert and Oriented    Airway:  Mallampati: III   Mouth Opening: Normal  TM Distance: > 6 cm  Tongue: Normal  Neck ROM: Normal ROM    Dental:  Intact, Caps / Implants    Chest/Lungs:  Clear to auscultation, Normal Respiratory Rate    Heart:  Rate: Normal  Rhythm: Regular Rhythm        Anesthesia Plan  Type of Anesthesia, risks & benefits discussed:    Anesthesia Type: Gen ETT  Intra-op Monitoring Plan: Standard ASA Monitors  Post Op Pain Control Plan: multimodal analgesia and IV/PO Opioids PRN  Induction:  IV and rapid sequence  Airway Plan: , Post-Induction  Informed Consent: Informed consent signed with the Patient and all parties understand the risks and agree with anesthesia plan.  All questions answered.   ASA Score: 3  Anesthesia Plan Notes:         IGOR Lira  Versed  No Decadron   No Ofirmev  Zofran 4mg iv, Pepcid 20mg iv   Sugammadex        Ready For Surgery From Anesthesia Perspective.     .

## 2025-06-26 NOTE — PROVATION PATIENT INSTRUCTIONS
Discharge Summary/Instructions after an Endoscopic Procedure  Patient Name: Jamil Cadet  Patient MRN: [id number]  Patient YOB: 1945  Thursday, June 26, 2025  Azael Alfred III, MD  RESTRICTIONS:  During your procedure today, you received medications for sedation.  These   medications may affect your judgment, balance and coordination.  Therefore,   for 24 hours, you have the following restrictions:   - DO NOT drive a car, operate machinery, make legal/financial decisions,   sign important papers or drink alcohol.    ACTIVITY:  Today: no heavy lifting, straining or running due to procedural   sedation/anesthesia.  The following day: return to full activity including work.  DIET:  Eat and drink normally unless instructed otherwise.     TREATMENT FOR COMMON SIDE EFFECTS:  - Mild abdominal pain, nausea, belching, bloating or excessive gas:  rest,   eat lightly and use a heating pad.  - Sore Throat: treat with throat lozenges and/or gargle with warm salt   water.  - Because air was used during the procedure, expelling large amounts of air   from your rectum or belching is normal.  - If a bowel prep was taken, you may not have a bowel movement for 1-3 days.    This is normal.  SYMPTOMS TO WATCH FOR AND REPORT TO YOUR PHYSICIAN:  1. Abdominal pain or bloating, other than gas cramps.  2. Chest pain.  3. Back pain.  4. Signs of infection such as: chills or fever occurring within 24 hours   after the procedure.  5. Rectal bleeding, which would show as bright red, maroon, or black stools.   (A tablespoon of blood from the rectum is not serious, especially if   hemorrhoids are present.)  6. Vomiting.  7. Weakness or dizziness.  GO DIRECTLY TO THE NEAREST EMERGENCY ROOM IF YOU HAVE ANY OF THE FOLLOWING:      Difficulty breathing              Chills and/or fever over 101 F   Persistent vomiting and/or vomiting blood   Severe abdominal pain   Severe chest pain   Black, tarry stools   Bleeding- more than one  tablespoon   Any other symptom or condition that you feel may need urgent attention  Your doctor recommends these additional instructions:  If any biopsies were taken, your doctors clinic will contact you in 1 to 2   weeks with any results.  - Clear liquid diet.   - Continue present medications.   - Patient has a contact number available for emergencies.  The signs and   symptoms of potential delayed complications were discussed with the   patient.  Return to normal activities tomorrow.  Written discharge   instructions were provided to the patient.   - Return patient to hospital [facility] for ongoing care.   - Lap diana tomorrow; Will likely leave stent in longer an repeat ERCP in   6-8 weeks given findings today.  For questions, problems or results please call your physician - Azael Alfred III, MD at Work:  [phone].  [facility], EMERGENCY ROOM PHONE NUMBER: [phone]  IF A COMPLICATION OR EMERGENCY SITUATION ARISES AND YOU ARE UNABLE TO REACH   YOUR PHYSICIAN - GO DIRECTLY TO THE EMERGENCY ROOM.  Azael Alfred III, MD  6/26/2025 6:10:26 PM  This report has been verified and signed electronically.  Dear patient,  As a result of recent federal legislation (The Federal Cures Act), you may   receive lab or pathology results from your procedure in your [facility]   account before your physician is able to contact you. Your physician or   their representative will relay the results to you with their   recommendations at their soonest availability.  Thank you,  PROVATION

## 2025-06-26 NOTE — ASSESSMENT & PLAN NOTE
Patient's FSGs are controlled on current medication regimen.  Last A1c reviewed-   Lab Results   Component Value Date    HGBA1C 6.5 (H) 05/06/2025     Most recent fingerstick glucose reviewed-   Recent Labs   Lab 06/26/25  1202   POCTGLUCOSE 134*     Current correctional scale  Medium  Maintain anti-hyperglycemic dose as follows-   Antihyperglycemics (From admission, onward)      Start     Stop Route Frequency Ordered    06/25/25 1911  insulin aspart U-100 pen 0-10 Units         -- SubQ Every 6 hours PRN 06/25/25 1811          Hold Oral hypoglycemics while patient is in the hospital.

## 2025-06-26 NOTE — CONSULTS
GASTROENTEROLOGY INPATIENT CONSULT NOTE  Patient Name: Jamil Cadet  Patient MRN: [id number]  Patient : 1945    Admit Date: 2025  Service date: 2025    Reason for Consult: jaundice / fevers / LFTs    PCP: Richard Gunter MD    Chief Complaint   Patient presents with    Fatigue     SENT FROM  FOR EVAL OF JAUNDICE    Vomiting    Abdominal Pain    Fever       HPI: Patient is a 80 y.o. male with PMHx DM, h/o RCC, HTN, HLD, back surgery, appy that presents to hospital for RUQ pain, fevers, and slight jaundice. Acute onset, constant, progressive over past 4-5 days. Seen by surgery and planning for lap diana pending clinical course.     CHART REVIEW:   TB 6.3; AST 43;   MRCP 25 - 10mm CBD w/o obvious filling defects; cholelithiasis / cholecystitis; Nml panc/PD      Past Medical History:  Past Medical History:   Diagnosis Date    Acute renal failure 2020    Bacteremia due to Enterococcus 10/25/2020    Cancer     Diabetes mellitus, type 2     Disorder of kidney and ureter     Encounter for blood transfusion     History of renal cell carcinoma status post partial nephrectomy 2020    Hydronephrosis 2020    Hyperlipidemia     Hypertension     Infection and inflammatory reaction due to nephrostomy catheter, initial encounter 2020    Infection following a procedure, superficial incisional surgical site, subsequent encounter 2020    Polio     Pyelonephritis of right kidney     Septicemia due to enterococcus 11/15/2020        Past Surgical History:  Past Surgical History:   Procedure Laterality Date    ANTEGRADE PYELOGRAM  12/10/2020    Procedure: PYELOGRAM, ANTEGRADE;  Surgeon: Dave Shin MD;  Location: [facility] OR [address] [place] [address];  Service: Urology;;    APPENDECTOMY      BACK SURGERY      CYSTOSCOPY  12/10/2020    Procedure: CYSTOSCOPY;  Surgeon: Dave Shin MD;  Location: [facility] OR [address] [place] [address];  Service: Urology;;    CYSTOSCOPY W/ RETROGRADES Right 2020    Procedure:  CYSTOSCOPY, WITH RETROGRADE PYELOGRAM;  Surgeon: Chris Garcia Jr., MD;  Location: [facility];  Service: Urology;  Laterality: Right;    DILATION OF NEPHROSTOMY TRACT Right 12/10/2020    Procedure: DILATION, NEPHROSTOMY TRACT;  Surgeon: Dave Shin MD;  Location: [facility] OR 1ST FLR;  Service: Urology;  Laterality: Right;    RADIOFREQUENCY ABLATION KIDNEY  2022    REMOVAL OF DRAIN Right 12/10/2020    Procedure: REMOVAL, DRAIN-NEPHROSTOMY TUBE;  Surgeon: Dave Shin MD;  Location: [facility] OR 1ST FLR;  Service: Urology;  Laterality: Right;    REPLACEMENT OF NEPHROSTOMY TUBE Right 12/10/2020    Procedure: REPLACEMENT, NEPHROSTOMY TUBE;  Surgeon: Dave Shin MD;  Location: [facility] OR 1ST FLR;  Service: Urology;  Laterality: Right;    ROBOT-ASSISTED LAPAROSCOPIC REIMPLANTATION OF URETER USING DA PHU XI Right 01/08/2021    Procedure: XI ROBOTIC IMPLANTATION, URETER, USING PSOAS HITCH TECHNIQUE;  Surgeon: Dave Shin MD;  Location: [facility] OR 2ND FLR;  Service: Urology;  Laterality: Right;  4hrs gen with regional    SPINE SURGERY      URETEROSCOPY Right 12/10/2020    Procedure: URETEROSCOPY-ANTEGRADE;  Surgeon: Dave Shin MD;  Location: [facility] OR 1ST FLR;  Service: Urology;  Laterality: Right;    URETHROSCOPY  09/09/2020    Procedure: URETHROSCOPY;  Surgeon: Chris Garcia Jr., MD;  Location: [facility];  Service: Urology;;        Home Medications:  Prescriptions Prior to Admission[1]    Inpatient Medications:   pantoprazole  40 mg Intravenous Daily    piperacillin-tazobactam (Zosyn) IV (PEDS and ADULTS) (extended infusion is not appropriate)  4.5 g Intravenous Q8H       Current Facility-Administered Medications:     acetaminophen, 650 mg, Oral, Q4H PRN    aluminum-magnesium hydroxide-simethicone, 30 mL, Oral, QID PRN    dextrose 50%, 12.5 g, Intravenous, PRN    dextrose 50%, 25 g, Intravenous, PRN    glucagon (human recombinant), 1 mg, Intramuscular, PRN    glucose, 16 g, Oral, PRN    glucose, 24 g,  "Oral, PRN    HYDROmorphone, 0.5 mg, Intravenous, Q3H PRN    insulin aspart U-100, 0-10 Units, Subcutaneous, Q6H PRN    magnesium oxide, 800 mg, Oral, PRN    magnesium oxide, 800 mg, Oral, PRN    melatonin, 6 mg, Oral, Nightly PRN    naloxone, 0.02 mg, Intravenous, PRN    ondansetron, 4 mg, Intravenous, Q6H PRN    potassium bicarbonate, 35 mEq, Oral, PRN    potassium bicarbonate, 50 mEq, Oral, PRN    potassium bicarbonate, 60 mEq, Oral, PRN    potassium, sodium phosphates, 2 packet, Oral, PRN    potassium, sodium phosphates, 2 packet, Oral, PRN    potassium, sodium phosphates, 2 packet, Oral, PRN    traZODone, 50 mg, Oral, Nightly PRN    Review of patient's allergies indicates:  No Known Allergies    Social History:   Social History     Occupational History    Not on file   Tobacco Use    Smoking status: Never     Passive exposure: Past    Smokeless tobacco: Never   Substance and Sexual Activity    Alcohol use: No    Drug use: No    Sexual activity: Yes     Partners: Female       Family History:   Family History   Problem Relation Name Age of Onset    Heart disease Mother      Heart disease Father      Stroke Father      Heart disease Maternal Grandfather      Heart disease Paternal Grandmother      Heart disease Paternal Grandfather         Review of Systems:  A 10 point review of systems was performed and was normal, except as mentioned in the HPI, including constitutional, HEENT, heme, lymph, cardiovascular, respiratory, gastrointestinal, genitourinary, neurologic, endocrine, psychiatric and musculoskeletal.      OBJECTIVE:    Physical Exam:  24 Hour Vital Sign Ranges: Temp:  [98 °F (36.7 °C)-98.1 °F (36.7 °C)] 98 °F (36.7 °C)  Pulse:  [67-83] 68  Resp:  [16-20] 16  SpO2:  [92 %-100 %] 97 %  BP: (117-146)/(58-64) 120/62  Most recent vitals: /62 (BP Location: Left arm, Patient Position: Lying)   Pulse 68   Temp 98 °F (36.7 °C) (Oral)   Resp 16   Ht 5' 11" (1.803 m)   Wt 76.4 kg (168 lb 6.4 oz)   SpO2 " "97%   BMI 23.49 kg/m²    GEN: well-developed, well-nourished, awake and alert, non-toxic appearing adult  HEENT: PERRL, sclera icteric, oral mucosa pink and moist without lesion  NECK: trachea midline; Good ROM  CV: regular rate and rhythm, no murmurs or gallops  RESP: clear to auscultation bilaterally, no wheezes, rhonci or rales  ABD: soft, +tender to epig, non-distended, normal bowel sounds  EXT: no swelling or edema, 2+ pulses distally  SKIN: no rashes ; slight jaundice  PSYCH: normal affect    Labs:   Recent Labs     06/25/25  1503 06/26/25  0444   WBC 7.77 5.29   MCV 94 96    164     Recent Labs     06/25/25  1503 06/26/25  0444   * 134*   K 3.3* 3.6   CL 99 100   CO2 21* 24   BUN 49* 52*   * 137*     No results for input(s): "ALB" in the last 72 hours.    Invalid input(s): "ALKP", "SGOT", "SGPT", "TBIL", "DBIL", "TPRO"  No results for input(s): "PT", "INR", "PTT" in the last 72 hours.      Radiology Review:  MRI MRCP Abdomen WO Contrast 3D WO Independent WS XPD   Final Result      1. Mildly dilated common bile duct.  No evidence of stricture or common duct stone.   2. Moderately distended gallbladder with small gallstones, mild wall thickening, and mild pericholecystic edema.  Correlate for possible acute cholecystitis.   3. Incompletely assessed on this examination, there is an indeterminate exophytic mass at the lower pole of the left kidney measuring 5.4 cm in greatest dimension, indeterminate.  By report, patient has had previous ablation of a left renal neoplasm, which may account for the above.  Pre and post infusion CT abdomen could be utilized for more detailed assessment.         Electronically signed by: Harish Paz   Date:    06/26/2025   Time:    07:42      CT Abdomen Pelvis  Without Contrast   Final Result      1. Distended gallbladder with calcified gallstones in the neck of the gallbladder.  There is mild pericholecystic fat stranding.  There is also mild thickening " of the 1st and 2nd portions of the duodenum with perienteric fat stranding.  Findings could reflect cholecystitis, enteritis or combination there of.   2. Small right pleural effusion.   3. Atelectasis and mild bronchiectasis of the right lung base.   4. Additional incidental observations as described.         Electronically signed by: Adama Morales   Date:    06/25/2025   Time:    16:17      X-Ray Chest AP Portable   Final Result      1. Chronic elevation of the right hemidiaphragm with mild right basilar atelectasis.   2. Chronic bilateral rotator cuff tears.         Electronically signed by: Harish Paz   Date:    06/25/2025   Time:    14:53      US Abdomen Limited   Final Result      1. Distended gallbladder containing diffuse low-level internal echoes and cholelithiasis.  Gallbladder at similar imaging appearance on prior imaging studies dating back to CT 11/14/2020.  Clinical and laboratory correlation is needed to assess for potential acute cholecystitis, as sonographic Brumfield sign is reported as positive.   2. Common duct diameter of 8-10 mm, not significantly changed from 08/15/2024 MRI.  This could be related to patient's age.  Correlation with bilirubin level is requested to assess for any other evidence of common duct obstruction.  Further evaluation with MRCP can be considered.   3. Partially visualized right pleural effusion.   4. Right renal cyst.         Electronically signed by: Orville Barfield   Date:    06/25/2025   Time:    14:53      FL ERCP Biliary And Pancreatic By Rad Tech    (Results Pending)         IMPRESSION / RECOMMENDATIONS:  80 y.o. male with PMHx DM, h/o RCC, HTN, HLD, back surgery, appy that presents to hospital for RUQ pain, fevers, and slight jaundice. MRCP w/o obvious filling defects however TB continuing to increase and CBD dilated up to 1cm. Extensive discussions w/ surgery / IM / patient / family. Still some concerns for small stone / sludge / cholangitis based on these  clinical findings. Surgery concerns given rising LFTs and inflammation, that will be high risk for complications including bile leak/injury margie if distal obstruction. Discussed these concerns w/ family in detail as well. After various discussions, will proceed w/ ERCP w/ sphincterotomy/sweep for suspected cholangitis as well as stent to prevent CBD injury and prevent bile leak following lap diana. Risks, benefits, alternatives discussed regarding upcoming procedure. Procedure with increased risks relative to standard endoscopy including but not limited to bleeding, pancreatitis, or bowel / bile duct injuries.     -ERCP today w/ anticipated sphincterotomy / sweep w/ likely stenting  -Lap diana to follow per surgery  -Will set up for EGD w/ stent extraction in a few weeks following lap diana.     Thank you for this consult.    Azael Alfred III  6/26/2025  2:01 PM             [1]   Medications Prior to Admission   Medication Sig Dispense Refill Last Dose/Taking    allopurinoL (ZYLOPRIM) 100 MG tablet Take 100 mg by mouth Daily.   6/25/2025 Morning    amLODIPine (NORVASC) 5 MG tablet TAKE 1 TABLET BY MOUTH EVERY DAY IN THE EVENING 90 tablet 3 6/24/2025 Evening    ascorbic acid, vitamin C, (VITAMIN C) 500 MG tablet Take 500 mg by mouth once daily.   6/25/2025 Morning    atorvastatin (LIPITOR) 10 MG tablet Take 10 mg by mouth every Mon, Wed, Fri.   6/25/2025 Morning    calcitRIOL (ROCALTROL) 0.25 MCG Cap Take 0.25 mcg by mouth As instructed. Mon, Tues, Wed, Thurs, Fri 6/25/2025 Morning    cyanocobalamin (VITAMIN B-12) 2000 MCG tablet Take 2,000 mcg by mouth once daily.   6/25/2025 Morning    indapamide (LOZOL) 1.25 MG Tab Take 1.25 mg by mouth once daily.   6/25/2025 Morning    insulin aspart U-100 (NOVOLOG FLEXPEN U-100 INSULIN) 100 unit/mL (3 mL) InPn pen Inject 10 Units into the skin 3 (three) times daily with meals. Check glucose before meals and then take insulin as per sliding scale (Patient taking  "differently: Inject 4 Units into the skin every morning. Check glucose before meals and then take insulin as per sliding scale) 5 each 3 6/25/2025 Morning    insulin degludec (TRESIBA FLEXTOUCH U-100) 100 unit/mL (3 mL) insulin pen Inject 15 Units into the skin every evening. (Patient taking differently: Inject 14 Units into the skin every evening.) 15 mL 3 6/24/2025 Evening    losartan (COZAAR) 100 MG tablet TAKE 1 TABLET BY MOUTH EVERY DAY (Patient taking differently: Take 100 mg by mouth every morning.) 90 tablet 3 6/25/2025 Morning    sodium bicarbonate 650 MG tablet Take 650 mg by mouth 2 (two) times daily.   6/25/2025 Morning    traZODone (DESYREL) 100 MG tablet TAKE 1 TABLET BY MOUTH EVERY EVENING (DOSAGE INCREASE) (Patient taking differently: Take 100 mg by mouth every evening.) 90 tablet 3 6/24/2025 Evening    zinc gluconate 50 mg tablet Take 50 mg by mouth once daily.   6/25/2025 Morning    BD WINSOME 2ND GEN PEN NEEDLE 32 gauge x 5/32" Ndle INJECT 1 UNITS INTO THE SKIN 3 (THREE) TIMES DAILY BEFORE MEALS. USE AS DIRECTED NON-MED ITEM 300 each 3     blood-glucose meter (TRUE METRIX GLUCOSE METER) Misc 1 Device by Misc.(Non-Drug; Combo Route) route once daily. 1 each 0     TRUE METRIX GLUCOSE TEST STRIP Strp TEST 3 (THREE) TIMES DAILY BEFORE MEALS. 300 strip 1      "

## 2025-06-26 NOTE — ASSESSMENT & PLAN NOTE
Per USS done today :. Common duct diameter of 8-10 mm, not significantly changed from 08/15/2024 MRI.   Pt has significant jaundice and labs showing transaminitis  GI MD Consulted - ERCP 6/26

## 2025-06-27 ENCOUNTER — ANESTHESIA (OUTPATIENT)
Dept: SURGERY | Facility: HOSPITAL | Age: 80
End: 2025-06-27
Payer: MEDICARE

## 2025-06-27 PROBLEM — K83.09 ACUTE CHOLANGITIS: Status: ACTIVE | Noted: 2025-06-27

## 2025-06-27 LAB
ABSOLUTE EOSINOPHIL (SMH): 0.12 K/UL
ABSOLUTE MONOCYTE (SMH): 0.95 K/UL (ref 0.3–1)
ABSOLUTE NEUTROPHIL COUNT (SMH): 3.4 K/UL (ref 1.8–7.7)
ALBUMIN SERPL-MCNC: 2.8 G/DL (ref 3.5–5.2)
ALP SERPL-CCNC: 191 UNIT/L (ref 55–135)
ALT SERPL-CCNC: 98 UNIT/L (ref 10–44)
ANION GAP (SMH): 9 MMOL/L (ref 8–16)
AST SERPL-CCNC: 29 UNIT/L (ref 10–40)
BASOPHILS # BLD AUTO: 0.01 K/UL
BASOPHILS NFR BLD AUTO: 0.2 %
BILIRUB SERPL-MCNC: 5 MG/DL (ref 0.1–1)
BUN SERPL-MCNC: 49 MG/DL (ref 8–23)
CALCIUM SERPL-MCNC: 8.2 MG/DL (ref 8.7–10.5)
CHLORIDE SERPL-SCNC: 104 MMOL/L (ref 95–110)
CO2 SERPL-SCNC: 23 MMOL/L (ref 23–29)
CREAT SERPL-MCNC: 2.2 MG/DL (ref 0.5–1.4)
ERYTHROCYTE [DISTWIDTH] IN BLOOD BY AUTOMATED COUNT: 12.7 % (ref 11.5–14.5)
GFR SERPLBLD CREATININE-BSD FMLA CKD-EPI: 29 ML/MIN/1.73/M2
GLUCOSE SERPL-MCNC: 121 MG/DL (ref 70–110)
HCT VFR BLD AUTO: 28.8 % (ref 40–54)
HGB BLD-MCNC: 9.8 GM/DL (ref 14–18)
IMM GRANULOCYTES # BLD AUTO: 0.03 K/UL (ref 0–0.04)
IMM GRANULOCYTES NFR BLD AUTO: 0.6 % (ref 0–0.5)
LYMPHOCYTES # BLD AUTO: 0.46 K/UL (ref 1–4.8)
MAGNESIUM SERPL-MCNC: 1.9 MG/DL (ref 1.6–2.6)
MCH RBC QN AUTO: 31.6 PG (ref 27–31)
MCHC RBC AUTO-ENTMCNC: 34 G/DL (ref 32–36)
MCV RBC AUTO: 93 FL (ref 82–98)
NUCLEATED RBC (/100WBC) (SMH): 0 /100 WBC
PLATELET # BLD AUTO: 161 K/UL (ref 150–450)
PMV BLD AUTO: 10.6 FL (ref 9.2–12.9)
POCT GLUCOSE: 134 MG/DL (ref 70–110)
POCT GLUCOSE: 192 MG/DL (ref 70–110)
POCT GLUCOSE: 210 MG/DL (ref 70–110)
POCT GLUCOSE: 215 MG/DL (ref 70–110)
POTASSIUM SERPL-SCNC: 4 MMOL/L (ref 3.5–5.1)
PROT SERPL-MCNC: 5.6 GM/DL (ref 6–8.4)
RBC # BLD AUTO: 3.1 M/UL (ref 4.6–6.2)
RELATIVE EOSINOPHIL (SMH): 2.4 % (ref 0–8)
RELATIVE LYMPHOCYTE (SMH): 9.2 % (ref 18–48)
RELATIVE MONOCYTE (SMH): 19 % (ref 4–15)
RELATIVE NEUTROPHIL (SMH): 68.6 % (ref 38–73)
SODIUM SERPL-SCNC: 136 MMOL/L (ref 136–145)
WBC # BLD AUTO: 4.99 K/UL (ref 3.9–12.7)

## 2025-06-27 PROCEDURE — 47562 LAPAROSCOPIC CHOLECYSTECTOMY: CPT | Mod: 22,,, | Performed by: STUDENT IN AN ORGANIZED HEALTH CARE EDUCATION/TRAINING PROGRAM

## 2025-06-27 PROCEDURE — 63600175 PHARM REV CODE 636 W HCPCS

## 2025-06-27 PROCEDURE — 63600175 PHARM REV CODE 636 W HCPCS: Performed by: ANESTHESIOLOGY

## 2025-06-27 PROCEDURE — 99900035 HC TECH TIME PER 15 MIN (STAT)

## 2025-06-27 PROCEDURE — 85025 COMPLETE CBC W/AUTO DIFF WBC: CPT | Performed by: INTERNAL MEDICINE

## 2025-06-27 PROCEDURE — 36000710: Performed by: STUDENT IN AN ORGANIZED HEALTH CARE EDUCATION/TRAINING PROGRAM

## 2025-06-27 PROCEDURE — 0FT44ZZ RESECTION OF GALLBLADDER, PERCUTANEOUS ENDOSCOPIC APPROACH: ICD-10-PCS | Performed by: STUDENT IN AN ORGANIZED HEALTH CARE EDUCATION/TRAINING PROGRAM

## 2025-06-27 PROCEDURE — 99900031 HC PATIENT EDUCATION (STAT)

## 2025-06-27 PROCEDURE — 25000003 PHARM REV CODE 250: Performed by: ANESTHESIOLOGY

## 2025-06-27 PROCEDURE — 94761 N-INVAS EAR/PLS OXIMETRY MLT: CPT

## 2025-06-27 PROCEDURE — 27202107 HC XP QUATRO SENSOR: Performed by: ANESTHESIOLOGY

## 2025-06-27 PROCEDURE — 71000039 HC RECOVERY, EACH ADD'L HOUR: Performed by: STUDENT IN AN ORGANIZED HEALTH CARE EDUCATION/TRAINING PROGRAM

## 2025-06-27 PROCEDURE — 63600175 PHARM REV CODE 636 W HCPCS: Mod: JZ | Performed by: NURSE PRACTITIONER

## 2025-06-27 PROCEDURE — 63600175 PHARM REV CODE 636 W HCPCS: Mod: JZ | Performed by: INTERNAL MEDICINE

## 2025-06-27 PROCEDURE — 37000009 HC ANESTHESIA EA ADD 15 MINS: Performed by: STUDENT IN AN ORGANIZED HEALTH CARE EDUCATION/TRAINING PROGRAM

## 2025-06-27 PROCEDURE — 25000003 PHARM REV CODE 250

## 2025-06-27 PROCEDURE — 71000033 HC RECOVERY, INTIAL HOUR: Performed by: STUDENT IN AN ORGANIZED HEALTH CARE EDUCATION/TRAINING PROGRAM

## 2025-06-27 PROCEDURE — 83735 ASSAY OF MAGNESIUM: CPT | Performed by: INTERNAL MEDICINE

## 2025-06-27 PROCEDURE — 25000003 PHARM REV CODE 250: Performed by: INTERNAL MEDICINE

## 2025-06-27 PROCEDURE — 11000001 HC ACUTE MED/SURG PRIVATE ROOM

## 2025-06-27 PROCEDURE — 36415 COLL VENOUS BLD VENIPUNCTURE: CPT | Performed by: INTERNAL MEDICINE

## 2025-06-27 PROCEDURE — 27201107 HC STYLET, STANDARD: Performed by: ANESTHESIOLOGY

## 2025-06-27 PROCEDURE — 37000008 HC ANESTHESIA 1ST 15 MINUTES: Performed by: STUDENT IN AN ORGANIZED HEALTH CARE EDUCATION/TRAINING PROGRAM

## 2025-06-27 PROCEDURE — 27000673 HC TUBING BLOOD Y: Performed by: ANESTHESIOLOGY

## 2025-06-27 PROCEDURE — 27201423 OPTIME MED/SURG SUP & DEVICES STERILE SUPPLY: Performed by: STUDENT IN AN ORGANIZED HEALTH CARE EDUCATION/TRAINING PROGRAM

## 2025-06-27 PROCEDURE — 8E0W4CZ ROBOTIC ASSISTED PROCEDURE OF TRUNK REGION, PERCUTANEOUS ENDOSCOPIC APPROACH: ICD-10-PCS | Performed by: STUDENT IN AN ORGANIZED HEALTH CARE EDUCATION/TRAINING PROGRAM

## 2025-06-27 PROCEDURE — 82040 ASSAY OF SERUM ALBUMIN: CPT | Performed by: INTERNAL MEDICINE

## 2025-06-27 PROCEDURE — 36000711: Performed by: STUDENT IN AN ORGANIZED HEALTH CARE EDUCATION/TRAINING PROGRAM

## 2025-06-27 PROCEDURE — 88304 TISSUE EXAM BY PATHOLOGIST: CPT | Mod: TC | Performed by: PATHOLOGY

## 2025-06-27 RX ORDER — PROPOFOL 10 MG/ML
VIAL (ML) INTRAVENOUS
Status: DISCONTINUED | OUTPATIENT
Start: 2025-06-27 | End: 2025-06-27

## 2025-06-27 RX ORDER — ROCURONIUM BROMIDE 10 MG/ML
INJECTION, SOLUTION INTRAVENOUS
Status: DISCONTINUED | OUTPATIENT
Start: 2025-06-27 | End: 2025-06-27

## 2025-06-27 RX ORDER — FAMOTIDINE 10 MG/ML
INJECTION, SOLUTION INTRAVENOUS
Status: DISCONTINUED | OUTPATIENT
Start: 2025-06-27 | End: 2025-06-27

## 2025-06-27 RX ORDER — HYDROMORPHONE HYDROCHLORIDE 1 MG/ML
1 INJECTION, SOLUTION INTRAMUSCULAR; INTRAVENOUS; SUBCUTANEOUS
Status: DISCONTINUED | OUTPATIENT
Start: 2025-06-27 | End: 2025-06-29 | Stop reason: HOSPADM

## 2025-06-27 RX ORDER — HYDROMORPHONE HYDROCHLORIDE 1 MG/ML
0.2 INJECTION, SOLUTION INTRAMUSCULAR; INTRAVENOUS; SUBCUTANEOUS EVERY 5 MIN PRN
Status: DISCONTINUED | OUTPATIENT
Start: 2025-06-27 | End: 2025-06-27 | Stop reason: HOSPADM

## 2025-06-27 RX ORDER — GLUCAGON 1 MG
1 KIT INJECTION
Status: DISCONTINUED | OUTPATIENT
Start: 2025-06-27 | End: 2025-06-27 | Stop reason: HOSPADM

## 2025-06-27 RX ORDER — LIDOCAINE HYDROCHLORIDE 20 MG/ML
INJECTION INTRAVENOUS
Status: DISCONTINUED | OUTPATIENT
Start: 2025-06-27 | End: 2025-06-27

## 2025-06-27 RX ORDER — SODIUM CHLORIDE, SODIUM LACTATE, POTASSIUM CHLORIDE, CALCIUM CHLORIDE 600; 310; 30; 20 MG/100ML; MG/100ML; MG/100ML; MG/100ML
INJECTION, SOLUTION INTRAVENOUS CONTINUOUS PRN
Status: DISCONTINUED | OUTPATIENT
Start: 2025-06-27 | End: 2025-06-27

## 2025-06-27 RX ORDER — DIPHENHYDRAMINE HYDROCHLORIDE 50 MG/ML
12.5 INJECTION, SOLUTION INTRAMUSCULAR; INTRAVENOUS
Status: DISCONTINUED | OUTPATIENT
Start: 2025-06-27 | End: 2025-06-27 | Stop reason: HOSPADM

## 2025-06-27 RX ORDER — ONDANSETRON HYDROCHLORIDE 2 MG/ML
INJECTION, SOLUTION INTRAVENOUS
Status: DISCONTINUED | OUTPATIENT
Start: 2025-06-27 | End: 2025-06-27

## 2025-06-27 RX ORDER — PHENYLEPHRINE HYDROCHLORIDE 10 MG/ML
INJECTION INTRAVENOUS
Status: DISCONTINUED | OUTPATIENT
Start: 2025-06-27 | End: 2025-06-27

## 2025-06-27 RX ORDER — HYDROMORPHONE HYDROCHLORIDE 1 MG/ML
0.5 INJECTION, SOLUTION INTRAMUSCULAR; INTRAVENOUS; SUBCUTANEOUS
Status: DISCONTINUED | OUTPATIENT
Start: 2025-06-27 | End: 2025-06-29 | Stop reason: HOSPADM

## 2025-06-27 RX ORDER — SUCCINYLCHOLINE CHLORIDE 20 MG/ML
INJECTION INTRAMUSCULAR; INTRAVENOUS
Status: DISCONTINUED | OUTPATIENT
Start: 2025-06-27 | End: 2025-06-27

## 2025-06-27 RX ORDER — OXYCODONE HYDROCHLORIDE 5 MG/1
5 TABLET ORAL
Status: DISCONTINUED | OUTPATIENT
Start: 2025-06-27 | End: 2025-06-27 | Stop reason: HOSPADM

## 2025-06-27 RX ORDER — ACETAMINOPHEN 10 MG/ML
1000 INJECTION, SOLUTION INTRAVENOUS ONCE
Status: COMPLETED | OUTPATIENT
Start: 2025-06-27 | End: 2025-06-27

## 2025-06-27 RX ORDER — DEXMEDETOMIDINE HYDROCHLORIDE 100 UG/ML
INJECTION, SOLUTION INTRAVENOUS
Status: DISCONTINUED | OUTPATIENT
Start: 2025-06-27 | End: 2025-06-27

## 2025-06-27 RX ORDER — ONDANSETRON HYDROCHLORIDE 2 MG/ML
4 INJECTION, SOLUTION INTRAVENOUS DAILY PRN
Status: DISCONTINUED | OUTPATIENT
Start: 2025-06-27 | End: 2025-06-27 | Stop reason: HOSPADM

## 2025-06-27 RX ORDER — FENTANYL CITRATE 50 UG/ML
INJECTION, SOLUTION INTRAMUSCULAR; INTRAVENOUS
Status: DISCONTINUED | OUTPATIENT
Start: 2025-06-27 | End: 2025-06-27

## 2025-06-27 RX ADMIN — HYDROMORPHONE HYDROCHLORIDE 0.2 MG: 1 INJECTION, SOLUTION INTRAMUSCULAR; INTRAVENOUS; SUBCUTANEOUS at 11:06

## 2025-06-27 RX ADMIN — DEXMEDETOMIDINE HYDROCHLORIDE 8 MCG: 100 INJECTION, SOLUTION INTRAVENOUS at 11:06

## 2025-06-27 RX ADMIN — DEXMEDETOMIDINE HYDROCHLORIDE 4 MCG: 100 INJECTION, SOLUTION INTRAVENOUS at 09:06

## 2025-06-27 RX ADMIN — HYDROMORPHONE HYDROCHLORIDE 0.2 MG: 1 INJECTION, SOLUTION INTRAMUSCULAR; INTRAVENOUS; SUBCUTANEOUS at 12:06

## 2025-06-27 RX ADMIN — FAMOTIDINE 20 MG: 10 INJECTION, SOLUTION INTRAVENOUS at 09:06

## 2025-06-27 RX ADMIN — FENTANYL CITRATE 25 MCG: 50 INJECTION, SOLUTION INTRAMUSCULAR; INTRAVENOUS at 09:06

## 2025-06-27 RX ADMIN — ACETAMINOPHEN 1000 MG: 10 INJECTION INTRAVENOUS at 12:06

## 2025-06-27 RX ADMIN — PROPOFOL 160 MG: 10 INJECTION, EMULSION INTRAVENOUS at 09:06

## 2025-06-27 RX ADMIN — SODIUM CHLORIDE: 9 INJECTION, SOLUTION INTRAVENOUS at 04:06

## 2025-06-27 RX ADMIN — INSULIN ASPART 2 UNITS: 100 INJECTION, SOLUTION INTRAVENOUS; SUBCUTANEOUS at 10:06

## 2025-06-27 RX ADMIN — ONDANSETRON 4 MG: 2 INJECTION INTRAMUSCULAR; INTRAVENOUS at 12:06

## 2025-06-27 RX ADMIN — Medication 140 MG: at 09:06

## 2025-06-27 RX ADMIN — SODIUM CHLORIDE, SODIUM LACTATE, POTASSIUM CHLORIDE, AND CALCIUM CHLORIDE: .6; .31; .03; .02 INJECTION, SOLUTION INTRAVENOUS at 09:06

## 2025-06-27 RX ADMIN — SUGAMMADEX 200 MG: 100 INJECTION, SOLUTION INTRAVENOUS at 10:06

## 2025-06-27 RX ADMIN — ROCURONIUM BROMIDE 35 MG: 10 INJECTION, SOLUTION INTRAVENOUS at 09:06

## 2025-06-27 RX ADMIN — HYDROMORPHONE HYDROCHLORIDE 1 MG: 1 INJECTION, SOLUTION INTRAMUSCULAR; INTRAVENOUS; SUBCUTANEOUS at 08:06

## 2025-06-27 RX ADMIN — HYDROMORPHONE HYDROCHLORIDE 0.5 MG: 1 INJECTION, SOLUTION INTRAMUSCULAR; INTRAVENOUS; SUBCUTANEOUS at 05:06

## 2025-06-27 RX ADMIN — PIPERACILLIN SODIUM AND TAZOBACTAM SODIUM 4.5 G: 4; .5 INJECTION, POWDER, LYOPHILIZED, FOR SOLUTION INTRAVENOUS at 04:06

## 2025-06-27 RX ADMIN — OXYCODONE HYDROCHLORIDE 5 MG: 5 TABLET ORAL at 11:06

## 2025-06-27 RX ADMIN — PIPERACILLIN SODIUM AND TAZOBACTAM SODIUM 4.5 G: 4; .5 INJECTION, POWDER, LYOPHILIZED, FOR SOLUTION INTRAVENOUS at 06:06

## 2025-06-27 RX ADMIN — PHENYLEPHRINE HYDROCHLORIDE 100 MCG: 10 INJECTION INTRAVENOUS at 09:06

## 2025-06-27 RX ADMIN — SODIUM CHLORIDE, SODIUM LACTATE, POTASSIUM CHLORIDE, AND CALCIUM CHLORIDE: .6; .31; .03; .02 INJECTION, SOLUTION INTRAVENOUS at 10:06

## 2025-06-27 RX ADMIN — HYDROMORPHONE HYDROCHLORIDE 0.5 MG: 1 INJECTION, SOLUTION INTRAMUSCULAR; INTRAVENOUS; SUBCUTANEOUS at 04:06

## 2025-06-27 RX ADMIN — ROCURONIUM BROMIDE 5 MG: 10 INJECTION, SOLUTION INTRAVENOUS at 09:06

## 2025-06-27 RX ADMIN — LIDOCAINE HYDROCHLORIDE 100 MG: 20 INJECTION, SOLUTION INTRAVENOUS at 09:06

## 2025-06-27 RX ADMIN — ONDANSETRON 4 MG: 2 INJECTION INTRAMUSCULAR; INTRAVENOUS at 09:06

## 2025-06-27 RX ADMIN — FENTANYL CITRATE 50 MCG: 50 INJECTION, SOLUTION INTRAMUSCULAR; INTRAVENOUS at 09:06

## 2025-06-27 NOTE — OP NOTE
[facility]  Surgery Department  Operative Note    SUMMARY     Date of Procedure: 6/27/2025     Procedure: Procedure(s) (LRB):  ROBOTIC CHOLECYSTECTOMY (N/A)   22 Modifer    Surgeons and Role:     * Darwin Clarke MD - Primary    Assisting Surgeon: Aliyah FAIRCHILD    Pre-Operative Diagnosis: Acute cholecystitis [K81.0]    Post-Operative Diagnosis: Post-Op Diagnosis Codes:     * Acute cholecystitis [K81.0]    Anesthesia: General    Operative Findings (including complications, if any):  Acute calculous and gangrenous cholecystitis.  Significant fibrosis at the neck of the gallbladder extending towards the duct    Description of Technical Procedures:  This is an 80-year-old male who presented with concerns of choledocholithiasis as well as acute cholecystitis versus a Mirizzi syndrome.  He underwent ERCP for duct clearance.  He did consent to cholecystectomy.  He was taken to the OR, placed supine, general endotracheal anesthesia was induced, the abdomen was prepped and draped in the usual fashion, a time-out was completed.   Access to the abdomen was achieved using Optiview technique in the left upper quadrant.  The abdomen was insufflated to 15 mmHg a scope was inserted.  There was no apparent injury during entry.  Three 8 mm robotic trocars were placed across the abdomen under direct visualization and the robot was docked.  The gallbladder was identified.  It was very dilated.  A laparoscopic needle was used to decompress the dome of the gallbladder to facilitate retraction.  The wall of the gallbladder was gangrenous in ultimately ruptured.  Peritoneum was stripped off the base exposing the cystic duct and artery as they entered the gallbladder.  There was severe fibrosis and inflammation in this location.  The duct and artery were clipped and then divided.  The gallbladder was removed off the fossa using electrocautery, placed in an Endo-Catch bag, and removed from the left upper quadrant  incision.  The right upper quadrant was copiously irrigated until effluent was clear and hemostasis was confirmed.  Surgicel powder was placed in the liver bed to facilitate hemostasis.  Trocars were then removed and insufflation was allowed to escape.  Fascia at the extraction site was closed with an 0 Vicryl suture.  Skin was closed with 4-0 Monocryl.  Dermabond was applied as a dressing.  Patient tolerated the entire procedure without apparent complication, was extubated in the OR, brought to recovery in stable condition.  All counts were correct.  A 22 modifier is justified given significant difficulties related to gangrenous cholecystitis as well as fibrosis from choledocholithiasis.  Significant Surgical Tasks Conducted by the Assistant(s), if Applicable:  Patient positioning and prep, bedside assist, skin closure, bandage application.    Estimated Blood Loss (EBL):  Minimal  Implants: * No implants in log *    Specimens:   Specimen (24h ago, onward)       Start     Ordered    06/27/25 1011  Specimen to Pathology - Surgery  Once        Comments: Pre-op Diagnosis: Acute cholecystitis [K81.0]Procedure(s):ROBOTIC CHOLECYSTECTOMY Number of specimens: 1Name of specimens: 1. Gallbladder     Question:  Release to patient  Answer:  Immediate    06/27/25 1011                   * No specimens in log *           Condition: Good    Disposition: PACU - hemodynamically stable.    Attestation: Op Note Attestation: I was physically present and scrubbed for the entire procedure.

## 2025-06-27 NOTE — ASSESSMENT & PLAN NOTE
Baseline creatinine 1.7-2  IV hydration   improving  Recent Labs     06/25/25  1503 06/26/25  0444 06/27/25  0439   CREATININE 2.2* 2.5* 2.2*   EGFRNORACEVR 29* 25* 29*

## 2025-06-27 NOTE — TRANSFER OF CARE
"Anesthesia Transfer of Care Note    Patient: Jamil Cadet    Procedure(s) Performed: Procedure(s) (LRB):  ROBOTIC CHOLECYSTECTOMY (N/A)    Patient location: PACU    Anesthesia Type: general    Transport from OR: Transported from OR on room air with adequate spontaneous ventilation    Post pain: adequate analgesia    Post assessment: no apparent anesthetic complications and tolerated procedure well    Post vital signs: stable    Level of consciousness: awake    Nausea/Vomiting: no nausea/vomiting    Complications: none    Transfer of care protocol was followed      Last vitals: Visit Vitals  BP (!) 116/48   Pulse 66   Temp 36.7 °C (98.1 °F) (Oral)   Resp 17   Ht 5' 11" (1.803 m)   Wt 76.4 kg (168 lb 6.4 oz)   SpO2 (!) 93%   BMI 23.49 kg/m²     "

## 2025-06-27 NOTE — ANESTHESIA POSTPROCEDURE EVALUATION
Anesthesia Post Evaluation    Patient: Jamil Cadet    Procedure(s) Performed: Procedure(s) (LRB):  ROBOTIC CHOLECYSTECTOMY (N/A)    Final Anesthesia Type: general      Patient location during evaluation: PACU  Patient participation: Yes- Able to Participate  Level of consciousness: awake and alert and oriented  Post-procedure vital signs: reviewed and stable  Pain management: adequate  Airway patency: patent    PONV status at discharge: No PONV  Anesthetic complications: no      Cardiovascular status: blood pressure returned to baseline, hemodynamically stable and stable  Respiratory status: unassisted, spontaneous ventilation and room air  Hydration status: euvolemic  Follow-up not needed.              Vitals Value Taken Time   /51 06/27/25 13:19   Temp 36.4 °C (97.5 °F) 06/27/25 12:38   Pulse 74 06/27/25 13:19   Resp 25 06/27/25 13:19   SpO2 100 % 06/27/25 13:19   Vitals shown include unfiled device data.      No case tracking events are documented in the log.      Pain/Vi Score: Pain Rating Prior to Med Admin: 10 (6/27/2025 12:38 PM)  Pain Rating Post Med Admin: 3 (6/27/2025  6:11 AM)  Vi Score: 9 (6/27/2025  1:15 PM)           none

## 2025-06-27 NOTE — PLAN OF CARE
Patient taken to room via stretcher at this time. Patient moved from stretcher to bed with minimal assist. Drowsy but awakens easily not distressful. Treva, floor nurse at bedside to assume care of patient.

## 2025-06-27 NOTE — PLAN OF CARE
Nursing Transfer Note      Reason patient is being transferred: Released from PACU    Transfer To: Room 1122    Transfer via stretcher    Transfer with cardiac monitoring leads    Transported by Shayy TOLENTINO    Medicines sent: None    Any special needs or follow-up needed: None    Chart send with patient: Yes    Notified: spouse

## 2025-06-27 NOTE — ANESTHESIA POSTPROCEDURE EVALUATION
Anesthesia Post Evaluation    Patient: Jamil Cadet    Procedure(s) Performed: Procedure(s) (LRB):  ERCP (ENDOSCOPIC RETROGRADE CHOLANGIOPANCREATOGRAPHY) (N/A)    Final Anesthesia Type: general      Patient location during evaluation: PACU  Patient participation: Yes- Able to Participate  Level of consciousness: awake and alert  Post-procedure vital signs: reviewed and stable  Pain management: adequate  Airway patency: patent    PONV status at discharge: No PONV  Anesthetic complications: no      Cardiovascular status: blood pressure returned to baseline and stable  Respiratory status: unassisted and room air  Hydration status: euvolemic  Follow-up not needed.              Vitals Value Taken Time   /60 06/26/25 19:00   Temp 97.5 06/26/25 20:30   Pulse 65 06/26/25 19:01   Resp 19 06/26/25 19:01   SpO2 96 % 06/26/25 19:00   Vitals shown include unfiled device data.      Event Time   Out of Recovery 06/26/2025 19:02:16         Pain/Vi Score: Pain Rating Prior to Med Admin: 9 (6/26/2025 10:17 AM)  Pain Rating Post Med Admin: 3 (6/26/2025 10:47 AM)  Vi Score: 10 (6/26/2025  7:00 PM)

## 2025-06-27 NOTE — ASSESSMENT & PLAN NOTE
Admit to med/surg  Empiric zosyn  GI consulted  Gen surgery consulted  ERCP 6/26 followed by lap cholecystecomy 6/27  Pain and nausea control

## 2025-06-27 NOTE — PROGRESS NOTES
[facility] Medicine  Progress Note    Patient Name: Jamil Cadet  MRN: [id number]  Patient Class: IP- Inpatient   Admission Date: 6/25/2025  Length of Stay: 2 days  Attending Physician: Heath Montilla MD  Primary Care Provider: Richard Gunter MD        Subjective     Principal Problem:Cholelithiasis with acute cholecystitis        HPI:  80 year old pt getting admitted with acute cholecystitis/Transaminitis/Jaundice  Pt has been suffering from abdominal pain/committing and fever for past 2 days  Today symptoms went worse. He went to Urgent care and was referred to ER  Imaging showed cholecystitis and MRCP was ordered from ER       Overview/Hospital Course:  Mr. Cadet has been monitored closely during his hospitalization. He was admitted on 6/25 with jaundice, transamanitis, acute cholecystitis, and concerns for choledocholithiasis. Imaging revealed CBD dilation and Moderately distended gallbladder with small gallstones, mild wall thickening, and mild pericholecystic edema. GI and general surgery were consulted. He was empirically started on zosyn. He has known CKDIIIB with history of partial right nephrectomy and ablation Left renal mass d/t RCC. He is followed by Dr. Shin - urology and is was previously a patient of Dr. Decker and is planning to establish with Dr. Perry. He had an ERCP on 6/26 with stent placement which revealed acute cholangitis. He had a lap cholecystectomy with removal of gangrenous gallbladder which ruptured upon removal on 6/27. Transaminases have been trended.     Interval History: pt seen and examined at bedside. He is s/p lap cholecystectomy, will need to continue IV abx given gangrenous gallbladder and acute cholangitis. Pain medication adjusted for better post op pain control.    Review of Systems   Gastrointestinal:  Positive for abdominal pain and nausea.     Objective:     Vital Signs (Most Recent):  Temp: 97.7 °F (36.5 °C) (06/27/25 1604)  Pulse: 78  (06/27/25 1604)  Resp: 18 (06/27/25 1620)  BP: (!) 118/58 (06/27/25 1604)  SpO2: (!) 94 % (06/27/25 1604) Vital Signs (24h Range):  Temp:  [97.5 °F (36.4 °C)-98.4 °F (36.9 °C)] 97.7 °F (36.5 °C)  Pulse:  [62-85] 78  Resp:  [17-33] 18  SpO2:  [86 %-100 %] 94 %  BP: ()/(45-75) 118/58     Weight: 76.4 kg (168 lb 6.4 oz)  Body mass index is 23.49 kg/m².    Intake/Output Summary (Last 24 hours) at 6/27/2025 1626  Last data filed at 6/27/2025 1321  Gross per 24 hour   Intake 2480 ml   Output 800 ml   Net 1680 ml         Physical Exam  Vitals and nursing note reviewed.   Constitutional:       General: He is not in acute distress.  HENT:      Head: Normocephalic and atraumatic.      Nose: Nose normal.   Eyes:      General: Scleral icterus present.      Conjunctiva/sclera: Conjunctivae normal.      Pupils: Pupils are equal, round, and reactive to light.   Cardiovascular:      Rate and Rhythm: Normal rate and regular rhythm.   Pulmonary:      Effort: Pulmonary effort is normal.      Breath sounds: Normal breath sounds.   Abdominal:      Palpations: Abdomen is soft.      Tenderness: There is abdominal tenderness.   Musculoskeletal:         General: Normal range of motion.      Cervical back: Normal range of motion and neck supple.   Skin:     General: Skin is warm and dry.      Capillary Refill: Capillary refill takes less than 2 seconds.      Coloration: Skin is jaundiced.      Comments: Abdominal incisions well approximated, no drainage or erythema   Neurological:      Mental Status: He is alert and oriented to person, place, and time. Mental status is at baseline.   Psychiatric:         Mood and Affect: Mood normal.         Behavior: Behavior normal.               Significant Labs: All pertinent labs within the past 24 hours have been reviewed.  CBC:   Recent Labs   Lab 06/26/25 0444 06/27/25  2359   WBC 5.29 4.99   HGB 10.8* 9.8*   HCT 32.7* 28.8*    161     CMP:   Recent Labs   Lab 06/26/25 0444  06/27/25  0439   * 136   K 3.6 4.0    104   CO2 24 23   * 121*   BUN 52* 49*   CREATININE 2.5* 2.2*   CALCIUM 8.5* 8.2*   PROT 6.0 5.6*   ALBUMIN 3.1* 2.8*   BILITOT 6.3* 5.0*   ALKPHOS 175* 191*   AST 43* 29   * 98*   ANIONGAP 10 9       Significant Imaging: I have reviewed all pertinent imaging results/findings within the past 24 hours.    FL ERCP Biliary And Pancreatic By Rad Tech  Result Date: 6/27/2025  EXAMINATION: FL ERCP BILIARY AND PANCREATIC CLINICAL HISTORY: pain; TECHNIQUE: FLUORO time: 52.1 seconds FLUORO image series: 7 image series COMPARISON: None available. FINDINGS: Fluoroscopic equipment provided by the radiology department, without involvement of the radiologist.  The images demonstrate endoscopic retrograde cholangiopancreatography. Sphincterotomy with balloon sweep and plastic biliary stent placement per technologist notes.  Please see Dr. Alfred procedure notes for details.     Fluoroscopic services provided as noted above. Electronically signed by: Manuel Pina Date:    06/27/2025 Time:    06:49    MRI MRCP Abdomen WO Contrast 3D WO Independent WS XPD  Result Date: 6/26/2025  EXAMINATION: MRI MRCP ABDOMEN WO CONTRAST 3D WO INDEPENDENT WS XPD CLINICAL HISTORY: cholecystitis; COMPARISON: CT, ultrasound June 25, 2025 FINDINGS: Multiplanar noncontrast imaging was performed, utilizing magnetic resonance cholangiopancreatography protocol. The common bile duct reaches maximal transverse diameter of 10 mm.  No filling defects are identified.  No mass or stricture is demonstrated.  The intrahepatic biliary tree is nondilated. The gallbladder is moderately distended, with mild gallbladder wall thickening and mild pericholecystic edema. No hepatic mass or contour abnormality is identified.  The spleen is mildly enlarged at 13.6 cm in longitudinal dimension.  No pancreatic mass lesion is identified.  There is no peripancreatic edema.  The pancreatic duct is normal in  caliber. Multiple small bilateral renal cysts are noted.  Incompletely assessed on this examination and not entirely included in the field of view, there is an indeterminate mass at the lower pole of the left kidney, measuring at least 5.4 cm in size.     1. Mildly dilated common bile duct.  No evidence of stricture or common duct stone. 2. Moderately distended gallbladder with small gallstones, mild wall thickening, and mild pericholecystic edema.  Correlate for possible acute cholecystitis. 3. Incompletely assessed on this examination, there is an indeterminate exophytic mass at the lower pole of the left kidney measuring 5.4 cm in greatest dimension, indeterminate.  By report, patient has had previous ablation of a left renal neoplasm, which may account for the above.  Pre and post infusion CT abdomen could be utilized for more detailed assessment. Electronically signed by: Harish Paz Date:    06/26/2025 Time:    07:42    CT Abdomen Pelvis  Without Contrast  Result Date: 6/25/2025  CMS MANDATED QUALITY DATA - CT RADIATION - 436 All CT scans at this facility utilize dose modulation, iterative reconstruction, and/or weight based dosing when appropriate to reduce radiation dose to as low as reasonably achievable. EXAMINATION: CT ABDOMEN PELVIS WITHOUT CONTRAST CLINICAL HISTORY: Abdominal abscess/infection suspected; TECHNIQUE: CT abdomen and pelvis without IV or oral contrast. COMPARISON: CT abdomen pelvis 11/14/2021. FINDINGS: There is atelectasis and mild bronchiectasis of the right lung base.  Small right pleural effusion.  The heart is normal size. The liver is normal size.  There are no gross hepatic lesions.  The gallbladder is distended.  There are calcified gallstones in the neck of the gallbladder.  There is mild pericholecystic fat stranding.  The common bile duct is within normal limits.  The pancreas, spleen and adrenal glands are unremarkable.  The kidneys are normal size.  There is focal  scarring of the medial right kidney with fibrosis of the adjacent perinephric fat, similar to previous exam.  Bilateral small renal cysts again observed.  There is no hydronephrosis or nephrolithiasis.  The ureters are normal caliber without obstructions.  The bladder is fluid filled with no focal wall abnormalities.  Prostate gland is unremarkable. There is diverticulosis of the sigmoid colon.  There is mild bowel wall thickening of the 1st and 2nd portion of duodenum with perienteric fat stranding.  Large and small bowel are otherwise normal caliber.  Stomach is mostly decompressed and unremarkable. The abdominal aorta is normal caliber with calcified plaque formation.  There are no enlarged intra-abdominal lymph nodes.  Small fat filled umbilical hernia.  There are degenerative changes of the spine.  There is no acute osseous abnormality.     1. Distended gallbladder with calcified gallstones in the neck of the gallbladder.  There is mild pericholecystic fat stranding.  There is also mild thickening of the 1st and 2nd portions of the duodenum with perienteric fat stranding.  Findings could reflect cholecystitis, enteritis or combination there of. 2. Small right pleural effusion. 3. Atelectasis and mild bronchiectasis of the right lung base. 4. Additional incidental observations as described. Electronically signed by: Adama Morales Date:    06/25/2025 Time:    16:17    US Abdomen Limited  Result Date: 6/25/2025  EXAMINATION: US ABDOMEN LIMITED CLINICAL HISTORY: Abdominal pain; COMPARISON: MRI 01/10/2025, 08/15/2024, CT 11/14/2020 FINDINGS: Liver maintains normal echogenicity without focal mass or intrahepatic bile duct dilation. Hepatopedal flow present in main portal vein. Diffuse low-level internal echoes suggesting sludge lies throughout distended gallbladder.  Cholelithiasis measuring 17 mm is also present in the gallbladder.  No gallbladder wall thickening or pericholecystic fluid.  Common duct measures 8-10  mm in diameter.  This remains similar to that partially visualized on 08/15/2024 MRI. Bowel gas obscures the pancreas.  Right kidney is free of hydronephrosis, containing multifocal simple cysts measuring up to 23 mm.  Visualized abdominal aorta is nonaneurysmal. Juxtahepatic IVC is unremarkable.  Graft Right pleural effusion is partially visualized.     1. Distended gallbladder containing diffuse low-level internal echoes and cholelithiasis.  Gallbladder at similar imaging appearance on prior imaging studies dating back to CT 11/14/2020.  Clinical and laboratory correlation is needed to assess for potential acute cholecystitis, as sonographic Brumfield sign is reported as positive. 2. Common duct diameter of 8-10 mm, not significantly changed from 08/15/2024 MRI.  This could be related to patient's age.  Correlation with bilirubin level is requested to assess for any other evidence of common duct obstruction.  Further evaluation with MRCP can be considered. 3. Partially visualized right pleural effusion. 4. Right renal cyst. Electronically signed by: Orville Barfield Date:    06/25/2025 Time:    14:53    X-Ray Chest AP Portable  Result Date: 6/25/2025  EXAMINATION: XR CHEST AP PORTABLE CLINICAL HISTORY: Sepsis; COMPARISON: August 2024 FINDINGS: Heart size is normal.  The mediastinum is unremarkable.  The left lung is clear.  There is mild right basilar atelectasis with chronic elevation of the right hemidiaphragm.  No acute osseous abnormalities are identified.  Chronic bilateral rotator cuff tears are noted.     1. Chronic elevation of the right hemidiaphragm with mild right basilar atelectasis. 2. Chronic bilateral rotator cuff tears. Electronically signed by: Harish Paz Date:    06/25/2025 Time:    14:53          Assessment & Plan  Cholelithiasis with acute cholecystitis  Admit to med/surg  Empiric zosyn  GI consulted  Gen surgery consulted  ERCP 6/26 followed by lap cholecystecomy 6/27  Pain and nausea  control      Acute on chronic renal failure  Baseline creatinine 1.7-2  IV hydration   improving  Recent Labs     06/25/25  1503 06/26/25  0444 06/27/25  0439   CREATININE 2.2* 2.5* 2.2*   EGFRNORACEVR 29* 25* 29*       Common bile duct dilation  Per USS done today :. Common duct diameter of 8-10 mm, not significantly changed from 08/15/2024 MRI.   Pt has significant jaundice and labs showing transaminitis  GI MD Consulted - ERCP 6/26    Type 2 diabetes mellitus with other diabetic kidney complication  Patient's FSGs are controlled on current medication regimen.  Last A1c reviewed-   Lab Results   Component Value Date    HGBA1C 6.5 (H) 05/06/2025     Most recent fingerstick glucose reviewed-   Recent Labs   Lab 06/26/25  2343 06/27/25  0526 06/27/25  1113 06/27/25  1624   POCTGLUCOSE 151* 134* 210* 192*     Current correctional scale  Medium  Maintain anti-hyperglycemic dose as follows-   Antihyperglycemics (From admission, onward)      Start     Stop Route Frequency Ordered    06/25/25 1911  insulin aspart U-100 pen 0-10 Units         -- SubQ Every 6 hours PRN 06/25/25 1811          Hold Oral hypoglycemics while patient is in the hospital.    History of renal cell carcinoma status post partial nephrectomy  Aware     Transaminitis  Likely d/t choledocholithiasis  ERCP 6/26  IV hydration   Trend CMP    Acute cholangitis  S/p ERCP with stent placement  Continue zosyn    VTE Risk Mitigation (From admission, onward)           Ordered     IP VTE HIGH RISK PATIENT  Once         06/25/25 1811     Place sequential compression device  Until discontinued         06/25/25 1811                    Discharge Planning   CA: 6/29/2025     Code Status: Full Code   Medical Readiness for Discharge Date:   Discharge Plan A: Home                        Binta Sheets NP  Department of Hospital Medicine   Formerly [facility]

## 2025-06-27 NOTE — PLAN OF CARE
Patient underwent cholecystectomy.  He may start on clear liquids.  Continue antibiotics today.  Possible DC tomorrow in clinical assessment

## 2025-06-27 NOTE — SUBJECTIVE & OBJECTIVE
Interval History: pt seen and examined at bedside. He is s/p lap cholecystectomy, will need to continue IV abx given gangrenous gallbladder and acute cholangitis. Pain medication adjusted for better post op pain control.    Review of Systems   Gastrointestinal:  Positive for abdominal pain and nausea.     Objective:     Vital Signs (Most Recent):  Temp: 97.7 °F (36.5 °C) (06/27/25 1604)  Pulse: 78 (06/27/25 1604)  Resp: 18 (06/27/25 1620)  BP: (!) 118/58 (06/27/25 1604)  SpO2: (!) 94 % (06/27/25 1604) Vital Signs (24h Range):  Temp:  [97.5 °F (36.4 °C)-98.4 °F (36.9 °C)] 97.7 °F (36.5 °C)  Pulse:  [62-85] 78  Resp:  [17-33] 18  SpO2:  [86 %-100 %] 94 %  BP: ()/(45-75) 118/58     Weight: 76.4 kg (168 lb 6.4 oz)  Body mass index is 23.49 kg/m².    Intake/Output Summary (Last 24 hours) at 6/27/2025 1626  Last data filed at 6/27/2025 1321  Gross per 24 hour   Intake 2480 ml   Output 800 ml   Net 1680 ml         Physical Exam  Vitals and nursing note reviewed.   Constitutional:       General: He is not in acute distress.  HENT:      Head: Normocephalic and atraumatic.      Nose: Nose normal.   Eyes:      General: Scleral icterus present.      Conjunctiva/sclera: Conjunctivae normal.      Pupils: Pupils are equal, round, and reactive to light.   Cardiovascular:      Rate and Rhythm: Normal rate and regular rhythm.   Pulmonary:      Effort: Pulmonary effort is normal.      Breath sounds: Normal breath sounds.   Abdominal:      Palpations: Abdomen is soft.      Tenderness: There is abdominal tenderness.   Musculoskeletal:         General: Normal range of motion.      Cervical back: Normal range of motion and neck supple.   Skin:     General: Skin is warm and dry.      Capillary Refill: Capillary refill takes less than 2 seconds.      Coloration: Skin is jaundiced.      Comments: Abdominal incisions well approximated, no drainage or erythema   Neurological:      Mental Status: He is alert and oriented to person, place,  and time. Mental status is at baseline.   Psychiatric:         Mood and Affect: Mood normal.         Behavior: Behavior normal.               Significant Labs: All pertinent labs within the past 24 hours have been reviewed.  CBC:   Recent Labs   Lab 06/26/25 0444 06/27/25 0439   WBC 5.29 4.99   HGB 10.8* 9.8*   HCT 32.7* 28.8*    161     CMP:   Recent Labs   Lab 06/26/25 0444 06/27/25 0439   * 136   K 3.6 4.0    104   CO2 24 23   * 121*   BUN 52* 49*   CREATININE 2.5* 2.2*   CALCIUM 8.5* 8.2*   PROT 6.0 5.6*   ALBUMIN 3.1* 2.8*   BILITOT 6.3* 5.0*   ALKPHOS 175* 191*   AST 43* 29   * 98*   ANIONGAP 10 9       Significant Imaging: I have reviewed all pertinent imaging results/findings within the past 24 hours.    FL ERCP Biliary And Pancreatic By Rad Tech  Result Date: 6/27/2025  EXAMINATION: FL ERCP BILIARY AND PANCREATIC CLINICAL HISTORY: pain; TECHNIQUE: FLUORO time: 52.1 seconds FLUORO image series: 7 image series COMPARISON: None available. FINDINGS: Fluoroscopic equipment provided by the radiology department, without involvement of the radiologist.  The images demonstrate endoscopic retrograde cholangiopancreatography. Sphincterotomy with balloon sweep and plastic biliary stent placement per technologist notes.  Please see Dr. Alfred procedure notes for details.     Fluoroscopic services provided as noted above. Electronically signed by: Manuel Pina Date:    06/27/2025 Time:    06:49    MRI MRCP Abdomen WO Contrast 3D WO Independent WS XPD  Result Date: 6/26/2025  EXAMINATION: MRI MRCP ABDOMEN WO CONTRAST 3D WO INDEPENDENT WS XPD CLINICAL HISTORY: cholecystitis; COMPARISON: CT, ultrasound June 25, 2025 FINDINGS: Multiplanar noncontrast imaging was performed, utilizing magnetic resonance cholangiopancreatography protocol. The common bile duct reaches maximal transverse diameter of 10 mm.  No filling defects are identified.  No mass or stricture is demonstrated.  The  intrahepatic biliary tree is nondilated. The gallbladder is moderately distended, with mild gallbladder wall thickening and mild pericholecystic edema. No hepatic mass or contour abnormality is identified.  The spleen is mildly enlarged at 13.6 cm in longitudinal dimension.  No pancreatic mass lesion is identified.  There is no peripancreatic edema.  The pancreatic duct is normal in caliber. Multiple small bilateral renal cysts are noted.  Incompletely assessed on this examination and not entirely included in the field of view, there is an indeterminate mass at the lower pole of the left kidney, measuring at least 5.4 cm in size.     1. Mildly dilated common bile duct.  No evidence of stricture or common duct stone. 2. Moderately distended gallbladder with small gallstones, mild wall thickening, and mild pericholecystic edema.  Correlate for possible acute cholecystitis. 3. Incompletely assessed on this examination, there is an indeterminate exophytic mass at the lower pole of the left kidney measuring 5.4 cm in greatest dimension, indeterminate.  By report, patient has had previous ablation of a left renal neoplasm, which may account for the above.  Pre and post infusion CT abdomen could be utilized for more detailed assessment. Electronically signed by: Harish Paz Date:    06/26/2025 Time:    07:42    CT Abdomen Pelvis  Without Contrast  Result Date: 6/25/2025  CMS MANDATED QUALITY DATA - CT RADIATION - 436 All CT scans at this facility utilize dose modulation, iterative reconstruction, and/or weight based dosing when appropriate to reduce radiation dose to as low as reasonably achievable. EXAMINATION: CT ABDOMEN PELVIS WITHOUT CONTRAST CLINICAL HISTORY: Abdominal abscess/infection suspected; TECHNIQUE: CT abdomen and pelvis without IV or oral contrast. COMPARISON: CT abdomen pelvis 11/14/2021. FINDINGS: There is atelectasis and mild bronchiectasis of the right lung base.  Small right pleural effusion.  The  heart is normal size. The liver is normal size.  There are no gross hepatic lesions.  The gallbladder is distended.  There are calcified gallstones in the neck of the gallbladder.  There is mild pericholecystic fat stranding.  The common bile duct is within normal limits.  The pancreas, spleen and adrenal glands are unremarkable.  The kidneys are normal size.  There is focal scarring of the medial right kidney with fibrosis of the adjacent perinephric fat, similar to previous exam.  Bilateral small renal cysts again observed.  There is no hydronephrosis or nephrolithiasis.  The ureters are normal caliber without obstructions.  The bladder is fluid filled with no focal wall abnormalities.  Prostate gland is unremarkable. There is diverticulosis of the sigmoid colon.  There is mild bowel wall thickening of the 1st and 2nd portion of duodenum with perienteric fat stranding.  Large and small bowel are otherwise normal caliber.  Stomach is mostly decompressed and unremarkable. The abdominal aorta is normal caliber with calcified plaque formation.  There are no enlarged intra-abdominal lymph nodes.  Small fat filled umbilical hernia.  There are degenerative changes of the spine.  There is no acute osseous abnormality.     1. Distended gallbladder with calcified gallstones in the neck of the gallbladder.  There is mild pericholecystic fat stranding.  There is also mild thickening of the 1st and 2nd portions of the duodenum with perienteric fat stranding.  Findings could reflect cholecystitis, enteritis or combination there of. 2. Small right pleural effusion. 3. Atelectasis and mild bronchiectasis of the right lung base. 4. Additional incidental observations as described. Electronically signed by: Adama Morales Date:    06/25/2025 Time:    16:17    US Abdomen Limited  Result Date: 6/25/2025  EXAMINATION: US ABDOMEN LIMITED CLINICAL HISTORY: Abdominal pain; COMPARISON: MRI 01/10/2025, 08/15/2024, CT 11/14/2020 FINDINGS:  Liver maintains normal echogenicity without focal mass or intrahepatic bile duct dilation. Hepatopedal flow present in main portal vein. Diffuse low-level internal echoes suggesting sludge lies throughout distended gallbladder.  Cholelithiasis measuring 17 mm is also present in the gallbladder.  No gallbladder wall thickening or pericholecystic fluid.  Common duct measures 8-10 mm in diameter.  This remains similar to that partially visualized on 08/15/2024 MRI. Bowel gas obscures the pancreas.  Right kidney is free of hydronephrosis, containing multifocal simple cysts measuring up to 23 mm.  Visualized abdominal aorta is nonaneurysmal. Juxtahepatic IVC is unremarkable.  Graft Right pleural effusion is partially visualized.     1. Distended gallbladder containing diffuse low-level internal echoes and cholelithiasis.  Gallbladder at similar imaging appearance on prior imaging studies dating back to CT 11/14/2020.  Clinical and laboratory correlation is needed to assess for potential acute cholecystitis, as sonographic Brumfield sign is reported as positive. 2. Common duct diameter of 8-10 mm, not significantly changed from 08/15/2024 MRI.  This could be related to patient's age.  Correlation with bilirubin level is requested to assess for any other evidence of common duct obstruction.  Further evaluation with MRCP can be considered. 3. Partially visualized right pleural effusion. 4. Right renal cyst. Electronically signed by: Orville Barfield Date:    06/25/2025 Time:    14:53    X-Ray Chest AP Portable  Result Date: 6/25/2025  EXAMINATION: XR CHEST AP PORTABLE CLINICAL HISTORY: Sepsis; COMPARISON: August 2024 FINDINGS: Heart size is normal.  The mediastinum is unremarkable.  The left lung is clear.  There is mild right basilar atelectasis with chronic elevation of the right hemidiaphragm.  No acute osseous abnormalities are identified.  Chronic bilateral rotator cuff tears are noted.     1. Chronic elevation of the right  hemidiaphragm with mild right basilar atelectasis. 2. Chronic bilateral rotator cuff tears. Electronically signed by: Harish Pza Date:    06/25/2025 Time:    14:53

## 2025-06-27 NOTE — ANESTHESIA PROCEDURE NOTES
Intubation    Date/Time: 6/27/2025 9:28 AM    Performed by: Olive Ko CRNA  Authorized by: Eze Nuno MD    Intubation:     Induction:  Intravenous    Intubated:  Postinduction    Mask Ventilation:  Easy mask    Attempts:  1    Attempted By:  CRNA    Method of Intubation:  Video laryngoscopy    Blade:  Fernandez 3    Laryngeal View Grade: Grade I - full view of cords      Difficult Airway Encountered?: No      Complications:  None    Airway Device:  Oral endotracheal tube    Airway Device Size:  7.5    Style/Cuff Inflation:  Cuffed (inflated to minimal occlusive pressure)    Tube secured:  23    Secured at:  The lips    Placement Verified By:  Capnometry    Complicating Factors:  None    Findings Post-Intubation:  BS equal bilateral and atraumatic/condition of teeth unchanged

## 2025-06-27 NOTE — ASSESSMENT & PLAN NOTE
Patient's FSGs are controlled on current medication regimen.  Last A1c reviewed-   Lab Results   Component Value Date    HGBA1C 6.5 (H) 05/06/2025     Most recent fingerstick glucose reviewed-   Recent Labs   Lab 06/26/25  2343 06/27/25  0526 06/27/25  1113 06/27/25  1624   POCTGLUCOSE 151* 134* 210* 192*     Current correctional scale  Medium  Maintain anti-hyperglycemic dose as follows-   Antihyperglycemics (From admission, onward)      Start     Stop Route Frequency Ordered    06/25/25 1911  insulin aspart U-100 pen 0-10 Units         -- SubQ Every 6 hours PRN 06/25/25 1811          Hold Oral hypoglycemics while patient is in the hospital.

## 2025-06-28 LAB
ABSOLUTE EOSINOPHIL (SMH): 0.04 K/UL
ABSOLUTE MONOCYTE (SMH): 0.72 K/UL (ref 0.3–1)
ABSOLUTE NEUTROPHIL COUNT (SMH): 8.7 K/UL (ref 1.8–7.7)
ALBUMIN SERPL-MCNC: 2.6 G/DL (ref 3.5–5.2)
ALP SERPL-CCNC: 169 UNIT/L (ref 55–135)
ALT SERPL-CCNC: 73 UNIT/L (ref 10–44)
ANION GAP (SMH): 8 MMOL/L (ref 8–16)
AST SERPL-CCNC: 33 UNIT/L (ref 10–40)
BASOPHILS # BLD AUTO: 0.03 K/UL
BASOPHILS NFR BLD AUTO: 0.3 %
BILIRUB SERPL-MCNC: 3.8 MG/DL (ref 0.1–1)
BUN SERPL-MCNC: 47 MG/DL (ref 8–23)
CALCIUM SERPL-MCNC: 7.6 MG/DL (ref 8.7–10.5)
CHLORIDE SERPL-SCNC: 104 MMOL/L (ref 95–110)
CO2 SERPL-SCNC: 21 MMOL/L (ref 23–29)
CREAT SERPL-MCNC: 2.5 MG/DL (ref 0.5–1.4)
ERYTHROCYTE [DISTWIDTH] IN BLOOD BY AUTOMATED COUNT: 12.6 % (ref 11.5–14.5)
GFR SERPLBLD CREATININE-BSD FMLA CKD-EPI: 25 ML/MIN/1.73/M2
GLUCOSE SERPL-MCNC: 175 MG/DL (ref 70–110)
HCT VFR BLD AUTO: 27.6 % (ref 40–54)
HGB BLD-MCNC: 9.1 GM/DL (ref 14–18)
IMM GRANULOCYTES # BLD AUTO: 0.13 K/UL (ref 0–0.04)
IMM GRANULOCYTES NFR BLD AUTO: 1.3 % (ref 0–0.5)
LYMPHOCYTES # BLD AUTO: 0.42 K/UL (ref 1–4.8)
MAGNESIUM SERPL-MCNC: 1.7 MG/DL (ref 1.6–2.6)
MCH RBC QN AUTO: 31.8 PG (ref 27–31)
MCHC RBC AUTO-ENTMCNC: 33 G/DL (ref 32–36)
MCV RBC AUTO: 97 FL (ref 82–98)
NUCLEATED RBC (/100WBC) (SMH): 0 /100 WBC
PLATELET # BLD AUTO: 195 K/UL (ref 150–450)
PMV BLD AUTO: 10.8 FL (ref 9.2–12.9)
POCT GLUCOSE: 148 MG/DL (ref 70–110)
POCT GLUCOSE: 158 MG/DL (ref 70–110)
POCT GLUCOSE: 187 MG/DL (ref 70–110)
POCT GLUCOSE: 188 MG/DL (ref 70–110)
POTASSIUM SERPL-SCNC: 4.3 MMOL/L (ref 3.5–5.1)
PROT SERPL-MCNC: 5.1 GM/DL (ref 6–8.4)
RBC # BLD AUTO: 2.86 M/UL (ref 4.6–6.2)
RELATIVE EOSINOPHIL (SMH): 0.4 % (ref 0–8)
RELATIVE LYMPHOCYTE (SMH): 4.2 % (ref 18–48)
RELATIVE MONOCYTE (SMH): 7.2 % (ref 4–15)
RELATIVE NEUTROPHIL (SMH): 86.6 % (ref 38–73)
SODIUM SERPL-SCNC: 133 MMOL/L (ref 136–145)
WBC # BLD AUTO: 9.99 K/UL (ref 3.9–12.7)

## 2025-06-28 PROCEDURE — 63600175 PHARM REV CODE 636 W HCPCS: Performed by: INTERNAL MEDICINE

## 2025-06-28 PROCEDURE — 63600175 PHARM REV CODE 636 W HCPCS: Mod: JZ | Performed by: NURSE PRACTITIONER

## 2025-06-28 PROCEDURE — 25000003 PHARM REV CODE 250: Performed by: NURSE PRACTITIONER

## 2025-06-28 PROCEDURE — 94799 UNLISTED PULMONARY SVC/PX: CPT

## 2025-06-28 PROCEDURE — 94761 N-INVAS EAR/PLS OXIMETRY MLT: CPT

## 2025-06-28 PROCEDURE — 36415 COLL VENOUS BLD VENIPUNCTURE: CPT | Performed by: INTERNAL MEDICINE

## 2025-06-28 PROCEDURE — 85025 COMPLETE CBC W/AUTO DIFF WBC: CPT | Performed by: INTERNAL MEDICINE

## 2025-06-28 PROCEDURE — 83735 ASSAY OF MAGNESIUM: CPT | Performed by: INTERNAL MEDICINE

## 2025-06-28 PROCEDURE — 94799 UNLISTED PULMONARY SVC/PX: CPT | Mod: XB

## 2025-06-28 PROCEDURE — 25000003 PHARM REV CODE 250: Performed by: INTERNAL MEDICINE

## 2025-06-28 PROCEDURE — 11000001 HC ACUTE MED/SURG PRIVATE ROOM

## 2025-06-28 PROCEDURE — 99900035 HC TECH TIME PER 15 MIN (STAT)

## 2025-06-28 PROCEDURE — 99900031 HC PATIENT EDUCATION (STAT)

## 2025-06-28 PROCEDURE — 80053 COMPREHEN METABOLIC PANEL: CPT | Performed by: INTERNAL MEDICINE

## 2025-06-28 RX ORDER — SODIUM BICARBONATE 650 MG/1
650 TABLET ORAL 2 TIMES DAILY
Status: DISCONTINUED | OUTPATIENT
Start: 2025-06-28 | End: 2025-06-29 | Stop reason: HOSPADM

## 2025-06-28 RX ORDER — ALLOPURINOL 100 MG/1
100 TABLET ORAL DAILY
Status: DISCONTINUED | OUTPATIENT
Start: 2025-06-28 | End: 2025-06-29 | Stop reason: HOSPADM

## 2025-06-28 RX ORDER — AMOXICILLIN 250 MG
1 CAPSULE ORAL 2 TIMES DAILY
Status: DISCONTINUED | OUTPATIENT
Start: 2025-06-28 | End: 2025-06-29 | Stop reason: HOSPADM

## 2025-06-28 RX ORDER — INSULIN ASPART 100 [IU]/ML
0-10 INJECTION, SOLUTION INTRAVENOUS; SUBCUTANEOUS
Status: DISCONTINUED | OUTPATIENT
Start: 2025-06-28 | End: 2025-06-29 | Stop reason: HOSPADM

## 2025-06-28 RX ORDER — IBUPROFEN 200 MG
16 TABLET ORAL
Status: DISCONTINUED | OUTPATIENT
Start: 2025-06-28 | End: 2025-06-29 | Stop reason: HOSPADM

## 2025-06-28 RX ORDER — GLUCAGON 1 MG
1 KIT INJECTION
Status: DISCONTINUED | OUTPATIENT
Start: 2025-06-28 | End: 2025-06-29 | Stop reason: HOSPADM

## 2025-06-28 RX ORDER — CALCITRIOL 0.25 UG/1
0.25 CAPSULE ORAL DAILY
Status: DISCONTINUED | OUTPATIENT
Start: 2025-06-23 | End: 2025-06-29 | Stop reason: HOSPADM

## 2025-06-28 RX ORDER — PANTOPRAZOLE SODIUM 40 MG/1
40 TABLET, DELAYED RELEASE ORAL DAILY
Status: DISCONTINUED | OUTPATIENT
Start: 2025-06-28 | End: 2025-06-29 | Stop reason: HOSPADM

## 2025-06-28 RX ORDER — IBUPROFEN 200 MG
24 TABLET ORAL
Status: DISCONTINUED | OUTPATIENT
Start: 2025-06-28 | End: 2025-06-29 | Stop reason: HOSPADM

## 2025-06-28 RX ADMIN — ONDANSETRON 4 MG: 2 INJECTION INTRAMUSCULAR; INTRAVENOUS at 04:06

## 2025-06-28 RX ADMIN — SENNOSIDES AND DOCUSATE SODIUM 1 TABLET: 50; 8.6 TABLET ORAL at 08:06

## 2025-06-28 RX ADMIN — SODIUM BICARBONATE 650 MG TABLET 650 MG: at 08:06

## 2025-06-28 RX ADMIN — CALCITRIOL CAPSULES 0.25 MCG 0.25 MCG: 0.25 CAPSULE ORAL at 10:06

## 2025-06-28 RX ADMIN — INSULIN ASPART 2 UNITS: 100 INJECTION, SOLUTION INTRAVENOUS; SUBCUTANEOUS at 10:06

## 2025-06-28 RX ADMIN — HYDROMORPHONE HYDROCHLORIDE 1 MG: 1 INJECTION, SOLUTION INTRAMUSCULAR; INTRAVENOUS; SUBCUTANEOUS at 04:06

## 2025-06-28 RX ADMIN — PIPERACILLIN SODIUM AND TAZOBACTAM SODIUM 4.5 G: 4; .5 INJECTION, POWDER, LYOPHILIZED, FOR SOLUTION INTRAVENOUS at 12:06

## 2025-06-28 RX ADMIN — PIPERACILLIN SODIUM AND TAZOBACTAM SODIUM 4.5 G: 4; .5 INJECTION, POWDER, LYOPHILIZED, FOR SOLUTION INTRAVENOUS at 06:06

## 2025-06-28 RX ADMIN — PANTOPRAZOLE SODIUM 40 MG: 40 INJECTION, POWDER, FOR SOLUTION INTRAVENOUS at 08:06

## 2025-06-28 RX ADMIN — OXYCODONE HYDROCHLORIDE 5 MG: 5 TABLET ORAL at 12:06

## 2025-06-28 RX ADMIN — PIPERACILLIN SODIUM AND TAZOBACTAM SODIUM 4.5 G: 4; .5 INJECTION, POWDER, LYOPHILIZED, FOR SOLUTION INTRAVENOUS at 04:06

## 2025-06-28 RX ADMIN — ALLOPURINOL 100 MG: 100 TABLET ORAL at 07:06

## 2025-06-28 RX ADMIN — HYDROMORPHONE HYDROCHLORIDE 1 MG: 1 INJECTION, SOLUTION INTRAMUSCULAR; INTRAVENOUS; SUBCUTANEOUS at 07:06

## 2025-06-28 RX ADMIN — SODIUM CHLORIDE: 9 INJECTION, SOLUTION INTRAVENOUS at 11:06

## 2025-06-28 RX ADMIN — SODIUM BICARBONATE 650 MG TABLET 650 MG: at 10:06

## 2025-06-28 NOTE — PROGRESS NOTES
[facility]  Progress Note    Patient Name: Jamil Cadet  MRN: [id number]  Patient Class: IP- Inpatient   Admission Date: 6/25/2025  Length of Stay: 3 days  Attending Physician: Precious Alicia DO  Primary Care Provider: Richard Gunter MD        Subjective     Principal Problem:Cholelithiasis with acute cholecystitis        HPI:  80 year old pt getting admitted with acute cholecystitis/Transaminitis/Jaundice  Pt has been suffering from abdominal pain/committing and fever for past 2 days  Today symptoms went worse. He went to Urgent care and was referred to ER  Imaging showed cholecystitis and MRCP was ordered from ER       Overview/Hospital Course:  Mr. Cadet has been monitored closely during his hospitalization. He was admitted on 6/25 with jaundice, transamanitis, acute cholecystitis, and concerns for choledocholithiasis. Imaging revealed CBD dilation and Moderately distended gallbladder with small gallstones, mild wall thickening, and mild pericholecystic edema. GI and general surgery were consulted. He was empirically started on zosyn. He has known CKDIIIB with history of partial right nephrectomy and ablation Left renal mass d/t RCC. He is followed by Dr. Shin - urology and is was previously a patient of Dr. Decker and is planning to establish with Dr. Perry. He had an ERCP on 6/26 with stent placement which revealed acute cholangitis. He had a lap cholecystectomy with removal of gangrenous gallbladder which ruptured upon removal on 6/27. Transaminases have been trended.     Interval History: pt seen and examined at bedside. Passing flatus today. D/w gen surgery and can advance diet. Transaminases and T bili trending down.    Review of Systems   Gastrointestinal:  Positive for abdominal pain and nausea.     Objective:     Vital Signs (Most Recent):  Temp: 97.6 °F (36.4 °C) (06/28/25 1130)  Pulse: 81 (06/28/25 1130)  Resp: 18 (06/28/25 1130)  BP: (!) 111/55 (06/28/25  1130)  SpO2: (!) 94 % (06/28/25 1130) Vital Signs (24h Range):  Temp:  [97.6 °F (36.4 °C)-98.5 °F (36.9 °C)] 97.6 °F (36.4 °C)  Pulse:  [78-88] 81  Resp:  [16-20] 18  SpO2:  [88 %-96 %] 94 %  BP: ()/(44-58) 111/55     Weight: 76.4 kg (168 lb 6.4 oz)  Body mass index is 23.49 kg/m².    Intake/Output Summary (Last 24 hours) at 6/28/2025 1407  Last data filed at 6/28/2025 0646  Gross per 24 hour   Intake 120 ml   Output 850 ml   Net -730 ml         Physical Exam  Vitals and nursing note reviewed.   Constitutional:       General: He is not in acute distress.  HENT:      Head: Normocephalic and atraumatic.      Nose: Nose normal.   Eyes:      General: Scleral icterus present.      Conjunctiva/sclera: Conjunctivae normal.      Pupils: Pupils are equal, round, and reactive to light.   Cardiovascular:      Rate and Rhythm: Normal rate and regular rhythm.   Pulmonary:      Effort: Pulmonary effort is normal.      Breath sounds: Normal breath sounds.   Abdominal:      Palpations: Abdomen is soft.      Tenderness: There is abdominal tenderness.   Musculoskeletal:         General: Normal range of motion.      Cervical back: Normal range of motion and neck supple.   Skin:     General: Skin is warm and dry.      Capillary Refill: Capillary refill takes less than 2 seconds.      Coloration: Skin is jaundiced.      Comments: Abdominal incisions well approximated, no drainage or erythema   Neurological:      Mental Status: He is alert and oriented to person, place, and time. Mental status is at baseline.   Psychiatric:         Mood and Affect: Mood normal.         Behavior: Behavior normal.               Significant Labs: All pertinent labs within the past 24 hours have been reviewed.  CBC:   Recent Labs   Lab 06/27/25  0439 06/28/25  0525   WBC 4.99 9.99   HGB 9.8* 9.1*   HCT 28.8* 27.6*    195     CMP:   Recent Labs   Lab 06/27/25  0439 06/28/25  0525    133*   K 4.0 4.3    104   CO2 23 21*   * 175*    BUN 49* 47*   CREATININE 2.2* 2.5*   CALCIUM 8.2* 7.6*   PROT 5.6* 5.1*   ALBUMIN 2.8* 2.6*   BILITOT 5.0* 3.8*   ALKPHOS 191* 169*   AST 29 33   ALT 98* 73*   ANIONGAP 9 8       Significant Imaging: I have reviewed all pertinent imaging results/findings within the past 24 hours.    X-Ray Chest 1 View  Result Date: 6/28/2025  CLINICAL HISTORY: ([id number] y/o  (1945) M hypoxia; TECHNIQUE: (A#[id number], exam time 6/28/2025 8:43) XR CHEST 1 VIEW [id number] COMPARISON: Radiograph from 06/25/2025. 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  There are mild-to-moderate central hilar and lower lung zone predominant reticular interstitial lung markings bilaterally, slightly worse from the previous exam.  No pneumothorax is identified. The heart is top normal in size. Atheromatous calcifications are seen at the aortic arch. Osseous structures show degenerative disc disease and degenerative changes in the shoulders. The visualized upper abdomen is unremarkable.     1.  Slight interval worsening, now mild-to-moderate central hilar reticular interstitial lung markings bilaterally suggesting a combination of aspiration/pneumonia, trace edema and atelectasis. 2.  Low lung volumes with asymmetric elevation of the right hemidiaphragm. Electronically signed by: Lowell Oh Date:    06/28/2025 Time:    08:49    FL ERCP Biliary And Pancreatic By Rad Tech  Result Date: 6/27/2025  EXAMINATION: FL ERCP BILIARY AND PANCREATIC CLINICAL HISTORY: pain; TECHNIQUE: FLUORO time: 52.1 seconds FLUORO image series: 7 image series COMPARISON: None available. FINDINGS: Fluoroscopic equipment provided by the radiology department, without involvement of the radiologist.  The images demonstrate endoscopic retrograde cholangiopancreatography. Sphincterotomy with balloon sweep and plastic biliary stent placement per  technologist notes.  Please see Dr. Alfred procedure notes for details.     Fluoroscopic services provided as noted above. Electronically signed by: Manuel Pina Date:    06/27/2025 Time:    06:49    MRI MRCP Abdomen WO Contrast 3D WO Independent WS XPD  Result Date: 6/26/2025  EXAMINATION: MRI MRCP ABDOMEN WO CONTRAST 3D WO INDEPENDENT WS XPD CLINICAL HISTORY: cholecystitis; COMPARISON: CT, ultrasound June 25, 2025 FINDINGS: Multiplanar noncontrast imaging was performed, utilizing magnetic resonance cholangiopancreatography protocol. The common bile duct reaches maximal transverse diameter of 10 mm.  No filling defects are identified.  No mass or stricture is demonstrated.  The intrahepatic biliary tree is nondilated. The gallbladder is moderately distended, with mild gallbladder wall thickening and mild pericholecystic edema. No hepatic mass or contour abnormality is identified.  The spleen is mildly enlarged at 13.6 cm in longitudinal dimension.  No pancreatic mass lesion is identified.  There is no peripancreatic edema.  The pancreatic duct is normal in caliber. Multiple small bilateral renal cysts are noted.  Incompletely assessed on this examination and not entirely included in the field of view, there is an indeterminate mass at the lower pole of the left kidney, measuring at least 5.4 cm in size.     1. Mildly dilated common bile duct.  No evidence of stricture or common duct stone. 2. Moderately distended gallbladder with small gallstones, mild wall thickening, and mild pericholecystic edema.  Correlate for possible acute cholecystitis. 3. Incompletely assessed on this examination, there is an indeterminate exophytic mass at the lower pole of the left kidney measuring 5.4 cm in greatest dimension, indeterminate.  By report, patient has had previous ablation of a left renal neoplasm, which may account for the above.  Pre and post infusion CT abdomen could be utilized for more detailed assessment.  Electronically signed by: Harish Paz Date:    06/26/2025 Time:    07:42    CT Abdomen Pelvis  Without Contrast  Result Date: 6/25/2025  CMS MANDATED QUALITY DATA - CT RADIATION - 436 All CT scans at this facility utilize dose modulation, iterative reconstruction, and/or weight based dosing when appropriate to reduce radiation dose to as low as reasonably achievable. EXAMINATION: CT ABDOMEN PELVIS WITHOUT CONTRAST CLINICAL HISTORY: Abdominal abscess/infection suspected; TECHNIQUE: CT abdomen and pelvis without IV or oral contrast. COMPARISON: CT abdomen pelvis 11/14/2021. FINDINGS: There is atelectasis and mild bronchiectasis of the right lung base.  Small right pleural effusion.  The heart is normal size. The liver is normal size.  There are no gross hepatic lesions.  The gallbladder is distended.  There are calcified gallstones in the neck of the gallbladder.  There is mild pericholecystic fat stranding.  The common bile duct is within normal limits.  The pancreas, spleen and adrenal glands are unremarkable.  The kidneys are normal size.  There is focal scarring of the medial right kidney with fibrosis of the adjacent perinephric fat, similar to previous exam.  Bilateral small renal cysts again observed.  There is no hydronephrosis or nephrolithiasis.  The ureters are normal caliber without obstructions.  The bladder is fluid filled with no focal wall abnormalities.  Prostate gland is unremarkable. There is diverticulosis of the sigmoid colon.  There is mild bowel wall thickening of the 1st and 2nd portion of duodenum with perienteric fat stranding.  Large and small bowel are otherwise normal caliber.  Stomach is mostly decompressed and unremarkable. The abdominal aorta is normal caliber with calcified plaque formation.  There are no enlarged intra-abdominal lymph nodes.  Small fat filled umbilical hernia.  There are degenerative changes of the spine.  There is no acute osseous abnormality.     1. Distended  gallbladder with calcified gallstones in the neck of the gallbladder.  There is mild pericholecystic fat stranding.  There is also mild thickening of the 1st and 2nd portions of the duodenum with perienteric fat stranding.  Findings could reflect cholecystitis, enteritis or combination there of. 2. Small right pleural effusion. 3. Atelectasis and mild bronchiectasis of the right lung base. 4. Additional incidental observations as described. Electronically signed by: Adama Morales Date:    06/25/2025 Time:    16:17    US Abdomen Limited  Result Date: 6/25/2025  EXAMINATION: US ABDOMEN LIMITED CLINICAL HISTORY: Abdominal pain; COMPARISON: MRI 01/10/2025, 08/15/2024, CT 11/14/2020 FINDINGS: Liver maintains normal echogenicity without focal mass or intrahepatic bile duct dilation. Hepatopedal flow present in main portal vein. Diffuse low-level internal echoes suggesting sludge lies throughout distended gallbladder.  Cholelithiasis measuring 17 mm is also present in the gallbladder.  No gallbladder wall thickening or pericholecystic fluid.  Common duct measures 8-10 mm in diameter.  This remains similar to that partially visualized on 08/15/2024 MRI. Bowel gas obscures the pancreas.  Right kidney is free of hydronephrosis, containing multifocal simple cysts measuring up to 23 mm.  Visualized abdominal aorta is nonaneurysmal. Juxtahepatic IVC is unremarkable.  Graft Right pleural effusion is partially visualized.     1. Distended gallbladder containing diffuse low-level internal echoes and cholelithiasis.  Gallbladder at similar imaging appearance on prior imaging studies dating back to CT 11/14/2020.  Clinical and laboratory correlation is needed to assess for potential acute cholecystitis, as sonographic Brumfield sign is reported as positive. 2. Common duct diameter of 8-10 mm, not significantly changed from 08/15/2024 MRI.  This could be related to patient's age.  Correlation with bilirubin level is requested to assess  for any other evidence of common duct obstruction.  Further evaluation with MRCP can be considered. 3. Partially visualized right pleural effusion. 4. Right renal cyst. Electronically signed by: Orville Barfield Date:    06/25/2025 Time:    14:53    X-Ray Chest AP Portable  Result Date: 6/25/2025  EXAMINATION: XR CHEST AP PORTABLE CLINICAL HISTORY: Sepsis; COMPARISON: August 2024 FINDINGS: Heart size is normal.  The mediastinum is unremarkable.  The left lung is clear.  There is mild right basilar atelectasis with chronic elevation of the right hemidiaphragm.  No acute osseous abnormalities are identified.  Chronic bilateral rotator cuff tears are noted.     1. Chronic elevation of the right hemidiaphragm with mild right basilar atelectasis. 2. Chronic bilateral rotator cuff tears. Electronically signed by: Harish Paz Date:    06/25/2025 Time:    14:53          Assessment & Plan  Cholelithiasis with acute cholecystitis  Admit to med/surg  Empiric zosyn  GI consulted  Gen surgery consulted  ERCP 6/26 followed by lap cholecystecomy 6/27  Pain and nausea control      Acute on chronic renal failure  Baseline creatinine 1.7-2  IV hydration   improving  Recent Labs     06/26/25  0444 06/27/25  0439 06/28/25  0525   CREATININE 2.5* 2.2* 2.5*   EGFRNORACEVR 25* 29* 25*       Common bile duct dilation  Per USS done today :. Common duct diameter of 8-10 mm, not significantly changed from 08/15/2024 MRI.   Pt has significant jaundice and labs showing transaminitis  GI MD Consulted - ERCP 6/26    Type 2 diabetes mellitus with other diabetic kidney complication  Patient's FSGs are controlled on current medication regimen.  Last A1c reviewed-   Lab Results   Component Value Date    HGBA1C 6.5 (H) 05/06/2025     Most recent fingerstick glucose reviewed-   Recent Labs   Lab 06/27/25  1624 06/27/25  2030 06/28/25  0809 06/28/25  1132   POCTGLUCOSE 192* 215* 158* 187*     Current correctional scale  Medium  Maintain  anti-hyperglycemic dose as follows-   Antihyperglycemics (From admission, onward)      Start     Stop Route Frequency Ordered    06/28/25 0928  insulin aspart U-100 pen 0-10 Units         -- SubQ Before meals & nightly PRN 06/28/25 0828          Hold Oral hypoglycemics while patient is in the hospital.    History of renal cell carcinoma status post partial nephrectomy  Aware     Transaminitis  Likely d/t choledocholithiasis  ERCP 6/26  IV hydration   Trend CMP    Acute cholangitis  S/p ERCP with stent placement  Continue zosyn    VTE Risk Mitigation (From admission, onward)           Ordered     IP VTE HIGH RISK PATIENT  Once         06/25/25 1811     Place sequential compression device  Until discontinued         06/25/25 1811                    Discharge Planning   CA: 6/30/2025     Code Status: Full Code   Medical Readiness for Discharge Date:   Discharge Plan A: Home                        Binta Sheets NP  Department of Hospital Medicine   Formerly [facility], [facility]

## 2025-06-28 NOTE — ASSESSMENT & PLAN NOTE
Baseline creatinine 1.7-2  IV hydration   improving  Recent Labs     06/26/25  0444 06/27/25  0439 06/28/25  0525   CREATININE 2.5* 2.2* 2.5*   EGFRNORACEVR 25* 29* 25*

## 2025-06-28 NOTE — ASSESSMENT & PLAN NOTE
Patient's FSGs are controlled on current medication regimen.  Last A1c reviewed-   Lab Results   Component Value Date    HGBA1C 6.5 (H) 05/06/2025     Most recent fingerstick glucose reviewed-   Recent Labs   Lab 06/27/25  1624 06/27/25  2030 06/28/25  0809 06/28/25  1132   POCTGLUCOSE 192* 215* 158* 187*     Current correctional scale  Medium  Maintain anti-hyperglycemic dose as follows-   Antihyperglycemics (From admission, onward)      Start     Stop Route Frequency Ordered    06/28/25 0928  insulin aspart U-100 pen 0-10 Units         -- SubQ Before meals & nightly PRN 06/28/25 0828          Hold Oral hypoglycemics while patient is in the hospital.

## 2025-06-28 NOTE — CARE UPDATE
06/27/25 2014   Patient Assessment/Suction   Level of Consciousness (AVPU) alert   Respiratory Effort Normal;Unlabored   Expansion/Accessory Muscles/Retractions no use of accessory muscles   All Lung Fields Breath Sounds equal bilaterally;clear   Rhythm/Pattern, Respiratory unlabored   Cough Frequency no cough   PRE-TX-O2   Device (Oxygen Therapy) room air  (NC @ 2L on S/B)   SpO2 (!) 91 %   Pulse Oximetry Type Intermittent   $ Pulse Oximetry - Multiple Charge Pulse Oximetry - Multiple   Pulse 88   Resp 16   Education   $ Education Oxygen;15 min   Respiratory Evaluation   $ Care Plan Tech Time 15 min

## 2025-06-28 NOTE — SUBJECTIVE & OBJECTIVE
Interval History: pt seen and examined at bedside. Passing flatus today. D/w gen surgery and can advance diet. Transaminases and T bili trending down.    Review of Systems   Gastrointestinal:  Positive for abdominal pain and nausea.     Objective:     Vital Signs (Most Recent):  Temp: 97.6 °F (36.4 °C) (06/28/25 1130)  Pulse: 81 (06/28/25 1130)  Resp: 18 (06/28/25 1130)  BP: (!) 111/55 (06/28/25 1130)  SpO2: (!) 94 % (06/28/25 1130) Vital Signs (24h Range):  Temp:  [97.6 °F (36.4 °C)-98.5 °F (36.9 °C)] 97.6 °F (36.4 °C)  Pulse:  [78-88] 81  Resp:  [16-20] 18  SpO2:  [88 %-96 %] 94 %  BP: ()/(44-58) 111/55     Weight: 76.4 kg (168 lb 6.4 oz)  Body mass index is 23.49 kg/m².    Intake/Output Summary (Last 24 hours) at 6/28/2025 1407  Last data filed at 6/28/2025 0646  Gross per 24 hour   Intake 120 ml   Output 850 ml   Net -730 ml         Physical Exam  Vitals and nursing note reviewed.   Constitutional:       General: He is not in acute distress.  HENT:      Head: Normocephalic and atraumatic.      Nose: Nose normal.   Eyes:      General: Scleral icterus present.      Conjunctiva/sclera: Conjunctivae normal.      Pupils: Pupils are equal, round, and reactive to light.   Cardiovascular:      Rate and Rhythm: Normal rate and regular rhythm.   Pulmonary:      Effort: Pulmonary effort is normal.      Breath sounds: Normal breath sounds.   Abdominal:      Palpations: Abdomen is soft.      Tenderness: There is abdominal tenderness.   Musculoskeletal:         General: Normal range of motion.      Cervical back: Normal range of motion and neck supple.   Skin:     General: Skin is warm and dry.      Capillary Refill: Capillary refill takes less than 2 seconds.      Coloration: Skin is jaundiced.      Comments: Abdominal incisions well approximated, no drainage or erythema   Neurological:      Mental Status: He is alert and oriented to person, place, and time. Mental status is at baseline.   Psychiatric:         Mood and  Affect: Mood normal.         Behavior: Behavior normal.               Significant Labs: All pertinent labs within the past 24 hours have been reviewed.  CBC:   Recent Labs   Lab 06/27/25  0439 06/28/25  0525   WBC 4.99 9.99   HGB 9.8* 9.1*   HCT 28.8* 27.6*    195     CMP:   Recent Labs   Lab 06/27/25  0439 06/28/25  0525    133*   K 4.0 4.3    104   CO2 23 21*   * 175*   BUN 49* 47*   CREATININE 2.2* 2.5*   CALCIUM 8.2* 7.6*   PROT 5.6* 5.1*   ALBUMIN 2.8* 2.6*   BILITOT 5.0* 3.8*   ALKPHOS 191* 169*   AST 29 33   ALT 98* 73*   ANIONGAP 9 8       Significant Imaging: I have reviewed all pertinent imaging results/findings within the past 24 hours.    X-Ray Chest 1 View  Result Date: 6/28/2025  CLINICAL HISTORY: ([id number])81 y/o  (1945) M hypoxia; TECHNIQUE: (A#[id number], exam time 6/28/2025 8:43) XR CHEST 1 VIEW [id number] COMPARISON: Radiograph from 06/25/2025. 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  There are mild-to-moderate central hilar and lower lung zone predominant reticular interstitial lung markings bilaterally, slightly worse from the previous exam.  No pneumothorax is identified. The heart is top normal in size. Atheromatous calcifications are seen at the aortic arch. Osseous structures show degenerative disc disease and degenerative changes in the shoulders. The visualized upper abdomen is unremarkable.     1.  Slight interval worsening, now mild-to-moderate central hilar reticular interstitial lung markings bilaterally suggesting a combination of aspiration/pneumonia, trace edema and atelectasis. 2.  Low lung volumes with asymmetric elevation of the right hemidiaphragm. Electronically signed by: Lowell Oh Date:    06/28/2025 Time:    08:49    FL ERCP Biliary And Pancreatic By Rad Tech  Result Date: 6/27/2025  EXAMINATION: FL ERCP BILIARY AND  PANCREATIC CLINICAL HISTORY: pain; TECHNIQUE: FLUORO time: 52.1 seconds FLUORO image series: 7 image series COMPARISON: None available. FINDINGS: Fluoroscopic equipment provided by the radiology department, without involvement of the radiologist.  The images demonstrate endoscopic retrograde cholangiopancreatography. Sphincterotomy with balloon sweep and plastic biliary stent placement per technologist notes.  Please see Dr. Alfred procedure notes for details.     Fluoroscopic services provided as noted above. Electronically signed by: Manuel Pina Date:    06/27/2025 Time:    06:49    MRI MRCP Abdomen WO Contrast 3D WO Independent WS XPD  Result Date: 6/26/2025  EXAMINATION: MRI MRCP ABDOMEN WO CONTRAST 3D WO INDEPENDENT WS XPD CLINICAL HISTORY: cholecystitis; COMPARISON: CT, ultrasound June 25, 2025 FINDINGS: Multiplanar noncontrast imaging was performed, utilizing magnetic resonance cholangiopancreatography protocol. The common bile duct reaches maximal transverse diameter of 10 mm.  No filling defects are identified.  No mass or stricture is demonstrated.  The intrahepatic biliary tree is nondilated. The gallbladder is moderately distended, with mild gallbladder wall thickening and mild pericholecystic edema. No hepatic mass or contour abnormality is identified.  The spleen is mildly enlarged at 13.6 cm in longitudinal dimension.  No pancreatic mass lesion is identified.  There is no peripancreatic edema.  The pancreatic duct is normal in caliber. Multiple small bilateral renal cysts are noted.  Incompletely assessed on this examination and not entirely included in the field of view, there is an indeterminate mass at the lower pole of the left kidney, measuring at least 5.4 cm in size.     1. Mildly dilated common bile duct.  No evidence of stricture or common duct stone. 2. Moderately distended gallbladder with small gallstones, mild wall thickening, and mild pericholecystic edema.  Correlate for possible  acute cholecystitis. 3. Incompletely assessed on this examination, there is an indeterminate exophytic mass at the lower pole of the left kidney measuring 5.4 cm in greatest dimension, indeterminate.  By report, patient has had previous ablation of a left renal neoplasm, which may account for the above.  Pre and post infusion CT abdomen could be utilized for more detailed assessment. Electronically signed by: Harish Paz Date:    06/26/2025 Time:    07:42    CT Abdomen Pelvis  Without Contrast  Result Date: 6/25/2025  CMS MANDATED QUALITY DATA - CT RADIATION - 436 All CT scans at this facility utilize dose modulation, iterative reconstruction, and/or weight based dosing when appropriate to reduce radiation dose to as low as reasonably achievable. EXAMINATION: CT ABDOMEN PELVIS WITHOUT CONTRAST CLINICAL HISTORY: Abdominal abscess/infection suspected; TECHNIQUE: CT abdomen and pelvis without IV or oral contrast. COMPARISON: CT abdomen pelvis 11/14/2021. FINDINGS: There is atelectasis and mild bronchiectasis of the right lung base.  Small right pleural effusion.  The heart is normal size. The liver is normal size.  There are no gross hepatic lesions.  The gallbladder is distended.  There are calcified gallstones in the neck of the gallbladder.  There is mild pericholecystic fat stranding.  The common bile duct is within normal limits.  The pancreas, spleen and adrenal glands are unremarkable.  The kidneys are normal size.  There is focal scarring of the medial right kidney with fibrosis of the adjacent perinephric fat, similar to previous exam.  Bilateral small renal cysts again observed.  There is no hydronephrosis or nephrolithiasis.  The ureters are normal caliber without obstructions.  The bladder is fluid filled with no focal wall abnormalities.  Prostate gland is unremarkable. There is diverticulosis of the sigmoid colon.  There is mild bowel wall thickening of the 1st and 2nd portion of duodenum with  perienteric fat stranding.  Large and small bowel are otherwise normal caliber.  Stomach is mostly decompressed and unremarkable. The abdominal aorta is normal caliber with calcified plaque formation.  There are no enlarged intra-abdominal lymph nodes.  Small fat filled umbilical hernia.  There are degenerative changes of the spine.  There is no acute osseous abnormality.     1. Distended gallbladder with calcified gallstones in the neck of the gallbladder.  There is mild pericholecystic fat stranding.  There is also mild thickening of the 1st and 2nd portions of the duodenum with perienteric fat stranding.  Findings could reflect cholecystitis, enteritis or combination there of. 2. Small right pleural effusion. 3. Atelectasis and mild bronchiectasis of the right lung base. 4. Additional incidental observations as described. Electronically signed by: Adama Morales Date:    06/25/2025 Time:    16:17    US Abdomen Limited  Result Date: 6/25/2025  EXAMINATION: US ABDOMEN LIMITED CLINICAL HISTORY: Abdominal pain; COMPARISON: MRI 01/10/2025, 08/15/2024, CT 11/14/2020 FINDINGS: Liver maintains normal echogenicity without focal mass or intrahepatic bile duct dilation. Hepatopedal flow present in main portal vein. Diffuse low-level internal echoes suggesting sludge lies throughout distended gallbladder.  Cholelithiasis measuring 17 mm is also present in the gallbladder.  No gallbladder wall thickening or pericholecystic fluid.  Common duct measures 8-10 mm in diameter.  This remains similar to that partially visualized on 08/15/2024 MRI. Bowel gas obscures the pancreas.  Right kidney is free of hydronephrosis, containing multifocal simple cysts measuring up to 23 mm.  Visualized abdominal aorta is nonaneurysmal. Juxtahepatic IVC is unremarkable.  Graft Right pleural effusion is partially visualized.     1. Distended gallbladder containing diffuse low-level internal echoes and cholelithiasis.  Gallbladder at similar imaging  appearance on prior imaging studies dating back to CT 11/14/2020.  Clinical and laboratory correlation is needed to assess for potential acute cholecystitis, as sonographic Brumfield sign is reported as positive. 2. Common duct diameter of 8-10 mm, not significantly changed from 08/15/2024 MRI.  This could be related to patient's age.  Correlation with bilirubin level is requested to assess for any other evidence of common duct obstruction.  Further evaluation with MRCP can be considered. 3. Partially visualized right pleural effusion. 4. Right renal cyst. Electronically signed by: Orville Barfield Date:    06/25/2025 Time:    14:53    X-Ray Chest AP Portable  Result Date: 6/25/2025  EXAMINATION: XR CHEST AP PORTABLE CLINICAL HISTORY: Sepsis; COMPARISON: August 2024 FINDINGS: Heart size is normal.  The mediastinum is unremarkable.  The left lung is clear.  There is mild right basilar atelectasis with chronic elevation of the right hemidiaphragm.  No acute osseous abnormalities are identified.  Chronic bilateral rotator cuff tears are noted.     1. Chronic elevation of the right hemidiaphragm with mild right basilar atelectasis. 2. Chronic bilateral rotator cuff tears. Electronically signed by: Harish Paz Date:    06/25/2025 Time:    14:53

## 2025-06-29 VITALS
BODY MASS INDEX: 23.57 KG/M2 | RESPIRATION RATE: 18 BRPM | SYSTOLIC BLOOD PRESSURE: 125 MMHG | DIASTOLIC BLOOD PRESSURE: 60 MMHG | HEART RATE: 78 BPM | OXYGEN SATURATION: 94 % | WEIGHT: 168.38 LBS | TEMPERATURE: 98 F | HEIGHT: 71 IN

## 2025-06-29 LAB
ABSOLUTE EOSINOPHIL (SMH): 0.03 K/UL
ABSOLUTE MONOCYTE (SMH): 0.86 K/UL (ref 0.3–1)
ABSOLUTE NEUTROPHIL COUNT (SMH): 12.1 K/UL (ref 1.8–7.7)
ALBUMIN SERPL-MCNC: 2.6 G/DL (ref 3.5–5.2)
ALP SERPL-CCNC: 185 UNIT/L (ref 55–135)
ALT SERPL-CCNC: 55 UNIT/L (ref 10–44)
ANION GAP (SMH): 11 MMOL/L (ref 8–16)
AST SERPL-CCNC: 24 UNIT/L (ref 10–40)
BASOPHILS # BLD AUTO: 0.03 K/UL
BASOPHILS NFR BLD AUTO: 0.2 %
BILIRUB SERPL-MCNC: 2.8 MG/DL (ref 0.1–1)
BUN SERPL-MCNC: 39 MG/DL (ref 8–23)
CALCIUM SERPL-MCNC: 7.8 MG/DL (ref 8.7–10.5)
CHLORIDE SERPL-SCNC: 104 MMOL/L (ref 95–110)
CO2 SERPL-SCNC: 19 MMOL/L (ref 23–29)
CREAT SERPL-MCNC: 2.2 MG/DL (ref 0.5–1.4)
ERYTHROCYTE [DISTWIDTH] IN BLOOD BY AUTOMATED COUNT: 12.7 % (ref 11.5–14.5)
GFR SERPLBLD CREATININE-BSD FMLA CKD-EPI: 29 ML/MIN/1.73/M2
GLUCOSE SERPL-MCNC: 179 MG/DL (ref 70–110)
HCT VFR BLD AUTO: 26.5 % (ref 40–54)
HGB BLD-MCNC: 8.8 GM/DL (ref 14–18)
IMM GRANULOCYTES # BLD AUTO: 0.44 K/UL (ref 0–0.04)
IMM GRANULOCYTES NFR BLD AUTO: 3.1 % (ref 0–0.5)
LYMPHOCYTES # BLD AUTO: 0.53 K/UL (ref 1–4.8)
MAGNESIUM SERPL-MCNC: 1.7 MG/DL (ref 1.6–2.6)
MCH RBC QN AUTO: 32.1 PG (ref 27–31)
MCHC RBC AUTO-ENTMCNC: 33.2 G/DL (ref 32–36)
MCV RBC AUTO: 97 FL (ref 82–98)
NUCLEATED RBC (/100WBC) (SMH): 0 /100 WBC
PLATELET # BLD AUTO: 231 K/UL (ref 150–450)
PMV BLD AUTO: 10.7 FL (ref 9.2–12.9)
POCT GLUCOSE: 196 MG/DL (ref 70–110)
POTASSIUM SERPL-SCNC: 3.5 MMOL/L (ref 3.5–5.1)
PROT SERPL-MCNC: 5.4 GM/DL (ref 6–8.4)
RBC # BLD AUTO: 2.74 M/UL (ref 4.6–6.2)
RELATIVE EOSINOPHIL (SMH): 0.2 % (ref 0–8)
RELATIVE LYMPHOCYTE (SMH): 3.8 % (ref 18–48)
RELATIVE MONOCYTE (SMH): 6.1 % (ref 4–15)
RELATIVE NEUTROPHIL (SMH): 86.6 % (ref 38–73)
SODIUM SERPL-SCNC: 134 MMOL/L (ref 136–145)
WBC # BLD AUTO: 14.02 K/UL (ref 3.9–12.7)

## 2025-06-29 PROCEDURE — 63600175 PHARM REV CODE 636 W HCPCS: Performed by: INTERNAL MEDICINE

## 2025-06-29 PROCEDURE — 25000003 PHARM REV CODE 250: Performed by: INTERNAL MEDICINE

## 2025-06-29 PROCEDURE — 85025 COMPLETE CBC W/AUTO DIFF WBC: CPT | Performed by: INTERNAL MEDICINE

## 2025-06-29 PROCEDURE — 94761 N-INVAS EAR/PLS OXIMETRY MLT: CPT

## 2025-06-29 PROCEDURE — 25000003 PHARM REV CODE 250: Performed by: NURSE PRACTITIONER

## 2025-06-29 PROCEDURE — 83735 ASSAY OF MAGNESIUM: CPT | Performed by: INTERNAL MEDICINE

## 2025-06-29 PROCEDURE — 84155 ASSAY OF PROTEIN SERUM: CPT | Performed by: INTERNAL MEDICINE

## 2025-06-29 PROCEDURE — 36415 COLL VENOUS BLD VENIPUNCTURE: CPT | Performed by: INTERNAL MEDICINE

## 2025-06-29 PROCEDURE — 63600175 PHARM REV CODE 636 W HCPCS: Mod: JZ | Performed by: NURSE PRACTITIONER

## 2025-06-29 PROCEDURE — 94799 UNLISTED PULMONARY SVC/PX: CPT

## 2025-06-29 RX ORDER — OXYCODONE AND ACETAMINOPHEN 5; 325 MG/1; MG/1
1 TABLET ORAL EVERY 6 HOURS PRN
Qty: 20 TABLET | Refills: 0 | Status: SHIPPED | OUTPATIENT
Start: 2025-06-29 | End: 2025-07-04

## 2025-06-29 RX ORDER — AMOXICILLIN 250 MG
1 CAPSULE ORAL DAILY
Qty: 7 TABLET | Refills: 0 | Status: SHIPPED | OUTPATIENT
Start: 2025-06-29 | End: 2025-07-06

## 2025-06-29 RX ORDER — AMOXICILLIN AND CLAVULANATE POTASSIUM 500; 125 MG/1; MG/1
1 TABLET, FILM COATED ORAL 2 TIMES DAILY
Qty: 14 TABLET | Refills: 0 | Status: SHIPPED | OUTPATIENT
Start: 2025-06-29 | End: 2025-07-06

## 2025-06-29 RX ADMIN — PIPERACILLIN SODIUM AND TAZOBACTAM SODIUM 4.5 G: 4; .5 INJECTION, POWDER, LYOPHILIZED, FOR SOLUTION INTRAVENOUS at 06:06

## 2025-06-29 RX ADMIN — PANTOPRAZOLE SODIUM 40 MG: 40 TABLET, DELAYED RELEASE ORAL at 05:06

## 2025-06-29 RX ADMIN — CALCITRIOL CAPSULES 0.25 MCG 0.25 MCG: 0.25 CAPSULE ORAL at 10:06

## 2025-06-29 RX ADMIN — HYDROMORPHONE HYDROCHLORIDE 1 MG: 1 INJECTION, SOLUTION INTRAMUSCULAR; INTRAVENOUS; SUBCUTANEOUS at 11:06

## 2025-06-29 RX ADMIN — SODIUM BICARBONATE 650 MG TABLET 650 MG: at 10:06

## 2025-06-29 RX ADMIN — ONDANSETRON 4 MG: 2 INJECTION INTRAMUSCULAR; INTRAVENOUS at 05:06

## 2025-06-29 RX ADMIN — PIPERACILLIN SODIUM AND TAZOBACTAM SODIUM 4.5 G: 4; .5 INJECTION, POWDER, LYOPHILIZED, FOR SOLUTION INTRAVENOUS at 12:06

## 2025-06-29 RX ADMIN — SENNOSIDES AND DOCUSATE SODIUM 1 TABLET: 50; 8.6 TABLET ORAL at 10:06

## 2025-06-29 NOTE — ASSESSMENT & PLAN NOTE
Baseline creatinine 1.7-2  IV hydration   improving  Recent Labs     06/27/25  0439 06/28/25  0525 06/29/25  0750   CREATININE 2.2* 2.5* 2.2*   EGFRNORACEVR 29* 25* 29*

## 2025-06-29 NOTE — PLAN OF CARE
Patient cleared for discharge from case management standpoint. [facility] contacted discharge clinic to schedule hospital follow up appointment. Pt will transport home via his wife home.    Chart and discharge orders reviewed.  Patient discharged with no further case management needs.          06/29/25 1017   Final Note   Assessment Type Final Discharge Note   Anticipated Discharge Disposition Home   What phone number can be called within the next 1-3 days to see how you are doing after discharge? [phone]   Hospital Resources/Appts/Education Provided   ([facility] contacted discharge clinic to schedule follow up appt)   Post-Acute Status   Discharge Delays None known at this time

## 2025-06-29 NOTE — PROGRESS NOTES
[facility]  General Surgery  Progress Note    Subjective:     Interval History: patient reports feeling better today. No fever. No jaundice.       Post-Op Info:  Procedure(s) (LRB):  ROBOTIC CHOLECYSTECTOMY (N/A)   1 Day Post-Op      Medications:  Continuous Infusions:   0.9% NaCl   Intravenous Continuous 125 mL/hr at 06/28/25 2307 New Bag at 06/28/25 2307     Scheduled Meds:   allopurinoL  100 mg Oral Daily    calcitRIOL  0.25 mcg Oral Daily    pantoprazole  40 mg Oral Daily    piperacillin-tazobactam (Zosyn) IV (PEDS and ADULTS) (extended infusion is not appropriate)  4.5 g Intravenous Q8H    senna-docusate  1 tablet Oral BID    sodium bicarbonate  650 mg Oral BID     PRN Meds:  Current Facility-Administered Medications:     acetaminophen, 650 mg, Oral, Q4H PRN    aluminum-magnesium hydroxide-simethicone, 30 mL, Oral, QID PRN    dextrose 50%, 12.5 g, Intravenous, PRN    dextrose 50%, 12.5 g, Intravenous, PRN    dextrose 50%, 25 g, Intravenous, PRN    dextrose 50%, 25 g, Intravenous, PRN    glucagon (human recombinant), 1 mg, Intramuscular, PRN    glucagon (human recombinant), 1 mg, Intramuscular, PRN    glucose, 16 g, Oral, PRN    glucose, 16 g, Oral, PRN    glucose, 24 g, Oral, PRN    glucose, 24 g, Oral, PRN    HYDROmorphone, 0.5 mg, Intravenous, Q3H PRN    HYDROmorphone, 1 mg, Intravenous, Q3H PRN    insulin aspart U-100, 0-10 Units, Subcutaneous, QID (AC + HS) PRN    magnesium oxide, 800 mg, Oral, PRN    magnesium oxide, 800 mg, Oral, PRN    melatonin, 6 mg, Oral, Nightly PRN    naloxone, 0.02 mg, Intravenous, PRN    ondansetron, 4 mg, Intravenous, Q6H PRN    oxyCODONE, 5 mg, Oral, Q4H PRN    potassium bicarbonate, 35 mEq, Oral, PRN    potassium bicarbonate, 50 mEq, Oral, PRN    potassium bicarbonate, 60 mEq, Oral, PRN    potassium, sodium phosphates, 2 packet, Oral, PRN    potassium, sodium phosphates, 2 packet, Oral, PRN    potassium, sodium phosphates, 2 packet, Oral, PRN    traZODone, 50 mg,  Oral, Nightly PRN     Objective:     Vital Signs (Most Recent):  Temp: 98.3 °F (36.8 °C) (06/28/25 1925)  Pulse: 83 (06/28/25 2004)  Resp: 16 (06/28/25 2004)  BP: (!) 130/57 (06/28/25 1925)  SpO2: (!) 91 % (06/28/25 2004) Vital Signs (24h Range):  Temp:  [97.6 °F (36.4 °C)-98.5 °F (36.9 °C)] 98.3 °F (36.8 °C)  Pulse:  [78-92] 83  Resp:  [16-18] 16  SpO2:  [88 %-96 %] 91 %  BP: ()/(44-58) 130/57       Intake/Output Summary (Last 24 hours) at 6/28/2025 2334  Last data filed at 6/28/2025 1901  Gross per 24 hour   Intake 3383.1 ml   Output 850 ml   Net 2533.1 ml       Physical Exam  Abdominal:      General: There is no distension.      Palpations: Abdomen is soft.      Tenderness: There is abdominal tenderness.      Comments: Appropriate tenderness   Incisions intact    Skin:     Coloration: Skin is not jaundiced.         Significant Labs:  CBC:   Recent Labs   Lab 06/28/25  0525   WBC 9.99   RBC 2.86*   HGB 9.1*   HCT 27.6*      MCV 97   MCH 31.8*   MCHC 33.0     CMP:   Recent Labs   Lab 06/28/25  0525   *   CALCIUM 7.6*   ALBUMIN 2.6*   PROT 5.1*   *   K 4.3   CO2 21*      BUN 47*   CREATININE 2.5*   ALKPHOS 169*   ALT 73*   AST 33   BILITOT 3.8*       Significant Diagnostics:  I have reviewed all pertinent imaging results/findings within the past 24 hours.    Assessment/Plan:     Active Diagnoses:    Diagnosis Date Noted POA    PRINCIPAL PROBLEM:  Cholelithiasis with acute cholecystitis [K80.00] 06/25/2025 Yes    Acute cholangitis [K83.09] 06/27/2025 Yes    Transaminitis [R74.01] 06/26/2025 Yes    Acute on chronic renal failure [N17.9, N18.9] 06/25/2025 Yes    Common bile duct dilation [K83.8] 06/25/2025 Yes    History of renal cell carcinoma status post partial nephrectomy [C64.1] 09/07/2020 Yes     Chronic    Type 2 diabetes mellitus with other diabetic kidney complication [E11.29] 09/07/2020 Yes      Problems Resolved During this Admission:   -POD 1. Diet as tolerated   -PT   -IS    -OK for discharge from surgery standpoint.       Lawrence Fairbanks III, MD  General Surgery  [facility]

## 2025-06-29 NOTE — NURSING
Upon discharge pt and spouse received information regarding hospital stay, when to contact hcp, and future follow-up appointments. Pt and spouse verbalized an understanding of all the information discussed, questions/concerns were addressed accordingly. Pt's telemetry and IV line were discontinued per order, catheter tip intact no signs of bleeding/distress noted. Pressure dressing applied to site. Pt getting dressed with assistance from wife. Pt and wife instructed to call when pt is ready to be transported via wheelchair to private vehicle. FRANCESCO

## 2025-06-29 NOTE — NURSING
Pt was transported off unit via wheelchair. Belongings and discharge information in wife's possession. YOAV.

## 2025-06-29 NOTE — ASSESSMENT & PLAN NOTE
Patient's FSGs are controlled on current medication regimen.  Last A1c reviewed-   Lab Results   Component Value Date    HGBA1C 6.5 (H) 05/06/2025     Most recent fingerstick glucose reviewed-   Recent Labs   Lab 06/28/25  1654 06/28/25 2025 06/29/25  0816   POCTGLUCOSE 148* 188* 196*     Current correctional scale  Medium  Maintain anti-hyperglycemic dose as follows-   Antihyperglycemics (From admission, onward)      None          Hold Oral hypoglycemics while patient is in the hospital.

## 2025-06-29 NOTE — DISCHARGE SUMMARY
[facility] Medicine  Discharge Summary      Patient Name: Jamil Cadet  MRN: [id number]  MISTI: [id number]  Patient Class: IP- Inpatient  Admission Date: 6/25/2025  Hospital Length of Stay: 4 days  Discharge Date and Time: 6/29/2025 11:57 AM  Attending Physician: No att. providers found   Discharging Provider: Binta Sheets NP  Primary Care Provider: Richard Gunter MD    Primary Care Team: Networked reference to record PCT     HPI:   80 year old pt getting admitted with acute cholecystitis/Transaminitis/Jaundice  Pt has been suffering from abdominal pain/committing and fever for past 2 days  Today symptoms went worse. He went to Urgent care and was referred to ER  Imaging showed cholecystitis and MRCP was ordered from ER       Procedure(s) (LRB):  ROBOTIC CHOLECYSTECTOMY (N/A)      Hospital Course:   Mr. Cadet has been monitored closely during his hospitalization. He was admitted on 6/25 with jaundice, transamanitis, acute cholecystitis, and concerns for choledocholithiasis. Imaging revealed CBD dilation and Moderately distended gallbladder with small gallstones, mild wall thickening, and mild pericholecystic edema. GI and general surgery were consulted. He was empirically started on zosyn. He has known CKDIIIB with history of partial right nephrectomy and ablation Left renal mass d/t RCC. He is followed by Dr. Shin - urology and is was previously a patient of Dr. Decker and is planning to establish with Dr. Perry. He had an ERCP on 6/26 with stent placement which revealed acute cholangitis. He had a lap cholecystectomy with removal of gangrenous gallbladder which ruptured upon removal on 6/27. Transaminases have trended down postoperatively. Diet was advanced and he began passing flatus. He was cleared for discharge by general surgery on 6/29. He will be discharged to complete a course of augmentin (renally dosed). He will need close outpt f/u with Dr. Clarke, Dr. Alfred, and his  PCP. He needs a repeat ERCP in 6-8 weeks. He was seen and examined on the date of discharge. Strict return prxn provided.     Goals of Care Treatment Preferences:  Code Status: Full Code         Consults:   Consults (From admission, onward)          Status Ordering Provider     Inpatient consult to Gastroenterology  Once        Provider:  Azael Alfred III, MD    Completed ABDIEL BRITO     Inpatient consult to General Surgery  Once        Provider:  Darwin Clarke MD    Completed ABDIEL BRITO            Assessment & Plan  Cholelithiasis with acute cholecystitis  Admit to med/surg  Empiric zosyn  GI consulted  Gen surgery consulted  ERCP 6/26 followed by lap cholecystecomy 6/27  Pain and nausea control      Acute on chronic renal failure  Baseline creatinine 1.7-2  IV hydration   improving  Recent Labs     06/27/25  0439 06/28/25  0525 06/29/25  0750   CREATININE 2.2* 2.5* 2.2*   EGFRNORACEVR 29* 25* 29*       Common bile duct dilation  Per USS done today :. Common duct diameter of 8-10 mm, not significantly changed from 08/15/2024 MRI.   Pt has significant jaundice and labs showing transaminitis  GI MD Consulted - ERCP 6/26    Type 2 diabetes mellitus with other diabetic kidney complication  Patient's FSGs are controlled on current medication regimen.  Last A1c reviewed-   Lab Results   Component Value Date    HGBA1C 6.5 (H) 05/06/2025     Most recent fingerstick glucose reviewed-   Recent Labs   Lab 06/28/25  1654 06/28/25 2025 06/29/25  0816   POCTGLUCOSE 148* 188* 196*     Current correctional scale  Medium  Maintain anti-hyperglycemic dose as follows-   Antihyperglycemics (From admission, onward)      None          Hold Oral hypoglycemics while patient is in the hospital.    History of renal cell carcinoma status post partial nephrectomy  Aware     Transaminitis  Likely d/t choledocholithiasis  ERCP 6/26  IV hydration   Trend CMP    Acute cholangitis  S/p ERCP with stent placement  Continue zosyn    Final  Active Diagnoses:    Diagnosis Date Noted POA    PRINCIPAL PROBLEM:  Cholelithiasis with acute cholecystitis [K80.00] 06/25/2025 Yes    Acute cholangitis [K83.09] 06/27/2025 Yes    Transaminitis [R74.01] 06/26/2025 Yes    Acute on chronic renal failure [N17.9, N18.9] 06/25/2025 Yes    Common bile duct dilation [K83.8] 06/25/2025 Yes    History of renal cell carcinoma status post partial nephrectomy [C64.1] 09/07/2020 Yes     Chronic    Type 2 diabetes mellitus with other diabetic kidney complication [E11.29] 09/07/2020 Yes      Problems Resolved During this Admission:       Discharged Condition: stable    Disposition: Home or Self Care    Follow Up:   Follow-up Information       Richard Gunter MD Follow up in 1 week(s).    Specialty: Internal Medicine  Contact information:  [address]  [address]  [place] [address]  [phone]               Darwin Clarke MD Follow up in 1 week(s).    Specialty: General Surgery  Why: call on monday for an appt  Contact information:  [address]  [address]  [place] [address]  [phone]               Azael Alfred III, MD Follow up in 4 week(s).    Specialty: Gastroenterology  Why: will need repeat ERCP in 6-8 weeks  Contact information:  [address] [facility] [address]  [phone]                           Patient Instructions:      Diet diabetic     Notify your health care provider if you experience any of the following:  temperature >100.4     Notify your health care provider if you experience any of the following:  persistent nausea and vomiting or diarrhea     Notify your health care provider if you experience any of the following:  severe uncontrolled pain     Notify your health care provider if you experience any of the following:  redness, tenderness, or signs of infection (pain, swelling, redness, odor or green/yellow discharge around incision site)     Notify your health care provider if you experience any of the following:  difficulty breathing  or increased cough     No dressing needed   Order Comments: No baths  Shower only     Activity as tolerated       Significant Diagnostic Studies: Labs: CMP   Recent Labs   Lab 06/28/25  0525 06/29/25  0750   * 134*   K 4.3 3.5    104   CO2 21* 19*   * 179*   BUN 47* 39*   CREATININE 2.5* 2.2*   CALCIUM 7.6* 7.8*   PROT 5.1* 5.4*   ALBUMIN 2.6* 2.6*   BILITOT 3.8* 2.8*   ALKPHOS 169* 185*   AST 33 24   ALT 73* 55*   ANIONGAP 8 11    and CBC   Recent Labs   Lab 06/28/25  0525 06/29/25  0750   WBC 9.99 14.02*   HGB 9.1* 8.8*   HCT 27.6* 26.5*    231     X-Ray Chest 1 View  Result Date: 6/28/2025  CLINICAL HISTORY: ([id number])81 y/o  (1945) M hypoxia; TECHNIQUE: (A#[id number], exam time 6/28/2025 8:43) XR CHEST 1 VIEW IMG34 COMPARISON: Radiograph from 06/25/2025. 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  There are mild-to-moderate central hilar and lower lung zone predominant reticular interstitial lung markings bilaterally, slightly worse from the previous exam.  No pneumothorax is identified. The heart is top normal in size. Atheromatous calcifications are seen at the aortic arch. Osseous structures show degenerative disc disease and degenerative changes in the shoulders. The visualized upper abdomen is unremarkable.     1.  Slight interval worsening, now mild-to-moderate central hilar reticular interstitial lung markings bilaterally suggesting a combination of aspiration/pneumonia, trace edema and atelectasis. 2.  Low lung volumes with asymmetric elevation of the right hemidiaphragm. Electronically signed by: Lowell Oh Date:    06/28/2025 Time:    08:49    FL ERCP Biliary And Pancreatic By Rad Tech  Result Date: 6/27/2025  EXAMINATION: FL ERCP BILIARY AND PANCREATIC CLINICAL HISTORY: pain; TECHNIQUE: FLUORO time: 52.1 seconds FLUORO image series: 7 image  series COMPARISON: None available. FINDINGS: Fluoroscopic equipment provided by the radiology department, without involvement of the radiologist.  The images demonstrate endoscopic retrograde cholangiopancreatography. Sphincterotomy with balloon sweep and plastic biliary stent placement per technologist notes.  Please see Dr. Alfred procedure notes for details.     Fluoroscopic services provided as noted above. Electronically signed by: Manuel Pina Date:    06/27/2025 Time:    06:49    MRI MRCP Abdomen WO Contrast 3D WO Independent WS XPD  Result Date: 6/26/2025  EXAMINATION: MRI MRCP ABDOMEN WO CONTRAST 3D WO INDEPENDENT WS XPD CLINICAL HISTORY: cholecystitis; COMPARISON: CT, ultrasound June 25, 2025 FINDINGS: Multiplanar noncontrast imaging was performed, utilizing magnetic resonance cholangiopancreatography protocol. The common bile duct reaches maximal transverse diameter of 10 mm.  No filling defects are identified.  No mass or stricture is demonstrated.  The intrahepatic biliary tree is nondilated. The gallbladder is moderately distended, with mild gallbladder wall thickening and mild pericholecystic edema. No hepatic mass or contour abnormality is identified.  The spleen is mildly enlarged at 13.6 cm in longitudinal dimension.  No pancreatic mass lesion is identified.  There is no peripancreatic edema.  The pancreatic duct is normal in caliber. Multiple small bilateral renal cysts are noted.  Incompletely assessed on this examination and not entirely included in the field of view, there is an indeterminate mass at the lower pole of the left kidney, measuring at least 5.4 cm in size.     1. Mildly dilated common bile duct.  No evidence of stricture or common duct stone. 2. Moderately distended gallbladder with small gallstones, mild wall thickening, and mild pericholecystic edema.  Correlate for possible acute cholecystitis. 3. Incompletely assessed on this examination, there is an indeterminate exophytic  mass at the lower pole of the left kidney measuring 5.4 cm in greatest dimension, indeterminate.  By report, patient has had previous ablation of a left renal neoplasm, which may account for the above.  Pre and post infusion CT abdomen could be utilized for more detailed assessment. Electronically signed by: Harish Paz Date:    06/26/2025 Time:    07:42    CT Abdomen Pelvis  Without Contrast  Result Date: 6/25/2025  CMS MANDATED QUALITY DATA - CT RADIATION - 436 All CT scans at this facility utilize dose modulation, iterative reconstruction, and/or weight based dosing when appropriate to reduce radiation dose to as low as reasonably achievable. EXAMINATION: CT ABDOMEN PELVIS WITHOUT CONTRAST CLINICAL HISTORY: Abdominal abscess/infection suspected; TECHNIQUE: CT abdomen and pelvis without IV or oral contrast. COMPARISON: CT abdomen pelvis 11/14/2021. FINDINGS: There is atelectasis and mild bronchiectasis of the right lung base.  Small right pleural effusion.  The heart is normal size. The liver is normal size.  There are no gross hepatic lesions.  The gallbladder is distended.  There are calcified gallstones in the neck of the gallbladder.  There is mild pericholecystic fat stranding.  The common bile duct is within normal limits.  The pancreas, spleen and adrenal glands are unremarkable.  The kidneys are normal size.  There is focal scarring of the medial right kidney with fibrosis of the adjacent perinephric fat, similar to previous exam.  Bilateral small renal cysts again observed.  There is no hydronephrosis or nephrolithiasis.  The ureters are normal caliber without obstructions.  The bladder is fluid filled with no focal wall abnormalities.  Prostate gland is unremarkable. There is diverticulosis of the sigmoid colon.  There is mild bowel wall thickening of the 1st and 2nd portion of duodenum with perienteric fat stranding.  Large and small bowel are otherwise normal caliber.  Stomach is mostly  decompressed and unremarkable. The abdominal aorta is normal caliber with calcified plaque formation.  There are no enlarged intra-abdominal lymph nodes.  Small fat filled umbilical hernia.  There are degenerative changes of the spine.  There is no acute osseous abnormality.     1. Distended gallbladder with calcified gallstones in the neck of the gallbladder.  There is mild pericholecystic fat stranding.  There is also mild thickening of the 1st and 2nd portions of the duodenum with perienteric fat stranding.  Findings could reflect cholecystitis, enteritis or combination there of. 2. Small right pleural effusion. 3. Atelectasis and mild bronchiectasis of the right lung base. 4. Additional incidental observations as described. Electronically signed by: Adama Morales Date:    06/25/2025 Time:    16:17    US Abdomen Limited  Result Date: 6/25/2025  EXAMINATION: US ABDOMEN LIMITED CLINICAL HISTORY: Abdominal pain; COMPARISON: MRI 01/10/2025, 08/15/2024, CT 11/14/2020 FINDINGS: Liver maintains normal echogenicity without focal mass or intrahepatic bile duct dilation. Hepatopedal flow present in main portal vein. Diffuse low-level internal echoes suggesting sludge lies throughout distended gallbladder.  Cholelithiasis measuring 17 mm is also present in the gallbladder.  No gallbladder wall thickening or pericholecystic fluid.  Common duct measures 8-10 mm in diameter.  This remains similar to that partially visualized on 08/15/2024 MRI. Bowel gas obscures the pancreas.  Right kidney is free of hydronephrosis, containing multifocal simple cysts measuring up to 23 mm.  Visualized abdominal aorta is nonaneurysmal. Juxtahepatic IVC is unremarkable.  Graft Right pleural effusion is partially visualized.     1. Distended gallbladder containing diffuse low-level internal echoes and cholelithiasis.  Gallbladder at similar imaging appearance on prior imaging studies dating back to CT 11/14/2020.  Clinical and laboratory  correlation is needed to assess for potential acute cholecystitis, as sonographic Brumfield sign is reported as positive. 2. Common duct diameter of 8-10 mm, not significantly changed from 08/15/2024 MRI.  This could be related to patient's age.  Correlation with bilirubin level is requested to assess for any other evidence of common duct obstruction.  Further evaluation with MRCP can be considered. 3. Partially visualized right pleural effusion. 4. Right renal cyst. Electronically signed by: Orville Barfield Date:    06/25/2025 Time:    14:53    X-Ray Chest AP Portable  Result Date: 6/25/2025  EXAMINATION: XR CHEST AP PORTABLE CLINICAL HISTORY: Sepsis; COMPARISON: August 2024 FINDINGS: Heart size is normal.  The mediastinum is unremarkable.  The left lung is clear.  There is mild right basilar atelectasis with chronic elevation of the right hemidiaphragm.  No acute osseous abnormalities are identified.  Chronic bilateral rotator cuff tears are noted.     1. Chronic elevation of the right hemidiaphragm with mild right basilar atelectasis. 2. Chronic bilateral rotator cuff tears. Electronically signed by: Harish Paz Date:    06/25/2025 Time:    14:53      Pending Diagnostic Studies:       Procedure Component Value Units Date/Time    Specimen to Pathology - Surgery [[id number]] Collected: 06/27/25 1015    Order Status: Sent Lab Status: No result     Specimen: Tissue            Medications:  Reconciled Home Medications:      Medication List        PAUSE taking these medications      indapamide 1.25 MG Tab  Wait to take this until your doctor or other care provider tells you to start again.  Commonly known as: LOZOL  Take 1.25 mg by mouth once daily.     losartan 100 MG tablet  Wait to take this until your doctor or other care provider tells you to start again.  Commonly known as: COZAAR  TAKE 1 TABLET BY MOUTH EVERY DAY  What changed: when to take this            START taking these medications   "    amoxicillin-clavulanate 500-125mg 500-125 mg Tab  Commonly known as: AUGMENTIN  Take 1 tablet (500 mg total) by mouth 2 (two) times daily. for 7 days     oxyCODONE-acetaminophen 5-325 mg per tablet  Commonly known as: PERCOCET  Take 1 tablet by mouth every 6 (six) hours as needed for Pain.     senna-docusate 8.6-50 mg per tablet  Commonly known as: PERICOLACE  Take 1 tablet by mouth once daily. for 7 days            CHANGE how you take these medications      traZODone 100 MG tablet  Commonly known as: DESYREL  TAKE 1 TABLET BY MOUTH EVERY EVENING (DOSAGE INCREASE)  What changed: See the new instructions.            CONTINUE taking these medications      allopurinoL 100 MG tablet  Commonly known as: ZYLOPRIM  Take 100 mg by mouth Daily.     amLODIPine 5 MG tablet  Commonly known as: NORVASC  TAKE 1 TABLET BY MOUTH EVERY DAY IN THE EVENING     ascorbic acid (vitamin C) 500 MG tablet  Commonly known as: VITAMIN C  Take 500 mg by mouth once daily.     atorvastatin 10 MG tablet  Commonly known as: LIPITOR  Take 10 mg by mouth every Mon, Wed, Fri.     BD WINSOME 2ND GEN PEN NEEDLE 32 gauge x 5/32" Ndle  Generic drug: pen needle, diabetic  INJECT 1 UNITS INTO THE SKIN 3 (THREE) TIMES DAILY BEFORE MEALS. USE AS DIRECTED NON-MED ITEM     blood-glucose meter Misc  Commonly known as: TRUE METRIX GLUCOSE METER  1 Device by Misc.(Non-Drug; Combo Route) route once daily.     calcitRIOL 0.25 MCG Cap  Commonly known as: ROCALTROL  Take 0.25 mcg by mouth As instructed. Mon, Tues, Wed, Thurs, Fri     insulin aspart U-100 100 unit/mL (3 mL) Inpn pen  Commonly known as: NovoLOG Flexpen U-100 Insulin  Inject 10 Units into the skin 3 (three) times daily with meals. Check glucose before meals and then take insulin as per sliding scale     insulin degludec 100 unit/mL (3 mL) insulin pen  Commonly known as: TRESIBA FLEXTOUCH U-100  Inject 15 Units into the skin every evening.     sodium bicarbonate 650 MG tablet  Take 650 mg by mouth 2 " (two) times daily.     TRUE METRIX GLUCOSE TEST STRIP Strp  Generic drug: blood sugar diagnostic  TEST 3 (THREE) TIMES DAILY BEFORE MEALS.     VITAMIN B-12 2000 MCG tablet  Generic drug: cyanocobalamin  Take 2,000 mcg by mouth once daily.     zinc gluconate 50 mg tablet  Take 50 mg by mouth once daily.              Indwelling Lines/Drains at time of discharge:   Lines/Drains/Airways       Drain  Duration                  Nephrostomy 12/10/20 0900 Right 18 Fr. 1662 days                        Time spent on the discharge of patient: 43 minutes         Binta Sheets NP  Department of Hospital Medicine  [facility]

## 2025-06-29 NOTE — PLAN OF CARE
Problem: Adult Inpatient Plan of Care  Goal: Plan of Care Review  Outcome: Met  Goal: Patient-Specific Goal (Individualized)  Outcome: Met  Goal: Absence of Hospital-Acquired Illness or Injury  Outcome: Met  Goal: Optimal Comfort and Wellbeing  Outcome: Met  Goal: Readiness for Transition of Care  Outcome: Met     Problem: Diabetes Comorbidity  Goal: Blood Glucose Level Within Targeted Range  Outcome: Met     Problem: Acute Kidney Injury/Impairment  Goal: Fluid and Electrolyte Balance  Outcome: Met  Goal: Improved Oral Intake  Outcome: Met  Goal: Effective Renal Function  Outcome: Met     Problem: Wound  Goal: Optimal Coping  Outcome: Met  Goal: Optimal Functional Ability  Outcome: Met  Goal: Absence of Infection Signs and Symptoms  Outcome: Met  Goal: Improved Oral Intake  Outcome: Met  Goal: Optimal Pain Control and Function  Outcome: Met  Goal: Skin Health and Integrity  Outcome: Met  Goal: Optimal Wound Healing  Outcome: Met

## 2025-06-29 NOTE — CARE UPDATE
06/28/25 2004   Patient Assessment/Suction   Level of Consciousness (AVPU) alert   Respiratory Effort Normal;Unlabored   Expansion/Accessory Muscles/Retractions no use of accessory muscles   All Lung Fields Breath Sounds equal bilaterally;diminished   Rhythm/Pattern, Respiratory unlabored   Cough Frequency no cough   PRE-TX-O2   Device (Oxygen Therapy) room air  (NC @ 2L on S/B)   SpO2 (!) 91 %   Pulse Oximetry Type Intermittent   $ Pulse Oximetry - Multiple Charge Pulse Oximetry - Multiple   Pulse 83   Resp 16   Incentive Spirometer   $ Incentive Spirometer Charges done with encouragement   Administration (IS) instruction provided, follow-up;self-administered   Number of Repetitions (IS) 8   Level Incentive Spirometer (mL) 1000   Patient Tolerance (IS) fair;no adverse signs/symptoms present   Education   $ Education Oxygen;15 min;Incentive Spirometry   Respiratory Evaluation   $ Care Plan Tech Time 15 min

## 2025-06-30 ENCOUNTER — PATIENT OUTREACH (OUTPATIENT)
Dept: FAMILY MEDICINE | Facility: CLINIC | Age: 80
End: 2025-06-30
Payer: MEDICARE

## 2025-06-30 ENCOUNTER — TELEPHONE (OUTPATIENT)
Dept: FAMILY MEDICINE | Facility: CLINIC | Age: 80
End: 2025-06-30
Payer: MEDICARE

## 2025-06-30 LAB
BACTERIA BLD CULT: NORMAL
BACTERIA BLD CULT: NORMAL

## 2025-06-30 NOTE — TELEPHONE ENCOUNTER
Discharge Information     Discharge Date:   6/29/2025    Primary Discharge Diagnosis:gallbladder removal       Discharge Summary:  Reviewed      Medication & Order Review     Were medication changes made or new medications added?   Yes    If so, has the patient filled the prescriptions?  Yes     Was Home Health ordered? No    If so, has Home Health contacted patient and/or initiated services?  No    Name of Home Health Agency? N/A    Durable Medical Equipment ordered?  No     If so, has the DME provider contacted patient and delivered equipment?  N/A    Follow Up               Any problems since discharge? No    How is the patient feeling since returning home?      Have you set up recommended follow up appointments?  (cardiology, surgery, etc.)    Schedule Hospital Follow-up appointment within 7-14 days (preferably 7).      Notes:  pt is feeling ok  has some gas  wife gave him meds  she will try to get him to walk more   and keep appt              Chrissy Lynch

## 2025-06-30 NOTE — TELEPHONE ENCOUNTER
----- Message from Nurse Bertrand sent at 6/30/2025 10:09 AM CDT -----  Call patient - needs post-hospital phone call within 2 business days and hospital follow up visit scheduled within 7-14 days.

## 2025-07-02 ENCOUNTER — PATIENT OUTREACH (OUTPATIENT)
Dept: ADMINISTRATIVE | Facility: CLINIC | Age: 80
End: 2025-07-02
Payer: MEDICARE

## 2025-07-02 ENCOUNTER — TELEPHONE (OUTPATIENT)
Dept: FAMILY MEDICINE | Facility: CLINIC | Age: 80
End: 2025-07-02
Payer: MEDICARE

## 2025-07-02 NOTE — PROGRESS NOTES
C3 nurse spoke with Jamil Cadet's wife Meron for a TCC post hospital discharge follow up call. The patient has a scheduled [facility] appointment with Richard Gunter MD  on 7/7/25 @ 1020.

## 2025-07-02 NOTE — TELEPHONE ENCOUNTER
----- Message from Nurse Parker sent at 7/2/2025  3:31 PM CDT -----  Regarding: Symptoms  Patient reported abdominal bloating following his procedure. He is weak and has a decrease appetite. He is also fatigue and not moving around much. He would like to know if anything is recommended for the abdominal bloating.    Please reach out directly to the patient.       Respectfully,    Elena Orta LPN    Transition of Care Team    Please do not reply to this message, as this in-box is not routinely monitored

## 2025-07-03 ENCOUNTER — TELEPHONE (OUTPATIENT)
Dept: SURGERY | Facility: CLINIC | Age: 80
End: 2025-07-03
Payer: MEDICARE

## 2025-07-03 NOTE — TELEPHONE ENCOUNTER
Copied from CRM #[id number]. Topic: General Inquiry - Patient Advice  >> Jul 3, 2025  9:15 AM Jessica wrote:  Type: Needs Medical Advice  Who Called:  wife - Theresa  Symptoms (please be specific):  still bloated and stomach extended, not passing gas  How long has patient had these symptoms:  since surgery on 6/27  Pharmacy name and phone #:    [facility]/pharmacy #[id number] - [place], [place] - [address]  [address] [place] [address]  Phone: [phone] Fax: [phone]      Best Call Back Number: [phone]    Additional Information: Meron is asking if there is anything else they could be doing or is there something he can take. Please call back to advise, thank you

## 2025-07-03 NOTE — TELEPHONE ENCOUNTER
Richard Gunter MD to [facility] Staff  Darwin Clarke MD  (Selected Message)        7/2/25 11:11 PM    Check with patient if he has reached out to the surgeon.  Is he feeding or not generally Tums or Mylanta can help with the bloating.    Is he  ambulatory

## 2025-07-03 NOTE — TELEPHONE ENCOUNTER
Spoke with wife he's doing a little better she will contact surgeon and try to get pt walking more

## 2025-07-07 ENCOUNTER — RESULTS FOLLOW-UP (OUTPATIENT)
Dept: FAMILY MEDICINE | Facility: CLINIC | Age: 80
End: 2025-07-07

## 2025-07-07 ENCOUNTER — PATIENT MESSAGE (OUTPATIENT)
Dept: FAMILY MEDICINE | Facility: CLINIC | Age: 80
End: 2025-07-07

## 2025-07-07 ENCOUNTER — OFFICE VISIT (OUTPATIENT)
Dept: SURGERY | Facility: CLINIC | Age: 80
End: 2025-07-07
Payer: MEDICARE

## 2025-07-07 ENCOUNTER — OFFICE VISIT (OUTPATIENT)
Dept: FAMILY MEDICINE | Facility: CLINIC | Age: 80
End: 2025-07-07
Payer: MEDICARE

## 2025-07-07 ENCOUNTER — LAB VISIT (OUTPATIENT)
Dept: LAB | Facility: HOSPITAL | Age: 80
End: 2025-07-07
Attending: INTERNAL MEDICINE
Payer: MEDICARE

## 2025-07-07 VITALS — SYSTOLIC BLOOD PRESSURE: 128 MMHG | DIASTOLIC BLOOD PRESSURE: 58 MMHG | TEMPERATURE: 98 F | HEART RATE: 78 BPM

## 2025-07-07 VITALS
DIASTOLIC BLOOD PRESSURE: 62 MMHG | HEIGHT: 71 IN | WEIGHT: 165.38 LBS | SYSTOLIC BLOOD PRESSURE: 129 MMHG | BODY MASS INDEX: 23.15 KG/M2 | HEART RATE: 76 BPM

## 2025-07-07 DIAGNOSIS — K81.0 ACUTE CHOLECYSTITIS WITH ACUTE CHOLANGITIS: Primary | Chronic | ICD-10-CM

## 2025-07-07 DIAGNOSIS — K81.0 ACUTE CHOLECYSTITIS WITH ACUTE CHOLANGITIS: ICD-10-CM

## 2025-07-07 DIAGNOSIS — N18.32 TYPE 2 DIABETES MELLITUS WITH STAGE 3B CHRONIC KIDNEY DISEASE, WITH LONG-TERM CURRENT USE OF INSULIN: ICD-10-CM

## 2025-07-07 DIAGNOSIS — K83.09 ACUTE CHOLECYSTITIS WITH ACUTE CHOLANGITIS: ICD-10-CM

## 2025-07-07 DIAGNOSIS — N25.81 SECONDARY HYPERPARATHYROIDISM OF RENAL ORIGIN: ICD-10-CM

## 2025-07-07 DIAGNOSIS — I10 ESSENTIAL HYPERTENSION: ICD-10-CM

## 2025-07-07 DIAGNOSIS — Z98.890 POST-OPERATIVE STATE: Primary | ICD-10-CM

## 2025-07-07 DIAGNOSIS — N18.32 STAGE 3B CHRONIC KIDNEY DISEASE: ICD-10-CM

## 2025-07-07 DIAGNOSIS — E11.22 TYPE 2 DIABETES MELLITUS WITH STAGE 3B CHRONIC KIDNEY DISEASE, WITH LONG-TERM CURRENT USE OF INSULIN: ICD-10-CM

## 2025-07-07 DIAGNOSIS — K83.09 ACUTE CHOLECYSTITIS WITH ACUTE CHOLANGITIS: Primary | Chronic | ICD-10-CM

## 2025-07-07 DIAGNOSIS — Z09 HOSPITAL DISCHARGE FOLLOW-UP: ICD-10-CM

## 2025-07-07 DIAGNOSIS — D64.9 CHRONIC ANEMIA: ICD-10-CM

## 2025-07-07 DIAGNOSIS — Z79.4 TYPE 2 DIABETES MELLITUS WITH STAGE 3B CHRONIC KIDNEY DISEASE, WITH LONG-TERM CURRENT USE OF INSULIN: ICD-10-CM

## 2025-07-07 LAB
ABSOLUTE EOSINOPHIL (OHS): 0.13 K/UL
ABSOLUTE MONOCYTE (OHS): 0.86 K/UL (ref 0.3–1)
ABSOLUTE NEUTROPHIL COUNT (OHS): 7.03 K/UL (ref 1.8–7.7)
ALBUMIN SERPL BCP-MCNC: 2.6 G/DL (ref 3.5–5.2)
ALP SERPL-CCNC: 145 UNIT/L (ref 40–150)
ALT SERPL W/O P-5'-P-CCNC: 30 UNIT/L (ref 10–44)
ANION GAP (OHS): 12 MMOL/L (ref 8–16)
AST SERPL-CCNC: 33 UNIT/L (ref 11–45)
BASOPHILS # BLD AUTO: 0.03 K/UL
BASOPHILS NFR BLD AUTO: 0.3 %
BILIRUB SERPL-MCNC: 1.3 MG/DL (ref 0.1–1)
BUN SERPL-MCNC: 28 MG/DL (ref 8–23)
CALCIUM SERPL-MCNC: 8.8 MG/DL (ref 8.7–10.5)
CHLORIDE SERPL-SCNC: 107 MMOL/L (ref 95–110)
CO2 SERPL-SCNC: 19 MMOL/L (ref 23–29)
CREAT SERPL-MCNC: 1.8 MG/DL (ref 0.5–1.4)
ERYTHROCYTE [DISTWIDTH] IN BLOOD BY AUTOMATED COUNT: 12.5 % (ref 11.5–14.5)
GFR SERPLBLD CREATININE-BSD FMLA CKD-EPI: 37 ML/MIN/1.73/M2
GLUCOSE SERPL-MCNC: 67 MG/DL (ref 70–110)
HCT VFR BLD AUTO: 27.4 % (ref 40–54)
HGB BLD-MCNC: 8.7 GM/DL (ref 14–18)
IMM GRANULOCYTES # BLD AUTO: 0.07 K/UL (ref 0–0.04)
IMM GRANULOCYTES NFR BLD AUTO: 0.8 % (ref 0–0.5)
LYMPHOCYTES # BLD AUTO: 0.88 K/UL (ref 1–4.8)
MCH RBC QN AUTO: 31.9 PG (ref 27–31)
MCHC RBC AUTO-ENTMCNC: 31.8 G/DL (ref 32–36)
MCV RBC AUTO: 100 FL (ref 82–98)
NUCLEATED RBC (/100WBC) (OHS): 0 /100 WBC
PLATELET # BLD AUTO: 508 K/UL (ref 150–450)
PMV BLD AUTO: 11 FL (ref 9.2–12.9)
POTASSIUM SERPL-SCNC: 4.3 MMOL/L (ref 3.5–5.1)
PROT SERPL-MCNC: 6.9 GM/DL (ref 6–8.4)
RBC # BLD AUTO: 2.73 M/UL (ref 4.6–6.2)
RELATIVE EOSINOPHIL (OHS): 1.4 %
RELATIVE LYMPHOCYTE (OHS): 9.8 % (ref 18–48)
RELATIVE MONOCYTE (OHS): 9.6 % (ref 4–15)
RELATIVE NEUTROPHIL (OHS): 78.1 % (ref 38–73)
SODIUM SERPL-SCNC: 138 MMOL/L (ref 136–145)
WBC # BLD AUTO: 9 K/UL (ref 3.9–12.7)

## 2025-07-07 PROCEDURE — 99213 OFFICE O/P EST LOW 20 MIN: CPT | Mod: PBBFAC,27,PN | Performed by: INTERNAL MEDICINE

## 2025-07-07 PROCEDURE — 85025 COMPLETE CBC W/AUTO DIFF WBC: CPT

## 2025-07-07 PROCEDURE — 36415 COLL VENOUS BLD VENIPUNCTURE: CPT | Mod: PN

## 2025-07-07 PROCEDURE — 99213 OFFICE O/P EST LOW 20 MIN: CPT | Mod: PBBFAC,PN | Performed by: STUDENT IN AN ORGANIZED HEALTH CARE EDUCATION/TRAINING PROGRAM

## 2025-07-07 PROCEDURE — 80053 COMPREHEN METABOLIC PANEL: CPT

## 2025-07-07 PROCEDURE — 99495 TRANSJ CARE MGMT MOD F2F 14D: CPT | Mod: S$PBB,,, | Performed by: INTERNAL MEDICINE

## 2025-07-07 PROCEDURE — 99999 PR PBB SHADOW E&M-EST. PATIENT-LVL III: CPT | Mod: PBBFAC,,, | Performed by: STUDENT IN AN ORGANIZED HEALTH CARE EDUCATION/TRAINING PROGRAM

## 2025-07-07 PROCEDURE — 99999 PR PBB SHADOW E&M-EST. PATIENT-LVL III: CPT | Mod: PBBFAC,,, | Performed by: INTERNAL MEDICINE

## 2025-07-07 PROCEDURE — 99024 POSTOP FOLLOW-UP VISIT: CPT | Mod: POP,,, | Performed by: STUDENT IN AN ORGANIZED HEALTH CARE EDUCATION/TRAINING PROGRAM

## 2025-07-07 RX ORDER — IRON,CARB/VIT C/VIT B12/FOLIC 100-250-1
1 TABLET ORAL DAILY
Qty: 30 TABLET | Refills: 3 | Status: SHIPPED | OUTPATIENT
Start: 2025-07-07 | End: 2025-11-04

## 2025-07-07 NOTE — PROGRESS NOTES
SUBJECTIVE:    Patient ID: Jamil Cadet is a 80 y.o. male.    Chief Complaint: Hospital Follow Up, Fatigue, Hypertension, and Cholecystitis    History of Present Illness    CHIEF COMPLAINT:  Jamil presents for a follow-up visit after recent hospitalization for a gallbladder attack and subsequent cholecystectomy.    HPI:  Jamil was hospitalized from June 25 to June 29, 2025, due to jaundice and a gallbladder attack. He had sudden onset of severe chest and abdominal pains on June 21, which he initially mistook for a possible heart attack. He did not seek immediate medical attention despite the pain.    During hospitalization, he underwent an ERCP on June 26, with stent placement, which revealed inflammation in his bile duct. Subsequently, he had a laparoscopic cholecystectomy to remove his gallbladder, which was found to be gangrenous and ruptured immediately upon removal.    His medical team had been monitoring his gallbladder for several years, noting significant sludge on previous scans. An abdominal MRI performed by Dr. Dave Shin in August of last year, initially done for the kidneys, had also revealed gallbladder issues.    During his hospital stay, he had very high blood sugar. His urine was initially yellow, then turned orange due to bile backing up into his bloodstream. His blood pressure became very low, leading to the temporary discontinuation of his blood pressure medications.    Currently, he reports no pain in his abdomen. His appetite is slowly returning, though it remains variable. His bowel movements are variable, depending on his diet. He is passing urine in good amounts. He feels short of breath and acknowledges difficulty with mobility, but states he is slowly improving. He reports nocturia once per night.    He denies current pain, edema in the legs, or any ongoing issues with urination.    MEDICATIONS:  Jamil is on Soda bicarb, 1 tablet twice daily for chronic kidney disease. He is  taking Vitamin C 500 mg daily, Zinc gluconate 50 mg daily, and Vitamin B12 2000 mcg per day. For diabetes management, he is on Tresiba (insulin) at night, Presciba (insulin), and regular insulin. Jamil is also on Trazodone for sleep. Indapamide, Losartan, and Amlodipine have been paused. He has finished a course of antibiotics, Colace or Pericolace for constipation, and Oxycodone for pain management.    MEDICAL HISTORY:  Jamil has a history of gallbladder disease with sludge in the gallbladder for several years and the presence of stones. He has chronic kidney disease, for which he is taking sodium bicarbonate. Jamil has diabetes and is on insulin therapy. He also has hypertension and was previously on blood pressure medications, which are currently paused.    SURGICAL HISTORY:  On 6/26/2025, the patient underwent an ERCP with stent placement, which revealed inflammation in the bile duct. On 6/29/2025, he had a laparoscopic cholecystectomy for the removal of a gangrenous gallbladder, which ruptured upon removal.    TEST RESULTS:  Jamil's A1C on 5/6/25 was 6.5%. His blood sugar on 6/22, before hospitalization, was 208. On 7/5, multiple blood sugar readings were recorded: 171, 137, 74, 142, and 163. During hos  pitalization, his creatinine level was 2.2. On 6/26, the patient underwent an ERCP which revealed inflammation in the gall bile duct, and a stent was placed.    IMAGING:  An abdomen MRI conducted last August, initially done for kidney evaluation, revealed gallbladder issues. Jamil has undergone several gallbladder scans over the years, which showed significant sludge.    ROS:  General: -fever, -chills, -fatigue, -weight gain, -weight loss  Cardiovascular: +chest pain, -palpitations, -lower extremity edema  Respiratory: -cough, +shortness of breath, +difficulty breathing  Gastrointestinal: +abdominal pain, -nausea, -vomiting, -diarrhea, -constipation, -blood in stool, +appetite changes  Skin: -rash,  -lesion, +jaundice  Neurological: -headache, -dizziness, -numbness, -tingling  Musculoskeletal: +limited movement  Genitourinary: +nocturia         Admit Date: 6.25.2025  Discharge Date: 06.29.2025  Discharge Facility: Hospital      Family and/or Caretaker present at visit? Yes wife  Medication Reconciliation:  Medications changed/added/deleted. Off pain meds and BP meds on hold  New Prescriptions filled after discharge: no  Discharge summary reviewed:  yes  Diagnostic tests reviewed/disposition: I have reviewed all completed as well as pending diagnostic tests at the time of discharge  Disease/illness education: Education provided  Follow up appointments scheduled:  yes              with General Surgery Dr Darwin Clarke today   Follow up labs/tests ordered:   yes  Home Health ordered on discharge: Patient does not have home health established from hospital visit.  They do not need home health.  If needed, we will set up home health for the patient  Home Health company name: NA  Establishment or re-establishment of referral orders for community resources: No other necessary community resources  DME ordered at discharge:   not applicable  How patient is feeling since discharge from the hospital?  Improving but weak      Discussion with other health care providers: No discussion with other health care providers necessary  Patient follow up phone call documented on separate encounter.    Lab Visit on 07/07/2025   Component Date Value Ref Range Status    Sodium 07/07/2025 138  136 - 145 mmol/L Final    Potassium 07/07/2025 4.3  3.5 - 5.1 mmol/L Final    Chloride 07/07/2025 107  95 - 110 mmol/L Final    CO2 07/07/2025 19 (L)  23 - 29 mmol/L Final    Glucose 07/07/2025 67 (L)  70 - 110 mg/dL Final    BUN 07/07/2025 28 (H)  8 - 23 mg/dL Final    Creatinine 07/07/2025 1.8 (H)  0.5 - 1.4 mg/dL Final    Calcium 07/07/2025 8.8  8.7 - 10.5 mg/dL Final    Protein Total 07/07/2025 6.9  6.0 - 8.4 gm/dL Final    Albumin 07/07/2025  2.6 (L)  3.5 - 5.2 g/dL Final    Bilirubin Total 07/07/2025 1.3 (H)  0.1 - 1.0 mg/dL Final    ALP 07/07/2025 145  40 - 150 unit/L Final    AST 07/07/2025 33  11 - 45 unit/L Final    ALT 07/07/2025 30  10 - 44 unit/L Final    Anion Gap 07/07/2025 12  8 - 16 mmol/L Final    eGFR 07/07/2025 37 (L)  >60 mL/min/1.73/m2 Final    WBC 07/07/2025 9.00  3.90 - 12.70 K/uL Final    RBC 07/07/2025 2.73 (L)  4.60 - 6.20 M/uL Final    HGB 07/07/2025 8.7 (L)  14.0 - 18.0 gm/dL Final    HCT 07/07/2025 27.4 (L)  40.0 - 54.0 % Final    MCV 07/07/2025 100 (H)  82 - 98 fL Final    MCH 07/07/2025 31.9 (H)  27.0 - 31.0 pg Final    MCHC 07/07/2025 31.8 (L)  32.0 - 36.0 g/dL Final    RDW 07/07/2025 12.5  11.5 - 14.5 % Final    Platelet Count 07/07/2025 508 (H)  150 - 450 K/uL Final    MPV 07/07/2025 11.0  9.2 - 12.9 fL Final    Nucleated RBC 07/07/2025 0  <=0 /100 WBC Final    Neut % 07/07/2025 78.1 (H)  38 - 73 % Final    Lymph % 07/07/2025 9.8 (L)  18 - 48 % Final    Mono % 07/07/2025 9.6  4 - 15 % Final    Eos % 07/07/2025 1.4  <=8 % Final    Basophil % 07/07/2025 0.3  <=1.9 % Final    Imm Grans % 07/07/2025 0.8 (H)  0.0 - 0.5 % Final    Neut # 07/07/2025 7.03  1.8 - 7.7 K/uL Final    Lymph # 07/07/2025 0.88 (L)  1 - 4.8 K/uL Final    Mono # 07/07/2025 0.86  0.3 - 1 K/uL Final    Eos # 07/07/2025 0.13  <=0.5 K/uL Final    Baso # 07/07/2025 0.03  <=0.2 K/uL Final    Imm Grans # 07/07/2025 0.07 (H)  0.00 - 0.04 K/uL Final   No results displayed because visit has over 200 results.      Office Visit on 06/25/2025   Component Date Value Ref Range Status    Color, POC UA 06/25/2025 Ida (A)  Yellow, Straw, Colorless Final    Clarity, POC UA 06/25/2025 Slight Cloudy (A)  Clear Final    Glucose, POC UA 06/25/2025 Negative  Negative Final    Bilirubin, POC UA 06/25/2025 Small (A)  Negative Final    Ketones, POC UA 06/25/2025 Negative  Negative Final    Spec Grav POC UA 06/25/2025 1.025  1.005 - 1.030 Final    Blood, POC UA 06/25/2025 Negative   Negative Final    pH, POC UA 06/25/2025 6.0  5.0 - 8.0 Final    Protein, POC UA 06/25/2025 100 (A)  Negative Final    Urobilinogen, POC UA 06/25/2025 1.0  <=1.0 Final    Nitrite, POC UA 06/25/2025 Negative  Negative Final    WBC, POC UA 06/25/2025 Negative  Negative Final   Lab Visit on 05/06/2025   Component Date Value Ref Range Status    Hemoglobin A1c 05/06/2025 6.5 (H)  4.0 - 5.6 % Final    Estimated Average Glucose 05/06/2025 140 (H)  68 - 131 mg/dL Final    Sodium 05/06/2025 138  136 - 145 mmol/L Final    Potassium 05/06/2025 4.5  3.5 - 5.1 mmol/L Final    Chloride 05/06/2025 109  95 - 110 mmol/L Final    CO2 05/06/2025 21 (L)  23 - 29 mmol/L Final    Glucose 05/06/2025 70  70 - 110 mg/dL Final    BUN 05/06/2025 36 (H)  8 - 23 mg/dL Final    Creatinine 05/06/2025 1.7 (H)  0.5 - 1.4 mg/dL Final    Calcium 05/06/2025 9.0  8.7 - 10.5 mg/dL Final    Albumin 05/06/2025 3.7  3.5 - 5.2 g/dL Final    Phosphorus Level 05/06/2025 3.5  2.7 - 4.5 mg/dL Final    Anion Gap 05/06/2025 8  8 - 16 mmol/L Final    eGFR 05/06/2025 40 (L)  >60 mL/min/1.73/m2 Final   Lab Visit on 01/08/2025   Component Date Value Ref Range Status    WBC 01/08/2025 8.11  3.90 - 12.70 K/uL Final    RBC 01/08/2025 3.80 (L)  4.60 - 6.20 M/uL Final    Hemoglobin 01/08/2025 12.2 (L)  14.0 - 18.0 g/dL Final    Hematocrit 01/08/2025 36.7 (L)  40.0 - 54.0 % Final    MCV 01/08/2025 97  82 - 98 fL Final    MCH 01/08/2025 32.1 (H)  27.0 - 31.0 pg Final    MCHC 01/08/2025 33.2  32.0 - 36.0 g/dL Final    RDW 01/08/2025 12.3  11.5 - 14.5 % Final    Platelets 01/08/2025 260  150 - 450 K/uL Final    MPV 01/08/2025 10.7  9.2 - 12.9 fL Final    Immature Granulocytes 01/08/2025 0.4  0.0 - 0.5 % Final    Gran # (ANC) 01/08/2025 6.0  1.8 - 7.7 K/uL Final    Immature Grans (Abs) 01/08/2025 0.03  0.00 - 0.04 K/uL Final    Lymph # 01/08/2025 1.1  1.0 - 4.8 K/uL Final    Mono # 01/08/2025 0.6  0.3 - 1.0 K/uL Final    Eos # 01/08/2025 0.3  0.0 - 0.5 K/uL Final    Baso #  01/08/2025 0.03  0.00 - 0.20 K/uL Final    nRBC 01/08/2025 0  0 /100 WBC Final    Gran % 01/08/2025 74.3 (H)  38.0 - 73.0 % Final    Lymph % 01/08/2025 13.9 (L)  18.0 - 48.0 % Final    Mono % 01/08/2025 7.9  4.0 - 15.0 % Final    Eosinophil % 01/08/2025 3.1  0.0 - 8.0 % Final    Basophil % 01/08/2025 0.4  0.0 - 1.9 % Final    Differential Method 01/08/2025 Automated   Final    Glucose 01/08/2025 75  70 - 110 mg/dL Final    Sodium 01/08/2025 139  136 - 145 mmol/L Final    Potassium 01/08/2025 4.1  3.5 - 5.1 mmol/L Final    Chloride 01/08/2025 107  95 - 110 mmol/L Final    CO2 01/08/2025 23  23 - 29 mmol/L Final    BUN 01/08/2025 30 (H)  8 - 23 mg/dL Final    Calcium 01/08/2025 9.4  8.7 - 10.5 mg/dL Final    Creatinine 01/08/2025 1.7 (H)  0.5 - 1.4 mg/dL Final    Albumin 01/08/2025 4.3  3.5 - 5.2 g/dL Final    Phosphorus 01/08/2025 3.3  2.7 - 4.5 mg/dL Final    eGFR 01/08/2025 40.0 (A)  >60 mL/min/1.73 m^2 Final    Anion Gap 01/08/2025 9  8 - 16 mmol/L Final    Specimen UA 01/08/2025 Urine, Clean Catch   Final    Color, UA 01/08/2025 Yellow  Yellow, Straw, Ida Final    Appearance, UA 01/08/2025 Clear  Clear Final    pH, UA 01/08/2025 6.0  5.0 - 8.0 Final    Specific Gravity, UA 01/08/2025 1.015  1.005 - 1.030 Final    Protein, UA 01/08/2025 Negative  Negative Final    Glucose, UA 01/08/2025 Negative  Negative Final    Ketones, UA 01/08/2025 Negative  Negative Final    Bilirubin (UA) 01/08/2025 Negative  Negative Final    Occult Blood UA 01/08/2025 Negative  Negative Final    Nitrite, UA 01/08/2025 Negative  Negative Final    Urobilinogen, UA 01/08/2025 Negative  Negative EU/dL Final    Leukocytes, UA 01/08/2025 Negative  Negative Final    Microalbumin, Urine 01/08/2025 10.4  <19.9 ug/mL Final    Creatinine, Urine 01/08/2025 98.1  23.0 - 375.0 mg/dL Final    Microalb/Creat Ratio 01/08/2025 10.6  0.0 - 30.0 ug/mg Final    Protein, Urine Random 01/08/2025 23 (H)  6 - 15 mg/dL Final    Creatinine, Urine 01/08/2025 98.1   23.0 - 375.0 mg/dL Final    Prot/Creat Ratio, Urine 01/08/2025 0.23 (H)  0.00 - 0.20 Final    PTH, Intact 01/08/2025 61.9  9.0 - 77.0 pg/mL Final    Magnesium 01/08/2025 1.5 (L)  1.6 - 2.6 mg/dL Final    Uric Acid 01/08/2025 6.3  3.4 - 7.0 mg/dL Final       Past Medical History:   Diagnosis Date    Acute renal failure 9/7/2020    Bacteremia due to Enterococcus 10/25/2020    Cancer     Diabetes mellitus, type 2     Disorder of kidney and ureter     Encounter for blood transfusion     History of renal cell carcinoma status post partial nephrectomy 9/7/2020    Hydronephrosis 9/9/2020    Hyperlipidemia     Hypertension     Infection and inflammatory reaction due to nephrostomy catheter, initial encounter 11/14/2020    Infection following a procedure, superficial incisional surgical site, subsequent encounter 12/26/2020    Polio     Pyelonephritis of right kidney     Septicemia due to enterococcus 11/15/2020     Past Surgical History:   Procedure Laterality Date    ANTEGRADE PYELOGRAM  12/10/2020    Procedure: PYELOGRAM, ANTEGRADE;  Surgeon: Dave Shin MD;  Location: [address];  Service: Urology;;    APPENDECTOMY      BACK SURGERY  2025    CYSTOSCOPY  12/10/2020    Procedure: CYSTOSCOPY;  Surgeon: Dave Sihn MD;  Location: [address];  Service: Urology;;    CYSTOSCOPY W/ RETROGRADES Right 09/09/2020    Procedure: CYSTOSCOPY, WITH RETROGRADE PYELOGRAM;  Surgeon: Chris Garcia Jr., MD;  Location: [facility] OR;  Service: Urology;  Laterality: Right;    DILATION OF NEPHROSTOMY TRACT Right 12/10/2020    Procedure: DILATION, NEPHROSTOMY TRACT;  Surgeon: Dave Shin MD;  [facility];  Service: Urology;  Laterality: Right;    ERCP N/A 6/26/2025    Procedure: ERCP (ENDOSCOPIC RETROGRADE CHOLANGIOPANCREATOGRAPHY);  Surgeon: Azael Alfred III, MD;  Location: [facility] ENDO;  Service: Endoscopy;  Laterality: N/A;    RADIOFREQUENCY ABLATION KIDNEY  2022    REMOVAL OF DRAIN Right  "12/10/2020    Procedure: REMOVAL, DRAIN-NEPHROSTOMY TUBE;  Surgeon: Dave Shin MD;  Location: [facility] OR 1ST FLR;  Service: Urology;  Laterality: Right;    REPLACEMENT OF NEPHROSTOMY TUBE Right 12/10/2020    Procedure: REPLACEMENT, NEPHROSTOMY TUBE;  Surgeon: Dave Shin MD;  Location: [facility] OR 1ST FLR;  Service: Urology;  Laterality: Right;    ROBOT-ASSISTED CHOLECYSTECTOMY N/A 6/27/2025    Procedure: ROBOTIC CHOLECYSTECTOMY;  Surgeon: Darwin Clarke MD;  Location: [facility];  Service: General;  Laterality: N/A;    ROBOT-ASSISTED LAPAROSCOPIC REIMPLANTATION OF URETER USING DA PHU XI Right 01/08/2021    Procedure: XI ROBOTIC IMPLANTATION, URETER, USING PSOAS HITCH TECHNIQUE;  Surgeon: Dave Shin MD;  Location: [facility] OR 2ND FLR;  Service: Urology;  Laterality: Right;  4hrs gen with regional    SPINE SURGERY      URETEROSCOPY Right 12/10/2020    Procedure: URETEROSCOPY-ANTEGRADE;  Surgeon: Dave Shin MD;  Location: [facility] OR 1ST FLR;  Service: Urology;  Laterality: Right;    URETHROSCOPY  09/09/2020    Procedure: URETHROSCOPY;  Surgeon: Chris Garcia Jr., MD;  Location: [facility];  Service: Urology;;     Family History   Problem Relation Name Age of Onset    Heart disease Mother      Heart disease Father      Stroke Father      Heart disease Maternal Grandfather      Heart disease Paternal Grandmother      Heart disease Paternal Grandfather         Marital Status:   Alcohol History:  reports no history of alcohol use.  Tobacco History:  reports that he has never smoked. He has been exposed to tobacco smoke. He has never used smokeless tobacco.  Drug History:  reports no history of drug use.    Review of patient's allergies indicates:  No Known Allergies  Current Medications[1]  Objective:      Physical Exam  Vitals:    07/07/25 1009   BP: 129/62   Pulse: 76   Weight: 75 kg (165 lb 5.5 oz)   Height: 5' 11" (1.803 m)     Physical Exam    Vitals: Blood pressure: 113/50.  General:  Looks " somewhat unwell and chronically ill Well-developed. Well-nourished.  Eyes: EOMI. Sclerae anicteric. Conjunctival pallor.  Cardiovascular: Regular rate. Regular rhythm. No murmurs. No rubs. No gallops. Normal S1, S2.  Respiratory: Normal respiratory effort. Clear to auscultation bilaterally. No rales. No rhonchi. No wheezing.  Abdomen: Laparoscopic surgical scars in various stages of healing.  Musculoskeletal: No  obvious deformity.  Extremities: No lower extremity edema.  Neurological: Alert & oriented   Psychiatric:  Appropriate for the context and situation  Skin: Warm. Dry.              Assessment:       Assessment & Plan    N18.4 CHRONIC KIDNEY DISEASE (CKD), STAGE IV (SEVERE):  - Monitored renal function with creatinine level of 2.2, indicating reduced kidney function.  - Continued sodium bicarbonate 1 tablet twice daily for management.  - Ordered CMP for further evaluation and referred patient to nephrologist Dr. Briseyda Perry for specialized monitoring and to prevent progression to dialysis.    K82.A2 PERFORATION OF GALLBLADDER IN CHOLECYSTITIS:  - Monitored the patient's recovery following laparoscopic cholecystectomy where the gallbladder was found to be gangrenous and ruptured upon removal.  - Jamil was hospitalized from 06/25 to 06/29 for gallbladder issues and jaundice.  - Prior to surgery, patient experienced chest and abdominal pains, indicating severe gallbladder issues.    K83.09 OTHER CHOLANGITIS:  - Monitored the patient's condition following ERCP performed on 6/26, which revealed inflammation in the bile duct.  - Procedure included stent placement to address the inflammation.    E11.65 TYPE 2 DIABETES MELLITUS WITH HYPERGLYCEMIA:  - Monitored blood sugar: 208, 142, 117 on 6/22 and 171, 137, 74, 142, 163 on 7/5.  - Current blood sugar during visit is 196.  - Adjusted Tresiba (insulin) by a few more units for diabetes management due to elevated glucose levels post-hospitalization.    I10 ESSENTIAL  (PRIMARY) HYPERTENSION:  - Monitored blood pressure recorded as 113/50.  - Paused BP medications (indapamide, losartan, amlodipine) due to low BP during hospitalization.    D64.9 ANEMIA, UNSPECIFIED:  - Assessed the patient for potential anemia due to pallor and conjunctival pallor following recent surgery.  - Ordered CBC to evaluate blood counts.    Z79.4 LONG TERM (CURRENT) USE OF INSULIN:  - Jamil continues on long-term insulin therapy with recent adjustment to Tresiba dosage due to post-hospitalization hyperglycemia.    Z95.828 PRESENCE OF OTHER VASCULAR IMPLANTS AND GRAFTS:  - Jamil has bile duct stent placed during ERCP procedure on 6/26 to address inflammation.    K91.86 RETAINED CHOLELITHIASIS FOLLOWING CHOLECYSTECTOMY:  - Addressed through the laparoscopic cholecystectomy performed to remove the gangrenous gallbladder.       Plan:       Acute cholecystitis with acute cholangitis  Comments:  Status post cholecystectomy and presented with obstructive jaundice.  Slow and steady recovery  Orders:  -     Cancel: Comprehensive Metabolic Panel; Future; Expected date: 01/07/2026  -     Cancel: CBC Auto Differential; Future; Expected date: 07/08/2025  -     Comprehensive Metabolic Panel; Future; Expected date: 07/07/2025  -     CBC Auto Differential; Future; Expected date: 07/07/2025    Essential hypertension  Comments:  Hold off blood pressure medications for the time being till your steady and he recuperates.    Stage 3b chronic kidney disease  Comments:  He will be following up with Dr. Perry nephrology t.  Clarified to him that Dr. Perry's and nephrologist and not an oncologist    Secondary hyperparathyroidism of renal origin    Type 2 diabetes mellitus with stage 3b chronic kidney disease, with long-term current use of insulin  Comments:  Continue with conservative care in order to try and avoid hypoglycemia.    Hospital discharge follow-up  Comments:  Recently hospitalized for acute cholecystitis with  obstructive jaundice and cholangitis.  Recovering slowly and steadily.    Chronic anemia  -     iron-vit c-b12-folic acid (ICAR-C PLUS) Tab; Take 1 tablet by mouth Daily.  Dispense: 30 tablet; Refill: 3    Transitional care management performed and discussed about patient's hospital condition, medication holding, recovery, follow-up.  Try to identify any need for home health but none indicated by patient's light.  Labs and medications reviewed and reconciled.   Reviewed his follow up with surgeon Dr. Darwin ngo which indicated satisfactory improvement post cholecystectomy as would be expected.  Blood pressures are slightly on the low side Chivo hold off medications till he recuperate  Be conservative sugar management at this point.  Clarified to patient that Dr. Perry is the nephrologist and not an oncologist.  The reason for referral is issues with the renal function.  Follow up in about 9 weeks (around 9/8/2025), or if symptoms worsen or fail to improve, for Hypertension/lipids, Diabetes/HTN/Lipids.  Lab reports conveyed to the patient. :-  This note was generated with the assistance of ambient listening technology. Verbal consent was obtained by the patient and accompanying visitor(s) for the recording of patient appointment to facilitate this note. I attest to having reviewed and edited the generated note for accuracy, though some syntax or spelling errors may persist. Please contact the author of this note for any clarification.  Addendum:-    Lab reports conveyed to the patient:-  Kidney tests are getting better and creatinine has come down from 2.2 previously to 1.8.  Please continue to remain hydrated and try to past adequate amount of urine.  Hopefully this keeps on getting better.  Blood counts still show anemia with a hemoglobin of 8.7 which will continue to make you feel tired and run down.  Are you taking any iron supplements?         [1]   Current Outpatient Medications:     allopurinoL (ZYLOPRIM)  "100 MG tablet, Take 100 mg by mouth Daily., Disp: , Rfl:     amLODIPine (NORVASC) 5 MG tablet, TAKE 1 TABLET BY MOUTH EVERY DAY IN THE EVENING, Disp: 90 tablet, Rfl: 3    ascorbic acid, vitamin C, (VITAMIN C) 500 MG tablet, Take 500 mg by mouth once daily., Disp: , Rfl:     atorvastatin (LIPITOR) 10 MG tablet, Take 10 mg by mouth every Mon, Wed, Fri., Disp: , Rfl:     BD WINSOME 2ND GEN PEN NEEDLE 32 gauge x 5/32" Ndle, INJECT 1 UNITS INTO THE SKIN 3 (THREE) TIMES DAILY BEFORE MEALS. USE AS DIRECTED NON-MED ITEM, Disp: 300 each, Rfl: 3    calcitRIOL (ROCALTROL) 0.25 MCG Cap, Take 0.25 mcg by mouth As instructed. Mon, Tues, Wed, Thurs, Fri, Disp: , Rfl:     cyanocobalamin (VITAMIN B-12) 2000 MCG tablet, Take 2,000 mcg by mouth once daily., Disp: , Rfl:     insulin aspart U-100 (NOVOLOG FLEXPEN U-100 INSULIN) 100 unit/mL (3 mL) InPn pen, Inject 10 Units into the skin 3 (three) times daily with meals. Check glucose before meals and then take insulin as per sliding scale, Disp: 5 each, Rfl: 3    insulin degludec (TRESIBA FLEXTOUCH U-100) 100 unit/mL (3 mL) insulin pen, Inject 15 Units into the skin every evening., Disp: 15 mL, Rfl: 3    sodium bicarbonate 650 MG tablet, Take 650 mg by mouth 2 (two) times daily., Disp: , Rfl:     traZODone (DESYREL) 100 MG tablet, TAKE 1 TABLET BY MOUTH EVERY EVENING (DOSAGE INCREASE), Disp: 90 tablet, Rfl: 3    TRUE METRIX GLUCOSE TEST STRIP Strp, TEST 3 (THREE) TIMES DAILY BEFORE MEALS., Disp: 300 strip, Rfl: 1    zinc gluconate 50 mg tablet, Take 50 mg by mouth once daily., Disp: , Rfl:     blood-glucose meter (TRUE METRIX GLUCOSE METER) Misc, 1 Device by Misc.(Non-Drug; Combo Route) route once daily., Disp: 1 each, Rfl: 0    [Paused] indapamide (LOZOL) 1.25 MG Tab, Take 1.25 mg by mouth once daily. (Patient not taking: Reported on 7/7/2025), Disp: , Rfl:     iron-vit c-b12-folic acid (ICAR-C PLUS) Tab, Take 1 tablet by mouth Daily., Disp: 30 tablet, Rfl: 3    [Paused] losartan (COZAAR) " 100 MG tablet, TAKE 1 TABLET BY MOUTH EVERY DAY (Patient not taking: Reported on 7/7/2025), Disp: 90 tablet, Rfl: 3

## 2025-07-07 NOTE — PROGRESS NOTES
Postop note     Patient underwent cholecystectomy for possible malignancy syndrome.  Recovering well.  No longer jaundice.      Incisions are healing well     Pathology: Benign gallbladder     Doing well.  Follow up as needed.

## 2025-07-25 ENCOUNTER — HOSPITAL ENCOUNTER (OUTPATIENT)
Dept: PREADMISSION TESTING | Facility: HOSPITAL | Age: 80
Discharge: HOME | End: 2025-07-25
Attending: INTERNAL MEDICINE
Payer: MEDICARE

## 2025-07-25 VITALS — WEIGHT: 159 LBS | BODY MASS INDEX: 22.18 KG/M2

## 2025-07-25 NOTE — DISCHARGE INSTRUCTIONS
INSTRUCTIONS  To confirm your doctor has scheduled your surgery for:    Morning of surgery please check in with registration near [address] Entrance then proceed to Registration then to endoscopy department    ENDOSCOPY NURSES will call the afternoon prior to procedure with your final arrival time.    TAKE ONLY THESE MEDICATIONS WITH A SMALL SIP OF WATER THE MORNING OF SURGERY:    DO NOT TAKE ANY INSULIN OR ORAL DIABETIC MEDICATIONS THE MORNING OF SURGERY UNLESS DIRECTED BY YOUR PHYSICIAN OR PRE ADMIT NURSE.    DO NOT TAKE THESE MEDICATIONS 5-7 DAYS PRIOR to your procedure per your surgeon's request: ASPIRIN, ALEVE, BC powder, MALIK SELTZER, IBUPROFEN, FISH OIL, VITAMIN E, OR HERBALS   (May take Tylenol)    If you are prescribed any types of blood thinners (Aspirin, Coumadin, Plavix, Pradaxa, Xarelto, Aggrenox, Effient, Eliquis, Savasya, Brilinta or any other), please ask your surgeon how many days before scheduled procedure should you stop taking them. You may also need to verify with prescribing physician if it is OK to stop your blood thinners.      INSTRUCTIONS IMPORTANT!!  Do not eat anything after midnight. OK to drink clear liquids until 2 hours before arrival time.  ONLY if you are diabetic, check your sugar in the morning before your procedure.  Do not smoke, vape or drink alcoholic beverages 24 hours prior to your procedure.  If you wear contact lenses, dentures, hearing aids or glasses, bring a container to put them in during surgery and give to a family member for safe keeping.    Please leave all jewelry, piercing's and valuables at home.   Wear comfortable loose clothing and rubber soled shoes.  If your condition changes such as fever, cough, etc, please notify your surgeon.   ONLY if you have been diagnosed with sleep apnea please bring your C-PAP machine.  ONLY if you wear home oxygen please bring your portable oxygen tank the day of your procedure.   ONLY for patients requiring bowel prep,  written instructions will be given by your doctor's office.  ONLY if you have a neuro stimulator, please bring the controller with you the morning of surgery  Make arrangements in advance for transportation home by a responsible adult.  You must make arrangements for transportation, TAXI'S, UBER'S OR LYFTS ARE NOT ALLOWED.        If you have any questions about these instructions, call Endoscopy Nurse at [phone]

## 2025-07-29 ENCOUNTER — HOSPITAL ENCOUNTER (OUTPATIENT)
Dept: RADIOLOGY | Facility: HOSPITAL | Age: 80
Discharge: HOME | End: 2025-07-29
Attending: INTERNAL MEDICINE | Admitting: INTERNAL MEDICINE
Payer: MEDICARE

## 2025-07-29 ENCOUNTER — ANESTHESIA (OUTPATIENT)
Dept: SURGERY | Facility: HOSPITAL | Age: 80
End: 2025-07-29
Payer: MEDICARE

## 2025-07-29 ENCOUNTER — HOSPITAL ENCOUNTER (OUTPATIENT)
Facility: HOSPITAL | Age: 80
Discharge: HOME | End: 2025-07-29
Attending: INTERNAL MEDICINE | Admitting: INTERNAL MEDICINE
Payer: MEDICARE

## 2025-07-29 ENCOUNTER — ANESTHESIA EVENT (OUTPATIENT)
Dept: SURGERY | Facility: HOSPITAL | Age: 80
End: 2025-07-29
Payer: MEDICARE

## 2025-07-29 VITALS
OXYGEN SATURATION: 100 % | HEIGHT: 71 IN | SYSTOLIC BLOOD PRESSURE: 189 MMHG | RESPIRATION RATE: 19 BRPM | DIASTOLIC BLOOD PRESSURE: 86 MMHG | BODY MASS INDEX: 21.84 KG/M2 | TEMPERATURE: 98 F | WEIGHT: 156 LBS | HEART RATE: 71 BPM

## 2025-07-29 DIAGNOSIS — K80.50 CHOLEDOCHOLITHIASIS: ICD-10-CM

## 2025-07-29 DIAGNOSIS — R10.9 ABDOMINAL PAIN: ICD-10-CM

## 2025-07-29 LAB — POCT GLUCOSE: 77 MG/DL (ref 70–110)

## 2025-07-29 PROCEDURE — 25000003 PHARM REV CODE 250: Performed by: INTERNAL MEDICINE

## 2025-07-29 PROCEDURE — 25000003 PHARM REV CODE 250: Performed by: STUDENT IN AN ORGANIZED HEALTH CARE EDUCATION/TRAINING PROGRAM

## 2025-07-29 PROCEDURE — 37000008 HC ANESTHESIA 1ST 15 MINUTES: Performed by: INTERNAL MEDICINE

## 2025-07-29 PROCEDURE — 43275 ERCP REMOVE FORGN BODY DUCT: CPT | Performed by: INTERNAL MEDICINE

## 2025-07-29 PROCEDURE — 27202125 HC BALLOON, EXTRACTION (ANY): Performed by: INTERNAL MEDICINE

## 2025-07-29 PROCEDURE — 74328 X-RAY BILE DUCT ENDOSCOPY: CPT | Mod: TC | Performed by: INTERNAL MEDICINE

## 2025-07-29 PROCEDURE — 82962 GLUCOSE BLOOD TEST: CPT | Performed by: INTERNAL MEDICINE

## 2025-07-29 PROCEDURE — 27201089 HC SNARE, DISP (ANY): Performed by: INTERNAL MEDICINE

## 2025-07-29 PROCEDURE — 74330 X-RAY BILE/PANC ENDOSCOPY: CPT | Mod: TC

## 2025-07-29 PROCEDURE — 37000009 HC ANESTHESIA EA ADD 15 MINS: Performed by: INTERNAL MEDICINE

## 2025-07-29 PROCEDURE — 43264 ERCP REMOVE DUCT CALCULI: CPT | Performed by: INTERNAL MEDICINE

## 2025-07-29 PROCEDURE — 74330 X-RAY BILE/PANC ENDOSCOPY: CPT | Mod: 26,,, | Performed by: RADIOLOGY

## 2025-07-29 PROCEDURE — 25500020 PHARM REV CODE 255: Performed by: INTERNAL MEDICINE

## 2025-07-29 PROCEDURE — 63600175 PHARM REV CODE 636 W HCPCS: Performed by: STUDENT IN AN ORGANIZED HEALTH CARE EDUCATION/TRAINING PROGRAM

## 2025-07-29 PROCEDURE — C1769 GUIDE WIRE: HCPCS | Performed by: INTERNAL MEDICINE

## 2025-07-29 RX ORDER — HYDROMORPHONE HYDROCHLORIDE 1 MG/ML
0.2 INJECTION, SOLUTION INTRAMUSCULAR; INTRAVENOUS; SUBCUTANEOUS EVERY 5 MIN PRN
Status: DISCONTINUED | OUTPATIENT
Start: 2025-07-29 | End: 2025-07-29 | Stop reason: HOSPADM

## 2025-07-29 RX ORDER — INDOMETHACIN 50 MG/1
SUPPOSITORY RECTAL
Status: COMPLETED | OUTPATIENT
Start: 2025-07-29 | End: 2025-07-29

## 2025-07-29 RX ORDER — FAMOTIDINE 10 MG/ML
INJECTION, SOLUTION INTRAVENOUS
Status: DISCONTINUED | OUTPATIENT
Start: 2025-07-29 | End: 2025-07-29

## 2025-07-29 RX ORDER — ROCURONIUM BROMIDE 10 MG/ML
INJECTION, SOLUTION INTRAVENOUS
Status: DISCONTINUED | OUTPATIENT
Start: 2025-07-29 | End: 2025-07-29

## 2025-07-29 RX ORDER — OXYCODONE HYDROCHLORIDE 5 MG/1
5 TABLET ORAL
Status: DISCONTINUED | OUTPATIENT
Start: 2025-07-29 | End: 2025-07-29 | Stop reason: HOSPADM

## 2025-07-29 RX ORDER — DIPHENHYDRAMINE HYDROCHLORIDE 50 MG/ML
12.5 INJECTION, SOLUTION INTRAMUSCULAR; INTRAVENOUS
Status: DISCONTINUED | OUTPATIENT
Start: 2025-07-29 | End: 2025-07-29 | Stop reason: HOSPADM

## 2025-07-29 RX ORDER — ONDANSETRON HYDROCHLORIDE 2 MG/ML
INJECTION, SOLUTION INTRAVENOUS
Status: DISCONTINUED | OUTPATIENT
Start: 2025-07-29 | End: 2025-07-29

## 2025-07-29 RX ORDER — SUCCINYLCHOLINE CHLORIDE 20 MG/ML
INJECTION INTRAMUSCULAR; INTRAVENOUS
Status: DISCONTINUED | OUTPATIENT
Start: 2025-07-29 | End: 2025-07-29 | Stop reason: HOSPADM

## 2025-07-29 RX ORDER — SODIUM CHLORIDE, SODIUM LACTATE, POTASSIUM CHLORIDE, CALCIUM CHLORIDE 600; 310; 30; 20 MG/100ML; MG/100ML; MG/100ML; MG/100ML
INJECTION, SOLUTION INTRAVENOUS CONTINUOUS PRN
Status: DISCONTINUED | OUTPATIENT
Start: 2025-07-29 | End: 2025-07-29

## 2025-07-29 RX ORDER — GLUCAGON 1 MG
1 KIT INJECTION
Status: DISCONTINUED | OUTPATIENT
Start: 2025-07-29 | End: 2025-07-29 | Stop reason: HOSPADM

## 2025-07-29 RX ORDER — ONDANSETRON HYDROCHLORIDE 2 MG/ML
4 INJECTION, SOLUTION INTRAVENOUS DAILY PRN
Status: DISCONTINUED | OUTPATIENT
Start: 2025-07-29 | End: 2025-07-29 | Stop reason: HOSPADM

## 2025-07-29 RX ORDER — PROPOFOL 10 MG/ML
VIAL (ML) INTRAVENOUS
Status: DISCONTINUED | OUTPATIENT
Start: 2025-07-29 | End: 2025-07-29

## 2025-07-29 RX ORDER — LIDOCAINE HYDROCHLORIDE 20 MG/ML
INJECTION, SOLUTION EPIDURAL; INFILTRATION; INTRACAUDAL; PERINEURAL
Status: DISCONTINUED | OUTPATIENT
Start: 2025-07-29 | End: 2025-07-29

## 2025-07-29 RX ADMIN — Medication 200 MG: at 01:07

## 2025-07-29 RX ADMIN — SODIUM CHLORIDE, SODIUM LACTATE, POTASSIUM CHLORIDE, AND CALCIUM CHLORIDE: .6; .31; .03; .02 INJECTION, SOLUTION INTRAVENOUS at 01:07

## 2025-07-29 RX ADMIN — FAMOTIDINE 20 MG: 10 INJECTION, SOLUTION INTRAVENOUS at 01:07

## 2025-07-29 RX ADMIN — ONDANSETRON 4 MG: 2 INJECTION INTRAMUSCULAR; INTRAVENOUS at 01:07

## 2025-07-29 RX ADMIN — ROCURONIUM BROMIDE 5 MG: 10 INJECTION, SOLUTION INTRAVENOUS at 01:07

## 2025-07-29 RX ADMIN — PROPOFOL 120 MG: 10 INJECTION, EMULSION INTRAVENOUS at 01:07

## 2025-07-29 RX ADMIN — LIDOCAINE HYDROCHLORIDE 100 MG: 20 INJECTION, SOLUTION INTRAVENOUS at 01:07

## 2025-07-29 NOTE — PLAN OF CARE
Patient taken to endo department via stretcher at this time. Awake and alert not distressful. RosaRN at bedside to assume care of patient

## 2025-07-29 NOTE — H&P
GASTROENTEROLOGY PRE-PROCEDURE H&P NOTE  Patient Name: Jamil Cadet  Patient MRN: [id number]  Patient : 1945    Service date: 2025    PCP: Richard Gunter MD    No chief complaint on file.      HPI: Patient is a 80 y.o. male with PMHx as below here for evaluation of repeat ERCP to re-evaluate biliary system and remove stent / debris.     Past Medical History:  Past Medical History:   Diagnosis Date    Acute renal failure 2020    Bacteremia due to Enterococcus 10/25/2020    Cancer     Diabetes mellitus, type 2     Disorder of kidney and ureter     Encounter for blood transfusion     History of renal cell carcinoma status post partial nephrectomy 2020    Hydronephrosis 2020    Hyperlipidemia     Hypertension     Infection and inflammatory reaction due to nephrostomy catheter, initial encounter 2020    Infection following a procedure, superficial incisional surgical site, subsequent encounter 2020    Polio     Pyelonephritis of right kidney     Septicemia due to enterococcus 11/15/2020        Past Surgical History:  Past Surgical History:   Procedure Laterality Date    ANTEGRADE PYELOGRAM  12/10/2020    Procedure: PYELOGRAM, ANTEGRADE;  Surgeon: Dave Shin MD;  Location: [address];  Service: Urology;;    APPENDECTOMY      BACK SURGERY      CYSTOSCOPY  12/10/2020    Procedure: CYSTOSCOPY;  Surgeon: Dave Shin MD;  Location: [address];  Service: Urology;;    CYSTOSCOPY W/ RETROGRADES Right 2020    Procedure: CYSTOSCOPY, WITH RETROGRADE PYELOGRAM;  Surgeon: Chris Garcia Jr., MD;  Location: [facility];  Service: Urology;  Laterality: Right;    DILATION OF NEPHROSTOMY TRACT Right 12/10/2020    Procedure: DILATION, NEPHROSTOMY TRACT;  Surgeon: Dave Shin MD;  Location: [address];  Service: Urology;  Laterality: Right;    ERCP N/A 2025    Procedure: ERCP (ENDOSCOPIC RETROGRADE CHOLANGIOPANCREATOGRAPHY);  Surgeon: Azael Alfred  MD ELENA;  Location: [facility] ENDO;  Service: Endoscopy;  Laterality: N/A;    RADIOFREQUENCY ABLATION KIDNEY  2022    REMOVAL OF DRAIN Right 12/10/2020    Procedure: REMOVAL, DRAIN-NEPHROSTOMY TUBE;  Surgeon: Dave Shin MD;  Location: [facility] OR 1ST FLR;  Service: Urology;  Laterality: Right;    REPLACEMENT OF NEPHROSTOMY TUBE Right 12/10/2020    Procedure: REPLACEMENT, NEPHROSTOMY TUBE;  Surgeon: Dave Shin MD;  Location: [facility] OR 1ST FLR;  Service: Urology;  Laterality: Right;    ROBOT-ASSISTED CHOLECYSTECTOMY N/A 6/27/2025    Procedure: ROBOTIC CHOLECYSTECTOMY;  Surgeon: Darwin Clarke MD;  Location: [facility] OR;  Service: General;  Laterality: N/A;    ROBOT-ASSISTED LAPAROSCOPIC REIMPLANTATION OF URETER USING DA PHU XI Right 01/08/2021    Procedure: XI ROBOTIC IMPLANTATION, URETER, USING PSOAS HITCH TECHNIQUE;  Surgeon: Dave Shin MD;  Location: [facility] OR 2ND FLR;  Service: Urology;  Laterality: Right;  4hrs gen with regional    SPINE SURGERY      URETEROSCOPY Right 12/10/2020    Procedure: URETEROSCOPY-ANTEGRADE;  Surgeon: Dave Shin MD;  Location: [facility] OR 1ST FLR;  Service: Urology;  Laterality: Right;    URETHROSCOPY  09/09/2020    Procedure: URETHROSCOPY;  Surgeon: Chris Garcia Jr., MD;  Location: [facility];  Service: Urology;;        Home Medications:  Prescriptions Prior to Admission[1]            Review of patient's allergies indicates:  No Known Allergies    Social History:   Social History     Occupational History    Not on file   Tobacco Use    Smoking status: Never     Passive exposure: Past    Smokeless tobacco: Never   Substance and Sexual Activity    Alcohol use: No    Drug use: No    Sexual activity: Yes     Partners: Female       Family History:   Family History   Problem Relation Name Age of Onset    Heart disease Mother      Heart disease Father      Stroke Father      Heart disease Maternal Grandfather      Heart disease Paternal Grandmother      Heart disease Paternal  "Grandfather         Review of Systems:  A 10 point review of systems was performed and was normal, except as mentioned in the HPI, including constitutional, HEENT, heme, lymph, cardiovascular, respiratory, gastrointestinal, genitourinary, neurologic, endocrine, psychiatric and musculoskeletal.      OBJECTIVE:    Physical Exam:  24 Hour Vital Sign Ranges: Temp:  [97.5 °F (36.4 °C)] 97.5 °F (36.4 °C)  Pulse:  [73] 73  Resp:  [16] 16  SpO2:  [100 %] 100 %  BP: (144)/(61) 144/61  Most recent vitals: BP (!) 144/61 (BP Location: Left arm, Patient Position: Lying)   Pulse 73   Temp 97.5 °F (36.4 °C) (Temporal)   Resp 16   Ht 5' 11" (1.803 m)   Wt 70.8 kg (156 lb)   SpO2 100%   BMI 21.76 kg/m²    GEN: well-developed, well-nourished, awake and alert, non-toxic appearing adult  HEENT: PERRL, sclera anicteric, oral mucosa pink and moist without lesion  NECK: trachea midline; Good ROM  CV: regular rate and rhythm, no murmurs or gallops  RESP: clear to auscultation bilaterally, no wheezes, rhonci or rales  ABD: soft, non-tender, non-distended, normal bowel sounds  EXT: no swelling or edema, 2+ pulses distally  SKIN: no rashes or jaundice  PSYCH: normal affect    Labs:   No results for input(s): "WBC", "MCV", "PLT" in the last 72 hours.    Invalid input(s): "HGBAU"  No results for input(s): "NA", "K", "CL", "CO2", "BUN", "GLU" in the last 72 hours.    Invalid input(s): "CREA"  No results for input(s): "ALB" in the last 72 hours.    Invalid input(s): "ALKP", "SGOT", "SGPT", "TBIL", "DBIL", "TPRO"  No results for input(s): "PT", "INR", "PTT" in the last 72 hours.      IMPRESSION / RECOMMENDATIONS:  ERCP with interventions as warranted.   RIsks, benefits, alternatives discussed in detail regarding upcoming procedures and sedation. Some of the more common endoscopic complications include but not limited to immediate or delayed perforation, bleeding, infections, pain, inadvertent injury to surrounding tissue / organs and " "possible need for surgical evaluation. Patient expressed understanding, all questions answered and will proceed with procedure as planned.     Azael MINOR Tima PARKER  7/29/2025  12:29 PM           [1]   Medications Prior to Admission   Medication Sig Dispense Refill Last Dose/Taking    allopurinoL (ZYLOPRIM) 100 MG tablet Take 100 mg by mouth Daily.       amLODIPine (NORVASC) 5 MG tablet TAKE 1 TABLET BY MOUTH EVERY DAY IN THE EVENING 90 tablet 3     ascorbic acid, vitamin C, (VITAMIN C) 500 MG tablet Take 500 mg by mouth once daily.       atorvastatin (LIPITOR) 10 MG tablet Take 10 mg by mouth every Mon, Wed, Fri.       BD WINSOME 2ND GEN PEN NEEDLE 32 gauge x 5/32" Ndle INJECT 1 UNITS INTO THE SKIN 3 (THREE) TIMES DAILY BEFORE MEALS. USE AS DIRECTED NON-MED ITEM 300 each 3     blood-glucose meter (TRUE METRIX GLUCOSE METER) Misc 1 Device by Misc.(Non-Drug; Combo Route) route once daily. 1 each 0     calcitRIOL (ROCALTROL) 0.25 MCG Cap Take 0.25 mcg by mouth As instructed. Mon, Tues, Wed, Thurs, Fri       cyanocobalamin (VITAMIN B-12) 2000 MCG tablet Take 2,000 mcg by mouth once daily.       [Paused] indapamide (LOZOL) 1.25 MG Tab Take 1.25 mg by mouth once daily.       insulin aspart U-100 (NOVOLOG FLEXPEN U-100 INSULIN) 100 unit/mL (3 mL) InPn pen Inject 10 Units into the skin 3 (three) times daily with meals. Check glucose before meals and then take insulin as per sliding scale (Patient taking differently: Inject 4 Units into the skin once daily. Check glucose before meals and then take insulin as per sliding scale) 5 each 3     insulin degludec (TRESIBA FLEXTOUCH U-100) 100 unit/mL (3 mL) insulin pen Inject 15 Units into the skin every evening. 15 mL 3     iron-vit c-b12-folic acid (ICAR-C PLUS) Tab Take 1 tablet by mouth Daily. 30 tablet 3     [Paused] losartan (COZAAR) 100 MG tablet TAKE 1 TABLET BY MOUTH EVERY DAY (Patient not taking: Reported on 7/7/2025) 90 tablet 3     sodium bicarbonate 650 MG tablet Take " 650 mg by mouth 2 (two) times daily.       traZODone (DESYREL) 100 MG tablet TAKE 1 TABLET BY MOUTH EVERY EVENING (DOSAGE INCREASE) 90 tablet 3     TRUE METRIX GLUCOSE TEST STRIP Strp TEST 3 (THREE) TIMES DAILY BEFORE MEALS. 300 strip 1     zinc gluconate 50 mg tablet Take 50 mg by mouth once daily.

## 2025-07-29 NOTE — [employer] PATIENT INSTRUCTIONS
Discharge Summary/Instructions after an Endoscopic Procedure  Patient Name: Jamil Cadet  Patient MRN: [id number]  Patient YOB: 1945  Tuesday, July 29, 2025  Azael Alfred III, MD  RESTRICTIONS:  During your procedure today, you received medications for sedation.  These   medications may affect your judgment, balance and coordination.  Therefore,   for 24 hours, you have the following restrictions:   - DO NOT drive a car, operate machinery, make legal/financial decisions,   sign important papers or drink alcohol.    ACTIVITY:  Today: no heavy lifting, straining or running due to procedural   sedation/anesthesia.  The following day: return to full activity including work.  DIET:  Eat and drink normally unless instructed otherwise.     TREATMENT FOR COMMON SIDE EFFECTS:  - Mild abdominal pain, nausea, belching, bloating or excessive gas:  rest,   eat lightly and use a heating pad.  - Sore Throat: treat with throat lozenges and/or gargle with warm salt   water.  - Because air was used during the procedure, expelling large amounts of air   from your rectum or belching is normal.  - If a bowel prep was taken, you may not have a bowel movement for 1-3 days.    This is normal.  SYMPTOMS TO WATCH FOR AND REPORT TO YOUR PHYSICIAN:  1. Abdominal pain or bloating, other than gas cramps.  2. Chest pain.  3. Back pain.  4. Signs of infection such as: chills or fever occurring within 24 hours   after the procedure.  5. Rectal bleeding, which would show as bright red, maroon, or black stools.   (A tablespoon of blood from the rectum is not serious, especially if   hemorrhoids are present.)  6. Vomiting.  7. Weakness or dizziness.  GO DIRECTLY TO THE NEAREST EMERGENCY ROOM IF YOU HAVE ANY OF THE FOLLOWING:      Difficulty breathing              Chills and/or fever over 101 F   Persistent vomiting and/or vomiting blood   Severe abdominal pain   Severe chest pain   Black, tarry stools   Bleeding- more than one  tablespoon   Any other symptom or condition that you feel may need urgent attention  Your doctor recommends these additional instructions:  If any biopsies were taken, your doctors clinic will contact you in 1 to 2   weeks with any results.  - Advance diet as tolerated.   - Continue present medications.   - Patient has a contact number available for emergencies.  The signs and   symptoms of potential delayed complications were discussed with the   patient.  Return to normal activities tomorrow.  Written discharge   instructions were provided to the patient.   - Discharge patient to home (with escort).  For questions, problems or results please call your physician - Azael Alfred III, MD at Work:  [phone].  [facility], EMERGENCY ROOM PHONE NUMBER: [phone]  IF A COMPLICATION OR EMERGENCY SITUATION ARISES AND YOU ARE UNABLE TO REACH   YOUR PHYSICIAN - GO DIRECTLY TO THE EMERGENCY ROOM.  Azael Alfred III, MD  7/29/2025 2:10:04 PM  This report has been verified and signed electronically.  Dear patient,  As a result of recent federal legislation (The Federal Cures Act), you may   receive lab or pathology results from your procedure in your [facility]   account before your physician is able to contact you. Your physician or   their representative will relay the results to you with their   recommendations at their soonest availability.  Thank you,  [employer]

## 2025-07-29 NOTE — TRANSFER OF CARE
"Anesthesia Transfer of Care Note    Patient: Jamil Cadet    Procedure(s) Performed: Procedure(s) (LRB):  ERCP (ENDOSCOPIC RETROGRADE CHOLANGIOPANCREATOGRAPHY) (N/A)    Patient location: PACU    Anesthesia Type: general    Transport from OR: Transported from OR on room air with adequate spontaneous ventilation    Post pain: adequate analgesia    Post assessment: no apparent anesthetic complications and tolerated procedure well    Post vital signs: stable    Level of consciousness: awake and alert    Nausea/Vomiting: no nausea/vomiting    Complications: none    Transfer of care protocol was followed    Last vitals: Visit Vitals  BP (!) 144/61 (BP Location: Left arm, Patient Position: Lying)   Pulse 73   Temp 36.4 °C (97.5 °F) (Temporal)   Resp 16   Ht 5' 11" (1.803 m)   Wt 70.8 kg (156 lb)   SpO2 100%   BMI 21.76 kg/m²     "

## 2025-07-29 NOTE — ANESTHESIA PREPROCEDURE EVALUATION
07/29/2025  Jamil Cadet is a 80 y.o., male.      Results for orders placed or performed during the hospital encounter of 06/25/25   EKG 12-lead    Collection Time: 06/25/25 11:51 PM   Result Value Ref Range    QRS Duration 134 ms    [facility] QTC Calculation 428 ms    Narrative    Test Reason : R07.9,    Vent. Rate :  69 BPM     Atrial Rate :  69 BPM     P-R Int : 172 ms          QRS Dur : 134 ms      QT Int : 400 ms       P-R-T Axes :  92 -23 -24 degrees    QTcB Int : 428 ms    Normal sinus rhythm  Right bundle branch block  Inferior infarct ,age undetermined  Abnormal ECG  Confirmed by Asuncion Rolle ([id number]) on 6/26/2025 4:51:22 PM    Referred By: AAAREFERRAL SELF           Confirmed By: Asuncion Rolle               Lab Results   Component Value Date    WBC 9.00 07/07/2025    HGB 8.7 (L) 07/07/2025    HCT 27.4 (L) 07/07/2025     (H) 07/07/2025     (H) 07/07/2025     BMP  Lab Results   Component Value Date     07/07/2025    K 4.3 07/07/2025     07/07/2025    CO2 19 (L) 07/07/2025    BUN 28 (H) 07/07/2025    CREATININE 1.8 (H) 07/07/2025    CALCIUM 8.8 07/07/2025    ANIONGAP 12 07/07/2025    GLU 67 (L) 07/07/2025     (H) 06/29/2025     (H) 06/28/2025       Results for orders placed during the hospital encounter of 12/05/23    Echo    Interpretation Summary    Left Ventricle: The left ventricle is normal in size. Mildly increased wall thickness. There is mild concentric hypertrophy. Normal wall motion. There is normal systolic function with a visually estimated ejection fraction of 60 - 65%. There is normal diastolic function.    Right Ventricle: Normal right ventricular cavity size. Wall thickness is normal. Right ventricle wall motion  is normal. Systolic function is normal.    Left Atrium: Left atrium is mildly dilated.    Aortic Valve: The aortic valve is a  trileaflet valve. There is mild aortic valve sclerosis.    Mitral Valve: There is mild regurgitation with a centrally directed jet.    Tricuspid Valve: There is mild regurgitation with a centrally directed jet.    IVC/SVC: Normal venous pressure at 3 mmHg.         Pre-op Assessment    I have reviewed the Patient Summary Reports.     I have reviewed the Nursing Notes. I have reviewed the NPO Status.   I have reviewed the Medications.     Review of Systems  Anesthesia Hx:   History of prior surgery of interest to airway management or planning: nephrectomy.         Denies Family Hx of Anesthesia complications.    Denies Personal Hx of Anesthesia complications.                    Social:  Non-Smoker, No Alcohol Use       Hematology/Oncology:       -- Anemia:               Hematology Comments: Thrombocytosis      --  Cancer in past history:              surgery       Cardiovascular:     Hypertension           hyperlipidemia   ECG has been reviewed.                            Renal/:  Chronic Renal Disease   History of partial nephrectomy  History of hydronephrosis  History of MELODIE   Neoplasm/Tumor, Renal Neoplasm, Renal Cell Carcinoma (CA).   Kidney Function/Disease, Chronic Kidney Disease (CKD) , CKD Stage III (GFR 30-59)            Hepatic/GI:        Acute cholecystitis status post cholecystectomy.  Choledocholithiasis.  Dilated bile duct.  Liver enzymes have normalized.         Biliary Disease, Biliary Obstruction  Liver Disease, Abnormal Liver Enzymes        Neurological:    Neuromuscular Disease,       History of Polio  Polyneuropathy                            Endocrine:  Diabetes, poorly controlled, type 2, using insulin               Physical Exam  General: Well nourished, Cooperative, Alert and Oriented    Airway:  Mallampati: III   Mouth Opening: Normal  TM Distance: > 6 cm  Tongue: Normal  Neck ROM: Normal ROM    Dental:  Intact, Caps / Implants    Chest/Lungs:  Clear to auscultation, Normal Respiratory  Rate    Heart:  Rate: Normal  Rhythm: Regular Rhythm        Anesthesia Plan  Type of Anesthesia, risks & benefits discussed:    Anesthesia Type: Gen ETT  Intra-op Monitoring Plan: Standard ASA Monitors  Post Op Pain Control Plan: multimodal analgesia and IV/PO Opioids PRN  Induction:  IV  Airway Plan: , Post-Induction  Informed Consent: Informed consent signed with the Patient and all parties understand the risks and agree with anesthesia plan.  All questions answered.   ASA Score: 3  Anesthesia Plan Notes: GETA  No Versed  Multimodal analgesia with ofirmev 1000 mg.  PONV prophylaxis with Pepcid 20 mg IV, and Zofran 4 mg IV.  Sugammadex        Ready For Surgery From Anesthesia Perspective.     .

## 2025-07-29 NOTE — ANESTHESIA PROCEDURE NOTES
Intubation    Date/Time: 7/29/2025 1:41 PM    Performed by: Yanelis Peres CRNA  Authorized by: Harvinder Carlton MD    Intubation:     Induction:  Intravenous    Intubated:  Postinduction    Mask Ventilation:  Easy mask    Attempts:  1    Attempted By:  CRNA    Method of Intubation:  Video laryngoscopy    Blade:  Fernandez 4    Laryngeal View Grade: Grade I - full view of cords      Difficult Airway Encountered?: No      Complications:  None    Airway Device:  Oral endotracheal tube    Airway Device Size:  7.5    Style/Cuff Inflation:  Cuffed    Secured at:  The lips    Placement Verified By:  Capnometry    Complicating Factors:  None    Findings Post-Intubation:  BS equal bilateral and atraumatic/condition of teeth unchanged

## 2025-07-30 LAB — POCT GLUCOSE: 95 MG/DL (ref 70–110)

## 2025-07-30 NOTE — ANESTHESIA POSTPROCEDURE EVALUATION
Anesthesia Post Evaluation    Patient: Jamil Cadet    Procedure(s) Performed: Procedure(s) (LRB):  ERCP (ENDOSCOPIC RETROGRADE CHOLANGIOPANCREATOGRAPHY) (N/A)    Final Anesthesia Type: general      Patient location during evaluation: PACU  Patient participation: Yes- Able to Participate  Level of consciousness: awake and alert  Post-procedure vital signs: reviewed and stable  Pain management: adequate  Airway patency: patent    PONV status at discharge: No PONV  Anesthetic complications: no      Cardiovascular status: stable  Respiratory status: unassisted and spontaneous ventilation  Hydration status: euvolemic  Follow-up not needed.              Vitals Value Taken Time   /86 07/29/25 14:47   Temp 36.4 °C (97.5 °F) 07/29/25 14:47   Pulse 71 07/29/25 14:47   Resp 19 07/29/25 14:47   SpO2 100 % 07/29/25 14:47         Event Time   Out of Recovery 14:39:11         Pain/Vi Score: Vi Score: 10 (7/29/2025  2:46 PM)

## 2025-08-08 ENCOUNTER — LAB VISIT (OUTPATIENT)
Dept: LAB | Facility: HOSPITAL | Age: 80
End: 2025-08-08
Attending: INTERNAL MEDICINE
Payer: MEDICARE

## 2025-08-08 DIAGNOSIS — I10 ESSENTIAL HYPERTENSION, MALIGNANT: ICD-10-CM

## 2025-08-08 DIAGNOSIS — R80.9 PROTEINURIA, UNSPECIFIED TYPE: ICD-10-CM

## 2025-08-08 DIAGNOSIS — N17.9 ACUTE KIDNEY FAILURE, UNSPECIFIED: Primary | ICD-10-CM

## 2025-08-08 DIAGNOSIS — N18.9 CHRONIC KIDNEY DISEASE, UNSPECIFIED: ICD-10-CM

## 2025-08-08 DIAGNOSIS — N25.81 SECONDARY HYPERPARATHYROIDISM OF RENAL ORIGIN: ICD-10-CM

## 2025-08-08 LAB
25(OH)D3+25(OH)D2 SERPL-MCNC: 35 NG/ML (ref 30–96)
ABSOLUTE EOSINOPHIL (SMH): 0.22 K/UL
ABSOLUTE MONOCYTE (SMH): 0.58 K/UL (ref 0.3–1)
ABSOLUTE NEUTROPHIL COUNT (SMH): 3.9 K/UL (ref 1.8–7.7)
ALBUMIN SERPL-MCNC: 3.8 G/DL (ref 3.5–5.2)
ALBUMIN/CREAT UR: NORMAL
ANION GAP (SMH): 6 MMOL/L (ref 8–16)
BASOPHILS # BLD AUTO: 0.02 K/UL
BASOPHILS NFR BLD AUTO: 0.4 %
BUN SERPL-MCNC: 33 MG/DL (ref 8–23)
CALCIUM SERPL-MCNC: 8.8 MG/DL (ref 8.7–10.5)
CHLORIDE SERPL-SCNC: 112 MMOL/L (ref 95–110)
CO2 SERPL-SCNC: 22 MMOL/L (ref 23–29)
CREAT SERPL-MCNC: 2 MG/DL (ref 0.5–1.4)
CREAT UR-MCNC: 100.7 MG/DL (ref 23–375)
CREAT UR-MCNC: 102.2 MG/DL (ref 23–375)
ERYTHROCYTE [DISTWIDTH] IN BLOOD BY AUTOMATED COUNT: 14.4 % (ref 11.5–14.5)
FERRITIN SERPL-MCNC: 99.6 NG/ML (ref 20–300)
GFR SERPLBLD CREATININE-BSD FMLA CKD-EPI: 33 ML/MIN/1.73/M2
GLUCOSE SERPL-MCNC: 92 MG/DL (ref 70–110)
HCT VFR BLD AUTO: 32.9 % (ref 40–54)
HGB BLD-MCNC: 10.3 GM/DL (ref 14–18)
IMM GRANULOCYTES # BLD AUTO: 0.02 K/UL (ref 0–0.04)
IMM GRANULOCYTES NFR BLD AUTO: 0.4 % (ref 0–0.5)
IRON SATN MFR SERPL: 32 % (ref 20–50)
IRON SERPL-MCNC: 74 UG/DL (ref 45–160)
LYMPHOCYTES # BLD AUTO: 1 K/UL (ref 1–4.8)
MCH RBC QN AUTO: 32 PG (ref 27–31)
MCHC RBC AUTO-ENTMCNC: 31.3 G/DL (ref 32–36)
MCV RBC AUTO: 102 FL (ref 82–98)
MICROALBUMIN UR-MCNC: <7 UG/ML
MICROSCOPIC COMMENT: NORMAL
NUCLEATED RBC (/100WBC) (SMH): 0 /100 WBC
PHOSPHATE SERPL-MCNC: 3.2 MG/DL (ref 2.7–4.5)
PLATELET # BLD AUTO: 225 K/UL (ref 150–450)
PMV BLD AUTO: 11.1 FL (ref 9.2–12.9)
POTASSIUM SERPL-SCNC: 4.7 MMOL/L (ref 3.5–5.1)
PROT UR-MCNC: 18 MG/DL
PROT/CREAT UR: 0.18 MG/G{CREAT}
PTH-INTACT SERPL-MCNC: 73.8 PG/ML (ref 9–77)
RBC # BLD AUTO: 3.22 M/UL (ref 4.6–6.2)
RBC #/AREA URNS AUTO: 1 /HPF
RELATIVE EOSINOPHIL (SMH): 3.9 % (ref 0–8)
RELATIVE LYMPHOCYTE (SMH): 17.6 % (ref 18–48)
RELATIVE MONOCYTE (SMH): 10.2 % (ref 4–15)
RELATIVE NEUTROPHIL (SMH): 67.5 % (ref 38–73)
SODIUM SERPL-SCNC: 140 MMOL/L (ref 136–145)
SQUAMOUS #/AREA URNS AUTO: <1 /HPF
TIBC SERPL-MCNC: 231 UG/DL (ref 250–450)
TRANSFERRIN SERPL-MCNC: 165 MG/DL (ref 200–375)
URATE SERPL-MCNC: 7.6 MG/DL (ref 3.4–7)
WBC # BLD AUTO: 5.69 K/UL (ref 3.9–12.7)
WBC #/AREA URNS AUTO: 1 /HPF

## 2025-08-08 PROCEDURE — 36415 COLL VENOUS BLD VENIPUNCTURE: CPT | Mod: PO

## 2025-08-08 PROCEDURE — 83970 ASSAY OF PARATHORMONE: CPT

## 2025-08-08 PROCEDURE — 82043 UR ALBUMIN QUANTITATIVE: CPT

## 2025-08-08 PROCEDURE — 81001 URINALYSIS AUTO W/SCOPE: CPT

## 2025-08-08 PROCEDURE — 82306 VITAMIN D 25 HYDROXY: CPT

## 2025-08-08 PROCEDURE — 83540 ASSAY OF IRON: CPT

## 2025-08-08 PROCEDURE — 85025 COMPLETE CBC W/AUTO DIFF WBC: CPT

## 2025-08-08 PROCEDURE — 84550 ASSAY OF BLOOD/URIC ACID: CPT

## 2025-08-08 PROCEDURE — 82728 ASSAY OF FERRITIN: CPT

## 2025-08-08 PROCEDURE — 82040 ASSAY OF SERUM ALBUMIN: CPT

## 2025-08-08 PROCEDURE — 82570 ASSAY OF URINE CREATININE: CPT | Mod: 59

## 2025-08-16 ENCOUNTER — HOSPITAL ENCOUNTER (OUTPATIENT)
Dept: RADIOLOGY | Facility: HOSPITAL | Age: 80
Discharge: HOME | End: 2025-08-16
Attending: UROLOGY
Payer: MEDICARE

## 2025-08-16 DIAGNOSIS — C64.1 RENAL CELL ADENOCARCINOMA, RIGHT: ICD-10-CM

## 2025-08-16 PROCEDURE — A9585 GADOBUTROL INJECTION: HCPCS | Performed by: UROLOGY

## 2025-08-16 PROCEDURE — 25500020 PHARM REV CODE 255: Performed by: UROLOGY

## 2025-08-16 PROCEDURE — 74183 MRI ABD W/O CNTR FLWD CNTR: CPT | Mod: TC

## 2025-08-16 PROCEDURE — 74183 MRI ABD W/O CNTR FLWD CNTR: CPT | Mod: 26,,, | Performed by: RADIOLOGY

## 2025-08-16 RX ORDER — GADOBUTROL 604.72 MG/ML
7 INJECTION INTRAVENOUS
Status: COMPLETED | OUTPATIENT
Start: 2025-08-16 | End: 2025-08-16

## 2025-08-16 RX ADMIN — GADOBUTROL 7 ML: 604.72 INJECTION INTRAVENOUS at 10:08

## 2025-08-18 ENCOUNTER — PATIENT MESSAGE (OUTPATIENT)
Dept: UROLOGY | Facility: CLINIC | Age: 80
End: 2025-08-18
Payer: MEDICARE

## 2025-08-18 ENCOUNTER — HOSPITAL ENCOUNTER (OUTPATIENT)
Dept: RADIOLOGY | Facility: HOSPITAL | Age: 80
Discharge: HOME | End: 2025-08-18
Attending: UROLOGY
Payer: MEDICARE

## 2025-08-18 ENCOUNTER — PATIENT MESSAGE (OUTPATIENT)
Dept: FAMILY MEDICINE | Facility: CLINIC | Age: 80
End: 2025-08-18
Payer: MEDICARE

## 2025-08-18 DIAGNOSIS — C64.1 RENAL CELL ADENOCARCINOMA, RIGHT: ICD-10-CM

## 2025-08-18 PROCEDURE — 71046 X-RAY EXAM CHEST 2 VIEWS: CPT | Mod: TC,PO

## 2025-08-18 PROCEDURE — 71046 X-RAY EXAM CHEST 2 VIEWS: CPT | Mod: 26,,, | Performed by: RADIOLOGY

## 2025-08-20 ENCOUNTER — OFFICE VISIT (OUTPATIENT)
Dept: UROLOGY | Facility: CLINIC | Age: 80
End: 2025-08-20
Payer: MEDICARE

## 2025-08-20 VITALS
BODY MASS INDEX: 23.36 KG/M2 | HEART RATE: 59 BPM | WEIGHT: 166.88 LBS | SYSTOLIC BLOOD PRESSURE: 128 MMHG | HEIGHT: 71 IN | DIASTOLIC BLOOD PRESSURE: 66 MMHG

## 2025-08-20 DIAGNOSIS — N18.32 STAGE 3B CHRONIC KIDNEY DISEASE: ICD-10-CM

## 2025-08-20 DIAGNOSIS — I10 ESSENTIAL HYPERTENSION: ICD-10-CM

## 2025-08-20 DIAGNOSIS — C64.1 RENAL CELL ADENOCARCINOMA, RIGHT: Primary | ICD-10-CM

## 2025-08-20 PROCEDURE — 99999 PR PBB SHADOW E&M-EST. PATIENT-LVL IV: CPT | Mod: PBBFAC,,, | Performed by: UROLOGY

## 2025-08-20 PROCEDURE — G2211 COMPLEX E/M VISIT ADD ON: HCPCS | Mod: ,,, | Performed by: UROLOGY

## 2025-08-20 PROCEDURE — 99214 OFFICE O/P EST MOD 30 MIN: CPT | Mod: S$PBB,,, | Performed by: UROLOGY

## 2025-08-20 PROCEDURE — 99214 OFFICE O/P EST MOD 30 MIN: CPT | Mod: PBBFAC | Performed by: UROLOGY

## (undated) DEVICE — IRRIGATOR ENDOWRIST XI SUCTION

## (undated) DEVICE — DRAPE COLUMN DAVINCI XI

## (undated) DEVICE — SOL ELECTROLUBE ANTI-STIC

## (undated) DEVICE — TUBING CYSTO DOUBLE 654301

## (undated) DEVICE — IRRIGATOR ENDOSCOPY DISP.

## (undated) DEVICE — TRAY MINOR GEN SURG

## (undated) DEVICE — APPLICATOR CHLORAPREP ORN 26ML

## (undated) DEVICE — DRESSING TEGADERM 4.4X5IN

## (undated) DEVICE — SCISSOR 5MMX35CM DIRECT DRIVE

## (undated) DEVICE — STRAP OR TABLE 5IN X 72IN

## (undated) DEVICE — SYR 30CC LUER LOCK

## (undated) DEVICE — PORT AIRSEAL 12/120MM LPI

## (undated) DEVICE — GLOVE SENSICARE PI MICRO 7.5

## (undated) DEVICE — SYR ONLY LUER LOCK 20CC

## (undated) DEVICE — GOWN X-LG STERILE BACK

## (undated) DEVICE — SOL NS 1000CC

## (undated) DEVICE — SUT VICRYL CTD 2-0 GI 27 SH

## (undated) DEVICE — GUIDE WIRE MOTION .035 X 150CM

## (undated) DEVICE — CLIP HEMO-LOK ML

## (undated) DEVICE — SOL WATER STRL IRR 1000ML

## (undated) DEVICE — PACK CYSTO

## (undated) DEVICE — CATH 5FR OPEN END URETERAL

## (undated) DEVICE — SUT MONOCRYL 4-0 PS-2

## (undated) DEVICE — TRAY CYSTO BASIN

## (undated) DEVICE — TROCAR ENDOPATH XCEL 12X100MM

## (undated) DEVICE — SYR 50ML CATH TIP

## (undated) DEVICE — STOPCOCK 1 WAY PLASTIC

## (undated) DEVICE — SOL IRRI STRL WATER 1000ML

## (undated) DEVICE — ELECTRODE REM PLYHSV RETURN 9

## (undated) DEVICE — CATHETER URETERAL OPEN TIP 5FX70

## (undated) DEVICE — DRAPE ARM DAVINCI XI

## (undated) DEVICE — GUIDEWIRE URO STRT FLEXIBLE TIP .035X150

## (undated) DEVICE — SUT MONOCRYL 3-0 UNDYED RB1

## (undated) DEVICE — SEAL UNIVERSAL 5MM-8MM XI

## (undated) DEVICE — SET IRR URLGY 2LINE UNIV SPIKE

## (undated) DEVICE — BAG TISSUE RETRIEVAL 5MM

## (undated) DEVICE — NDL SAFETY 21G X 1 1/2 ECLPSE

## (undated) DEVICE — TROCAR ENDO Z THREAD KII 5X100

## (undated) DEVICE — Device

## (undated) DEVICE — NDL INSUF ULTRA VERESS 120MM

## (undated) DEVICE — COVER LIGHT HANDLE

## (undated) DEVICE — COVER CAMERA OPERATING ROOM

## (undated) DEVICE — ADHESIVE DERMABOND ADVANCED

## (undated) DEVICE — SCRUB BACTOSHIELD 134439

## (undated) DEVICE — SOL CLEARIFY VISUALIZATION LAP

## (undated) DEVICE — SET TUBE DAVINCI 5 HI FLOW INS

## (undated) DEVICE — APPLICATOR SURGICEL ENDOSCOPIC

## (undated) DEVICE — SUT MCRYL PLUS 4-0 PS2 27IN

## (undated) DEVICE — CATH URETERAL DL 10FR 24CM

## (undated) DEVICE — PACK CYSTOSCOPY III

## (undated) DEVICE — SOL IRR NACL .9% 3000ML

## (undated) DEVICE — GOWN ORBIS LVL 4 XL 47IN

## (undated) DEVICE — SOL IRR WATER STRL 3000 ML

## (undated) DEVICE — CLIP HEMO-LOK MLX LARGE LF

## (undated) DEVICE — GUIDEWIRE URO STRGHT 3CM TIP.035X150CM

## (undated) DEVICE — COVER TIP CURVED SCISSORS XI

## (undated) DEVICE — DRAPE SCOPE PILLOW WARMER

## (undated) DEVICE — OBTURATOR BLADELESS 8MM XI CLR

## (undated) DEVICE — SET TRI-LUMEN FILTERED TUBE

## (undated) DEVICE — DRAPE ABDOMINAL TIBURON 14X11

## (undated) DEVICE — SEAL CANN UNIVERSAL 5-12MM

## (undated) DEVICE — GUIDEWIRE PTFE .038INX145CM

## (undated) DEVICE — COVER LIGHT HANDLE 80/CA

## (undated) DEVICE — COVER MAYO STND XL 30X57IN

## (undated) DEVICE — BAG DRAINAGE DISP LF 600ML

## (undated) DEVICE — SPONGE KERLIX SUPER   NON25854

## (undated) DEVICE — CONTAINER SPECIMEN 4 OZ STERILE 1053

## (undated) DEVICE — SET DILATOR RENAL

## (undated) DEVICE — LOOP VESSEL BLUE MAXI

## (undated) DEVICE — LUBRICANT SURGILUBE 2 OZ

## (undated) DEVICE — CLIPPER BLADE MOD 4406 (CAREF)

## (undated) DEVICE — ELECTRODE REM POLYHESIVE II

## (undated) DEVICE — SUT CTD VICRYL 3-0 V.L BR

## (undated) DEVICE — SYR 10CC LUER LOCK

## (undated) DEVICE — PACK SPY-PHI DRUG DRAPE

## (undated) DEVICE — HEMOSTAT SURGICEL PWD 3G

## (undated) DEVICE — SUT MONOCRYL 3-0 RB1

## (undated) DEVICE — SEE MEDLINE ITEM 157149

## (undated) DEVICE — GLOVE SENSICARE PI GRN 8

## (undated) DEVICE — TROCAR ENDOPATH XCEL 5MM 7.5CM

## (undated) DEVICE — DILATOR URETERAL SET 6-18FRX60

## (undated) DEVICE — JELLY LUBRICATING 5GR

## (undated) DEVICE — SYS SEE SHARP SCP ANTIFG LNG

## (undated) DEVICE — GLOVE BIOGELPI GOLD SIZE 7.5

## (undated) DEVICE — BAG URINARY DRAINAGE 2000ML

## (undated) DEVICE — SOLUTION H2O IRRIGATION 3000ML R8006

## (undated) DEVICE — BLLN ULTRAXX NEPHSTMY 6MM 15CM

## (undated) DEVICE — GOWN X-LARGE 044674

## (undated) DEVICE — BLADE SURG CARBON STEEL SZ11

## (undated) DEVICE — GOWN SMART LRG 044673

## (undated) DEVICE — CATH POLLACK OPEN-END FLEXI-TI